# Patient Record
Sex: FEMALE | Race: BLACK OR AFRICAN AMERICAN | Employment: FULL TIME | URBAN - METROPOLITAN AREA
[De-identification: names, ages, dates, MRNs, and addresses within clinical notes are randomized per-mention and may not be internally consistent; named-entity substitution may affect disease eponyms.]

---

## 2018-03-01 ENCOUNTER — HOSPITAL ENCOUNTER (OUTPATIENT)
Dept: MAMMOGRAPHY | Age: 63
Discharge: HOME | End: 2018-03-01
Attending: INTERNAL MEDICINE | Admitting: INTERNAL MEDICINE

## 2018-03-01 DIAGNOSIS — Z12.31 VISIT FOR SCREENING MAMMOGRAM: ICD-10-CM

## 2018-12-04 ENCOUNTER — CLINICAL DOCUMENTATION (OUTPATIENT)
Dept: ONCOLOGY | Age: 63
End: 2018-12-04

## 2018-12-05 DIAGNOSIS — I10 ESSENTIAL HYPERTENSION: ICD-10-CM

## 2018-12-05 DIAGNOSIS — E11.9 DIABETES MELLITUS WITHOUT COMPLICATION (HCC): ICD-10-CM

## 2018-12-05 DIAGNOSIS — Z01.818 ENCOUNTER FOR PRE-TRANSPLANT EVALUATION FOR STEM CELL TRANSPLANT: ICD-10-CM

## 2018-12-05 DIAGNOSIS — E78.2 MIXED HYPERLIPIDEMIA: ICD-10-CM

## 2018-12-05 DIAGNOSIS — R06.02 SHORTNESS OF BREATH: ICD-10-CM

## 2018-12-05 DIAGNOSIS — C90.00 MULTIPLE MYELOMA, REMISSION STATUS UNSPECIFIED (HCC): Primary | ICD-10-CM

## 2018-12-24 ENCOUNTER — CLINICAL DOCUMENTATION (OUTPATIENT)
Dept: ONCOLOGY | Age: 63
End: 2018-12-24

## 2018-12-31 ENCOUNTER — HOSPITAL ENCOUNTER (OUTPATIENT)
Dept: NON INVASIVE DIAGNOSTICS | Age: 63
Discharge: HOME | End: 2018-12-31
Payer: COMMERCIAL

## 2018-12-31 ENCOUNTER — HOSPITAL ENCOUNTER (OUTPATIENT)
Dept: GENERAL RADIOLOGY | Age: 63
Discharge: HOME | End: 2018-12-31
Payer: COMMERCIAL

## 2018-12-31 ENCOUNTER — CLINICAL DOCUMENTATION (OUTPATIENT)
Dept: ONCOLOGY | Age: 63
End: 2018-12-31

## 2018-12-31 ENCOUNTER — HOSPITAL ENCOUNTER (OUTPATIENT)
Dept: ONCOLOGY | Age: 63
Setting detail: INFUSION SERIES
Discharge: HOME | End: 2018-12-31
Payer: COMMERCIAL

## 2018-12-31 ENCOUNTER — HOSPITAL ENCOUNTER (OUTPATIENT)
Dept: PULMONOLOGY | Age: 63
Discharge: HOME | End: 2018-12-31
Payer: COMMERCIAL

## 2018-12-31 VITALS
TEMPERATURE: 97.3 F | RESPIRATION RATE: 18 BRPM | WEIGHT: 178.35 LBS | HEART RATE: 75 BPM | SYSTOLIC BLOOD PRESSURE: 99 MMHG | DIASTOLIC BLOOD PRESSURE: 60 MMHG

## 2018-12-31 DIAGNOSIS — Z01.818 ENCOUNTER FOR PRE-TRANSPLANT EVALUATION FOR STEM CELL TRANSPLANT: ICD-10-CM

## 2018-12-31 DIAGNOSIS — E11.9 DIABETES MELLITUS WITHOUT COMPLICATION (HCC): ICD-10-CM

## 2018-12-31 DIAGNOSIS — R06.02 SHORTNESS OF BREATH: ICD-10-CM

## 2018-12-31 DIAGNOSIS — I10 ESSENTIAL HYPERTENSION: ICD-10-CM

## 2018-12-31 DIAGNOSIS — E78.2 MIXED HYPERLIPIDEMIA: ICD-10-CM

## 2018-12-31 DIAGNOSIS — C90.00 MULTIPLE MYELOMA, REMISSION STATUS UNSPECIFIED (HCC): ICD-10-CM

## 2018-12-31 LAB
A/G RATIO: 1.6 (ref 1.1–2.2)
ABO/RH: NORMAL
ALBUMIN SERPL-MCNC: 3.9 G/DL (ref 3.4–5)
ALP BLD-CCNC: 48 U/L (ref 40–129)
ALT SERPL-CCNC: 19 U/L (ref 10–40)
ANION GAP SERPL CALCULATED.3IONS-SCNC: 10 MMOL/L (ref 3–16)
ANTIBODY SCREEN: NORMAL
APTT: 30.4 SEC (ref 26–36)
AST SERPL-CCNC: 17 U/L (ref 15–37)
BASOPHILS ABSOLUTE: 0.1 K/UL (ref 0–0.2)
BASOPHILS RELATIVE PERCENT: 1 %
BILIRUB SERPL-MCNC: 0.3 MG/DL (ref 0–1)
BILIRUBIN DIRECT: <0.2 MG/DL (ref 0–0.3)
BILIRUBIN, INDIRECT: NORMAL MG/DL (ref 0–1)
BUN BLDV-MCNC: 13 MG/DL (ref 7–20)
CALCIUM SERPL-MCNC: 9.6 MG/DL (ref 8.3–10.6)
CHLORIDE BLD-SCNC: 102 MMOL/L (ref 99–110)
CO2: 29 MMOL/L (ref 21–32)
CREAT SERPL-MCNC: 0.9 MG/DL (ref 0.6–1.2)
EKG ATRIAL RATE: 60 BPM
EKG DIAGNOSIS: NORMAL
EKG P AXIS: 65 DEGREES
EKG P-R INTERVAL: 178 MS
EKG Q-T INTERVAL: 424 MS
EKG QRS DURATION: 88 MS
EKG QTC CALCULATION (BAZETT): 424 MS
EKG R AXIS: 5 DEGREES
EKG T AXIS: 41 DEGREES
EKG VENTRICULAR RATE: 60 BPM
EOSINOPHILS ABSOLUTE: 0.2 K/UL (ref 0–0.6)
EOSINOPHILS RELATIVE PERCENT: 3.6 %
GFR AFRICAN AMERICAN: >60
GFR NON-AFRICAN AMERICAN: >60
GLOBULIN: 2.5 G/DL
GLUCOSE BLD-MCNC: 121 MG/DL (ref 70–99)
HAV IGM SER IA-ACNC: NORMAL
HBV SURFACE AB TITR SER: <3.5 MIU/ML
HCT VFR BLD CALC: 39 % (ref 36–48)
HEMOGLOBIN: 12.3 G/DL (ref 12–16)
INR BLD: 1.04 (ref 0.86–1.14)
LACTATE DEHYDROGENASE: 199 U/L (ref 100–190)
LV EF: 63 %
LVEF MODALITY: NORMAL
LYMPHOCYTES ABSOLUTE: 1.6 K/UL (ref 1–5.1)
LYMPHOCYTES RELATIVE PERCENT: 26.7 %
MCH RBC QN AUTO: 28.3 PG (ref 26–34)
MCHC RBC AUTO-ENTMCNC: 31.6 G/DL (ref 31–36)
MCV RBC AUTO: 89.6 FL (ref 80–100)
MONOCYTES ABSOLUTE: 0.6 K/UL (ref 0–1.3)
MONOCYTES RELATIVE PERCENT: 10.8 %
NEUTROPHILS ABSOLUTE: 3.4 K/UL (ref 1.7–7.7)
NEUTROPHILS RELATIVE PERCENT: 57.9 %
PDW BLD-RTO: 16.3 % (ref 12.4–15.4)
PHOSPHORUS: 2.5 MG/DL (ref 2.5–4.9)
PLATELET # BLD: 250 K/UL (ref 135–450)
PMV BLD AUTO: 10.3 FL (ref 5–10.5)
POTASSIUM SERPL-SCNC: 3.2 MMOL/L (ref 3.5–5.1)
PROTHROMBIN TIME: 11.8 SEC (ref 9.8–13)
RBC # BLD: 4.36 M/UL (ref 4–5.2)
SICKLE CELL SCREEN: NEGATIVE
SODIUM BLD-SCNC: 141 MMOL/L (ref 136–145)
TOTAL PROTEIN: 6.4 G/DL (ref 6.4–8.2)
URIC ACID, SERUM: 2 MG/DL (ref 2.6–6)
WBC # BLD: 5.8 K/UL (ref 4–11)

## 2018-12-31 PROCEDURE — 85610 PROTHROMBIN TIME: CPT

## 2018-12-31 PROCEDURE — 82248 BILIRUBIN DIRECT: CPT

## 2018-12-31 PROCEDURE — 84100 ASSAY OF PHOSPHORUS: CPT

## 2018-12-31 PROCEDURE — 86706 HEP B SURFACE ANTIBODY: CPT

## 2018-12-31 PROCEDURE — 85660 RBC SICKLE CELL TEST: CPT

## 2018-12-31 PROCEDURE — 93306 TTE W/DOPPLER COMPLETE: CPT

## 2018-12-31 PROCEDURE — 83615 LACTATE (LD) (LDH) ENZYME: CPT

## 2018-12-31 PROCEDURE — 93010 ELECTROCARDIOGRAM REPORT: CPT | Performed by: INTERNAL MEDICINE

## 2018-12-31 PROCEDURE — 93005 ELECTROCARDIOGRAM TRACING: CPT

## 2018-12-31 PROCEDURE — 86850 RBC ANTIBODY SCREEN: CPT

## 2018-12-31 PROCEDURE — 86694 HERPES SIMPLEX NES ANTBDY: CPT

## 2018-12-31 PROCEDURE — 99201 HC NEW PT, OUTPT VISIT LEVEL 1: CPT | Performed by: NURSE PRACTITIONER

## 2018-12-31 PROCEDURE — 86901 BLOOD TYPING SEROLOGIC RH(D): CPT

## 2018-12-31 PROCEDURE — 80053 COMPREHEN METABOLIC PANEL: CPT

## 2018-12-31 PROCEDURE — 86709 HEPATITIS A IGM ANTIBODY: CPT

## 2018-12-31 PROCEDURE — 86644 CMV ANTIBODY: CPT

## 2018-12-31 PROCEDURE — 71046 X-RAY EXAM CHEST 2 VIEWS: CPT

## 2018-12-31 PROCEDURE — 86707 HEPATITIS BE ANTIBODY: CPT

## 2018-12-31 PROCEDURE — 85730 THROMBOPLASTIN TIME PARTIAL: CPT

## 2018-12-31 PROCEDURE — 85025 COMPLETE CBC W/AUTO DIFF WBC: CPT

## 2018-12-31 PROCEDURE — 86900 BLOOD TYPING SEROLOGIC ABO: CPT

## 2018-12-31 PROCEDURE — 82247 BILIRUBIN TOTAL: CPT

## 2018-12-31 PROCEDURE — 86645 CMV ANTIBODY IGM: CPT

## 2018-12-31 PROCEDURE — 84550 ASSAY OF BLOOD/URIC ACID: CPT

## 2019-01-01 LAB
CYTOMEGALOVIRUS IGG ANTIBODY: >10 U/ML
CYTOMEGALOVIRUS IGM ANTIBODY: <8 AU/ML
HEPATITIS BE ANTIBODY: NEGATIVE
HERPES TYPE 1/2 IGM COMBINED: 0.33 IV
HERPES TYPE I/II IGG COMBINED: >22.4 IV

## 2019-01-07 DIAGNOSIS — C90.01 MULTIPLE MYELOMA IN REMISSION (HCC): Primary | ICD-10-CM

## 2019-01-07 DIAGNOSIS — E78.2 MIXED HYPERLIPIDEMIA: ICD-10-CM

## 2019-01-07 DIAGNOSIS — R06.02 BREATH SHORTNESS: ICD-10-CM

## 2019-01-07 DIAGNOSIS — E11.9 DIABETES MELLITUS, STABLE (HCC): ICD-10-CM

## 2019-01-09 ENCOUNTER — CLINICAL DOCUMENTATION (OUTPATIENT)
Dept: ONCOLOGY | Age: 64
End: 2019-01-09

## 2019-01-09 ENCOUNTER — HOSPITAL ENCOUNTER (OUTPATIENT)
Dept: PULMONOLOGY | Age: 64
Discharge: HOME | End: 2019-01-09
Payer: COMMERCIAL

## 2019-01-09 PROCEDURE — 94640 AIRWAY INHALATION TREATMENT: CPT

## 2019-01-09 PROCEDURE — 94761 N-INVAS EAR/PLS OXIMETRY MLT: CPT

## 2019-01-09 PROCEDURE — 94060 EVALUATION OF WHEEZING: CPT

## 2019-01-09 PROCEDURE — 94726 PLETHYSMOGRAPHY LUNG VOLUMES: CPT

## 2019-01-09 PROCEDURE — 6360000002 HC RX W HCPCS: Performed by: INTERNAL MEDICINE

## 2019-01-09 PROCEDURE — 94729 DIFFUSING CAPACITY: CPT

## 2019-01-09 PROCEDURE — 94664 DEMO&/EVAL PT USE INHALER: CPT

## 2019-01-09 RX ORDER — ALBUTEROL SULFATE 2.5 MG/3ML
2.5 SOLUTION RESPIRATORY (INHALATION) ONCE
Status: COMPLETED | OUTPATIENT
Start: 2019-01-09 | End: 2019-01-09

## 2019-01-09 RX ADMIN — ALBUTEROL SULFATE 2.5 MG: 2.5 SOLUTION RESPIRATORY (INHALATION) at 13:35

## 2019-01-15 ENCOUNTER — HOSPITAL ENCOUNTER (OUTPATIENT)
Dept: ONCOLOGY | Age: 64
Setting detail: INFUSION SERIES
Discharge: HOME | End: 2019-01-15
Payer: COMMERCIAL

## 2019-01-15 NOTE — PROGRESS NOTES
FDA Donor labs drawn at [facility] and picked for transport to [facility].

## 2019-01-22 ENCOUNTER — TELEPHONE (OUTPATIENT)
Dept: SURGERY | Age: 64
End: 2019-01-22

## 2019-01-22 ENCOUNTER — CLINICAL DOCUMENTATION (OUTPATIENT)
Dept: ONCOLOGY | Age: 64
End: 2019-01-22

## 2019-01-22 RX ORDER — ACYCLOVIR 400 MG/1
1 TABLET ORAL 2 TIMES DAILY
Status: ON HOLD | COMMUNITY
Start: 2018-08-09 | End: 2019-02-18

## 2019-01-22 RX ORDER — AMLODIPINE BESYLATE 10 MG/1
TABLET ORAL
Status: ON HOLD | COMMUNITY
Start: 2018-03-13 | End: 2019-02-18 | Stop reason: HOSPADM

## 2019-01-22 RX ORDER — LISINOPRIL AND HYDROCHLOROTHIAZIDE 20; 12.5 MG/1; MG/1
2 TABLET ORAL
Status: ON HOLD | COMMUNITY
Start: 2018-11-26 | End: 2019-02-18 | Stop reason: HOSPADM

## 2019-01-22 RX ORDER — POTASSIUM CHLORIDE 750 MG/1
TABLET, FILM COATED, EXTENDED RELEASE ORAL
Refills: 3 | COMMUNITY
Start: 2019-01-08 | End: 2019-07-08 | Stop reason: SDUPTHER

## 2019-01-22 RX ORDER — PROCHLORPERAZINE MALEATE 10 MG
10 TABLET ORAL PRN
COMMUNITY
Start: 2018-08-29 | End: 2019-08-05 | Stop reason: ALTCHOICE

## 2019-01-22 RX ORDER — ROSUVASTATIN CALCIUM 20 MG/1
TABLET, COATED ORAL
Refills: 3 | COMMUNITY
Start: 2018-12-26 | End: 2019-06-11

## 2019-01-22 RX ORDER — LEVOFLOXACIN 500 MG/1
500 TABLET, FILM COATED ORAL PRN
Status: ON HOLD | COMMUNITY
Start: 2018-09-17 | End: 2019-02-05

## 2019-01-22 RX ORDER — LORATADINE 10 MG/1
10 TABLET ORAL PRN
COMMUNITY
End: 2019-06-11

## 2019-01-23 ENCOUNTER — ANESTHESIA EVENT (OUTPATIENT)
Dept: OPERATING ROOM | Age: 64
End: 2019-01-23
Payer: COMMERCIAL

## 2019-01-24 ENCOUNTER — APPOINTMENT (OUTPATIENT)
Dept: GENERAL RADIOLOGY | Age: 64
End: 2019-01-24
Attending: SURGERY
Payer: COMMERCIAL

## 2019-01-24 ENCOUNTER — ANESTHESIA (OUTPATIENT)
Dept: OPERATING ROOM | Age: 64
End: 2019-01-24
Payer: COMMERCIAL

## 2019-01-24 ENCOUNTER — HOSPITAL ENCOUNTER (OUTPATIENT)
Age: 64
Setting detail: OUTPATIENT SURGERY
Discharge: HOME | End: 2019-01-24
Attending: SURGERY | Admitting: SURGERY
Payer: COMMERCIAL

## 2019-01-24 VITALS — OXYGEN SATURATION: 94 % | DIASTOLIC BLOOD PRESSURE: 46 MMHG | SYSTOLIC BLOOD PRESSURE: 81 MMHG

## 2019-01-24 VITALS
SYSTOLIC BLOOD PRESSURE: 122 MMHG | RESPIRATION RATE: 18 BRPM | DIASTOLIC BLOOD PRESSURE: 72 MMHG | WEIGHT: 180 LBS | BODY MASS INDEX: 33.99 KG/M2 | OXYGEN SATURATION: 96 % | TEMPERATURE: 96.4 F | HEART RATE: 66 BPM | HEIGHT: 61 IN

## 2019-01-24 LAB
GLUCOSE BLD-MCNC: 100 MG/DL (ref 70–99)
GLUCOSE BLD-MCNC: 142 MG/DL (ref 70–99)
PERFORMED ON: ABNORMAL
PERFORMED ON: ABNORMAL
POTASSIUM SERPL-SCNC: 3.9 MMOL/L (ref 3.5–5.1)

## 2019-01-24 PROCEDURE — 3700000001 HC ADD 15 MINUTES (ANESTHESIA): Performed by: SURGERY

## 2019-01-24 PROCEDURE — 71045 X-RAY EXAM CHEST 1 VIEW: CPT

## 2019-01-24 PROCEDURE — 2709999900 HC NON-CHARGEABLE SUPPLY: Performed by: SURGERY

## 2019-01-24 PROCEDURE — 6360000002 HC RX W HCPCS: Performed by: SURGERY

## 2019-01-24 PROCEDURE — 7100000001 HC PACU RECOVERY - ADDTL 15 MIN: Performed by: SURGERY

## 2019-01-24 PROCEDURE — 2580000003 HC RX 258: Performed by: ANESTHESIOLOGY

## 2019-01-24 PROCEDURE — C1769 GUIDE WIRE: HCPCS | Performed by: SURGERY

## 2019-01-24 PROCEDURE — 3600000012 HC SURGERY LEVEL 2 ADDTL 15MIN: Performed by: SURGERY

## 2019-01-24 PROCEDURE — 2500000003 HC RX 250 WO HCPCS: Performed by: NURSE ANESTHETIST, CERTIFIED REGISTERED

## 2019-01-24 PROCEDURE — C1751 CATH, INF, PER/CENT/MIDLINE: HCPCS | Performed by: SURGERY

## 2019-01-24 PROCEDURE — 3600000002 HC SURGERY LEVEL 2 BASE: Performed by: SURGERY

## 2019-01-24 PROCEDURE — 77001 FLUOROGUIDE FOR VEIN DEVICE: CPT

## 2019-01-24 PROCEDURE — 84132 ASSAY OF SERUM POTASSIUM: CPT

## 2019-01-24 PROCEDURE — 7100000011 HC PHASE II RECOVERY - ADDTL 15 MIN: Performed by: SURGERY

## 2019-01-24 PROCEDURE — 36558 INSERT TUNNELED CV CATH: CPT | Performed by: SURGERY

## 2019-01-24 PROCEDURE — 2580000003 HC RX 258: Performed by: SURGERY

## 2019-01-24 PROCEDURE — 7100000000 HC PACU RECOVERY - FIRST 15 MIN: Performed by: SURGERY

## 2019-01-24 PROCEDURE — 6360000002 HC RX W HCPCS: Performed by: NURSE ANESTHETIST, CERTIFIED REGISTERED

## 2019-01-24 PROCEDURE — 7100000010 HC PHASE II RECOVERY - FIRST 15 MIN: Performed by: SURGERY

## 2019-01-24 PROCEDURE — 77001 FLUOROGUIDE FOR VEIN DEVICE: CPT | Performed by: SURGERY

## 2019-01-24 PROCEDURE — 3700000000 HC ANESTHESIA ATTENDED CARE: Performed by: SURGERY

## 2019-01-24 PROCEDURE — 2500000003 HC RX 250 WO HCPCS: Performed by: SURGERY

## 2019-01-24 RX ORDER — ONDANSETRON 2 MG/ML
4 INJECTION INTRAMUSCULAR; INTRAVENOUS
Status: DISCONTINUED | OUTPATIENT
Start: 2019-01-24 | End: 2019-01-24 | Stop reason: HOSPADM

## 2019-01-24 RX ORDER — PROPOFOL 10 MG/ML
INJECTION, EMULSION INTRAVENOUS CONTINUOUS PRN
Status: DISCONTINUED | OUTPATIENT
Start: 2019-01-24 | End: 2019-01-24 | Stop reason: SDUPTHER

## 2019-01-24 RX ORDER — SODIUM CHLORIDE, SODIUM LACTATE, POTASSIUM CHLORIDE, CALCIUM CHLORIDE 600; 310; 30; 20 MG/100ML; MG/100ML; MG/100ML; MG/100ML
INJECTION, SOLUTION INTRAVENOUS CONTINUOUS
Status: DISCONTINUED | OUTPATIENT
Start: 2019-01-24 | End: 2019-01-24 | Stop reason: HOSPADM

## 2019-01-24 RX ORDER — LIDOCAINE HYDROCHLORIDE 20 MG/ML
INJECTION, SOLUTION INFILTRATION; PERINEURAL PRN
Status: DISCONTINUED | OUTPATIENT
Start: 2019-01-24 | End: 2019-01-24 | Stop reason: SDUPTHER

## 2019-01-24 RX ORDER — MAGNESIUM HYDROXIDE 1200 MG/15ML
LIQUID ORAL CONTINUOUS PRN
Status: DISCONTINUED | OUTPATIENT
Start: 2019-01-24 | End: 2019-01-24 | Stop reason: HOSPADM

## 2019-01-24 RX ORDER — MEPERIDINE HYDROCHLORIDE 25 MG/ML
12.5 INJECTION INTRAMUSCULAR; INTRAVENOUS; SUBCUTANEOUS EVERY 5 MIN PRN
Status: DISCONTINUED | OUTPATIENT
Start: 2019-01-24 | End: 2019-01-24 | Stop reason: HOSPADM

## 2019-01-24 RX ORDER — FENTANYL CITRATE 50 UG/ML
50 INJECTION, SOLUTION INTRAMUSCULAR; INTRAVENOUS EVERY 5 MIN PRN
Status: DISCONTINUED | OUTPATIENT
Start: 2019-01-24 | End: 2019-01-24 | Stop reason: HOSPADM

## 2019-01-24 RX ORDER — SODIUM CHLORIDE 0.9 % (FLUSH) 0.9 %
10 SYRINGE (ML) INJECTION EVERY 12 HOURS SCHEDULED
Status: DISCONTINUED | OUTPATIENT
Start: 2019-01-24 | End: 2019-01-24 | Stop reason: HOSPADM

## 2019-01-24 RX ORDER — MORPHINE SULFATE 4 MG/ML
2 INJECTION, SOLUTION INTRAMUSCULAR; INTRAVENOUS EVERY 5 MIN PRN
Status: DISCONTINUED | OUTPATIENT
Start: 2019-01-24 | End: 2019-01-24 | Stop reason: HOSPADM

## 2019-01-24 RX ORDER — HEPARIN SODIUM (PORCINE) LOCK FLUSH IV SOLN 100 UNIT/ML 100 UNIT/ML
SOLUTION INTRAVENOUS PRN
Status: DISCONTINUED | OUTPATIENT
Start: 2019-01-24 | End: 2019-01-24 | Stop reason: HOSPADM

## 2019-01-24 RX ORDER — SODIUM CHLORIDE 0.9 % (FLUSH) 0.9 %
10 SYRINGE (ML) INJECTION PRN
Status: DISCONTINUED | OUTPATIENT
Start: 2019-01-24 | End: 2019-01-24 | Stop reason: HOSPADM

## 2019-01-24 RX ORDER — LABETALOL HYDROCHLORIDE 5 MG/ML
5 INJECTION, SOLUTION INTRAVENOUS EVERY 10 MIN PRN
Status: DISCONTINUED | OUTPATIENT
Start: 2019-01-24 | End: 2019-01-24 | Stop reason: HOSPADM

## 2019-01-24 RX ORDER — FENTANYL CITRATE 50 UG/ML
INJECTION, SOLUTION INTRAMUSCULAR; INTRAVENOUS PRN
Status: DISCONTINUED | OUTPATIENT
Start: 2019-01-24 | End: 2019-01-24 | Stop reason: SDUPTHER

## 2019-01-24 RX ADMIN — FENTANYL CITRATE 25 MCG: 50 INJECTION INTRAMUSCULAR; INTRAVENOUS at 07:37

## 2019-01-24 RX ADMIN — FENTANYL CITRATE 25 MCG: 50 INJECTION INTRAMUSCULAR; INTRAVENOUS at 07:45

## 2019-01-24 RX ADMIN — PROPOFOL 100 MCG/KG/MIN: 10 INJECTION, EMULSION INTRAVENOUS at 07:32

## 2019-01-24 RX ADMIN — SODIUM CHLORIDE, SODIUM LACTATE, POTASSIUM CHLORIDE, AND CALCIUM CHLORIDE: 600; 310; 30; 20 INJECTION, SOLUTION INTRAVENOUS at 07:18

## 2019-01-24 RX ADMIN — LIDOCAINE HYDROCHLORIDE 60 MG: 20 INJECTION, SOLUTION INFILTRATION; PERINEURAL at 07:32

## 2019-01-24 RX ADMIN — DEXTROSE MONOHYDRATE 1250 MG: 5 INJECTION, SOLUTION INTRAVENOUS at 06:57

## 2019-01-24 RX ADMIN — FENTANYL CITRATE 25 MCG: 50 INJECTION INTRAMUSCULAR; INTRAVENOUS at 07:41

## 2019-01-24 RX ADMIN — SODIUM CHLORIDE, SODIUM LACTATE, POTASSIUM CHLORIDE, AND CALCIUM CHLORIDE: 600; 310; 30; 20 INJECTION, SOLUTION INTRAVENOUS at 06:41

## 2019-01-24 ASSESSMENT — PULMONARY FUNCTION TESTS
PIF_VALUE: 1

## 2019-01-24 ASSESSMENT — PAIN SCALES - GENERAL
PAINLEVEL_OUTOF10: 0
PAINLEVEL_OUTOF10: 0

## 2019-01-24 ASSESSMENT — PAIN - FUNCTIONAL ASSESSMENT: PAIN_FUNCTIONAL_ASSESSMENT: 0-10

## 2019-01-24 ASSESSMENT — PAIN DESCRIPTION - ORIENTATION: ORIENTATION: LEFT

## 2019-01-24 ASSESSMENT — PAIN DESCRIPTION - LOCATION: LOCATION: CHEST

## 2019-01-24 ASSESSMENT — PAIN DESCRIPTION - PAIN TYPE: TYPE: SURGICAL PAIN

## 2019-01-29 ENCOUNTER — HOSPITAL ENCOUNTER (OUTPATIENT)
Dept: ONCOLOGY | Age: 64
Setting detail: INFUSION SERIES
Discharge: HOME | End: 2019-01-29
Payer: COMMERCIAL

## 2019-01-29 VITALS
HEART RATE: 86 BPM | SYSTOLIC BLOOD PRESSURE: 126 MMHG | TEMPERATURE: 98.5 F | RESPIRATION RATE: 18 BRPM | DIASTOLIC BLOOD PRESSURE: 77 MMHG

## 2019-01-29 DIAGNOSIS — C90.00 MULTIPLE MYELOMA NOT HAVING ACHIEVED REMISSION (HCC): ICD-10-CM

## 2019-01-29 LAB
A/G RATIO: 1.4 (ref 1.1–2.2)
ALBUMIN SERPL-MCNC: 4.2 G/DL (ref 3.4–5)
ALP BLD-CCNC: 270 U/L (ref 40–129)
ALT SERPL-CCNC: 15 U/L (ref 10–40)
ANION GAP SERPL CALCULATED.3IONS-SCNC: 13 MMOL/L (ref 3–16)
AST SERPL-CCNC: 27 U/L (ref 15–37)
ATYPICAL LYMPHOCYTE RELATIVE PERCENT: 7 % (ref 0–6)
BANDED NEUTROPHILS RELATIVE PERCENT: 18 % (ref 0–7)
BASOPHILS ABSOLUTE: 0 K/UL (ref 0–0.2)
BASOPHILS RELATIVE PERCENT: 0 %
BILIRUB SERPL-MCNC: 0.3 MG/DL (ref 0–1)
BUN BLDV-MCNC: 13 MG/DL (ref 7–20)
CALCIUM SERPL-MCNC: 10.1 MG/DL (ref 8.3–10.6)
CHLORIDE BLD-SCNC: 100 MMOL/L (ref 99–110)
CO2: 28 MMOL/L (ref 21–32)
CREAT SERPL-MCNC: 0.9 MG/DL (ref 0.6–1.2)
DOHLE BODIES: PRESENT
EOSINOPHILS ABSOLUTE: 0.5 K/UL (ref 0–0.6)
EOSINOPHILS RELATIVE PERCENT: 1 %
GFR AFRICAN AMERICAN: >60
GFR NON-AFRICAN AMERICAN: >60
GLOBULIN: 3 G/DL
GLUCOSE BLD-MCNC: 108 MG/DL (ref 70–99)
HCT VFR BLD CALC: 38.3 % (ref 36–48)
HCT VFR BLD CALC: 38.7 % (ref 36–48)
HEMATOLOGY PATH CONSULT: YES
HEMOGLOBIN: 12.2 G/DL (ref 12–16)
HEMOGLOBIN: 12.5 G/DL (ref 12–16)
LYMPHOCYTES ABSOLUTE: 9.2 K/UL (ref 1–5.1)
LYMPHOCYTES RELATIVE PERCENT: 13 %
MAGNESIUM: 2.2 MG/DL (ref 1.8–2.4)
MCH RBC QN AUTO: 28.2 PG (ref 26–34)
MCH RBC QN AUTO: 28.5 PG (ref 26–34)
MCHC RBC AUTO-ENTMCNC: 31.8 G/DL (ref 31–36)
MCHC RBC AUTO-ENTMCNC: 32.3 G/DL (ref 31–36)
MCV RBC AUTO: 88.4 FL (ref 80–100)
MCV RBC AUTO: 88.6 FL (ref 80–100)
METAMYELOCYTES RELATIVE PERCENT: 1 %
MONOCYTES ABSOLUTE: 2.7 K/UL (ref 0–1.3)
MONOCYTES RELATIVE PERCENT: 6 %
NEUTROPHILS ABSOLUTE: 33.4 K/UL (ref 1.7–7.7)
NEUTROPHILS RELATIVE PERCENT: 54 %
PDW BLD-RTO: 17.7 % (ref 12.4–15.4)
PDW BLD-RTO: 17.8 % (ref 12.4–15.4)
PLATELET # BLD: 106 K/UL (ref 135–450)
PLATELET # BLD: 205 K/UL (ref 135–450)
PLATELET SLIDE REVIEW: ADEQUATE
PMV BLD AUTO: 6.6 FL (ref 5–10.5)
PMV BLD AUTO: 7.7 FL (ref 5–10.5)
POTASSIUM SERPL-SCNC: 3.8 MMOL/L (ref 3.5–5.1)
RBC # BLD: 4.33 M/UL (ref 4–5.2)
RBC # BLD: 4.38 M/UL (ref 4–5.2)
SODIUM BLD-SCNC: 141 MMOL/L (ref 136–145)
TOTAL PROTEIN: 7.2 G/DL (ref 6.4–8.2)
WBC # BLD: 41.3 K/UL (ref 4–11)
WBC # BLD: 45.8 K/UL (ref 4–11)

## 2019-01-29 PROCEDURE — 83735 ASSAY OF MAGNESIUM: CPT

## 2019-01-29 PROCEDURE — 36592 COLLECT BLOOD FROM PICC: CPT

## 2019-01-29 PROCEDURE — 96372 THER/PROPH/DIAG INJ SC/IM: CPT

## 2019-01-29 PROCEDURE — 38206 HARVEST AUTO STEM CELLS: CPT

## 2019-01-29 PROCEDURE — 6360000002 HC RX W HCPCS: Performed by: INTERNAL MEDICINE

## 2019-01-29 PROCEDURE — 96365 THER/PROPH/DIAG IV INF INIT: CPT

## 2019-01-29 PROCEDURE — 85025 COMPLETE CBC W/AUTO DIFF WBC: CPT

## 2019-01-29 PROCEDURE — 80053 COMPREHEN METABOLIC PANEL: CPT

## 2019-01-29 PROCEDURE — 2580000003 HC RX 258: Performed by: INTERNAL MEDICINE

## 2019-01-29 PROCEDURE — 38207 CRYOPRESERVE STEM CELLS: CPT

## 2019-01-29 PROCEDURE — 85027 COMPLETE CBC AUTOMATED: CPT

## 2019-01-29 PROCEDURE — 96366 THER/PROPH/DIAG IV INF ADDON: CPT

## 2019-01-29 RX ORDER — PROCHLORPERAZINE MALEATE 10 MG
10 TABLET ORAL EVERY 6 HOURS PRN
Status: CANCELLED | OUTPATIENT
Start: 2019-01-29

## 2019-01-29 RX ORDER — LOPERAMIDE HYDROCHLORIDE 2 MG/1
2 CAPSULE ORAL PRN
Status: CANCELLED | OUTPATIENT
Start: 2019-01-29

## 2019-01-29 RX ORDER — WATER 1000 ML/1000ML
2.2 INJECTION, SOLUTION INTRAVENOUS PRN
Status: CANCELLED | OUTPATIENT
Start: 2019-01-29

## 2019-01-29 RX ADMIN — TBO-FILGRASTIM 900 MCG: 300 INJECTION, SOLUTION SUBCUTANEOUS at 06:55

## 2019-01-29 RX ADMIN — CALCIUM GLUCONATE 4 G: 98 INJECTION, SOLUTION INTRAVENOUS at 08:02

## 2019-01-29 NOTE — PROGRESS NOTES
PROCEDURE: Autologous stem cell collection in anticipation of autologous  stem cell transplant. Diagnosis:  Mult Myeloma    Date:  1/29/19    Subjective:  No complaints this morning except mild indigestion. Seen on apheresis and tolerating well. Laura Ardon REVIEW OF SYSTEMS:  The remainder of his 10-point is negative. PHYSICAL EXAMINATION:   GENERAL APPEARANCE: He is alert and oriented in no distress. VITAL SIGNS: Within normal limits. HEENT: Pupils are equal and reactive. RESPIRATORY: Clear to auscultation bilaterally. CARDIOVASCULAR: Regular rate and rhythm. No rubs or murmurs. ABDOMEN: Nontender. EXTREMITIES: No edema. SKIN: No rash or dermatitis. LABORATORIES: Reviewed. DESCRIPTION OF PROCEDURE: Patient underwent a 20 liter collection at a flow  rate of 100 mL per minute. The procedure was tolerated well. ASSESSMENT AND PLAN: Day #   of stem cell collection. Goal is 2.5-5.0. CD-34 cells/kg. Collection will be done today and possibly tomorrow dependoing on CD-34 count from today.    =====================  Bhavya Pastures.  Zaina Baer, [address]

## 2019-01-29 NOTE — PROGRESS NOTES
Pt arrived to [facility] today for scheduled stem cell  phoresis procedure. Granix was administered sq to abdomen, as noted in orders. After the hour wait period,   ECP, line care, education, & lab draws all completed by Rashmi RN, Malik Amaro. Review Rashmi documentation for details. Patient understands that she will receive a call this afternoon from [facility]- as to whether she has completed her stem cell collection. She will return tomorrow for either Day 2 collections, or line care and dressing change. Discharged ambulatory to home with daughter.

## 2019-01-30 ENCOUNTER — CLINICAL DOCUMENTATION (OUTPATIENT)
Dept: ONCOLOGY | Age: 64
End: 2019-01-30

## 2019-01-30 ENCOUNTER — HOSPITAL ENCOUNTER (OUTPATIENT)
Dept: ONCOLOGY | Age: 64
Setting detail: INFUSION SERIES
Discharge: HOME | End: 2019-01-30
Payer: COMMERCIAL

## 2019-01-30 VITALS
TEMPERATURE: 98.3 F | RESPIRATION RATE: 18 BRPM | DIASTOLIC BLOOD PRESSURE: 78 MMHG | SYSTOLIC BLOOD PRESSURE: 118 MMHG | HEART RATE: 83 BPM

## 2019-01-30 DIAGNOSIS — C90.00 MULTIPLE MYELOMA NOT HAVING ACHIEVED REMISSION (HCC): ICD-10-CM

## 2019-01-30 LAB
A/G RATIO: 1.6 (ref 1.1–2.2)
ALBUMIN SERPL-MCNC: 3.9 G/DL (ref 3.4–5)
ALP BLD-CCNC: 418 U/L (ref 40–129)
ALT SERPL-CCNC: 14 U/L (ref 10–40)
ANION GAP SERPL CALCULATED.3IONS-SCNC: 15 MMOL/L (ref 3–16)
ANISOCYTOSIS: ABNORMAL
AST SERPL-CCNC: 22 U/L (ref 15–37)
ATYPICAL LYMPHOCYTE RELATIVE PERCENT: 1 % (ref 0–6)
BANDED NEUTROPHILS RELATIVE PERCENT: 2 % (ref 0–7)
BASOPHILS ABSOLUTE: 0 K/UL (ref 0–0.2)
BASOPHILS RELATIVE PERCENT: 0 %
BILIRUB SERPL-MCNC: 0.3 MG/DL (ref 0–1)
BUN BLDV-MCNC: 12 MG/DL (ref 7–20)
CALCIUM SERPL-MCNC: 9.3 MG/DL (ref 8.3–10.6)
CHLORIDE BLD-SCNC: 104 MMOL/L (ref 99–110)
CO2: 27 MMOL/L (ref 21–32)
CREAT SERPL-MCNC: 0.9 MG/DL (ref 0.6–1.2)
EOSINOPHILS ABSOLUTE: 0 K/UL (ref 0–0.6)
EOSINOPHILS RELATIVE PERCENT: 0 %
GFR AFRICAN AMERICAN: >60
GFR NON-AFRICAN AMERICAN: >60
GLOBULIN: 2.4 G/DL
GLUCOSE BLD-MCNC: 149 MG/DL (ref 70–99)
HCT VFR BLD CALC: 35.3 % (ref 36–48)
HCT VFR BLD CALC: 36.9 % (ref 36–48)
HEMOGLOBIN: 11 G/DL (ref 12–16)
HEMOGLOBIN: 11.7 G/DL (ref 12–16)
LYMPHOCYTES ABSOLUTE: 3.2 K/UL (ref 1–5.1)
LYMPHOCYTES RELATIVE PERCENT: 7 %
MCH RBC QN AUTO: 28.1 PG (ref 26–34)
MCH RBC QN AUTO: 28.2 PG (ref 26–34)
MCHC RBC AUTO-ENTMCNC: 31.2 G/DL (ref 31–36)
MCHC RBC AUTO-ENTMCNC: 31.7 G/DL (ref 31–36)
MCV RBC AUTO: 88.8 FL (ref 80–100)
MCV RBC AUTO: 90 FL (ref 80–100)
MONOCYTES ABSOLUTE: 2 K/UL (ref 0–1.3)
MONOCYTES RELATIVE PERCENT: 5 %
NEUTROPHILS ABSOLUTE: 34.3 K/UL (ref 1.7–7.7)
NEUTROPHILS RELATIVE PERCENT: 85 %
PDW BLD-RTO: 17.4 % (ref 12.4–15.4)
PDW BLD-RTO: 17.5 % (ref 12.4–15.4)
PLATELET # BLD: 52 K/UL (ref 135–450)
PLATELET # BLD: 93 K/UL (ref 135–450)
PLATELET SLIDE REVIEW: ABNORMAL
PMV BLD AUTO: 6.1 FL (ref 5–10.5)
PMV BLD AUTO: 7.1 FL (ref 5–10.5)
POTASSIUM SERPL-SCNC: 4 MMOL/L (ref 3.5–5.1)
RBC # BLD: 3.92 M/UL (ref 4–5.2)
RBC # BLD: 4.15 M/UL (ref 4–5.2)
SODIUM BLD-SCNC: 146 MMOL/L (ref 136–145)
TOTAL PROTEIN: 6.3 G/DL (ref 6.4–8.2)
WBC # BLD: 36.6 K/UL (ref 4–11)
WBC # BLD: 39.4 K/UL (ref 4–11)

## 2019-01-30 PROCEDURE — 80053 COMPREHEN METABOLIC PANEL: CPT

## 2019-01-30 PROCEDURE — 96365 THER/PROPH/DIAG IV INF INIT: CPT

## 2019-01-30 PROCEDURE — 85027 COMPLETE CBC AUTOMATED: CPT

## 2019-01-30 PROCEDURE — 36592 COLLECT BLOOD FROM PICC: CPT

## 2019-01-30 PROCEDURE — 85025 COMPLETE CBC W/AUTO DIFF WBC: CPT

## 2019-01-30 PROCEDURE — 6360000002 HC RX W HCPCS: Performed by: INTERNAL MEDICINE

## 2019-01-30 PROCEDURE — 96372 THER/PROPH/DIAG INJ SC/IM: CPT

## 2019-01-30 PROCEDURE — 2580000003 HC RX 258: Performed by: INTERNAL MEDICINE

## 2019-01-30 PROCEDURE — 96366 THER/PROPH/DIAG IV INF ADDON: CPT

## 2019-01-30 PROCEDURE — 38207 CRYOPRESERVE STEM CELLS: CPT

## 2019-01-30 PROCEDURE — 38206 HARVEST AUTO STEM CELLS: CPT

## 2019-01-30 RX ORDER — PROCHLORPERAZINE MALEATE 10 MG
10 TABLET ORAL EVERY 6 HOURS PRN
Status: CANCELLED | OUTPATIENT
Start: 2019-01-30

## 2019-01-30 RX ORDER — LOPERAMIDE HYDROCHLORIDE 2 MG/1
2 CAPSULE ORAL PRN
Status: DISCONTINUED | OUTPATIENT
Start: 2019-01-30 | End: 2019-01-31 | Stop reason: HOSPADM

## 2019-01-30 RX ORDER — PROCHLORPERAZINE MALEATE 10 MG
10 TABLET ORAL EVERY 6 HOURS PRN
Status: DISCONTINUED | OUTPATIENT
Start: 2019-01-30 | End: 2019-01-31 | Stop reason: HOSPADM

## 2019-01-30 RX ORDER — LOPERAMIDE HYDROCHLORIDE 2 MG/1
2 CAPSULE ORAL PRN
Status: CANCELLED | OUTPATIENT
Start: 2019-01-30

## 2019-01-30 RX ADMIN — TBO-FILGRASTIM 900 MCG: 300 INJECTION, SOLUTION SUBCUTANEOUS at 07:08

## 2019-01-30 RX ADMIN — CALCIUM GLUCONATE 4 G: 98 INJECTION, SOLUTION INTRAVENOUS at 08:19

## 2019-01-30 NOTE — PROGRESS NOTES
PROCEDURE: Autologous stem cell collection in anticipation of autologous  stem cell transplant. Diagnosis:  Mult Myeloma    Date:  1/30/19    Subjective:  No complaints this morning except mild indigestion. Seen on apheresis and tolerating well. Yesterday collected 3.3 x 10^6 CD34+ cells/kg  . REVIEW OF SYSTEMS:  The remainder of her 10-point is negative. PHYSICAL EXAMINATION:   GENERAL APPEARANCE: He is alert and oriented in no distress. VITAL SIGNS: Within normal limits. HEENT: Pupils are equal and reactive. RESPIRATORY: Clear to auscultation bilaterally. CARDIOVASCULAR: Regular rate and rhythm. No rubs or murmurs. ABDOMEN: Nontender. EXTREMITIES: No edema. SKIN: No rash or dermatitis. LABORATORIES: Reviewed. DESCRIPTION OF PROCEDURE: Patient underwent a 20 liter collection at a flow  rate of 100 mL per minute. The procedure was tolerated well. ASSESSMENT AND PLAN: Day # 2  of stem cell collection. Goal is 5.0. CD-34 cells/kg. Collection will be done today and possibly tomorrow depending on CD-34 count from today.    =====================  Francisco Son.  Smith Edwards, [address] [place] [address]

## 2019-01-30 NOTE — PROGRESS NOTES
Patient arrived ambulatory to OP Infusion. Granix 900 mcg given at 0708 (see MAR) which she tolerated well. VSS. Denies c/o. Scheduled for apheresis at 0800. Will continue to monitor.

## 2019-01-30 NOTE — PLAN OF CARE
Problem: KNOWLEDGE DEFICIT  Goal: Patient/S.O. demonstrates understanding of disease process, treatment plan, medications, and discharge instructions. Outcome: Ongoing  Pt arrived to O[facility] today for scheduled stem cell pheresis procedure. Apheresis, line care, education, & lab draws all completed by Rashmi RN, Jaya Ch. Patient seen by Dr. Frank Hays. Review Rashmi documentation for details. Post cbc within normal range. Patient verbalized understanding that she will receive a phone call this evening from [facility] as to next appt. D/C'd ambulatory family.

## 2019-01-31 ENCOUNTER — HOSPITAL ENCOUNTER (OUTPATIENT)
Dept: ONCOLOGY | Age: 64
Setting detail: INFUSION SERIES
End: 2019-01-31
Payer: COMMERCIAL

## 2019-01-31 ENCOUNTER — HOSPITAL ENCOUNTER (OUTPATIENT)
Dept: ONCOLOGY | Age: 64
Setting detail: INFUSION SERIES
Discharge: HOME | End: 2019-01-31
Payer: COMMERCIAL

## 2019-01-31 VITALS
TEMPERATURE: 98 F | HEART RATE: 80 BPM | DIASTOLIC BLOOD PRESSURE: 68 MMHG | SYSTOLIC BLOOD PRESSURE: 117 MMHG | RESPIRATION RATE: 18 BRPM

## 2019-01-31 PROCEDURE — 6360000002 HC RX W HCPCS: Performed by: INTERNAL MEDICINE

## 2019-01-31 PROCEDURE — 99211 OFF/OP EST MAY X REQ PHY/QHP: CPT

## 2019-01-31 RX ORDER — HEPARIN SODIUM (PORCINE) LOCK FLUSH IV SOLN 100 UNIT/ML 100 UNIT/ML
300 SOLUTION INTRAVENOUS PRN
Status: DISCONTINUED | OUTPATIENT
Start: 2019-01-31 | End: 2019-02-01 | Stop reason: HOSPADM

## 2019-01-31 RX ADMIN — Medication 300 UNITS: at 13:26

## 2019-01-31 RX ADMIN — Medication 300 UNITS: at 13:24

## 2019-02-04 ENCOUNTER — CLINICAL DOCUMENTATION (OUTPATIENT)
Dept: ONCOLOGY | Age: 64
End: 2019-02-04

## 2019-02-04 NOTE — H&P
[facility] History and Physical        Attending Physician: Brian Villarreal MD    Primary Care: Kendal Flannery, APRN - CNP       Referring MD: Jorge Luis Varela MD  [address] [place] [address]    Name: Angela Baldwin :  1955  MRN:  [id number]    Admission:  19    Date:  19    Reason for Admission: High dose Melphalan (200 mg/m^2) preparative regimen followed by Autologous Stem Cell Transplant for IgG Lambda Multiple Myeloma     History of Present Illness: This is a 72-year-old female who was diagnosed w/ IgG Lambda Multiple Myeloma (R-ISS Stage I) in 2018. Her past medical history is also significant for HTN, HLD & Type 2 DM. Her baseline myeloma labs when diagnosed (18) showed M-spike 2.47, IgG 3560, Hgb 13.2, SCr 0.54 & Ca 9.2. Serum free light chains (18) showed kappa 1.46, lambda 13.7 w/ ratio 0.107. Her B2MG 2.6. PET scan (18) was negative, but BM bx/asp (18) showed 60% cellular marrow w/ 60% plasma cells. The cytogenetics were normal, but FISH was abnormal w/ hyperdiploidy 5, 9, 15 and del 13q, del 17, IgH heavy chain rearranged and dup 1q. She began treatment w/ RVd under the care of Dr. Jorge Luis Varela on 19. She has tolerated chemotherapy well except for conjunctivitis. She has completed 5 cycles of RVd (last cycle 18) and achieved a VGPR. She is now being admitted for high dose Melphalan and autologous stem cell rescue. She will receive 2.71 x10^6 [id number]/kg in 490 mL on 19. She feels well today and is without complaints. She denies headache, mouth pain, shortness of breath, chest pain, cough, nausea, vomiting, diarrhea, numbness, tingling, dizziness, fever, chills or sweats or pain.             Pre Transplant Workup:        Pre Transplant labs:     2018   Herpes Type 1/2 IgM Combined 0.33   Hep A IgM Non-reactive   Hep B E Ab Negative   Hep B S Ab <3.50      2018    Herpes Type I/II IgG Combined >22.40      12/31/2018 1/9/2019    CMV IgG >10.00    CMV IgM <8.0    VARICELLA ZOSTER AB IGM  0.00   VARICELLA ZOSTER AB IGG  1578       Past Surgical History:   Procedure Laterality Date    HYSTERECTOMY      partial for fibroids    INSERTION / REMOVAL / REPLACEMENT VENOUS ACCESS CATHETER N/A 1/24/2019    TRIFUSION CATHETER INSERTION LEFT SUBCLAVIAN performed by Matilda Oglesby MD at [address]         Past Medical History:   Diagnosis Date    Cancer [facility])     Multiple myeloma    Diabetes mellitus ([facility] [address]     Hyperlipidemia     Hypertension        Prior to Admission medications    Medication Sig Start Date End Date Taking?  Authorizing Provider   rosuvastatin (CRESTOR) 20 MG tablet Daily at PM 12/26/18  Yes Historical Provider, MD   potassium chloride (KLOR-CON) 10 MEQ extended release tablet TK 2 TS PO BID, 1/8/19  Yes Historical Provider, MD   metFORMIN (GLUCOPHAGE) 500 MG tablet Once daily with Breakfast 12/26/18  Yes Historical Provider, MD   loratadine (CLARITIN) 10 MG tablet Take 10 mg by mouth as needed    Yes Historical Provider, MD   lisinopril-hydrochlorothiazide (PRINZIDE;ZESTORETIC) 20-12.5 MG per tablet Take 2 tablets by mouth Takes 2 tabs in the morning 11/26/18  Yes Historical Provider, MD   Cholecalciferol (VITAMIN D3) 1000 units CAPS Take 2 capsules by mouth   Yes Historical Provider, MD   Calcium Carbonate-Vitamin D (CALCIUM-CARB 600 + D) 600-125 MG-UNIT TABS Take 1 tablet by mouth 2 times daily  8/9/18  Yes Historical Provider, MD   amLODIPine (NORVASC) 10 MG tablet Take by mouth 3/13/18  Yes Historical Provider, MD   acyclovir (ZOVIRAX) 400 MG tablet Take 1 tablet by mouth 2 times daily  8/9/18  Yes Historical Provider, MD   prochlorperazine (COMPAZINE) 10 MG tablet Take 10 mg by mouth 8/29/18   Historical Provider, MD       Allergies   Allergen Reactions    Pcn [Penicillins]     Lipitor [Atorvastatin] Other (See Comments)     Leg cramping       No family history on file.     Social History     Social History    Marital status:      Spouse name: N/A    Number of children: N/A    Years of education: N/A     Occupational History    Not on file. Social History Main Topics    Smoking status: Former Smoker     Packs/day: 0.50     Years: 20.00     Types: Cigarettes     Quit date: 11/22/2018    Smokeless tobacco: Never Used    Alcohol use Yes      Comment: rare    Drug use: No    Sexual activity: Not on file     Other Topics Concern    Not on file     Social History Narrative    No narrative on file        ROS:  As noted above, otherwise remainder of 10-point ROS negative      Physical Exam:     Vital Signs:  /66   Pulse 83   Temp 98.5 °F (36.9 °C) (Oral)   Resp 18   Ht 5' 1\" (1.549 m)   Wt 178 lb 6 oz (80.9 kg)   SpO2 94%   BMI 33.70 kg/m²     Weight:    Wt Readings from Last 3 Encounters:   02/05/19 178 lb 6 oz (80.9 kg)   01/24/19 180 lb (81.6 kg)   12/31/18 178 lb 5.6 oz (80.9 kg)       General: Awake, alert and oriented.   HEENT: normocephalic, alopecia, PERRL, no scleral erythema or icterus, Oral mucosa moist and intact, throat clear  NECK: supple without palpable adenopathy  BACK: Straight negative CVAT  SKIN: warm dry and intact without lesions rashes or masses  CHEST: CTA bilaterally without use of accessory muscles  CV: Normal S1 S2, RRR, no MRG  ABD: NT ND normoactive BS, no palpable masses or hepatosplenomegaly  EXTREMITIES: without edema, healing abrasion on left calf, denies calf tenderness  NEURO: CN II - XII grossly intact  CATHETER: Left SC Trifusion (1/24/19, Barrat) - CDI w/ open skin tears from bandage    Laboratory Data:  CBC:   Recent Labs      02/05/19   1013   WBC  3.9*   HGB  11.9*   HCT  36.7   MCV  87.4   PLT  97*     BMP/Mag:  Recent Labs      02/05/19   1013   NA  142   K  3.6   CL  103   CO2  29   PHOS  3.3   BUN  16   CREATININE  0.9   MG  2.20     LIVP:   Recent Labs      02/05/19   1013   AST  29   ALT  52*   BILIDIR <0. 2   BILITOT  <0.2   ALKPHOS  124     Coags:   Recent Labs      02/05/19   1013   PROTIME  11.3   INR  0.99   APTT  34.5     Uric Acid   Recent Labs      02/05/19   1013   LABURIC  4.2       PROBLEM LIST:            1. IgG Lambda Multiple Myeloma, R-ISS Stage I  2. HTN  3. HLD  4. Type 2 DM  5. GERD  6. Tobacco Use (quit 12/2018)      TREATMENT:            1.  RVd x 5 cycles  2. High dose Melpahlan (200 mg/m^2) and ASCT (2/7/19)     ASSESSMENT AND PLAN:            1. IgG Lambda Multiple Myeloma:  She is currently in VGPR  - Admit for high dose Melpahlan (200 mg/m^2) and ASCT  - She will receive 2.71x10^6 [id number]/kg on 2/7/19    Dr. Bharati Fischer has discussed the risks associated with transplant which include the risk of infection, bleeding and inability to obtain a long term remission. He understands these risks and is willing to proceed. The consent is signed and on the chart. Day - 2    2. ID:  Afebrile, no evidence of infection.    - Begin Valtrex, cont acyclovir at discharge for VZV positive titer.    - Start Diflucan prophylaxis on 2/7/19   - Start Levaquin when [address] < 1.5    3. Heme:  Blood counts stable  - Transfuse for Hgb < 7 and Platelets < 51U  - No transfusion today    4. Metabolic:  Electrolytes are WNL and renal fxn stable. - Cont KCl 20 meq bid  - Start IVF:  D5.45NS + KCl 10 meq @ 150 mL/hr   - Replace potassium and magnesium per policy. 5.  GI / Nutrition:  Appetite and oral intake is good. - Start low microbial diet   - Follow closely with dietary    6. Type 2 DM: She will likely develop hyperglycemia d/t steroids  - Hold oral diabetes medications  - Start Lispro SSI, medium regimen, AC&HS     7.  Cardiac:  Stable  HLD:  Stable  - Hold Crestor while inpatient, resume on d/c  HTN:  Stable  - Hold HCTZ while inpatient, will give intermittent Lasix if needed  - Cont Norvasc 10 mg daily and Lisinopril 40 mg daily    8.   Bone Health:  Stable  - Hold Calcium and Vit D supplements while inpatient, resume on d/c  - Resume bisphosphonate after discharged       - DVT Prophylaxis: Platelets >58,120 cells/dL, - daily lovenox prophylaxis ordered  Contraindications to pharmacologic prophylaxis: None  Contraindications to mechanical prophylaxis: None    - Disposition:  Once ANC > 1.0 and toxicities from transplant resolved    The patient was seen and examined by Dr. Hammad Leon. This admission history and physical has been discussed and agreed upon by Dr. Hammad Leon.     Margarita Koenig, APRN - CNP

## 2019-02-05 ENCOUNTER — HOSPITAL ENCOUNTER (INPATIENT)
Age: 64
LOS: 13 days | Discharge: HOME | DRG: 016 | End: 2019-02-18
Attending: INTERNAL MEDICINE | Admitting: INTERNAL MEDICINE
Payer: COMMERCIAL

## 2019-02-05 PROBLEM — C90.01 MULTIPLE MYELOMA IN REMISSION (HCC): Status: ACTIVE | Noted: 2019-02-05

## 2019-02-05 LAB
ALBUMIN SERPL-MCNC: 4.1 G/DL (ref 3.4–5)
ALP BLD-CCNC: 124 U/L (ref 40–129)
ALT SERPL-CCNC: 52 U/L (ref 10–40)
ANION GAP SERPL CALCULATED.3IONS-SCNC: 10 MMOL/L (ref 3–16)
APTT: 34.5 SEC (ref 26–36)
AST SERPL-CCNC: 29 U/L (ref 15–37)
BASOPHILS ABSOLUTE: 0 K/UL (ref 0–0.2)
BASOPHILS RELATIVE PERCENT: 0.6 %
BILIRUB SERPL-MCNC: <0.2 MG/DL (ref 0–1)
BILIRUBIN DIRECT: <0.2 MG/DL (ref 0–0.3)
BILIRUBIN URINE: NEGATIVE
BILIRUBIN, INDIRECT: ABNORMAL MG/DL (ref 0–1)
BLOOD, URINE: NEGATIVE
BUN BLDV-MCNC: 16 MG/DL (ref 7–20)
CALCIUM SERPL-MCNC: 9.6 MG/DL (ref 8.3–10.6)
CHLORIDE BLD-SCNC: 103 MMOL/L (ref 99–110)
CLARITY: CLEAR
CO2: 29 MMOL/L (ref 21–32)
COLOR: YELLOW
CREAT SERPL-MCNC: 0.9 MG/DL (ref 0.6–1.2)
EKG ATRIAL RATE: 76 BPM
EKG DIAGNOSIS: NORMAL
EKG P AXIS: 60 DEGREES
EKG P-R INTERVAL: 178 MS
EKG Q-T INTERVAL: 384 MS
EKG QRS DURATION: 88 MS
EKG QTC CALCULATION (BAZETT): 432 MS
EKG R AXIS: -41 DEGREES
EKG T AXIS: 41 DEGREES
EKG VENTRICULAR RATE: 76 BPM
EOSINOPHILS ABSOLUTE: 0.1 K/UL (ref 0–0.6)
EOSINOPHILS RELATIVE PERCENT: 1.9 %
GFR AFRICAN AMERICAN: >60
GFR NON-AFRICAN AMERICAN: >60
GLUCOSE BLD-MCNC: 113 MG/DL (ref 70–99)
GLUCOSE BLD-MCNC: 319 MG/DL (ref 70–99)
GLUCOSE URINE: NEGATIVE MG/DL
HCT VFR BLD CALC: 36.7 % (ref 36–48)
HEMOGLOBIN: 11.9 G/DL (ref 12–16)
INR BLD: 0.99 (ref 0.86–1.14)
KETONES, URINE: NEGATIVE MG/DL
LACTATE DEHYDROGENASE: 205 U/L (ref 100–190)
LEUKOCYTE ESTERASE, URINE: NEGATIVE
LYMPHOCYTES ABSOLUTE: 0.6 K/UL (ref 1–5.1)
LYMPHOCYTES RELATIVE PERCENT: 14.5 %
MAGNESIUM: 2.2 MG/DL (ref 1.8–2.4)
MCH RBC QN AUTO: 28.3 PG (ref 26–34)
MCHC RBC AUTO-ENTMCNC: 32.3 G/DL (ref 31–36)
MCV RBC AUTO: 87.4 FL (ref 80–100)
MICROSCOPIC EXAMINATION: NORMAL
MONOCYTES ABSOLUTE: 0.4 K/UL (ref 0–1.3)
MONOCYTES RELATIVE PERCENT: 11.5 %
NEUTROPHILS ABSOLUTE: 2.8 K/UL (ref 1.7–7.7)
NEUTROPHILS RELATIVE PERCENT: 71.5 %
NITRITE, URINE: NEGATIVE
PDW BLD-RTO: 17.1 % (ref 12.4–15.4)
PERFORMED ON: ABNORMAL
PH UA: 6.5
PHOSPHORUS: 3.3 MG/DL (ref 2.5–4.9)
PLATELET # BLD: 97 K/UL (ref 135–450)
PMV BLD AUTO: 8.6 FL (ref 5–10.5)
POTASSIUM SERPL-SCNC: 3.6 MMOL/L (ref 3.5–5.1)
PROTEIN UA: NEGATIVE MG/DL
PROTHROMBIN TIME: 11.3 SEC (ref 9.8–13)
RBC # BLD: 4.2 M/UL (ref 4–5.2)
SODIUM BLD-SCNC: 142 MMOL/L (ref 136–145)
SPECIFIC GRAVITY UA: 1.01
TOTAL PROTEIN: 6.5 G/DL (ref 6.4–8.2)
URIC ACID, SERUM: 4.2 MG/DL (ref 2.6–6)
URINE TYPE: NORMAL
UROBILINOGEN, URINE: 0.2 E.U./DL
WBC # BLD: 3.9 K/UL (ref 4–11)

## 2019-02-05 PROCEDURE — 85730 THROMBOPLASTIN TIME PARTIAL: CPT

## 2019-02-05 PROCEDURE — 94760 N-INVAS EAR/PLS OXIMETRY 1: CPT

## 2019-02-05 PROCEDURE — 2580000003 HC RX 258: Performed by: INTERNAL MEDICINE

## 2019-02-05 PROCEDURE — 94150 VITAL CAPACITY TEST: CPT

## 2019-02-05 PROCEDURE — 80076 HEPATIC FUNCTION PANEL: CPT

## 2019-02-05 PROCEDURE — 96413 CHEMO IV INFUSION 1 HR: CPT

## 2019-02-05 PROCEDURE — 2580000003 HC RX 258: Performed by: NURSE PRACTITIONER

## 2019-02-05 PROCEDURE — 85025 COMPLETE CBC W/AUTO DIFF WBC: CPT

## 2019-02-05 PROCEDURE — 6360000002 HC RX W HCPCS: Performed by: INTERNAL MEDICINE

## 2019-02-05 PROCEDURE — 94664 DEMO&/EVAL PT USE INHALER: CPT

## 2019-02-05 PROCEDURE — 87081 CULTURE SCREEN ONLY: CPT

## 2019-02-05 PROCEDURE — 83615 LACTATE (LD) (LDH) ENZYME: CPT

## 2019-02-05 PROCEDURE — 93010 ELECTROCARDIOGRAM REPORT: CPT | Performed by: INTERNAL MEDICINE

## 2019-02-05 PROCEDURE — 36592 COLLECT BLOOD FROM PICC: CPT

## 2019-02-05 PROCEDURE — 2500000003 HC RX 250 WO HCPCS: Performed by: NURSE PRACTITIONER

## 2019-02-05 PROCEDURE — 2060000000 HC ICU INTERMEDIATE R&B

## 2019-02-05 PROCEDURE — 80048 BASIC METABOLIC PNL TOTAL CA: CPT

## 2019-02-05 PROCEDURE — 93005 ELECTROCARDIOGRAM TRACING: CPT | Performed by: NURSE PRACTITIONER

## 2019-02-05 PROCEDURE — 6370000000 HC RX 637 (ALT 250 FOR IP): Performed by: INTERNAL MEDICINE

## 2019-02-05 PROCEDURE — 85610 PROTHROMBIN TIME: CPT

## 2019-02-05 PROCEDURE — 84550 ASSAY OF BLOOD/URIC ACID: CPT

## 2019-02-05 PROCEDURE — 84100 ASSAY OF PHOSPHORUS: CPT

## 2019-02-05 PROCEDURE — 81003 URINALYSIS AUTO W/O SCOPE: CPT

## 2019-02-05 PROCEDURE — 6370000000 HC RX 637 (ALT 250 FOR IP): Performed by: NURSE PRACTITIONER

## 2019-02-05 PROCEDURE — 83735 ASSAY OF MAGNESIUM: CPT

## 2019-02-05 RX ORDER — CETIRIZINE HYDROCHLORIDE 10 MG/1
10 TABLET ORAL DAILY
Status: DISCONTINUED | OUTPATIENT
Start: 2019-02-05 | End: 2019-02-05

## 2019-02-05 RX ORDER — DEXTROSE MONOHYDRATE 50 MG/ML
100 INJECTION, SOLUTION INTRAVENOUS PRN
Status: DISCONTINUED | OUTPATIENT
Start: 2019-02-05 | End: 2019-02-18 | Stop reason: HOSPADM

## 2019-02-05 RX ORDER — MAGNESIUM SULFATE IN WATER 40 MG/ML
4 INJECTION, SOLUTION INTRAVENOUS PRN
Status: DISCONTINUED | OUTPATIENT
Start: 2019-02-05 | End: 2019-02-18 | Stop reason: HOSPADM

## 2019-02-05 RX ORDER — ONDANSETRON HYDROCHLORIDE 8 MG/1
24 TABLET, FILM COATED ORAL ONCE
Status: COMPLETED | OUTPATIENT
Start: 2019-02-05 | End: 2019-02-05

## 2019-02-05 RX ORDER — PROCHLORPERAZINE MALEATE 10 MG
10 TABLET ORAL EVERY 4 HOURS PRN
Status: DISCONTINUED | OUTPATIENT
Start: 2019-02-05 | End: 2019-02-18 | Stop reason: HOSPADM

## 2019-02-05 RX ORDER — DEXAMETHASONE 4 MG/1
12 TABLET ORAL ONCE
Status: COMPLETED | OUTPATIENT
Start: 2019-02-06 | End: 2019-02-06

## 2019-02-05 RX ORDER — FLUCONAZOLE 200 MG/1
400 TABLET ORAL DAILY
Status: DISCONTINUED | OUTPATIENT
Start: 2019-02-07 | End: 2019-02-18

## 2019-02-05 RX ORDER — NICOTINE POLACRILEX 4 MG
15 LOZENGE BUCCAL PRN
Status: DISCONTINUED | OUTPATIENT
Start: 2019-02-05 | End: 2019-02-18 | Stop reason: HOSPADM

## 2019-02-05 RX ORDER — DEXTROSE MONOHYDRATE 25 G/50ML
12.5 INJECTION, SOLUTION INTRAVENOUS PRN
Status: DISCONTINUED | OUTPATIENT
Start: 2019-02-05 | End: 2019-02-18 | Stop reason: HOSPADM

## 2019-02-05 RX ORDER — POTASSIUM CHLORIDE 29.8 MG/ML
20 INJECTION INTRAVENOUS PRN
Status: DISCONTINUED | OUTPATIENT
Start: 2019-02-05 | End: 2019-02-18 | Stop reason: HOSPADM

## 2019-02-05 RX ORDER — SODIUM CHLORIDE 0.9 % (FLUSH) 0.9 %
10 SYRINGE (ML) INJECTION EVERY 12 HOURS SCHEDULED
Status: DISCONTINUED | OUTPATIENT
Start: 2019-02-05 | End: 2019-02-18 | Stop reason: HOSPADM

## 2019-02-05 RX ORDER — ONDANSETRON HYDROCHLORIDE 8 MG/1
24 TABLET, FILM COATED ORAL ONCE
Status: COMPLETED | OUTPATIENT
Start: 2019-02-06 | End: 2019-02-06

## 2019-02-05 RX ORDER — SODIUM CHLORIDE 9 MG/ML
INJECTION, SOLUTION INTRAVENOUS CONTINUOUS PRN
Status: DISCONTINUED | OUTPATIENT
Start: 2019-02-05 | End: 2019-02-07 | Stop reason: SDUPTHER

## 2019-02-05 RX ORDER — FUROSEMIDE 10 MG/ML
40 INJECTION INTRAMUSCULAR; INTRAVENOUS EVERY 12 HOURS
Status: DISCONTINUED | OUTPATIENT
Start: 2019-02-06 | End: 2019-02-11

## 2019-02-05 RX ORDER — LORAZEPAM 2 MG/ML
0.5 INJECTION INTRAMUSCULAR EVERY 4 HOURS PRN
Status: DISCONTINUED | OUTPATIENT
Start: 2019-02-05 | End: 2019-02-18 | Stop reason: HOSPADM

## 2019-02-05 RX ORDER — LORAZEPAM 0.5 MG/1
0.5 TABLET ORAL EVERY 4 HOURS PRN
Status: DISCONTINUED | OUTPATIENT
Start: 2019-02-05 | End: 2019-02-18 | Stop reason: HOSPADM

## 2019-02-05 RX ORDER — POTASSIUM CHLORIDE 600 MG/1
20 TABLET, FILM COATED, EXTENDED RELEASE ORAL 2 TIMES DAILY
Status: DISCONTINUED | OUTPATIENT
Start: 2019-02-05 | End: 2019-02-12

## 2019-02-05 RX ORDER — DEXTROSE AND SODIUM CHLORIDE 5; .45 G/100ML; G/100ML
1000 INJECTION, SOLUTION INTRAVENOUS CONTINUOUS
Status: DISPENSED | OUTPATIENT
Start: 2019-02-05 | End: 2019-02-05

## 2019-02-05 RX ORDER — DEXAMETHASONE 4 MG/1
12 TABLET ORAL ONCE
Status: COMPLETED | OUTPATIENT
Start: 2019-02-05 | End: 2019-02-05

## 2019-02-05 RX ORDER — AMLODIPINE BESYLATE 10 MG/1
10 TABLET ORAL DAILY
Status: DISCONTINUED | OUTPATIENT
Start: 2019-02-06 | End: 2019-02-12

## 2019-02-05 RX ORDER — DEXTROSE, SODIUM CHLORIDE, AND POTASSIUM CHLORIDE 5; .45; .075 G/100ML; G/100ML; G/100ML
INJECTION INTRAVENOUS CONTINUOUS
Status: DISCONTINUED | OUTPATIENT
Start: 2019-02-05 | End: 2019-02-14

## 2019-02-05 RX ORDER — VALACYCLOVIR HYDROCHLORIDE 500 MG/1
500 TABLET, FILM COATED ORAL 2 TIMES DAILY
Status: DISCONTINUED | OUTPATIENT
Start: 2019-02-05 | End: 2019-02-18 | Stop reason: HOSPADM

## 2019-02-05 RX ORDER — LISINOPRIL 40 MG/1
40 TABLET ORAL DAILY
Status: DISCONTINUED | OUTPATIENT
Start: 2019-02-05 | End: 2019-02-12

## 2019-02-05 RX ADMIN — POTASSIUM CHLORIDE 20 MEQ: 600 TABLET, FILM COATED, EXTENDED RELEASE ORAL at 20:49

## 2019-02-05 RX ADMIN — VALACYCLOVIR HYDROCHLORIDE 500 MG: 500 TABLET, FILM COATED ORAL at 20:49

## 2019-02-05 RX ADMIN — POTASSIUM CHLORIDE, DEXTROSE MONOHYDRATE AND SODIUM CHLORIDE: 75; 5; 450 INJECTION, SOLUTION INTRAVENOUS at 20:38

## 2019-02-05 RX ADMIN — DEXTROSE AND SODIUM CHLORIDE 1000 ML: 5; 450 INJECTION, SOLUTION INTRAVENOUS at 10:42

## 2019-02-05 RX ADMIN — Medication 10 ML: at 20:50

## 2019-02-05 RX ADMIN — SODIUM CHLORIDE 150 MG: 900 INJECTION, SOLUTION INTRAVENOUS at 14:26

## 2019-02-05 RX ADMIN — VALACYCLOVIR HYDROCHLORIDE 500 MG: 500 TABLET, FILM COATED ORAL at 10:41

## 2019-02-05 RX ADMIN — ONDANSETRON HYDROCHLORIDE 24 MG: 8 TABLET, FILM COATED ORAL at 14:26

## 2019-02-05 RX ADMIN — INSULIN LISPRO 4 UNITS: 100 INJECTION, SOLUTION INTRAVENOUS; SUBCUTANEOUS at 20:50

## 2019-02-05 RX ADMIN — SODIUM CHLORIDE 15 ML: 900 IRRIGANT IRRIGATION at 20:51

## 2019-02-05 RX ADMIN — POTASSIUM CHLORIDE, DEXTROSE MONOHYDRATE AND SODIUM CHLORIDE: 75; 5; 450 INJECTION, SOLUTION INTRAVENOUS at 13:46

## 2019-02-05 RX ADMIN — DEXAMETHASONE 12 MG: 4 TABLET ORAL at 14:26

## 2019-02-05 RX ADMIN — Medication 10 ML: at 09:54

## 2019-02-05 RX ADMIN — SODIUM CHLORIDE: 9 INJECTION, SOLUTION INTRAVENOUS at 14:26

## 2019-02-05 RX ADMIN — SODIUM CHLORIDE 15 ML: 900 IRRIGANT IRRIGATION at 17:47

## 2019-02-05 RX ADMIN — MELPHALAN HYDROCHLORIDE 200 MG: KIT at 15:01

## 2019-02-05 ASSESSMENT — PAIN SCALES - GENERAL
PAINLEVEL_OUTOF10: 0

## 2019-02-05 NOTE — PROGRESS NOTES
4 Eyes Admission Assessment     I agree as the admission nurse that 2 RN's have performed a thorough Head to Toe Skin Assessment on the patient. ALL assessment sites listed below have been assessed on admission. Areas assessed by both nurses: Ingrid Smith RN and Stephy Garces RN  [x]   Head, Face, and Ears   [x]   Shoulders, Back, and Chest  [x]   Arms, Elbows, and Hands   [x]   Coccyx, Sacrum, and Ischum  [x]   Legs, Feet, and Heels        Does the Patient have Skin Breakdown? Yes a wound was noted on the Admission Assessment and an LDA was Initiated documentation include the Geneva-wound, Wound Assessment, Measurements, Dressing Treatment, Drainage, and Color\",         Eddy Prevention initiated:  NA   Wound Care Orders initiated:  NA      WOC nurse consulted for Pressure Injury (Stage 3,4, Unstageable, DTI, NWPT, and Complex wounds):  NA      Nurse 1 eSignature: Electronically signed by Mer Nagel RN on 2/5/19 at 2:47 PM        Nurse 2 eSignature: Electronically signed by Manny Navarrete RN on 2/5/19 at 2:52 PM

## 2019-02-05 NOTE — PROGRESS NOTES
Original chemotherapy orders reviewed and acknowledged. Appropriateness of chemotherapy treatment regimen Melphelan for diagnosis of Multiple Myelomawas verified. Patient educated on chemotherapy regimen. Consent for chemotherapy obtained. Estimated body surface area is 1.87 meters squared as calculated from the following:    Height as of this encounter: 5' 1\" (1.549 m). Weight as of this encounter: 178 lb 6 oz (80.9 kg). verified. Appropriate dosing calculations of chemotherapy based on above height, weight, and BSA verified.       Jeana Nava  2:48 PM

## 2019-02-05 NOTE — BH NOTE
about 15 minutes from this facility. Her sister Monica Goodson will be the weekday caregiver, in charge of driving and daily needs, and her daughter Leila and her son will be moving in with her and take care of her needs in the evening and on weekends. 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 She gets 4 weeks PTO and no short-term disability or additional sick time. When she doesnt work, she is not paid. In addition, her insurance premium is $262 per month. I gave her links to [employer] financial assistance programs and encouraged her to enroll now while they are still open. Quality of Life Issues (Body Image, hair loss, sexual functioning):  Mrs. Maldonado complains of low energy and weakness but not much else. She indicated that she has sufficient support to meet her needs. History/Current or Projected Mental Health Issues/substance use:  Mrs. Sven Pena denies any history of depression or anxiety except during situations of loss or stress in the past.  She said dealing with her s death was difficult but she managed without intervention. She says she stopped drinking after her   but she did unfortunately  smoking cigarettes in her 44s. She says she quit a month ago and smoked about a half pack per day. Assessment and Plan:  Mrs. Gonzalez concerns about her financial solvency are the only psychosocial concerns about her candidacy. She will likely be able to get significant grants for her medical expenses and health insurance premiums through [employer] and we may be able to supply her with some additional funds to help her manage the gap between her PTO and her return to work. She has adequate caregiver support and both daughters and the sister who will be her  and daily caregiver, Monica Goodson, were present at her last appointment with Dr. Aye Coleman.   She will receive routine distress screening and intervention as is needed.     Deng Shankar.MARIZA, ABPP

## 2019-02-05 NOTE — PROGRESS NOTES
Administration: Chemotherapy drug Melphalan  independently verified with Geovanny Brewer RN prior to administration. Acknowledgement of informed consent for chemotherapy administration verified. Original order, appropriateness of regimen, drug supplied, height, weight, BSA, dose calculations, expiration dates/times, drug appearance, and two patient identifiers were verified by both RNs. Drug checked for vesicant/irritant status and for risk of hypersensitivity. Most recent laboratory values and allergies, were reviewed. Positive, brisk blood return via CVC was confirmed prior to administration. Chest x-ray for correct line placement reviewed. Sahil Martinez and Jacquelyn ULLOA verified correct rate of chemotherapy and maintenance IV fluids. Patient was educated on chemotherapy regimen prior to administration including indication for treatment related to disease & side effects of chemotherapy drug. Patient verbalizes understanding of all instructions. Completion of Chemotherapy: Monitoring during infusion done per policy, see Flowsheets. Blood return verified before, during, and after infusion per policy; no signs of extravasation. Pt tolerated chemotherapy well and without incident. Chemotherapy infusion end time on the [facility]. Will continue to monitor.

## 2019-02-05 NOTE — CARE COORDINATION
Type of Admission  Multiple Myeloma IgG  Melphalan Auto SCT ( T:0--->2/7/19)  Day-2        Central venous catheter  Left SC Tri Fusion ( 1/24/19, Dr. Bobo Friday)        Plan  Melphalan Auto SCT for treatment of Multiple Myeloma        Update  2/5/19: Planned admission for Melphalan Auto SCT. Multiple family members presents during admission process. Education  2/5/19:  Patient and caregiver have been through the educational process for autologus bone marrow transplantation including preadmission teaching and preadmission physician office visit with Princess Luis F ULLOA [facility] Coordinator     Patient and caregiver verbalize understanding of treatment regimen, possible adverse events, length of stay, and risk and benefits of transplantation and are agreeable to proceed. Patient and caregiver have been given an opportunity to ask, and to have their questions answered to their satisfaction. Documentation for above can be found in the Oncology Hematology [address] office chart. 2/5/19:  Reviewed infusion of stem cells, approximate length of time, pre-medication, telemetry during infusion. Discussed criteria for discharge & day+5 begin daily Granix until [address] recovers.         Discharge          Pending

## 2019-02-06 LAB
ALBUMIN SERPL-MCNC: 3.8 G/DL (ref 3.4–5)
ALP BLD-CCNC: 99 U/L (ref 40–129)
ALT SERPL-CCNC: 38 U/L (ref 10–40)
ANION GAP SERPL CALCULATED.3IONS-SCNC: 14 MMOL/L (ref 3–16)
ANISOCYTOSIS: ABNORMAL
AST SERPL-CCNC: 18 U/L (ref 15–37)
BANDED NEUTROPHILS RELATIVE PERCENT: 2 % (ref 0–7)
BASOPHILS ABSOLUTE: 0 K/UL (ref 0–0.2)
BASOPHILS RELATIVE PERCENT: 0 %
BILIRUB SERPL-MCNC: <0.2 MG/DL (ref 0–1)
BILIRUBIN DIRECT: <0.2 MG/DL (ref 0–0.3)
BILIRUBIN, INDIRECT: ABNORMAL MG/DL (ref 0–1)
BUN BLDV-MCNC: 16 MG/DL (ref 7–20)
CALCIUM SERPL-MCNC: 8.1 MG/DL (ref 8.3–10.6)
CHLORIDE BLD-SCNC: 105 MMOL/L (ref 99–110)
CO2: 26 MMOL/L (ref 21–32)
CREAT SERPL-MCNC: 0.8 MG/DL (ref 0.6–1.2)
EOSINOPHILS ABSOLUTE: 0 K/UL (ref 0–0.6)
EOSINOPHILS RELATIVE PERCENT: 0 %
GFR AFRICAN AMERICAN: >60
GFR NON-AFRICAN AMERICAN: >60
GLUCOSE BLD-MCNC: 129 MG/DL (ref 70–99)
GLUCOSE BLD-MCNC: 148 MG/DL (ref 70–99)
GLUCOSE BLD-MCNC: 176 MG/DL (ref 70–99)
GLUCOSE BLD-MCNC: 183 MG/DL (ref 70–99)
GLUCOSE BLD-MCNC: 248 MG/DL (ref 70–99)
HCT VFR BLD CALC: 33 % (ref 36–48)
HEMOGLOBIN: 10.9 G/DL (ref 12–16)
LACTATE DEHYDROGENASE: 182 U/L (ref 100–190)
LYMPHOCYTES ABSOLUTE: 0.2 K/UL (ref 1–5.1)
LYMPHOCYTES RELATIVE PERCENT: 6 %
MCH RBC QN AUTO: 28.8 PG (ref 26–34)
MCHC RBC AUTO-ENTMCNC: 32.9 G/DL (ref 31–36)
MCV RBC AUTO: 87.4 FL (ref 80–100)
MONOCYTES ABSOLUTE: 0 K/UL (ref 0–1.3)
MONOCYTES RELATIVE PERCENT: 1 %
NEUTROPHILS ABSOLUTE: 2.7 K/UL (ref 1.7–7.7)
NEUTROPHILS RELATIVE PERCENT: 91 %
PDW BLD-RTO: 17 % (ref 12.4–15.4)
PERFORMED ON: ABNORMAL
PHOSPHORUS: 2.6 MG/DL (ref 2.5–4.9)
PLATELET # BLD: 109 K/UL (ref 135–450)
PMV BLD AUTO: 8.2 FL (ref 5–10.5)
POLYCHROMASIA: ABNORMAL
POTASSIUM SERPL-SCNC: 3.8 MMOL/L (ref 3.5–5.1)
RBC # BLD: 3.77 M/UL (ref 4–5.2)
SODIUM BLD-SCNC: 145 MMOL/L (ref 136–145)
TOTAL PROTEIN: 5.9 G/DL (ref 6.4–8.2)
URIC ACID, SERUM: 3.5 MG/DL (ref 2.6–6)
WBC # BLD: 2.9 K/UL (ref 4–11)

## 2019-02-06 PROCEDURE — 36592 COLLECT BLOOD FROM PICC: CPT

## 2019-02-06 PROCEDURE — 2060000000 HC ICU INTERMEDIATE R&B

## 2019-02-06 PROCEDURE — 6360000002 HC RX W HCPCS: Performed by: INTERNAL MEDICINE

## 2019-02-06 PROCEDURE — 84550 ASSAY OF BLOOD/URIC ACID: CPT

## 2019-02-06 PROCEDURE — 84100 ASSAY OF PHOSPHORUS: CPT

## 2019-02-06 PROCEDURE — 2580000003 HC RX 258: Performed by: NURSE PRACTITIONER

## 2019-02-06 PROCEDURE — 6370000000 HC RX 637 (ALT 250 FOR IP): Performed by: NURSE PRACTITIONER

## 2019-02-06 PROCEDURE — 6370000000 HC RX 637 (ALT 250 FOR IP): Performed by: INTERNAL MEDICINE

## 2019-02-06 PROCEDURE — 83615 LACTATE (LD) (LDH) ENZYME: CPT

## 2019-02-06 PROCEDURE — 80076 HEPATIC FUNCTION PANEL: CPT

## 2019-02-06 PROCEDURE — 2500000003 HC RX 250 WO HCPCS: Performed by: INTERNAL MEDICINE

## 2019-02-06 PROCEDURE — 80048 BASIC METABOLIC PNL TOTAL CA: CPT

## 2019-02-06 PROCEDURE — 85025 COMPLETE CBC W/AUTO DIFF WBC: CPT

## 2019-02-06 PROCEDURE — 2500000003 HC RX 250 WO HCPCS: Performed by: NURSE PRACTITIONER

## 2019-02-06 RX ADMIN — POTASSIUM CHLORIDE, DEXTROSE MONOHYDRATE AND SODIUM CHLORIDE: 75; 5; 450 INJECTION, SOLUTION INTRAVENOUS at 21:12

## 2019-02-06 RX ADMIN — SODIUM CHLORIDE 15 ML: 900 IRRIGANT IRRIGATION at 16:54

## 2019-02-06 RX ADMIN — POTASSIUM CHLORIDE 20 MEQ: 600 TABLET, FILM COATED, EXTENDED RELEASE ORAL at 07:39

## 2019-02-06 RX ADMIN — MELPHALAN HYDROCHLORIDE 150 MG: KIT at 10:06

## 2019-02-06 RX ADMIN — DEXAMETHASONE 12 MG: 4 TABLET ORAL at 09:29

## 2019-02-06 RX ADMIN — VALACYCLOVIR HYDROCHLORIDE 500 MG: 500 TABLET, FILM COATED ORAL at 07:39

## 2019-02-06 RX ADMIN — SODIUM CHLORIDE 15 ML: 900 IRRIGANT IRRIGATION at 10:18

## 2019-02-06 RX ADMIN — ONDANSETRON HYDROCHLORIDE 24 MG: 8 TABLET, FILM COATED ORAL at 09:29

## 2019-02-06 RX ADMIN — POTASSIUM CHLORIDE, DEXTROSE MONOHYDRATE AND SODIUM CHLORIDE: 75; 5; 450 INJECTION, SOLUTION INTRAVENOUS at 10:10

## 2019-02-06 RX ADMIN — LISINOPRIL 40 MG: 40 TABLET ORAL at 07:48

## 2019-02-06 RX ADMIN — POTASSIUM CHLORIDE 20 MEQ: 600 TABLET, FILM COATED, EXTENDED RELEASE ORAL at 21:12

## 2019-02-06 RX ADMIN — Medication 10 ML: at 08:56

## 2019-02-06 RX ADMIN — POTASSIUM CHLORIDE, DEXTROSE MONOHYDRATE AND SODIUM CHLORIDE: 75; 5; 450 INJECTION, SOLUTION INTRAVENOUS at 03:12

## 2019-02-06 RX ADMIN — INSULIN LISPRO 4 UNITS: 100 INJECTION, SOLUTION INTRAVENOUS; SUBCUTANEOUS at 16:52

## 2019-02-06 RX ADMIN — SODIUM CHLORIDE 15 ML: 900 IRRIGANT IRRIGATION at 23:05

## 2019-02-06 RX ADMIN — AMLODIPINE BESYLATE 10 MG: 10 TABLET ORAL at 07:48

## 2019-02-06 RX ADMIN — ENOXAPARIN SODIUM 40 MG: 40 INJECTION SUBCUTANEOUS at 18:11

## 2019-02-06 RX ADMIN — INSULIN LISPRO 2 UNITS: 100 INJECTION, SOLUTION INTRAVENOUS; SUBCUTANEOUS at 07:21

## 2019-02-06 RX ADMIN — Medication 10 ML: at 21:13

## 2019-02-06 RX ADMIN — SODIUM CHLORIDE 15 ML: 900 IRRIGANT IRRIGATION at 07:49

## 2019-02-06 RX ADMIN — VALACYCLOVIR HYDROCHLORIDE 500 MG: 500 TABLET, FILM COATED ORAL at 21:12

## 2019-02-06 RX ADMIN — INSULIN LISPRO 1 UNITS: 100 INJECTION, SOLUTION INTRAVENOUS; SUBCUTANEOUS at 21:17

## 2019-02-06 ASSESSMENT — PAIN SCALES - GENERAL
PAINLEVEL_OUTOF10: 0

## 2019-02-06 NOTE — CARE COORDINATION
both and that it is asked that she complete a Durable Power of  for Jac Nieves prior to transplant. Transplant Recovery Plan:  Patients sister Haley Armendariz is disabled and she will be able to help out whenever patients daughters are not able to. Rosanne Rowe and Tiffani Tony are all able to drive. As stated above, patient told psychologist that Tiffani Tony and her son are moving in to assist.  Tiffani Tony will be working. Impression:  Patient has good support in her family, Jasper Abad is not concerned regarding care givers or transportation. Finances will likely get tight for patient during this time. SW will assist as able. Plan of Care  SW will monitor for discharge and psychosocial needs.     ANA Cote, [address] [address] [facility] [address]  [phone]

## 2019-02-06 NOTE — PLAN OF CARE
Problem: Falls - Risk of:  Goal: Will remain free from falls  Will remain free from falls   Outcome: Ongoing  Orthostatic vital signs obtained at start of shift - see flowsheet for details. Pt does not meet criteria for orthostasis. Pt is a Med fall risk. See Diana Merriman Fall Score and ABCDS Injury Risk assessments. - Screening for Orthostasis AND not a [place] Risk per AYERS/ABCDS: Pt bed is in low position, side rails up, call light and belongings are in reach. Fall risk light is on outside pts room. Pt encouraged to call for assistance as needed. Will continue with hourly rounds for PO intake, pain needs, toileting and repositioning as needed. Problem: Venous Thromboembolism:  Goal: Will show no signs or symptoms of venous thromboembolism  Will show no signs or symptoms of venous thromboembolism   Outcome: Ongoing  Refusing DVT Prevention: Pt is at risk for DVT d/t decreased mobility and cancer treatment. Pt educated on importance of activity. Pt has orders for SCDs while in bed, however pt currently refusing treatment. Reviewed risks of DVT & PE development while inpatient. Provider aware of patient's refusal and re-education of importance of prophylaxis. No new orders at this time. Will continue to re-instruct patient and intervene as appropriate. Problem: Bleeding:  Goal: Will show no signs and symptoms of excessive bleeding  Will show no signs and symptoms of excessive bleeding   Outcome: Ongoing  Patient's hemoglobin this AM:   Recent Labs      02/06/19   0315   HGB  10.9*     Patient's platelet count this AM:   Recent Labs      02/06/19   0315   PLT  109*    Thrombocytopenia Precautions in place. Patient showing no signs or symptoms of active bleeding. Transfusion not indicated at this time. Patient verbalizes understanding of all instructions. Will continue to assess and implement POC. Call light within reach and hourly rounding in place.      Problem: Infection - Central Venous Catheter-Associated Bloodstream Infection:  Goal: Will show no infection signs and symptoms  Will show no infection signs and symptoms   Outcome: Ongoing  CVC site remains free of signs/symptoms of infection. No drainage, edema, erythema, pain, or warmth noted at site. Dressing changes continue per protocol and on an as needed basis - see flowsheet. Problem: PROTECTIVE PRECAUTIONS  Goal: Patient will remain free of nosocomial Infections  Outcome: Ongoing  Pt, staff, and visitors adhering to handwashing guidelines. Pt showers daily with chlorhexidine and linens changed daily per protocol. Pt verbalizes understanding of low microbial diet. Will continue to monitor.

## 2019-02-06 NOTE — PLAN OF CARE
Problem: Falls - Risk of:  Goal: Will remain free from falls  Will remain free from falls   Outcome: Ongoing  Orthostatic vital signs obtained at start of shift - see flowsheet for details. Pt does not meet criteria for orthostasis. Pt is a Med fall risk. See Bunny Argue Fall Score and ABCDS Injury Risk assessments. Pt bed is in low position, side rails up, call light and belongings are in reach. Fall risk light is on outside pts room. Pt encouraged to call for assistance as needed. Will continue with hourly rounds for PO intake, pain needs, toileting and repositioning as needed.      Problem: Venous Thromboembolism:  Goal: Will show no signs or symptoms of venous thromboembolism  Will show no signs or symptoms of venous thromboembolism   Outcome: Ongoing  Pt is at risk for DVT d/t decreased mobility and cancer treatment. Pt educated on importance of activity. Pt has orders for SCDs while in bed. Pt verbalizes understanding of need for prophylaxis while inpatient. Problem: Bleeding:  Goal: Will show no signs and symptoms of excessive bleeding  Will show no signs and symptoms of excessive bleeding   Outcome: Ongoing  Patient's hemoglobin this AM:   Recent Labs      02/06/19   0315   HGB  10.9*     Patient's platelet count this AM:   Recent Labs      02/06/19   0315   PLT  109*    Thrombocytopenia Precautions in place. Patient showing no signs or symptoms of active bleeding. Transfusion not indicated at this time. Patient verbalizes understanding of all instructions. Will continue to assess and implement POC. Call light within reach and hourly rounding in place.      Problem: Infection - Central Venous Catheter-Associated Bloodstream Infection:  Goal: Will show no infection signs and symptoms  Will show no infection signs and symptoms   Outcome: Ongoing  CVC site remains free of signs/symptoms of infection. No drainage, edema, erythema, pain, or warmth noted at site.  Dressing changes continue per protocol and on an as needed basis - see flowsheet. Performed CHG bath today per [facility] protocol utilizing CHG solution in the shower. CVC site cleansed with CHG wipe over dressing, skin surrounding dressing, and first 6\" of IV tubing. Pt tolerated well. Continued to encourage daily CHG bathing per [facility] protocol. Problem: PROTECTIVE PRECAUTIONS  Goal: Patient will remain free of nosocomial Infections  Outcome: Ongoing  Pt remains in protective precautions per floor policy. Pt, visitors, and staff noted to be following precautions appropriately. Handwashing in place; pt wearing mask in hallway per protocol. Pt in private room. Low microbial diet in place. Will continue to monitor.

## 2019-02-06 NOTE — CARE COORDINATION
Type of Admission  Multiple Myeloma IgG  Melphalan Auto SCT (T:0--->2/7/19)  Day - 1    Central venous catheter  Left SC Tri Fusion (1/24/19, Dr. Edmund Alvarez)    Plan  Melphalan Auto SCT for treatment of Multiple Myeloma, and stay through recovery    Update  2/5/19: Planned admission for Melphalan Auto SCT. Multiple family members presents during admission process. 2/6: Tolerating chemo well. Education  2/5/19:  Patient and caregiver have been through the educational process for autologus bone marrow transplantation including preadmission teaching and preadmission physician office visit with Cody Hernandez RN [facility] Coordinator   Patient and caregiver verbalize understanding of treatment regimen, possible adverse events, length of stay, and risk and benefits of transplantation and are agreeable to proceed. Patient and caregiver have been given an opportunity to ask, and to have their questions answered to their satisfaction. Documentation for above can be found in the Oncology Hematology [address] office chart. 2/5/19:  Reviewed infusion of stem cells, approximate length of time, pre-medication, telemetry during infusion. Discussed criteria for discharge & day+5 begin daily Granix until [facility] recovers. 2/6: Asked questions about process on Day 0, so discussed that, as well as the importance of activity while here and discharge criteria. Discharge  Expect discharge to home in [place] [facility] with sister and daughter as caregivers.     Pending

## 2019-02-06 NOTE — PROGRESS NOTES
Administration: Chemotherapy drug Melphalan independently verified with Zander Pedro RN prior to administration. Acknowledgement of informed consent for chemotherapy administration verified. Original order, appropriateness of regimen, drug supplied, height, weight, BSA, dose calculations, expiration dates/times, drug appearance, and two patient identifiers were verified by both RNs. Drug checked for vesicant/irritant status and for risk of hypersensitivity. Most recent laboratory values and allergies, were reviewed. Positive, brisk blood return via CVC was confirmed prior to administration. Chest x-ray for correct line placement reviewed. Cindy Jacobsen and Teresa hernandez RN verified correct rate of chemotherapy and maintenance IV fluids. Patient was educated on chemotherapy regimen prior to administration including indication for treatment related to disease & side effects of chemotherapy drug. Patient verbalizes understanding of all instructions. Completion of Chemotherapy: Monitoring during infusion done per policy, see Flowsheets. Blood return verified before, during, and after infusion per policy; no signs of extravasation. Pt tolerated chemotherapy well and without incident. Chemotherapy infusion end time on the [facility] Will continue to monitor. Patient received melphalan today per chemotherapy preparative regimen as ordered. Prior to initiating melphalan infusion, patient was educated on the use of cryotherapy to prevent/decrease severe oral mucositis. Patient verbalized understanding of procedure and performed cryotherapy as instructed for 15 minutes prior to infusion, throughout duration of infusion, and for 2 hours post infusion using ice chips. Pt tolerated well with no significant side effects.

## 2019-02-06 NOTE — PROGRESS NOTES
i3 membrane progress note        Attending Physician: Deana Joyce MD      Primary Care: Aldo Lowry, APRN - CNP         Name: Roderick Hernández   :  1955    MRN:  [id number]      Date:  19      Reason for Admission: High dose Melphalan (200 mg/m^2) preparative regimen followed by Autologous Stem Cell Transplant for IgG Lambda Multiple Myeloma       Subjective:     No complaints, other than no BM yesterday         ROS:  As noted above, otherwise remainder of 10-point ROS negative      Physical Exam:     Vital Signs:  BP (!) 149/78   Pulse 94   Temp 98.2 °F (36.8 °C) (Oral)   Resp 16   Ht 5' 1\" (1.549 m)   Wt 183 lb 4.8 oz (83.1 kg)   SpO2 98%   BMI 34.63 kg/m²     Weight:    Wt Readings from Last 3 Encounters:   19 183 lb 4.8 oz (83.1 kg)   19 180 lb (81.6 kg)   18 178 lb 5.6 oz (80.9 kg)       General: Awake, alert and oriented.   HEENT: normocephalic, alopecia, PERRL, no scleral erythema or icterus, Oral mucosa moist and intact, throat clear  NECK: supple without palpable adenopathy  BACK: Straight negative CVAT  SKIN: warm dry and intact without lesions rashes or masses  CHEST: CTA bilaterally without use of accessory muscles  CV: Normal S1 S2, RRR, no MRG  ABD: NT ND normoactive BS, no palpable masses or hepatosplenomegaly  EXTREMITIES: without edema, healing abrasion on left calf, denies calf tenderness  NEURO: CN II - XII grossly intact  CATHETER: Left SC Trifusion (19, Barrat) - CDI w/ open skin tears from bandage    Laboratory Data:  CBC:   Recent Labs      19   1013  19   0315   WBC  3.9*  2.9*   HGB  11.9*  10.9*   HCT  36.7  33.0*   MCV  87.4  87.4   PLT  97*  109*     BMP/Mag:  Recent Labs      19   1013  19   0315   NA  142  145   K  3.6  3.8   CL  103  105   CO2  29  26   PHOS  3.3  2.6   BUN  16  16   CREATININE  0.9  0.8   MG  2.20   --      LIVP:   Recent Labs      19   1013  19   0315   AST  29  18   ALT  52*  38 BILIDIR  <0.2  <0.2   BILITOT  <0.2  <0.2   ALKPHOS  124  99     Coags:   Recent Labs      02/05/19   1013   PROTIME  11.3   INR  0.99   APTT  34.5     Uric Acid   Recent Labs      02/05/19   1013  02/06/19   0315   LABURIC  4.2  3.5       PROBLEM LIST:            1. IgG Lambda Multiple Myeloma, R-ISS Stage I  2. HTN  3. HLD  4. Type 2 DM  5. GERD  6. Tobacco Use (quit 12/2018)      TREATMENT:            1.  RVd x 5 cycles  2. High dose Melpahlan (200 mg/m^2) and ASCT (2/7/19)     ASSESSMENT AND PLAN:            1. IgG Lambda Multiple Myeloma:  She is currently in VGPR  - Admit for high dose Melpahlan (200 mg/m^2) and ASCT  - She will receive 2.71x10^6 ic18ppagx/kg on 2/7/19      Day -1    2. ID:  Afebrile, no evidence of infection.    - Begin Valtrex, cont acyclovir at discharge for VZV positive titer.    - Start Diflucan prophylaxis on 2/7/19   - Start Levaquin when [address] < 1.5    3. Heme:  Blood counts stable  - Transfuse for Hgb < 7 and Platelets < 03A  - No transfusion today    4. Metabolic:  Electrolytes are WNL and renal fxn stable. - Cont KCl 20 meq bid  - Start IVF:  D5.45NS + KCl 10 meq @ 75 mL/hr   - Replace potassium and magnesium per policy. 5.  GI / Nutrition:  Appetite and oral intake is good. - Start low microbial diet   - Follow closely with dietary    6. Type 2 DM: She will likely develop hyperglycemia d/t steroids  - Hold oral diabetes medications  - Start Lispro SSI, medium regimen, AC&HS     7.  Cardiac:  Stable  HLD:  Stable  - Hold Crestor while inpatient, resume on d/c  HTN:  Stable  - Hold HCTZ while inpatient, will give intermittent Lasix if needed  - Cont Norvasc 10 mg daily and Lisinopril 40 mg daily    8.   Bone Health:  Stable  - Hold Calcium and Vit D supplements while inpatient, resume on d/c  - Resume bisphosphonate after discharged       - DVT Prophylaxis: Platelets >53,600 cells/dL, - daily lovenox prophylaxis ordered  Contraindications to pharmacologic prophylaxis: None  Contraindications to mechanical prophylaxis: None    - Disposition:  Once ANC > 1.0 and toxicities from transplant resolved      Marilyn Chi DO, MS  Oncology/Hematology Care    Please contact via:  1. [employer]  2.   Cell Phone:  [phone]    2/6/2019   10:09 AM

## 2019-02-07 LAB
ANION GAP SERPL CALCULATED.3IONS-SCNC: 8 MMOL/L (ref 3–16)
ANISOCYTOSIS: ABNORMAL
APTT: 29.4 SEC (ref 26–36)
BANDED NEUTROPHILS RELATIVE PERCENT: 6 % (ref 0–7)
BASOPHILS ABSOLUTE: 0 K/UL (ref 0–0.2)
BASOPHILS RELATIVE PERCENT: 0 %
BUN BLDV-MCNC: 16 MG/DL (ref 7–20)
CALCIUM SERPL-MCNC: 7.8 MG/DL (ref 8.3–10.6)
CHLORIDE BLD-SCNC: 108 MMOL/L (ref 99–110)
CO2: 26 MMOL/L (ref 21–32)
CREAT SERPL-MCNC: 0.7 MG/DL (ref 0.6–1.2)
EOSINOPHILS ABSOLUTE: 0.1 K/UL (ref 0–0.6)
EOSINOPHILS RELATIVE PERCENT: 1 %
GFR AFRICAN AMERICAN: >60
GFR NON-AFRICAN AMERICAN: >60
GLUCOSE BLD-MCNC: 161 MG/DL (ref 70–99)
GLUCOSE BLD-MCNC: 174 MG/DL (ref 70–99)
GLUCOSE BLD-MCNC: 193 MG/DL (ref 70–99)
GLUCOSE BLD-MCNC: 257 MG/DL (ref 70–99)
GLUCOSE BLD-MCNC: 264 MG/DL (ref 70–99)
HCT VFR BLD CALC: 31.9 % (ref 36–48)
HEMOGLOBIN: 10.5 G/DL (ref 12–16)
INR BLD: 0.98 (ref 0.86–1.14)
LYMPHOCYTES ABSOLUTE: 0 K/UL (ref 1–5.1)
LYMPHOCYTES RELATIVE PERCENT: 0 %
MAGNESIUM: 2.4 MG/DL (ref 1.8–2.4)
MCH RBC QN AUTO: 28.8 PG (ref 26–34)
MCHC RBC AUTO-ENTMCNC: 32.9 G/DL (ref 31–36)
MCV RBC AUTO: 87.6 FL (ref 80–100)
METAMYELOCYTES RELATIVE PERCENT: 1 %
MONOCYTES ABSOLUTE: 0.3 K/UL (ref 0–1.3)
MONOCYTES RELATIVE PERCENT: 5 %
NEUTROPHILS ABSOLUTE: 5.4 K/UL (ref 1.7–7.7)
NEUTROPHILS RELATIVE PERCENT: 87 %
PDW BLD-RTO: 17.2 % (ref 12.4–15.4)
PERFORMED ON: ABNORMAL
PLATELET # BLD: 139 K/UL (ref 135–450)
PMV BLD AUTO: 8.4 FL (ref 5–10.5)
POTASSIUM SERPL-SCNC: 3.9 MMOL/L (ref 3.5–5.1)
PROTHROMBIN TIME: 11.2 SEC (ref 9.8–13)
RBC # BLD: 3.65 M/UL (ref 4–5.2)
SODIUM BLD-SCNC: 142 MMOL/L (ref 136–145)
URIC ACID, SERUM: 3.4 MG/DL (ref 2.6–6)
VRE CULTURE: NORMAL
WBC # BLD: 5.7 K/UL (ref 4–11)

## 2019-02-07 PROCEDURE — 85610 PROTHROMBIN TIME: CPT

## 2019-02-07 PROCEDURE — 38208 THAW PRESERVED STEM CELLS: CPT

## 2019-02-07 PROCEDURE — 85025 COMPLETE CBC W/AUTO DIFF WBC: CPT

## 2019-02-07 PROCEDURE — 38241 TRANSPLT AUTOL HCT/DONOR: CPT

## 2019-02-07 PROCEDURE — 6370000000 HC RX 637 (ALT 250 FOR IP): Performed by: NURSE PRACTITIONER

## 2019-02-07 PROCEDURE — 6360000002 HC RX W HCPCS: Performed by: INTERNAL MEDICINE

## 2019-02-07 PROCEDURE — 30243Y0 TRANSFUSION OF AUTOLOGOUS HEMATOPOIETIC STEM CELLS INTO CENTRAL VEIN, PERCUTANEOUS APPROACH: ICD-10-PCS | Performed by: INTERNAL MEDICINE

## 2019-02-07 PROCEDURE — 2580000003 HC RX 258: Performed by: NURSE PRACTITIONER

## 2019-02-07 PROCEDURE — 85730 THROMBOPLASTIN TIME PARTIAL: CPT

## 2019-02-07 PROCEDURE — 36592 COLLECT BLOOD FROM PICC: CPT

## 2019-02-07 PROCEDURE — 80048 BASIC METABOLIC PNL TOTAL CA: CPT

## 2019-02-07 PROCEDURE — 6360000002 HC RX W HCPCS: Performed by: NURSE PRACTITIONER

## 2019-02-07 PROCEDURE — 2060000000 HC ICU INTERMEDIATE R&B

## 2019-02-07 PROCEDURE — 83735 ASSAY OF MAGNESIUM: CPT

## 2019-02-07 PROCEDURE — 84550 ASSAY OF BLOOD/URIC ACID: CPT

## 2019-02-07 PROCEDURE — 2500000003 HC RX 250 WO HCPCS: Performed by: INTERNAL MEDICINE

## 2019-02-07 RX ORDER — EPINEPHRINE 1 MG/ML
0.3 INJECTION, SOLUTION, CONCENTRATE INTRAVENOUS
Status: DISPENSED | OUTPATIENT
Start: 2019-02-07 | End: 2019-02-07

## 2019-02-07 RX ORDER — MORPHINE SULFATE 2 MG/ML
2 INJECTION, SOLUTION INTRAMUSCULAR; INTRAVENOUS PRN
Status: DISCONTINUED | OUTPATIENT
Start: 2019-02-07 | End: 2019-02-11

## 2019-02-07 RX ORDER — DIPHENHYDRAMINE HCL 25 MG
25 TABLET ORAL ONCE
Status: COMPLETED | OUTPATIENT
Start: 2019-02-07 | End: 2019-02-07

## 2019-02-07 RX ORDER — SODIUM CHLORIDE 9 MG/ML
INJECTION, SOLUTION INTRAVENOUS CONTINUOUS PRN
Status: DISCONTINUED | OUTPATIENT
Start: 2019-02-07 | End: 2019-02-18 | Stop reason: HOSPADM

## 2019-02-07 RX ORDER — DIPHENHYDRAMINE HYDROCHLORIDE 50 MG/ML
25 INJECTION INTRAMUSCULAR; INTRAVENOUS PRN
Status: DISPENSED | OUTPATIENT
Start: 2019-02-07 | End: 2019-02-08

## 2019-02-07 RX ORDER — FUROSEMIDE 10 MG/ML
40 INJECTION INTRAMUSCULAR; INTRAVENOUS ONCE
Status: COMPLETED | OUTPATIENT
Start: 2019-02-07 | End: 2019-02-07

## 2019-02-07 RX ORDER — ACETAMINOPHEN 325 MG/1
650 TABLET ORAL ONCE
Status: COMPLETED | OUTPATIENT
Start: 2019-02-07 | End: 2019-02-07

## 2019-02-07 RX ADMIN — AMLODIPINE BESYLATE 10 MG: 10 TABLET ORAL at 09:47

## 2019-02-07 RX ADMIN — ACETAMINOPHEN 650 MG: 325 TABLET, FILM COATED ORAL at 10:33

## 2019-02-07 RX ADMIN — SODIUM CHLORIDE 15 ML: 900 IRRIGANT IRRIGATION at 16:02

## 2019-02-07 RX ADMIN — POTASSIUM CHLORIDE, DEXTROSE MONOHYDRATE AND SODIUM CHLORIDE: 75; 5; 450 INJECTION, SOLUTION INTRAVENOUS at 20:57

## 2019-02-07 RX ADMIN — INSULIN LISPRO 6 UNITS: 100 INJECTION, SOLUTION INTRAVENOUS; SUBCUTANEOUS at 16:51

## 2019-02-07 RX ADMIN — VALACYCLOVIR HYDROCHLORIDE 500 MG: 500 TABLET, FILM COATED ORAL at 20:45

## 2019-02-07 RX ADMIN — SODIUM CHLORIDE 15 ML: 900 IRRIGANT IRRIGATION at 09:15

## 2019-02-07 RX ADMIN — FUROSEMIDE 40 MG: 10 INJECTION, SOLUTION INTRAMUSCULAR; INTRAVENOUS at 09:53

## 2019-02-07 RX ADMIN — VALACYCLOVIR HYDROCHLORIDE 500 MG: 500 TABLET, FILM COATED ORAL at 09:13

## 2019-02-07 RX ADMIN — INSULIN LISPRO 2 UNITS: 100 INJECTION, SOLUTION INTRAVENOUS; SUBCUTANEOUS at 07:39

## 2019-02-07 RX ADMIN — Medication 10 ML: at 09:13

## 2019-02-07 RX ADMIN — INSULIN LISPRO 3 UNITS: 100 INJECTION, SOLUTION INTRAVENOUS; SUBCUTANEOUS at 20:51

## 2019-02-07 RX ADMIN — POTASSIUM CHLORIDE 20 MEQ: 600 TABLET, FILM COATED, EXTENDED RELEASE ORAL at 09:12

## 2019-02-07 RX ADMIN — Medication 10 ML: at 20:45

## 2019-02-07 RX ADMIN — FLUCONAZOLE 400 MG: 200 TABLET ORAL at 09:13

## 2019-02-07 RX ADMIN — POTASSIUM CHLORIDE, DEXTROSE MONOHYDRATE AND SODIUM CHLORIDE: 75; 5; 450 INJECTION, SOLUTION INTRAVENOUS at 03:43

## 2019-02-07 RX ADMIN — INSULIN LISPRO 2 UNITS: 100 INJECTION, SOLUTION INTRAVENOUS; SUBCUTANEOUS at 11:30

## 2019-02-07 RX ADMIN — ENOXAPARIN SODIUM 40 MG: 40 INJECTION SUBCUTANEOUS at 18:20

## 2019-02-07 RX ADMIN — HYDROCORTISONE SODIUM SUCCINATE 100 MG: 100 INJECTION, POWDER, FOR SOLUTION INTRAMUSCULAR; INTRAVENOUS at 10:34

## 2019-02-07 RX ADMIN — POTASSIUM CHLORIDE, DEXTROSE MONOHYDRATE AND SODIUM CHLORIDE: 75; 5; 450 INJECTION, SOLUTION INTRAVENOUS at 16:02

## 2019-02-07 RX ADMIN — POTASSIUM CHLORIDE 20 MEQ: 600 TABLET, FILM COATED, EXTENDED RELEASE ORAL at 20:45

## 2019-02-07 RX ADMIN — SODIUM CHLORIDE 15 ML: 900 IRRIGANT IRRIGATION at 11:52

## 2019-02-07 RX ADMIN — POTASSIUM CHLORIDE, DEXTROSE MONOHYDRATE AND SODIUM CHLORIDE: 75; 5; 450 INJECTION, SOLUTION INTRAVENOUS at 09:47

## 2019-02-07 RX ADMIN — DIPHENHYDRAMINE HCL 25 MG: 25 TABLET ORAL at 10:34

## 2019-02-07 RX ADMIN — LISINOPRIL 40 MG: 40 TABLET ORAL at 09:13

## 2019-02-07 ASSESSMENT — PAIN SCALES - GENERAL
PAINLEVEL_OUTOF10: 0

## 2019-02-07 NOTE — PLAN OF CARE
Problem: Falls - Risk of:  Goal: Will remain free from falls  Will remain free from falls   Outcome: Ongoing  Orthostatic vital signs obtained at start of shift - see flowsheet for details. Pt meets criteria for orthostasis. Pt is a Med fall risk. See Virginie João Fall Score and ABCDS Injury Risk assessments. - Screening for Orthostasis AND not a [place] Risk per AYERS/ABCDS: Pt bed is in low position, side rails up, call light and belongings are in reach. Fall risk light is on outside pts room. Pt encouraged to call for assistance as needed. Will continue with hourly rounds for PO intake, pain needs, toileting and repositioning as needed. Problem: Venous Thromboembolism:  Goal: Will show no signs or symptoms of venous thromboembolism  Will show no signs or symptoms of venous thromboembolism   Outcome: Ongoing  Adherent with DVT Prevention: Pt is at risk for DVT d/t decreased mobility and cancer treatment. Pt educated on importance of activity. Pt has orders for Subcut prophylactic lovenox. Pt verbalizes understanding of need for prophylaxis while inpatient. Problem: Bleeding:  Goal: Will show no signs and symptoms of excessive bleeding  Will show no signs and symptoms of excessive bleeding   Outcome: Ongoing  Patient's hemoglobin this AM:   Recent Labs      02/07/19   0345   HGB  10.5*     Patient's platelet count this AM:   Recent Labs      02/07/19   0345   PLT  139    Thrombocytopenia Precautions in place. Patient showing no signs or symptoms of active bleeding. Transfusion not indicated at this time. Patient verbalizes understanding of all instructions. Will continue to assess and implement POC. Call light within reach and hourly rounding in place. Problem: Infection - Central Venous Catheter-Associated Bloodstream Infection:  Goal: Will show no infection signs and symptoms  Will show no infection signs and symptoms   Outcome: Ongoing  CVC site remains free of signs/symptoms of infection. No drainage, edema, erythema, pain, or warmth noted at site. Dressing changes continue per protocol and on an as needed basis - see flowsheet. Compliant with BCC Bath Protocol:  Performed CHG bath today per [facility] protocol utilizing CHG solution in the shower. CVC site cleansed with CHG wipe over dressing, skin surrounding dressing, and first 6\" of IV tubing. Pt tolerated well. Continued to encourage daily CHG bathing per [facility] protocol. Problem: PROTECTIVE PRECAUTIONS  Goal: Patient will remain free of nosocomial Infections  Outcome: Ongoing  Pt remains in neutropenic precautions per floor policy. Pt, visitors, and staff noted to be following precautions appropriately. Handwashing in place; pt wearing mask in hallway per protocol. Pt in private room. Low microbial diet in place. Will continue to monitor.

## 2019-02-08 LAB
ALBUMIN SERPL-MCNC: 3.2 G/DL (ref 3.4–5)
ALP BLD-CCNC: 75 U/L (ref 40–129)
ALT SERPL-CCNC: 23 U/L (ref 10–40)
ANION GAP SERPL CALCULATED.3IONS-SCNC: 8 MMOL/L (ref 3–16)
AST SERPL-CCNC: 19 U/L (ref 15–37)
BASOPHILS ABSOLUTE: 0 K/UL (ref 0–0.2)
BASOPHILS RELATIVE PERCENT: 0.2 %
BILIRUB SERPL-MCNC: <0.2 MG/DL (ref 0–1)
BILIRUBIN DIRECT: <0.2 MG/DL (ref 0–0.3)
BILIRUBIN, INDIRECT: ABNORMAL MG/DL (ref 0–1)
BUN BLDV-MCNC: 11 MG/DL (ref 7–20)
CALCIUM SERPL-MCNC: 6.9 MG/DL (ref 8.3–10.6)
CHLORIDE BLD-SCNC: 111 MMOL/L (ref 99–110)
CO2: 27 MMOL/L (ref 21–32)
CREAT SERPL-MCNC: 0.6 MG/DL (ref 0.6–1.2)
EOSINOPHILS ABSOLUTE: 0 K/UL (ref 0–0.6)
EOSINOPHILS RELATIVE PERCENT: 0 %
GFR AFRICAN AMERICAN: >60
GFR NON-AFRICAN AMERICAN: >60
GLUCOSE BLD-MCNC: 108 MG/DL (ref 70–99)
GLUCOSE BLD-MCNC: 113 MG/DL (ref 70–99)
GLUCOSE BLD-MCNC: 113 MG/DL (ref 70–99)
GLUCOSE BLD-MCNC: 96 MG/DL (ref 70–99)
HCT VFR BLD CALC: 29.4 % (ref 36–48)
HEMOGLOBIN: 9.8 G/DL (ref 12–16)
LACTATE DEHYDROGENASE: 412 U/L (ref 100–190)
LYMPHOCYTES ABSOLUTE: 0.2 K/UL (ref 1–5.1)
LYMPHOCYTES RELATIVE PERCENT: 4.1 %
MCH RBC QN AUTO: 28.9 PG (ref 26–34)
MCHC RBC AUTO-ENTMCNC: 33.2 G/DL (ref 31–36)
MCV RBC AUTO: 87 FL (ref 80–100)
MONOCYTES ABSOLUTE: 0.1 K/UL (ref 0–1.3)
MONOCYTES RELATIVE PERCENT: 2.8 %
NEUTROPHILS ABSOLUTE: 4.8 K/UL (ref 1.7–7.7)
NEUTROPHILS RELATIVE PERCENT: 92.9 %
PDW BLD-RTO: 17.3 % (ref 12.4–15.4)
PERFORMED ON: ABNORMAL
PERFORMED ON: ABNORMAL
PERFORMED ON: NORMAL
PHOSPHORUS: 1.8 MG/DL (ref 2.5–4.9)
PLATELET # BLD: 152 K/UL (ref 135–450)
PMV BLD AUTO: 8.1 FL (ref 5–10.5)
POTASSIUM SERPL-SCNC: 3.4 MMOL/L (ref 3.5–5.1)
RBC # BLD: 3.38 M/UL (ref 4–5.2)
SODIUM BLD-SCNC: 146 MMOL/L (ref 136–145)
TOTAL PROTEIN: 4.9 G/DL (ref 6.4–8.2)
URIC ACID, SERUM: 3.5 MG/DL (ref 2.6–6)
WBC # BLD: 5.2 K/UL (ref 4–11)

## 2019-02-08 PROCEDURE — 84550 ASSAY OF BLOOD/URIC ACID: CPT

## 2019-02-08 PROCEDURE — 6360000002 HC RX W HCPCS: Performed by: INTERNAL MEDICINE

## 2019-02-08 PROCEDURE — 80048 BASIC METABOLIC PNL TOTAL CA: CPT

## 2019-02-08 PROCEDURE — 36592 COLLECT BLOOD FROM PICC: CPT

## 2019-02-08 PROCEDURE — 80076 HEPATIC FUNCTION PANEL: CPT

## 2019-02-08 PROCEDURE — 83615 LACTATE (LD) (LDH) ENZYME: CPT

## 2019-02-08 PROCEDURE — 2060000000 HC ICU INTERMEDIATE R&B

## 2019-02-08 PROCEDURE — 84100 ASSAY OF PHOSPHORUS: CPT

## 2019-02-08 PROCEDURE — 2500000003 HC RX 250 WO HCPCS: Performed by: INTERNAL MEDICINE

## 2019-02-08 PROCEDURE — 2580000003 HC RX 258: Performed by: NURSE PRACTITIONER

## 2019-02-08 PROCEDURE — 6370000000 HC RX 637 (ALT 250 FOR IP): Performed by: NURSE PRACTITIONER

## 2019-02-08 PROCEDURE — 6370000000 HC RX 637 (ALT 250 FOR IP): Performed by: INTERNAL MEDICINE

## 2019-02-08 PROCEDURE — 6360000002 HC RX W HCPCS: Performed by: NURSE PRACTITIONER

## 2019-02-08 PROCEDURE — 85025 COMPLETE CBC W/AUTO DIFF WBC: CPT

## 2019-02-08 RX ORDER — CALCIUM CARBONATE 200(500)MG
1000 TABLET,CHEWABLE ORAL 3 TIMES DAILY PRN
Status: DISCONTINUED | OUTPATIENT
Start: 2019-02-08 | End: 2019-02-18 | Stop reason: HOSPADM

## 2019-02-08 RX ORDER — POLYETHYLENE GLYCOL 3350 17 G/17G
17 POWDER, FOR SOLUTION ORAL DAILY PRN
Status: DISCONTINUED | OUTPATIENT
Start: 2019-02-08 | End: 2019-02-18 | Stop reason: HOSPADM

## 2019-02-08 RX ORDER — OYSTER SHELL CALCIUM WITH VITAMIN D 500; 200 MG/1; [IU]/1
1 TABLET, FILM COATED ORAL 2 TIMES DAILY
Status: DISCONTINUED | OUTPATIENT
Start: 2019-02-08 | End: 2019-02-11

## 2019-02-08 RX ADMIN — SODIUM CHLORIDE 15 ML: 900 IRRIGANT IRRIGATION at 18:41

## 2019-02-08 RX ADMIN — VALACYCLOVIR HYDROCHLORIDE 500 MG: 500 TABLET, FILM COATED ORAL at 21:03

## 2019-02-08 RX ADMIN — POTASSIUM CHLORIDE 20 MEQ: 29.8 INJECTION, SOLUTION INTRAVENOUS at 12:32

## 2019-02-08 RX ADMIN — CALCIUM CARBONATE-VITAMIN D TAB 500 MG-200 UNIT 1 TABLET: 500-200 TAB at 21:02

## 2019-02-08 RX ADMIN — AMLODIPINE BESYLATE 10 MG: 10 TABLET ORAL at 08:50

## 2019-02-08 RX ADMIN — Medication 10 ML: at 10:58

## 2019-02-08 RX ADMIN — ENOXAPARIN SODIUM 40 MG: 40 INJECTION SUBCUTANEOUS at 18:40

## 2019-02-08 RX ADMIN — DIBASIC SODIUM PHOSPHATE, MONOBASIC POTASSIUM PHOSPHATE AND MONOBASIC SODIUM PHOSPHATE 2 TABLET: 852; 155; 130 TABLET ORAL at 21:02

## 2019-02-08 RX ADMIN — CALCIUM CARBONATE-VITAMIN D TAB 500 MG-200 UNIT 1 TABLET: 500-200 TAB at 08:50

## 2019-02-08 RX ADMIN — FLUCONAZOLE 400 MG: 200 TABLET ORAL at 08:30

## 2019-02-08 RX ADMIN — POTASSIUM CHLORIDE 20 MEQ: 600 TABLET, FILM COATED, EXTENDED RELEASE ORAL at 21:02

## 2019-02-08 RX ADMIN — SODIUM CHLORIDE 15 ML: 900 IRRIGANT IRRIGATION at 06:29

## 2019-02-08 RX ADMIN — VALACYCLOVIR HYDROCHLORIDE 500 MG: 500 TABLET, FILM COATED ORAL at 08:50

## 2019-02-08 RX ADMIN — DIBASIC SODIUM PHOSPHATE, MONOBASIC POTASSIUM PHOSPHATE AND MONOBASIC SODIUM PHOSPHATE 2 TABLET: 852; 155; 130 TABLET ORAL at 10:57

## 2019-02-08 RX ADMIN — POTASSIUM CHLORIDE 20 MEQ: 29.8 INJECTION, SOLUTION INTRAVENOUS at 08:37

## 2019-02-08 RX ADMIN — Medication 10 ML: at 21:03

## 2019-02-08 RX ADMIN — POTASSIUM CHLORIDE 20 MEQ: 600 TABLET, FILM COATED, EXTENDED RELEASE ORAL at 10:56

## 2019-02-08 RX ADMIN — ANTACID TABLETS 1000 MG: 500 TABLET, CHEWABLE ORAL at 15:49

## 2019-02-08 RX ADMIN — PROCHLORPERAZINE MALEATE 10 MG: 10 TABLET ORAL at 15:51

## 2019-02-08 RX ADMIN — POTASSIUM CHLORIDE 20 MEQ: 29.8 INJECTION, SOLUTION INTRAVENOUS at 06:37

## 2019-02-08 RX ADMIN — POTASSIUM CHLORIDE, DEXTROSE MONOHYDRATE AND SODIUM CHLORIDE: 75; 5; 450 INJECTION, SOLUTION INTRAVENOUS at 04:13

## 2019-02-08 RX ADMIN — POTASSIUM CHLORIDE 20 MEQ: 29.8 INJECTION, SOLUTION INTRAVENOUS at 10:45

## 2019-02-08 RX ADMIN — SODIUM CHLORIDE 15 ML: 900 IRRIGANT IRRIGATION at 12:35

## 2019-02-08 RX ADMIN — LISINOPRIL 40 MG: 40 TABLET ORAL at 08:50

## 2019-02-08 RX ADMIN — POLYETHYLENE GLYCOL (3350) 17 G: 17 POWDER, FOR SOLUTION ORAL at 17:04

## 2019-02-08 ASSESSMENT — PAIN SCALES - GENERAL
PAINLEVEL_OUTOF10: 0

## 2019-02-08 NOTE — PROGRESS NOTES
[address]  Autologous Progress Note    2019    Sigrid Wolf    :  1955    MRN:  [id number]    Referring MD: Rissa Harris MD  [address] [facility]      Subjective:   Increased fatigue today and less of appetite      ECOG PS:  (1) Restricted in physically strenuous activity, ambulatory and able to do work of light nature    Isolation:  None     Medications    Scheduled Meds:   Calcium Carbonate-Vitamin D  1 tablet Oral BID    [START ON 2019] Tbo-Filgrastim  300 mcg Subcutaneous QPM    enoxaparin  40 mg Subcutaneous QPM    fluconazole  400 mg Oral Daily    Saline Mouthwash  15 mL Swish & Spit 4x Daily AC & HS    sodium chloride flush  10 mL Intravenous 2 times per day    valACYclovir  500 mg Oral BID    amLODIPine  10 mg Oral Daily    lisinopril  40 mg Oral Daily    potassium chloride  20 mEq Oral BID    insulin lispro  0-12 Units Subcutaneous TID WC    insulin lispro  0-6 Units Subcutaneous Nightly    furosemide  40 mg Intravenous Q12H     Continuous Infusions:   sodium chloride      dextrose 5% and 0.45% NaCl with KCl 10 mEq 50 mL/hr at 19 0413    dextrose       PRN Meds:.diphenhydrAMINE, morphine, sodium chloride, sodium chloride, alteplase, magnesium hydroxide, magnesium sulfate, potassium chloride, Saline Mouthwash, glucose, dextrose, glucagon (rDNA), dextrose, prochlorperazine **OR** prochlorperazine, LORazepam **OR** LORazepam    ROS:  As noted above, otherwise remainder of 10-point ROS negative    Physical Exam:     I&O:      Intake/Output Summary (Last 24 hours) at 19 0649  Last data filed at 19 9620   Gross per 24 hour   Intake             5671 ml   Output             5200 ml   Net              471 ml       Vital Signs:  /62   Pulse 76   Temp 98.3 °F (36.8 °C) (Oral)   Resp 14   Ht 5' 1\" (1.549 m)   Wt 185 lb 4.8 oz (84.1 kg)   SpO2 92%   BMI 35.01 kg/m²     Weight:    Wt Readings from Last 3 Encounters: 02/07/19 185 lb 4.8 oz (84.1 kg)   01/24/19 180 lb (81.6 kg)   12/31/18 178 lb 5.6 oz (80.9 kg)       General: Awake, alert and oriented. HEENT: normocephalic, alopecia, PERRL, no scleral erythema or icterus, Oral mucosa moist and intact, throat clear  NECK: supple without palpable adenopathy  BACK: Straight negative CVAT  SKIN: warm dry and intact without lesions rashes or masses  CHEST: CTA bilaterally without use of accessory muscles  CV: Normal S1 S2, RRR, no MRG  ABD: NT ND normoactive BS, no palpable masses or hepatosplenomegaly  EXTREMITIES: without edema, healing abrasion on left calf, denies calf tenderness  NEURO: CN II - XII grossly intact  CATHETER: Left SC Trifusion (1/24/19, Barrat) - CDI w/ open skin tears from bandage    Data:   CBC:   Recent Labs      02/06/19 0315 02/07/19 0345 02/08/19 0415   WBC  2.9*  5.7  5.2   HGB  10.9*  10.5*  9.8*   HCT  33.0*  31.9*  29.4*   MCV  87.4  87.6  87.0   PLT  109*  139  152     BMP/Mag:  Recent Labs      02/05/19 1013  02/06/19 0315 02/07/19 0345 02/08/19 0415   NA  142  145  142  146*   K  3.6  3.8  3.9  3.4*   CL  103  105  108  111*   CO2  29  26  26  27   PHOS  3.3  2.6   --   1.8*   BUN  16  16  16  11   CREATININE  0.9  0.8  0.7  0.6   MG  2.20   --   2.40   --      LIVP:   Recent Labs      02/05/19   1013  02/06/19 0315 02/08/19   0415   AST  29  18  19   ALT  52*  38  23   BILIDIR  <0.2  <0.2  <0.2   BILITOT  <0.2  <0.2  <0.2   ALKPHOS  124  99  75     Uric Acid:    Recent Labs      02/08/19 0415   LABURIC  3.5     Coags:   Recent Labs      02/05/19   1013  02/07/19 0345   PROTIME  11.3  11.2   INR  0.99  0.98   APTT  34.5  29.4       PROBLEM LIST:            1. IgG Lambda Multiple Myeloma, R-ISS Stage I  2. HTN  3. HLD  4. Type 2 DM  5. GERD  6. Tobacco Use (quit 12/2018)      TREATMENT:            1.  RVd x 5 cycles  2. High dose Melpahlan & ASCT (2/7/19) - 2.71x10^6 cx63ghbiz/kg     ASSESSMENT AND PLAN:            1. IgG Lambda Multiple Myeloma:  She is currently in VGPR  - S/p high dose Melpahlan (200 mg/m^2) and ASCT on 2/7/19      Day + 1     2. ID:  Afebrile, no evidence of infection.    - Cont Diflucan & Valtrex, cont acyclovir at discharge for VZV positive titer.   - Start Levaquin when [address] < 1.5     3. Heme:  Anemia from chemotherapy   - Transfuse for Hgb < 7 and Platelets < 54W  - No transfusion today  - Start Granix on 2/12/19     4. Metabolic:  Electrolytes are WNL and renal fxn stable except for hypreNa, hypoK and hypoCa    - HypoCa:  Resume Ca+D supplement  - Cont KCl 20 meq bid  - Cont IVF:  D5.45NS + KCl 10 meq @ 50 mL/hr   - Replace potassium and magnesium per policy.     5.  GI / Nutrition:  Appetite and oral intake is good. - Cont low microbial diet   - Follow closely with dietary     6. Type 2 DM: hyperglycemia d/t steroids is improving   - Hold oral diabetes medications  - Cont Lispro SSI, medium regimen, AC&HS      7.  Cardiac:  Stable  HLD:  Stable  - Hold Crestor while inpatient, resume on d/c  HTN:  Stable  - Hold HCTZ while inpatient, will give intermittent Lasix if needed  - Cont Norvasc 10 mg daily and Lisinopril 40 mg daily     8. Bone Health:  Stable  - Resume Calcium and Vit D supplements d/t low Ca  - Resume bisphosphonate after discharged        - DVT Prophylaxis: Platelets >54,110 cells/dL, - daily lovenox prophylaxis ordered  Contraindications to pharmacologic prophylaxis: None  Contraindications to mechanical prophylaxis: None    - Disposition:  Once ANC > 1.0 and toxicities from transplant resolved    Alexandr Quintanilla, APRN - JONATHAN Tejada. Jay Jay Kelly DO, MS  Oncology/Hematology Care    Please contact via:  1. [employer]  2.   Cell Phone:  [phone]    2/8/2019   6:49 AM

## 2019-02-08 NOTE — PLAN OF CARE
Problem: Falls - Risk of:  Goal: Will remain free from falls  Will remain free from falls   Outcome: Ongoing  Orthostatic vital signs obtained at start of shift - see flowsheet for details. Pt does not meet criteria for orthostasis. Pt is a Med fall risk. See Maddi Wilder Fall Score and ABCDS Injury Risk assessments. - Screening for Orthostasis AND not a [place] Risk per AYERS/ABCDS: Pt bed is in low position, side rails up, call light and belongings are in reach. Fall risk light is on outside pts room. Pt encouraged to call for assistance as needed. Will continue with hourly rounds for PO intake, pain needs, toileting and repositioning as needed. Problem: Venous Thromboembolism:  Goal: Will show no signs or symptoms of venous thromboembolism  Will show no signs or symptoms of venous thromboembolism   Outcome: Ongoing  Adherent with DVT Prevention: Pt is at risk for DVT d/t decreased mobility and cancer treatment. Pt educated on importance of activity. Pt has orders for Subcut prophylactic lovenox. Pt verbalizes understanding of need for prophylaxis while inpatient. Problem: Bleeding:  Goal: Will show no signs and symptoms of excessive bleeding  Will show no signs and symptoms of excessive bleeding   Outcome: Ongoing  Patient's hemoglobin this AM:   Recent Labs      02/08/19   0415   HGB  9.8*     Patient's platelet count this AM:   Recent Labs      02/08/19   0415   PLT  152    Thrombocytopenia not present at this time. Patient showing no signs or symptoms of active bleeding. Transfusion not indicated at this time. Patient verbalizes understanding of all instructions. Will continue to assess and implement POC. Call light within reach and hourly rounding in place.        Problem: Infection - Central Venous Catheter-Associated Bloodstream Infection:  Goal: Will show no infection signs and symptoms  Will show no infection signs and symptoms   Outcome: Ongoing  CVC site remains free of signs/symptoms of infection. No drainage, edema, erythema, pain, or warmth noted at site. Dressing changes continue per protocol and on an as needed basis - see flowsheet. Compliant with BCC Bath Protocol:  Performed CHG bath today per [facility] protocol utilizing CHG solution in the shower. CVC site cleansed with CHG wipe over dressing, skin surrounding dressing, and first 6\" of IV tubing. Pt tolerated well. Continued to encourage daily CHG bathing per [facility] protocol. Problem: PROTECTIVE PRECAUTIONS  Goal: Patient will remain free of nosocomial Infections  Outcome: Ongoing  Pt educated on wearing mask when in hallways. Pt, staff, and visitors adhering to handwashing guidelines. Pt showers daily with chlorhexidine and linens changed daily per protocol. Pt verbalizes understanding of low microbial diet. Will continue to monitor.

## 2019-02-08 NOTE — BH NOTE
Visited pt during stem cell infusion. She is feeling well and pleased to have the transplant and chemo over with. Daughters both working today and not able to visit. Will continue to visit with pt and provide emotional support.   Ana Naidu Psy.D., ABPP

## 2019-02-09 LAB
ANION GAP SERPL CALCULATED.3IONS-SCNC: 8 MMOL/L (ref 3–16)
BASOPHILS ABSOLUTE: 0 K/UL (ref 0–0.2)
BASOPHILS RELATIVE PERCENT: 0.1 %
BUN BLDV-MCNC: 10 MG/DL (ref 7–20)
CALCIUM SERPL-MCNC: 7.6 MG/DL (ref 8.3–10.6)
CHLORIDE BLD-SCNC: 111 MMOL/L (ref 99–110)
CO2: 26 MMOL/L (ref 21–32)
CREAT SERPL-MCNC: <0.5 MG/DL (ref 0.6–1.2)
EOSINOPHILS ABSOLUTE: 0 K/UL (ref 0–0.6)
EOSINOPHILS RELATIVE PERCENT: 0.4 %
GFR AFRICAN AMERICAN: >60
GFR NON-AFRICAN AMERICAN: >60
GLUCOSE BLD-MCNC: 111 MG/DL (ref 70–99)
GLUCOSE BLD-MCNC: 111 MG/DL (ref 70–99)
GLUCOSE BLD-MCNC: 121 MG/DL (ref 70–99)
GLUCOSE BLD-MCNC: 123 MG/DL (ref 70–99)
GLUCOSE BLD-MCNC: 139 MG/DL (ref 70–99)
HCT VFR BLD CALC: 31.3 % (ref 36–48)
HEMOGLOBIN: 10.2 G/DL (ref 12–16)
LYMPHOCYTES ABSOLUTE: 0.1 K/UL (ref 1–5.1)
LYMPHOCYTES RELATIVE PERCENT: 16.9 %
MCH RBC QN AUTO: 29.1 PG (ref 26–34)
MCHC RBC AUTO-ENTMCNC: 32.7 G/DL (ref 31–36)
MCV RBC AUTO: 89 FL (ref 80–100)
MONOCYTES ABSOLUTE: 0.1 K/UL (ref 0–1.3)
MONOCYTES RELATIVE PERCENT: 9 %
NEUTROPHILS ABSOLUTE: 0.6 K/UL (ref 1.7–7.7)
NEUTROPHILS RELATIVE PERCENT: 73.6 %
PDW BLD-RTO: 17.7 % (ref 12.4–15.4)
PERFORMED ON: ABNORMAL
PHOSPHORUS: 1.9 MG/DL (ref 2.5–4.9)
PLATELET # BLD: 157 K/UL (ref 135–450)
PMV BLD AUTO: 7.8 FL (ref 5–10.5)
POTASSIUM SERPL-SCNC: 4.7 MMOL/L (ref 3.5–5.1)
RBC # BLD: 3.51 M/UL (ref 4–5.2)
SODIUM BLD-SCNC: 145 MMOL/L (ref 136–145)
URIC ACID, SERUM: 2.9 MG/DL (ref 2.6–6)
WBC # BLD: 0.8 K/UL (ref 4–11)

## 2019-02-09 PROCEDURE — 6370000000 HC RX 637 (ALT 250 FOR IP): Performed by: NURSE PRACTITIONER

## 2019-02-09 PROCEDURE — 84100 ASSAY OF PHOSPHORUS: CPT

## 2019-02-09 PROCEDURE — 80048 BASIC METABOLIC PNL TOTAL CA: CPT

## 2019-02-09 PROCEDURE — 2580000003 HC RX 258: Performed by: NURSE PRACTITIONER

## 2019-02-09 PROCEDURE — 2060000000 HC ICU INTERMEDIATE R&B

## 2019-02-09 PROCEDURE — 6360000002 HC RX W HCPCS: Performed by: INTERNAL MEDICINE

## 2019-02-09 PROCEDURE — 85025 COMPLETE CBC W/AUTO DIFF WBC: CPT

## 2019-02-09 PROCEDURE — 84550 ASSAY OF BLOOD/URIC ACID: CPT

## 2019-02-09 PROCEDURE — 2500000003 HC RX 250 WO HCPCS: Performed by: NURSE PRACTITIONER

## 2019-02-09 PROCEDURE — 6370000000 HC RX 637 (ALT 250 FOR IP): Performed by: INTERNAL MEDICINE

## 2019-02-09 PROCEDURE — 2500000003 HC RX 250 WO HCPCS: Performed by: INTERNAL MEDICINE

## 2019-02-09 PROCEDURE — 36592 COLLECT BLOOD FROM PICC: CPT

## 2019-02-09 RX ORDER — LEVOFLOXACIN 500 MG/1
500 TABLET, FILM COATED ORAL DAILY
Status: DISCONTINUED | OUTPATIENT
Start: 2019-02-09 | End: 2019-02-18

## 2019-02-09 RX ADMIN — POTASSIUM CHLORIDE, DEXTROSE MONOHYDRATE AND SODIUM CHLORIDE: 75; 5; 450 INJECTION, SOLUTION INTRAVENOUS at 20:32

## 2019-02-09 RX ADMIN — ANTACID TABLETS 1000 MG: 500 TABLET, CHEWABLE ORAL at 16:12

## 2019-02-09 RX ADMIN — POTASSIUM CHLORIDE 20 MEQ: 600 TABLET, FILM COATED, EXTENDED RELEASE ORAL at 20:32

## 2019-02-09 RX ADMIN — AMLODIPINE BESYLATE 10 MG: 10 TABLET ORAL at 10:15

## 2019-02-09 RX ADMIN — POTASSIUM PHOSPHATE, MONOBASIC AND POTASSIUM PHOSPHATE, DIBASIC 20 MMOL: 224; 236 INJECTION, SOLUTION INTRAVENOUS at 05:07

## 2019-02-09 RX ADMIN — Medication 10 ML: at 20:32

## 2019-02-09 RX ADMIN — VALACYCLOVIR HYDROCHLORIDE 500 MG: 500 TABLET, FILM COATED ORAL at 10:14

## 2019-02-09 RX ADMIN — LISINOPRIL 40 MG: 40 TABLET ORAL at 10:14

## 2019-02-09 RX ADMIN — SODIUM CHLORIDE 15 ML: 900 IRRIGANT IRRIGATION at 17:00

## 2019-02-09 RX ADMIN — CALCIUM CARBONATE-VITAMIN D TAB 500 MG-200 UNIT 1 TABLET: 500-200 TAB at 20:32

## 2019-02-09 RX ADMIN — CALCIUM CARBONATE-VITAMIN D TAB 500 MG-200 UNIT 1 TABLET: 500-200 TAB at 10:15

## 2019-02-09 RX ADMIN — FLUCONAZOLE 400 MG: 200 TABLET ORAL at 10:13

## 2019-02-09 RX ADMIN — ENOXAPARIN SODIUM 40 MG: 40 INJECTION SUBCUTANEOUS at 18:17

## 2019-02-09 RX ADMIN — POTASSIUM CHLORIDE 20 MEQ: 600 TABLET, FILM COATED, EXTENDED RELEASE ORAL at 10:14

## 2019-02-09 RX ADMIN — ANTACID TABLETS 1000 MG: 500 TABLET, CHEWABLE ORAL at 11:38

## 2019-02-09 RX ADMIN — Medication 10 ML: at 10:15

## 2019-02-09 RX ADMIN — LEVOFLOXACIN 500 MG: 500 TABLET, FILM COATED ORAL at 10:50

## 2019-02-09 RX ADMIN — SODIUM CHLORIDE 15 ML: 900 IRRIGANT IRRIGATION at 10:50

## 2019-02-09 RX ADMIN — VALACYCLOVIR HYDROCHLORIDE 500 MG: 500 TABLET, FILM COATED ORAL at 20:32

## 2019-02-09 RX ADMIN — POTASSIUM CHLORIDE, DEXTROSE MONOHYDRATE AND SODIUM CHLORIDE: 75; 5; 450 INJECTION, SOLUTION INTRAVENOUS at 03:26

## 2019-02-09 RX ADMIN — PROCHLORPERAZINE MALEATE 10 MG: 10 TABLET ORAL at 16:11

## 2019-02-09 ASSESSMENT — PAIN SCALES - GENERAL
PAINLEVEL_OUTOF10: 0

## 2019-02-09 NOTE — PROGRESS NOTES
[facility]  Autologous Progress Note    2019    Brando Yan    :  1955    MRN:  [id number]    Referring MD: Ahsan Willard MD  [address] [facility]    Subjective: she refers some weakness and fatigue. Her po intake is fair.       ECOG PS:  (1) Restricted in physically strenuous activity, ambulatory and able to do work of light nature    Isolation:  None     Medications    Scheduled Meds:   calcium-vitamin D  1 tablet Oral BID    [START ON 2019] Tbo-Filgrastim  300 mcg Subcutaneous QPM    enoxaparin  40 mg Subcutaneous QPM    fluconazole  400 mg Oral Daily    Saline Mouthwash  15 mL Swish & Spit 4x Daily AC & HS    sodium chloride flush  10 mL Intravenous 2 times per day    valACYclovir  500 mg Oral BID    amLODIPine  10 mg Oral Daily    lisinopril  40 mg Oral Daily    potassium chloride  20 mEq Oral BID    insulin lispro  0-12 Units Subcutaneous TID WC    insulin lispro  0-6 Units Subcutaneous Nightly    furosemide  40 mg Intravenous Q12H     Continuous Infusions:   sodium chloride      dextrose 5% and 0.45% NaCl with KCl 10 mEq 50 mL/hr at 19 0326    dextrose       PRN Meds:.potassium phosphate IVPB, calcium carbonate, polyethylene glycol, morphine, sodium chloride, alteplase, magnesium hydroxide, magnesium sulfate, potassium chloride, Saline Mouthwash, glucose, dextrose, glucagon (rDNA), dextrose, prochlorperazine **OR** prochlorperazine, LORazepam **OR** LORazepam    ROS:  As noted above, otherwise remainder of 10-point ROS negative    Physical Exam:     I&O:      Intake/Output Summary (Last 24 hours) at 19 1010  Last data filed at 19 0547   Gross per 24 hour   Intake          2075.83 ml   Output             2750 ml   Net          -674.17 ml       Vital Signs:  /70   Pulse 82   Temp 98.6 °F (37 °C) (Oral)   Resp 16   Ht 5' 1\" (1.549 m)   Wt 183 lb 14.4 oz (83.4 kg)   SpO2 97%   BMI 34.75 kg/m²     Weight: Wt Readings from Last 3 Encounters:   02/09/19 183 lb 14.4 oz (83.4 kg)   01/24/19 180 lb (81.6 kg)   12/31/18 178 lb 5.6 oz (80.9 kg)       General: Awake, alert and oriented. HEENT: normocephalic, alopecia, PERRL, no scleral erythema or icterus, Oral mucosa moist and intact, throat clear  NECK: supple without palpable adenopathy  BACK: Straight negative CVAT  SKIN: warm dry and intact without lesions rashes or masses  CHEST: CTA bilaterally without use of accessory muscles  CV: Normal S1 S2, RRR, no MRG  ABD: NT ND normoactive BS, no palpable masses or hepatosplenomegaly  EXTREMITIES: without edema, healing abrasion on left calf, denies calf tenderness  NEURO: CN II - XII grossly intact  CATHETER: Left SC Trifusion (1/24/19, Barrat) - CDI w/ open skin tears from bandage    Data:   CBC:   Recent Labs      02/07/19 0345 02/08/19 0415 02/09/19 0358   WBC  5.7  5.2  0.8*   HGB  10.5*  9.8*  10.2*   HCT  31.9*  29.4*  31.3*   MCV  87.6  87.0  89.0   PLT  139  152  157     BMP/Mag:  Recent Labs      02/07/19 0345 02/08/19 0415 02/09/19 0358   NA  142  146*  145   K  3.9  3.4*  4.7   CL  108  111*  111*   CO2  26  27  26   PHOS   --   1.8*  1.9*   BUN  16  11  10   CREATININE  0.7  0.6  <0.5*   MG  2.40   --    --      LIVP:   Recent Labs      02/08/19 0415   AST  19   ALT  23   BILIDIR  <0.2   BILITOT  <0.2   ALKPHOS  75     Uric Acid:    Recent Labs      02/09/19 0358   LABURIC  2.9     Coags:   Recent Labs      02/07/19 0345   PROTIME  11.2   INR  0.98   APTT  29.4       PROBLEM LIST:            1. IgG Lambda Multiple Myeloma, R-ISS Stage I  2. HTN  3. HLD  4. Type 2 DM  5. GERD  6. Tobacco Use (quit 12/2018)      TREATMENT:            1.  RVd x 5 cycles  2. High dose Melpahlan & ASCT (2/7/19) - 2.71x10^6 [id number]/kg     ASSESSMENT AND PLAN:            1.   IgG Lambda Multiple Myeloma:  She is currently in [facility]GPR  - S/p high dose Melpahlan (200 mg/m^2) and ASCT on 2/7/19      Day +

## 2019-02-09 NOTE — PLAN OF CARE
Problem: Falls - Risk of:  Goal: Will remain free from falls  Will remain free from falls   Outcome: Ongoing  Orthostatic vital signs obtained at start of shift - see flowsheet for details. Pt does not meet criteria for orthostasis. Pt is a Med fall risk. See Jeanenne Pew Fall Score and ABCDS Injury Risk assessments. - Screening for Orthostasis AND not a [place] Risk per AYERS/ABCDS: Pt bed is in low position, side rails up, call light and belongings are in reach. Fall risk light is on outside pts room. Pt encouraged to call for assistance as needed. Will continue with hourly rounds for PO intake, pain needs, toileting and repositioning as needed. Problem: Venous Thromboembolism:  Goal: Will show no signs or symptoms of venous thromboembolism  Will show no signs or symptoms of venous thromboembolism   Outcome: Ongoing  Adherent with DVT Prevention: Pt is at risk for DVT d/t decreased mobility and cancer treatment. Pt educated on importance of activity. Pt has orders for Subcut prophylactic lovenox. Pt verbalizes understanding of need for prophylaxis while inpatient. Problem: Bleeding:  Goal: Will show no signs and symptoms of excessive bleeding  Will show no signs and symptoms of excessive bleeding   Outcome: Ongoing  Patient's hemoglobin this AM:   Recent Labs      02/09/19   0358   HGB  10.2*     Patient's platelet count this AM:   Recent Labs      02/09/19   0358   PLT  157    Thrombocytopenia not present at this time. Patient showing no signs or symptoms of active bleeding. Transfusion not indicated at this time. Patient verbalizes understanding of all instructions. Will continue to assess and implement POC. Call light within reach and hourly rounding in place.      Problem: Infection - Central Venous Catheter-Associated Bloodstream Infection:  Goal: Will show no infection signs and symptoms  Will show no infection signs and symptoms   Outcome: Ongoing  CVC site remains free of signs/symptoms of

## 2019-02-09 NOTE — PLAN OF CARE
Problem: Falls - Risk of:  Goal: Absence of physical injury  Absence of physical injury   Outcome: Ongoing  Orthostatic vital signs obtained at start of shift - see flowsheet for details. Pt does not meet criteria for orthostasis. Pt is a Med fall risk. See Kevan [place] Fall Score and ABCDS Injury Risk assessments. Pt bed is in low position, side rails up, call light and belongings are in reach. Fall risk light is on outside pts room. Pt encouraged to call for assistance as needed. Will continue with hourly rounds for PO intake, pain needs, toileting and repositioning as needed.      Problem: Venous Thromboembolism:  Goal: Will show no signs or symptoms of venous thromboembolism  Will show no signs or symptoms of venous thromboembolism   Outcome: Ongoing  Adherent with DVT Prevention: Pt is at risk for DVT d/t decreased mobility and cancer treatment. Pt educated on importance of activity. Pt has orders for Subcut prophylactic lovenox. Pt verbalizes understanding of need for prophylaxis while inpatient.      Problem: Bleeding:  Goal: Will show no signs and symptoms of excessive bleeding  Will show no signs and symptoms of excessive bleeding   Outcome: Ongoing     Patient's hemoglobin this AM:   Recent Labs      02/09/19   0358   HGB  10.2*     Patient's platelet count this AM:   Recent Labs      02/09/19   0358   PLT  157    Thrombocytopenia Precautions in place. Patient showing no signs or symptoms of active bleeding. Transfusion not indicated at this time. Patient verbalizes understanding of all instructions. Will continue to assess and implement POC. Call light within reach and hourly rounding in place.      Problem: Infection - Central Venous Catheter-Associated Bloodstream Infection:  Goal: Will show no infection signs and symptoms  Will show no infection signs and symptoms   Outcome: Ongoing  CVC site remains free of signs/symptoms of infection. No drainage, edema, erythema, pain, or warmth noted at site. Dressing changes continue per protocol and on an as needed basis - see flowsheet.         Problem: PROTECTIVE PRECAUTIONS  Goal: Patient will remain free of nosocomial Infections  Outcome: Ongoing  Pt remains in protective precautions. Pt educated on wearing mask when in hallways. Pt, staff, and visitors adhering to handwashing guidelines. Pt educated to shower or bathe daily with chlorhexidine and linens changed daily per protocol. Pt verbalizes understanding of low microbial diet. Will continue to monitor.

## 2019-02-09 NOTE — PLAN OF CARE
Problem: Falls - Risk of:  Goal: Will remain free from falls  Will remain free from falls   Outcome: Ongoing  Pt is a medium fall risk. See [facility] Fall Score. Pt bed is in low position, side rails up, call light and belongings are in reach. Pt up ad sharee, steady gait noted, bed alarm not in use. Pt encouraged to call for assistance, pt using call light appropriately. Will continue with hourly rounds for po intake, pain needs, toileting and repositioning as needed. Problem: Venous Thromboembolism:  Goal: Will show no signs or symptoms of venous thromboembolism  Will show no signs or symptoms of venous thromboembolism   Outcome: Ongoing  Adherent with DVT Prevention: Pt is at risk for DVT d/t decreased mobility and cancer treatment. Pt educated on importance of activity. Pt has orders for Subcut prophylactic lovenox. Pt verbalizes understanding of need for prophylaxis while inpatient. Problem: Bleeding:  Goal: Will show no signs and symptoms of excessive bleeding  Will show no signs and symptoms of excessive bleeding   Outcome: Ongoing  Patient's hemoglobin this AM:   Recent Labs      02/09/19   0358   HGB  10.2*     Patient's platelet count this AM:   Recent Labs      02/09/19   0358   PLT  157    Thrombocytopenia not present at this time. Patient showing no signs or symptoms of active bleeding. Transfusion not indicated at this time. Patient verbalizes understanding of all instructions. Will continue to assess and implement POC. Call light within reach and hourly rounding in place. Problem: Infection - Central Venous Catheter-Associated Bloodstream Infection:  Goal: Will show no infection signs and symptoms  Will show no infection signs and symptoms   Outcome: Ongoing  CVC site remains free of signs/symptoms of infection. No drainage, edema, erythema, pain, or warmth noted at site. Dressing changes continue per protocol and on an as needed basis - see flowsheet.      Compliant with [address] [facility]

## 2019-02-10 LAB
ANION GAP SERPL CALCULATED.3IONS-SCNC: 7 MMOL/L (ref 3–16)
BASOPHILS ABSOLUTE: 0 K/UL (ref 0–0.2)
BASOPHILS RELATIVE PERCENT: 0.4 %
BUN BLDV-MCNC: 9 MG/DL (ref 7–20)
CALCIUM SERPL-MCNC: 8.6 MG/DL (ref 8.3–10.6)
CHLORIDE BLD-SCNC: 105 MMOL/L (ref 99–110)
CO2: 26 MMOL/L (ref 21–32)
CREAT SERPL-MCNC: 0.6 MG/DL (ref 0.6–1.2)
EOSINOPHILS ABSOLUTE: 0 K/UL (ref 0–0.6)
EOSINOPHILS RELATIVE PERCENT: 0.5 %
GFR AFRICAN AMERICAN: >60
GFR NON-AFRICAN AMERICAN: >60
GLUCOSE BLD-MCNC: 106 MG/DL (ref 70–99)
GLUCOSE BLD-MCNC: 108 MG/DL (ref 70–99)
GLUCOSE BLD-MCNC: 116 MG/DL (ref 70–99)
GLUCOSE BLD-MCNC: 123 MG/DL (ref 70–99)
HCT VFR BLD CALC: 32 % (ref 36–48)
HEMOGLOBIN: 10.7 G/DL (ref 12–16)
LYMPHOCYTES ABSOLUTE: 0.1 K/UL (ref 1–5.1)
LYMPHOCYTES RELATIVE PERCENT: 10.7 %
MCH RBC QN AUTO: 29.2 PG (ref 26–34)
MCHC RBC AUTO-ENTMCNC: 33.6 G/DL (ref 31–36)
MCV RBC AUTO: 87.1 FL (ref 80–100)
MONOCYTES ABSOLUTE: 0 K/UL (ref 0–1.3)
MONOCYTES RELATIVE PERCENT: 7.4 %
NEUTROPHILS ABSOLUTE: 0.5 K/UL (ref 1.7–7.7)
NEUTROPHILS RELATIVE PERCENT: 81 %
PDW BLD-RTO: 17.8 % (ref 12.4–15.4)
PERFORMED ON: ABNORMAL
PHOSPHORUS: 3.4 MG/DL (ref 2.5–4.9)
PLATELET # BLD: 159 K/UL (ref 135–450)
PMV BLD AUTO: 7.6 FL (ref 5–10.5)
POTASSIUM SERPL-SCNC: 4.2 MMOL/L (ref 3.5–5.1)
RBC # BLD: 3.67 M/UL (ref 4–5.2)
SODIUM BLD-SCNC: 138 MMOL/L (ref 136–145)
URIC ACID, SERUM: 2.6 MG/DL (ref 2.6–6)
WBC # BLD: 0.6 K/UL (ref 4–11)

## 2019-02-10 PROCEDURE — 84550 ASSAY OF BLOOD/URIC ACID: CPT

## 2019-02-10 PROCEDURE — 2060000000 HC ICU INTERMEDIATE R&B

## 2019-02-10 PROCEDURE — 6370000000 HC RX 637 (ALT 250 FOR IP): Performed by: NURSE PRACTITIONER

## 2019-02-10 PROCEDURE — 2580000003 HC RX 258: Performed by: NURSE PRACTITIONER

## 2019-02-10 PROCEDURE — 36592 COLLECT BLOOD FROM PICC: CPT

## 2019-02-10 PROCEDURE — 84100 ASSAY OF PHOSPHORUS: CPT

## 2019-02-10 PROCEDURE — 2500000003 HC RX 250 WO HCPCS: Performed by: INTERNAL MEDICINE

## 2019-02-10 PROCEDURE — 85025 COMPLETE CBC W/AUTO DIFF WBC: CPT

## 2019-02-10 PROCEDURE — 6370000000 HC RX 637 (ALT 250 FOR IP): Performed by: INTERNAL MEDICINE

## 2019-02-10 PROCEDURE — 80048 BASIC METABOLIC PNL TOTAL CA: CPT

## 2019-02-10 PROCEDURE — 6360000002 HC RX W HCPCS: Performed by: INTERNAL MEDICINE

## 2019-02-10 RX ORDER — PANTOPRAZOLE SODIUM 40 MG/1
40 TABLET, DELAYED RELEASE ORAL
Status: DISCONTINUED | OUTPATIENT
Start: 2019-02-11 | End: 2019-02-12

## 2019-02-10 RX ADMIN — POTASSIUM CHLORIDE, DEXTROSE MONOHYDRATE AND SODIUM CHLORIDE: 75; 5; 450 INJECTION, SOLUTION INTRAVENOUS at 16:46

## 2019-02-10 RX ADMIN — ANTACID TABLETS 1000 MG: 500 TABLET, CHEWABLE ORAL at 16:45

## 2019-02-10 RX ADMIN — CALCIUM CARBONATE-VITAMIN D TAB 500 MG-200 UNIT 1 TABLET: 500-200 TAB at 21:23

## 2019-02-10 RX ADMIN — SODIUM CHLORIDE 15 ML: 900 IRRIGANT IRRIGATION at 21:23

## 2019-02-10 RX ADMIN — LEVOFLOXACIN 500 MG: 500 TABLET, FILM COATED ORAL at 08:29

## 2019-02-10 RX ADMIN — VALACYCLOVIR HYDROCHLORIDE 500 MG: 500 TABLET, FILM COATED ORAL at 08:26

## 2019-02-10 RX ADMIN — ANTACID TABLETS 1000 MG: 500 TABLET, CHEWABLE ORAL at 11:44

## 2019-02-10 RX ADMIN — FLUCONAZOLE 400 MG: 200 TABLET ORAL at 08:27

## 2019-02-10 RX ADMIN — SODIUM CHLORIDE 15 ML: 900 IRRIGANT IRRIGATION at 12:14

## 2019-02-10 RX ADMIN — LISINOPRIL 40 MG: 40 TABLET ORAL at 08:26

## 2019-02-10 RX ADMIN — CALCIUM CARBONATE-VITAMIN D TAB 500 MG-200 UNIT 1 TABLET: 500-200 TAB at 08:27

## 2019-02-10 RX ADMIN — ENOXAPARIN SODIUM 40 MG: 40 INJECTION SUBCUTANEOUS at 18:23

## 2019-02-10 RX ADMIN — Medication 10 ML: at 21:23

## 2019-02-10 RX ADMIN — SODIUM CHLORIDE 15 ML: 900 IRRIGANT IRRIGATION at 18:24

## 2019-02-10 RX ADMIN — Medication 10 ML: at 08:29

## 2019-02-10 RX ADMIN — POTASSIUM CHLORIDE 20 MEQ: 600 TABLET, FILM COATED, EXTENDED RELEASE ORAL at 08:26

## 2019-02-10 RX ADMIN — POTASSIUM CHLORIDE 20 MEQ: 600 TABLET, FILM COATED, EXTENDED RELEASE ORAL at 21:22

## 2019-02-10 RX ADMIN — VALACYCLOVIR HYDROCHLORIDE 500 MG: 500 TABLET, FILM COATED ORAL at 21:23

## 2019-02-10 RX ADMIN — AMLODIPINE BESYLATE 10 MG: 10 TABLET ORAL at 08:26

## 2019-02-10 ASSESSMENT — PAIN SCALES - GENERAL
PAINLEVEL_OUTOF10: 0

## 2019-02-10 NOTE — PLAN OF CARE
Problem: Falls - Risk of:  Goal: Will remain free from falls  Will remain free from falls   Outcome: Ongoing  Orthostatic vital signs obtained at start of shift - see flowsheet for details. Pt does not meet criteria for orthostasis. Pt is a Med fall risk. See Ellaree Barters Fall Score and ABCDS Injury Risk assessments. - Screening for Orthostasis AND not a [place] Risk per AYERS/ABCDS: Pt bed is in low position, side rails up, call light and belongings are in reach. Fall risk light is on outside pts room. Pt encouraged to call for assistance as needed. Will continue with hourly rounds for PO intake, pain needs, toileting and repositioning as needed. Problem: Venous Thromboembolism:  Goal: Will show no signs or symptoms of venous thromboembolism  Will show no signs or symptoms of venous thromboembolism   Outcome: Ongoing  Adherent with DVT Prevention: Pt is at risk for DVT d/t decreased mobility and cancer treatment. Pt educated on importance of activity. Pt has orders for Subcut prophylactic lovenox. Pt verbalizes understanding of need for prophylaxis while inpatient. Problem: Bleeding:  Goal: Will show no signs and symptoms of excessive bleeding  Will show no signs and symptoms of excessive bleeding   Outcome: Ongoing  Patient's hemoglobin this AM:   Recent Labs      02/10/19   0417   HGB  10.7*     Patient's platelet count this AM:   Recent Labs      02/10/19   0417   PLT  159    Thrombocytopenia Precautions in place. Patient showing no signs or symptoms of active bleeding. Transfusion not indicated at this time. Patient verbalizes understanding of all instructions. Will continue to assess and implement POC. Call light within reach and hourly rounding in place.        Problem: Infection - Central Venous Catheter-Associated Bloodstream Infection:  Goal: Will show no infection signs and symptoms  Will show no infection signs and symptoms   Outcome: Ongoing  CVC site remains free of signs/symptoms of infection. No drainage, edema, erythema, pain, or warmth noted at site. Dressing changes continue per protocol and on an as needed basis - see flowsheet. Compliant with [facility] Protocol:  Performed CHG bath today per [facility] protocol utilizing CHG solution in the shower. CVC site cleansed with CHG wipe over dressing, skin surrounding dressing, and first 6\" of IV tubing. Pt tolerated well. Continued to encourage daily CHG bathing per [facility] protocol. Problem: PROTECTIVE PRECAUTIONS  Goal: Patient will remain free of nosocomial Infections  Outcome: Ongoing  Pt educated on wearing mask when in hallways. Pt, staff, and visitors adhering to handwashing guidelines. Pt showers daily with chlorhexidine and linens changed daily per protocol. Pt verbalizes understanding of low microbial diet. Will continue to monitor. Problem: Serum Glucose Level - Abnormal:  Goal: Ability to maintain appropriate glucose levels has stabilized  Ability to maintain appropriate glucose levels has stabilized   Outcome: Ongoing  Pt did not require any insulin at bedtime.

## 2019-02-10 NOTE — PROGRESS NOTES
[facility]  Autologous Progress Note    2/10/2019    Justina Lucas    :  1955    MRN:  [id number]    Referring MD: Marcy Carl MD  [address], [address] [facility]    Subjective: she refers some weakness and fatigue. Her po intake is fair.       ECOG PS:  (1) Restricted in physically strenuous activity, ambulatory and able to do work of light nature    Isolation:  None     Medications    Scheduled Meds:   levofloxacin  500 mg Oral Daily    calcium-vitamin D  1 tablet Oral BID    [START ON 2019] Tbo-Filgrastim  300 mcg Subcutaneous QPM    enoxaparin  40 mg Subcutaneous QPM    fluconazole  400 mg Oral Daily    Saline Mouthwash  15 mL Swish & Spit 4x Daily AC & HS    sodium chloride flush  10 mL Intravenous 2 times per day    valACYclovir  500 mg Oral BID    amLODIPine  10 mg Oral Daily    lisinopril  40 mg Oral Daily    potassium chloride  20 mEq Oral BID    insulin lispro  0-12 Units Subcutaneous TID WC    insulin lispro  0-6 Units Subcutaneous Nightly    furosemide  40 mg Intravenous Q12H     Continuous Infusions:   sodium chloride      dextrose 5% and 0.45% NaCl with KCl 10 mEq 50 mL/hr at 19    dextrose       PRN Meds:.potassium phosphate IVPB, calcium carbonate, polyethylene glycol, morphine, sodium chloride, alteplase, magnesium hydroxide, magnesium sulfate, potassium chloride, Saline Mouthwash, glucose, dextrose, glucagon (rDNA), dextrose, prochlorperazine **OR** prochlorperazine, LORazepam **OR** LORazepam    ROS:  As noted above, otherwise remainder of 10-point ROS negative    Physical Exam:     I&O:      Intake/Output Summary (Last 24 hours) at 02/10/19 1137  Last data filed at 02/10/19 0742   Gross per 24 hour   Intake             1930 ml   Output             3125 ml   Net            -1195 ml       Vital Signs:  /65   Pulse 93   Temp 98.9 °F (37.2 °C) (Oral)   Resp 14   Ht 5' 1\" (1.549 m)   Wt 179 lb 3.2 oz (81.3 kg)   SpO2 97%   BMI 33.86 kg/m²     Weight:    Wt Readings from Last 3 Encounters:   02/10/19 179 lb 3.2 oz (81.3 kg)   01/24/19 180 lb (81.6 kg)   12/31/18 178 lb 5.6 oz (80.9 kg)       General: Awake, alert and oriented. HEENT: normocephalic, alopecia, PERRL, no scleral erythema or icterus, Oral mucosa moist and intact, throat clear  NECK: supple without palpable adenopathy  BACK: Straight negative CVAT  SKIN: warm dry and intact without lesions rashes or masses  CHEST: CTA bilaterally without use of accessory muscles  CV: Normal S1 S2, RRR, no MRG  ABD: NT ND normoactive BS, no palpable masses or hepatosplenomegaly  EXTREMITIES: without edema, healing abrasion on left calf, denies calf tenderness  NEURO: CN II - XII grossly intact  CATHETER: Left SC Trifusion (1/24/19, Barrat) - CDI w/ open skin tears from bandage    Data:   CBC:   Recent Labs      02/08/19 0415  02/09/19   0358  02/10/19   0417   WBC  5.2  0.8*  0.6*   HGB  9.8*  10.2*  10.7*   HCT  29.4*  31.3*  32.0*   MCV  87.0  89.0  87.1   PLT  152  157  159     BMP/Mag:  Recent Labs      02/08/19 0415  02/09/19   0358  02/10/19   0416   NA  146*  145  138   K  3.4*  4.7  4.2   CL  111*  111*  105   CO2  27  26  26   PHOS  1.8*  1.9*  3.4   BUN  11  10  9   CREATININE  0.6  <0.5*  0.6     LIVP:   Recent Labs      02/08/19 0415   AST  19   ALT  23   BILIDIR  <0.2   BILITOT  <0.2   ALKPHOS  75     Uric Acid:    Recent Labs      02/10/19   0416   LABURIC  2.6     Coags:   No results for input(s): PROTIME, INR, APTT in the last 72 hours. PROBLEM LIST:            1. IgG Lambda Multiple Myeloma, R-ISS Stage I  2. HTN  3. HLD  4. Type 2 DM  5. GERD  6. Tobacco Use (quit 12/2018)      TREATMENT:            1.  RVd x 5 cycles  2. High dose Melpahlan & ASCT (2/7/19) - 2.71x10^6 [id number]/kg     ASSESSMENT AND PLAN:            1.   IgG Lambda Multiple Myeloma:  She is currently in VGPR  - S/p high dose Melpahlan (200 mg/m^2) and ASCT on 2/7/19      Day + 3     2.  ID:  Afebrile, no evidence of infection.    - Cont Diflucan & Valtrex, cont acyclovir at discharge for VZV positive titer.   - Started Levaquin 2/9     3. Heme:  Anemia from chemotherapy   - Transfuse for Hgb < 7 and Platelets < 78J   - No transfusion today  - Start Granix on 2/12/19     4. Metabolic: good uop and stable wt   - HypoCa:  Resume Ca+D supplement  - Cont KCl 20 meq bid  - Cont IVF:  D5.45NS + KCl 10 meq @ 50 mL/hr   - Replace potassium and magnesium per policy.     5.  GI / Nutrition:  Appetite and oral intake is good. - Cont low microbial diet   - Follow closely with dietary     6. Type 2 DM: hyperglycemia d/t steroids is improving   - Hold oral diabetes medications  - Cont Lispro SSI, medium regimen, AC&HS      7.  Cardiac:  Stable  HLD:  Stable  - Hold Crestor while inpatient, resume on d/c  HTN:  Stable  - Hold HCTZ while inpatient, will give intermittent Lasix if needed  - Cont Norvasc 10 mg daily and Lisinopril 40 mg daily     8.   Bone Health:  Stable  - Resume Calcium and Vit D supplements d/t low Ca  - Resume bisphosphonate after discharged      - DVT Prophylaxis: Platelets >59,003 cells/dL, - daily lovenox prophylaxis ordered  Contraindications to pharmacologic prophylaxis: None  Contraindications to mechanical prophylaxis: None    - Disposition:  Once ANC > 1.0 and toxicities from transplant resolved    Ana Burgess MD

## 2019-02-11 ENCOUNTER — APPOINTMENT (OUTPATIENT)
Dept: GENERAL RADIOLOGY | Age: 64
DRG: 016 | End: 2019-02-11
Attending: INTERNAL MEDICINE
Payer: COMMERCIAL

## 2019-02-11 LAB
ALBUMIN SERPL-MCNC: 3.6 G/DL (ref 3.4–5)
ALP BLD-CCNC: 67 U/L (ref 40–129)
ALT SERPL-CCNC: 54 U/L (ref 10–40)
ANION GAP SERPL CALCULATED.3IONS-SCNC: 10 MMOL/L (ref 3–16)
APTT: 31 SEC (ref 26–36)
AST SERPL-CCNC: 25 U/L (ref 15–37)
BASOPHILS ABSOLUTE: 0 K/UL (ref 0–0.2)
BASOPHILS RELATIVE PERCENT: 0.6 %
BILIRUB SERPL-MCNC: 0.4 MG/DL (ref 0–1)
BILIRUBIN DIRECT: <0.2 MG/DL (ref 0–0.3)
BILIRUBIN URINE: NEGATIVE
BILIRUBIN, INDIRECT: ABNORMAL MG/DL (ref 0–1)
BLOOD, URINE: NEGATIVE
BUN BLDV-MCNC: 10 MG/DL (ref 7–20)
C DIFFICILE TOXIN, EIA: NORMAL
CALCIUM SERPL-MCNC: 9 MG/DL (ref 8.3–10.6)
CHLORIDE BLD-SCNC: 104 MMOL/L (ref 99–110)
CLARITY: CLEAR
CO2: 26 MMOL/L (ref 21–32)
COLOR: YELLOW
CREAT SERPL-MCNC: 0.7 MG/DL (ref 0.6–1.2)
EOSINOPHILS ABSOLUTE: 0 K/UL (ref 0–0.6)
EOSINOPHILS RELATIVE PERCENT: 0.5 %
GFR AFRICAN AMERICAN: >60
GFR NON-AFRICAN AMERICAN: >60
GLUCOSE BLD-MCNC: 107 MG/DL (ref 70–99)
GLUCOSE BLD-MCNC: 111 MG/DL (ref 70–99)
GLUCOSE BLD-MCNC: 113 MG/DL (ref 70–99)
GLUCOSE BLD-MCNC: 142 MG/DL (ref 70–99)
GLUCOSE URINE: NEGATIVE MG/DL
HCT VFR BLD CALC: 33.8 % (ref 36–48)
HEMOGLOBIN: 11.4 G/DL (ref 12–16)
INR BLD: 1.04 (ref 0.86–1.14)
KETONES, URINE: NEGATIVE MG/DL
LACTATE DEHYDROGENASE: 184 U/L (ref 100–190)
LEUKOCYTE ESTERASE, URINE: NEGATIVE
LYMPHOCYTES ABSOLUTE: 0.1 K/UL (ref 1–5.1)
LYMPHOCYTES RELATIVE PERCENT: 12.4 %
MAGNESIUM: 2 MG/DL (ref 1.8–2.4)
MCH RBC QN AUTO: 29.3 PG (ref 26–34)
MCHC RBC AUTO-ENTMCNC: 33.7 G/DL (ref 31–36)
MCV RBC AUTO: 86.8 FL (ref 80–100)
MICROSCOPIC EXAMINATION: NORMAL
MONOCYTES ABSOLUTE: 0.1 K/UL (ref 0–1.3)
MONOCYTES RELATIVE PERCENT: 12.8 %
NEUTROPHILS ABSOLUTE: 0.4 K/UL (ref 1.7–7.7)
NEUTROPHILS RELATIVE PERCENT: 73.7 %
NITRITE, URINE: NEGATIVE
PDW BLD-RTO: 17.4 % (ref 12.4–15.4)
PERFORMED ON: ABNORMAL
PH UA: 6
PHOSPHORUS: 4 MG/DL (ref 2.5–4.9)
PLATELET # BLD: 138 K/UL (ref 135–450)
PMV BLD AUTO: 7.3 FL (ref 5–10.5)
POTASSIUM SERPL-SCNC: 4 MMOL/L (ref 3.5–5.1)
PROTEIN UA: NEGATIVE MG/DL
PROTHROMBIN TIME: 11.8 SEC (ref 9.8–13)
RBC # BLD: 3.89 M/UL (ref 4–5.2)
SODIUM BLD-SCNC: 140 MMOL/L (ref 136–145)
SPECIFIC GRAVITY UA: 1.01
TOTAL PROTEIN: 5.6 G/DL (ref 6.4–8.2)
URIC ACID, SERUM: 2.6 MG/DL (ref 2.6–6)
URINE TYPE: NORMAL
UROBILINOGEN, URINE: 0.2 E.U./DL
WBC # BLD: 0.6 K/UL (ref 4–11)

## 2019-02-11 PROCEDURE — 2060000000 HC ICU INTERMEDIATE R&B

## 2019-02-11 PROCEDURE — 80048 BASIC METABOLIC PNL TOTAL CA: CPT

## 2019-02-11 PROCEDURE — 85730 THROMBOPLASTIN TIME PARTIAL: CPT

## 2019-02-11 PROCEDURE — 6370000000 HC RX 637 (ALT 250 FOR IP): Performed by: INTERNAL MEDICINE

## 2019-02-11 PROCEDURE — 36592 COLLECT BLOOD FROM PICC: CPT

## 2019-02-11 PROCEDURE — 87449 NOS EACH ORGANISM AG IA: CPT

## 2019-02-11 PROCEDURE — 84550 ASSAY OF BLOOD/URIC ACID: CPT

## 2019-02-11 PROCEDURE — 81003 URINALYSIS AUTO W/O SCOPE: CPT

## 2019-02-11 PROCEDURE — 71045 X-RAY EXAM CHEST 1 VIEW: CPT

## 2019-02-11 PROCEDURE — 83615 LACTATE (LD) (LDH) ENZYME: CPT

## 2019-02-11 PROCEDURE — 6370000000 HC RX 637 (ALT 250 FOR IP): Performed by: NURSE PRACTITIONER

## 2019-02-11 PROCEDURE — 85025 COMPLETE CBC W/AUTO DIFF WBC: CPT

## 2019-02-11 PROCEDURE — 85610 PROTHROMBIN TIME: CPT

## 2019-02-11 PROCEDURE — 80076 HEPATIC FUNCTION PANEL: CPT

## 2019-02-11 PROCEDURE — 2500000003 HC RX 250 WO HCPCS: Performed by: INTERNAL MEDICINE

## 2019-02-11 PROCEDURE — 87324 CLOSTRIDIUM AG IA: CPT

## 2019-02-11 PROCEDURE — 6360000002 HC RX W HCPCS: Performed by: INTERNAL MEDICINE

## 2019-02-11 PROCEDURE — 84100 ASSAY OF PHOSPHORUS: CPT

## 2019-02-11 PROCEDURE — 83735 ASSAY OF MAGNESIUM: CPT

## 2019-02-11 PROCEDURE — 2580000003 HC RX 258: Performed by: NURSE PRACTITIONER

## 2019-02-11 RX ORDER — LOPERAMIDE HYDROCHLORIDE 2 MG/1
4 CAPSULE ORAL ONCE
Status: DISCONTINUED | OUTPATIENT
Start: 2019-02-11 | End: 2019-02-15

## 2019-02-11 RX ORDER — ONDANSETRON HYDROCHLORIDE 8 MG/1
8 TABLET, FILM COATED ORAL EVERY 8 HOURS PRN
Status: DISCONTINUED | OUTPATIENT
Start: 2019-02-11 | End: 2019-02-18 | Stop reason: HOSPADM

## 2019-02-11 RX ORDER — FAMOTIDINE 20 MG/1
20 TABLET, FILM COATED ORAL 2 TIMES DAILY
Status: COMPLETED | OUTPATIENT
Start: 2019-02-11 | End: 2019-02-11

## 2019-02-11 RX ORDER — ONDANSETRON 2 MG/ML
8 INJECTION INTRAMUSCULAR; INTRAVENOUS EVERY 8 HOURS PRN
Status: DISCONTINUED | OUTPATIENT
Start: 2019-02-11 | End: 2019-02-18 | Stop reason: HOSPADM

## 2019-02-11 RX ORDER — LOPERAMIDE HYDROCHLORIDE 2 MG/1
2 CAPSULE ORAL PRN
Status: DISCONTINUED | OUTPATIENT
Start: 2019-02-11 | End: 2019-02-15

## 2019-02-11 RX ADMIN — FAMOTIDINE 20 MG: 20 TABLET, FILM COATED ORAL at 20:51

## 2019-02-11 RX ADMIN — CALCIUM CARBONATE-VITAMIN D TAB 500 MG-200 UNIT 1 TABLET: 500-200 TAB at 08:02

## 2019-02-11 RX ADMIN — FLUCONAZOLE 400 MG: 200 TABLET ORAL at 08:02

## 2019-02-11 RX ADMIN — SODIUM CHLORIDE 15 ML: 900 IRRIGANT IRRIGATION at 06:49

## 2019-02-11 RX ADMIN — POTASSIUM CHLORIDE 20 MEQ: 600 TABLET, FILM COATED, EXTENDED RELEASE ORAL at 08:02

## 2019-02-11 RX ADMIN — POTASSIUM CHLORIDE 20 MEQ: 600 TABLET, FILM COATED, EXTENDED RELEASE ORAL at 20:50

## 2019-02-11 RX ADMIN — Medication 10 ML: at 19:47

## 2019-02-11 RX ADMIN — FAMOTIDINE 20 MG: 20 TABLET, FILM COATED ORAL at 15:26

## 2019-02-11 RX ADMIN — INSULIN LISPRO 1 UNITS: 100 INJECTION, SOLUTION INTRAVENOUS; SUBCUTANEOUS at 20:51

## 2019-02-11 RX ADMIN — AMLODIPINE BESYLATE 10 MG: 10 TABLET ORAL at 08:03

## 2019-02-11 RX ADMIN — PROCHLORPERAZINE EDISYLATE 10 MG: 5 INJECTION INTRAMUSCULAR; INTRAVENOUS at 11:16

## 2019-02-11 RX ADMIN — POTASSIUM CHLORIDE, DEXTROSE MONOHYDRATE AND SODIUM CHLORIDE: 75; 5; 450 INJECTION, SOLUTION INTRAVENOUS at 23:12

## 2019-02-11 RX ADMIN — ENOXAPARIN SODIUM 40 MG: 40 INJECTION SUBCUTANEOUS at 19:02

## 2019-02-11 RX ADMIN — Medication 10 ML: at 08:03

## 2019-02-11 RX ADMIN — POTASSIUM CHLORIDE, DEXTROSE MONOHYDRATE AND SODIUM CHLORIDE: 75; 5; 450 INJECTION, SOLUTION INTRAVENOUS at 03:45

## 2019-02-11 RX ADMIN — LEVOFLOXACIN 500 MG: 500 TABLET, FILM COATED ORAL at 08:03

## 2019-02-11 RX ADMIN — SODIUM CHLORIDE 15 ML: 900 IRRIGANT IRRIGATION at 11:22

## 2019-02-11 RX ADMIN — LISINOPRIL 40 MG: 40 TABLET ORAL at 08:02

## 2019-02-11 RX ADMIN — PANTOPRAZOLE SODIUM 40 MG: 40 TABLET, DELAYED RELEASE ORAL at 06:48

## 2019-02-11 RX ADMIN — VALACYCLOVIR HYDROCHLORIDE 500 MG: 500 TABLET, FILM COATED ORAL at 08:02

## 2019-02-11 RX ADMIN — VALACYCLOVIR HYDROCHLORIDE 500 MG: 500 TABLET, FILM COATED ORAL at 20:50

## 2019-02-11 ASSESSMENT — PAIN SCALES - GENERAL
PAINLEVEL_OUTOF10: 0

## 2019-02-11 NOTE — PLAN OF CARE
protocol utilizing CHG solution in the shower. CVC site cleansed with CHG wipe over dressing, skin surrounding dressing, and first 6\" of IV tubing. Pt tolerated well. Continued to encourage daily CHG bathing per [facility] protocol    Problem: PROTECTIVE PRECAUTIONS  Goal: Patient will remain free of nosocomial Infections  Outcome: Ongoing  Pt remains in protective precautions. No living plants or fresh flowers in his/her room. Patient educated on wearing mask when in hallways. Patient, staff, and visitors adhering to handwashing guidelines. Patient cleansed with chlorhexidine wipes and linens changed daily per protocol. Pt verbalizes understanding of low microbial diet. Patient remains free of nosocomial infections. Problem: Nausea/Vomiting:  Goal: Absence of nausea/vomiting  Absence of nausea/vomiting   Outcome: Ongoing  Pt vomited once during shift. Medicated with compazine. Will continue to monitor. Problem: Bowel/Gastric:  Goal: Ability to achieve a regular elimination pattern will improve  Ability to achieve a regular elimination pattern will improve   Outcome: Ongoing  Pt Cdiff negative during shift.

## 2019-02-11 NOTE — PROGRESS NOTES
Resp 14   Ht 5' 1\" (1.549 m)   Wt 179 lb 3.2 oz (81.3 kg)   SpO2 92%   BMI 33.86 kg/m²     Weight:    Wt Readings from Last 3 Encounters:   02/10/19 179 lb 3.2 oz (81.3 kg)   01/24/19 180 lb (81.6 kg)   12/31/18 178 lb 5.6 oz (80.9 kg)       General: Awake, alert and oriented. HEENT: normocephalic, alopecia, PERRL, no scleral erythema or icterus, Oral mucosa moist and intact, throat clear  NECK: supple without palpable adenopathy  BACK: Straight negative CVAT  SKIN: warm dry and intact without lesions rashes or masses  CHEST: CTA bilaterally without use of accessory muscles  CV: Normal S1 S2, RRR, no MRG  ABD: NT ND normoactive BS, no palpable masses or hepatosplenomegaly  EXTREMITIES: without edema, healing abrasion on left calf, denies calf tenderness  NEURO: CN II - XII grossly intact  CATHETER: Left SC Trifusion (1/24/19, Barrat) - CDI w/ open skin tears from bandage    Data:   CBC:   Recent Labs      02/09/19   0358  02/10/19   0417  02/11/19   0350   WBC  0.8*  0.6*  0.6*   HGB  10.2*  10.7*  11.4*   HCT  31.3*  32.0*  33.8*   MCV  89.0  87.1  86.8   PLT  157  159  138     BMP/Mag:  Recent Labs      02/09/19   0358  02/10/19   0416  02/11/19   0350   NA  145  138  140   K  4.7  4.2  4.0   CL  111*  105  104   CO2  26  26  26   PHOS  1.9*  3.4  4.0   BUN  10  9  10   CREATININE  <0.5*  0.6  0.7   MG   --    --   2.00     LIVP:   Recent Labs      02/11/19   0350   AST  25   ALT  54*   BILIDIR  <0.2   BILITOT  0.4   ALKPHOS  67     Uric Acid:    Recent Labs      02/11/19 0350   LABURIC  2.6     Coags:   Recent Labs      02/11/19 0350   PROTIME  11.8   INR  1.04   APTT  31.0       PROBLEM LIST:            1. IgG Lambda Multiple Myeloma, R-ISS Stage I  2. HTN  3. HLD  4. Type 2 DM  5. GERD  6. Tobacco Use (quit 12/2018)      TREATMENT:            1.  RVd x 5 cycles  2. High dose Melpahlan & ASCT (2/7/19) - 2.71x10^6 [id number]/kg     ASSESSMENT AND PLAN:            1.   IgG Lambda Multiple Myeloma:

## 2019-02-11 NOTE — PLAN OF CARE
Problem: Falls - Risk of:  Goal: Will remain free from falls  Will remain free from falls   Outcome: Ongoing  Pt remains free of falls; Patient has very steady gait. She is up ad sharee. Bed in lowest position, wheels locked, side rails up 2/4. Possessions and call light within reach; pt uses call light appropriately. Will continue to monitor.     Stable/No Isolation Precautions:  Pt with activity orders for up ad sharee. Encouraged pt to be up OOB as much as possible throughout the day and for all meals. Encouraged frequent short naps as necessary to preserve energy but instructed that while awake, pt should be OOB. Encouraged pt to ambulate in halls. Pt seen up ad sharee in halls several times this shift. Pt is visualized to be OOB % of the time this shift. Will continue to encourage frequent activity.       Problem: Bleeding:  Goal: Will show no signs and symptoms of excessive bleeding  Will show no signs and symptoms of excessive bleeding   Outcome: Ongoing  Patient's hemoglobin this AM:   Recent Labs      02/11/19   0350   HGB  11.4*     Patient's platelet count this AM:   Recent Labs      02/11/19   0350   PLT  138    Thrombocytopenia Precautions in place. Patient showing no signs or symptoms of active bleeding. Transfusion not indicated at this time. Patient verbalizes understanding of all instructions. Will continue to assess and implement POC. Call light within reach and hourly rounding in place.      Problem: Infection - Central Venous Catheter-Associated Bloodstream Infection:  Goal: Will show no infection signs and symptoms  Will show no infection signs and symptoms   Outcome: Ongoing  Pt afebrile. PICC in place; site and dressing remain c/d/i. Lines flush well with good blood return; Tegaderm and Biopatch in place. Lines pinned per protocol. Will continue to monitor. CVC site remains free of signs/symptoms of infection. No drainage, edema, erythema, pain, or warmth noted at site.  Dressing changes continue per protocol and on an as needed basis - see flowsheet.

## 2019-02-12 LAB
ANION GAP SERPL CALCULATED.3IONS-SCNC: 9 MMOL/L (ref 3–16)
BUN BLDV-MCNC: 11 MG/DL (ref 7–20)
CALCIUM SERPL-MCNC: 8.4 MG/DL (ref 8.3–10.6)
CHLORIDE BLD-SCNC: 108 MMOL/L (ref 99–110)
CO2: 25 MMOL/L (ref 21–32)
CREAT SERPL-MCNC: 0.7 MG/DL (ref 0.6–1.2)
GFR AFRICAN AMERICAN: >60
GFR NON-AFRICAN AMERICAN: >60
GLUCOSE BLD-MCNC: 101 MG/DL (ref 70–99)
GLUCOSE BLD-MCNC: 110 MG/DL (ref 70–99)
HCT VFR BLD CALC: 30.8 % (ref 36–48)
HEMOGLOBIN: 10 G/DL (ref 12–16)
MCH RBC QN AUTO: 28.5 PG (ref 26–34)
MCHC RBC AUTO-ENTMCNC: 32.3 G/DL (ref 31–36)
MCV RBC AUTO: 88.2 FL (ref 80–100)
PDW BLD-RTO: 17.1 % (ref 12.4–15.4)
PERFORMED ON: ABNORMAL
PHOSPHORUS: 3.5 MG/DL (ref 2.5–4.9)
PLATELET # BLD: 92 K/UL (ref 135–450)
PMV BLD AUTO: 7 FL (ref 5–10.5)
POTASSIUM SERPL-SCNC: 4 MMOL/L (ref 3.5–5.1)
RBC # BLD: 3.5 M/UL (ref 4–5.2)
SODIUM BLD-SCNC: 142 MMOL/L (ref 136–145)
URIC ACID, SERUM: 2.4 MG/DL (ref 2.6–6)
WBC # BLD: 0.3 K/UL (ref 4–11)

## 2019-02-12 PROCEDURE — 6370000000 HC RX 637 (ALT 250 FOR IP): Performed by: NURSE PRACTITIONER

## 2019-02-12 PROCEDURE — 36592 COLLECT BLOOD FROM PICC: CPT

## 2019-02-12 PROCEDURE — 2500000003 HC RX 250 WO HCPCS: Performed by: NURSE PRACTITIONER

## 2019-02-12 PROCEDURE — 2060000000 HC ICU INTERMEDIATE R&B

## 2019-02-12 PROCEDURE — 2580000003 HC RX 258: Performed by: NURSE PRACTITIONER

## 2019-02-12 PROCEDURE — 80048 BASIC METABOLIC PNL TOTAL CA: CPT

## 2019-02-12 PROCEDURE — 84100 ASSAY OF PHOSPHORUS: CPT

## 2019-02-12 PROCEDURE — 6370000000 HC RX 637 (ALT 250 FOR IP): Performed by: INTERNAL MEDICINE

## 2019-02-12 PROCEDURE — 6360000002 HC RX W HCPCS: Performed by: INTERNAL MEDICINE

## 2019-02-12 PROCEDURE — 84550 ASSAY OF BLOOD/URIC ACID: CPT

## 2019-02-12 PROCEDURE — 87081 CULTURE SCREEN ONLY: CPT

## 2019-02-12 PROCEDURE — 85025 COMPLETE CBC W/AUTO DIFF WBC: CPT

## 2019-02-12 RX ORDER — FAMOTIDINE 20 MG/1
20 TABLET, FILM COATED ORAL 2 TIMES DAILY
Status: DISCONTINUED | OUTPATIENT
Start: 2019-02-12 | End: 2019-02-12

## 2019-02-12 RX ORDER — 0.9 % SODIUM CHLORIDE 0.9 %
500 INTRAVENOUS SOLUTION INTRAVENOUS ONCE
Status: COMPLETED | OUTPATIENT
Start: 2019-02-12 | End: 2019-02-12

## 2019-02-12 RX ORDER — AMLODIPINE BESYLATE 5 MG/1
5 TABLET ORAL DAILY
Status: DISCONTINUED | OUTPATIENT
Start: 2019-02-12 | End: 2019-02-12

## 2019-02-12 RX ORDER — FAMOTIDINE 20 MG/1
20 TABLET, FILM COATED ORAL 2 TIMES DAILY
Status: DISCONTINUED | OUTPATIENT
Start: 2019-02-12 | End: 2019-02-18 | Stop reason: HOSPADM

## 2019-02-12 RX ORDER — LISINOPRIL 20 MG/1
20 TABLET ORAL DAILY
Status: DISCONTINUED | OUTPATIENT
Start: 2019-02-12 | End: 2019-02-18 | Stop reason: HOSPADM

## 2019-02-12 RX ADMIN — VALACYCLOVIR HYDROCHLORIDE 500 MG: 500 TABLET, FILM COATED ORAL at 09:22

## 2019-02-12 RX ADMIN — FAMOTIDINE 20 MG: 20 TABLET, FILM COATED ORAL at 21:45

## 2019-02-12 RX ADMIN — SODIUM CHLORIDE 15 ML: 900 IRRIGANT IRRIGATION at 21:46

## 2019-02-12 RX ADMIN — LISINOPRIL 20 MG: 20 TABLET ORAL at 09:22

## 2019-02-12 RX ADMIN — Medication 10 ML: at 21:46

## 2019-02-12 RX ADMIN — FAMOTIDINE 20 MG: 20 TABLET, FILM COATED ORAL at 09:22

## 2019-02-12 RX ADMIN — SODIUM CHLORIDE 15 ML: 900 IRRIGANT IRRIGATION at 17:35

## 2019-02-12 RX ADMIN — PANTOPRAZOLE SODIUM 40 MG: 40 TABLET, DELAYED RELEASE ORAL at 06:24

## 2019-02-12 RX ADMIN — ENOXAPARIN SODIUM 40 MG: 40 INJECTION SUBCUTANEOUS at 17:28

## 2019-02-12 RX ADMIN — TBO-FILGRASTIM 300 MCG: 300 INJECTION, SOLUTION SUBCUTANEOUS at 17:28

## 2019-02-12 RX ADMIN — Medication 10 ML: at 09:23

## 2019-02-12 RX ADMIN — LOPERAMIDE HYDROCHLORIDE 2 MG: 2 CAPSULE ORAL at 17:31

## 2019-02-12 RX ADMIN — SODIUM CHLORIDE 15 ML: 900 IRRIGANT IRRIGATION at 11:59

## 2019-02-12 RX ADMIN — VALACYCLOVIR HYDROCHLORIDE 500 MG: 500 TABLET, FILM COATED ORAL at 21:45

## 2019-02-12 RX ADMIN — FLUCONAZOLE 400 MG: 200 TABLET ORAL at 09:22

## 2019-02-12 RX ADMIN — POTASSIUM CHLORIDE, DEXTROSE MONOHYDRATE AND SODIUM CHLORIDE: 75; 5; 450 INJECTION, SOLUTION INTRAVENOUS at 14:56

## 2019-02-12 RX ADMIN — LEVOFLOXACIN 500 MG: 500 TABLET, FILM COATED ORAL at 09:22

## 2019-02-12 RX ADMIN — SODIUM CHLORIDE 500 ML: 9 INJECTION, SOLUTION INTRAVENOUS at 07:37

## 2019-02-12 ASSESSMENT — PAIN SCALES - GENERAL
PAINLEVEL_OUTOF10: 0

## 2019-02-12 NOTE — PROGRESS NOTES
Problem: Falls - Risk of:  Goal: Will remain free from falls  Will remain free from falls   Outcome: Ongoing  Orthostatic vital signs obtained at start of shift - see flowsheet for details. Pt does not meet criteria for orthostasis. Pt is a Med fall risk. See [facility] Fall Score and ABCDS Injury Risk assessments. - Screening for Orthostasis AND not a [place] Risk per AYERS/ABCDS: Pt bed is in low position, side rails up, call light and belongings are in reach. Fall risk light is on outside pts room. Pt encouraged to call for assistance as needed. Will continue with hourly rounds for PO intake, pain needs, toileting and repositioning as needed.        Problem: Venous Thromboembolism:  Goal: Will show no signs or symptoms of venous thromboembolism  Will show no signs or symptoms of venous thromboembolism   Outcome: Ongoing  Adherent with DVT Prevention: Pt is at risk for DVT d/t decreased mobility and cancer treatment. Pt educated on importance of activity. Pt has orders for Subcut prophylactic lovenox. Pt verbalizes understanding of need for prophylaxis while inpatient.      Problem: Bleeding:  Goal: Will show no signs and symptoms of excessive bleeding  Will show no signs and symptoms of excessive bleeding   Outcome: Ongoing  Patient's hemoglobin this AM:   Recent Labs      02/12/19   0355   HGB  10.0*     Patient's platelet count this AM:   Recent Labs      02/12/19   0355   PLT  92*    Thrombocytopenia Precautions in place. Patient showing no signs or symptoms of active bleeding. Transfusion not indicated at this time. Patient verbalizes understanding of all instructions. Will continue to assess and implement POC.  Call light within reach and hourly rounding in place.      Problem: Infection - Central Venous Catheter-Associated Bloodstream Infection:  Goal: Will show no infection signs and symptoms  Will show no infection signs and symptoms   Outcome: Ongoing  CVC site remains free of signs/symptoms of infection. No drainage, edema, erythema, pain, or warmth noted at site. Dressing changes continue per protocol and on an as needed basis - see flowsheet. Dressing changed this shift    Compliant with [facility] Bath Protocol:  Performed CHG bath today per [facility] protocol utilizing CHG solution in the shower. CVC site cleansed with CHG wipe over dressing, skin surrounding dressing, and first 6\" of IV tubing. Pt tolerated well. Continued to encourage daily CHG bathing per [facility] protocol.       Problem: PROTECTIVE PRECAUTIONS  Goal: Patient will remain free of nosocomial Infections  Outcome: Ongoing  Protective precautions in place        Problem: Nausea/Vomiting:  Goal: Absence of nausea/vomiting  Absence of nausea/vomiting   Outcome: Ongoing  No complaints of nausea or vomiting     Problem: Discharge Planning:  Goal: Discharged to appropriate level of care  Discharged to appropriate level of care  Outcome: Ongoing  Patient verbalizes understanding of plan of care.  Will continue to reinforce

## 2019-02-12 NOTE — PROGRESS NOTES
[facility]  Autologous Progress Note    2019    Phyllis Badlwin    :  1955    MRN:  [id number]    Referring MD: Gus Monroe MD  [address], [address] [facility]    Subjective:  Feels better - dyspepsia is better     ECOG PS:  (1) Restricted in physically strenuous activity, ambulatory and able to do work of light nature    Isolation:  None     Medications    Scheduled Meds:   loperamide  4 mg Oral Once    pantoprazole  40 mg Oral QAM AC    levofloxacin  500 mg Oral Daily    Tbo-Filgrastim  300 mcg Subcutaneous QPM    enoxaparin  40 mg Subcutaneous QPM    fluconazole  400 mg Oral Daily    Saline Mouthwash  15 mL Swish & Spit 4x Daily AC & HS    sodium chloride flush  10 mL Intravenous 2 times per day    valACYclovir  500 mg Oral BID    amLODIPine  10 mg Oral Daily    lisinopril  40 mg Oral Daily    potassium chloride  20 mEq Oral BID    insulin lispro  0-12 Units Subcutaneous TID WC    insulin lispro  0-6 Units Subcutaneous Nightly     Continuous Infusions:   sodium chloride      dextrose 5% and 0.45% NaCl with KCl 10 mEq 50 mL/hr at 19 2312    dextrose       PRN Meds:. loperamide **FOLLOWED BY** loperamide, ondansetron, ondansetron, potassium phosphate IVPB, calcium carbonate, polyethylene glycol, sodium chloride, alteplase, magnesium hydroxide, magnesium sulfate, potassium chloride, Saline Mouthwash, glucose, dextrose, glucagon (rDNA), dextrose, prochlorperazine **OR** prochlorperazine, LORazepam **OR** LORazepam    ROS:  As noted above, otherwise remainder of 10-point ROS negative    Physical Exam:     I&O:      Intake/Output Summary (Last 24 hours) at 19 0637  Last data filed at 19 5286   Gross per 24 hour   Intake             1932 ml   Output             2300 ml   Net             -368 ml       Vital Signs:  BP (!) 94/58   Pulse 82   Temp 98.5 °F (36.9 °C) (Oral)   Resp 18   Ht 5' 1\" (1.549 m)   Wt 177 lb 3.2 oz (80.4 kg)   SpO2 95% is currently in VGPR  - S/p high dose Melpahlan (200 mg/m^2) and ASCT on 2/7/19      Day + 5      2. ID:  Afebrile, no evidence of infection.    - Levaquin, Diflucan & Valtrex, cont acyclovir at discharge for VZV positive titer.      3. Heme:  Anemia from chemotherapy   - Transfuse for Hgb < 7 and Platelets < 98E   - No transfusion today  - Start Granix on 2/12/19     4. Metabolic: good uop & wt down w/ stable e-lytes and renal fxn  - S/p KCl 20 meq bid (stopped 2/12/19) w/ nausea and indigestion  - Increase IVF:  D5.45NS + KCl 10 meq @ 75 mL/hr   - NS bolus 500 mL (2/12/19)  - Replace potassium and magnesium per policy.     5.  GI / Nutrition:  Appetite and oral intake has diminished d/t nausea, indigestion, diarrhea and lack of appetite  Nutrition:  - Cont low microbial diet   - Follow closely with dietary  - Encourage supplements and small meals  Diarrhea:  Chemo induced  C. Diff (2/11/19) - negative  - Cont Imodium as needed  Nausea /  Indigestion:  Ongoing, chemo induced  - Cont PPI and Pepcid bid, TUMS prn  - Cont Zofran, Compazine and Ativan prn      6. Type 2 DM: hyperglycemia d/t steroids has resolved  - Hold oral diabetes medications  - Stop Lispro SSI, cont to check glucose twice daily     7.  Cardiac: mild hypotension   HLD:  Stable  - Hold Crestor while inpatient, resume on d/c  HTN:  Stable  - Hold HCTZ while inpatient  - Hold Norvasc 10 mg daily (stopped 2/12/19) and decrease Lisinopril 20 mg daily (2/12/19) d/t mild hypotension. Will also give 500 mL bolus and increase IVF      8. Bone Health:  Stable  - Resume Calcium and Vit D on d/c  - Resume bisphosphonate after discharged        - DVT Prophylaxis: Platelets >83,311 cells/dL, - daily lovenox prophylaxis ordered  Contraindications to pharmacologic prophylaxis: None  Contraindications to mechanical prophylaxis: None    - Disposition:  Once ANC > 1.0 and toxicities from transplant resolved        HE Feliciano - CNP         Sherrine Riding. Macie Han DO, MS  Oncology/Hematology Care    Please contact via:  1. Perfect Serve  2.   Cell Phone:  [phone]    2/12/2019   10:56 AM

## 2019-02-12 NOTE — PLAN OF CARE
Problem: Nutrition  Goal: Optimal nutrition therapy  Outcome: Ongoing  Nutrition Problem: Increased nutrient needs  Intervention: Food and/or Nutrient Delivery: Continue current diet  Nutritional Goals: pt will tolerate and consume 50% or more of all meals offered to meet increased needs

## 2019-02-12 NOTE — PLAN OF CARE
Problem: Falls - Risk of:  Goal: Will remain free from falls  Will remain free from falls   Outcome: Ongoing    - Screening for Orthostasis AND not a [place] Risk per AYERS/ABCDS: Pt bed is in low position, side rails up, call light and belongings are in reach. Fall risk light is on outside pts room. Pt encouraged to call for assistance as needed. Will continue with hourly rounds for PO intake, pain needs, toileting and repositioning as needed. Pt calls out appropriately. Resting comfortably at this time. Will continue to monitor. Problem: Bleeding:  Goal: Will show no signs and symptoms of excessive bleeding  Will show no signs and symptoms of excessive bleeding   Outcome: Ongoing  Patient's hemoglobin this AM:   Recent Labs      02/12/19   0355   HGB  10.0*     Patient's platelet count this AM:   Recent Labs      02/12/19   0355   PLT  92*    Thrombocytopenia Precautions in place. Patient showing no signs or symptoms of active bleeding. Transfusion not indicated at this time. Patient verbalizes understanding of all instructions. Will continue to assess and implement POC. Call light within reach and hourly rounding in place. Problem: PROTECTIVE PRECAUTIONS  Goal: Patient will remain free of nosocomial Infections  Outcome: Ongoing  Pt remains in protective precautions. No living plants or fresh flowers in his/her room. Patient educated on wearing mask when in hallways. Patient, staff, and visitors adhering to handwashing guidelines. Patient cleansed with chlorhexidine wipes and linens changed daily per protocol. Pt verbalizes understanding of low microbial diet. Patient remains free of nosocomial infections. Problem: Serum Glucose Level - Abnormal:  Goal: Ability to maintain appropriate glucose levels has stabilized  Ability to maintain appropriate glucose levels has stabilized   Outcome: Ongoing  Covered with one unit during this shift. Pressure applied. Will continue to monitor.     Problem: Nausea/Vomiting:  Goal: Absence of nausea/vomiting  Absence of nausea/vomiting   Outcome: Met This Shift      Problem: Bowel/Gastric:  Goal: Ability to achieve a regular elimination pattern will improve  Ability to achieve a regular elimination pattern will improve   Outcome: Ongoing  Pt refuses imodium at this time. Stating that she does not want to become constipated. Educated pt. Will continue to monitor.

## 2019-02-12 NOTE — PROGRESS NOTES
None  · Anthropometric Measures:  · Ht: 5' 1\" (154.9 cm)   · Current Body Wt: 177 lb 11.1 oz (80.6 kg)  · Admission Body Wt: 178 lb 6 oz (80.9 kg)  · Usual Body Wt: 180 lb (81.6 kg)  · % Weight Change:    no significant weight lost - pt had previously been trying to lose wt d/t DM   · Ideal Body Wt: 105 lb (47.6 kg)   · BMI Classification: BMI 30.0 - 34.9 Obese Class I    Nutrition Interventions:   Continue current diet  Continued Inpatient Monitoring    Nutrition Evaluation:   · Evaluation: Goals set   · Goals: pt will tolerate and consume 50% or more of all meals offered to meet increased needs    · Monitoring: Meal Intake, Weight, Nausea or Vomiting, Diarrhea      Electronically signed by Nitin Rehman RD, PRASAD on 2/12/19 at 10:29 AM    BELÉN OLSEN, PRASAD  Pager:  [phone]  Office:  [phone]

## 2019-02-13 LAB
ALBUMIN SERPL-MCNC: 3.2 G/DL (ref 3.4–5)
ALP BLD-CCNC: 55 U/L (ref 40–129)
ALT SERPL-CCNC: 32 U/L (ref 10–40)
ANION GAP SERPL CALCULATED.3IONS-SCNC: 12 MMOL/L (ref 3–16)
AST SERPL-CCNC: 14 U/L (ref 15–37)
BILIRUB SERPL-MCNC: 0.3 MG/DL (ref 0–1)
BILIRUBIN DIRECT: <0.2 MG/DL (ref 0–0.3)
BILIRUBIN, INDIRECT: ABNORMAL MG/DL (ref 0–1)
BUN BLDV-MCNC: 8 MG/DL (ref 7–20)
CALCIUM SERPL-MCNC: 7.8 MG/DL (ref 8.3–10.6)
CHLORIDE BLD-SCNC: 110 MMOL/L (ref 99–110)
CO2: 25 MMOL/L (ref 21–32)
CREAT SERPL-MCNC: 0.7 MG/DL (ref 0.6–1.2)
GFR AFRICAN AMERICAN: >60
GFR NON-AFRICAN AMERICAN: >60
GLUCOSE BLD-MCNC: 107 MG/DL (ref 70–99)
HCT VFR BLD CALC: 27.7 % (ref 36–48)
HEMOGLOBIN: 9.3 G/DL (ref 12–16)
LACTATE DEHYDROGENASE: 141 U/L (ref 100–190)
MCH RBC QN AUTO: 29.1 PG (ref 26–34)
MCHC RBC AUTO-ENTMCNC: 33.4 G/DL (ref 31–36)
MCV RBC AUTO: 87.2 FL (ref 80–100)
PDW BLD-RTO: 17 % (ref 12.4–15.4)
PHOSPHORUS: 2.6 MG/DL (ref 2.5–4.9)
PLATELET # BLD: 63 K/UL (ref 135–450)
PMV BLD AUTO: 7.4 FL (ref 5–10.5)
POTASSIUM SERPL-SCNC: 3.7 MMOL/L (ref 3.5–5.1)
RBC # BLD: 3.17 M/UL (ref 4–5.2)
SODIUM BLD-SCNC: 147 MMOL/L (ref 136–145)
TOTAL PROTEIN: 5.1 G/DL (ref 6.4–8.2)
URIC ACID, SERUM: 1.9 MG/DL (ref 2.6–6)
WBC # BLD: 0.1 K/UL (ref 4–11)

## 2019-02-13 PROCEDURE — 85025 COMPLETE CBC W/AUTO DIFF WBC: CPT

## 2019-02-13 PROCEDURE — 84100 ASSAY OF PHOSPHORUS: CPT

## 2019-02-13 PROCEDURE — 36592 COLLECT BLOOD FROM PICC: CPT

## 2019-02-13 PROCEDURE — 80048 BASIC METABOLIC PNL TOTAL CA: CPT

## 2019-02-13 PROCEDURE — 6370000000 HC RX 637 (ALT 250 FOR IP): Performed by: INTERNAL MEDICINE

## 2019-02-13 PROCEDURE — 6370000000 HC RX 637 (ALT 250 FOR IP): Performed by: NURSE PRACTITIONER

## 2019-02-13 PROCEDURE — 83615 LACTATE (LD) (LDH) ENZYME: CPT

## 2019-02-13 PROCEDURE — 2500000003 HC RX 250 WO HCPCS: Performed by: NURSE PRACTITIONER

## 2019-02-13 PROCEDURE — 84550 ASSAY OF BLOOD/URIC ACID: CPT

## 2019-02-13 PROCEDURE — 2060000000 HC ICU INTERMEDIATE R&B

## 2019-02-13 PROCEDURE — 2580000003 HC RX 258: Performed by: NURSE PRACTITIONER

## 2019-02-13 PROCEDURE — 6360000002 HC RX W HCPCS: Performed by: INTERNAL MEDICINE

## 2019-02-13 PROCEDURE — 80076 HEPATIC FUNCTION PANEL: CPT

## 2019-02-13 RX ORDER — CETIRIZINE HYDROCHLORIDE 10 MG/1
10 TABLET ORAL DAILY
Status: DISCONTINUED | OUTPATIENT
Start: 2019-02-13 | End: 2019-02-18 | Stop reason: HOSPADM

## 2019-02-13 RX ADMIN — LISINOPRIL 20 MG: 20 TABLET ORAL at 09:30

## 2019-02-13 RX ADMIN — POTASSIUM CHLORIDE, DEXTROSE MONOHYDRATE AND SODIUM CHLORIDE: 75; 5; 450 INJECTION, SOLUTION INTRAVENOUS at 21:52

## 2019-02-13 RX ADMIN — POTASSIUM CHLORIDE, DEXTROSE MONOHYDRATE AND SODIUM CHLORIDE: 75; 5; 450 INJECTION, SOLUTION INTRAVENOUS at 17:24

## 2019-02-13 RX ADMIN — Medication 10 ML: at 20:46

## 2019-02-13 RX ADMIN — SODIUM CHLORIDE 15 ML: 900 IRRIGANT IRRIGATION at 11:42

## 2019-02-13 RX ADMIN — LOPERAMIDE HYDROCHLORIDE 2 MG: 2 CAPSULE ORAL at 21:53

## 2019-02-13 RX ADMIN — FAMOTIDINE 20 MG: 20 TABLET, FILM COATED ORAL at 20:44

## 2019-02-13 RX ADMIN — POTASSIUM CHLORIDE, DEXTROSE MONOHYDRATE AND SODIUM CHLORIDE: 75; 5; 450 INJECTION, SOLUTION INTRAVENOUS at 04:01

## 2019-02-13 RX ADMIN — CETIRIZINE HYDROCHLORIDE 10 MG: 10 TABLET, FILM COATED ORAL at 11:40

## 2019-02-13 RX ADMIN — TBO-FILGRASTIM 300 MCG: 300 INJECTION, SOLUTION SUBCUTANEOUS at 18:05

## 2019-02-13 RX ADMIN — FLUCONAZOLE 400 MG: 200 TABLET ORAL at 09:31

## 2019-02-13 RX ADMIN — VALACYCLOVIR HYDROCHLORIDE 500 MG: 500 TABLET, FILM COATED ORAL at 09:31

## 2019-02-13 RX ADMIN — Medication 10 ML: at 09:31

## 2019-02-13 RX ADMIN — LEVOFLOXACIN 500 MG: 500 TABLET, FILM COATED ORAL at 09:31

## 2019-02-13 RX ADMIN — SODIUM CHLORIDE 15 ML: 900 IRRIGANT IRRIGATION at 20:45

## 2019-02-13 RX ADMIN — VALACYCLOVIR HYDROCHLORIDE 500 MG: 500 TABLET, FILM COATED ORAL at 20:44

## 2019-02-13 RX ADMIN — SODIUM CHLORIDE 15 ML: 900 IRRIGANT IRRIGATION at 17:25

## 2019-02-13 RX ADMIN — FAMOTIDINE 20 MG: 20 TABLET, FILM COATED ORAL at 09:31

## 2019-02-13 ASSESSMENT — PAIN SCALES - GENERAL
PAINLEVEL_OUTOF10: 0

## 2019-02-13 NOTE — BH NOTE
Pt's family members attended [employer] support lunch. Pt doing well, mild nausea. Will follow.   Ana Naidu Psy.D., ABPP

## 2019-02-13 NOTE — PROGRESS NOTES
[address] [facility]  Autologous Progress Note    2019    Sigrid Wolf    :  1955    MRN:  [id number]    Referring MD: Rissa Harris MD  [address], [address] [facility]    Subjective:  Feels better - dyspepsia is better; up in chair again; ate breakfast well     ECOG PS:  (1) Restricted in physically strenuous activity, ambulatory and able to do work of light nature    Isolation:  None     Medications    Scheduled Meds:   lisinopril  20 mg Oral Daily    famotidine  20 mg Oral BID    loperamide  4 mg Oral Once    levofloxacin  500 mg Oral Daily    Tbo-Filgrastim  300 mcg Subcutaneous QPM    enoxaparin  40 mg Subcutaneous QPM    fluconazole  400 mg Oral Daily    Saline Mouthwash  15 mL Swish & Spit 4x Daily AC & HS    sodium chloride flush  10 mL Intravenous 2 times per day    valACYclovir  500 mg Oral BID     Continuous Infusions:   sodium chloride      dextrose 5% and 0.45% NaCl with KCl 10 mEq 75 mL/hr at 19 0401    dextrose       PRN Meds:. loperamide **FOLLOWED BY** loperamide, ondansetron, ondansetron, potassium phosphate IVPB, calcium carbonate, polyethylene glycol, sodium chloride, alteplase, magnesium hydroxide, magnesium sulfate, potassium chloride, Saline Mouthwash, glucose, dextrose, glucagon (rDNA), dextrose, prochlorperazine **OR** prochlorperazine, LORazepam **OR** LORazepam    ROS:  As noted above, otherwise remainder of 10-point ROS negative    Physical Exam:     I&O:      Intake/Output Summary (Last 24 hours) at 19 0722  Last data filed at 19 1114   Gross per 24 hour   Intake             2945 ml   Output             2500 ml   Net              445 ml       Vital Signs:  /66   Pulse 80   Temp 99.2 °F (37.3 °C) (Oral)   Resp 18   Ht 5' 1\" (1.549 m)   Wt 177 lb 9.6 oz (80.6 kg)   SpO2 95%   BMI 33.56 kg/m²     Weight:    Wt Readings from Last 3 Encounters:   19 177 lb 9.6 oz (80.6 kg)   19 180 lb (81.6 kg)   18 178 lb 5.6 oz (80.9 kg)       General: Awake, alert and oriented. HEENT: normocephalic, alopecia, PERRL, no scleral erythema or icterus, Oral mucosa moist and intact, throat clear  NECK: supple without palpable adenopathy  BACK: Straight negative CVAT  SKIN: warm dry and intact without lesions rashes or masses  CHEST: CTA bilaterally without use of accessory muscles  CV: Normal S1 S2, RRR, no MRG  ABD: NT ND normoactive BS, no palpable masses or hepatosplenomegaly  EXTREMITIES: without edema, healing abrasion on left calf, denies calf tenderness  NEURO: CN II - XII grossly intact  CATHETER: Left SC Trifusion (1/24/19, [place]) - CDI w/ open skin tears from bandage    Data:   CBC:   Recent Labs      02/11/19   0350 02/12/19   0355 02/13/19   0405   WBC  0.6*  0.3*  0.1*   HGB  11.4*  10.0*  9.3*   HCT  33.8*  30.8*  27.7*   MCV  86.8  88.2  87.2   PLT  138  92*  63*     BMP/Mag:  Recent Labs      02/11/19   0350 02/12/19   0355  02/13/19   0405   NA  140  142  147*   K  4.0  4.0  3.7   CL  104  108  110   CO2  26  25  25   PHOS  4.0  3.5  2.6   BUN  10  11  8   CREATININE  0.7  0.7  0.7   MG  2.00   --    --      LIVP:   Recent Labs      02/11/19 0350 02/13/19   0405   AST  25  14*   ALT  54*  32   BILIDIR  <0.2  <0.2   BILITOT  0.4  0.3   ALKPHOS  67  55     Uric Acid:    Recent Labs      02/13/19   0405   LABURIC  1.9*     Coags:   Recent Labs      02/11/19 0350   PROTIME  11.8   INR  1.04   APTT  31.0       PROBLEM LIST:            1. IgG Lambda Multiple Myeloma, R-ISS Stage I  2. HTN  3. HLD  4. Type 2 DM  5. GERD  6. Tobacco Use (quit 12/2018)    Post - Transplant Complications:  1. Nausea / Indigestion / Diarrhea      TREATMENT:            1.  RVd x 5 cycles  2. High dose Melpahlan & ASCT (2/7/19) - 2.71x10^6 [id number]/kg     ASSESSMENT AND PLAN:            1. IgG Lambda Multiple Myeloma:  She is currently in VGPR  - S/p high dose Melpahlan (200 mg/m^2) and ASCT on 2/7/19      Day + 6     2. ID:  Afebrile, no evidence of infection.    - Levaquin, Diflucan & Valtrex, cont acyclovir at discharge for VZV positive titer.      3. Heme:  Pancytopenia from chemotherapy   - Transfuse for Hgb < 7 and Platelets < 75W   - No transfusion today  - Continue Granix daily     4. Metabolic: good uop & wt down w/ stable e-lytes except for hyperNa and renal fxn  - S/p KCl 20 meq bid (stopped 2/12/19) w/ nausea and indigestion  - Continue IVF:  D5.45NS + KCl 10 meq @ 75 mL/hr   - s/p NS bolus 500 mL (2/12/19)  - Replace potassium and magnesium per policy.     5.  GI / Nutrition:  Appetite and oral intake has diminished d/t nausea, indigestion, diarrhea and lack of appetite  Nutrition:  - Cont low microbial diet   - Follow closely with dietary  - Encourage supplements and small meals  Diarrhea:  Chemo induced  C. Diff (2/11/19) - negative  - Cont Imodium as needed  Nausea /  Indigestion:  Ongoing, chemo induced  - Cont PPI and Pepcid bid, TUMS prn  - Cont Zofran, Compazine and Ativan prn      6. Type 2 DM: hyperglycemia d/t steroids has resolved  - Hold oral diabetes medications  - Stop Lispro SSI, cont to check glucose twice daily     7.  Cardiac: mild hypotension improved  HLD:  Stable  - Hold Crestor while inpatient, resume on d/c  HTN:  Stable  - Hold HCTZ while inpatient  - Hold Norvasc 10 mg daily (stopped 2/12/19) and decrease Lisinopril 20 mg daily (2/12/19) d/t mild hypotension.       8. Bone Health:  Stable  - Resume Calcium and Vit D on d/c  - Resume bisphosphonate after discharged        - DVT Prophylaxis: Platelets <49,120 cells/dL - prophylactic lovenox on hold and mechanical prophylaxis with bilateral SCDs while in bed in place.   Anticipate platelets <36,055 by evening of 2/13/19  Contraindications to pharmacologic prophylaxis: None  Contraindications to mechanical prophylaxis: None    - Disposition:  Once ANC > 1.0 and toxicities from transplant resolved        [place], APRN - CNP         Casey Swann Rosaura Morales DO, MS  Oncology/Hematology Care    Please contact via:  1. Perfect Serve  2.   Cell Phone:  [phone]    2/13/2019   7:22 AM

## 2019-02-13 NOTE — PROGRESS NOTES
Problem: Falls - Risk of:  Goal: Will remain free from falls  Will remain free from falls   Outcome: Ongoing  Orthostatic vital signs obtained at start of shift - see flowsheet for details. Pt does not meet criteria for orthostasis. Pt is a Med fall risk. See [place] Fall Score and ABCDS Injury Risk assessments. - Screening for Orthostasis AND not a [place] Risk per AYERS/ABCDS: Pt bed is in low position, side rails up, call light and belongings are in reach. Fall risk light is on outside pts room. Pt encouraged to call for assistance as needed. Will continue with hourly rounds for PO intake, pain needs, toileting and repositioning as needed.        Problem: Venous Thromboembolism:  Goal: Will show no signs or symptoms of venous thromboembolism  Will show no signs or symptoms of venous thromboembolism   Outcome: Ongoing    No complaints of calf pain. Up in chair most of shift and occasionally walks almeida     Problem: Bleeding:  Goal: Will show no signs and symptoms of excessive bleeding  Will show no signs and symptoms of excessive bleeding   Outcome: Ongoing  Patient's hemoglobin this AM:   Recent Labs      02/13/19   0405   HGB  9.3*     Patient's platelet count this AM:   Recent Labs      02/13/19   0405   PLT  63*    Thrombocytopenia Precautions in place. Patient showing no signs or symptoms of active bleeding. Transfusion not indicated at this time. Patient verbalizes understanding of all instructions. Will continue to assess and implement POC. Call light within reach and hourly rounding in place.      Problem: Infection - Central Venous Catheter-Associated Bloodstream Infection:  Goal: Will show no infection signs and symptoms  Will show no infection signs and symptoms   Outcome: Ongoing  CVC site remains free of signs/symptoms of infection. No drainage, edema, erythema, pain, or warmth noted at site. Dressing changes continue per protocol and on an as needed basis - see flowsheet.  Dressing changed this shift    Compliant with [facility] Bath Protocol:  Performed CHG bath today per [facility] protocol utilizing CHG solution in the shower. CVC site cleansed with CHG wipe over dressing, skin surrounding dressing, and first 6\" of IV tubing. Pt tolerated well. Continued to encourage daily CHG bathing per [facility] protocol. Problem: PROTECTIVE PRECAUTIONS  Goal: Patient will remain free of nosocomial Infections  Outcome: Ongoing  Protective precautions in place     Problem: Nausea/Vomiting:  Goal: Absence of nausea/vomiting  Absence of nausea/vomiting   Outcome: Ongoing  No complaints of nausea or vomiting     Problem:  Bowel/Gastric:  Goal: Ability to achieve a regular elimination pattern will improve  Ability to achieve a regular elimination pattern will improve   Outcome: Ongoing  Patient had couple soft stools this shift     Problem: Discharge Planning:  Goal: Discharged to appropriate level of care  Discharged to appropriate level of care   Outcome: Ongoing  Will continue to reinforce plan of care with patient     Problem: Nutrition  Goal: Optimal nutrition therapy  Outcome: Ongoing  Eating at meals and tolerating well.

## 2019-02-14 LAB
ANION GAP SERPL CALCULATED.3IONS-SCNC: 9 MMOL/L (ref 3–16)
APTT: 31.5 SEC (ref 26–36)
BUN BLDV-MCNC: 7 MG/DL (ref 7–20)
CALCIUM SERPL-MCNC: 7.4 MG/DL (ref 8.3–10.6)
CHLORIDE BLD-SCNC: 111 MMOL/L (ref 99–110)
CO2: 24 MMOL/L (ref 21–32)
CREAT SERPL-MCNC: 0.6 MG/DL (ref 0.6–1.2)
GFR AFRICAN AMERICAN: >60
GFR NON-AFRICAN AMERICAN: >60
GLUCOSE BLD-MCNC: 109 MG/DL (ref 70–99)
HCT VFR BLD CALC: 27.1 % (ref 36–48)
HEMOGLOBIN: 8.9 G/DL (ref 12–16)
INR BLD: 1.11 (ref 0.86–1.14)
MAGNESIUM: 2 MG/DL (ref 1.8–2.4)
MCH RBC QN AUTO: 28.8 PG (ref 26–34)
MCHC RBC AUTO-ENTMCNC: 32.9 G/DL (ref 31–36)
MCV RBC AUTO: 87.7 FL (ref 80–100)
PDW BLD-RTO: 17.1 % (ref 12.4–15.4)
PHOSPHORUS: 1.8 MG/DL (ref 2.5–4.9)
PLATELET # BLD: 36 K/UL (ref 135–450)
PMV BLD AUTO: 7.6 FL (ref 5–10.5)
POTASSIUM SERPL-SCNC: 3.6 MMOL/L (ref 3.5–5.1)
PROTHROMBIN TIME: 12.6 SEC (ref 9.8–13)
RBC # BLD: 3.09 M/UL (ref 4–5.2)
SODIUM BLD-SCNC: 144 MMOL/L (ref 136–145)
URIC ACID, SERUM: 1.6 MG/DL (ref 2.6–6)
VRE CULTURE: NORMAL
WBC # BLD: 0.1 K/UL (ref 4–11)

## 2019-02-14 PROCEDURE — 85610 PROTHROMBIN TIME: CPT

## 2019-02-14 PROCEDURE — 85025 COMPLETE CBC W/AUTO DIFF WBC: CPT

## 2019-02-14 PROCEDURE — 80048 BASIC METABOLIC PNL TOTAL CA: CPT

## 2019-02-14 PROCEDURE — 6360000002 HC RX W HCPCS: Performed by: INTERNAL MEDICINE

## 2019-02-14 PROCEDURE — 2500000003 HC RX 250 WO HCPCS: Performed by: NURSE PRACTITIONER

## 2019-02-14 PROCEDURE — 85730 THROMBOPLASTIN TIME PARTIAL: CPT

## 2019-02-14 PROCEDURE — 84100 ASSAY OF PHOSPHORUS: CPT

## 2019-02-14 PROCEDURE — 6370000000 HC RX 637 (ALT 250 FOR IP): Performed by: NURSE PRACTITIONER

## 2019-02-14 PROCEDURE — 2060000000 HC ICU INTERMEDIATE R&B

## 2019-02-14 PROCEDURE — 36592 COLLECT BLOOD FROM PICC: CPT

## 2019-02-14 PROCEDURE — 84550 ASSAY OF BLOOD/URIC ACID: CPT

## 2019-02-14 PROCEDURE — 2580000003 HC RX 258: Performed by: NURSE PRACTITIONER

## 2019-02-14 PROCEDURE — 83735 ASSAY OF MAGNESIUM: CPT

## 2019-02-14 PROCEDURE — 6370000000 HC RX 637 (ALT 250 FOR IP): Performed by: INTERNAL MEDICINE

## 2019-02-14 RX ORDER — DEXTROSE, SODIUM CHLORIDE, AND POTASSIUM CHLORIDE 5; .45; .22 G/100ML; G/100ML; G/100ML
INJECTION INTRAVENOUS CONTINUOUS
Status: DISCONTINUED | OUTPATIENT
Start: 2019-02-14 | End: 2019-02-17

## 2019-02-14 RX ADMIN — TBO-FILGRASTIM 300 MCG: 300 INJECTION, SOLUTION SUBCUTANEOUS at 17:48

## 2019-02-14 RX ADMIN — POTASSIUM CHLORIDE, DEXTROSE MONOHYDRATE AND SODIUM CHLORIDE: 224; 5; 450 INJECTION, SOLUTION INTRAVENOUS at 19:36

## 2019-02-14 RX ADMIN — CETIRIZINE HYDROCHLORIDE 10 MG: 10 TABLET, FILM COATED ORAL at 09:19

## 2019-02-14 RX ADMIN — LISINOPRIL 20 MG: 20 TABLET ORAL at 09:19

## 2019-02-14 RX ADMIN — FLUCONAZOLE 400 MG: 200 TABLET ORAL at 09:19

## 2019-02-14 RX ADMIN — Medication 10 ML: at 09:19

## 2019-02-14 RX ADMIN — Medication 10 ML: at 19:38

## 2019-02-14 RX ADMIN — SODIUM CHLORIDE 15 ML: 900 IRRIGANT IRRIGATION at 17:48

## 2019-02-14 RX ADMIN — FAMOTIDINE 20 MG: 20 TABLET, FILM COATED ORAL at 20:20

## 2019-02-14 RX ADMIN — VALACYCLOVIR HYDROCHLORIDE 500 MG: 500 TABLET, FILM COATED ORAL at 20:20

## 2019-02-14 RX ADMIN — POTASSIUM PHOSPHATE, MONOBASIC AND POTASSIUM PHOSPHATE, DIBASIC 20 MMOL: 224; 236 INJECTION, SOLUTION INTRAVENOUS at 07:39

## 2019-02-14 RX ADMIN — FAMOTIDINE 20 MG: 20 TABLET, FILM COATED ORAL at 09:19

## 2019-02-14 RX ADMIN — LIDOCAINE HYDROCHLORIDE 5 ML: 20 SOLUTION ORAL; TOPICAL at 16:14

## 2019-02-14 RX ADMIN — SODIUM CHLORIDE 15 ML: 900 IRRIGANT IRRIGATION at 20:21

## 2019-02-14 RX ADMIN — SODIUM CHLORIDE 15 ML: 900 IRRIGANT IRRIGATION at 12:01

## 2019-02-14 RX ADMIN — POTASSIUM CHLORIDE, DEXTROSE MONOHYDRATE AND SODIUM CHLORIDE: 224; 5; 450 INJECTION, SOLUTION INTRAVENOUS at 07:39

## 2019-02-14 RX ADMIN — VALACYCLOVIR HYDROCHLORIDE 500 MG: 500 TABLET, FILM COATED ORAL at 09:19

## 2019-02-14 RX ADMIN — LEVOFLOXACIN 500 MG: 500 TABLET, FILM COATED ORAL at 09:19

## 2019-02-14 ASSESSMENT — PAIN SCALES - GENERAL
PAINLEVEL_OUTOF10: 0

## 2019-02-14 NOTE — PLAN OF CARE
Problem: Falls - Risk of:  Goal: Will remain free from falls  Will remain free from falls   Outcome: Ongoing    Pt with activity orders for up ad sharee. Encouraged pt to be up OOB as much as possible throughout the day and for all meals. Encouraged frequent short naps as necessary to preserve energy but instructed that while awake, pt should be OOB. Pt up ad sharee. Pt up in room and hallway. Encouraged pt to ambulate in halls. Pt seen up ad sharee in halls several times this shift. Pt is visualized to be OOB % of the time this shift. Will continue to encourage frequent activity. Problem: Venous Thromboembolism:  Goal: Will show no signs or symptoms of venous thromboembolism  Will show no signs or symptoms of venous thromboembolism   Outcome: Ongoing  Adherent with DVT Prevention: Pt is at risk for DVT d/t decreased mobility and cancer treatment. Pt educated on importance of activity. Pt has orders for SCDs while in bed. Pt verbalizes understanding of need for prophylaxis while inpatient. Pt up ad sharee today in hallway and in chair. Problem: Bleeding:  Goal: Will show no signs and symptoms of excessive bleeding  Will show no signs and symptoms of excessive bleeding   Outcome: Ongoing  Patient's hemoglobin this AM:   Recent Labs      02/14/19   0324   HGB  8.9*     Patient's platelet count this AM:   Recent Labs      02/14/19   0324   PLT  36*    Thrombocytopenia Precautions in place. Patient showing no signs or symptoms of active bleeding. Transfusion not indicated at this time. Patient verbalizes understanding of all instructions. Will continue to assess and implement POC. Call light within reach and hourly rounding in place. Problem: Infection - Central Venous Catheter-Associated Bloodstream Infection:  Goal: Will show no infection signs and symptoms  Will show no infection signs and symptoms   Outcome: Ongoing  CVC site remains free of signs/symptoms of infection.  No drainage, edema, erythema, pain, or warmth noted at site. Dressing changes continue per protocol and on an as needed basis - see flowsheet. Performed CHG bath today per [facility] protocol utilizing CHG solution in the shower. CVC site cleansed with CHG wipe over dressing, skin surrounding dressing, and first 6\" of IV tubing. Pt tolerated well. Continued to encourage daily CHG bathing per [facility] protocol. Problem: PROTECTIVE PRECAUTIONS  Goal: Patient will remain free of nosocomial Infections  Outcome: Ongoing  Pt verbalize understanding of precautions. Will monitor. Problem: Nausea/Vomiting:  Goal: Absence of nausea/vomiting  Absence of nausea/vomiting   Outcome: Met This Shift  Pt denies nausea at this time. Encourage pt to call out with any needs or status changes. Problem: Bowel/Gastric:  Goal: Ability to achieve a regular elimination pattern will improve  Ability to achieve a regular elimination pattern will improve   Outcome: Met This Shift  Pt verbalize having formed bm. Will monitor. Problem: Discharge Planning:  Goal: Discharged to appropriate level of care  Discharged to appropriate level of care   Outcome: Ongoing  Pt verbalize understanding of treatment plan. Will monitor. Problem: Nutrition  Goal: Optimal nutrition therapy  Outcome: Ongoing  Encourage small frequent meals.

## 2019-02-14 NOTE — PROGRESS NOTES
[facility]  Autologous Progress Note    2019    Rylee Bearden    :  1955    MRN:  [id number]    Referring MD: Tamara Vera MD  [address], [address] [facility]    Subjective: throat is slightly sore, but still able to swallow. Otherwise she feels pretty well      ECOG PS:  (1) Restricted in physically strenuous activity, ambulatory and able to do work of light nature    Isolation:  None     Medications    Scheduled Meds:   cetirizine  10 mg Oral Daily    lisinopril  20 mg Oral Daily    famotidine  20 mg Oral BID    loperamide  4 mg Oral Once    levofloxacin  500 mg Oral Daily    Tbo-Filgrastim  300 mcg Subcutaneous QPM    fluconazole  400 mg Oral Daily    Saline Mouthwash  15 mL Swish & Spit 4x Daily AC & HS    sodium chloride flush  10 mL Intravenous 2 times per day    valACYclovir  500 mg Oral BID     Continuous Infusions:   sodium chloride      dextrose 5% and 0.45% NaCl with KCl 10 mEq 75 mL/hr at 19 2152    dextrose       PRN Meds:. loperamide **FOLLOWED BY** loperamide, ondansetron, ondansetron, potassium phosphate IVPB, calcium carbonate, polyethylene glycol, sodium chloride, alteplase, magnesium hydroxide, magnesium sulfate, potassium chloride, Saline Mouthwash, glucose, dextrose, glucagon (rDNA), dextrose, prochlorperazine **OR** prochlorperazine, LORazepam **OR** LORazepam    ROS:  As noted above, otherwise remainder of 10-point ROS negative    Physical Exam:     I&O:      Intake/Output Summary (Last 24 hours) at 19 0705  Last data filed at 19 0646   Gross per 24 hour   Intake             3110 ml   Output             3900 ml   Net             -790 ml       Vital Signs:  BP (!) 114/53   Pulse 82   Temp 99.2 °F (37.3 °C) (Oral)   Resp 18   Ht 5' 1\" (1.549 m)   Wt 179 lb 4.8 oz (81.3 kg)   SpO2 93%   BMI 33.88 kg/m²     Weight:    Wt Readings from Last 3 Encounters:   19 179 lb 4.8 oz (81.3 kg)   19 180 lb (81.6 kg) 12/31/18 178 lb 5.6 oz (80.9 kg)       General: Awake, alert and oriented. HEENT: normocephalic, alopecia, PERRL, no scleral erythema or icterus, Oral mucosa moist and intact, throat clear  NECK: supple without palpable adenopathy  BACK: Straight negative CVAT  SKIN: warm dry and intact without lesions rashes or masses  CHEST: CTA bilaterally without use of accessory muscles  CV: Normal S1 S2, RRR, no MRG  ABD: NT ND normoactive BS, no palpable masses or hepatosplenomegaly  EXTREMITIES: without edema, healing abrasion on left calf, denies calf tenderness  NEURO: CN II - XII grossly intact  CATHETER: Left SC Trifusion (1/24/19, Barrat) - CDI w/ open skin tears from bandage    Data:   CBC:   Recent Labs      02/12/19   0355  02/13/19   0405  02/14/19   0324   WBC  0.3*  0.1*  0.1*   HGB  10.0*  9.3*  8.9*   HCT  30.8*  27.7*  27.1*   MCV  88.2  87.2  87.7   PLT  92*  63*  36*     BMP/Mag:  Recent Labs      02/12/19   0355  02/13/19   0405  02/14/19   0324   NA  142  147*  144   K  4.0  3.7  3.6   CL  108  110  111*   CO2  25  25  24   PHOS  3.5  2.6  1.8*   BUN  11  8  7   CREATININE  0.7  0.7  0.6   MG   --    --   2.00     LIVP:   Recent Labs      02/13/19   0405   AST  14*   ALT  32   BILIDIR  <0.2   BILITOT  0.3   ALKPHOS  55     Uric Acid:    Recent Labs      02/14/19   0324   LABURIC  1.6*     Coags:   Recent Labs      02/14/19   0324   PROTIME  12.6   INR  1.11   APTT  31.5       PROBLEM LIST:            1. IgG Lambda Multiple Myeloma, R-ISS Stage I  2. HTN  3. HLD  4. Type 2 DM  5. GERD  6. Tobacco Use (quit 12/2018)    Post - Transplant Complications:  1. Nausea / Indigestion / Diarrhea  2. Hypophosphatemia & hypokalemia       TREATMENT:            1.  RVd x 5 cycles  2. High dose Melpahlan & ASCT (2/7/19) - 2.71x10^6 [id number]/kg     ASSESSMENT AND PLAN:            1.   IgG Lambda Multiple Myeloma:  She is currently in VGPR  - S/p high dose Melpahlan (200 mg/m^2) and ASCT on 2/7/19      Day + 7     2.  ID:  Afebrile, no evidence of infection.    - Levaquin, Diflucan & Valtrex, cont acyclovir at discharge for VZV positive titer.      3. Heme:  Pancytopenia from chemotherapy   - Transfuse for Hgb < 7 and Platelets < 46S   - No transfusion today  - Cont Granix daily     4. Metabolic: stable e-lytes except for and renal fxn except for hypoPhos  - S/p KCl 20 meq bid (stopped 2/12/19) w/ nausea and indigestion  - Change IVF:  D5.45NS + KCl 30 meq @ 75 mL/hr   - Replace Phos, potassium and magnesium per policy.     5.  GI / Nutrition:  Appetite and oral intake is good  Nutrition:  - Cont low microbial diet   - Follow closely with dietary  - Encourage supplements and small meals  Diarrhea:  Chemo induced and improved  - C. Diff (2/11/19) - Negative  - Cont Imodium as needed  Nausea /  Indigestion:  Ongoing, chemo induced  - Cont Pepcid bid, TUMS prn  - Cont Zofran, Compazine and Ativan prn      6. Type 2 DM: hyperglycemia d/t steroids has resolved  - Hold oral diabetes medications  - S/p Lispro SSI, cont to check glucose twice daily     7.  Cardiac: Stable  HLD:  Stable  - Hold Crestor while inpatient, resume on d/c  HTN:  Stable  - Hold HCTZ while inpatient  - S/p Norvasc 10 mg daily (stopped 2/12/19)   - Cont Lisinopril 20 mg daily (decreased 2/12/19) d/t mild hypotension.       8. Bone Health:  Stable  - Resume Calcium and Vit D on d/c  - Resume bisphosphonate after discharged     - DVT Prophylaxis: Platelets <77,397 cells/dL - prophylactic lovenox on hold and mechanical prophylaxis with bilateral SCDs while in bed in place. Contraindications to pharmacologic prophylaxis: Thrombocytopenia  Contraindications to mechanical prophylaxis: None      - Disposition:  Once ANC > 1.0 and toxicities from transplant resolved        HE Pearce - JONATHAN Reyna. Merritt Wood DO, MS  Oncology/Hematology Care    Please contact via:  1. Perfect Serve  2.   Cell Phone:  [phone]    2/14/2019   11:09 AM

## 2019-02-14 NOTE — PLAN OF CARE
Problem: Falls - Risk of:  Goal: Will remain free from falls  Will remain free from falls   Outcome: Ongoing  Orthostatic vital signs obtained at start of shift - see flowsheet for details. Pt does not meet criteria for orthostasis. Pt is a Med fall risk. See Maddi Wlider Fall Score and ABCDS Injury Risk assessments. .   - Screening for Orthostasis AND not a [place] Risk per AYERS/ABCDS: Pt bed is in low position, side rails up, call light and belongings are in reach. Fall risk light is on outside pts room. Pt encouraged to call for assistance as needed. Will continue with hourly rounds for PO intake, pain needs, toileting and repositioning as needed. Problem: Venous Thromboembolism:  Goal: Absence of signs or symptoms of impaired coagulation  Absence of signs or symptoms of impaired coagulation   Outcome: Ongoing  Adherent with DVT Prevention: Pt is at risk for DVT d/t decreased mobility and cancer treatment. Pt educated on importance of activity. Pt has orders for SCDs while in bed. Pt verbalizes understanding of need for prophylaxis while inpatient. Problem: Bleeding:  Goal: Will show no signs and symptoms of excessive bleeding  Will show no signs and symptoms of excessive bleeding   Outcome: Ongoing  Patient's hemoglobin this AM:   Recent Labs      02/14/19   0324   HGB  8.9*     Patient's platelet count this AM:   Recent Labs      02/14/19   0324   PLT  36*    Thrombocytopenia Precautions in place. Patient showing no signs or symptoms of active bleeding. Transfusion not indicated at this time. Patient verbalizes understanding of all instructions. Will continue to assess and implement POC. Call light within reach and hourly rounding in place. Problem: Infection - Central Venous Catheter-Associated Bloodstream Infection:  Goal: Will show no infection signs and symptoms  Will show no infection signs and symptoms   Outcome: Ongoing  CVC site remains free of signs/symptoms of infection.  No drainage, edema, erythema, pain, or warmth noted at site. Dressing changes continue per protocol and on an as needed basis - see flowsheet. Problem: PROTECTIVE PRECAUTIONS  Goal: Patient will remain free of nosocomial Infections  Outcome: Ongoing  Pt currently in a private, positive pressure room. Educated pt on wearing a mask when neutropenic and/or leaving the floor. No living plants or flowers allowed. Also reinforced importance of hand hygiene. Pt following a low microbial diet. Problem: Nausea/Vomiting:  Goal: Absence of nausea/vomiting  Absence of nausea/vomiting   Outcome: Ongoing  Pt has not complained of any nausea/vomiting this shift. Will continue to assess. Problem: Discharge Planning:  Goal: Discharged to appropriate level of care  Discharged to appropriate level of care   Outcome: Ongoing  Pt has been updated of POC with no questions at this time. Will continue to assess.

## 2019-02-14 NOTE — CARE COORDINATION
Type of Admission  Multiple Myeloma IgG  Melphalan Auto SCT (T:0--->2/7/19)  Day 7    Central venous catheter  Left SC Tri Fusion (1/24/19, Dr. Jackie Christensen)    Plan  Melphalan Auto SCT for treatment of Multiple Myeloma, and stay through recovery    Update  2/5/19: Planned admission for Melphalan Auto SCT. Multiple family members presents during admission process. 2/6: Tolerating chemo well. 2/14: Doing well, with only mild throat discomfort, which does not affect her diet very much, she states. Education  2/5/19:  Patient and caregiver have been through the educational process for autologus bone marrow transplantation including preadmission teaching and preadmission physician office visit with Fernanda Rodriguez RN [facility] Coordinator   Patient and caregiver verbalize understanding of treatment regimen, possible adverse events, length of stay, and risk and benefits of transplantation and are agreeable to proceed. Patient and caregiver have been given an opportunity to ask, and to have their questions answered to their satisfaction. Documentation for above can be found in the Oncology Hematology [address] office chart. 2/5/19:  Reviewed infusion of stem cells, approximate length of time, pre-medication, telemetry during infusion. Discussed criteria for discharge & day+5 begin daily Granix until [facility] recovers. 2/6: Asked questions about process on Day 0, so discussed that, as well as the importance of activity while here and discharge criteria. 2/14: Offered encouragement, and discussed usual time of wbc recovery, hopefully early next week. Answered questions about activities at home and assured her more information will follow for her and her family as she nears discharge. Discharge  Expect discharge to home in [facility] with sister and daughter as caregivers.     Pending

## 2019-02-14 NOTE — PROGRESS NOTES
Pt verbalize throat is sore. Pt denies need for pain medication at this time. Order for magic mouth rinse and swallow. Will monitor.

## 2019-02-15 LAB
ALBUMIN SERPL-MCNC: 3.2 G/DL (ref 3.4–5)
ALP BLD-CCNC: 49 U/L (ref 40–129)
ALT SERPL-CCNC: 21 U/L (ref 10–40)
ANION GAP SERPL CALCULATED.3IONS-SCNC: 9 MMOL/L (ref 3–16)
AST SERPL-CCNC: 10 U/L (ref 15–37)
BILIRUB SERPL-MCNC: 0.3 MG/DL (ref 0–1)
BILIRUBIN DIRECT: <0.2 MG/DL (ref 0–0.3)
BILIRUBIN, INDIRECT: ABNORMAL MG/DL (ref 0–1)
BUN BLDV-MCNC: 6 MG/DL (ref 7–20)
CALCIUM SERPL-MCNC: 7.2 MG/DL (ref 8.3–10.6)
CHLORIDE BLD-SCNC: 112 MMOL/L (ref 99–110)
CO2: 23 MMOL/L (ref 21–32)
CREAT SERPL-MCNC: 0.6 MG/DL (ref 0.6–1.2)
GFR AFRICAN AMERICAN: >60
GFR NON-AFRICAN AMERICAN: >60
GLUCOSE BLD-MCNC: 109 MG/DL (ref 70–99)
GLUCOSE BLD-MCNC: 96 MG/DL (ref 70–99)
HCT VFR BLD CALC: 26.5 % (ref 36–48)
HEMOGLOBIN: 8.8 G/DL (ref 12–16)
LACTATE DEHYDROGENASE: 121 U/L (ref 100–190)
MCH RBC QN AUTO: 28.7 PG (ref 26–34)
MCHC RBC AUTO-ENTMCNC: 33.1 G/DL (ref 31–36)
MCV RBC AUTO: 86.6 FL (ref 80–100)
PDW BLD-RTO: 16.8 % (ref 12.4–15.4)
PERFORMED ON: NORMAL
PHOSPHORUS: 1.7 MG/DL (ref 2.5–4.9)
PLATELET # BLD: 18 K/UL (ref 135–450)
PMV BLD AUTO: 7.8 FL (ref 5–10.5)
POTASSIUM SERPL-SCNC: 3.9 MMOL/L (ref 3.5–5.1)
RBC # BLD: 3.06 M/UL (ref 4–5.2)
SODIUM BLD-SCNC: 144 MMOL/L (ref 136–145)
TOTAL PROTEIN: 5 G/DL (ref 6.4–8.2)
URIC ACID, SERUM: 1.6 MG/DL (ref 2.6–6)
WBC # BLD: 0.1 K/UL (ref 4–11)

## 2019-02-15 PROCEDURE — 80048 BASIC METABOLIC PNL TOTAL CA: CPT

## 2019-02-15 PROCEDURE — 6360000002 HC RX W HCPCS: Performed by: INTERNAL MEDICINE

## 2019-02-15 PROCEDURE — 84550 ASSAY OF BLOOD/URIC ACID: CPT

## 2019-02-15 PROCEDURE — 2580000003 HC RX 258: Performed by: NURSE PRACTITIONER

## 2019-02-15 PROCEDURE — 36592 COLLECT BLOOD FROM PICC: CPT

## 2019-02-15 PROCEDURE — 6370000000 HC RX 637 (ALT 250 FOR IP): Performed by: NURSE PRACTITIONER

## 2019-02-15 PROCEDURE — 80076 HEPATIC FUNCTION PANEL: CPT

## 2019-02-15 PROCEDURE — 84100 ASSAY OF PHOSPHORUS: CPT

## 2019-02-15 PROCEDURE — 2500000003 HC RX 250 WO HCPCS: Performed by: INTERNAL MEDICINE

## 2019-02-15 PROCEDURE — 83615 LACTATE (LD) (LDH) ENZYME: CPT

## 2019-02-15 PROCEDURE — 2580000003 HC RX 258: Performed by: INTERNAL MEDICINE

## 2019-02-15 PROCEDURE — 2060000000 HC ICU INTERMEDIATE R&B

## 2019-02-15 PROCEDURE — 85025 COMPLETE CBC W/AUTO DIFF WBC: CPT

## 2019-02-15 PROCEDURE — 6370000000 HC RX 637 (ALT 250 FOR IP): Performed by: INTERNAL MEDICINE

## 2019-02-15 PROCEDURE — 2500000003 HC RX 250 WO HCPCS: Performed by: NURSE PRACTITIONER

## 2019-02-15 RX ORDER — LOPERAMIDE HYDROCHLORIDE 2 MG/1
2 CAPSULE ORAL PRN
Status: DISCONTINUED | OUTPATIENT
Start: 2019-02-15 | End: 2019-02-18 | Stop reason: HOSPADM

## 2019-02-15 RX ADMIN — POTASSIUM PHOSPHATE, MONOBASIC AND POTASSIUM PHOSPHATE, DIBASIC 15 MMOL: 224; 236 INJECTION, SOLUTION, CONCENTRATE INTRAVENOUS at 09:20

## 2019-02-15 RX ADMIN — CETIRIZINE HYDROCHLORIDE 10 MG: 10 TABLET, FILM COATED ORAL at 08:52

## 2019-02-15 RX ADMIN — FAMOTIDINE 20 MG: 20 TABLET, FILM COATED ORAL at 20:14

## 2019-02-15 RX ADMIN — LEVOFLOXACIN 500 MG: 500 TABLET, FILM COATED ORAL at 08:52

## 2019-02-15 RX ADMIN — POTASSIUM CHLORIDE, DEXTROSE MONOHYDRATE AND SODIUM CHLORIDE: 224; 5; 450 INJECTION, SOLUTION INTRAVENOUS at 18:54

## 2019-02-15 RX ADMIN — Medication 10 ML: at 08:54

## 2019-02-15 RX ADMIN — POTASSIUM PHOSPHATE, MONOBASIC AND POTASSIUM PHOSPHATE, DIBASIC 20 MMOL: 224; 236 INJECTION, SOLUTION INTRAVENOUS at 05:36

## 2019-02-15 RX ADMIN — TBO-FILGRASTIM 300 MCG: 300 INJECTION, SOLUTION SUBCUTANEOUS at 17:47

## 2019-02-15 RX ADMIN — VALACYCLOVIR HYDROCHLORIDE 500 MG: 500 TABLET, FILM COATED ORAL at 20:13

## 2019-02-15 RX ADMIN — FAMOTIDINE 20 MG: 20 TABLET, FILM COATED ORAL at 08:52

## 2019-02-15 RX ADMIN — SODIUM CHLORIDE 15 ML: 900 IRRIGANT IRRIGATION at 08:53

## 2019-02-15 RX ADMIN — SODIUM CHLORIDE 15 ML: 900 IRRIGANT IRRIGATION at 20:20

## 2019-02-15 RX ADMIN — VALACYCLOVIR HYDROCHLORIDE 500 MG: 500 TABLET, FILM COATED ORAL at 08:52

## 2019-02-15 RX ADMIN — FLUCONAZOLE 400 MG: 200 TABLET ORAL at 08:52

## 2019-02-15 RX ADMIN — Medication 10 ML: at 20:19

## 2019-02-15 RX ADMIN — LISINOPRIL 20 MG: 20 TABLET ORAL at 08:52

## 2019-02-15 RX ADMIN — SODIUM CHLORIDE 15 ML: 900 IRRIGANT IRRIGATION at 17:48

## 2019-02-15 RX ADMIN — POTASSIUM CHLORIDE, DEXTROSE MONOHYDRATE AND SODIUM CHLORIDE: 224; 5; 450 INJECTION, SOLUTION INTRAVENOUS at 05:36

## 2019-02-15 RX ADMIN — SODIUM CHLORIDE 15 ML: 900 IRRIGANT IRRIGATION at 12:53

## 2019-02-15 ASSESSMENT — PAIN SCALES - GENERAL
PAINLEVEL_OUTOF10: 0

## 2019-02-15 NOTE — PLAN OF CARE
Problem: Falls - Risk of:  Goal: Will remain free from falls  Will remain free from falls   Outcome: Ongoing  Orthostatic vital signs obtained at start of shift - see flowsheet for details. Pt meets criteria for orthostasis. Pt is a Med fall risk. See Waldemar Villar Fall Score and ABCDS Injury Risk assessments. - Screening for Orthostasis AND not a [place] Risk per AYERS/ABCDS: Pt bed is in low position, side rails up, call light and belongings are in reach. Fall risk light is on outside pts room. Pt encouraged to call for assistance as needed. Will continue with hourly rounds for PO intake, pain needs, toileting and repositioning as needed. Problem: Venous Thromboembolism:  Goal: Will show no signs or symptoms of venous thromboembolism  Will show no signs or symptoms of venous thromboembolism   Outcome: Ongoing  - DVT Prophylaxis: Platelets <35,168 cells/dL - prophylactic lovenox on hold and mechanical prophylaxis with bilateral SCDs while in bed in place. Contraindications to pharmacologic prophylaxis: Thrombocytopenia  Contraindications to mechanical prophylaxis: Pt assessed and low-risk for VTE    Problem: Bleeding:  Goal: Will show no signs and symptoms of excessive bleeding  Will show no signs and symptoms of excessive bleeding   Outcome: Ongoing  Patient's hemoglobin this AM:   Recent Labs      02/15/19   0400   HGB  8.8*     Patient's platelet count this AM:   Recent Labs      02/15/19   0400   PLT  18*    Thrombocytopenia Precautions in place. Patient showing no signs or symptoms of active bleeding. Transfusion not indicated at this time. Patient verbalizes understanding of all instructions. Will continue to assess and implement POC. Call light within reach and hourly rounding in place.      Problem: Infection - Central Venous Catheter-Associated Bloodstream Infection:  Goal: Will show no infection signs and symptoms  Will show no infection signs and symptoms   Outcome: Ongoing  CVC site remains free

## 2019-02-15 NOTE — PROGRESS NOTES
oz (80.9 kg)       General: Awake, alert and oriented. HEENT: normocephalic, alopecia, PERRL, no scleral erythema or icterus, Oral mucosa moist and intact, throat clear  NECK: supple without palpable adenopathy  BACK: Straight negative CVAT  SKIN: warm dry and intact without lesions rashes or masses  CHEST: CTA bilaterally without use of accessory muscles  CV: Normal S1 S2, RRR, no MRG  ABD: NT ND normoactive BS, no palpable masses or hepatosplenomegaly  EXTREMITIES: without edema, healing abrasion on left calf, denies calf tenderness  NEURO: CN II - XII grossly intact  CATHETER: Left SC Trifusion (1/24/19, Barrat) - CDI w/ open skin tears from bandage    Data:   CBC:   Recent Labs      02/13/19   0405  02/14/19   0324  02/15/19   0400   WBC  0.1*  0.1*  0.1*   HGB  9.3*  8.9*  8.8*   HCT  27.7*  27.1*  26.5*   MCV  87.2  87.7  86.6   PLT  63*  36*  18*     BMP/Mag:  Recent Labs      02/13/19 0405  02/14/19 0324  02/15/19   0400   NA  147*  144  144   K  3.7  3.6  3.9   CL  110  111*  112*   CO2  25  24  23   PHOS  2.6  1.8*  1.7*   BUN  8  7  6*   CREATININE  0.7  0.6  0.6   MG   --   2.00   --      LIVP:   Recent Labs      02/13/19   0405  02/15/19   0400   AST  14*  10*   ALT  32  21   BILIDIR  <0.2  <0.2   BILITOT  0.3  0.3   ALKPHOS  55  49     Uric Acid:    Recent Labs      02/15/19   0400   LABURIC  1.6*     Coags:   Recent Labs      02/14/19 0324   PROTIME  12.6   INR  1.11   APTT  31.5       PROBLEM LIST:            1. IgG Lambda Multiple Myeloma, R-ISS Stage I  2. HTN  3. HLD  4. Type 2 DM  5. GERD  6. Tobacco Use (quit 12/2018)    Post - Transplant Complications:  1. Nausea / Indigestion / Diarrhea  2. Hypophosphatemia & hypokalemia  3. Mucositis        TREATMENT:            1.  RVd x 5 cycles  2. High dose Melpahlan & ASCT (2/7/19) - 2.71x10^6 [id number]/kg     ASSESSMENT AND PLAN:            1.   IgG Lambda Multiple Myeloma:  She is currently in VGPR  - S/p high dose Melpahlan (200 mg/m^2) and ASCT on 2/7/19      Day + 8     2. ID:  Afebrile, no evidence of infection.    - Levaquin, Diflucan & Valtrex, cont acyclovir at discharge for VZV positive titer.      3. Heme:  Pancytopenia from chemotherapy   - Transfuse for Hgb < 7 and Platelets < 41H   - No transfusion today  - Cont Granix daily     4. Metabolic: stable e-lytes and renal fxn except for hypoPhos  - S/p KCl 20 meq bid (stopped 2/12/19) w/ nausea and indigestion  - Cont IVF:  D5.45NS + KCl 30 meq @ 75 mL/hr   - Replace Phos, potassium and magnesium per policy.     5.  GI / Nutrition:  Appetite and oral intake is good  Nutrition:  - Cont low microbial diet   - Follow closely with dietary  - Encourage supplements and small meals  Diarrhea:  Chemo induced and improved  - C. Diff (2/11/19) - Negative  - Cont Imodium as needed  Nausea /  Indigestion:  Improved  - Cont Pepcid bid, TUMS prn  - Cont Zofran, Compazine and Ativan prn   Mucositis:  D/t chemotherapy   - Cont MMW prn     6. Type 2 DM: hyperglycemia d/t steroids has resolved, blood sugars are well controlled   - Hold oral diabetes medications  - S/p Lispro SSI, cont to check glucose twice daily     7.  Cardiac: Stable  HLD:  Stable  - Hold Crestor while inpatient, resume on d/c  HTN:  Stable  - Hold HCTZ while inpatient  - S/p Norvasc 10 mg daily (stopped 2/12/19)   - Cont Lisinopril 20 mg daily (decreased 2/12/19) d/t mild hypotension.       8. Bone Health:  Stable  - Resume Calcium and Vit D on d/c  - Resume bisphosphonate after discharged     - DVT Prophylaxis: Platelets <89,596 cells/dL - prophylactic lovenox on hold and mechanical prophylaxis with bilateral SCDs while in bed in place. Contraindications to pharmacologic prophylaxis: Thrombocytopenia  Contraindications to mechanical prophylaxis: None      - Disposition:  Once ANC > 1.0 and toxicities from transplant resolved      HE Fish - CNP       Juaquin Krabbe.  Delmar Campos DO, MS  Oncology/Hematology Care    Please contact via:  1. Perfect Serve  2.   Cell Phone:  [phone]    2/15/2019   10:36 AM

## 2019-02-15 NOTE — PLAN OF CARE
Problem: Falls - Risk of:  Goal: Will remain free from falls  Will remain free from falls   Outcome: Ongoing  Orthostatic vital signs obtained at start of shift - see flowsheet for details. Pt does not meet criteria for orthostasis. Pt is a Med fall risk. See Konstantin Mighty Fall Score and ABCDS Injury Risk assessments. - Screening for Orthostasis AND not a [place] Risk per AYERS/ABCDS: Pt bed is in low position, side rails up, call light and belongings are in reach. Fall risk light is on outside pts room. Pt encouraged to call for assistance as needed. Will continue with hourly rounds for PO intake, pain needs, toileting and repositioning as needed. Problem: Venous Thromboembolism:  Goal: Will show no signs or symptoms of venous thromboembolism  Will show no signs or symptoms of venous thromboembolism   Outcome: Ongoing  Adherent with DVT Prevention: Pt is at risk for DVT d/t decreased mobility and cancer treatment. Pt educated on importance of activity. Pt has orders for SCDs while in bed. Pt verbalizes understanding of need for prophylaxis while inpatient.        Problem: Bleeding:  Goal: Will show no signs and symptoms of excessive bleeding  Will show no signs and symptoms of excessive bleeding   Outcome: Ongoing  Patient's hemoglobin this AM:   Recent Labs      02/15/19   0400   HGB  8.8*     Patient's platelet count this AM:   Recent Labs      02/15/19   0400   PLT  18*    Thrombocytopenia Precautions in place. Patient showing no signs or symptoms of active bleeding. Transfusion not indicated at this time. Patient verbalizes understanding of all instructions. Will continue to assess and implement POC. Call light within reach and hourly rounding in place. Problem: Infection - Central Venous Catheter-Associated Bloodstream Infection:  Goal: Will show no infection signs and symptoms  Will show no infection signs and symptoms   Outcome: Ongoing  CVC site remains free of signs/symptoms of infection. No drainage, edema, erythema, pain, or warmth noted at site. Dressing changes continue per protocol and on an as needed basis - see flowsheet. Problem: PROTECTIVE PRECAUTIONS  Goal: Patient will remain free of nosocomial Infections  Outcome: Ongoing  Pt currently in a private, positive pressure room. Educated pt on wearing a mask when neutropenic and/or leaving the floor. No living plants or flowers allowed. Also reinforced importance of hand hygiene. Pt following a low microbial diet. Problem: Nausea/Vomiting:  Goal: Absence of nausea/vomiting  Absence of nausea/vomiting   Outcome: Ongoing  Pt has not complained of any N/V during this shift. Will continue to monitor. Problem: Discharge Planning:  Goal: Discharged to appropriate level of care  Discharged to appropriate level of care   Outcome: Ongoing  Pt has been updated on POC and has no questions at this time. Will continue to assess.

## 2019-02-16 LAB
ANION GAP SERPL CALCULATED.3IONS-SCNC: 9 MMOL/L (ref 3–16)
BLOOD BANK DISPENSE STATUS: NORMAL
BLOOD BANK PRODUCT CODE: NORMAL
BPU ID: NORMAL
BUN BLDV-MCNC: 5 MG/DL (ref 7–20)
CALCIUM SERPL-MCNC: 7.5 MG/DL (ref 8.3–10.6)
CHLORIDE BLD-SCNC: 108 MMOL/L (ref 99–110)
CO2: 23 MMOL/L (ref 21–32)
CREAT SERPL-MCNC: <0.5 MG/DL (ref 0.6–1.2)
DESCRIPTION BLOOD BANK: NORMAL
GFR AFRICAN AMERICAN: >60
GFR NON-AFRICAN AMERICAN: >60
GLUCOSE BLD-MCNC: 107 MG/DL (ref 70–99)
HCT VFR BLD CALC: 25.6 % (ref 36–48)
HEMOGLOBIN: 8.7 G/DL (ref 12–16)
MCH RBC QN AUTO: 29.3 PG (ref 26–34)
MCHC RBC AUTO-ENTMCNC: 34 G/DL (ref 31–36)
MCV RBC AUTO: 86.1 FL (ref 80–100)
PDW BLD-RTO: 16.8 % (ref 12.4–15.4)
PHOSPHORUS: 1.7 MG/DL (ref 2.5–4.9)
PLATELET # BLD: 8 K/UL (ref 135–450)
PMV BLD AUTO: 7.9 FL (ref 5–10.5)
POTASSIUM SERPL-SCNC: 4.1 MMOL/L (ref 3.5–5.1)
RBC # BLD: 2.97 M/UL (ref 4–5.2)
SODIUM BLD-SCNC: 140 MMOL/L (ref 136–145)
URIC ACID, SERUM: 1.6 MG/DL (ref 2.6–6)
WBC # BLD: 0.2 K/UL (ref 4–11)

## 2019-02-16 PROCEDURE — 2500000003 HC RX 250 WO HCPCS: Performed by: INTERNAL MEDICINE

## 2019-02-16 PROCEDURE — 2580000003 HC RX 258: Performed by: INTERNAL MEDICINE

## 2019-02-16 PROCEDURE — 36430 TRANSFUSION BLD/BLD COMPNT: CPT

## 2019-02-16 PROCEDURE — 85025 COMPLETE CBC W/AUTO DIFF WBC: CPT

## 2019-02-16 PROCEDURE — 6370000000 HC RX 637 (ALT 250 FOR IP): Performed by: INTERNAL MEDICINE

## 2019-02-16 PROCEDURE — 6370000000 HC RX 637 (ALT 250 FOR IP): Performed by: NURSE PRACTITIONER

## 2019-02-16 PROCEDURE — 2500000003 HC RX 250 WO HCPCS: Performed by: NURSE PRACTITIONER

## 2019-02-16 PROCEDURE — 84550 ASSAY OF BLOOD/URIC ACID: CPT

## 2019-02-16 PROCEDURE — 6360000002 HC RX W HCPCS: Performed by: INTERNAL MEDICINE

## 2019-02-16 PROCEDURE — 84100 ASSAY OF PHOSPHORUS: CPT

## 2019-02-16 PROCEDURE — 2580000003 HC RX 258: Performed by: NURSE PRACTITIONER

## 2019-02-16 PROCEDURE — P9037 PLATE PHERES LEUKOREDU IRRAD: HCPCS

## 2019-02-16 PROCEDURE — 36592 COLLECT BLOOD FROM PICC: CPT

## 2019-02-16 PROCEDURE — 80048 BASIC METABOLIC PNL TOTAL CA: CPT

## 2019-02-16 PROCEDURE — 2060000000 HC ICU INTERMEDIATE R&B

## 2019-02-16 RX ORDER — 0.9 % SODIUM CHLORIDE 0.9 %
250 INTRAVENOUS SOLUTION INTRAVENOUS ONCE
Status: COMPLETED | OUTPATIENT
Start: 2019-02-16 | End: 2019-02-16

## 2019-02-16 RX ADMIN — SODIUM CHLORIDE 15 ML: 900 IRRIGANT IRRIGATION at 18:28

## 2019-02-16 RX ADMIN — VALACYCLOVIR HYDROCHLORIDE 500 MG: 500 TABLET, FILM COATED ORAL at 21:34

## 2019-02-16 RX ADMIN — BENZOCAINE AND MENTHOL 1 LOZENGE: 15; 3.6 LOZENGE ORAL at 08:56

## 2019-02-16 RX ADMIN — SODIUM CHLORIDE 15 ML: 900 IRRIGANT IRRIGATION at 11:29

## 2019-02-16 RX ADMIN — SODIUM CHLORIDE 15 ML: 900 IRRIGANT IRRIGATION at 21:38

## 2019-02-16 RX ADMIN — FAMOTIDINE 20 MG: 20 TABLET, FILM COATED ORAL at 08:51

## 2019-02-16 RX ADMIN — POTASSIUM CHLORIDE, DEXTROSE MONOHYDRATE AND SODIUM CHLORIDE: 224; 5; 450 INJECTION, SOLUTION INTRAVENOUS at 21:39

## 2019-02-16 RX ADMIN — LIDOCAINE HYDROCHLORIDE 5 ML: 20 SOLUTION ORAL; TOPICAL at 00:07

## 2019-02-16 RX ADMIN — Medication 10 ML: at 21:38

## 2019-02-16 RX ADMIN — POTASSIUM CHLORIDE, DEXTROSE MONOHYDRATE AND SODIUM CHLORIDE: 224; 5; 450 INJECTION, SOLUTION INTRAVENOUS at 08:49

## 2019-02-16 RX ADMIN — POTASSIUM PHOSPHATE, MONOBASIC AND POTASSIUM PHOSPHATE, DIBASIC 36 MMOL: 224; 236 INJECTION, SOLUTION, CONCENTRATE INTRAVENOUS at 05:10

## 2019-02-16 RX ADMIN — FLUCONAZOLE 400 MG: 200 TABLET ORAL at 08:51

## 2019-02-16 RX ADMIN — TBO-FILGRASTIM 300 MCG: 300 INJECTION, SOLUTION SUBCUTANEOUS at 18:28

## 2019-02-16 RX ADMIN — LEVOFLOXACIN 500 MG: 500 TABLET, FILM COATED ORAL at 08:51

## 2019-02-16 RX ADMIN — BENZOCAINE AND MENTHOL 1 LOZENGE: 15; 3.6 LOZENGE ORAL at 00:22

## 2019-02-16 RX ADMIN — LISINOPRIL 20 MG: 20 TABLET ORAL at 08:51

## 2019-02-16 RX ADMIN — FAMOTIDINE 20 MG: 20 TABLET, FILM COATED ORAL at 21:34

## 2019-02-16 RX ADMIN — LOPERAMIDE HYDROCHLORIDE 2 MG: 2 CAPSULE ORAL at 11:28

## 2019-02-16 RX ADMIN — CETIRIZINE HYDROCHLORIDE 10 MG: 10 TABLET, FILM COATED ORAL at 08:51

## 2019-02-16 RX ADMIN — SODIUM CHLORIDE 250 ML: 9 INJECTION, SOLUTION INTRAVENOUS at 05:09

## 2019-02-16 RX ADMIN — BENZOCAINE AND MENTHOL 1 LOZENGE: 15; 3.6 LOZENGE ORAL at 11:28

## 2019-02-16 RX ADMIN — VALACYCLOVIR HYDROCHLORIDE 500 MG: 500 TABLET, FILM COATED ORAL at 08:51

## 2019-02-16 RX ADMIN — Medication 10 ML: at 08:51

## 2019-02-16 ASSESSMENT — PAIN SCALES - GENERAL
PAINLEVEL_OUTOF10: 0
PAINLEVEL_OUTOF10: 5
PAINLEVEL_OUTOF10: 0
PAINLEVEL_OUTOF10: 4

## 2019-02-16 ASSESSMENT — PAIN DESCRIPTION - PAIN TYPE
TYPE: ACUTE PAIN
TYPE: ACUTE PAIN

## 2019-02-16 ASSESSMENT — PAIN DESCRIPTION - ORIENTATION
ORIENTATION: INNER
ORIENTATION: INNER

## 2019-02-16 ASSESSMENT — PAIN - FUNCTIONAL ASSESSMENT
PAIN_FUNCTIONAL_ASSESSMENT: ACTIVITIES ARE NOT PREVENTED
PAIN_FUNCTIONAL_ASSESSMENT: ACTIVITIES ARE NOT PREVENTED

## 2019-02-16 ASSESSMENT — PAIN DESCRIPTION - PROGRESSION
CLINICAL_PROGRESSION: NOT CHANGED
CLINICAL_PROGRESSION: GRADUALLY WORSENING
CLINICAL_PROGRESSION: GRADUALLY WORSENING

## 2019-02-16 ASSESSMENT — PAIN DESCRIPTION - FREQUENCY
FREQUENCY: INTERMITTENT
FREQUENCY: INTERMITTENT

## 2019-02-16 ASSESSMENT — PAIN DESCRIPTION - DESCRIPTORS
DESCRIPTORS: SORE
DESCRIPTORS: SORE

## 2019-02-16 ASSESSMENT — PAIN DESCRIPTION - LOCATION
LOCATION: THROAT
LOCATION: THROAT

## 2019-02-16 ASSESSMENT — PAIN DESCRIPTION - ONSET
ONSET: ON-GOING
ONSET: AWAKENED FROM SLEEP

## 2019-02-16 NOTE — PROGRESS NOTES
[address]  Autologous Progress Note    2019    Rylee Caul    :  1955    MRN:  [id number]    Referring MD: Tamara Vera MD  [address], [facility]    Subjective:  Mucositis issues better, no new issues; feeling better     ECOG PS:  (1) Restricted in physically strenuous activity, ambulatory and able to do work of light nature    Isolation:  None     Medications    Scheduled Meds:   sodium chloride  250 mL Intravenous Once    cetirizine  10 mg Oral Daily    lisinopril  20 mg Oral Daily    famotidine  20 mg Oral BID    levofloxacin  500 mg Oral Daily    Tbo-Filgrastim  300 mcg Subcutaneous QPM    fluconazole  400 mg Oral Daily    Saline Mouthwash  15 mL Swish & Spit 4x Daily AC & HS    sodium chloride flush  10 mL Intravenous 2 times per day    valACYclovir  500 mg Oral BID     Continuous Infusions:   dextrose 5% and 0.45% NaCl with KCl 30 mEq 75 mL/hr at 19 0849    sodium chloride      dextrose       PRN Meds:.benzocaine-menthol, potassium phosphate IVPB, loperamide, magic (miracle) mouthwash, ondansetron, ondansetron, calcium carbonate, polyethylene glycol, sodium chloride, alteplase, magnesium hydroxide, magnesium sulfate, potassium chloride, Saline Mouthwash, glucose, dextrose, glucagon (rDNA), dextrose, prochlorperazine **OR** prochlorperazine, LORazepam **OR** LORazepam    ROS:  As noted above, otherwise remainder of 10-point ROS negative    Physical Exam:     I&O:      Intake/Output Summary (Last 24 hours) at 19 1455  Last data filed at 19 1338   Gross per 24 hour   Intake             4619 ml   Output             3600 ml   Net             1019 ml       Vital Signs:  /83   Pulse 78   Temp 98.9 °F (37.2 °C) (Oral)   Resp 18   Ht 5' 1\" (1.549 m)   Wt 180 lb 8 oz (81.9 kg)   SpO2 94%   BMI 34.11 kg/m²     Weight:    Wt Readings from Last 3 Encounters:   19 180 lb 8 oz (81.9 kg)   19 180 lb (81.6 kg) 12/31/18 178 lb 5.6 oz (80.9 kg)       General: Awake, alert and oriented. HEENT: normocephalic, alopecia, PERRL, no scleral erythema or icterus, Oral mucosa moist and intact, throat clear  NECK: supple without palpable adenopathy  BACK: Straight negative CVAT  SKIN: warm dry and intact without lesions rashes or masses  CHEST: CTA bilaterally without use of accessory muscles  CV: Normal S1 S2, RRR, no MRG  ABD: NT ND normoactive BS, no palpable masses or hepatosplenomegaly  EXTREMITIES: without edema, healing abrasion on left calf, denies calf tenderness  NEURO: CN II - XII grossly intact  CATHETER: Left SC Trifusion (1/24/19, [place]) - CDI w/ open skin tears from bandage    Data:   CBC:   Recent Labs      02/14/19   0324  02/15/19   0400  02/16/19   0330   WBC  0.1*  0.1*  0.2*   HGB  8.9*  8.8*  8.7*   HCT  27.1*  26.5*  25.6*   MCV  87.7  86.6  86.1   PLT  36*  18*  8*     BMP/Mag:  Recent Labs      02/14/19   0324  02/15/19   0400  02/16/19   0330   NA  144  144  140   K  3.6  3.9  4.1   CL  111*  112*  108   CO2  24  23  23   PHOS  1.8*  1.7*  1.7*   BUN  7  6*  5*   CREATININE  0.6  0.6  <0.5*   MG  2.00   --    --      LIVP:   Recent Labs      02/15/19   0400   AST  10*   ALT  21   BILIDIR  <0.2   BILITOT  0.3   ALKPHOS  49     Uric Acid:    Recent Labs      02/16/19   0330   LABURIC  1.6*     Coags:   Recent Labs      02/14/19   0324   PROTIME  12.6   INR  1.11   APTT  31.5       PROBLEM LIST:            1. IgG Lambda Multiple Myeloma, R-ISS Stage I  2. HTN  3. HLD  4. Type 2 DM  5. GERD  6. Tobacco Use (quit 12/2018)    Post - Transplant Complications:  1. Nausea / Indigestion / Diarrhea  2. Hypophosphatemia & hypokalemia  3. Mucositis        TREATMENT:            1.  RVd x 5 cycles  2. High dose Melpahlan & ASCT (2/7/19) - 2.71x10^6 [id number]/kg     ASSESSMENT AND PLAN:            1.   IgG Lambda Multiple Myeloma:  She is currently in [facility]  - S/p high dose Melpahlan (200 mg/m^2) and ASCT on 2/7/19      Day +9     2. ID:  Afebrile, no evidence of infection.    - Levaquin, Diflucan & Valtrex, cont acyclovir at discharge for VZV positive titer.      3. Heme:  Pancytopenia from chemotherapy   - Transfuse for Hgb < 7 and Platelets < 21K   - No transfusion today  - Cont Granix daily     4. Metabolic: stable e-lytes and renal fxn except for hypoPhos  - S/p KCl 20 meq bid (stopped 2/12/19) w/ nausea and indigestion  - IVF:  D5.45NS + KCl 30 meq @ 75 mL/hr   - Replace Phos, potassium and magnesium per policy.     5.  GI / Nutrition:  Appetite and oral intake is good  Nutrition:  - Cont low microbial diet   - Follow closely with dietary  - Encourage supplements and small meals  Diarrhea:  Chemo induced and improved  - C. Diff (2/11/19) - Negative  - Cont Imodium as needed  Nausea /  Indigestion:  Improved  - Cont Pepcid bid, TUMS prn  - Cont Zofran, Compazine and Ativan prn   Mucositis:  D/t chemotherapy   - Cont MMW prn     6. Type 2 DM: hyperglycemia d/t steroids has resolved, blood sugars are well controlled   - Hold oral diabetes medications  - S/p Lispro SSI, cont to check glucose twice daily     7.  Cardiac: Stable  HLD:  Stable  - Hold Crestor while inpatient, resume on d/c  HTN:  Stable  - Hold HCTZ while inpatient  - S/p Norvasc 10 mg daily (stopped 2/12/19)   - Cont Lisinopril 20 mg daily (decreased 2/12/19) d/t mild hypotension.       8. Bone Health:  Stable  - Resume Calcium and Vit D on d/c  - Resume bisphosphonate after discharged     - DVT Prophylaxis: Platelets <08,927 cells/dL - prophylactic lovenox on hold and mechanical prophylaxis with bilateral SCDs while in bed in place. Contraindications to pharmacologic prophylaxis: Thrombocytopenia  Contraindications to mechanical prophylaxis: None      - Disposition:  Once ANC > 1.0 and toxicities from transplant resolved      Silviano Simmons DO, MS  [facility]    Please contact via:  1. [employer]  2.   Cell Phone:  [phone])

## 2019-02-16 NOTE — PLAN OF CARE
Problem: Falls - Risk of:  Goal: Will remain free from falls  Will remain free from falls   Outcome: Ongoing  Orthostatic vital signs obtained at start of shift - see flowsheet for details. Pt does not meet criteria for orthostasis. Pt is a Med fall risk. See [place] Fall Score and ABCDS Injury Risk assessments. - Screening for Orthostasis AND not a [place] Risk per AYERS/ABCDS: Pt bed is in low position, side rails up, call light and belongings are in reach. Fall risk light is on outside pts room. Pt encouraged to call for assistance as needed. Will continue with hourly rounds for PO intake, pain needs, toileting and repositioning as needed. Problem: Venous Thromboembolism:  Goal: Will show no signs or symptoms of venous thromboembolism  Will show no signs or symptoms of venous thromboembolism   Outcome: Ongoing  No s/s venous thromboembolism. Pt educated on s/s and instructed to notify RN immediately if s/s noted. Verbalized understanding. Will monitor. Problem: Bleeding:  Goal: Will show no signs and symptoms of excessive bleeding  Will show no signs and symptoms of excessive bleeding   Outcome: Ongoing  Patient's hemoglobin this AM:   Recent Labs      02/16/19   0330   HGB  8.7*     Patient's platelet count this AM:   Recent Labs      02/16/19   0330   PLT  8*    Thrombocytopenia Precautions in place. Patient showing no signs or symptoms of active bleeding. Patient received transfusions per orders prior to this shift. Patient verbalizes understanding of all instructions. Will continue to assess and implement POC. Call light within reach and hourly rounding in place. Problem: Infection - Central Venous Catheter-Associated Bloodstream Infection:  Goal: Will show no infection signs and symptoms  Will show no infection signs and symptoms   Outcome: Ongoing  Pt afebrile. Trifusion in place; site and dressing remain c/d/i. Lines flush well with good blood return; Tegaderm and Biopatch in place. Lines pinned per protocol. Will continue to monitor. CVC site remains free of signs/symptoms of infection. No drainage, edema, erythema, pain, or warmth noted at site. Dressing changes continue per protocol and on an as needed basis - see flowsheet. Compliant with [facility] Bath Protocol:  Pt plans to perform CHG bath today per [facility] protocol utilizing CHG solution in the shower. CVC site cleansed with CHG wipe over dressing, skin surrounding dressing, and first 6\" of IV tubing. Pt tolerated well. Continued to encourage daily CHG bathing per [facility] protocol. Problem: PROTECTIVE PRECAUTIONS  Goal: Patient will remain free of nosocomial Infections  Outcome: Ongoing  Pt compliant with protective precautions, remains in private room. Pt, staff and visitors adhere to handwashing protocol. Pt verbalizes understanding of low microbial diet. Pt instructed to wear mask while in halls. Problem: Nausea/Vomiting:  Goal: Absence of nausea/vomiting  Absence of nausea/vomiting   Outcome: Ongoing  No c/o nausea so far this shift. Educated on nausea control measures. Verbalized understanding. Problem: Discharge Planning:  Goal: Discharged to appropriate level of care  Discharged to appropriate level of care   Outcome: Ongoing  Pt updated on and agreeable to plan of care. Problem: Pain:  Goal: Pain level will decrease  Pain level will decrease   Outcome: Ongoing  Pt able to appropriately rate pain on scale of 0-10. C/o pain in throat, PRN cepacol given per [facility] with moderate relief per pt. Educated on pain control measures, verbalized understanding. Will monitor.       Electronically signed by Marcy Rae RN on 2/16/2019 at 3:48 PM

## 2019-02-16 NOTE — PLAN OF CARE
Problem: Falls - Risk of:  Goal: Will remain free from falls  Will remain free from falls   Outcome: Ongoing    - Screening for Orthostasis AND not a [place] Risk per AYERS/ABCDS: Pt bed is in low position, side rails up, call light and belongings are in reach. Fall risk light is on outside pts room. Pt encouraged to call for assistance as needed. Will continue with hourly rounds for PO intake, pain needs, toileting and repositioning as needed. Problem: Venous Thromboembolism:  Goal: Will show no signs or symptoms of venous thromboembolism  Will show no signs or symptoms of venous thromboembolism   Outcome: Ongoing    Refusing DVT Prevention: Pt is at risk for DVT d/t decreased mobility and cancer treatment. Pt educated on importance of activity. Pt has orders for SCDs while in bed, however pt currently refusing treatment. Reviewed risks of DVT & PE development while inpatient. MD aware of patient's refusal and re-education of importance of prophylaxis. No new orders at this time. Will continue to re-instruct patient and intervene as appropriate. Problem: Bleeding:  Goal: Will show no signs and symptoms of excessive bleeding  Will show no signs and symptoms of excessive bleeding   Outcome: Ongoing  Patient's hemoglobin this AM:   Recent Labs      02/16/19   0330   HGB  8.7*     Patient's platelet count this AM:   Recent Labs      02/16/19   0330   PLT  8*    Thrombocytopenia Precautions in place. Patient showing no signs or symptoms of active bleeding. Patient transfused blood products per orders - see flowsheet. Patient verbalizes understanding of all instructions. Will continue to assess and implement POC. Call light within reach and hourly rounding in place.  .     Problem: Infection - Central Venous Catheter-Associated Bloodstream Infection:  Goal: Will show no infection signs and symptoms  Will show no infection signs and symptoms   Outcome: Ongoing  CVC site remains free of signs/symptoms of infection. No drainage, edema, erythema, pain, or warmth noted at site. Dressing changes continue per protocol and on an as needed basis - see flowsheet. Compliant with [facility] Bath Protocol:  Performed CHG bath today per [facility] protocol utilizing CHG solution in the shower. CVC site cleansed with CHG wipe over dressing, skin surrounding dressing, and first 6\" of IV tubing. Pt tolerated well. Continued to encourage daily CHG bathing per [employer] protocol. .    Problem: PROTECTIVE PRECAUTIONS  Goal: Patient will remain free of nosocomial Infections  Outcome: Ongoing  Pt remains in protective precautions. Pt educated on wearing mask when in hallways. Pt, staff, and visitors adhering to handwashing guidelines. Pt educated to shower or bathe daily with chlorhexidine and linens changed daily per protocol. Pt verbalizes understanding of low microbial diet. Will continue to monitor. Problem: Pain:  Goal: Pain level will decrease  Pain level will decrease   Outcome: Ongoing  Pt c/o throst pain and magic mouthwash provided and effective for pain, Cepacol throat lozenges provided.

## 2019-02-17 LAB
ANION GAP SERPL CALCULATED.3IONS-SCNC: 11 MMOL/L (ref 3–16)
BASOPHILS ABSOLUTE: 0 K/UL (ref 0–0.2)
BASOPHILS RELATIVE PERCENT: 0.7 %
BUN BLDV-MCNC: 6 MG/DL (ref 7–20)
CALCIUM SERPL-MCNC: 7.9 MG/DL (ref 8.3–10.6)
CHLORIDE BLD-SCNC: 108 MMOL/L (ref 99–110)
CO2: 23 MMOL/L (ref 21–32)
CREAT SERPL-MCNC: 0.6 MG/DL (ref 0.6–1.2)
EOSINOPHILS ABSOLUTE: 0 K/UL (ref 0–0.6)
EOSINOPHILS RELATIVE PERCENT: 0.1 %
GFR AFRICAN AMERICAN: >60
GFR NON-AFRICAN AMERICAN: >60
GLUCOSE BLD-MCNC: 103 MG/DL (ref 70–99)
HCT VFR BLD CALC: 25.8 % (ref 36–48)
HEMOGLOBIN: 8.8 G/DL (ref 12–16)
LYMPHOCYTES ABSOLUTE: 0.3 K/UL (ref 1–5.1)
LYMPHOCYTES RELATIVE PERCENT: 48.2 %
MCH RBC QN AUTO: 29.4 PG (ref 26–34)
MCHC RBC AUTO-ENTMCNC: 34.2 G/DL (ref 31–36)
MCV RBC AUTO: 86.1 FL (ref 80–100)
MONOCYTES ABSOLUTE: 0.1 K/UL (ref 0–1.3)
MONOCYTES RELATIVE PERCENT: 23 %
NEUTROPHILS ABSOLUTE: 0.2 K/UL (ref 1.7–7.7)
NEUTROPHILS RELATIVE PERCENT: 28 %
PDW BLD-RTO: 16.5 % (ref 12.4–15.4)
PHOSPHORUS: 2.4 MG/DL (ref 2.5–4.9)
PLATELET # BLD: 33 K/UL (ref 135–450)
PMV BLD AUTO: 8.5 FL (ref 5–10.5)
POTASSIUM SERPL-SCNC: 4 MMOL/L (ref 3.5–5.1)
RBC # BLD: 3 M/UL (ref 4–5.2)
SODIUM BLD-SCNC: 142 MMOL/L (ref 136–145)
URIC ACID, SERUM: 1.7 MG/DL (ref 2.6–6)
WBC # BLD: 0.6 K/UL (ref 4–11)

## 2019-02-17 PROCEDURE — 85025 COMPLETE CBC W/AUTO DIFF WBC: CPT

## 2019-02-17 PROCEDURE — 6370000000 HC RX 637 (ALT 250 FOR IP): Performed by: INTERNAL MEDICINE

## 2019-02-17 PROCEDURE — 80048 BASIC METABOLIC PNL TOTAL CA: CPT

## 2019-02-17 PROCEDURE — 6370000000 HC RX 637 (ALT 250 FOR IP): Performed by: NURSE PRACTITIONER

## 2019-02-17 PROCEDURE — 2500000003 HC RX 250 WO HCPCS: Performed by: NURSE PRACTITIONER

## 2019-02-17 PROCEDURE — 36592 COLLECT BLOOD FROM PICC: CPT

## 2019-02-17 PROCEDURE — 2580000003 HC RX 258: Performed by: NURSE PRACTITIONER

## 2019-02-17 PROCEDURE — 84100 ASSAY OF PHOSPHORUS: CPT

## 2019-02-17 PROCEDURE — 6360000002 HC RX W HCPCS: Performed by: INTERNAL MEDICINE

## 2019-02-17 PROCEDURE — 84550 ASSAY OF BLOOD/URIC ACID: CPT

## 2019-02-17 PROCEDURE — 2060000000 HC ICU INTERMEDIATE R&B

## 2019-02-17 RX ADMIN — BENZOCAINE AND MENTHOL 1 LOZENGE: 15; 3.6 LOZENGE ORAL at 20:44

## 2019-02-17 RX ADMIN — BENZOCAINE AND MENTHOL 1 LOZENGE: 15; 3.6 LOZENGE ORAL at 03:43

## 2019-02-17 RX ADMIN — BENZOCAINE AND MENTHOL 1 LOZENGE: 15; 3.6 LOZENGE ORAL at 00:27

## 2019-02-17 RX ADMIN — FAMOTIDINE 20 MG: 20 TABLET, FILM COATED ORAL at 20:44

## 2019-02-17 RX ADMIN — TBO-FILGRASTIM 300 MCG: 300 INJECTION, SOLUTION SUBCUTANEOUS at 16:28

## 2019-02-17 RX ADMIN — BENZOCAINE AND MENTHOL 1 LOZENGE: 15; 3.6 LOZENGE ORAL at 08:01

## 2019-02-17 RX ADMIN — LEVOFLOXACIN 500 MG: 500 TABLET, FILM COATED ORAL at 08:02

## 2019-02-17 RX ADMIN — FAMOTIDINE 20 MG: 20 TABLET, FILM COATED ORAL at 08:02

## 2019-02-17 RX ADMIN — POTASSIUM CHLORIDE, DEXTROSE MONOHYDRATE AND SODIUM CHLORIDE: 224; 5; 450 INJECTION, SOLUTION INTRAVENOUS at 11:11

## 2019-02-17 RX ADMIN — VALACYCLOVIR HYDROCHLORIDE 500 MG: 500 TABLET, FILM COATED ORAL at 08:02

## 2019-02-17 RX ADMIN — FLUCONAZOLE 400 MG: 200 TABLET ORAL at 08:02

## 2019-02-17 RX ADMIN — Medication 10 ML: at 20:45

## 2019-02-17 RX ADMIN — LISINOPRIL 20 MG: 20 TABLET ORAL at 08:02

## 2019-02-17 RX ADMIN — VALACYCLOVIR HYDROCHLORIDE 500 MG: 500 TABLET, FILM COATED ORAL at 20:44

## 2019-02-17 RX ADMIN — SODIUM CHLORIDE 15 ML: 900 IRRIGANT IRRIGATION at 16:28

## 2019-02-17 RX ADMIN — SODIUM CHLORIDE 15 ML: 900 IRRIGANT IRRIGATION at 20:45

## 2019-02-17 RX ADMIN — Medication 10 ML: at 08:02

## 2019-02-17 RX ADMIN — SODIUM CHLORIDE 15 ML: 900 IRRIGANT IRRIGATION at 11:11

## 2019-02-17 RX ADMIN — CETIRIZINE HYDROCHLORIDE 10 MG: 10 TABLET, FILM COATED ORAL at 08:02

## 2019-02-17 ASSESSMENT — PAIN SCALES - GENERAL
PAINLEVEL_OUTOF10: 0

## 2019-02-17 NOTE — PLAN OF CARE
Problem: Falls - Risk of:  Goal: Will remain free from falls  Will remain free from falls   Outcome: Ongoing  Orthostatic vital signs obtained at start of shift - see flowsheet for details. Pt does not meet criteria for orthostasis. Pt is a Med fall risk. See Cleda Asper Fall Score and ABCDS Injury Risk assessments. - Screening for Orthostasis AND not a [place] Risk per AYERS/ABCDS: Pt bed is in low position, side rails up, call light and belongings are in reach. Fall risk light is on outside pts room. Pt encouraged to call for assistance as needed. Will continue with hourly rounds for PO intake, pain needs, toileting and repositioning as needed.      Problem: Venous Thromboembolism:  Goal: Will show no signs or symptoms of venous thromboembolism  Will show no signs or symptoms of venous thromboembolism   Outcome: Ongoing  No s/s venous thromboembolism. Pt educated on s/s and instructed to notify RN immediately if s/s noted. Verbalized understanding. Will monitor.        Problem: Bleeding:  Goal: Will show no signs and symptoms of excessive bleeding  Will show no signs and symptoms of excessive bleeding   Outcome: Ongoing  Patient's hemoglobin this AM:   Recent Labs      02/17/19   0337   HGB  8.8*     Patient's platelet count this AM:   Recent Labs      02/17/19   0337   PLT  33*    Thrombocytopenia Precautions in place. Patient showing no signs or symptoms of active bleeding. Transfusion not indicated at this time. Patient verbalizes understanding of all instructions. Will continue to assess and implement POC. Call light within reach and hourly rounding in place.        Problem: Infection - Central Venous Catheter-Associated Bloodstream Infection:  Goal: Will show no infection signs and symptoms  Will show no infection signs and symptoms   Outcome: Ongoing  Pt afebrile. Trifusion in place; site and dressing remain c/d/i. Lines flush well with good blood return; Tegaderm and Biopatch in place.  Lines pinned per protocol. Will continue to monitor. CVC site remains free of signs/symptoms of infection. No drainage, edema, erythema, pain, or warmth noted at site. Dressing changes continue per protocol and on an as needed basis - see flowsheet.      Compliant with [facility] Bath Protocol:  Performed CHG bath today per [employer] protocol utilizing CHG solution in the shower. CVC site cleansed with CHG wipe over dressing, skin surrounding dressing, and first 6\" of IV tubing. Pt tolerated well. Continued to encourage daily CHG bathing per [facility] protocol.      Problem: PROTECTIVE PRECAUTIONS  Goal: Patient will remain free of nosocomial Infections  Outcome: Ongoing  Pt compliant with protective precautions, remains in private room. Pt, staff and visitors adhere to handwashing protocol. Pt verbalizes understanding of low microbial diet. Pt instructed to wear mask while in halls.      Problem: Nausea/Vomiting:  Goal: Absence of nausea/vomiting  Absence of nausea/vomiting   Outcome: Ongoing  No c/o nausea so far this shift. Educated on nausea control measures. Verbalized understanding.      Problem: Discharge Planning:  Goal: Discharged to appropriate level of care  Discharged to appropriate level of care   Outcome: Ongoing  Pt updated on and agreeable to plan of care.      Problem: Pain:  Goal: Pain level will decrease  Pain level will decrease   Outcome: Ongoing  Pt able to appropriately rate pain on scale of 0-10. C/o pain in throat, PRN cepacol given per [facility] with moderate relief per pt. Educated on pain control measures, verbalized understanding.  Will monitor.     Electronically signed by Kim Gonzalez RN on 2/17/2019 at 5:20 PM

## 2019-02-17 NOTE — PLAN OF CARE
Problem: Falls - Risk of:  Goal: Will remain free from falls  Will remain free from falls   Outcome: Ongoing  Orthostatic vital signs obtained at start of shift - see flowsheet for details. Pt does not meet criteria for orthostasis. Pt is a Med fall risk. See Marlon Speed Fall Score and ABCDS Injury Risk assessments. - Screening for Orthostasis AND not a [place] Risk per AYERS/ABCDS: Pt bed is in low position, side rails up, call light and belongings are in reach. Fall risk light is on outside pts room. Pt encouraged to call for assistance as needed. Will continue with hourly rounds for PO intake, pain needs, toileting and repositioning as needed. Problem: Venous Thromboembolism:  Goal: Absence of signs or symptoms of impaired coagulation  Absence of signs or symptoms of impaired coagulation   Outcome: Ongoing  Adherent with DVT Prevention: Pt is at risk for DVT d/t decreased mobility and cancer treatment.  Pt educated on importance of activity.  Pt has orders for SCDs while in bed.  Pt verbalizes understanding of need for prophylaxis while inpatient.        Problem: Bleeding:  Goal: Will show no signs and symptoms of excessive bleeding  Will show no signs and symptoms of excessive bleeding   Outcome: Ongoing  Patient's hemoglobin this AM:   Recent Labs      02/17/19   0337   HGB  8.8*     Patient's platelet count this AM:   Recent Labs      02/17/19   0337   PLT  33*    Thrombocytopenia Precautions in place. Patient showing no signs or symptoms of active bleeding. Transfusion not indicated at this time. Patient verbalizes understanding of all instructions. Will continue to assess and implement POC. Call light within reach and hourly rounding in place. Problem: Infection - Central Venous Catheter-Associated Bloodstream Infection:  Goal: Will show no infection signs and symptoms  Will show no infection signs and symptoms   Outcome: Ongoing  Pt currently in a private, positive pressure room.   Educated pt on wearing a mask when neutropenic and/or leaving the floor. No living plants or flowers allowed. Also reinforced importance of hand hygiene. Pt following a low microbial diet. Problem: Nausea/Vomiting:  Goal: Absence of nausea/vomiting  Absence of nausea/vomiting   Outcome: Ongoing  Pt has not complained of any N/V during this shift. Will continue to monitor. Problem: Bowel/Gastric:  Goal: Ability to achieve a regular elimination pattern will improve  Ability to achieve a regular elimination pattern will improve   Outcome: Ongoing      Problem: Discharge Planning:  Goal: Discharged to appropriate level of care  Discharged to appropriate level of care   Outcome: Ongoing  Pt has been updated on POC and has no questions at this time. Will continue to assess. Problem: Pain:  Goal: Pain level will decrease  Pain level will decrease   Outcome: Ongoing  Pt has complained of pain upon swallowing. Medicated pt with cepacol throat lozenges. Will continue to assess.

## 2019-02-17 NOTE — PROGRESS NOTES
[facility]  Autologous Progress Note    2019    Giovanna Grubbs    :  1955    MRN:  [id number]    Referring MD: Amy Aleman MD  [address], [address] [facility]    Subjective:  Mucositis issues better, no new issues; feeling better day by day     ECOG PS:  (1) Restricted in physically strenuous activity, ambulatory and able to do work of light nature    Isolation:  None     Medications    Scheduled Meds:   cetirizine  10 mg Oral Daily    lisinopril  20 mg Oral Daily    famotidine  20 mg Oral BID    levofloxacin  500 mg Oral Daily    Tbo-Filgrastim  300 mcg Subcutaneous QPM    fluconazole  400 mg Oral Daily    Saline Mouthwash  15 mL Swish & Spit 4x Daily AC & HS    sodium chloride flush  10 mL Intravenous 2 times per day    valACYclovir  500 mg Oral BID     Continuous Infusions:   sodium chloride      dextrose       PRN Meds:.benzocaine-menthol, potassium phosphate IVPB, loperamide, magic (miracle) mouthwash, ondansetron, ondansetron, calcium carbonate, polyethylene glycol, sodium chloride, alteplase, magnesium hydroxide, magnesium sulfate, potassium chloride, Saline Mouthwash, glucose, dextrose, glucagon (rDNA), dextrose, prochlorperazine **OR** prochlorperazine, LORazepam **OR** LORazepam    ROS:  As noted above, otherwise remainder of 10-point ROS negative    Physical Exam:     I&O:      Intake/Output Summary (Last 24 hours) at 19 1152  Last data filed at 19 1115   Gross per 24 hour   Intake             2551 ml   Output             3800 ml   Net            -1249 ml       Vital Signs:  /61   Pulse 87   Temp 98.5 °F (36.9 °C) (Oral)   Resp 18   Ht 5' 1\" (1.549 m)   Wt 177 lb 9.6 oz (80.6 kg)   SpO2 94%   BMI 33.56 kg/m²     Weight:    Wt Readings from Last 3 Encounters:   19 177 lb 9.6 oz (80.6 kg)   19 180 lb (81.6 kg)   18 178 lb 5.6 oz (80.9 kg)       General: Awake, alert and oriented.   HEENT: normocephalic, alopecia, PERRL, no scleral erythema or icterus, Oral mucosa moist and intact, throat clear  NECK: supple without palpable adenopathy  BACK: Straight negative CVAT  SKIN: warm dry and intact without lesions rashes or masses  CHEST: CTA bilaterally without use of accessory muscles  CV: Normal S1 S2, RRR, no MRG  ABD: NT ND normoactive BS, no palpable masses or hepatosplenomegaly  EXTREMITIES: without edema, healing abrasion on left calf, denies calf tenderness  NEURO: CN II - XII grossly intact  CATHETER: Left SC Trifusion (1/24/19, Barrat) - CDI w/ open skin tears from bandage    Data:   CBC:   Recent Labs      02/15/19   0400  02/16/19   0330  02/17/19   0337   WBC  0.1*  0.2*  0.6*   HGB  8.8*  8.7*  8.8*   HCT  26.5*  25.6*  25.8*   MCV  86.6  86.1  86.1   PLT  18*  8*  33*     BMP/Mag:  Recent Labs      02/15/19   0400  02/16/19   0330  02/17/19   0337   NA  144  140  142   K  3.9  4.1  4.0   CL  112*  108  108   CO2  23  23  23   PHOS  1.7*  1.7*  2.4*   BUN  6*  5*  6*   CREATININE  0.6  <0.5*  0.6     LIVP:   Recent Labs      02/15/19   0400   AST  10*   ALT  21   BILIDIR  <0.2   BILITOT  0.3   ALKPHOS  49     Uric Acid:    Recent Labs      02/17/19   0337   LABURIC  1.7*     Coags:   No results for input(s): PROTIME, INR, APTT in the last 72 hours. PROBLEM LIST:            1. IgG Lambda Multiple Myeloma, R-ISS Stage I  2. HTN  3. HLD  4. Type 2 DM  5. GERD  6. Tobacco Use (quit 12/2018)    Post - Transplant Complications:  1. Nausea / Indigestion / Diarrhea  2. Hypophosphatemia & hypokalemia  3. Mucositis        TREATMENT:            1.  RVd x 5 cycles  2. High dose Melpahlan & ASCT (2/7/19) - 2.71x10^6 [id number]/kg     ASSESSMENT AND PLAN:            1. IgG Lambda Multiple Myeloma:  She is currently in VGPR  - S/p high dose Melpahlan (200 mg/m^2) and ASCT on 2/7/19      Day +10     2.   ID:  Afebrile, no evidence of infection.    - Levaquin, Diflucan & Valtrex, cont acyclovir at discharge for VZV positive titer.      3. Heme:  Pancytopenia from chemotherapy   - Transfuse for Hgb < 7 and Platelets < 95E   - No transfusion today  - Cont Granix daily     4. Metabolic: stable e-lytes and renal fxn except for hypoPhos  - S/p KCl 20 meq bid (stopped 2/12/19) w/ nausea and indigestion  - IVF:  D5.45NS + KCl 30 meq @ 75 mL/hr - stop today  - Replace Phos, potassium and magnesium per policy.     5.  GI / Nutrition:  Appetite and oral intake is good  Nutrition:  - Cont low microbial diet   - Follow closely with dietary  - Encourage supplements and small meals  Diarrhea:  Chemo induced and improved  - C. Diff (2/11/19) - Negative  - Cont Imodium as needed  Nausea /  Indigestion:  Improved  - Cont Pepcid bid, TUMS prn  - Cont Zofran, Compazine and Ativan prn   Mucositis:  D/t chemotherapy   - Cont MMW prn     6. Type 2 DM: hyperglycemia d/t steroids has resolved, blood sugars are well controlled   - Hold oral diabetes medications  - S/p Lispro SSI, cont to check glucose twice daily     7.  Cardiac: Stable  HLD:  Stable  - Hold Crestor while inpatient, resume on d/c  HTN:  Stable  - Hold HCTZ while inpatient  - S/p Norvasc 10 mg daily (stopped 2/12/19)   - Cont Lisinopril 20 mg daily (decreased 2/12/19) d/t mild hypotension.       8. Bone Health:  Stable  - Resume Calcium and Vit D on d/c  - Resume bisphosphonate after discharged     - DVT Prophylaxis: Platelets <64,776 cells/dL - prophylactic lovenox on hold and mechanical prophylaxis with bilateral SCDs while in bed in place. Contraindications to pharmacologic prophylaxis: Thrombocytopenia  Contraindications to mechanical prophylaxis: None      - Disposition:  Next 1-3 days      Iván Leonardo DO, MS  Oncology/Hematology Care    Please contact via:  1. [employer]  2.   Cell Phone:  [phone]    2/17/2019   11:52 AM

## 2019-02-18 ENCOUNTER — APPOINTMENT (OUTPATIENT)
Dept: GENERAL RADIOLOGY | Age: 64
DRG: 016 | End: 2019-02-18
Attending: INTERNAL MEDICINE
Payer: COMMERCIAL

## 2019-02-18 VITALS
HEART RATE: 99 BPM | WEIGHT: 176.4 LBS | SYSTOLIC BLOOD PRESSURE: 121 MMHG | DIASTOLIC BLOOD PRESSURE: 63 MMHG | BODY MASS INDEX: 33.3 KG/M2 | TEMPERATURE: 98.7 F | RESPIRATION RATE: 16 BRPM | HEIGHT: 61 IN | OXYGEN SATURATION: 95 %

## 2019-02-18 LAB
ALBUMIN SERPL-MCNC: 3.3 G/DL (ref 3.4–5)
ALP BLD-CCNC: 53 U/L (ref 40–129)
ALT SERPL-CCNC: 29 U/L (ref 10–40)
ANION GAP SERPL CALCULATED.3IONS-SCNC: 9 MMOL/L (ref 3–16)
APTT: 30.3 SEC (ref 26–36)
AST SERPL-CCNC: 15 U/L (ref 15–37)
BANDED NEUTROPHILS RELATIVE PERCENT: 19 % (ref 0–7)
BASOPHILS ABSOLUTE: 0 K/UL (ref 0–0.2)
BASOPHILS RELATIVE PERCENT: 0 %
BILIRUB SERPL-MCNC: 0.3 MG/DL (ref 0–1)
BILIRUBIN DIRECT: <0.2 MG/DL (ref 0–0.3)
BILIRUBIN URINE: NEGATIVE
BILIRUBIN, INDIRECT: ABNORMAL MG/DL (ref 0–1)
BLOOD, URINE: NEGATIVE
BUN BLDV-MCNC: 6 MG/DL (ref 7–20)
CALCIUM SERPL-MCNC: 8.4 MG/DL (ref 8.3–10.6)
CHLORIDE BLD-SCNC: 104 MMOL/L (ref 99–110)
CLARITY: CLEAR
CO2: 25 MMOL/L (ref 21–32)
COLOR: YELLOW
CREAT SERPL-MCNC: 0.7 MG/DL (ref 0.6–1.2)
DOHLE BODIES: PRESENT
EOSINOPHILS ABSOLUTE: 0 K/UL (ref 0–0.6)
EOSINOPHILS RELATIVE PERCENT: 0 %
GFR AFRICAN AMERICAN: >60
GFR NON-AFRICAN AMERICAN: >60
GLUCOSE BLD-MCNC: 88 MG/DL (ref 70–99)
GLUCOSE URINE: NEGATIVE MG/DL
HCT VFR BLD CALC: 27.1 % (ref 36–48)
HEMOGLOBIN: 9.4 G/DL (ref 12–16)
INR BLD: 1.18 (ref 0.86–1.14)
KETONES, URINE: NEGATIVE MG/DL
LACTATE DEHYDROGENASE: 143 U/L (ref 100–190)
LEUKOCYTE ESTERASE, URINE: NEGATIVE
LYMPHOCYTES ABSOLUTE: 0.7 K/UL (ref 1–5.1)
LYMPHOCYTES RELATIVE PERCENT: 30 %
MAGNESIUM: 2 MG/DL (ref 1.8–2.4)
MCH RBC QN AUTO: 29.8 PG (ref 26–34)
MCHC RBC AUTO-ENTMCNC: 34.6 G/DL (ref 31–36)
MCV RBC AUTO: 86 FL (ref 80–100)
MICROSCOPIC EXAMINATION: NORMAL
MONOCYTES ABSOLUTE: 0.2 K/UL (ref 0–1.3)
MONOCYTES RELATIVE PERCENT: 9 %
NEUTROPHILS ABSOLUTE: 1.5 K/UL (ref 1.7–7.7)
NEUTROPHILS RELATIVE PERCENT: 42 %
NITRITE, URINE: NEGATIVE
PDW BLD-RTO: 16.9 % (ref 12.4–15.4)
PH UA: 6.5
PHOSPHORUS: 2.8 MG/DL (ref 2.5–4.9)
PLATELET # BLD: 34 K/UL (ref 135–450)
PMV BLD AUTO: 8.7 FL (ref 5–10.5)
POTASSIUM SERPL-SCNC: 3.5 MMOL/L (ref 3.5–5.1)
PROTEIN UA: NEGATIVE MG/DL
PROTHROMBIN TIME: 13.5 SEC (ref 9.8–13)
RBC # BLD: 3.14 M/UL (ref 4–5.2)
SODIUM BLD-SCNC: 138 MMOL/L (ref 136–145)
SPECIFIC GRAVITY UA: <=1.005
TOTAL PROTEIN: 5.5 G/DL (ref 6.4–8.2)
TOXIC GRANULATION: PRESENT
URIC ACID, SERUM: 2.4 MG/DL (ref 2.6–6)
URINE TYPE: NORMAL
UROBILINOGEN, URINE: 0.2 E.U./DL
WBC # BLD: 2.4 K/UL (ref 4–11)

## 2019-02-18 PROCEDURE — 85025 COMPLETE CBC W/AUTO DIFF WBC: CPT

## 2019-02-18 PROCEDURE — 84100 ASSAY OF PHOSPHORUS: CPT

## 2019-02-18 PROCEDURE — 83735 ASSAY OF MAGNESIUM: CPT

## 2019-02-18 PROCEDURE — 81003 URINALYSIS AUTO W/O SCOPE: CPT

## 2019-02-18 PROCEDURE — 2580000003 HC RX 258: Performed by: NURSE PRACTITIONER

## 2019-02-18 PROCEDURE — 6370000000 HC RX 637 (ALT 250 FOR IP): Performed by: NURSE PRACTITIONER

## 2019-02-18 PROCEDURE — 80076 HEPATIC FUNCTION PANEL: CPT

## 2019-02-18 PROCEDURE — 83615 LACTATE (LD) (LDH) ENZYME: CPT

## 2019-02-18 PROCEDURE — 85610 PROTHROMBIN TIME: CPT

## 2019-02-18 PROCEDURE — 84550 ASSAY OF BLOOD/URIC ACID: CPT

## 2019-02-18 PROCEDURE — 36592 COLLECT BLOOD FROM PICC: CPT

## 2019-02-18 PROCEDURE — 6360000002 HC RX W HCPCS: Performed by: NURSE PRACTITIONER

## 2019-02-18 PROCEDURE — 85730 THROMBOPLASTIN TIME PARTIAL: CPT

## 2019-02-18 PROCEDURE — 80048 BASIC METABOLIC PNL TOTAL CA: CPT

## 2019-02-18 RX ORDER — ACYCLOVIR 800 MG/1
800 TABLET ORAL 2 TIMES DAILY
Qty: 60 TABLET | Refills: 11 | Status: SHIPPED | OUTPATIENT
Start: 2019-02-18 | End: 2022-09-18 | Stop reason: CLARIF

## 2019-02-18 RX ORDER — CALCIUM CARBONATE 200(500)MG
1000 TABLET,CHEWABLE ORAL 3 TIMES DAILY PRN
COMMUNITY
Start: 2019-02-18 | End: 2019-03-20

## 2019-02-18 RX ORDER — LISINOPRIL 20 MG/1
20 TABLET ORAL DAILY
Qty: 30 TABLET | Refills: 3 | Status: ON HOLD | OUTPATIENT
Start: 2019-02-18 | End: 2019-06-19 | Stop reason: HOSPADM

## 2019-02-18 RX ORDER — LOPERAMIDE HYDROCHLORIDE 2 MG/1
2 CAPSULE ORAL PRN
COMMUNITY
Start: 2019-02-18 | End: 2019-02-28

## 2019-02-18 RX ORDER — FAMOTIDINE 20 MG/1
20 TABLET, FILM COATED ORAL 2 TIMES DAILY
Qty: 60 TABLET | Refills: 3 | Status: SHIPPED | OUTPATIENT
Start: 2019-02-18 | End: 2020-01-18

## 2019-02-18 RX ORDER — POTASSIUM CHLORIDE 20 MEQ/1
40 TABLET, EXTENDED RELEASE ORAL ONCE
Status: COMPLETED | OUTPATIENT
Start: 2019-02-18 | End: 2019-02-18

## 2019-02-18 RX ORDER — HEPARIN SODIUM (PORCINE) LOCK FLUSH IV SOLN 100 UNIT/ML 100 UNIT/ML
1500 SOLUTION INTRAVENOUS ONCE
Status: COMPLETED | OUTPATIENT
Start: 2019-02-18 | End: 2019-02-18

## 2019-02-18 RX ADMIN — POTASSIUM CHLORIDE 40 MEQ: 20 TABLET, EXTENDED RELEASE ORAL at 07:30

## 2019-02-18 RX ADMIN — LISINOPRIL 20 MG: 20 TABLET ORAL at 08:14

## 2019-02-18 RX ADMIN — SODIUM CHLORIDE 15 ML: 900 IRRIGANT IRRIGATION at 07:31

## 2019-02-18 RX ADMIN — TBO-FILGRASTIM 300 MCG: 300 INJECTION, SOLUTION SUBCUTANEOUS at 10:41

## 2019-02-18 RX ADMIN — VALACYCLOVIR HYDROCHLORIDE 500 MG: 500 TABLET, FILM COATED ORAL at 08:14

## 2019-02-18 RX ADMIN — Medication 10 ML: at 08:14

## 2019-02-18 RX ADMIN — CETIRIZINE HYDROCHLORIDE 10 MG: 10 TABLET, FILM COATED ORAL at 08:14

## 2019-02-18 RX ADMIN — FAMOTIDINE 20 MG: 20 TABLET, FILM COATED ORAL at 08:14

## 2019-02-18 RX ADMIN — Medication 1500 UNITS: at 10:40

## 2019-02-18 ASSESSMENT — PAIN SCALES - GENERAL
PAINLEVEL_OUTOF10: 0

## 2019-02-18 NOTE — PLAN OF CARE
Problem: Falls - Risk of:  Goal: Will remain free from falls  Will remain free from falls   Outcome: Ongoing  Orthostatic vital signs obtained at start of shift - see flowsheet for details.  Pt does not meet criteria for orthostasis.  Pt is a Med fall risk. See [place] Fall Score and ABCDS Injury Risk assessments. - Screening for Orthostasis AND not a [place] Risk per AYERS/ABCDS: Pt bed is in low position, side rails up, call light and belongings are in reach.  Fall risk light is on outside pts room.  Pt encouraged to call for assistance as needed. Will continue with hourly rounds for PO intake, pain needs, toileting and repositioning as needed.      Problem: Venous Thromboembolism:  Goal: Will show no signs or symptoms of venous thromboembolism  Will show no signs or symptoms of venous thromboembolism   Outcome: Ongoing  No s/s venous thromboembolism. Pt educated on s/s and instructed to notify RN immediately if s/s noted. Verbalized understanding. Will monitor.        Problem: Bleeding:  Goal: Will show no signs and symptoms of excessive bleeding  Will show no signs and symptoms of excessive bleeding   Outcome: Ongoing  Patient's hemoglobin this AM:   Recent Labs      02/18/19   0340   HGB  9.4*     Patient's platelet count this AM:   Recent Labs      02/18/19   0340   PLT  34*    Thrombocytopenia Precautions in place. Patient showing no signs or symptoms of active bleeding. Transfusion not indicated at this time. Patient verbalizes understanding of all instructions. Will continue to assess and implement POC. Call light within reach and hourly rounding in place.      Problem: Infection - Central Venous Catheter-Associated Bloodstream Infection:  Goal: Will show no infection signs and symptoms  Will show no infection signs and symptoms   Outcome: Ongoing  Pt afebrile. Trifusion in place; site and dressing remain c/d/i. Lines flush well with good blood return; Tegaderm and Biopatch in place.  Lines pinned per protocol. Will continue to monitor. CVC site remains free of signs/symptoms of infection. No drainage, edema, erythema, pain, or warmth noted at site. Dressing changes continue per protocol and on an as needed basis - see flowsheet.      Compliant with [facility] Bath Protocol:  Performed CHG bath today per [facility] protocol utilizing CHG solution in the shower. [address] [facility] site cleansed with CHG wipe over dressing, skin surrounding dressing, and first 6\" of IV tubing.  Pt tolerated well.  Continued to encourage daily CHG bathing per [facility] protocol.      Problem: PROTECTIVE PRECAUTIONS  Goal: Patient will remain free of nosocomial Infections  Outcome: Ongoing  Pt compliant with protective precautions, remains in private room. Pt, staff and visitors adhere to handwashing protocol. Pt verbalizes understanding of low microbial diet. Pt instructed to wear mask while in halls.      Problem: Nausea/Vomiting:  Goal: Absence of nausea/vomiting  Absence of nausea/vomiting   Outcome: Ongoing  No c/o nausea so far this shift. Educated on nausea control measures. Verbalized understanding.      Problem: Discharge Planning:  Goal: Discharged to appropriate level of care  Discharged to appropriate level of care   Outcome: Ongoing  Pt updated on and agreeable to plan of care.      Problem: Pain:  Goal: Pain level will decrease  Pain level will decrease   Outcome: Ongoing  Pt able to appropriately rate pain on scale of 0-10. C/o pain in throat, PRN cepacol given per [facility] with moderate relief per pt. Educated on pain control measures, verbalized understanding. Will monitor.

## 2019-02-18 NOTE — FLOWSHEET NOTE
02/18/19 1146   Encounter Summary   Services provided to: Patient and family together   Referral/Consult From: Rounding   Continue Visiting (es 2/18)   Complexity of Encounter Moderate   Length of Encounter 15 minutes   Spiritual/Voodoo   Type Spiritual support   Assessment Approachable   Intervention Active listening;Prayer   Outcome Receptive;Engaged in conversation

## 2019-02-18 NOTE — DISCHARGE SUMMARY
History:     This is a 79-year-old female who was diagnosed w/ IgG Lambda Multiple Myeloma (R-ISS Stage I) in July 2018. Her past medical history is also significant for HTN, HLD & Type 2 DM. Her baseline myeloma labs when diagnosed (7/16/18) showed M-spike 2.47, IgG 3560, Hgb 13.2, SCr 0.54 & Ca 9.2. Serum free light chains (7/23/18) showed kappa 1.46, lambda 13.7 w/ ratio 0.107. Her B2MG 2.6. PET scan (8/1/18) was negative, but BM bx/asp (8/2/18) showed 60% cellular marrow w/ 60% plasma cells. The cytogenetics were normal, but FISH was abnormal w/ hyperdiploidy 5, 9, 15 and del 13q, del 17, IgH heavy chain rearranged and dup 1q. She began treatment w/ RVd under the care of Dr. Ahsan Willard on 8/26/19. She has tolerated chemotherapy well except for conjunctivitis. She has completed 5 cycles of RVd (last cycle 12/19/18) and achieved a VGPR. Hospital Course:     She was admitted (2/5/19) for high dose Melphalan and autologous stem cell rescue. She received 2.71 x10^6 fm47qsnhe/kg in 490 mL on 2/7/19. She did very well during her transplant hospitalization. She did develop nausea, indigestion and diarrhea, but she continued to eat and drink well. The nausea and indigestion was managed w/ prn antiemetics and Pepcid. She required a couple doses of Imodium to manage the diarrhea. She also developed mild mucositis that was managed w/ soft foods and magic mouthwash. She never developed neutropenic fever. Her WBC count recovered on Granix and she received her last dose prior to discharge. She will be discharged home today and return return to [facility] on 2/20/19 for labs (CBC w/ diff, CMP, Mag & Phos) and provider visit.        Physical Exam:     Vital Signs:  /65   Pulse 84   Temp 97.9 °F (36.6 °C) (Oral)   Resp 18   Ht 5' 1\" (1.549 m)   Wt 177 lb 9.6 oz (80.6 kg)   SpO2 90%   BMI 33.56 kg/m²     Weight:    Wt Readings from Last 3 Encounters:   02/17/19 177 lb 9.6 oz (80.6 kg)   01/24/19 180 lb (81.6 kg)   12/31/18 178 lb 5.6 oz (80.9 kg)       General: Awake, alert and oriented. HEENT: normocephalic, alopecia, PERRL, no scleral erythema or icterus, Oral mucosa moist and intact, throat clear  NECK: supple without palpable adenopathy  BACK: Straight negative CVAT  SKIN: warm dry and intact without lesions rashes or masses  CHEST: CTA bilaterally without use of accessory muscles  CV: Normal S1 S2, RRR, no MRG  ABD: NT ND normoactive BS, no palpable masses or hepatosplenomegaly  EXTREMITIES: without edema, denies calf tenderness  NEURO: CN II - XII grossly intact  CATHETER: Left SC Trifusion (1/24/19, Barrat) - CDI     Discharge Laboratory Data:  CBC:   Recent Labs      02/16/19 0330 02/17/19 0337 02/18/19 0340   WBC  0.2*  0.6*  2.4*   HGB  8.7*  8.8*  9.4*   HCT  25.6*  25.8*  27.1*   MCV  86.1  86.1  86.0   PLT  8*  33*  34*     BMP/Mag:  Recent Labs      02/16/19 0330 02/17/19 0337 02/18/19 0340   NA  140  142  138   K  4.1  4.0  3.5   CL  108  108  104   CO2  23  23  25   PHOS  1.7*  2.4*  2.8   BUN  5*  6*  6*   CREATININE  <0.5*  0.6  0.7   MG   --    --   2.00     LIVP:   Recent Labs      02/18/19 0340   AST  15   ALT  29   BILIDIR  <0.2   BILITOT  0.3   ALKPHOS  53     Coags:   Recent Labs      02/18/19 0340   PROTIME  13.5*   INR  1.18*   APTT  30.3     Uric Acid   Recent Labs      02/16/19 0330 02/17/19 0337 02/18/19 0340   LABURIC  1.6*  1.7*  2.4*       PROBLEM LIST:            1.  IgG Lambda Multiple Myeloma, R-ISS Stage I  2.  HTN  3.  HLD  4.  Type 2 DM  5.  GERD  6.  Tobacco Use (quit 12/2018)     Post - Transplant Complications:  1. Nausea / Indigestion / Diarrhea  2. Hypophosphatemia & hypokalemia  3.   Mucositis        TREATMENT:            1.  RVd x 5 cycles  2.  High dose Melpahlan & ASCT (2/7/19) - 2.71x10^6 [id number]/kg     ASSESSMENT AND PLAN:            1.  IgG Lambda Multiple Myeloma:  She is currently in VGPR  - S/p high dose Melpahlan (200 mg/m^2) and ASCT on 2/7/19      Day + 11     2.  ID: Dahlia Cheema, no evidence of infection.    - Cont acyclovir at discharge for VZV positive titer.      3.  Heme:  Pancytopenia from chemotherapy   - Transfuse for Hgb < 7 and Platelets < 50S   - No transfusion today  - Last Granix 1/74/71     4.  Metabolic: stable e-lytes and renal fxn except for hypoPhos  - Resume KCl 20 meq bid   - KCl 40 meq x 1 prior to d/c    5.  GI / Nutrition:  Appetite and oral intake is good  Nutrition:  - Cont low microbial diet   Diarrhea:  Chemo induced and improved  - C. Diff (2/11/19) - Negative  - Cont Imodium as needed  Nausea /  Indigestion:  Improved  - Cont Pepcid bid, TUMS prn  - Cont Compazine prn   Mucositis:  D/t chemotherapy, resolved     6.  Type 2 DM: blood sugars are well controlled   - Hold oral diabetes medications  - Keep log of glucose and restart oral meds when indicated by outpt team     7.  Cardiac: Stable  HLD:  Stable  - Resume Crestor   HTN:  Stable  - S/p HCTZ 12.5 mg daily (stopped on admit) - consider restarting once stable as outpatient  - S/p Norvasc 10 mg daily (stopped 2/12/19) - may need to resume as outpt  - Cont Lisinopril 20 mg daily      8.  Bone Health:  Stable  - Resume Calcium and Vit D   - Resume bisphosphonate after discharge     Condition on discharge:  Stable    Discharge Instructions:  Return to [facility] on Wednesday (2/20/19) for labs (CBC w/ diff, CMP, Mag & Phos) and provider visit. The patient was advised on activity and dietary restrictions. The patient was advised to follow up in the emergency department or contact the physician with any unresolved nausea/vomiting/diarrhea/pain or temperature greater than 100.5 F or any other unusual symptoms. This discharge summary and plan was discussed and agreed upon with Dr. Kaye Frey.     Gilbert Frank, CNP

## 2019-02-18 NOTE — CARE COORDINATION
Type of Admission  Multiple Myeloma IgG  Melphalan Auto SCT (T:0--->2/7/19)  Day 11    Central venous catheter  Left SC Tri Fusion (1/24/19, Dr. Ivana Fernandes)    Plan  Melphalan Auto SCT for treatment of Multiple Myeloma, and stay through recovery    Update  2/5/19: Planned admission for Melphalan Auto SCT. Multiple family members presents during admission process. 2/6: Tolerating chemo well. 2/14: Doing well, with only mild throat discomfort, which does not affect her diet very much, she states. 2/18: ANC 1.5, afebrile, eating well. Discharged. Education  2/5/19:  Patient and caregiver have been through the educational process for autologus bone marrow transplantation including preadmission teaching and preadmission physician office visit with Sophia Carney RN [facility] Coordinator   Patient and caregiver verbalize understanding of treatment regimen, possible adverse events, length of stay, and risk and benefits of transplantation and are agreeable to proceed. Patient and caregiver have been given an opportunity to ask, and to have their questions answered to their satisfaction. Documentation for above can be found in the Oncology Hematology [address] office chart. 2/5/19:  Reviewed infusion of stem cells, approximate length of time, pre-medication, telemetry during infusion. Discussed criteria for discharge & day+5 begin daily Granix until [address] recovers. 2/6: Asked questions about process on Day 0, so discussed that, as well as the importance of activity while here and discharge criteria. 2/14: Offered encouragement, and discussed usual time of wbc recovery, hopefully early next week. Answered questions about activities at home and assured her more information will follow for her and her family as she nears discharge. 2/18: Reviewed discharge instructions with patient and  daughter, Lesly Ramirez. Reviewed discharge medications including dosing, schedule, indication, and adverse reactions.   Reviewed which medications were already taken today and next dosage due for each medication. Reviewed signs and symptoms that prompt a call to the physician and appropriate phone numbers. [facility] card given to the patient with explanations of its use. Reviewed follow up appointments that have been made in [facility]. Low microbial diet, activity restrictions, and increased risk of infection were reviewed. Patient verbalized understanding of all instructions and questions were answered to their. satisfaction. Signed discharge instructions were given to the patient and a copy placed in the paper-lite chart. Patient discharged to home per self with caregiver. Discharge  Expect discharge to home in [facility] with sister and daughter as caregivers.

## 2019-02-18 NOTE — PROGRESS NOTES
Per William Agarwal RN, reviewed discharge instructions with patient and  family members. Reviewed discharge medications including dosing, schedule, indication, and adverse reactions. Reviewed which medications were already taken today and next dosage due for each medication. Reviewed signs and symptoms that prompt a call to the physician and appropriate phone numbers. [facility] card given to the patient with explanations of its use. Reviewed follow up appointments that have been made in [facility]. Low microbial diet, activity restrictions, and increased risk of infection were reviewed. Patient is being discharged with IV access d/t need for ongoing therapy:      Type:  L Trifusion                        Plan:continue   Next dressing change due on: 2/25/19  Cap changes due on: 2/25/19  CVC care and maintenance was reviewed with patient and family members. Pt verbalizes understanding of line care and maintenance. Patient verbalized understanding of all instructions and questions were answered to her. satisfaction. Signed discharge instructions were given to the patient and a copy placed in the paper-lite chart. Patient discharged to home per self with family members.       Pavan Mock

## 2019-03-06 ENCOUNTER — TELEPHONE (OUTPATIENT)
Dept: SURGERY | Age: 64
End: 2019-03-06

## 2019-03-13 ENCOUNTER — PROCEDURE VISIT (OUTPATIENT)
Dept: SURGERY | Age: 64
End: 2019-03-13
Payer: COMMERCIAL

## 2019-03-13 VITALS
HEIGHT: 61 IN | WEIGHT: 176 LBS | DIASTOLIC BLOOD PRESSURE: 78 MMHG | SYSTOLIC BLOOD PRESSURE: 124 MMHG | BODY MASS INDEX: 33.23 KG/M2 | RESPIRATION RATE: 16 BRPM

## 2019-03-13 DIAGNOSIS — C90.01 MULTIPLE MYELOMA IN REMISSION (HCC): Primary | ICD-10-CM

## 2019-03-13 PROCEDURE — 36589 REMOVAL TUNNELED CV CATH: CPT | Performed by: SURGERY

## 2019-06-11 ENCOUNTER — APPOINTMENT (OUTPATIENT)
Dept: GENERAL RADIOLOGY | Age: 64
DRG: 233 | End: 2019-06-11
Payer: COMMERCIAL

## 2019-06-11 ENCOUNTER — APPOINTMENT (OUTPATIENT)
Dept: CT IMAGING | Age: 64
DRG: 233 | End: 2019-06-11
Payer: COMMERCIAL

## 2019-06-11 ENCOUNTER — HOSPITAL ENCOUNTER (INPATIENT)
Age: 64
LOS: 8 days | Discharge: HOME HEALTH | DRG: 233 | End: 2019-06-19
Attending: EMERGENCY MEDICINE | Admitting: INTERNAL MEDICINE
Payer: COMMERCIAL

## 2019-06-11 DIAGNOSIS — I21.4 NSTEMI (NON-ST ELEVATED MYOCARDIAL INFARCTION) (HCC): Primary | ICD-10-CM

## 2019-06-11 DIAGNOSIS — G89.18 ACUTE POSTOPERATIVE PAIN: ICD-10-CM

## 2019-06-11 DIAGNOSIS — Z95.1 S/P CORONARY ARTERY BYPASS GRAFT X 4: ICD-10-CM

## 2019-06-11 PROBLEM — R07.9 CHEST PAIN: Status: ACTIVE | Noted: 2019-06-11

## 2019-06-11 LAB
A/G RATIO: 2.2 (ref 1.1–2.2)
ABO/RH: NORMAL
ALBUMIN SERPL-MCNC: 3.7 G/DL (ref 3.4–5)
ALP BLD-CCNC: 42 U/L (ref 40–129)
ALT SERPL-CCNC: 25 U/L (ref 10–40)
ANION GAP SERPL CALCULATED.3IONS-SCNC: 12 MMOL/L (ref 3–16)
ANION GAP SERPL CALCULATED.3IONS-SCNC: 13 MMOL/L (ref 3–16)
ANTI-XA UNFRAC HEPARIN: 1.41 IU/ML (ref 0.3–0.7)
ANTIBODY SCREEN: NORMAL
APTT: 32 SEC (ref 26–36)
APTT: 91.1 SEC (ref 26–36)
AST SERPL-CCNC: 72 U/L (ref 15–37)
BASOPHILS ABSOLUTE: 0.1 K/UL (ref 0–0.2)
BASOPHILS RELATIVE PERCENT: 1.5 %
BILIRUB SERPL-MCNC: <0.2 MG/DL (ref 0–1)
BILIRUBIN DIRECT: <0.2 MG/DL (ref 0–0.3)
BILIRUBIN, INDIRECT: NORMAL MG/DL (ref 0–1)
BUN BLDV-MCNC: 11 MG/DL (ref 7–20)
BUN BLDV-MCNC: 12 MG/DL (ref 7–20)
CALCIUM SERPL-MCNC: 10.1 MG/DL (ref 8.3–10.6)
CALCIUM SERPL-MCNC: 8.7 MG/DL (ref 8.3–10.6)
CHLORIDE BLD-SCNC: 105 MMOL/L (ref 99–110)
CHLORIDE BLD-SCNC: 105 MMOL/L (ref 99–110)
CO2: 22 MMOL/L (ref 21–32)
CO2: 25 MMOL/L (ref 21–32)
CREAT SERPL-MCNC: 0.7 MG/DL (ref 0.6–1.2)
CREAT SERPL-MCNC: 0.8 MG/DL (ref 0.6–1.2)
EKG ATRIAL RATE: 58 BPM
EKG ATRIAL RATE: 61 BPM
EKG DIAGNOSIS: NORMAL
EKG DIAGNOSIS: NORMAL
EKG P AXIS: 50 DEGREES
EKG P AXIS: 56 DEGREES
EKG P-R INTERVAL: 196 MS
EKG P-R INTERVAL: 208 MS
EKG Q-T INTERVAL: 424 MS
EKG Q-T INTERVAL: 426 MS
EKG QRS DURATION: 108 MS
EKG QRS DURATION: 110 MS
EKG QTC CALCULATION (BAZETT): 418 MS
EKG QTC CALCULATION (BAZETT): 426 MS
EKG R AXIS: -18 DEGREES
EKG R AXIS: -20 DEGREES
EKG T AXIS: 14 DEGREES
EKG T AXIS: 37 DEGREES
EKG VENTRICULAR RATE: 58 BPM
EKG VENTRICULAR RATE: 61 BPM
EOSINOPHILS ABSOLUTE: 0 K/UL (ref 0–0.6)
EOSINOPHILS RELATIVE PERCENT: 0.9 %
FIBRINOGEN: 240 MG/DL (ref 200–397)
GFR AFRICAN AMERICAN: >60
GFR AFRICAN AMERICAN: >60
GFR NON-AFRICAN AMERICAN: >60
GFR NON-AFRICAN AMERICAN: >60
GLOBULIN: 1.7 G/DL
GLUCOSE BLD-MCNC: 101 MG/DL (ref 70–99)
GLUCOSE BLD-MCNC: 91 MG/DL (ref 70–99)
GLUCOSE BLD-MCNC: 95 MG/DL (ref 70–99)
HCT VFR BLD CALC: 33.6 % (ref 36–48)
HCT VFR BLD CALC: 39.5 % (ref 36–48)
HEMOGLOBIN: 11.1 G/DL (ref 12–16)
HEMOGLOBIN: 12.9 G/DL (ref 12–16)
INR BLD: 1.01 (ref 0.86–1.14)
INR BLD: 1.16 (ref 0.86–1.14)
LACTIC ACID: 1 MMOL/L (ref 0.4–2)
LEFT VENTRICULAR EJECTION FRACTION MODE: NORMAL
LV EF: 60 %
LYMPHOCYTES ABSOLUTE: 0.6 K/UL (ref 1–5.1)
LYMPHOCYTES RELATIVE PERCENT: 12.2 %
MAGNESIUM: 1.7 MG/DL (ref 1.8–2.4)
MCH RBC QN AUTO: 28.4 PG (ref 26–34)
MCH RBC QN AUTO: 28.5 PG (ref 26–34)
MCHC RBC AUTO-ENTMCNC: 32.5 G/DL (ref 31–36)
MCHC RBC AUTO-ENTMCNC: 33.1 G/DL (ref 31–36)
MCV RBC AUTO: 86 FL (ref 80–100)
MCV RBC AUTO: 87.7 FL (ref 80–100)
MONOCYTES ABSOLUTE: 0.5 K/UL (ref 0–1.3)
MONOCYTES RELATIVE PERCENT: 9.9 %
NEUTROPHILS ABSOLUTE: 4 K/UL (ref 1.7–7.7)
NEUTROPHILS RELATIVE PERCENT: 75.5 %
PDW BLD-RTO: 14.3 % (ref 12.4–15.4)
PDW BLD-RTO: 14.5 % (ref 12.4–15.4)
PERFORMED ON: NORMAL
PLATELET # BLD: 242 K/UL (ref 135–450)
PLATELET # BLD: 262 K/UL (ref 135–450)
PMV BLD AUTO: 8 FL (ref 5–10.5)
PMV BLD AUTO: 8.2 FL (ref 5–10.5)
POC ACT LR: 231 SEC
POC ACT LR: 240 SEC
POTASSIUM REFLEX MAGNESIUM: 3.7 MMOL/L (ref 3.5–5.1)
POTASSIUM SERPL-SCNC: 3.4 MMOL/L (ref 3.5–5.1)
PRO-BNP: 70 PG/ML (ref 0–124)
PROTHROMBIN TIME: 11.5 SEC (ref 9.8–13)
PROTHROMBIN TIME: 13.2 SEC (ref 9.8–13)
RBC # BLD: 3.91 M/UL (ref 4–5.2)
RBC # BLD: 4.51 M/UL (ref 4–5.2)
SODIUM BLD-SCNC: 139 MMOL/L (ref 136–145)
SODIUM BLD-SCNC: 143 MMOL/L (ref 136–145)
TOTAL PROTEIN: 5.4 G/DL (ref 6.4–8.2)
TROPONIN: 0.25 NG/ML
TROPONIN: 0.38 NG/ML
WBC # BLD: 4.3 K/UL (ref 4–11)
WBC # BLD: 5.2 K/UL (ref 4–11)

## 2019-06-11 PROCEDURE — 71275 CT ANGIOGRAPHY CHEST: CPT

## 2019-06-11 PROCEDURE — 6360000004 HC RX CONTRAST MEDICATION: Performed by: EMERGENCY MEDICINE

## 2019-06-11 PROCEDURE — 6370000000 HC RX 637 (ALT 250 FOR IP)

## 2019-06-11 PROCEDURE — 6360000002 HC RX W HCPCS

## 2019-06-11 PROCEDURE — 85384 FIBRINOGEN ACTIVITY: CPT

## 2019-06-11 PROCEDURE — 83880 ASSAY OF NATRIURETIC PEPTIDE: CPT

## 2019-06-11 PROCEDURE — C1894 INTRO/SHEATH, NON-LASER: HCPCS

## 2019-06-11 PROCEDURE — C1769 GUIDE WIRE: HCPCS

## 2019-06-11 PROCEDURE — 2709999900 HC NON-CHARGEABLE SUPPLY

## 2019-06-11 PROCEDURE — 83036 HEMOGLOBIN GLYCOSYLATED A1C: CPT

## 2019-06-11 PROCEDURE — 6370000000 HC RX 637 (ALT 250 FOR IP): Performed by: STUDENT IN AN ORGANIZED HEALTH CARE EDUCATION/TRAINING PROGRAM

## 2019-06-11 PROCEDURE — 80053 COMPREHEN METABOLIC PANEL: CPT

## 2019-06-11 PROCEDURE — 99153 MOD SED SAME PHYS/QHP EA: CPT

## 2019-06-11 PROCEDURE — 36415 COLL VENOUS BLD VENIPUNCTURE: CPT

## 2019-06-11 PROCEDURE — 83605 ASSAY OF LACTIC ACID: CPT

## 2019-06-11 PROCEDURE — 6370000000 HC RX 637 (ALT 250 FOR IP): Performed by: INTERNAL MEDICINE

## 2019-06-11 PROCEDURE — 2580000003 HC RX 258: Performed by: INTERNAL MEDICINE

## 2019-06-11 PROCEDURE — 84484 ASSAY OF TROPONIN QUANT: CPT

## 2019-06-11 PROCEDURE — 85347 COAGULATION TIME ACTIVATED: CPT

## 2019-06-11 PROCEDURE — 6360000002 HC RX W HCPCS: Performed by: PHYSICIAN ASSISTANT

## 2019-06-11 PROCEDURE — 85027 COMPLETE CBC AUTOMATED: CPT

## 2019-06-11 PROCEDURE — 6360000002 HC RX W HCPCS: Performed by: STUDENT IN AN ORGANIZED HEALTH CARE EDUCATION/TRAINING PROGRAM

## 2019-06-11 PROCEDURE — 2500000003 HC RX 250 WO HCPCS

## 2019-06-11 PROCEDURE — 85730 THROMBOPLASTIN TIME PARTIAL: CPT

## 2019-06-11 PROCEDURE — 93458 L HRT ARTERY/VENTRICLE ANGIO: CPT

## 2019-06-11 PROCEDURE — 85520 HEPARIN ASSAY: CPT

## 2019-06-11 PROCEDURE — 99285 EMERGENCY DEPT VISIT HI MDM: CPT

## 2019-06-11 PROCEDURE — 92920 PRQ TRLUML C ANGIOP 1ART&/BR: CPT

## 2019-06-11 PROCEDURE — 92920 PRQ TRLUML C ANGIOP 1ART&/BR: CPT | Performed by: INTERNAL MEDICINE

## 2019-06-11 PROCEDURE — 2580000003 HC RX 258

## 2019-06-11 PROCEDURE — 82248 BILIRUBIN DIRECT: CPT

## 2019-06-11 PROCEDURE — 85025 COMPLETE CBC W/AUTO DIFF WBC: CPT

## 2019-06-11 PROCEDURE — C1760 CLOSURE DEV, VASC: HCPCS

## 2019-06-11 PROCEDURE — 86901 BLOOD TYPING SEROLOGIC RH(D): CPT

## 2019-06-11 PROCEDURE — 4A023N7 MEASUREMENT OF CARDIAC SAMPLING AND PRESSURE, LEFT HEART, PERCUTANEOUS APPROACH: ICD-10-PCS | Performed by: INTERNAL MEDICINE

## 2019-06-11 PROCEDURE — 83735 ASSAY OF MAGNESIUM: CPT

## 2019-06-11 PROCEDURE — 85610 PROTHROMBIN TIME: CPT

## 2019-06-11 PROCEDURE — 2000000000 HC ICU R&B

## 2019-06-11 PROCEDURE — 99152 MOD SED SAME PHYS/QHP 5/>YRS: CPT

## 2019-06-11 PROCEDURE — 6360000004 HC RX CONTRAST MEDICATION: Performed by: INTERNAL MEDICINE

## 2019-06-11 PROCEDURE — 71046 X-RAY EXAM CHEST 2 VIEWS: CPT

## 2019-06-11 PROCEDURE — 93458 L HRT ARTERY/VENTRICLE ANGIO: CPT | Performed by: INTERNAL MEDICINE

## 2019-06-11 PROCEDURE — 99223 1ST HOSP IP/OBS HIGH 75: CPT | Performed by: INTERNAL MEDICINE

## 2019-06-11 PROCEDURE — C1725 CATH, TRANSLUMIN NON-LASER: HCPCS

## 2019-06-11 PROCEDURE — B2111ZZ FLUOROSCOPY OF MULTIPLE CORONARY ARTERIES USING LOW OSMOLAR CONTRAST: ICD-10-PCS | Performed by: INTERNAL MEDICINE

## 2019-06-11 PROCEDURE — 86900 BLOOD TYPING SEROLOGIC ABO: CPT

## 2019-06-11 PROCEDURE — 93005 ELECTROCARDIOGRAM TRACING: CPT | Performed by: EMERGENCY MEDICINE

## 2019-06-11 PROCEDURE — C1887 CATHETER, GUIDING: HCPCS

## 2019-06-11 PROCEDURE — 80061 LIPID PANEL: CPT

## 2019-06-11 PROCEDURE — 6370000000 HC RX 637 (ALT 250 FOR IP): Performed by: PHYSICIAN ASSISTANT

## 2019-06-11 PROCEDURE — 86850 RBC ANTIBODY SCREEN: CPT

## 2019-06-11 PROCEDURE — 93005 ELECTROCARDIOGRAM TRACING: CPT | Performed by: PHYSICIAN ASSISTANT

## 2019-06-11 RX ORDER — MORPHINE SULFATE 2 MG/ML
1 INJECTION, SOLUTION INTRAMUSCULAR; INTRAVENOUS EVERY 4 HOURS PRN
Status: DISCONTINUED | OUTPATIENT
Start: 2019-06-11 | End: 2019-06-13

## 2019-06-11 RX ORDER — ATROPINE SULFATE 0.4 MG/ML
0.5 AMPUL (ML) INJECTION
Status: DISCONTINUED | OUTPATIENT
Start: 2019-06-11 | End: 2019-06-11

## 2019-06-11 RX ORDER — SODIUM CHLORIDE 9 MG/ML
INJECTION, SOLUTION INTRAVENOUS CONTINUOUS
Status: DISCONTINUED | OUTPATIENT
Start: 2019-06-11 | End: 2019-06-11

## 2019-06-11 RX ORDER — ROSUVASTATIN CALCIUM 20 MG/1
20 TABLET, COATED ORAL NIGHTLY
Status: DISCONTINUED | OUTPATIENT
Start: 2019-06-11 | End: 2019-06-13

## 2019-06-11 RX ORDER — ASPIRIN 81 MG/1
81 TABLET, CHEWABLE ORAL DAILY
Status: DISCONTINUED | OUTPATIENT
Start: 2019-06-12 | End: 2019-06-11

## 2019-06-11 RX ORDER — NITROGLYCERIN 0.4 MG/1
0.4 TABLET SUBLINGUAL EVERY 5 MIN PRN
Status: DISCONTINUED | OUTPATIENT
Start: 2019-06-11 | End: 2019-06-13

## 2019-06-11 RX ORDER — ONDANSETRON 2 MG/ML
4 INJECTION INTRAMUSCULAR; INTRAVENOUS EVERY 6 HOURS PRN
Status: DISCONTINUED | OUTPATIENT
Start: 2019-06-11 | End: 2019-06-13

## 2019-06-11 RX ORDER — ROSUVASTATIN CALCIUM 20 MG/1
20 TABLET, COATED ORAL EVERY EVENING
COMMUNITY
End: 2019-07-08 | Stop reason: SDUPTHER

## 2019-06-11 RX ORDER — SODIUM CHLORIDE 0.9 % (FLUSH) 0.9 %
10 SYRINGE (ML) INJECTION EVERY 12 HOURS SCHEDULED
Status: DISCONTINUED | OUTPATIENT
Start: 2019-06-11 | End: 2019-06-11

## 2019-06-11 RX ORDER — MORPHINE SULFATE 2 MG/ML
2 INJECTION, SOLUTION INTRAMUSCULAR; INTRAVENOUS ONCE
Status: DISCONTINUED | OUTPATIENT
Start: 2019-06-11 | End: 2019-06-13

## 2019-06-11 RX ORDER — NITROGLYCERIN 0.4 MG/1
0.4 TABLET SUBLINGUAL EVERY 5 MIN PRN
Status: DISCONTINUED | OUTPATIENT
Start: 2019-06-11 | End: 2019-06-11

## 2019-06-11 RX ORDER — SODIUM CHLORIDE 0.9 % (FLUSH) 0.9 %
10 SYRINGE (ML) INJECTION PRN
Status: DISCONTINUED | OUTPATIENT
Start: 2019-06-11 | End: 2019-06-13

## 2019-06-11 RX ORDER — ZOLEDRONIC ACID 4 MG/5ML
4 INJECTION INTRAVENOUS
COMMUNITY

## 2019-06-11 RX ORDER — ATROPINE SULFATE 1 MG/ML
0.5 INJECTION, SOLUTION INTRAMUSCULAR; INTRAVENOUS; SUBCUTANEOUS
Status: DISPENSED | OUTPATIENT
Start: 2019-06-11 | End: 2019-06-11

## 2019-06-11 RX ORDER — SODIUM CHLORIDE 0.9 % (FLUSH) 0.9 %
10 SYRINGE (ML) INJECTION PRN
Status: DISCONTINUED | OUTPATIENT
Start: 2019-06-11 | End: 2019-06-11

## 2019-06-11 RX ORDER — SODIUM CHLORIDE 0.9 % (FLUSH) 0.9 %
10 SYRINGE (ML) INJECTION EVERY 12 HOURS SCHEDULED
Status: DISCONTINUED | OUTPATIENT
Start: 2019-06-11 | End: 2019-06-13

## 2019-06-11 RX ORDER — MORPHINE SULFATE 2 MG/ML
2 INJECTION, SOLUTION INTRAMUSCULAR; INTRAVENOUS
Status: ACTIVE | OUTPATIENT
Start: 2019-06-11 | End: 2019-06-11

## 2019-06-11 RX ORDER — HEPARIN SODIUM 5000 [USP'U]/ML
2000 INJECTION, SOLUTION INTRAVENOUS; SUBCUTANEOUS PRN
Status: DISCONTINUED | OUTPATIENT
Start: 2019-06-11 | End: 2019-06-11

## 2019-06-11 RX ORDER — ONDANSETRON 2 MG/ML
4 INJECTION INTRAMUSCULAR; INTRAVENOUS EVERY 6 HOURS PRN
Status: DISCONTINUED | OUTPATIENT
Start: 2019-06-11 | End: 2019-06-11

## 2019-06-11 RX ORDER — SODIUM CHLORIDE 9 MG/ML
INJECTION, SOLUTION INTRAVENOUS CONTINUOUS
Status: DISCONTINUED | OUTPATIENT
Start: 2019-06-11 | End: 2019-06-13

## 2019-06-11 RX ORDER — FAMOTIDINE 20 MG/1
20 TABLET, FILM COATED ORAL 2 TIMES DAILY
Status: DISCONTINUED | OUTPATIENT
Start: 2019-06-11 | End: 2019-06-13

## 2019-06-11 RX ORDER — LISINOPRIL 20 MG/1
20 TABLET ORAL DAILY
Status: DISCONTINUED | OUTPATIENT
Start: 2019-06-12 | End: 2019-06-12

## 2019-06-11 RX ORDER — LENALIDOMIDE 25 MG/1
25 CAPSULE ORAL DAILY
Status: DISCONTINUED | OUTPATIENT
Start: 2019-06-12 | End: 2019-06-12

## 2019-06-11 RX ORDER — HEPARIN SODIUM 5000 [USP'U]/ML
4000 INJECTION, SOLUTION INTRAVENOUS; SUBCUTANEOUS PRN
Status: DISCONTINUED | OUTPATIENT
Start: 2019-06-11 | End: 2019-06-11

## 2019-06-11 RX ORDER — ASPIRIN 325 MG
325 TABLET ORAL DAILY
Status: DISCONTINUED | OUTPATIENT
Start: 2019-06-12 | End: 2019-06-13

## 2019-06-11 RX ORDER — ACETAMINOPHEN 325 MG/1
650 TABLET ORAL EVERY 4 HOURS PRN
Status: DISCONTINUED | OUTPATIENT
Start: 2019-06-11 | End: 2019-06-13

## 2019-06-11 RX ORDER — AMLODIPINE BESYLATE 10 MG/1
10 TABLET ORAL DAILY
Status: ON HOLD | COMMUNITY
Start: 2019-06-08 | End: 2019-06-19 | Stop reason: HOSPADM

## 2019-06-11 RX ORDER — SODIUM CHLORIDE 9 MG/ML
INJECTION, SOLUTION INTRAVENOUS CONTINUOUS
Status: ACTIVE | OUTPATIENT
Start: 2019-06-11 | End: 2019-06-12

## 2019-06-11 RX ORDER — LENALIDOMIDE 10 MG/1
10 CAPSULE ORAL EVERY EVENING
Status: ON HOLD | COMMUNITY
End: 2019-06-19 | Stop reason: HOSPADM

## 2019-06-11 RX ORDER — ATORVASTATIN CALCIUM 40 MG/1
40 TABLET, FILM COATED ORAL NIGHTLY
Status: DISCONTINUED | OUTPATIENT
Start: 2019-06-11 | End: 2019-06-11

## 2019-06-11 RX ORDER — NICOTINE POLACRILEX 4 MG
15 LOZENGE BUCCAL PRN
Status: DISCONTINUED | OUTPATIENT
Start: 2019-06-11 | End: 2019-06-13

## 2019-06-11 RX ORDER — ASPIRIN 81 MG/1
324 TABLET, CHEWABLE ORAL ONCE
Status: COMPLETED | OUTPATIENT
Start: 2019-06-11 | End: 2019-06-11

## 2019-06-11 RX ORDER — AMLODIPINE BESYLATE 10 MG/1
10 TABLET ORAL DAILY
Status: DISCONTINUED | OUTPATIENT
Start: 2019-06-12 | End: 2019-06-12

## 2019-06-11 RX ORDER — LENALIDOMIDE 25 MG/1
25 CAPSULE ORAL
COMMUNITY
Start: 2018-10-03 | End: 2019-06-11

## 2019-06-11 RX ORDER — DEXTROSE MONOHYDRATE 25 G/50ML
12.5 INJECTION, SOLUTION INTRAVENOUS PRN
Status: DISCONTINUED | OUTPATIENT
Start: 2019-06-11 | End: 2019-06-13

## 2019-06-11 RX ORDER — ASPIRIN 325 MG
1 TABLET ORAL EVERY EVENING
Status: ON HOLD | COMMUNITY
Start: 2018-08-09 | End: 2019-06-19 | Stop reason: HOSPADM

## 2019-06-11 RX ORDER — HEPARIN SODIUM 10000 [USP'U]/100ML
12 INJECTION, SOLUTION INTRAVENOUS CONTINUOUS
Status: DISCONTINUED | OUTPATIENT
Start: 2019-06-11 | End: 2019-06-11

## 2019-06-11 RX ORDER — HEPARIN SODIUM 5000 [USP'U]/ML
4000 INJECTION, SOLUTION INTRAVENOUS; SUBCUTANEOUS ONCE
Status: COMPLETED | OUTPATIENT
Start: 2019-06-11 | End: 2019-06-11

## 2019-06-11 RX ORDER — DEXTROSE MONOHYDRATE 50 MG/ML
100 INJECTION, SOLUTION INTRAVENOUS PRN
Status: DISCONTINUED | OUTPATIENT
Start: 2019-06-11 | End: 2019-06-13

## 2019-06-11 RX ADMIN — HEPARIN SODIUM 4000 UNITS: 5000 INJECTION, SOLUTION INTRAVENOUS; SUBCUTANEOUS at 16:27

## 2019-06-11 RX ADMIN — NITROGLYCERIN 0.4 MG: 0.4 TABLET, ORALLY DISINTEGRATING SUBLINGUAL at 14:06

## 2019-06-11 RX ADMIN — TIROFIBAN 0.15 MCG/KG/MIN: 5 INJECTION, SOLUTION INTRAVENOUS at 19:25

## 2019-06-11 RX ADMIN — ASPIRIN 81 MG 324 MG: 81 TABLET ORAL at 14:06

## 2019-06-11 RX ADMIN — IOHEXOL 300 ML: 350 INJECTION, SOLUTION INTRAVENOUS at 18:16

## 2019-06-11 RX ADMIN — HEPARIN SODIUM AND DEXTROSE 12 UNITS/KG/HR: 10000; 5 INJECTION INTRAVENOUS at 16:27

## 2019-06-11 RX ADMIN — FAMOTIDINE 20 MG: 20 TABLET, FILM COATED ORAL at 21:35

## 2019-06-11 RX ADMIN — METOPROLOL TARTRATE 25 MG: 25 TABLET ORAL at 22:00

## 2019-06-11 RX ADMIN — ROSUVASTATIN CALCIUM 20 MG: 20 TABLET, COATED ORAL at 22:17

## 2019-06-11 RX ADMIN — SODIUM CHLORIDE: 9 INJECTION, SOLUTION INTRAVENOUS at 19:19

## 2019-06-11 RX ADMIN — Medication 10 ML: at 21:35

## 2019-06-11 RX ADMIN — IOHEXOL 50 ML: 300 INJECTION, SOLUTION INTRAVENOUS at 17:43

## 2019-06-11 RX ADMIN — IOPAMIDOL 80 ML: 755 INJECTION, SOLUTION INTRAVENOUS at 15:09

## 2019-06-11 ASSESSMENT — PAIN DESCRIPTION - DIRECTION: RADIATING_TOWARDS: MID BACK

## 2019-06-11 ASSESSMENT — PAIN DESCRIPTION - ORIENTATION: ORIENTATION: LOWER

## 2019-06-11 ASSESSMENT — ENCOUNTER SYMPTOMS
BACK PAIN: 1
NAUSEA: 0
COUGH: 0
DIARRHEA: 0
SHORTNESS OF BREATH: 0
WHEEZING: 0
VOMITING: 0
ABDOMINAL PAIN: 0
NAUSEA: 1

## 2019-06-11 ASSESSMENT — PAIN SCALES - GENERAL
PAINLEVEL_OUTOF10: 5
PAINLEVEL_OUTOF10: 7
PAINLEVEL_OUTOF10: 5
PAINLEVEL_OUTOF10: 2

## 2019-06-11 ASSESSMENT — PAIN - FUNCTIONAL ASSESSMENT
PAIN_FUNCTIONAL_ASSESSMENT: ACTIVITIES ARE NOT PREVENTED
PAIN_FUNCTIONAL_ASSESSMENT: 0-10

## 2019-06-11 ASSESSMENT — PAIN DESCRIPTION - LOCATION: LOCATION: BACK

## 2019-06-11 ASSESSMENT — PAIN DESCRIPTION - ONSET: ONSET: ON-GOING

## 2019-06-11 ASSESSMENT — HEART SCORE: ECG: 0

## 2019-06-11 ASSESSMENT — PAIN DESCRIPTION - DESCRIPTORS: DESCRIPTORS: ACHING

## 2019-06-11 ASSESSMENT — PAIN DESCRIPTION - PAIN TYPE
TYPE: ACUTE PAIN
TYPE: ACUTE PAIN

## 2019-06-11 ASSESSMENT — PAIN DESCRIPTION - PROGRESSION: CLINICAL_PROGRESSION: GRADUALLY IMPROVING

## 2019-06-11 ASSESSMENT — PAIN DESCRIPTION - FREQUENCY: FREQUENCY: CONTINUOUS

## 2019-06-11 NOTE — ED NOTES
Pt resting in bed, 2/2 side rails up, fall precautions in place per [place] fall scale. Pt appears comfortable, no distress noted at this time. IV intact, no complications noted. Dressing clean, dry and intact. Denies any needs at this time. Call light in reach.        Tremaine Hannon RN  06/11/19 1100

## 2019-06-11 NOTE — H&P
Addendum to the Resident H& P .  Pt seen,examined and evaluated at bed side with the  resident. I have reviewed the current history, physical findings, labs and assessment and plan and agree with note as documented . Admitted for mid sternal cp rad to lower back, with elevated troponin , nsr on ekg, likley nstemi  On hep drip, asa, lipitor, mtpll, cardio called from ed, awaits response   MM on leflunamide,   Dm ty 2 on diet control, low sliding   Ct PA neg for PE, ? Infiltrate, check procal    Plan d/w pt/family and RN at bed side. Dispo: will admit , plan of care per H& P.    Giovanny Neal M.D

## 2019-06-11 NOTE — H&P
Hyperlipidemia     Hypertension      Past Surgical History:   Procedure Laterality Date    HYSTERECTOMY      partial for fibroids    INSERTION / REMOVAL / REPLACEMENT VENOUS ACCESS CATHETER N/A 2019    TRIFUSION CATHETER INSERTION LEFT SUBCLAVIAN performed by Vel Mahajan MD at [address] [place] [address] History     Substance and Sexual Activity   Alcohol Use Yes    Comment: rare     Social History     Substance and Sexual Activity   Drug Use No     Social History     Tobacco Use   Smoking Status Former Smoker    Packs/day: 0.50    Years: 20.00    Pack years: 10.00    Types: Cigarettes    Last attempt to quit: 2018    Years since quittin.5   Smokeless Tobacco Never Used     FMHx:  No family history on file. No hx of CAD, Cancer, DM  MEDICATIONS:     No current facility-administered medications on file prior to encounter.       Current Outpatient Medications on File Prior to Encounter   Medication Sig Dispense Refill    aspirin 325 MG tablet Take 1 tablet by mouth every evening       amLODIPine (NORVASC) 10 MG tablet Take 10 mg by mouth daily       rosuvastatin (CRESTOR) 20 MG tablet Take 20 mg by mouth every evening      lenalidomide (REVLIMID) 10 MG chemo capsule Take 10 mg by mouth every evening      zoledronic acid (ZOMETA) 4 MG/5ML injection Infuse 4 mg intravenously every 3 months      acyclovir (ZOVIRAX) 800 MG tablet Take 1 tablet by mouth 2 times daily 60 tablet 11    famotidine (PEPCID) 20 MG tablet Take 1 tablet by mouth 2 times daily 60 tablet 3    lisinopril (PRINIVIL;ZESTRIL) 20 MG tablet Take 1 tablet by mouth daily 30 tablet 3    potassium chloride (KLOR-CON) 10 MEQ extended release tablet TK 2 TS PO BID,  3    Cholecalciferol (VITAMIN D3) 1000 units CAPS Take 1 capsule by mouth 2 times daily       Calcium Carbonate-Vitamin D (CALCIUM-CARB 600 + D) 600-125 MG-UNIT TABS Take 1 tablet by mouth 2 times daily       prochlorperazine (COMPAZINE) 10 MG Gustavo Nella in the last 72 hours. Invalid input(s): Marimar Cardenas  Rad: CTPA negative for pulmonary embolism   EKG: No evidence of acute ischemia. ASSESSMENT AND PLAN:   Cathryn Solis is a 66 y.o. female w/ a PMH of Multiple Myeloma s/p stem cell transplant, HTN, HLD, Former Smoker who presents with chest pain. Patient was admitted for NSTEMI. NSTEMI- Patient p/w constant substernal chest pain with radiation to back. Troponin had upward trend (0.25 then 0.38 after 90 mins). ECG showed NSR without acute ST changes. CTPA negative for acute PE. HEART score of 3.  -Cardiac cath per Dr. Loya Course showing evidence of triple-vessel disease; needs CABG   -Cardiothoracic surgery consulted   -Diet NPO after midnight   -Tirofiban infusion   - mg PO daily  -Lisinopril 20mg PO daily  -Rosuvastatin 20mg PO daily  -amlodipine 10mg PO daily     H/o multiple myeloma s/p stem cell transplant- Patient was diagnosed last July. Had stem cell transplant in Feb. Currently on revlimid for maintenance.  -Hold revlimid 25mg per Dr. Edrick Canavan, patient's oncologist, because it's a procoagulant.     HTN  -lisinopril 20mg  -amlodipine 10mg PO daily     HLD  -rosuvastatin 20mg     Code Status: Full code   FEN: NPO after midnight   PPX: Tirofiban   DISPO: ICU for further management as above.    This patient has been staffed and discussed with Dr. Breanna Stout MD.   -----------------------------  Chepe Delong M.D.   PGY-1 Internal Medicine  [facility]  6/11/2019 6:30 PM

## 2019-06-11 NOTE — CONSULTS
coordination, mood, affect, memory, mentation, behavior. · Psychiatric: No anxiety,  · Endocrine: No malaise,  · Hematologic/Lymphatic: No abnormal bruising  · Allergic/Immunologic: No nasal congestion      Physical Examination:    Vitals:    06/11/19 1612   BP:    Pulse:    Resp:    Temp:    SpO2: 93%    Weight: 178 lb (80.7 kg)         General Appearance:  Alert, cooperative, no distress, appears stated age   Head:  Normocephalic, without obvious abnormality, atraumatic   Eyes:  PERRL   Nose: Nares normal,   Neck: Supple, JVP normal    Lungs:   Clear to auscultation bilaterally, respirations unlabored   Chest Wall:  No tenderness or deformity   Heart:  Regular rate and rhythm, normal S1, S2 normal, no murmur, ,  No rub. No S3 / S4 gallop   Abdomen:   Soft, non-tender, +bowel sounds   Extremities: no cyanosis, no clubbing , no LE edema   Pulses: Symmetric extremities   Skin:  no gross lesions or rashes   Pysch: Normal mood and affect   Neurologic: No gross deficits. CN II - XII grossly intact        Labs  CBC:   Lab Results   Component Value Date    WBC 4.3 06/11/2019    RBC 3.91 06/11/2019    HGB 11.1 06/11/2019    HCT 33.6 06/11/2019    MCV 86.0 06/11/2019    RDW 14.3 06/11/2019     06/11/2019     CMP:    Lab Results   Component Value Date     06/11/2019    K 3.7 06/11/2019     06/11/2019    CO2 25 06/11/2019    BUN 12 06/11/2019    CREATININE 0.8 06/11/2019    GFRAA >60 06/11/2019    AGRATIO 1.6 01/30/2019    LABGLOM >60 06/11/2019    GLUCOSE 101 06/11/2019    PROT 5.5 02/18/2019    CALCIUM 10.1 06/11/2019    BILITOT 0.3 02/18/2019    ALKPHOS 53 02/18/2019    AST 15 02/18/2019    ALT 29 02/18/2019     PT/INR:  No results found for: PTINR  Recent Labs     06/11/19  1345 06/11/19  1526   TROPONINI 0.25* 0.38*       EKG:  SR and poor R wave progression with NSST/T wave changes.     Assessment  Patient Active Problem List   Diagnosis    Multiple myeloma not having achieved remission ([facility] [address]   

## 2019-06-11 NOTE — PROGRESS NOTES
1845- admission     Patient admitted to room 4514, post cardiac cath , patient settled in room , handoff completed with cath lab nurse, pulses + in alessandra DP and PT, right groin soft no hematoma noted

## 2019-06-11 NOTE — ED PROVIDER NOTES
ED Attending Attestation Note     Date of evaluation: 6/11/2019    This patient was seen by the advance practice provider. I have seen and examined the patient, agree with the workup, evaluation, management and diagnosis. The care plan has been discussed. I have reviewed the ECG and concur with the PRECIOUS's interpretation. My assessment reveals a 65 y/o F who p/w chest pain    Location: midsternal  Onset: earlier today  Quality: \"dull\" (denied tightness, pressure, stabbing)  Radiation: none  Severity: 7/10 now   Exacerbating or relieving factors: patient has not tried anything for the pain   Associated factors: denied associated n/v/diaphoresis/SOA    Takes ASA 325mg daily, last took this last night     Heart Score Total: 3    6874 spoke to Dr. Kya Meneses (PROCOAGULANT)    Patient found to have elevated troponins. Serial EKGs did not show any acute ST-T wave changes, otherwise unchanged from her baseline. Spoke to cardiology who also discussed with hematology/oncology. Patient taken to Cath Lab for cardiac cath. Critical Care:  Due to the immediate potential for life-threatening deterioration due to NSTEMI, I spent 40 minutes providing critical care. This time excludes time spent performing procedures but includes time spent on direct patient care, history retrieval, review of the chart, and discussions with patient, family, and consultant(s).            Kimberly Theodore MD  06/18/19 4807
normal heart sounds. Pulmonary/Chest: Effort normal and breath sounds normal. No stridor. No respiratory distress. She has no wheezes. She has no rales. She exhibits no tenderness. Abdominal: Soft. Bowel sounds are normal. She exhibits no distension. There is no tenderness. There is no rebound and no guarding. Musculoskeletal: She exhibits no edema or tenderness (no calf tenderness). Neurological: She is alert and oriented to person, place, and time. Skin: Skin is warm and dry. She is not diaphoretic. Psychiatric: She has a normal mood and affect. Her behavior is normal.   Nursing note and vitals reviewed. Diagnostic Results     EKG   Interpreted in conjunction with emergencydepartment physician Ruben Cunningham MD  Rhythm: normal sinus   Rate: normal  Axis: normal  Ectopy: none  Conduction: normal  ST Segments: normal  T Darlen Primmer in  v1  Q Waves: none  Clinical Impression: NSR without acute ST changes  Comparison:  02/05/2019    RADIOLOGY:  CTA PULMONARY W CONTRAST   Final Result         1. No CT evidence of a filling defect to indicate acute pulmonary embolism. 2. Patchy bilateral ground glass opacities. These may be reactive airway disease. Infection is in the differential. Follow-up CT can be obtained to document resolution. 3. Stable expansile lucent lesion in the left T12 pedicle without FDG uptake. It is indeterminate. XR CHEST STANDARD (2 VW)   Final Result      No radiographic evidence of an acute pulmonary process.                 LABS:   Results for orders placed or performed during the hospital encounter of 06/11/19   CBC Auto Differential   Result Value Ref Range    WBC 5.2 4.0 - 11.0 K/uL    RBC 4.51 4.00 - 5.20 M/uL    Hemoglobin 12.9 12.0 - 16.0 g/dL    Hematocrit 39.5 36.0 - 48.0 %    MCV 87.7 80.0 - 100.0 fL    MCH 28.5 26.0 - 34.0 pg    MCHC 32.5 31.0 - 36.0 g/dL    RDW 14.5 12.4 - 15.4 %    Platelets 097 670 - 046 K/uL    MPV 8.0 5.0 - 10.5 fL    Neutrophils % 75.5

## 2019-06-11 NOTE — ED NOTES
Patient report given to Kylah Andrade at [id number]. Patient will be taken to cath lab per Dr. Kaylen Carrillo. Cath lab here to transfer her.        Gayle Farley RN  06/11/19 8052

## 2019-06-12 ENCOUNTER — APPOINTMENT (OUTPATIENT)
Dept: VASCULAR LAB | Age: 64
DRG: 233 | End: 2019-06-12
Payer: COMMERCIAL

## 2019-06-12 ENCOUNTER — ANESTHESIA EVENT (OUTPATIENT)
Dept: OPERATING ROOM | Age: 64
DRG: 233 | End: 2019-06-12
Payer: COMMERCIAL

## 2019-06-12 ENCOUNTER — APPOINTMENT (OUTPATIENT)
Dept: GENERAL RADIOLOGY | Age: 64
DRG: 233 | End: 2019-06-12
Payer: COMMERCIAL

## 2019-06-12 PROBLEM — E11.9 TYPE 2 DIABETES MELLITUS (HCC): Chronic | Status: ACTIVE | Noted: 2019-06-12

## 2019-06-12 PROBLEM — I10 HTN (HYPERTENSION): Chronic | Status: ACTIVE | Noted: 2019-06-12

## 2019-06-12 PROBLEM — E78.5 HYPERLIPIDEMIA: Chronic | Status: ACTIVE | Noted: 2019-06-12

## 2019-06-12 LAB
ANION GAP SERPL CALCULATED.3IONS-SCNC: 10 MMOL/L (ref 3–16)
BACTERIA: ABNORMAL /HPF
BILIRUBIN URINE: NEGATIVE
BLOOD, URINE: ABNORMAL
BUN BLDV-MCNC: 11 MG/DL (ref 7–20)
CALCIUM SERPL-MCNC: 8.8 MG/DL (ref 8.3–10.6)
CHLORIDE BLD-SCNC: 109 MMOL/L (ref 99–110)
CHOLESTEROL, TOTAL: 138 MG/DL (ref 0–199)
CHOLESTEROL, TOTAL: 150 MG/DL (ref 0–199)
CLARITY: CLEAR
CO2: 24 MMOL/L (ref 21–32)
COLOR: YELLOW
CREAT SERPL-MCNC: 0.7 MG/DL (ref 0.6–1.2)
EKG ATRIAL RATE: 56 BPM
EKG DIAGNOSIS: NORMAL
EKG P AXIS: 53 DEGREES
EKG P-R INTERVAL: 196 MS
EKG Q-T INTERVAL: 454 MS
EKG QRS DURATION: 96 MS
EKG QTC CALCULATION (BAZETT): 438 MS
EKG R AXIS: -23 DEGREES
EKG T AXIS: 29 DEGREES
EKG VENTRICULAR RATE: 56 BPM
EPITHELIAL CELLS, UA: ABNORMAL /HPF
ESTIMATED AVERAGE GLUCOSE: 119.8 MG/DL
ESTIMATED AVERAGE GLUCOSE: 119.8 MG/DL
GFR AFRICAN AMERICAN: >60
GFR NON-AFRICAN AMERICAN: >60
GLUCOSE BLD-MCNC: 105 MG/DL (ref 70–99)
GLUCOSE BLD-MCNC: 114 MG/DL (ref 70–99)
GLUCOSE BLD-MCNC: 116 MG/DL (ref 70–99)
GLUCOSE BLD-MCNC: 134 MG/DL (ref 70–99)
GLUCOSE BLD-MCNC: 92 MG/DL (ref 70–99)
GLUCOSE URINE: 100 MG/DL
HBA1C MFR BLD: 5.8 %
HBA1C MFR BLD: 5.8 %
HCT VFR BLD CALC: 34.9 % (ref 36–48)
HDLC SERPL-MCNC: 56 MG/DL (ref 40–60)
HDLC SERPL-MCNC: 57 MG/DL (ref 40–60)
HEMOGLOBIN: 11.5 G/DL (ref 12–16)
KETONES, URINE: NEGATIVE MG/DL
LDL CHOLESTEROL CALCULATED: 72 MG/DL
LDL CHOLESTEROL CALCULATED: 76 MG/DL
LEUKOCYTE ESTERASE, URINE: ABNORMAL
LV EF: 58 %
LVEF MODALITY: NORMAL
MAGNESIUM: 1.9 MG/DL (ref 1.8–2.4)
MCH RBC QN AUTO: 28.6 PG (ref 26–34)
MCHC RBC AUTO-ENTMCNC: 32.9 G/DL (ref 31–36)
MCV RBC AUTO: 87 FL (ref 80–100)
MICROSCOPIC EXAMINATION: YES
MRSA SCREEN RT-PCR: NORMAL
NITRITE, URINE: NEGATIVE
PDW BLD-RTO: 14.2 % (ref 12.4–15.4)
PERFORMED ON: ABNORMAL
PERFORMED ON: NORMAL
PH UA: 7 (ref 5–8)
PLATELET # BLD: 239 K/UL (ref 135–450)
PMV BLD AUTO: 8.1 FL (ref 5–10.5)
POTASSIUM REFLEX MAGNESIUM: 3.4 MMOL/L (ref 3.5–5.1)
PROTEIN UA: NEGATIVE MG/DL
RBC # BLD: 4.01 M/UL (ref 4–5.2)
RBC UA: ABNORMAL /HPF (ref 0–2)
SODIUM BLD-SCNC: 143 MMOL/L (ref 136–145)
SPECIFIC GRAVITY UA: <=1.005 (ref 1–1.03)
TRIGL SERPL-MCNC: 44 MG/DL (ref 0–150)
TRIGL SERPL-MCNC: 92 MG/DL (ref 0–150)
TROPONIN: 2.8 NG/ML
URINE TYPE: ABNORMAL
UROBILINOGEN, URINE: 0.2 E.U./DL
VLDLC SERPL CALC-MCNC: 18 MG/DL
VLDLC SERPL CALC-MCNC: 9 MG/DL
WBC # BLD: 4.4 K/UL (ref 4–11)
WBC UA: ABNORMAL /HPF (ref 0–5)

## 2019-06-12 PROCEDURE — 80048 BASIC METABOLIC PNL TOTAL CA: CPT

## 2019-06-12 PROCEDURE — 6370000000 HC RX 637 (ALT 250 FOR IP): Performed by: STUDENT IN AN ORGANIZED HEALTH CARE EDUCATION/TRAINING PROGRAM

## 2019-06-12 PROCEDURE — 93970 EXTREMITY STUDY: CPT

## 2019-06-12 PROCEDURE — 83735 ASSAY OF MAGNESIUM: CPT

## 2019-06-12 PROCEDURE — 93005 ELECTROCARDIOGRAM TRACING: CPT | Performed by: INTERNAL MEDICINE

## 2019-06-12 PROCEDURE — 87086 URINE CULTURE/COLONY COUNT: CPT

## 2019-06-12 PROCEDURE — 6370000000 HC RX 637 (ALT 250 FOR IP): Performed by: CLINICAL NURSE SPECIALIST

## 2019-06-12 PROCEDURE — 94150 VITAL CAPACITY TEST: CPT

## 2019-06-12 PROCEDURE — 6360000002 HC RX W HCPCS: Performed by: STUDENT IN AN ORGANIZED HEALTH CARE EDUCATION/TRAINING PROGRAM

## 2019-06-12 PROCEDURE — 6370000000 HC RX 637 (ALT 250 FOR IP): Performed by: INTERNAL MEDICINE

## 2019-06-12 PROCEDURE — 2580000003 HC RX 258: Performed by: INTERNAL MEDICINE

## 2019-06-12 PROCEDURE — 87641 MR-STAPH DNA AMP PROBE: CPT

## 2019-06-12 PROCEDURE — 94761 N-INVAS EAR/PLS OXIMETRY MLT: CPT

## 2019-06-12 PROCEDURE — 93880 EXTRACRANIAL BILAT STUDY: CPT

## 2019-06-12 PROCEDURE — 93010 ELECTROCARDIOGRAM REPORT: CPT | Performed by: INTERNAL MEDICINE

## 2019-06-12 PROCEDURE — 36415 COLL VENOUS BLD VENIPUNCTURE: CPT

## 2019-06-12 PROCEDURE — 80061 LIPID PANEL: CPT

## 2019-06-12 PROCEDURE — 85027 COMPLETE CBC AUTOMATED: CPT

## 2019-06-12 PROCEDURE — 93306 TTE W/DOPPLER COMPLETE: CPT

## 2019-06-12 PROCEDURE — 2000000000 HC ICU R&B

## 2019-06-12 PROCEDURE — 99232 SBSQ HOSP IP/OBS MODERATE 35: CPT | Performed by: INTERNAL MEDICINE

## 2019-06-12 PROCEDURE — 71046 X-RAY EXAM CHEST 2 VIEWS: CPT

## 2019-06-12 PROCEDURE — 81001 URINALYSIS AUTO W/SCOPE: CPT

## 2019-06-12 RX ORDER — HEPARIN SODIUM 5000 [USP'U]/ML
2000 INJECTION, SOLUTION INTRAVENOUS; SUBCUTANEOUS PRN
Status: DISCONTINUED | OUTPATIENT
Start: 2019-06-12 | End: 2019-06-12

## 2019-06-12 RX ORDER — CHLORHEXIDINE GLUCONATE 0.12 MG/ML
15 RINSE ORAL ONCE
Status: COMPLETED | OUTPATIENT
Start: 2019-06-13 | End: 2019-06-13

## 2019-06-12 RX ORDER — CHLORHEXIDINE GLUCONATE 4 G/100ML
SOLUTION TOPICAL SEE ADMIN INSTRUCTIONS
Status: DISCONTINUED | OUTPATIENT
Start: 2019-06-12 | End: 2019-06-13

## 2019-06-12 RX ORDER — SODIUM CHLORIDE, SODIUM LACTATE, POTASSIUM CHLORIDE, CALCIUM CHLORIDE 600; 310; 30; 20 MG/100ML; MG/100ML; MG/100ML; MG/100ML
INJECTION, SOLUTION INTRAVENOUS CONTINUOUS
Status: DISCONTINUED | OUTPATIENT
Start: 2019-06-13 | End: 2019-06-13

## 2019-06-12 RX ORDER — MAGNESIUM SULFATE IN WATER 40 MG/ML
2 INJECTION, SOLUTION INTRAVENOUS ONCE
Status: COMPLETED | OUTPATIENT
Start: 2019-06-12 | End: 2019-06-12

## 2019-06-12 RX ORDER — POTASSIUM CHLORIDE 20 MEQ/1
40 TABLET, EXTENDED RELEASE ORAL ONCE
Status: COMPLETED | OUTPATIENT
Start: 2019-06-12 | End: 2019-06-12

## 2019-06-12 RX ORDER — HEPARIN SODIUM 10000 [USP'U]/100ML
12 INJECTION, SOLUTION INTRAVENOUS CONTINUOUS
Status: DISCONTINUED | OUTPATIENT
Start: 2019-06-13 | End: 2019-06-12

## 2019-06-12 RX ORDER — HEPARIN SODIUM 5000 [USP'U]/ML
2000 INJECTION, SOLUTION INTRAVENOUS; SUBCUTANEOUS ONCE
Status: DISCONTINUED | OUTPATIENT
Start: 2019-06-13 | End: 2019-06-12

## 2019-06-12 RX ORDER — HEPARIN SODIUM 10000 [USP'U]/100ML
12 INJECTION, SOLUTION INTRAVENOUS CONTINUOUS
Status: DISCONTINUED | OUTPATIENT
Start: 2019-06-13 | End: 2019-06-13

## 2019-06-12 RX ORDER — MIDAZOLAM HYDROCHLORIDE 1 MG/ML
2 INJECTION INTRAMUSCULAR; INTRAVENOUS ONCE
Status: DISCONTINUED | OUTPATIENT
Start: 2019-06-13 | End: 2019-06-13

## 2019-06-12 RX ORDER — HEPARIN SODIUM 5000 [USP'U]/ML
4000 INJECTION, SOLUTION INTRAVENOUS; SUBCUTANEOUS PRN
Status: DISCONTINUED | OUTPATIENT
Start: 2019-06-12 | End: 2019-06-12

## 2019-06-12 RX ADMIN — Medication 10 ML: at 09:03

## 2019-06-12 RX ADMIN — MAGNESIUM SULFATE HEPTAHYDRATE 2 G: 40 INJECTION, SOLUTION INTRAVENOUS at 07:57

## 2019-06-12 RX ADMIN — ASPIRIN 325 MG ORAL TABLET 325 MG: 325 PILL ORAL at 09:03

## 2019-06-12 RX ADMIN — MUPIROCIN: 20 OINTMENT TOPICAL at 20:36

## 2019-06-12 RX ADMIN — ACETAMINOPHEN 650 MG: 325 TABLET ORAL at 04:03

## 2019-06-12 RX ADMIN — TIROFIBAN 0.15 MCG/KG/MIN: 5 INJECTION, SOLUTION INTRAVENOUS at 09:56

## 2019-06-12 RX ADMIN — CHLORHEXIDINE GLUCONATE: 213 SOLUTION TOPICAL at 20:38

## 2019-06-12 RX ADMIN — FAMOTIDINE 20 MG: 20 TABLET, FILM COATED ORAL at 09:03

## 2019-06-12 RX ADMIN — FAMOTIDINE 20 MG: 20 TABLET, FILM COATED ORAL at 20:35

## 2019-06-12 RX ADMIN — ROSUVASTATIN CALCIUM 20 MG: 20 TABLET, COATED ORAL at 20:35

## 2019-06-12 RX ADMIN — POTASSIUM CHLORIDE 40 MEQ: 20 TABLET, EXTENDED RELEASE ORAL at 09:06

## 2019-06-12 RX ADMIN — Medication 10 ML: at 20:37

## 2019-06-12 ASSESSMENT — ENCOUNTER SYMPTOMS
GASTROINTESTINAL NEGATIVE: 1
COUGH: 0
CHEST TIGHTNESS: 0
SHORTNESS OF BREATH: 0
CHOKING: 0

## 2019-06-12 ASSESSMENT — PAIN SCALES - GENERAL
PAINLEVEL_OUTOF10: 0
PAINLEVEL_OUTOF10: 0
PAINLEVEL_OUTOF10: 4
PAINLEVEL_OUTOF10: 0

## 2019-06-12 ASSESSMENT — PAIN DESCRIPTION - LOCATION: LOCATION: HEAD

## 2019-06-12 ASSESSMENT — PAIN DESCRIPTION - PAIN TYPE: TYPE: ACUTE PAIN

## 2019-06-12 ASSESSMENT — PAIN DESCRIPTION - PROGRESSION
CLINICAL_PROGRESSION: GRADUALLY IMPROVING
CLINICAL_PROGRESSION: GRADUALLY WORSENING
CLINICAL_PROGRESSION: GRADUALLY IMPROVING

## 2019-06-12 ASSESSMENT — PAIN - FUNCTIONAL ASSESSMENT: PAIN_FUNCTIONAL_ASSESSMENT: ACTIVITIES ARE NOT PREVENTED

## 2019-06-12 ASSESSMENT — PAIN DESCRIPTION - FREQUENCY: FREQUENCY: CONTINUOUS

## 2019-06-12 ASSESSMENT — PAIN DESCRIPTION - DESCRIPTORS: DESCRIPTORS: ACHING

## 2019-06-12 ASSESSMENT — PAIN DESCRIPTION - ORIENTATION: ORIENTATION: RIGHT;LEFT

## 2019-06-12 ASSESSMENT — PAIN DESCRIPTION - ONSET: ONSET: GRADUAL

## 2019-06-12 NOTE — H&P
Consultation H&P    Date of Admission:  6/11/2019  1:12 PM  Date of Consultation:  6/12/2019    PCP:  HE Freedman CNP      Chief Complaint: Chest pain with acute non-ST MI    History of Present Illness: We are asked to see this patient in consultation by Dr. estrada  Brianna Pantoja is a 66 y.o. female who presented to the emergency room severe acute chest pain progressive started in the middle of her chest radiated to her back no relieving factor associated with sweating patient was rushed to the Cath Lab with EKG changes found to have acute closure of distal circumflex balloon dilation was performed and flow was established     Past Medical History:  Past Medical History:   Diagnosis Date    Cancer ([facility] [address]     Multiple myeloma    Diabetes mellitus ([address] [address]     Hyperlipidemia     Hypertension        Past Surgical History:  Past Surgical History:   Procedure Laterality Date    HYSTERECTOMY      partial for fibroids    INSERTION / REMOVAL / REPLACEMENT VENOUS ACCESS CATHETER N/A 1/24/2019    TRIFUSION CATHETER INSERTION LEFT SUBCLAVIAN performed by Gertrude Webb MD at [address] [place] [address] Medications:   Prior to Admission medications    Medication Sig Start Date End Date Taking?  Authorizing Provider   aspirin 325 MG tablet Take 1 tablet by mouth every evening  8/9/18  Yes Historical Provider, MD   amLODIPine (NORVASC) 10 MG tablet Take 10 mg by mouth daily  6/8/19  Yes Historical Provider, MD   rosuvastatin (CRESTOR) 20 MG tablet Take 20 mg by mouth every evening   Yes Historical Provider, MD   lenalidomide (REVLIMID) 10 MG chemo capsule Take 10 mg by mouth every evening   Yes Historical Provider, MD   zoledronic acid (ZOMETA) 4 MG/5ML injection Infuse 4 mg intravenously every 3 months   Yes Historical Provider, MD   acyclovir (ZOVIRAX) 800 MG tablet Take 1 tablet by mouth 2 times daily 2/18/19  Yes Edmond Ndiaye.,    famotidine (PEPCID) 20 MG tablet Take 1 tablet Types: Cigarettes     Last attempt to quit: 2018     Years since quittin.5    Smokeless tobacco: Never Used   Substance and Sexual Activity    Alcohol use: Yes     Comment: rare    Drug use: No    Sexual activity: Not on file   Lifestyle    Physical activity:     Days per week: Not on file     Minutes per session: Not on file    Stress: Not on file   Relationships    Social connections:     Talks on phone: Not on file     Gets together: Not on file     Attends Orthodox service: Not on file     Active member of club or organization: Not on file     Attends meetings of clubs or organizations: Not on file     Relationship status: Not on file    Intimate partner violence:     Fear of current or ex partner: Not on file     Emotionally abused: Not on file     Physically abused: Not on file     Forced sexual activity: Not on file   Other Topics Concern    Not on file   Social History Narrative    Not on file       Family History:  Heart Disease:   Stroke:   Cancer:   Diabetes:   Hypertension:   Aneurysm/PVD:     No family history on file. Review of Systems:  Constitutional:  No night sweats, headaches, weight loss. Eyes:  No glaucoma, cataracts. ENMT:  No nosebleeds, deviated septum. Cardiac:  No arrhythmias, previous MI. Vascular:  No claudication, varicosities. GI:  No PUD, heartburn. :  No kidney stones, frequent UTIs  Musculoskeletal:  No arthritis, gout. Respiratory:  No SOB, emphysema, asthma. Integumentary:  No dermatitis, itching, rash. Neurological:  No stroke, TIAs, seizures. Psychiatric:  No depression, anxiety. Endocrine: No diabetes, thyroid issues. Hematologic:  No bleeding, easy bruising. Immunologic:  No known cancer, steroid therapies.       Physical Examination:    /60   Pulse 55   Temp 98.5 °F (36.9 °C) (Oral)   Resp 14   Ht 5' 1\" (1.549 m)   Wt 177 lb 14.6 oz (80.7 kg)   SpO2 96%   BMI 33.62 kg/m²      BP RUE:  BP LUE:   Admission Weight: 178 lb STS Cardiac Surgery Risk profile:  CABG     Mortality:   Morbidity and Mortality:    Long LOS:    Short LOS:    Permanent Stroke:    Prolonged Ventilation:    Deep Sternal Infection:    Renal Failure:    Reoperation:      Diagnosis: Complex coronary artery disease with non-ST MI    Plan: As discussed with the patient's we will plan for coronary artery bypass x3-4, will use LAD, reverse evidence vein for the rest of the graft  Carotid echo and vein mapping was reviewed      Typical periop/postop course reviewed including initial limitations on driving/heavy lifting. Risks, benefits and postoperative complications discussed including bleeding, infection, stroke, death, postop pulmonary and renal issues.     Francene Gilford, MD FACS

## 2019-06-12 NOTE — PROGRESS NOTES
without acute ST changes. CTPA negative for acute PE. HEART score of 3.  -Cardiac cath per Dr. Wu Skelton showing evidence of triple-vessel disease; needs CABG   -Cardiothoracic surgery consulted, plan for surgery on 6.13.19 at 7 am   -Diet NPO after midnight   -Tirofiban infusion to be held at 1 am, heparin gtt to be resumed following CABG   - mg PO daily  -Rosuvastatin 20mg PO daily  - Mg>2, K >4  - f/u carotid dopplers     H/o multiple myeloma s/p stem cell transplant- Patient was diagnosed last July. Had stem cell transplant in Feb. Currently on revlimid for maintenance.  -Gwendloyn Show 25mg per Dr. Wander Dash, patient's oncologist, because it's a procoagulant.  -stable subcarinal lymph nodes on CT chest    -Dr. Noreen Eduardo consulted     HTN  -hold home lisinopril 20mg  -hold home amlodipine 10mg PO daily     HLD  -rosuvastatin 20mg     Code Status: Full  FEN: Cardiac diet, NPO at midnight  PPX: ASA and aggrastat  DISPO: ICU, pending CTS on 9/96/5151    Konrad Armijo, PGY-1  57/93/05  9:52 AM    This patient has been staffed and discussed with Dr. Marty Stubbs MD.     Patient was seen, examined and discussed with Dr. Robert Orourke and the ICU team this morning. I agree with the interval history. My physical exam confirms the findings listed below  Chart was reviewed including labs, CT chest, EKG, echo, and medical records confirm the findings noted    NSTEMI, multivessel disease, scheduled for CABG  Bilateral ground glass on CT suggestive of dependent edema however echo show normal EF and normal diastolic function and BNP is not elevated. At this time she is on room air with pulse ox of 97%. Will monitor   Agree on the A/p noted above.

## 2019-06-12 NOTE — PROGRESS NOTES
pedal pulses +   And capillary refills time is  <  than 3 secs. Skin: Skin color, texture, turgor normal.  Neurologic:   Neurovascularly intact grossly . Labs:   Recent Labs     06/11/19  1345 06/11/19  1810 06/12/19  0421   WBC 5.2 4.3 4.4   HGB 12.9 11.1* 11.5*   HCT 39.5 33.6* 34.9*    242 239     Recent Labs     06/11/19  1345 06/11/19  1810 06/12/19  0421    139 143   K 3.7 3.4* 3.4*    105 109   CO2 25 22 24   BUN 12 11 11   CREATININE 0.8 0.7 0.7   CALCIUM 10.1 8.7 8.8     Recent Labs     06/11/19  1810   AST 72*   ALT 25   BILIDIR <0.2   BILITOT <0.2   ALKPHOS 42     Recent Labs     06/11/19  1345 06/11/19  1810   INR 1.01 1.16*     Recent Labs     06/11/19  1345 06/11/19  1526 06/11/19  2319   TROPONINI 0.25* 0.38* 2.80*       No flowsheet data found. Diagnostic Assesment and Plan :   Admitted 6.11.19  for mid sternal cp rad to lower back, with elevated troponin , nsr on ekg, likley nstemi, underwent cardiac catheter which showed circumflex occlusion, had stent placed, she also has multiple vessel disease she will be seen by CT surgery for possible bypass hopefully tomorrow morning. Continue Asa, lipitor, mtpll, tirofiban drip  MM, status post bone marrow transplant on leflunamide,   Dm ty 2 on diet control, low sliding   Ct PA neg for PE, ? Infiltrate, check procal  Obesity she needs to lose some weight. We will sign off as she is going for bypass surgery tomorrow    Body mass index is 33.62 kg/m². The following items were considered in medical decision making:  Independent review of images, Review / order clinical lab tests, Review / order radiology, tests Decision to obtain old records. DVT Prophylaxis: lvx   Diet: DIET CARDIAC; No Caffeine  Code Status: Full Code    PT/OT Eval Status: . At his/her baseline .     This chart was generated in part by using Dragon Dictation system and may contain errors related to that system including errors in grammar, punctuation, and spelling, as well as words and phrases that may be inappropriate. When dictating, effort is made to correct spelling/grammar errors. If there are any questions or concerns please feel free to contact the dictating provider for clarification. I have discussed the above stated plan with the patient  and they verbalized understanding and agreed with the plan. A total time of 15 min were exclusively devoted to discuss plan of care and advanced care planning during this encounter. RN notified about todays plan  bed side. Dispo: will monitor, Discharge in few  days to home . Needs to stay in hospital for poss cabg in am .   Giovanny Coronel MD  [phone]

## 2019-06-12 NOTE — PROGRESS NOTES
Heena Oh in to see pt. Plan of care discussed. Asked about beta blocker, Norvasc and lisinopril, was informed to hold these meds this am. Will continue to monitor.

## 2019-06-12 NOTE — ANESTHESIA PRE PROCEDURE
Department of Anesthesiology  Preprocedure Note       Name:  Manuela Parker   Age:  66 y.o.  :  1955                                          MRN:  [id number]         Date:  2019      Surgeon: Marleny Pearson):  Oliver Mckee MD    Procedure: CORONARY ARTERY BYPASS GRAFT X4, INTERNAL MAMMARY ARTERY AND SAPHENOUS VEIN GRAFT-TCP-BP (N/A )    Medications prior to admission:   Prior to Admission medications    Medication Sig Start Date End Date Taking?  Authorizing Provider   aspirin 325 MG tablet Take 1 tablet by mouth every evening  18  Yes Historical Provider, MD   amLODIPine (NORVASC) 10 MG tablet Take 10 mg by mouth daily  19  Yes Historical Provider, MD   rosuvastatin (CRESTOR) 20 MG tablet Take 20 mg by mouth every evening   Yes Historical Provider, MD   lenalidomide (REVLIMID) 10 MG chemo capsule Take 10 mg by mouth every evening   Yes Historical Provider, MD   zoledronic acid (ZOMETA) 4 MG/5ML injection Infuse 4 mg intravenously every 3 months   Yes Historical Provider, MD   acyclovir (ZOVIRAX) 800 MG tablet Take 1 tablet by mouth 2 times daily 19  Yes Wilian Candelarioing., DO   famotidine (PEPCID) 20 MG tablet Take 1 tablet by mouth 2 times daily 19  Yes Wilian Allan., DO   lisinopril (PRINIVIL;ZESTRIL) 20 MG tablet Take 1 tablet by mouth daily 19  Yes Wilian Candelarioing., DO   potassium chloride (KLOR-CON) 10 MEQ extended release tablet TK 2 TS PO BID, 19  Yes Historical Provider, MD   Cholecalciferol (VITAMIN D3) 1000 units CAPS Take 1 capsule by mouth 2 times daily    Yes Historical Provider, MD   Calcium Carbonate-Vitamin D (CALCIUM-CARB 600 + D) 600-125 MG-UNIT TABS Take 1 tablet by mouth 2 times daily  18  Yes Historical Provider, MD   prochlorperazine (COMPAZINE) 10 MG tablet Take 10 mg by mouth as needed  18   Historical Provider, MD       Current medications:    Current Facility-Administered Medications   Medication Dose Route Frequency Provider Last Rate Last Dose    [START ON 6/13/2019] lactated ringers infusion   Intravenous Continuous HE Farley CNP        [START ON 6/13/2019] ceFAZolin (ANCEF) 2 g in dextrose 5 % 50 mL IVPB  2 g Intravenous On Call to [address], APRN - CNP        [START ON 6/13/2019] vancomycin (VANCOCIN) 1,500 mg in dextrose 5 % 250 mL IVPB  1,500 mg Intravenous On Call to [address], APRN - CNP        [START ON 6/13/2019] chlorhexidine (PERIDEX) 0.12 % solution 15 mL  15 mL Mouth/Throat Once HE Farley CNP        chlorhexidine (HIBICLENS) 4 % liquid   Topical See Admin Instructions HE Farley CNP        mupirocin (BACTROBAN) 2 % ointment   Nasal Once HE Farley CNP        [START ON 6/13/2019] mupirocin (BACTROBAN) 2 % ointment   Nasal Once HE Farley CNP        [START ON 6/13/2019] heparin 25,000 units in dextrose 5% 250 mL infusion  12 Units/kg/hr Intravenous Continuous HE Farley CNP        morphine (PF) injection 2 mg  2 mg Intravenous Once Fernie Peralta MD   Stopped at 06/11/19 1525    aspirin tablet 325 mg  325 mg Oral Daily Giovanny Angela Solomon MD   325 mg at 06/12/19 0903    sodium chloride flush 0.9 % injection 10 mL  10 mL Intravenous 2 times per day Giovanny Solomon MD   10 mL at 06/12/19 0903    sodium chloride flush 0.9 % injection 10 mL  10 mL Intravenous PRN Giovanny Solomon MD        magnesium hydroxide (MILK OF MAGNESIA) 400 MG/5ML suspension 30 mL  30 mL Oral Daily PRN Giovanny Solomon MD        ondansetron (ZOFRAN) injection 4 mg  4 mg Intravenous Q6H PRN Giovanny Solomon MD        0.9 % sodium chloride infusion   Intravenous Continuous Giovanny Solomon MD 75 mL/hr at 06/11/19 1919      nitroGLYCERIN (NITROSTAT) SL tablet 0.4 mg  0.4 mg Sublingual Q5 Min PRN Giovanny Solomon MD        morphine (PF) injection 1 mg  1 mg Intravenous Q4H PRN Giovanny León MD        glucose (GLUTOSE) 40 % oral gel 15 g  15 g Oral PRN Antonieta Callahan MD        dextrose 50 % IV solution  12.5 g Intravenous PRN Antonieta Callahan MD        glucagon (rDNA) injection 1 mg  1 mg Intramuscular PRN Antonieta Callahan MD        dextrose 5 % solution  100 mL/hr Intravenous PRN Antonieta Callahan MD        insulin lispro (HUMALOG) injection pen 0-6 Units  0-6 Units Subcutaneous TID WC Antonieta Callahan MD        insulin lispro (HUMALOG) injection pen 0-3 Units  0-3 Units Subcutaneous Nightly Antonieta Callahan MD        acetaminophen (TYLENOL) tablet 650 mg  650 mg Oral Q4H PRN Tabatha Rahman MD   650 mg at 06/12/19 0403    famotidine (PEPCID) tablet 20 mg  20 mg Oral BID Tabatha Rahman MD   20 mg at 06/12/19 0903    tirofiban (AGGRASTAT) 50 mcg/mL infusion  0.15 mcg/kg/min Intravenous Continuous HE Marie - CNP 14.5 mL/hr at 06/12/19 0956 0.15 mcg/kg/min at 06/12/19 0956    rosuvastatin (CRESTOR) tablet 20 mg  20 mg Oral Nightly Rashel Dodd MD   20 mg at 06/11/19 2217       Allergies:     Allergies   Allergen Reactions    Pcn [Penicillins]     Lipitor [Atorvastatin] Other (See Comments)     Leg cramping       Problem List:    Patient Active Problem List   Diagnosis Code    Multiple myeloma not having achieved remission ([facility] [address] C90.00    Multiple myeloma in remission ([facility] [address] C90.01    NSTEMI (non-ST elevated myocardial infarction) ([address] I21.4    Chest pain R07.9    HTN (hypertension) I10    Hyperlipidemia E78.5    Type 2 diabetes mellitus ([address] E11.9       Past Medical History:        Diagnosis Date    Cancer ([address]     Multiple myeloma    Diabetes mellitus ([address]     Hyperlipidemia     Hypertension        Past Surgical History:        Procedure Laterality Date    HYSTERECTOMY      partial for fibroids    INSERTION / REMOVAL / REPLACEMENT VENOUS ACCESS CATHETER N/A 1/24/2019    TRIFUSION CATHETER INSERTION LEFT SUBCLAVIAN performed by Fanta Sotelo MD at [facility] History:    Social History     Tobacco Use    Smoking status: Former Smoker     Packs/day: 0.50     Years: 20.00     Pack years: 10.00     Types: Cigarettes     Last attempt to quit: 2018     Years since quittin.5    Smokeless tobacco: Never Used   Substance Use Topics    Alcohol use: Yes     Comment: rare                                Counseling given: Not Answered      Vital Signs (Current):   Vitals:    19 1500 19 1600 19 1700 19 1800   BP: 99/61 109/63 123/69 101/60   Pulse: 57 58 62 53   Resp:    Temp:  98.4 °F (36.9 °C)     TempSrc:  Oral     SpO2: 97% 94% 97% 97%   Weight:       Height:                                                  BP Readings from Last 3 Encounters:   19 101/60   19 124/78   19 121/63       NPO Status:                                                                                 BMI:   Wt Readings from Last 3 Encounters:   19 177 lb 14.6 oz (80.7 kg)   19 176 lb (79.8 kg)   19 176 lb 6.4 oz (80 kg)     Body mass index is 33.62 kg/m².     CBC:   Lab Results   Component Value Date    WBC 4.4 2019    RBC 4.01 2019    HGB 11.5 2019    HCT 34.9 2019    MCV 87.0 2019    RDW 14.2 2019     2019       CMP:   Lab Results   Component Value Date     2019    K 3.4 2019     2019    CO2 24 2019    BUN 11 2019    CREATININE 0.7 2019    GFRAA >60 2019    AGRATIO 2.2 2019    LABGLOM >60 2019    GLUCOSE 105 2019    PROT 5.4 2019    CALCIUM 8.8 2019    BILITOT <0.2 2019    ALKPHOS 42 2019    AST 72 2019    ALT 25 2019       POC Tests:   Recent Labs     19  1643   POCGLU 92       Coags:   Lab Results   Component Value Date    PROTIME 13.2 2019    INR 1.16 2019    APTT 91.1 2019       HCG (If Applicable): No results found for: PREGTESTUR, PREGSERUM, HCG, HCGQUANT     ABGs: No results found for:

## 2019-06-13 ENCOUNTER — ANESTHESIA (OUTPATIENT)
Dept: OPERATING ROOM | Age: 64
DRG: 233 | End: 2019-06-13
Payer: COMMERCIAL

## 2019-06-13 ENCOUNTER — APPOINTMENT (OUTPATIENT)
Dept: GENERAL RADIOLOGY | Age: 64
DRG: 233 | End: 2019-06-13
Payer: COMMERCIAL

## 2019-06-13 VITALS — SYSTOLIC BLOOD PRESSURE: 209 MMHG | TEMPERATURE: 98.6 F | OXYGEN SATURATION: 98 % | DIASTOLIC BLOOD PRESSURE: 118 MMHG

## 2019-06-13 LAB
ACTIVATED CLOTTING TIME: 110 SEC (ref 99–130)
ACTIVATED CLOTTING TIME: 488 SEC (ref 99–130)
ACTIVATED CLOTTING TIME: 576 SEC (ref 99–130)
ACTIVATED CLOTTING TIME: 609 SEC (ref 99–130)
ACTIVATED CLOTTING TIME: 668 SEC (ref 99–130)
ACTIVATED CLOTTING TIME: 723 SEC (ref 99–130)
ACTIVATED CLOTTING TIME: 80 SEC (ref 99–130)
ANION GAP SERPL CALCULATED.3IONS-SCNC: 10 MMOL/L (ref 3–16)
ANION GAP SERPL CALCULATED.3IONS-SCNC: 6 MMOL/L (ref 3–16)
ANION GAP SERPL CALCULATED.3IONS-SCNC: 7 MMOL/L (ref 3–16)
BASE EXCESS ARTERIAL: -1 (ref -3–3)
BASE EXCESS ARTERIAL: -2 (ref -3–3)
BASE EXCESS ARTERIAL: -2 (ref -3–3)
BASE EXCESS ARTERIAL: -3 (ref -3–3)
BASE EXCESS ARTERIAL: -4 (ref -3–3)
BASE EXCESS ARTERIAL: -5 (ref -3–3)
BASE EXCESS ARTERIAL: 0 (ref -3–3)
BASE EXCESS ARTERIAL: 1 (ref -3–3)
BASE EXCESS ARTERIAL: 1 (ref -3–3)
BASE EXCESS VENOUS: -4 (ref -3–3)
BASOPHILS ABSOLUTE: 0 K/UL (ref 0–0.2)
BASOPHILS ABSOLUTE: 0.1 K/UL (ref 0–0.2)
BASOPHILS RELATIVE PERCENT: 0.4 %
BASOPHILS RELATIVE PERCENT: 1.9 %
BUN BLDV-MCNC: 10 MG/DL (ref 7–20)
BUN BLDV-MCNC: 10 MG/DL (ref 7–20)
BUN BLDV-MCNC: 12 MG/DL (ref 7–20)
CALCIUM IONIZED: 1 MMOL/L (ref 1.12–1.32)
CALCIUM IONIZED: 1.03 MMOL/L (ref 1.12–1.32)
CALCIUM IONIZED: 1.04 MMOL/L (ref 1.12–1.32)
CALCIUM IONIZED: 1.04 MMOL/L (ref 1.12–1.32)
CALCIUM IONIZED: 1.06 MMOL/L (ref 1.12–1.32)
CALCIUM IONIZED: 1.13 MMOL/L (ref 1.12–1.32)
CALCIUM IONIZED: 1.16 MMOL/L (ref 1.12–1.32)
CALCIUM IONIZED: 1.18 MMOL/L (ref 1.12–1.32)
CALCIUM IONIZED: 1.32 MMOL/L (ref 1.12–1.32)
CALCIUM SERPL-MCNC: 7 MG/DL (ref 8.3–10.6)
CALCIUM SERPL-MCNC: 7.8 MG/DL (ref 8.3–10.6)
CALCIUM SERPL-MCNC: 8.4 MG/DL (ref 8.3–10.6)
CHLORIDE BLD-SCNC: 109 MMOL/L (ref 99–110)
CHLORIDE BLD-SCNC: 109 MMOL/L (ref 99–110)
CHLORIDE BLD-SCNC: 112 MMOL/L (ref 99–110)
CHOLESTEROL, TOTAL: 150 MG/DL (ref 0–199)
CO2: 21 MMOL/L (ref 21–32)
CO2: 22 MMOL/L (ref 21–32)
CO2: 25 MMOL/L (ref 21–32)
CREAT SERPL-MCNC: 0.6 MG/DL (ref 0.6–1.2)
CREAT SERPL-MCNC: 0.7 MG/DL (ref 0.6–1.2)
CREAT SERPL-MCNC: 0.8 MG/DL (ref 0.6–1.2)
EOSINOPHILS ABSOLUTE: 0 K/UL (ref 0–0.6)
EOSINOPHILS ABSOLUTE: 0.1 K/UL (ref 0–0.6)
EOSINOPHILS RELATIVE PERCENT: 0 %
EOSINOPHILS RELATIVE PERCENT: 4.4 %
GFR AFRICAN AMERICAN: >60
GFR NON-AFRICAN AMERICAN: >60
GLUCOSE BLD-MCNC: 102 MG/DL (ref 70–99)
GLUCOSE BLD-MCNC: 103 MG/DL (ref 70–99)
GLUCOSE BLD-MCNC: 105 MG/DL (ref 70–99)
GLUCOSE BLD-MCNC: 110 MG/DL (ref 70–99)
GLUCOSE BLD-MCNC: 111 MG/DL (ref 70–99)
GLUCOSE BLD-MCNC: 115 MG/DL (ref 70–99)
GLUCOSE BLD-MCNC: 123 MG/DL (ref 70–99)
GLUCOSE BLD-MCNC: 126 MG/DL (ref 70–99)
GLUCOSE BLD-MCNC: 127 MG/DL (ref 70–99)
GLUCOSE BLD-MCNC: 128 MG/DL (ref 70–99)
GLUCOSE BLD-MCNC: 131 MG/DL (ref 70–99)
GLUCOSE BLD-MCNC: 133 MG/DL (ref 70–99)
GLUCOSE BLD-MCNC: 140 MG/DL (ref 70–99)
GLUCOSE BLD-MCNC: 143 MG/DL (ref 70–99)
GLUCOSE BLD-MCNC: 144 MG/DL (ref 70–99)
GLUCOSE BLD-MCNC: 148 MG/DL (ref 70–99)
GLUCOSE BLD-MCNC: 153 MG/DL (ref 70–99)
GLUCOSE BLD-MCNC: 154 MG/DL (ref 70–99)
GLUCOSE BLD-MCNC: 156 MG/DL (ref 70–99)
GLUCOSE BLD-MCNC: 167 MG/DL (ref 70–99)
HCO3 ARTERIAL: 21.6 MMOL/L (ref 21–29)
HCO3 ARTERIAL: 22.6 MMOL/L (ref 21–29)
HCO3 ARTERIAL: 22.8 MMOL/L (ref 21–29)
HCO3 ARTERIAL: 23.2 MMOL/L (ref 21–29)
HCO3 ARTERIAL: 23.6 MMOL/L (ref 21–29)
HCO3 ARTERIAL: 24.1 MMOL/L (ref 21–29)
HCO3 ARTERIAL: 24.1 MMOL/L (ref 21–29)
HCO3 ARTERIAL: 24.2 MMOL/L (ref 21–29)
HCO3 ARTERIAL: 25.3 MMOL/L (ref 21–29)
HCO3 VENOUS: 22.6 MMOL/L (ref 23–29)
HCT VFR BLD CALC: 28.2 % (ref 36–48)
HCT VFR BLD CALC: 28.7 % (ref 36–48)
HCT VFR BLD CALC: 36.6 % (ref 36–48)
HDLC SERPL-MCNC: 55 MG/DL (ref 40–60)
HEMOGLOBIN: 10.1 G/DL (ref 12–16)
HEMOGLOBIN: 11.1 G/DL (ref 12–16)
HEMOGLOBIN: 12 G/DL (ref 12–16)
HEMOGLOBIN: 6.3 G/DL (ref 12–16)
HEMOGLOBIN: 6.5 G/DL (ref 12–16)
HEMOGLOBIN: 6.6 G/DL (ref 12–16)
HEMOGLOBIN: 6.8 G/DL (ref 12–16)
HEMOGLOBIN: 6.9 G/DL (ref 12–16)
HEMOGLOBIN: 9.1 G/DL (ref 12–16)
HEMOGLOBIN: 9.3 G/DL (ref 12–16)
INR BLD: 1.2 (ref 0.86–1.14)
LACTATE: 0.46 MMOL/L (ref 0.4–2)
LACTATE: 0.87 MMOL/L (ref 0.4–2)
LACTATE: 0.87 MMOL/L (ref 0.4–2)
LACTATE: 0.89 MMOL/L (ref 0.4–2)
LACTATE: 1.24 MMOL/L (ref 0.4–2)
LACTATE: 1.53 MMOL/L (ref 0.4–2)
LDL CHOLESTEROL CALCULATED: 80 MG/DL
LYMPHOCYTES ABSOLUTE: 0.5 K/UL (ref 1–5.1)
LYMPHOCYTES ABSOLUTE: 0.8 K/UL (ref 1–5.1)
LYMPHOCYTES RELATIVE PERCENT: 24.8 %
LYMPHOCYTES RELATIVE PERCENT: 4.6 %
MAGNESIUM: 2.8 MG/DL (ref 1.8–2.4)
MAGNESIUM: 3.3 MG/DL (ref 1.8–2.4)
MCH RBC QN AUTO: 28.3 PG (ref 26–34)
MCH RBC QN AUTO: 28.4 PG (ref 26–34)
MCH RBC QN AUTO: 28.6 PG (ref 26–34)
MCHC RBC AUTO-ENTMCNC: 32.4 G/DL (ref 31–36)
MCHC RBC AUTO-ENTMCNC: 32.5 G/DL (ref 31–36)
MCHC RBC AUTO-ENTMCNC: 32.7 G/DL (ref 31–36)
MCV RBC AUTO: 86.7 FL (ref 80–100)
MCV RBC AUTO: 87.7 FL (ref 80–100)
MCV RBC AUTO: 88 FL (ref 80–100)
MONOCYTES ABSOLUTE: 0.5 K/UL (ref 0–1.3)
MONOCYTES ABSOLUTE: 0.9 K/UL (ref 0–1.3)
MONOCYTES RELATIVE PERCENT: 15.8 %
MONOCYTES RELATIVE PERCENT: 8.9 %
NEUTROPHILS ABSOLUTE: 1.7 K/UL (ref 1.7–7.7)
NEUTROPHILS ABSOLUTE: 8.8 K/UL (ref 1.7–7.7)
NEUTROPHILS RELATIVE PERCENT: 53.1 %
NEUTROPHILS RELATIVE PERCENT: 86.1 %
O2 SAT, ARTERIAL: 100 % (ref 93–100)
O2 SAT, ARTERIAL: 90 % (ref 93–100)
O2 SAT, ARTERIAL: 98 % (ref 93–100)
O2 SAT, VEN: 70 %
PCO2 ARTERIAL: 31.9 MM HG (ref 35–45)
PCO2 ARTERIAL: 33.8 MM HG (ref 35–45)
PCO2 ARTERIAL: 37.1 MM HG (ref 35–45)
PCO2 ARTERIAL: 39 MM HG (ref 35–45)
PCO2 ARTERIAL: 39.5 MM HG (ref 35–45)
PCO2 ARTERIAL: 40.9 MM HG (ref 35–45)
PCO2 ARTERIAL: 43.5 MM HG (ref 35–45)
PCO2 ARTERIAL: 43.6 MM HG (ref 35–45)
PCO2 ARTERIAL: 43.8 MM HG (ref 35–45)
PCO2, VEN: 47.1 MM HG (ref 40–50)
PDW BLD-RTO: 14 % (ref 12.4–15.4)
PDW BLD-RTO: 14 % (ref 12.4–15.4)
PDW BLD-RTO: 14.3 % (ref 12.4–15.4)
PERFORMED ON: ABNORMAL
PH ARTERIAL: 7.3 (ref 7.35–7.45)
PH ARTERIAL: 7.32 (ref 7.35–7.45)
PH ARTERIAL: 7.35 (ref 7.35–7.45)
PH ARTERIAL: 7.36 (ref 7.35–7.45)
PH ARTERIAL: 7.37 (ref 7.35–7.45)
PH ARTERIAL: 7.41 (ref 7.35–7.45)
PH ARTERIAL: 7.42 (ref 7.35–7.45)
PH ARTERIAL: 7.46 (ref 7.35–7.45)
PH ARTERIAL: 7.49 (ref 7.35–7.45)
PH VENOUS: 7.29 (ref 7.35–7.45)
PLATELET # BLD: 125 K/UL (ref 135–450)
PLATELET # BLD: 153 K/UL (ref 135–450)
PLATELET # BLD: 232 K/UL (ref 135–450)
PMV BLD AUTO: 8 FL (ref 5–10.5)
PMV BLD AUTO: 8.2 FL (ref 5–10.5)
PMV BLD AUTO: 8.2 FL (ref 5–10.5)
PO2 ARTERIAL: 108.4 MM HG (ref 75–108)
PO2 ARTERIAL: 207 MM HG (ref 75–108)
PO2 ARTERIAL: 250.4 MM HG (ref 75–108)
PO2 ARTERIAL: 257.9 MM HG (ref 75–108)
PO2 ARTERIAL: 284.8 MM HG (ref 75–108)
PO2 ARTERIAL: 319.2 MM HG (ref 75–108)
PO2 ARTERIAL: 328.9 MM HG (ref 75–108)
PO2 ARTERIAL: 624.3 MM HG (ref 75–108)
PO2 ARTERIAL: 64.1 MM HG (ref 75–108)
PO2, VEN: 41 MM HG
POC POTASSIUM: 3.3 MMOL/L (ref 3.5–5.1)
POC POTASSIUM: 3.3 MMOL/L (ref 3.5–5.1)
POC POTASSIUM: 3.6 MMOL/L (ref 3.5–5.1)
POC POTASSIUM: 4 MMOL/L (ref 3.5–5.1)
POC POTASSIUM: 4.2 MMOL/L (ref 3.5–5.1)
POC POTASSIUM: 4.3 MMOL/L (ref 3.5–5.1)
POC POTASSIUM: 4.3 MMOL/L (ref 3.5–5.1)
POC POTASSIUM: 4.4 MMOL/L (ref 3.5–5.1)
POC POTASSIUM: 4.6 MMOL/L (ref 3.5–5.1)
POC POTASSIUM: 4.7 MMOL/L (ref 3.5–5.1)
POC SAMPLE TYPE: ABNORMAL
POC SODIUM: 142 MMOL/L (ref 136–145)
POC SODIUM: 143 MMOL/L (ref 136–145)
POC SODIUM: 144 MMOL/L (ref 136–145)
POC SODIUM: 144 MMOL/L (ref 136–145)
POC SODIUM: 145 MMOL/L (ref 136–145)
POC SODIUM: 145 MMOL/L (ref 136–145)
POTASSIUM REFLEX MAGNESIUM: 3.7 MMOL/L (ref 3.5–5.1)
POTASSIUM SERPL-SCNC: 4 MMOL/L (ref 3.5–5.1)
POTASSIUM SERPL-SCNC: 4.4 MMOL/L (ref 3.5–5.1)
PROTHROMBIN TIME: 13.7 SEC (ref 9.8–13)
RBC # BLD: 3.22 M/UL (ref 4–5.2)
RBC # BLD: 3.26 M/UL (ref 4–5.2)
RBC # BLD: 4.22 M/UL (ref 4–5.2)
SODIUM BLD-SCNC: 136 MMOL/L (ref 136–145)
SODIUM BLD-SCNC: 141 MMOL/L (ref 136–145)
SODIUM BLD-SCNC: 144 MMOL/L (ref 136–145)
TCO2 ARTERIAL: 23 MMOL/L
TCO2 ARTERIAL: 24 MMOL/L
TCO2 ARTERIAL: 25 MMOL/L
TCO2 ARTERIAL: 27 MMOL/L
TCO2 CALC VENOUS: 24 MMOL/L
TRIGL SERPL-MCNC: 73 MG/DL (ref 0–150)
URINE CULTURE, ROUTINE: NORMAL
VLDLC SERPL CALC-MCNC: 15 MG/DL
WBC # BLD: 10.3 K/UL (ref 4–11)
WBC # BLD: 3.2 K/UL (ref 4–11)
WBC # BLD: 9.5 K/UL (ref 4–11)

## 2019-06-13 PROCEDURE — 83605 ASSAY OF LACTIC ACID: CPT

## 2019-06-13 PROCEDURE — 2709999900 HC NON-CHARGEABLE SUPPLY: Performed by: THORACIC SURGERY (CARDIOTHORACIC VASCULAR SURGERY)

## 2019-06-13 PROCEDURE — 84295 ASSAY OF SERUM SODIUM: CPT

## 2019-06-13 PROCEDURE — 2500000003 HC RX 250 WO HCPCS: Performed by: ANESTHESIOLOGY

## 2019-06-13 PROCEDURE — 3700000000 HC ANESTHESIA ATTENDED CARE: Performed by: THORACIC SURGERY (CARDIOTHORACIC VASCULAR SURGERY)

## 2019-06-13 PROCEDURE — 2580000003 HC RX 258: Performed by: CLINICAL NURSE SPECIALIST

## 2019-06-13 PROCEDURE — 3700000001 HC ADD 15 MINUTES (ANESTHESIA): Performed by: THORACIC SURGERY (CARDIOTHORACIC VASCULAR SURGERY)

## 2019-06-13 PROCEDURE — C9290 INJ, BUPIVACAINE LIPOSOME: HCPCS | Performed by: THORACIC SURGERY (CARDIOTHORACIC VASCULAR SURGERY)

## 2019-06-13 PROCEDURE — 85025 COMPLETE CBC W/AUTO DIFF WBC: CPT

## 2019-06-13 PROCEDURE — 6370000000 HC RX 637 (ALT 250 FOR IP): Performed by: ANESTHESIOLOGY

## 2019-06-13 PROCEDURE — 2580000003 HC RX 258: Performed by: THORACIC SURGERY (CARDIOTHORACIC VASCULAR SURGERY)

## 2019-06-13 PROCEDURE — 2780000010 HC IMPLANT OTHER: Performed by: THORACIC SURGERY (CARDIOTHORACIC VASCULAR SURGERY)

## 2019-06-13 PROCEDURE — 84132 ASSAY OF SERUM POTASSIUM: CPT

## 2019-06-13 PROCEDURE — 33533 CABG ARTERIAL SINGLE: CPT | Performed by: PHYSICIAN ASSISTANT

## 2019-06-13 PROCEDURE — C1786 PMKR, SINGLE, RATE-RESP: HCPCS | Performed by: THORACIC SURGERY (CARDIOTHORACIC VASCULAR SURGERY)

## 2019-06-13 PROCEDURE — 6360000002 HC RX W HCPCS: Performed by: THORACIC SURGERY (CARDIOTHORACIC VASCULAR SURGERY)

## 2019-06-13 PROCEDURE — 94761 N-INVAS EAR/PLS OXIMETRY MLT: CPT

## 2019-06-13 PROCEDURE — 2580000003 HC RX 258: Performed by: ANESTHESIOLOGY

## 2019-06-13 PROCEDURE — 2500000003 HC RX 250 WO HCPCS: Performed by: THORACIC SURGERY (CARDIOTHORACIC VASCULAR SURGERY)

## 2019-06-13 PROCEDURE — 02100Z9 BYPASS CORONARY ARTERY, ONE ARTERY FROM LEFT INTERNAL MAMMARY, OPEN APPROACH: ICD-10-PCS | Performed by: THORACIC SURGERY (CARDIOTHORACIC VASCULAR SURGERY)

## 2019-06-13 PROCEDURE — 33519 CABG ARTERY-VEIN THREE: CPT | Performed by: PHYSICIAN ASSISTANT

## 2019-06-13 PROCEDURE — 33508 ENDOSCOPIC VEIN HARVEST: CPT | Performed by: THORACIC SURGERY (CARDIOTHORACIC VASCULAR SURGERY)

## 2019-06-13 PROCEDURE — 6370000000 HC RX 637 (ALT 250 FOR IP): Performed by: THORACIC SURGERY (CARDIOTHORACIC VASCULAR SURGERY)

## 2019-06-13 PROCEDURE — 021209W BYPASS CORONARY ARTERY, THREE ARTERIES FROM AORTA WITH AUTOLOGOUS VENOUS TISSUE, OPEN APPROACH: ICD-10-PCS | Performed by: THORACIC SURGERY (CARDIOTHORACIC VASCULAR SURGERY)

## 2019-06-13 PROCEDURE — 33508 ENDOSCOPIC VEIN HARVEST: CPT | Performed by: PHYSICIAN ASSISTANT

## 2019-06-13 PROCEDURE — 85018 HEMOGLOBIN: CPT

## 2019-06-13 PROCEDURE — 80048 BASIC METABOLIC PNL TOTAL CA: CPT

## 2019-06-13 PROCEDURE — C1729 CATH, DRAINAGE: HCPCS | Performed by: THORACIC SURGERY (CARDIOTHORACIC VASCULAR SURGERY)

## 2019-06-13 PROCEDURE — 82330 ASSAY OF CALCIUM: CPT

## 2019-06-13 PROCEDURE — 94150 VITAL CAPACITY TEST: CPT

## 2019-06-13 PROCEDURE — 33533 CABG ARTERIAL SINGLE: CPT | Performed by: THORACIC SURGERY (CARDIOTHORACIC VASCULAR SURGERY)

## 2019-06-13 PROCEDURE — 82803 BLOOD GASES ANY COMBINATION: CPT

## 2019-06-13 PROCEDURE — 2580000003 HC RX 258

## 2019-06-13 PROCEDURE — 3600000018 HC SURGERY OHS ADDTL 15MIN: Performed by: THORACIC SURGERY (CARDIOTHORACIC VASCULAR SURGERY)

## 2019-06-13 PROCEDURE — 36415 COLL VENOUS BLD VENIPUNCTURE: CPT

## 2019-06-13 PROCEDURE — P9045 ALBUMIN (HUMAN), 5%, 250 ML: HCPCS | Performed by: THORACIC SURGERY (CARDIOTHORACIC VASCULAR SURGERY)

## 2019-06-13 PROCEDURE — 3600000008 HC SURGERY OHS BASE: Performed by: THORACIC SURGERY (CARDIOTHORACIC VASCULAR SURGERY)

## 2019-06-13 PROCEDURE — 6360000002 HC RX W HCPCS: Performed by: ANESTHESIOLOGY

## 2019-06-13 PROCEDURE — 83735 ASSAY OF MAGNESIUM: CPT

## 2019-06-13 PROCEDURE — 94669 MECHANICAL CHEST WALL OSCILL: CPT

## 2019-06-13 PROCEDURE — 94640 AIRWAY INHALATION TREATMENT: CPT

## 2019-06-13 PROCEDURE — 33519 CABG ARTERY-VEIN THREE: CPT | Performed by: THORACIC SURGERY (CARDIOTHORACIC VASCULAR SURGERY)

## 2019-06-13 PROCEDURE — 5A1221Z PERFORMANCE OF CARDIAC OUTPUT, CONTINUOUS: ICD-10-PCS | Performed by: THORACIC SURGERY (CARDIOTHORACIC VASCULAR SURGERY)

## 2019-06-13 PROCEDURE — 94799 UNLISTED PULMONARY SVC/PX: CPT

## 2019-06-13 PROCEDURE — 2000000000 HC ICU R&B

## 2019-06-13 PROCEDURE — 2720000010 HC SURG SUPPLY STERILE: Performed by: THORACIC SURGERY (CARDIOTHORACIC VASCULAR SURGERY)

## 2019-06-13 PROCEDURE — 6370000000 HC RX 637 (ALT 250 FOR IP): Performed by: CLINICAL NURSE SPECIALIST

## 2019-06-13 PROCEDURE — 6360000002 HC RX W HCPCS: Performed by: CLINICAL NURSE SPECIALIST

## 2019-06-13 PROCEDURE — 85610 PROTHROMBIN TIME: CPT

## 2019-06-13 PROCEDURE — 71045 X-RAY EXAM CHEST 1 VIEW: CPT

## 2019-06-13 PROCEDURE — 2700000000 HC OXYGEN THERAPY PER DAY

## 2019-06-13 PROCEDURE — 85027 COMPLETE CBC AUTOMATED: CPT

## 2019-06-13 PROCEDURE — 82947 ASSAY GLUCOSE BLOOD QUANT: CPT

## 2019-06-13 PROCEDURE — 85347 COAGULATION TIME ACTIVATED: CPT

## 2019-06-13 PROCEDURE — 06BP4ZZ EXCISION OF RIGHT SAPHENOUS VEIN, PERCUTANEOUS ENDOSCOPIC APPROACH: ICD-10-PCS | Performed by: THORACIC SURGERY (CARDIOTHORACIC VASCULAR SURGERY)

## 2019-06-13 RX ORDER — MEPERIDINE HYDROCHLORIDE 25 MG/ML
25 INJECTION INTRAMUSCULAR; INTRAVENOUS; SUBCUTANEOUS
Status: ACTIVE | OUTPATIENT
Start: 2019-06-13 | End: 2019-06-13

## 2019-06-13 RX ORDER — ALBUMIN, HUMAN INJ 5% 5 %
25 SOLUTION INTRAVENOUS PRN
Status: DISCONTINUED | OUTPATIENT
Start: 2019-06-13 | End: 2019-06-14

## 2019-06-13 RX ORDER — OXYCODONE HYDROCHLORIDE AND ACETAMINOPHEN 5; 325 MG/1; MG/1
2 TABLET ORAL EVERY 4 HOURS PRN
Status: DISCONTINUED | OUTPATIENT
Start: 2019-06-13 | End: 2019-06-14

## 2019-06-13 RX ORDER — CLOPIDOGREL BISULFATE 75 MG/1
75 TABLET ORAL DAILY
Status: DISCONTINUED | OUTPATIENT
Start: 2019-06-14 | End: 2019-06-19 | Stop reason: HOSPADM

## 2019-06-13 RX ORDER — KETOROLAC TROMETHAMINE 30 MG/ML
INJECTION, SOLUTION INTRAMUSCULAR; INTRAVENOUS PRN
Status: DISCONTINUED | OUTPATIENT
Start: 2019-06-13 | End: 2019-06-13 | Stop reason: SDUPTHER

## 2019-06-13 RX ORDER — ZOLEDRONIC ACID 4 MG/5ML
4 INJECTION INTRAVENOUS
Status: DISCONTINUED | OUTPATIENT
Start: 2019-06-13 | End: 2019-06-13 | Stop reason: ALTCHOICE

## 2019-06-13 RX ORDER — GLYCOPYRROLATE 0.2 MG/ML
INJECTION INTRAMUSCULAR; INTRAVENOUS PRN
Status: DISCONTINUED | OUTPATIENT
Start: 2019-06-13 | End: 2019-06-13 | Stop reason: SDUPTHER

## 2019-06-13 RX ORDER — NEOSTIGMINE METHYLSULFATE 1 MG/ML
INJECTION, SOLUTION INTRAVENOUS PRN
Status: DISCONTINUED | OUTPATIENT
Start: 2019-06-13 | End: 2019-06-13 | Stop reason: SDUPTHER

## 2019-06-13 RX ORDER — MAGNESIUM SULFATE IN WATER 40 MG/ML
2 INJECTION, SOLUTION INTRAVENOUS PRN
Status: DISCONTINUED | OUTPATIENT
Start: 2019-06-13 | End: 2019-06-19

## 2019-06-13 RX ORDER — DEXAMETHASONE SODIUM PHOSPHATE 4 MG/ML
INJECTION, SOLUTION INTRA-ARTICULAR; INTRALESIONAL; INTRAMUSCULAR; INTRAVENOUS; SOFT TISSUE PRN
Status: DISCONTINUED | OUTPATIENT
Start: 2019-06-13 | End: 2019-06-13 | Stop reason: SDUPTHER

## 2019-06-13 RX ORDER — CHLORHEXIDINE GLUCONATE 0.12 MG/ML
15 RINSE ORAL 2 TIMES DAILY
Status: DISCONTINUED | OUTPATIENT
Start: 2019-06-13 | End: 2019-06-19 | Stop reason: HOSPADM

## 2019-06-13 RX ORDER — NICOTINE POLACRILEX 4 MG
15 LOZENGE BUCCAL PRN
Status: DISCONTINUED | OUTPATIENT
Start: 2019-06-13 | End: 2019-06-19 | Stop reason: HOSPADM

## 2019-06-13 RX ORDER — CISATRACURIUM BESYLATE 2 MG/ML
INJECTION, SOLUTION INTRAVENOUS PRN
Status: DISCONTINUED | OUTPATIENT
Start: 2019-06-13 | End: 2019-06-13 | Stop reason: SDUPTHER

## 2019-06-13 RX ORDER — ACETAMINOPHEN 650 MG/1
650 SUPPOSITORY RECTAL EVERY 4 HOURS PRN
Status: DISCONTINUED | OUTPATIENT
Start: 2019-06-13 | End: 2019-06-14

## 2019-06-13 RX ORDER — DEXTROSE MONOHYDRATE 25 G/50ML
12.5 INJECTION, SOLUTION INTRAVENOUS PRN
Status: DISCONTINUED | OUTPATIENT
Start: 2019-06-13 | End: 2019-06-19 | Stop reason: HOSPADM

## 2019-06-13 RX ORDER — DEXTROSE MONOHYDRATE 50 MG/ML
100 INJECTION, SOLUTION INTRAVENOUS PRN
Status: DISCONTINUED | OUTPATIENT
Start: 2019-06-13 | End: 2019-06-19 | Stop reason: HOSPADM

## 2019-06-13 RX ORDER — ROSUVASTATIN CALCIUM 20 MG/1
20 TABLET, COATED ORAL EVERY EVENING
Status: DISCONTINUED | OUTPATIENT
Start: 2019-06-13 | End: 2019-06-19 | Stop reason: HOSPADM

## 2019-06-13 RX ORDER — ALBUTEROL SULFATE 2.5 MG/3ML
2.5 SOLUTION RESPIRATORY (INHALATION) EVERY 4 HOURS PRN
Status: DISCONTINUED | OUTPATIENT
Start: 2019-06-13 | End: 2019-06-19 | Stop reason: HOSPADM

## 2019-06-13 RX ORDER — POTASSIUM CHLORIDE 29.8 MG/ML
20 INJECTION INTRAVENOUS PRN
Status: DISCONTINUED | OUTPATIENT
Start: 2019-06-13 | End: 2019-06-19

## 2019-06-13 RX ORDER — ONDANSETRON 2 MG/ML
4 INJECTION INTRAMUSCULAR; INTRAVENOUS EVERY 8 HOURS PRN
Status: DISCONTINUED | OUTPATIENT
Start: 2019-06-13 | End: 2019-06-19 | Stop reason: HOSPADM

## 2019-06-13 RX ORDER — ASPIRIN 81 MG/1
81 TABLET ORAL DAILY
Status: DISCONTINUED | OUTPATIENT
Start: 2019-06-14 | End: 2019-06-19 | Stop reason: HOSPADM

## 2019-06-13 RX ORDER — PROTAMINE SULFATE 10 MG/ML
INJECTION, SOLUTION INTRAVENOUS PRN
Status: DISCONTINUED | OUTPATIENT
Start: 2019-06-13 | End: 2019-06-13 | Stop reason: SDUPTHER

## 2019-06-13 RX ORDER — POTASSIUM CHLORIDE 750 MG/1
10 TABLET, EXTENDED RELEASE ORAL
Status: DISCONTINUED | OUTPATIENT
Start: 2019-06-14 | End: 2019-06-19

## 2019-06-13 RX ORDER — MIDAZOLAM HYDROCHLORIDE 1 MG/ML
INJECTION INTRAMUSCULAR; INTRAVENOUS PRN
Status: DISCONTINUED | OUTPATIENT
Start: 2019-06-13 | End: 2019-06-13 | Stop reason: SDUPTHER

## 2019-06-13 RX ORDER — POLYETHYLENE GLYCOL 3350 17 G/17G
17 POWDER, FOR SOLUTION ORAL DAILY
Status: DISCONTINUED | OUTPATIENT
Start: 2019-06-14 | End: 2019-06-19 | Stop reason: HOSPADM

## 2019-06-13 RX ORDER — FAMOTIDINE 20 MG/1
20 TABLET, FILM COATED ORAL 2 TIMES DAILY
Status: DISCONTINUED | OUTPATIENT
Start: 2019-06-15 | End: 2019-06-19 | Stop reason: HOSPADM

## 2019-06-13 RX ORDER — DEXTROSE AND SODIUM CHLORIDE 5; .45 G/100ML; G/100ML
INJECTION, SOLUTION INTRAVENOUS CONTINUOUS
Status: DISCONTINUED | OUTPATIENT
Start: 2019-06-13 | End: 2019-06-14

## 2019-06-13 RX ORDER — ETOMIDATE 2 MG/ML
INJECTION INTRAVENOUS PRN
Status: DISCONTINUED | OUTPATIENT
Start: 2019-06-13 | End: 2019-06-13 | Stop reason: SDUPTHER

## 2019-06-13 RX ORDER — LENALIDOMIDE 10 MG/1
10 CAPSULE ORAL EVERY EVENING
Status: DISCONTINUED | OUTPATIENT
Start: 2019-06-13 | End: 2019-06-14

## 2019-06-13 RX ORDER — SODIUM CHLORIDE 9 MG/ML
INJECTION, SOLUTION INTRAVENOUS CONTINUOUS PRN
Status: DISCONTINUED | OUTPATIENT
Start: 2019-06-13 | End: 2019-06-13 | Stop reason: SDUPTHER

## 2019-06-13 RX ORDER — MIDAZOLAM HYDROCHLORIDE 1 MG/ML
1 INJECTION INTRAMUSCULAR; INTRAVENOUS
Status: DISCONTINUED | OUTPATIENT
Start: 2019-06-13 | End: 2019-06-14

## 2019-06-13 RX ORDER — ACETAMINOPHEN 325 MG/1
650 TABLET ORAL EVERY 4 HOURS PRN
Status: DISCONTINUED | OUTPATIENT
Start: 2019-06-13 | End: 2019-06-14

## 2019-06-13 RX ORDER — FENTANYL CITRATE 50 UG/ML
INJECTION, SOLUTION INTRAMUSCULAR; INTRAVENOUS PRN
Status: DISCONTINUED | OUTPATIENT
Start: 2019-06-13 | End: 2019-06-13 | Stop reason: SDUPTHER

## 2019-06-13 RX ORDER — OXYCODONE HYDROCHLORIDE AND ACETAMINOPHEN 5; 325 MG/1; MG/1
1 TABLET ORAL EVERY 4 HOURS PRN
Status: DISCONTINUED | OUTPATIENT
Start: 2019-06-13 | End: 2019-06-14

## 2019-06-13 RX ORDER — KETAMINE HYDROCHLORIDE 50 MG/ML
INJECTION, SOLUTION, CONCENTRATE INTRAMUSCULAR; INTRAVENOUS PRN
Status: DISCONTINUED | OUTPATIENT
Start: 2019-06-13 | End: 2019-06-13 | Stop reason: SDUPTHER

## 2019-06-13 RX ORDER — ONDANSETRON 2 MG/ML
INJECTION INTRAMUSCULAR; INTRAVENOUS PRN
Status: DISCONTINUED | OUTPATIENT
Start: 2019-06-13 | End: 2019-06-13 | Stop reason: SDUPTHER

## 2019-06-13 RX ORDER — METOPROLOL TARTRATE 5 MG/5ML
2.5 INJECTION INTRAVENOUS EVERY 10 MIN PRN
Status: DISCONTINUED | OUTPATIENT
Start: 2019-06-13 | End: 2019-06-17

## 2019-06-13 RX ORDER — DOCUSATE SODIUM 100 MG/1
100 CAPSULE, LIQUID FILLED ORAL 2 TIMES DAILY
Status: DISCONTINUED | OUTPATIENT
Start: 2019-06-13 | End: 2019-06-19 | Stop reason: HOSPADM

## 2019-06-13 RX ORDER — CLINDAMYCIN PHOSPHATE 900 MG/50ML
900 INJECTION INTRAVENOUS EVERY 6 HOURS
Status: COMPLETED | OUTPATIENT
Start: 2019-06-13 | End: 2019-06-14

## 2019-06-13 RX ORDER — ALBUTEROL SULFATE 2.5 MG/3ML
2.5 SOLUTION RESPIRATORY (INHALATION)
Status: DISCONTINUED | OUTPATIENT
Start: 2019-06-13 | End: 2019-06-13

## 2019-06-13 RX ORDER — DOBUTAMINE 12.5 MG/ML
INJECTION, SOLUTION INTRAVENOUS CONTINUOUS PRN
Status: DISCONTINUED | OUTPATIENT
Start: 2019-06-13 | End: 2019-06-13 | Stop reason: SDUPTHER

## 2019-06-13 RX ORDER — HYDRALAZINE HYDROCHLORIDE 20 MG/ML
5 INJECTION INTRAMUSCULAR; INTRAVENOUS EVERY 5 MIN PRN
Status: DISCONTINUED | OUTPATIENT
Start: 2019-06-13 | End: 2019-06-19

## 2019-06-13 RX ORDER — ALBUTEROL SULFATE 2.5 MG/3ML
2.5 SOLUTION RESPIRATORY (INHALATION) 3 TIMES DAILY
Status: DISCONTINUED | OUTPATIENT
Start: 2019-06-13 | End: 2019-06-15

## 2019-06-13 RX ORDER — SODIUM CHLORIDE 0.9 % (FLUSH) 0.9 %
10 SYRINGE (ML) INJECTION EVERY 12 HOURS SCHEDULED
Status: DISCONTINUED | OUTPATIENT
Start: 2019-06-13 | End: 2019-06-19 | Stop reason: HOSPADM

## 2019-06-13 RX ORDER — DEXTROSE AND SODIUM CHLORIDE 5; .45 G/100ML; G/100ML
INJECTION, SOLUTION INTRAVENOUS
Status: COMPLETED
Start: 2019-06-13 | End: 2019-06-13

## 2019-06-13 RX ORDER — PROTAMINE SULFATE 10 MG/ML
50 INJECTION, SOLUTION INTRAVENOUS
Status: DISPENSED | OUTPATIENT
Start: 2019-06-13 | End: 2019-06-13

## 2019-06-13 RX ORDER — HEPARIN SODIUM 1000 [USP'U]/ML
INJECTION, SOLUTION INTRAVENOUS; SUBCUTANEOUS PRN
Status: DISCONTINUED | OUTPATIENT
Start: 2019-06-13 | End: 2019-06-13 | Stop reason: SDUPTHER

## 2019-06-13 RX ORDER — SODIUM CHLORIDE, SODIUM LACTATE, POTASSIUM CHLORIDE, CALCIUM CHLORIDE 600; 310; 30; 20 MG/100ML; MG/100ML; MG/100ML; MG/100ML
INJECTION, SOLUTION INTRAVENOUS CONTINUOUS PRN
Status: DISCONTINUED | OUTPATIENT
Start: 2019-06-13 | End: 2019-06-13 | Stop reason: SDUPTHER

## 2019-06-13 RX ORDER — FUROSEMIDE 10 MG/ML
40 INJECTION INTRAMUSCULAR; INTRAVENOUS 2 TIMES DAILY
Status: DISCONTINUED | OUTPATIENT
Start: 2019-06-14 | End: 2019-06-14

## 2019-06-13 RX ORDER — SODIUM CHLORIDE 0.9 % (FLUSH) 0.9 %
10 SYRINGE (ML) INJECTION PRN
Status: DISCONTINUED | OUTPATIENT
Start: 2019-06-13 | End: 2019-06-19 | Stop reason: HOSPADM

## 2019-06-13 RX ORDER — MAGNESIUM HYDROXIDE 1200 MG/15ML
LIQUID ORAL CONTINUOUS PRN
Status: COMPLETED | OUTPATIENT
Start: 2019-06-13 | End: 2019-06-13

## 2019-06-13 RX ORDER — NITROGLYCERIN 20 MG/100ML
INJECTION INTRAVENOUS CONTINUOUS PRN
Status: DISCONTINUED | OUTPATIENT
Start: 2019-06-13 | End: 2019-06-13 | Stop reason: SDUPTHER

## 2019-06-13 RX ADMIN — KETAMINE HYDROCHLORIDE 25 MG: 50 INJECTION, SOLUTION INTRAMUSCULAR; INTRAVENOUS at 07:04

## 2019-06-13 RX ADMIN — PROTAMINE SULFATE 400 MG: 10 INJECTION, SOLUTION INTRAVENOUS at 11:05

## 2019-06-13 RX ADMIN — MUPIROCIN: 20 OINTMENT TOPICAL at 21:39

## 2019-06-13 RX ADMIN — POTASSIUM CHLORIDE 20 MEQ: 29.8 INJECTION, SOLUTION INTRAVENOUS at 15:05

## 2019-06-13 RX ADMIN — ALBUMIN (HUMAN) 25 G: 12.5 INJECTION, SOLUTION INTRAVENOUS at 14:02

## 2019-06-13 RX ADMIN — FENTANYL CITRATE 500 MCG: 50 INJECTION, SOLUTION INTRAMUSCULAR; INTRAVENOUS at 07:15

## 2019-06-13 RX ADMIN — SODIUM CHLORIDE, SODIUM LACTATE, POTASSIUM CHLORIDE, AND CALCIUM CHLORIDE: 600; 310; 30; 20 INJECTION, SOLUTION INTRAVENOUS at 11:43

## 2019-06-13 RX ADMIN — DEXAMETHASONE SODIUM PHOSPHATE 4 MG: 4 INJECTION, SOLUTION INTRAMUSCULAR; INTRAVENOUS at 11:30

## 2019-06-13 RX ADMIN — CISATRACURIUM BESYLATE 10 MG: 2 INJECTION INTRAVENOUS at 09:00

## 2019-06-13 RX ADMIN — CLINDAMYCIN PHOSPHATE 900 MG: 900 INJECTION, SOLUTION INTRAVENOUS at 14:13

## 2019-06-13 RX ADMIN — ROSUVASTATIN CALCIUM 20 MG: 20 TABLET, COATED ORAL at 18:43

## 2019-06-13 RX ADMIN — HEPARIN SODIUM AND DEXTROSE 12 UNITS/KG/HR: 10000; 5 INJECTION INTRAVENOUS at 00:06

## 2019-06-13 RX ADMIN — ALBUMIN (HUMAN) 25 G: 12.5 INJECTION, SOLUTION INTRAVENOUS at 21:39

## 2019-06-13 RX ADMIN — POTASSIUM CHLORIDE 20 MEQ: 29.8 INJECTION, SOLUTION INTRAVENOUS at 13:37

## 2019-06-13 RX ADMIN — SODIUM CHLORIDE 3 UNITS/HR: 9 INJECTION, SOLUTION INTRAVENOUS at 09:00

## 2019-06-13 RX ADMIN — ETOMIDATE 10 MG: 2 INJECTION, SOLUTION INTRAVENOUS at 07:15

## 2019-06-13 RX ADMIN — FENTANYL CITRATE 250 MCG: 50 INJECTION, SOLUTION INTRAMUSCULAR; INTRAVENOUS at 08:27

## 2019-06-13 RX ADMIN — PROTAMINE SULFATE 50 MG: 10 INJECTION, SOLUTION INTRAVENOUS at 11:13

## 2019-06-13 RX ADMIN — Medication 10 ML: at 21:39

## 2019-06-13 RX ADMIN — Medication 15 ML: at 21:39

## 2019-06-13 RX ADMIN — CISATRACURIUM BESYLATE 10 MG: 2 INJECTION INTRAVENOUS at 11:00

## 2019-06-13 RX ADMIN — Medication 15 ML: at 05:06

## 2019-06-13 RX ADMIN — SODIUM CHLORIDE, POTASSIUM CHLORIDE, SODIUM LACTATE AND CALCIUM CHLORIDE: 600; 310; 30; 20 INJECTION, SOLUTION INTRAVENOUS at 05:06

## 2019-06-13 RX ADMIN — MIDAZOLAM HYDROCHLORIDE 2 MG: 1 INJECTION, SOLUTION INTRAMUSCULAR; INTRAVENOUS at 06:00

## 2019-06-13 RX ADMIN — VANCOMYCIN HYDROCHLORIDE 1500 MG: 10 INJECTION, POWDER, LYOPHILIZED, FOR SOLUTION INTRAVENOUS at 19:56

## 2019-06-13 RX ADMIN — HEPARIN SODIUM 37000 UNITS: 1000 INJECTION, SOLUTION INTRAVENOUS; SUBCUTANEOUS at 09:00

## 2019-06-13 RX ADMIN — NITROGLYCERIN 25 MCG/MIN: 200 INJECTION, SOLUTION INTRAVENOUS at 08:09

## 2019-06-13 RX ADMIN — HYDROMORPHONE HYDROCHLORIDE 0.25 MG: 1 INJECTION, SOLUTION INTRAMUSCULAR; INTRAVENOUS; SUBCUTANEOUS at 20:06

## 2019-06-13 RX ADMIN — POTASSIUM CHLORIDE 20 MEQ: 29.8 INJECTION, SOLUTION INTRAVENOUS at 20:32

## 2019-06-13 RX ADMIN — MIDAZOLAM HYDROCHLORIDE 2 MG: 2 INJECTION, SOLUTION INTRAMUSCULAR; INTRAVENOUS at 07:04

## 2019-06-13 RX ADMIN — FAMOTIDINE 20 MG: 10 INJECTION, SOLUTION INTRAVENOUS at 14:50

## 2019-06-13 RX ADMIN — FENTANYL CITRATE 250 MCG: 50 INJECTION, SOLUTION INTRAMUSCULAR; INTRAVENOUS at 08:00

## 2019-06-13 RX ADMIN — KETOROLAC TROMETHAMINE 30 MG: 30 INJECTION, SOLUTION INTRAMUSCULAR; INTRAVENOUS at 11:30

## 2019-06-13 RX ADMIN — DEXTROSE AND SODIUM CHLORIDE: 5; .45 INJECTION, SOLUTION INTRAVENOUS at 12:36

## 2019-06-13 RX ADMIN — SODIUM CHLORIDE: 900 INJECTION, SOLUTION INTRAVENOUS at 05:17

## 2019-06-13 RX ADMIN — CISATRACURIUM BESYLATE 10 MG: 2 INJECTION INTRAVENOUS at 08:00

## 2019-06-13 RX ADMIN — DOBUTAMINE 2 MCG/KG/MIN: 12.5 INJECTION, SOLUTION, CONCENTRATE INTRAVENOUS at 07:30

## 2019-06-13 RX ADMIN — MUPIROCIN: 20 OINTMENT TOPICAL at 14:51

## 2019-06-13 RX ADMIN — HYDROMORPHONE HYDROCHLORIDE 0.5 MG: 1 INJECTION, SOLUTION INTRAMUSCULAR; INTRAVENOUS; SUBCUTANEOUS at 12:49

## 2019-06-13 RX ADMIN — SODIUM CHLORIDE 1.29 UNITS/HR: 900 INJECTION, SOLUTION INTRAVENOUS at 12:45

## 2019-06-13 RX ADMIN — NEOSTIGMINE METHYLSULFATE 4 MG: 1 INJECTION INTRAVENOUS at 11:51

## 2019-06-13 RX ADMIN — AMINOCAPROIC ACID 1 G/HR: 250 INJECTION, SOLUTION INTRAVENOUS at 07:30

## 2019-06-13 RX ADMIN — HYDROMORPHONE HYDROCHLORIDE 0.25 MG: 1 INJECTION, SOLUTION INTRAMUSCULAR; INTRAVENOUS; SUBCUTANEOUS at 22:35

## 2019-06-13 RX ADMIN — SODIUM CHLORIDE, SODIUM LACTATE, POTASSIUM CHLORIDE, AND CALCIUM CHLORIDE: 600; 310; 30; 20 INJECTION, SOLUTION INTRAVENOUS at 06:55

## 2019-06-13 RX ADMIN — HYDROMORPHONE HYDROCHLORIDE 0.5 MG: 1 INJECTION, SOLUTION INTRAMUSCULAR; INTRAVENOUS; SUBCUTANEOUS at 15:39

## 2019-06-13 RX ADMIN — GLYCOPYRROLATE 0.4 MG: 0.2 INJECTION, SOLUTION INTRAMUSCULAR; INTRAVENOUS at 11:51

## 2019-06-13 RX ADMIN — ALBUTEROL SULFATE 2.5 MG: 2.5 SOLUTION RESPIRATORY (INHALATION) at 19:30

## 2019-06-13 RX ADMIN — DEXTROSE AND SODIUM CHLORIDE: 5; 450 INJECTION, SOLUTION INTRAVENOUS at 12:36

## 2019-06-13 RX ADMIN — VANCOMYCIN HYDROCHLORIDE 1.5 G: 10 INJECTION, POWDER, LYOPHILIZED, FOR SOLUTION INTRAVENOUS at 07:30

## 2019-06-13 RX ADMIN — DEXTROSE MONOHYDRATE 5 MCG/MIN: 5 INJECTION, SOLUTION INTRAVENOUS at 13:00

## 2019-06-13 RX ADMIN — ONDANSETRON 4 MG: 2 INJECTION INTRAMUSCULAR; INTRAVENOUS at 11:30

## 2019-06-13 RX ADMIN — ALBUTEROL SULFATE 2.5 MG: 2.5 SOLUTION RESPIRATORY (INHALATION) at 16:51

## 2019-06-13 RX ADMIN — MUPIROCIN: 20 OINTMENT TOPICAL at 05:06

## 2019-06-13 RX ADMIN — CISATRACURIUM BESYLATE 20 MG: 2 INJECTION INTRAVENOUS at 07:15

## 2019-06-13 RX ADMIN — CLINDAMYCIN PHOSPHATE 900 MG: 900 INJECTION, SOLUTION INTRAVENOUS at 18:42

## 2019-06-13 ASSESSMENT — PULMONARY FUNCTION TESTS
PIF_VALUE: 21
PIF_VALUE: 2
PIF_VALUE: 20
PIF_VALUE: 25
PIF_VALUE: 8
PIF_VALUE: 2
PIF_VALUE: 2
PIF_VALUE: 24
PIF_VALUE: 21
PIF_VALUE: 22
PIF_VALUE: 23
PIF_VALUE: 2
PIF_VALUE: 4
PIF_VALUE: 1
PIF_VALUE: 1
PIF_VALUE: 21
PIF_VALUE: 25
PIF_VALUE: 22
PIF_VALUE: 1
PIF_VALUE: 23
PIF_VALUE: 20
PIF_VALUE: 1
PIF_VALUE: 23
PIF_VALUE: 22
PIF_VALUE: 1
PIF_VALUE: 22
PIF_VALUE: 25
PIF_VALUE: 2
PIF_VALUE: 1
PIF_VALUE: 31
PIF_VALUE: 1
PIF_VALUE: 1
PIF_VALUE: 24
PIF_VALUE: 1
PIF_VALUE: 25
PIF_VALUE: 1
PIF_VALUE: 1
PIF_VALUE: 15
PIF_VALUE: 24
PIF_VALUE: 13
PIF_VALUE: 1
PIF_VALUE: 23
PIF_VALUE: 22
PIF_VALUE: 1
PIF_VALUE: 23
PIF_VALUE: 22
PIF_VALUE: 26
PIF_VALUE: 20
PIF_VALUE: 1
PIF_VALUE: 1
PIF_VALUE: 22
PIF_VALUE: 22
PIF_VALUE: 23
PIF_VALUE: 22
PIF_VALUE: 22
PIF_VALUE: 4
PIF_VALUE: 1
PIF_VALUE: 1
PIF_VALUE: 20
PIF_VALUE: 1
PIF_VALUE: 21
PIF_VALUE: 23
PIF_VALUE: 21
PIF_VALUE: 1
PIF_VALUE: 27
PIF_VALUE: 22
PIF_VALUE: 25
PIF_VALUE: 1
PIF_VALUE: 1
PIF_VALUE: 23
PIF_VALUE: 1
PIF_VALUE: 24
PIF_VALUE: 1
PIF_VALUE: 23
PIF_VALUE: 1
PIF_VALUE: 25
PIF_VALUE: 15
PIF_VALUE: 1
PIF_VALUE: 1
PIF_VALUE: 35
PIF_VALUE: 1
PIF_VALUE: 22
PIF_VALUE: 22
PIF_VALUE: 23
PIF_VALUE: 22
PIF_VALUE: 25
PIF_VALUE: 18
PIF_VALUE: 23
PIF_VALUE: 21
PIF_VALUE: 23
PIF_VALUE: 23
PIF_VALUE: 24
PIF_VALUE: 21
PIF_VALUE: 24
PIF_VALUE: 23
PIF_VALUE: 24
PIF_VALUE: 21
PIF_VALUE: 23
PIF_VALUE: 1
PIF_VALUE: 1
PIF_VALUE: 22
PIF_VALUE: 1
PIF_VALUE: 2
PIF_VALUE: 22
PIF_VALUE: 22
PIF_VALUE: 23
PIF_VALUE: 1
PIF_VALUE: 2
PIF_VALUE: 1
PIF_VALUE: 23
PIF_VALUE: 2
PIF_VALUE: 21
PIF_VALUE: 1
PIF_VALUE: 27
PIF_VALUE: 1
PIF_VALUE: 0
PIF_VALUE: 1
PIF_VALUE: 15
PIF_VALUE: 23
PIF_VALUE: 1
PIF_VALUE: 24
PIF_VALUE: 1
PIF_VALUE: 26
PIF_VALUE: 1
PIF_VALUE: 1
PIF_VALUE: 25
PIF_VALUE: 1
PIF_VALUE: 22
PIF_VALUE: 9
PIF_VALUE: 23
PIF_VALUE: 23
PIF_VALUE: 22
PIF_VALUE: 1
PIF_VALUE: 2
PIF_VALUE: 24
PIF_VALUE: 21
PIF_VALUE: 23
PIF_VALUE: 13
PIF_VALUE: 1
PIF_VALUE: 21
PIF_VALUE: 20
PIF_VALUE: 1
PIF_VALUE: 25
PIF_VALUE: 1
PIF_VALUE: 21
PIF_VALUE: 1
PIF_VALUE: 27
PIF_VALUE: 34
PIF_VALUE: 25
PIF_VALUE: 24
PIF_VALUE: 23
PIF_VALUE: 1
PIF_VALUE: 21
PIF_VALUE: 26
PIF_VALUE: 23
PIF_VALUE: 1
PIF_VALUE: 21
PIF_VALUE: 20
PIF_VALUE: 1
PIF_VALUE: 24
PIF_VALUE: 23
PIF_VALUE: 20
PIF_VALUE: 2
PIF_VALUE: 23
PIF_VALUE: 26
PIF_VALUE: 1
PIF_VALUE: 23
PIF_VALUE: 23
PIF_VALUE: 1
PIF_VALUE: 24
PIF_VALUE: 1
PIF_VALUE: 29
PIF_VALUE: 23
PIF_VALUE: 23
PIF_VALUE: 1
PIF_VALUE: 24
PIF_VALUE: 21
PIF_VALUE: 24
PIF_VALUE: 1
PIF_VALUE: 14
PIF_VALUE: 1
PIF_VALUE: 1
PIF_VALUE: 24
PIF_VALUE: 1
PIF_VALUE: 23
PIF_VALUE: 24
PIF_VALUE: 1
PIF_VALUE: 21
PIF_VALUE: 1
PIF_VALUE: 1
PIF_VALUE: 21
PIF_VALUE: 1
PIF_VALUE: 27
PIF_VALUE: 23
PIF_VALUE: 23
PIF_VALUE: 2
PIF_VALUE: 23
PIF_VALUE: 24
PIF_VALUE: 1
PIF_VALUE: 21
PIF_VALUE: 3
PIF_VALUE: 1
PIF_VALUE: 1
PIF_VALUE: 23
PIF_VALUE: 1
PIF_VALUE: 22
PIF_VALUE: 1
PIF_VALUE: 1
PIF_VALUE: 23
PIF_VALUE: 1
PIF_VALUE: 1
PIF_VALUE: 23
PIF_VALUE: 23
PIF_VALUE: 20
PIF_VALUE: 31
PIF_VALUE: 1
PIF_VALUE: 21
PIF_VALUE: 22
PIF_VALUE: 1
PIF_VALUE: 25
PIF_VALUE: 3
PIF_VALUE: 23
PIF_VALUE: 22
PIF_VALUE: 21
PIF_VALUE: 23
PIF_VALUE: 2
PIF_VALUE: 1
PIF_VALUE: 20
PIF_VALUE: 4
PIF_VALUE: 1
PIF_VALUE: 21
PIF_VALUE: 24
PIF_VALUE: 1
PIF_VALUE: 15
PIF_VALUE: 24
PIF_VALUE: 1
PIF_VALUE: 4
PIF_VALUE: 13
PIF_VALUE: 1
PIF_VALUE: 1
PIF_VALUE: 4
PIF_VALUE: 24
PIF_VALUE: 4
PIF_VALUE: 1
PIF_VALUE: 2
PIF_VALUE: 1
PIF_VALUE: 21
PIF_VALUE: 1
PIF_VALUE: 23
PIF_VALUE: 19
PIF_VALUE: 1
PIF_VALUE: 21
PIF_VALUE: 1
PIF_VALUE: 22
PIF_VALUE: 24
PIF_VALUE: 1
PIF_VALUE: 24
PIF_VALUE: 3
PIF_VALUE: 25
PIF_VALUE: 25
PIF_VALUE: 24
PIF_VALUE: 1
PIF_VALUE: 22
PIF_VALUE: 21
PIF_VALUE: 1
PIF_VALUE: 23
PIF_VALUE: 2
PIF_VALUE: 1
PIF_VALUE: 23
PIF_VALUE: 1
PIF_VALUE: 24
PIF_VALUE: 1
PIF_VALUE: 23
PIF_VALUE: 32
PIF_VALUE: 21
PIF_VALUE: 25
PIF_VALUE: 22
PIF_VALUE: 21
PIF_VALUE: 23
PIF_VALUE: 23
PIF_VALUE: 1
PIF_VALUE: 33
PIF_VALUE: 23
PIF_VALUE: 1
PIF_VALUE: 22
PIF_VALUE: 25
PIF_VALUE: 22
PIF_VALUE: 21

## 2019-06-13 ASSESSMENT — PAIN SCALES - GENERAL
PAINLEVEL_OUTOF10: 7
PAINLEVEL_OUTOF10: 0
PAINLEVEL_OUTOF10: 0
PAINLEVEL_OUTOF10: 7
PAINLEVEL_OUTOF10: 6
PAINLEVEL_OUTOF10: 0
PAINLEVEL_OUTOF10: 0
PAINLEVEL_OUTOF10: 5

## 2019-06-13 ASSESSMENT — PAIN DESCRIPTION - PAIN TYPE
TYPE: SURGICAL PAIN

## 2019-06-13 ASSESSMENT — PAIN DESCRIPTION - LOCATION
LOCATION: CHEST;INCISION
LOCATION: CHEST
LOCATION: CHEST;INCISION

## 2019-06-13 ASSESSMENT — PAIN DESCRIPTION - FREQUENCY
FREQUENCY: CONTINUOUS
FREQUENCY: CONTINUOUS

## 2019-06-13 ASSESSMENT — PAIN DESCRIPTION - PROGRESSION
CLINICAL_PROGRESSION: GRADUALLY WORSENING
CLINICAL_PROGRESSION: GRADUALLY WORSENING

## 2019-06-13 ASSESSMENT — PAIN DESCRIPTION - ORIENTATION: ORIENTATION: MID;LEFT

## 2019-06-13 ASSESSMENT — PAIN DESCRIPTION - DESCRIPTORS
DESCRIPTORS: ACHING;DULL
DESCRIPTORS: ACHING;PRESSURE
DESCRIPTORS: ACHING;DULL

## 2019-06-13 NOTE — ANESTHESIA POSTPROCEDURE EVALUATION
Department of Anesthesiology  Postprocedure Note    Patient: Shelly Phelps  MRN: [id number]  YOB: 1955  Date of evaluation: 6/13/2019  Time:  12:24 PM     Procedure Summary     Date:  06/13/19 Room / Location:  [facility] OR [address] [place] [address] OR    Anesthesia Start:  4095 Anesthesia Stop:  9048    Procedure:  CORONARY ARTERY BYPASS GRAFT X4, INTERNAL MAMMARY ARTERY AND SAPHENOUS VEIN GRAFT-TCP-BP (N/A Chest) Diagnosis:  (CORONARY ARTERY DISEASE)    Surgeon:  Mayi Oglesby MD Responsible Provider:  Juanjo Quintanilla MD    Anesthesia Type:  general ASA Status:  4          Anesthesia Type: general    Dolores Phase I:      Dolores Phase II:      Last vitals: Reviewed and per EMR flowsheets. Anesthesia Post Evaluation    Patient location during evaluation: ICU  Patient participation: complete - patient participated  Level of consciousness: awake  Pain scale: N/A.   Airway patency: patent  Nausea & Vomiting: no vomiting and no nausea  Complications: no  Cardiovascular status: hemodynamically stable  Respiratory status: acceptable and face mask  Hydration status: stable  Comments: [place]: ef~ 50-55% ; no swma ; MR unchanged ; probe out intact

## 2019-06-13 NOTE — DISCHARGE INSTR - COC
E78.5    Type 2 diabetes mellitus (HCC) E11.9       Isolation/Infection:   Isolation          No Isolation            Nurse Assessment:  Last Vital Signs: BP (!) 84/55   Pulse 79   Temp 99.1 °F (37.3 °C) (Core)   Resp 17   Ht 5' 1\" (1.549 m)   Wt 177 lb 11.1 oz (80.6 kg)   SpO2 95%   BMI 33.57 kg/m²     Last documented pain score (0-10 scale): Pain Level: 7  Last Weight:   Wt Readings from Last 1 Encounters:   06/13/19 177 lb 11.1 oz (80.6 kg)     Mental Status:  oriented, alert, coherent, logical, thought processes intact and able to concentrate and follow conversation    IV Access:  - None    Nursing Mobility/ADLs:  Walking   Independent  Transfer  Independent  Bathing  Assisted  Dressing  Independent  Toileting  Independent  Feeding  Independent  Med [address] [place]  Med Delivery   whole    Wound Care Documentation and Therapy:        Elimination:  Continence:   · Bowel: yes  · Bladder: yes  Urinary Catheter: None   Colostomy/Ileostomy/Ileal Conduit: no  Date of Last BM:6/19/19     Intake/Output Summary (Last 24 hours) at 6/13/2019 1514  Last data filed at 6/13/2019 1500  Gross per 24 hour   Intake 4746 ml   Output 2825 ml   Net 1921 ml     I/O last 3 completed shifts: In: 5309 [P.O.:120; I.V.:3276; Blood:750; IV Piggyback:600]  Out: 5492 [Urine:2775;  Chest Tube:50]    Safety Concerns:     None    Impairments/Disabilities:      None    Nutrition Therapy:  Current Nutrition Therapy:   - Oral Diet:  General    Routes of Feeding: Oral  Liquids: {Slp liquid thickness:73668}  Daily Fluid Restriction: yes - amount 1500  Last Modified Barium Swallow with Video (Video Swallowing Test): not done    Treatments at the Time of Hospital Discharge:   Respiratory Treatments:   Oxygen Therapy:02 2 liters nasal cannula   Ventilator:    - No ventilator support    Rehab Therapies: Skilled Nurse  Weight Bearing Status/Restrictions: sternal wound precautions  Other Medical Equipment (for information only, NOT a DME order):02 2 tanke with 02 2 liters nc   Other Treatments:     Patient's personal belongings (please select all that are sent with patient):  Cell phone    RN SIGNATURE:  Jeremy Hoffmann RN     CASE MANAGEMENT/SOCIAL WORK SECTION    Inpatient Status Date: ***    Readmission Risk Assessment Score:  Readmission Risk              Risk of Unplanned Readmission:        30           Discharging to Facility/ Agency   Name:  [facility] care    Address: [address] [place] [address] [address]  Phone: [phone]  Fax: [phone]      / signature: {Esignature:[id number]}    PHYSICIAN SECTION    Prognosis: Good    Condition at Discharge: Stable    Rehab Potential (if transferring to Rehab): Good    Recommended Labs or Other Treatments After Discharge:     MUST  [address] [facility]    Pre-op weight:   177 lbs    Incision/Wound Care:    Midsternal      Chest Tube Site     Legs         Clean with antibacterial soap and water daily. Monitor for signs of infection    Upper Extremity Restrictions (sternal precautions):  No lifting, pushing, pulling over 5 pounds for 8 weeks. Remove chest tubes sutures 10 days post-op, after checking with surgeon's office. Please remove IJ suture if it is still in.   1.  Wash EACH incision daily with separate wash cloth with antibacterial soap and water; pat dry. Do NOT apply anything to incisions - NO LOTIONS, HYDROGEN PEROXIDE, POWDER. 2.  If discharged with dressing on wound, remove dressing and wash daily with antibacterial soap and water. Leave open to air unless draining. 3. MANNY hose on during the day and off at night. Keep legs elevated while sitting, especially if edematous. 4.  Incentive spiropmeter X 10, cough and deep breath every hour while awake. 5.  Pulse Ox checks with each visit for home care and with vital signs for ECF. CALL if pulse ox less than 90%.   6.  Keep activity

## 2019-06-13 NOTE — PROGRESS NOTES
[address] [facility] Daily Progress Note      Admit Date:  6/11/2019    CC:  Chest pain on admission  NSTEMI    Subjective:  Ms. Ghazala Hummel feels fine on 6/12/19 am.    Objective:   BP (!) 111/58   Pulse 59   Temp 98 °F (36.7 °C) (Oral)   Resp 17   Ht 5' 1\" (1.549 m)   Wt 177 lb 11.1 oz (80.6 kg)   SpO2 96%   BMI 33.57 kg/m²       Intake/Output Summary (Last 24 hours) at 6/13/2019 0854  Last data filed at 6/13/2019 0615  Gross per 24 hour   Intake 703 ml   Output 1925 ml   Net -1222 ml       TELEMETRY: Sinus     Physical Exam:  General:  Awake, alert, NAD  Eyes:  EOMI PERRL anicteric  Skin:  Warm and dry. Neck:  JVP normal  Chest:  Diminshed. No Rales. Cardiovascular:  RRR, Normal S1S2. No Murmur. No Rub. No Gallops. Abdomen:  Soft NT  + bs  Extremities:  No edema  Groin stable. Neuro:  CN II-XII intact. No focal deficits. No weakness. No lateralizing findings. Psych:  Normal thought and affect. MSK:  No Cyanosis nor Clubbing.   Symmetrical strength upper and lower extremities    Medications:    ceFAZolin (ANCEF) IVPB  2 g Intravenous On Call to OR    chlorhexidine   Topical See Admin Instructions    midazolam  2 mg Intravenous Once    morphine  2 mg Intravenous Once    aspirin  325 mg Oral Daily    sodium chloride flush  10 mL Intravenous 2 times per day    insulin lispro  0-6 Units Subcutaneous TID WC    insulin lispro  0-3 Units Subcutaneous Nightly    famotidine  20 mg Oral BID    rosuvastatin  20 mg Oral Nightly      sodium chloride      lactated ringers 50 mL/hr at 06/13/19 0506    insulin (HUMAN R) non-weight based infusion      norepinephrine      sodium chloride 75 mL/hr at 06/11/19 1919    dextrose       gentamicin irrigation, heparin and plasmalyte, heparin and plasmalyte, sodium chloride, Local Anesthetic Custom Mixture, bupivacaine liposome, sodium chloride flush, magnesium hydroxide, ondansetron, nitroGLYCERIN, morphine, glucose, dextrose, glucagon (rDNA),

## 2019-06-13 NOTE — PROGRESS NOTES
Patient bathed with hibiclens solution. Plan of care discussed and patient agreeable. No further questions expressed about surgey in AM. VSS. Will continue to monitor closely.

## 2019-06-13 NOTE — PROGRESS NOTES
06/13/19 0100 106/62 -- -- 56 12 -- -- --   06/13/19 0000 (!) 106/56 97.8 °F (36.6 °C) Oral 67 13 92 % -- --   06/12/19 2300 (!) 87/47 -- -- 66 23 (!) 89 % -- --   06/12/19 2200 (!) 86/51 -- -- 56 19 90 % -- --   06/12/19 2100 (!) 98/46 -- -- 59 15 93 % -- --   06/12/19 2000 100/84 97.8 °F (36.6 °C) Oral 67 15 96 % -- --   06/12/19 1900 128/60 -- -- 69 18 -- -- --   06/12/19 1800 101/60 -- -- 53 20 97 % -- --   06/12/19 1700 123/69 -- -- 62 20 97 % -- --   06/12/19 1600 109/63 98.4 °F (36.9 °C) Oral 58 18 94 % -- --   06/12/19 1500 99/61 -- -- 57 13 97 % -- --   06/12/19 1410 -- -- -- -- -- 98 % -- --   06/12/19 1400 89/66 -- -- 63 16 99 % -- --   06/12/19 1300 115/63 -- -- 66 17 98 % -- --   06/12/19 1219 (!) 114/59 98.4 °F (36.9 °C) Oral 66 16 97 % -- --   06/12/19 1200 -- -- -- 74 18 97 % -- --   06/12/19 1100 109/63 -- -- 63 17 95 % -- --   06/12/19 1000 114/63 -- -- 67 21 91 % -- --   06/12/19 0900 114/60 -- -- 64 19 (!) 89 % -- --   06/12/19 0800 109/61 98.4 °F (36.9 °C) Oral 67 18 94 % -- --   06/12/19 0700 110/60 -- -- 55 14 96 % -- --         Intake/Output Summary (Last 24 hours) at 6/13/2019 0653  Last data filed at 6/13/2019 0615  Gross per 24 hour   Intake 703 ml   Output 1925 ml   Net -1222 ml       Well developed, well nourished in no acute distress    Skin: Warm, dry no active rash    HEENT:  PERRL, Conjunctiva without erythema or icterus, oral mucosa moist and intact. Neck:  Supple without adenopathy    Catheter:  See Above    Cor:  RRR S1,S2, OK. No murmur rub or gallop. Lungs:  Unlabored breathing. Clear to A & P    Abdomen:  Soft, nontender, nondistended, normoactive BS, no hepatosplenomegaly or palpable masses. No rebound or guarding. Pelvic/:  No nodes, rojas, flexiseal not present    Extremities:  No edema, cyanosis or clubbing    Neuro:  Awake, alert, well oriented. CN II-XII intact, no gross motor or sensory deficits.     Data    CBC:   Recent Labs     06/11/19  1810 06/12/19  0421 06/13/19  0413   WBC 4.3 4.4 3.2*   HGB 11.1* 11.5* 12.0   HCT 33.6* 34.9* 36.6   MCV 86.0 87.0 86.7    239 232     BMP/Mag:  Recent Labs     06/11/19  1810 06/12/19  0421 06/13/19  0413    143 144   K 3.4* 3.4* 3.7    109 109   CO2 22 24 25   BUN 11 11 12   CREATININE 0.7 0.7 0.7   MG 1.70* 1.90  --      LIVP:   Recent Labs     06/11/19 1810   AST 72*   ALT 25   BILIDIR <0.2   BILITOT <0.2   ALKPHOS 42     Coags:   Recent Labs     06/11/19  1345 06/11/19  1810 06/11/19 2007   PROTIME 11.5 13.2*  --    INR 1.01 1.16*  --    APTT 32.0  --  91.1*                ASSESSMENT AND PLAN     Patient Active Problem List   Diagnosis    Multiple myeloma not having achieved remission ([address]    Multiple myeloma in remission ([address]    NSTEMI (non-ST elevated myocardial infarction) ([address]    Chest pain    HTN (hypertension)    Hyperlipidemia    Type 2 diabetes mellitus ([address]       1. CAD - scheduled for CABG today    2. Myeloma - she is S/P RVD X 5 followed by an autologous BMT with HD Melphalan 2/7/2019. She is currently in remission and receiving maintenance Revlimid. This can be pro-thrombotic and will be stopped. After she recovers from CABG will consider Velcade maintenance.       Amy Enrique MD  [facility]  Please contact me through [address]

## 2019-06-13 NOTE — PROGRESS NOTES
direction from physician. 3. Bronchodilators will be administered via Metered Dose Inhaler (MDI) with spacer when the following criteria are met:  a. Alert and cooperative     b. HR < 140 bpm  c. RR < 30 bpm                d. Can demonstrate a 2-3 second inspiratory hold  4. Bronchodilators will be administered via Hand Held Nebulizer TANYA [facility]) to patients when ANY of the following criteria are met  a. Incognizant or uncooperative          b. Patients treated with HHN at Home        c. Unable to demonstrate proper use of MDI with spacer     d. RR > 30 bpm   5. Bronchodilators will be delivered via Metered Dose Inhaler (MDI), HHN, Aerogen to intubated patients on mechanical ventilation. 6. Inhalation medication orders will be delivered and/or substituted as outlined below. Aerosolized Medications Ordering and Administration Guidelines:    1. All Medications will be ordered by a physician, and their frequency and/or modality will be adjusted as defined by the patients Respiratory Severity Index (RSI) score. 2. If the patient does not have documented COPD, consider discontinuing anticholinergics when RSI is less than 9.  3. If the bronchospasm worsens (increased RSI), then the bronchodilator frequency can be increased to a maximum of every 4 hours. If greater than every 4 hours is required, the physician will be contacted. 4. If the bronchospasm improves, the frequency of the bronchodilator can be decreased, based on the patient's RSI, but not less than home treatment regimen frequency. 5. Bronchodilator(s) will be discontinued if patient has a RSI less than 9 and has received no scheduled or as needed treatment for 72  Hrs. Patients Ordered on a Mucolytic Agent:    1. Must always be administered with a bronchodilator. 2. Discontinue if patient experiences worsened bronchospasm, or secretions have lessened to the point that the patient is able to clear them with a cough.     Anti-inflammatory and Combination

## 2019-06-14 ENCOUNTER — APPOINTMENT (OUTPATIENT)
Dept: GENERAL RADIOLOGY | Age: 64
DRG: 233 | End: 2019-06-14
Payer: COMMERCIAL

## 2019-06-14 LAB
ANION GAP SERPL CALCULATED.3IONS-SCNC: 12 MMOL/L (ref 3–16)
ANION GAP SERPL CALCULATED.3IONS-SCNC: 13 MMOL/L (ref 3–16)
BASE EXCESS ARTERIAL: -4 (ref -3–3)
BASE EXCESS ARTERIAL: -7 (ref -3–3)
BASE EXCESS VENOUS: -6 (ref -3–3)
BLOOD BANK DISPENSE STATUS: NORMAL
BLOOD BANK PRODUCT CODE: NORMAL
BPU ID: NORMAL
BUN BLDV-MCNC: 13 MG/DL (ref 7–20)
BUN BLDV-MCNC: 13 MG/DL (ref 7–20)
CALCIUM IONIZED: 1.05 MMOL/L (ref 1.12–1.32)
CALCIUM IONIZED: 1.08 MMOL/L (ref 1.12–1.32)
CALCIUM SERPL-MCNC: 6.9 MG/DL (ref 8.3–10.6)
CALCIUM SERPL-MCNC: 7.2 MG/DL (ref 8.3–10.6)
CHLORIDE BLD-SCNC: 104 MMOL/L (ref 99–110)
CHLORIDE BLD-SCNC: 108 MMOL/L (ref 99–110)
CO2: 19 MMOL/L (ref 21–32)
CO2: 21 MMOL/L (ref 21–32)
CREAT SERPL-MCNC: 0.8 MG/DL (ref 0.6–1.2)
CREAT SERPL-MCNC: 0.9 MG/DL (ref 0.6–1.2)
DESCRIPTION BLOOD BANK: NORMAL
GFR AFRICAN AMERICAN: >60
GFR AFRICAN AMERICAN: >60
GFR NON-AFRICAN AMERICAN: >60
GFR NON-AFRICAN AMERICAN: >60
GLUCOSE BLD-MCNC: 101 MG/DL (ref 70–99)
GLUCOSE BLD-MCNC: 102 MG/DL (ref 70–99)
GLUCOSE BLD-MCNC: 104 MG/DL (ref 70–99)
GLUCOSE BLD-MCNC: 104 MG/DL (ref 70–99)
GLUCOSE BLD-MCNC: 113 MG/DL (ref 70–99)
GLUCOSE BLD-MCNC: 114 MG/DL (ref 70–99)
GLUCOSE BLD-MCNC: 125 MG/DL (ref 70–99)
GLUCOSE BLD-MCNC: 138 MG/DL (ref 70–99)
GLUCOSE BLD-MCNC: 139 MG/DL (ref 70–99)
GLUCOSE BLD-MCNC: 143 MG/DL (ref 70–99)
GLUCOSE BLD-MCNC: 164 MG/DL (ref 70–99)
GLUCOSE BLD-MCNC: 76 MG/DL (ref 70–99)
GLUCOSE BLD-MCNC: 86 MG/DL (ref 70–99)
GLUCOSE BLD-MCNC: 92 MG/DL (ref 70–99)
GLUCOSE BLD-MCNC: 95 MG/DL (ref 70–99)
GLUCOSE BLD-MCNC: 95 MG/DL (ref 70–99)
GLUCOSE BLD-MCNC: 96 MG/DL (ref 70–99)
GLUCOSE BLD-MCNC: 98 MG/DL (ref 70–99)
HCO3 ARTERIAL: 18.8 MMOL/L (ref 21–29)
HCO3 ARTERIAL: 21.9 MMOL/L (ref 21–29)
HCO3 VENOUS: 20.5 MMOL/L (ref 23–29)
HCT VFR BLD CALC: 24.6 % (ref 36–48)
HEMOGLOBIN: 8 G/DL (ref 12–16)
MAGNESIUM: 2.5 MG/DL (ref 1.8–2.4)
MCH RBC QN AUTO: 28.9 PG (ref 26–34)
MCHC RBC AUTO-ENTMCNC: 32.7 G/DL (ref 31–36)
MCV RBC AUTO: 88.3 FL (ref 80–100)
O2 SAT, ARTERIAL: 87 % (ref 93–100)
O2 SAT, ARTERIAL: 93 % (ref 93–100)
O2 SAT, VEN: 58 %
PCO2 ARTERIAL: 34.9 MM HG (ref 35–45)
PCO2 ARTERIAL: 38.6 MM HG (ref 35–45)
PCO2, VEN: 40.5 MM HG (ref 40–50)
PDW BLD-RTO: 13.9 % (ref 12.4–15.4)
PERFORMED ON: ABNORMAL
PERFORMED ON: NORMAL
PH ARTERIAL: 7.34 (ref 7.35–7.45)
PH ARTERIAL: 7.36 (ref 7.35–7.45)
PH VENOUS: 7.31 (ref 7.35–7.45)
PLATELET # BLD: 129 K/UL (ref 135–450)
PMV BLD AUTO: 8.3 FL (ref 5–10.5)
PO2 ARTERIAL: 54.5 MM HG (ref 75–108)
PO2 ARTERIAL: 71.4 MM HG (ref 75–108)
PO2, VEN: 33 MM HG
POC POTASSIUM: 5.1 MMOL/L (ref 3.5–5.1)
POC SAMPLE TYPE: ABNORMAL
POC SAMPLE TYPE: NORMAL
POTASSIUM SERPL-SCNC: 3.8 MMOL/L (ref 3.5–5.1)
POTASSIUM SERPL-SCNC: 4.6 MMOL/L (ref 3.5–5.1)
RBC # BLD: 2.78 M/UL (ref 4–5.2)
SODIUM BLD-SCNC: 137 MMOL/L (ref 136–145)
SODIUM BLD-SCNC: 140 MMOL/L (ref 136–145)
TCO2 ARTERIAL: 20 MMOL/L
TCO2 ARTERIAL: 23 MMOL/L
TCO2 CALC VENOUS: 22 MMOL/L
WBC # BLD: 8.9 K/UL (ref 4–11)

## 2019-06-14 PROCEDURE — P9040 RBC LEUKOREDUCED IRRADIATED: HCPCS

## 2019-06-14 PROCEDURE — 6370000000 HC RX 637 (ALT 250 FOR IP): Performed by: CLINICAL NURSE SPECIALIST

## 2019-06-14 PROCEDURE — P9045 ALBUMIN (HUMAN), 5%, 250 ML: HCPCS | Performed by: THORACIC SURGERY (CARDIOTHORACIC VASCULAR SURGERY)

## 2019-06-14 PROCEDURE — 2580000003 HC RX 258: Performed by: THORACIC SURGERY (CARDIOTHORACIC VASCULAR SURGERY)

## 2019-06-14 PROCEDURE — 2580000003 HC RX 258: Performed by: CLINICAL NURSE SPECIALIST

## 2019-06-14 PROCEDURE — 86923 COMPATIBILITY TEST ELECTRIC: CPT

## 2019-06-14 PROCEDURE — 2500000003 HC RX 250 WO HCPCS: Performed by: THORACIC SURGERY (CARDIOTHORACIC VASCULAR SURGERY)

## 2019-06-14 PROCEDURE — 71045 X-RAY EXAM CHEST 1 VIEW: CPT

## 2019-06-14 PROCEDURE — 94669 MECHANICAL CHEST WALL OSCILL: CPT

## 2019-06-14 PROCEDURE — 94150 VITAL CAPACITY TEST: CPT

## 2019-06-14 PROCEDURE — 82330 ASSAY OF CALCIUM: CPT

## 2019-06-14 PROCEDURE — 99233 SBSQ HOSP IP/OBS HIGH 50: CPT | Performed by: INTERNAL MEDICINE

## 2019-06-14 PROCEDURE — 99232 SBSQ HOSP IP/OBS MODERATE 35: CPT | Performed by: INTERNAL MEDICINE

## 2019-06-14 PROCEDURE — 2700000000 HC OXYGEN THERAPY PER DAY

## 2019-06-14 PROCEDURE — 82803 BLOOD GASES ANY COMBINATION: CPT

## 2019-06-14 PROCEDURE — 94761 N-INVAS EAR/PLS OXIMETRY MLT: CPT

## 2019-06-14 PROCEDURE — 6370000000 HC RX 637 (ALT 250 FOR IP): Performed by: THORACIC SURGERY (CARDIOTHORACIC VASCULAR SURGERY)

## 2019-06-14 PROCEDURE — 80048 BASIC METABOLIC PNL TOTAL CA: CPT

## 2019-06-14 PROCEDURE — 85027 COMPLETE CBC AUTOMATED: CPT

## 2019-06-14 PROCEDURE — 94640 AIRWAY INHALATION TREATMENT: CPT

## 2019-06-14 PROCEDURE — 36430 TRANSFUSION BLD/BLD COMPNT: CPT

## 2019-06-14 PROCEDURE — 2000000000 HC ICU R&B

## 2019-06-14 PROCEDURE — 37799 UNLISTED PX VASCULAR SURGERY: CPT

## 2019-06-14 PROCEDURE — 82947 ASSAY GLUCOSE BLOOD QUANT: CPT

## 2019-06-14 PROCEDURE — 83735 ASSAY OF MAGNESIUM: CPT

## 2019-06-14 PROCEDURE — 6360000002 HC RX W HCPCS: Performed by: CLINICAL NURSE SPECIALIST

## 2019-06-14 PROCEDURE — 36415 COLL VENOUS BLD VENIPUNCTURE: CPT

## 2019-06-14 PROCEDURE — 6360000002 HC RX W HCPCS: Performed by: THORACIC SURGERY (CARDIOTHORACIC VASCULAR SURGERY)

## 2019-06-14 PROCEDURE — 84132 ASSAY OF SERUM POTASSIUM: CPT

## 2019-06-14 RX ORDER — OXYCODONE HYDROCHLORIDE 5 MG/1
5 TABLET ORAL EVERY 4 HOURS PRN
Status: DISCONTINUED | OUTPATIENT
Start: 2019-06-14 | End: 2019-06-19 | Stop reason: HOSPADM

## 2019-06-14 RX ORDER — 0.9 % SODIUM CHLORIDE 0.9 %
250 INTRAVENOUS SOLUTION INTRAVENOUS ONCE
Status: DISCONTINUED | OUTPATIENT
Start: 2019-06-14 | End: 2019-06-14

## 2019-06-14 RX ORDER — ACYCLOVIR 800 MG/1
800 TABLET ORAL 2 TIMES DAILY
Status: DISCONTINUED | OUTPATIENT
Start: 2019-06-14 | End: 2019-06-19 | Stop reason: HOSPADM

## 2019-06-14 RX ORDER — OYSTER SHELL CALCIUM WITH VITAMIN D 500; 200 MG/1; [IU]/1
1 TABLET, FILM COATED ORAL 2 TIMES DAILY
Status: DISCONTINUED | OUTPATIENT
Start: 2019-06-14 | End: 2019-06-19 | Stop reason: HOSPADM

## 2019-06-14 RX ORDER — ACETAMINOPHEN 500 MG
1000 TABLET ORAL EVERY 6 HOURS
Status: DISCONTINUED | OUTPATIENT
Start: 2019-06-14 | End: 2019-06-19 | Stop reason: HOSPADM

## 2019-06-14 RX ORDER — IBUPROFEN 400 MG/1
600 TABLET ORAL EVERY 6 HOURS PRN
Status: DISCONTINUED | OUTPATIENT
Start: 2019-06-14 | End: 2019-06-19 | Stop reason: HOSPADM

## 2019-06-14 RX ORDER — FUROSEMIDE 10 MG/ML
20 INJECTION INTRAMUSCULAR; INTRAVENOUS EVERY 6 HOURS
Status: DISCONTINUED | OUTPATIENT
Start: 2019-06-14 | End: 2019-06-14

## 2019-06-14 RX ORDER — SODIUM CHLORIDE 9 MG/ML
INJECTION, SOLUTION INTRAVENOUS
Status: DISPENSED
Start: 2019-06-14 | End: 2019-06-15

## 2019-06-14 RX ORDER — OXYCODONE HYDROCHLORIDE 5 MG/1
10 TABLET ORAL EVERY 4 HOURS PRN
Status: DISCONTINUED | OUTPATIENT
Start: 2019-06-14 | End: 2019-06-19 | Stop reason: HOSPADM

## 2019-06-14 RX ORDER — OXYCODONE HYDROCHLORIDE 5 MG/1
10 TABLET ORAL EVERY 4 HOURS PRN
Status: DISCONTINUED | OUTPATIENT
Start: 2019-06-14 | End: 2019-06-14 | Stop reason: SDUPTHER

## 2019-06-14 RX ORDER — FUROSEMIDE 10 MG/ML
20 INJECTION INTRAMUSCULAR; INTRAVENOUS ONCE
Status: COMPLETED | OUTPATIENT
Start: 2019-06-14 | End: 2019-06-14

## 2019-06-14 RX ORDER — OXYCODONE HYDROCHLORIDE 5 MG/1
5 TABLET ORAL EVERY 4 HOURS PRN
Status: DISCONTINUED | OUTPATIENT
Start: 2019-06-14 | End: 2019-06-14 | Stop reason: SDUPTHER

## 2019-06-14 RX ORDER — SODIUM CHLORIDE, SODIUM LACTATE, POTASSIUM CHLORIDE, CALCIUM CHLORIDE 600; 310; 30; 20 MG/100ML; MG/100ML; MG/100ML; MG/100ML
INJECTION, SOLUTION INTRAVENOUS
Status: DISPENSED
Start: 2019-06-14 | End: 2019-06-14

## 2019-06-14 RX ADMIN — IBUPROFEN 600 MG: 400 TABLET, FILM COATED ORAL at 16:45

## 2019-06-14 RX ADMIN — ALBUTEROL SULFATE 2.5 MG: 2.5 SOLUTION RESPIRATORY (INHALATION) at 19:49

## 2019-06-14 RX ADMIN — POTASSIUM CHLORIDE 10 MEQ: 10 TABLET, EXTENDED RELEASE ORAL at 17:56

## 2019-06-14 RX ADMIN — INSULIN GLARGINE 10 UNITS: 100 INJECTION, SOLUTION SUBCUTANEOUS at 22:02

## 2019-06-14 RX ADMIN — FUROSEMIDE 20 MG: 10 INJECTION, SOLUTION INTRAMUSCULAR; INTRAVENOUS at 14:56

## 2019-06-14 RX ADMIN — CLINDAMYCIN PHOSPHATE 900 MG: 900 INJECTION, SOLUTION INTRAVENOUS at 12:49

## 2019-06-14 RX ADMIN — OXYCODONE HYDROCHLORIDE AND ACETAMINOPHEN 2 TABLET: 5; 325 TABLET ORAL at 10:33

## 2019-06-14 RX ADMIN — POTASSIUM CHLORIDE 10 MEQ: 10 TABLET, EXTENDED RELEASE ORAL at 12:50

## 2019-06-14 RX ADMIN — FUROSEMIDE 40 MG: 10 INJECTION, SOLUTION INTRAMUSCULAR; INTRAVENOUS at 07:42

## 2019-06-14 RX ADMIN — ALBUTEROL SULFATE 2.5 MG: 2.5 SOLUTION RESPIRATORY (INHALATION) at 02:35

## 2019-06-14 RX ADMIN — ASPIRIN 81 MG: 81 TABLET, COATED ORAL at 08:52

## 2019-06-14 RX ADMIN — ACETAMINOPHEN 1000 MG: 500 TABLET ORAL at 13:16

## 2019-06-14 RX ADMIN — MUPIROCIN: 20 OINTMENT TOPICAL at 08:53

## 2019-06-14 RX ADMIN — IBUPROFEN 600 MG: 400 TABLET, FILM COATED ORAL at 22:43

## 2019-06-14 RX ADMIN — CALCIUM CARBONATE-VITAMIN D TAB 500 MG-200 UNIT 1 TABLET: 500-200 TAB at 22:09

## 2019-06-14 RX ADMIN — ACETAMINOPHEN 1000 MG: 500 TABLET ORAL at 17:55

## 2019-06-14 RX ADMIN — POTASSIUM CHLORIDE 20 MEQ: 29.8 INJECTION, SOLUTION INTRAVENOUS at 17:56

## 2019-06-14 RX ADMIN — POLYETHYLENE GLYCOL (3350) 17 G: 17 POWDER, FOR SOLUTION ORAL at 08:52

## 2019-06-14 RX ADMIN — HYDROMORPHONE HYDROCHLORIDE 0.5 MG: 1 INJECTION, SOLUTION INTRAMUSCULAR; INTRAVENOUS; SUBCUTANEOUS at 02:21

## 2019-06-14 RX ADMIN — FUROSEMIDE 20 MG: 10 INJECTION, SOLUTION INTRAMUSCULAR; INTRAVENOUS at 11:47

## 2019-06-14 RX ADMIN — MAGNESIUM HYDROXIDE 30 ML: 400 SUSPENSION ORAL at 08:53

## 2019-06-14 RX ADMIN — CLINDAMYCIN PHOSPHATE 900 MG: 900 INJECTION, SOLUTION INTRAVENOUS at 00:22

## 2019-06-14 RX ADMIN — SODIUM CHLORIDE 250 ML: 9 INJECTION, SOLUTION INTRAVENOUS at 09:45

## 2019-06-14 RX ADMIN — CALCIUM CHLORIDE 1 G: 100 INJECTION, SOLUTION INTRAVENOUS at 05:58

## 2019-06-14 RX ADMIN — MUPIROCIN: 20 OINTMENT TOPICAL at 21:58

## 2019-06-14 RX ADMIN — CALCIUM CARBONATE-VITAMIN D TAB 500 MG-200 UNIT 1 TABLET: 500-200 TAB at 11:47

## 2019-06-14 RX ADMIN — ALBUTEROL SULFATE 2.5 MG: 2.5 SOLUTION RESPIRATORY (INHALATION) at 15:01

## 2019-06-14 RX ADMIN — HYDROMORPHONE HYDROCHLORIDE 0.5 MG: 1 INJECTION, SOLUTION INTRAMUSCULAR; INTRAVENOUS; SUBCUTANEOUS at 07:38

## 2019-06-14 RX ADMIN — CLOPIDOGREL 75 MG: 75 TABLET, FILM COATED ORAL at 08:52

## 2019-06-14 RX ADMIN — ACYCLOVIR 800 MG: 800 TABLET ORAL at 21:58

## 2019-06-14 RX ADMIN — OXYCODONE HYDROCHLORIDE 10 MG: 5 TABLET ORAL at 17:56

## 2019-06-14 RX ADMIN — VANCOMYCIN HYDROCHLORIDE 1500 MG: 10 INJECTION, POWDER, LYOPHILIZED, FOR SOLUTION INTRAVENOUS at 08:51

## 2019-06-14 RX ADMIN — Medication 10 ML: at 21:58

## 2019-06-14 RX ADMIN — ALBUTEROL SULFATE 2.5 MG: 2.5 SOLUTION RESPIRATORY (INHALATION) at 08:19

## 2019-06-14 RX ADMIN — HYDROMORPHONE HYDROCHLORIDE 0.5 MG: 1 INJECTION, SOLUTION INTRAMUSCULAR; INTRAVENOUS; SUBCUTANEOUS at 20:09

## 2019-06-14 RX ADMIN — Medication 10 ML: at 07:30

## 2019-06-14 RX ADMIN — CLINDAMYCIN PHOSPHATE 900 MG: 900 INJECTION, SOLUTION INTRAVENOUS at 07:15

## 2019-06-14 RX ADMIN — VANCOMYCIN HYDROCHLORIDE 1500 MG: 10 INJECTION, POWDER, LYOPHILIZED, FOR SOLUTION INTRAVENOUS at 19:03

## 2019-06-14 RX ADMIN — DOCUSATE SODIUM 100 MG: 100 CAPSULE, LIQUID FILLED ORAL at 22:09

## 2019-06-14 RX ADMIN — HYDROMORPHONE HYDROCHLORIDE 0.25 MG: 1 INJECTION, SOLUTION INTRAMUSCULAR; INTRAVENOUS; SUBCUTANEOUS at 12:06

## 2019-06-14 RX ADMIN — FUROSEMIDE 10 MG/HR: 10 INJECTION, SOLUTION INTRAMUSCULAR; INTRAVENOUS at 17:42

## 2019-06-14 RX ADMIN — ROSUVASTATIN CALCIUM 20 MG: 20 TABLET, COATED ORAL at 17:56

## 2019-06-14 RX ADMIN — Medication 15 ML: at 08:53

## 2019-06-14 RX ADMIN — DOCUSATE SODIUM 100 MG: 100 CAPSULE, LIQUID FILLED ORAL at 08:52

## 2019-06-14 RX ADMIN — Medication 15 ML: at 21:57

## 2019-06-14 RX ADMIN — POTASSIUM CHLORIDE 10 MEQ: 10 TABLET, EXTENDED RELEASE ORAL at 08:52

## 2019-06-14 RX ADMIN — INSULIN LISPRO 2 UNITS: 100 INJECTION, SOLUTION INTRAVENOUS; SUBCUTANEOUS at 22:00

## 2019-06-14 RX ADMIN — ACYCLOVIR 800 MG: 800 TABLET ORAL at 11:47

## 2019-06-14 ASSESSMENT — PAIN - FUNCTIONAL ASSESSMENT
PAIN_FUNCTIONAL_ASSESSMENT: PREVENTS OR INTERFERES WITH ALL ACTIVE AND SOME PASSIVE ACTIVITIES
PAIN_FUNCTIONAL_ASSESSMENT: PREVENTS OR INTERFERES WITH MANY ACTIVE NOT PASSIVE ACTIVITIES

## 2019-06-14 ASSESSMENT — PAIN DESCRIPTION - FREQUENCY
FREQUENCY: CONTINUOUS
FREQUENCY: CONTINUOUS
FREQUENCY: INTERMITTENT
FREQUENCY: CONTINUOUS
FREQUENCY: INTERMITTENT
FREQUENCY: CONTINUOUS

## 2019-06-14 ASSESSMENT — PAIN SCALES - GENERAL
PAINLEVEL_OUTOF10: 0
PAINLEVEL_OUTOF10: 4
PAINLEVEL_OUTOF10: 0
PAINLEVEL_OUTOF10: 1
PAINLEVEL_OUTOF10: 7
PAINLEVEL_OUTOF10: 6
PAINLEVEL_OUTOF10: 8
PAINLEVEL_OUTOF10: 4
PAINLEVEL_OUTOF10: 1
PAINLEVEL_OUTOF10: 3
PAINLEVEL_OUTOF10: 7
PAINLEVEL_OUTOF10: 1
PAINLEVEL_OUTOF10: 1
PAINLEVEL_OUTOF10: 2

## 2019-06-14 ASSESSMENT — PAIN DESCRIPTION - PAIN TYPE
TYPE: SURGICAL PAIN

## 2019-06-14 ASSESSMENT — PAIN DESCRIPTION - ONSET
ONSET: ON-GOING

## 2019-06-14 ASSESSMENT — PAIN DESCRIPTION - DESCRIPTORS
DESCRIPTORS: SORE

## 2019-06-14 ASSESSMENT — PAIN DESCRIPTION - ORIENTATION
ORIENTATION: MID

## 2019-06-14 ASSESSMENT — PAIN DESCRIPTION - LOCATION
LOCATION: STERNUM
LOCATION: BACK
LOCATION: STERNUM

## 2019-06-14 ASSESSMENT — PAIN DESCRIPTION - PROGRESSION
CLINICAL_PROGRESSION: NOT CHANGED
CLINICAL_PROGRESSION: GRADUALLY IMPROVING
CLINICAL_PROGRESSION: NOT CHANGED
CLINICAL_PROGRESSION: NOT CHANGED
CLINICAL_PROGRESSION: GRADUALLY IMPROVING
CLINICAL_PROGRESSION: NOT CHANGED

## 2019-06-14 ASSESSMENT — PAIN DESCRIPTION - DIRECTION
RADIATING_TOWARDS: BACK

## 2019-06-14 NOTE — PROGRESS NOTES
[address] [place] [address] Daily Progress Note      Admit Date:  6/11/2019    CC:  Follow up CAD s/p CABG x 4   Subjective:  Ms. Dianne Solorio has mild incisional discomfort post cath    Objective:   BP (!) 87/49   Pulse 81   Temp 98.1 °F (36.7 °C) (Oral)   Resp 18   Ht 5' 1\" (1.549 m)   Wt 195 lb 12.3 oz (88.8 kg)   SpO2 (!) 88%   BMI 36.99 kg/m²       Intake/Output Summary (Last 24 hours) at 6/14/2019 1814  Last data filed at 6/14/2019 1800  Gross per 24 hour   Intake 4386.93 ml   Output 2386 ml   Net 2000.93 ml       TELEMETRY: Sinus    Physical Exam:  General:  Awake, alert, incisional chest pain  Eyes:  EOMI PERRL anicteric  Skin:  Warm and dry. Healing sternal wounds and leg wounds  Neck:  JVP normal  Chest:  Diminshed. No Rales. Cardiovascular:  RRR, Normal S1S2. No Murmur. No Rub. No Gallops. Abdomen:  Soft NT  + bs  Extremities:  ++ edema  Neuro:  CN II-XII intact. No focal deficits. Psych:  Normal thought and affect. MSK:  No Cyanosis nor Clubbing.   Symmetrical strength    Medications:    calcium-vitamin D  1 tablet Oral BID    acyclovir  800 mg Oral BID    [START ON 6/15/2019] magnesium oxide  400 mg Oral BID    acetaminophen  1,000 mg Oral Q6H    insulin glargine  0.125 Units/kg Subcutaneous Nightly    rosuvastatin  20 mg Oral QPM    sodium chloride flush  10 mL Intravenous 2 times per day    vancomycin (VANCOCIN) IV  1,500 mg Intravenous Q12H    docusate sodium  100 mg Oral BID    [START ON 6/15/2019] famotidine  20 mg Oral BID    [Held by provider] metoprolol tartrate  12.5 mg Oral BID    chlorhexidine  15 mL Mouth/Throat BID    mupirocin   Nasal BID    potassium chloride  10 mEq Oral TID WC    aspirin  81 mg Oral Daily    clopidogrel  75 mg Oral Daily    insulin lispro  0-12 Units Subcutaneous Q4H    magnesium hydroxide  30 mL Oral Daily    polyethylene glycol  17 g Oral Daily    albuterol  2.5 mg Nebulization TID      lactated ringers      sodium chloride     

## 2019-06-14 NOTE — PROGRESS NOTES
to monitor, transfuse 1 unite PRBC                  -  Acute postoperative thrombocytopenia - continue to monitor        · FEN - cardiac diet with 1500 ml fluid restriction     - dc insulin infusion later today, was diet controlled preop     - resume preop calcium carbonate  · Remove chest tubes when criteria met  · DC Revlimid per oncology - prothrombic  · Restart preop acyclovir  · VTE prophylaxis - SCD and MANNY hose  · Disposition - Transition to progressive care when norepinephrine weaned off. PT/OT  Discussed patient with Dr Christy Calderon who is agreeable to assessment and plan.         Bruce Rachel, [address] [facility] pager  6/14/2019  7:45 AM     Transfuse 1 unit of blood  Transition  Keep art line  Wean Levophed as tolerated  Lasix 20 every 6 hours  DC wire  DC mediastinal tube

## 2019-06-14 NOTE — PROGRESS NOTES
HYDROmorphone, ondansetron, hydrALAZINE, metoprolol, albumin human, norepinephrine, glucose, dextrose, glucagon (rDNA), dextrose, albuterol      ROS:  negative ten point review of systems.     Physical    Patient Vitals for the past 24 hrs:   BP Temp Temp src Pulse Resp SpO2 Weight   06/14/19 0944 (!) 114/58 98.9 °F (37.2 °C) Oral -- -- -- --   06/14/19 0917 (!) 105/58 98.7 °F (37.1 °C) Oral 81 19 (!) 87 % --   06/14/19 0900 (!) 101/56 -- -- 80 28 (!) 89 % --   06/14/19 0845 -- -- -- 83 (!) 32 (!) 86 % --   06/14/19 0833 -- -- -- -- -- 90 % --   06/14/19 0830 -- -- -- 81 16 (!) 85 % --   06/14/19 0819 -- -- -- -- -- (!) 89 % --   06/14/19 0815 -- -- -- 73 20 91 % --   06/14/19 0800 (!) 101/49 -- -- 76 17 (!) 86 % --   06/14/19 0715 -- 100.6 °F (38.1 °C) CORE 73 21 90 % --   06/14/19 0700 113/60 100.8 °F (38.2 °C) CORE 72 23 90 % 195 lb 12.3 oz (88.8 kg)   06/14/19 0645 -- -- -- 71 23 (!) 88 % --   06/14/19 0630 -- -- -- 71 17 90 % --   06/14/19 0615 -- -- -- 71 16 90 % --   06/14/19 0600 (!) 87/49 100.4 °F (38 °C) CORE 72 23 (!) 89 % --   06/14/19 0545 -- -- -- 71 22 90 % --   06/14/19 0530 -- -- -- 71 17 90 % --   06/14/19 0515 -- -- -- 71 16 90 % --   06/14/19 0500 (!) 90/56 100.3 °F (37.9 °C) CORE 69 16 91 % --   06/14/19 0415 -- -- -- 70 18 90 % --   06/14/19 0400 (!) 96/49 100.1 °F (37.8 °C) CORE 73 17 90 % --   06/14/19 0345 -- -- -- 74 12 (!) 89 % --   06/14/19 0330 -- -- -- 72 16 (!) 89 % --   06/14/19 0315 -- -- -- 71 19 (!) 89 % --   06/14/19 0300 (!) 96/48 100.1 °F (37.8 °C) CORE 70 18 (!) 88 % --   06/14/19 0245 -- -- -- 70 21 90 % --   06/14/19 0235 -- -- -- -- 18 90 % --   06/14/19 0230 -- -- -- 70 16 (!) 85 % --   06/14/19 0200 -- 100 °F (37.8 °C) CORE 67 12 91 % --   06/14/19 0145 -- -- -- 68 17 91 % --   06/14/19 0130 -- -- -- 68 14 91 % --   06/14/19 0115 (!) 96/53 -- -- 70 23 (!) 89 % --   06/14/19 0100 -- 100 °F (37.8 °C) CORE 70 15 92 % --   06/14/19 0045 -- -- -- 71 14 92 % --   06/14/19 0030 -- (Last 24 hours) at 6/14/2019 1029  Last data filed at 6/14/2019 0957  Gross per 24 hour   Intake 7846.5 ml   Output 3066 ml   Net 4780.5 ml       Well developed, well nourished in no acute distress    Skin: Warm, dry no active rash    HEENT:  PERRL, Conjunctiva without erythema or icterus, oral mucosa moist and intact. Neck:  Supple without adenopathy    Catheter:  R IJ , left hand PIV, right art line    Cor:  RRR S1,S2, OK. No murmur rub or gallop. Lungs:  Unlabored breathing. Clear to A & P. Mediastinal tubes in place    Abdomen:  Soft, nontender, nondistended, normoactive BS, no hepatosplenomegaly or palpable masses. No rebound or guarding. Pelvic/:  No nodes, rojas present    Extremities:  No edema, cyanosis or clubbing    Neuro:  Awake, alert, well oriented. CN II-XII intact, no gross motor or sensory deficits. Data    CBC:   Recent Labs     06/13/19  1225 06/1955 06/14/19 0418   WBC 9.5 10.3 8.9   HGB 9.3* 9.1* 8.0*   HCT 28.7* 28.2* 24.6*   MCV 88.0 87.7 88.3   * 153 129*     BMP/Mag:  Recent Labs     06/13/19  1225 06/1955 06/14/19 0418    136 140   K 4.0 4.4 4.6   * 109 108   CO2 22 21 19*   BUN 10 10 13   CREATININE 0.6 0.8 0.8   MG 3.30* 2.80* 2.50*     LIVP:   Recent Labs     06/11/19 1810   AST 72*   ALT 25   BILIDIR <0.2   BILITOT <0.2   ALKPHOS 42     Coags:   Recent Labs     06/11/19  1345 06/11/19 1810 06/11/19 2007 06/1955   PROTIME 11.5 13.2*  --  13.7*   INR 1.01 1.16*  --  1.20*   APTT 32.0  --  91.1*  --                 ASSESSMENT AND PLAN     Patient Active Problem List   Diagnosis    Multiple myeloma not having achieved remission ([facility]    Multiple myeloma in remission ([address]    [facility]non-ST [facility]) [facility]    Chest pain    HTN (hypertension)    Hyperlipidemia    Type 2 diabetes mellitus ([facility]       1. CAD - POD 1 CABG     2.  Myeloma - she is S/P RVD X 5 followed by an autologous BMT with HD Melphalan

## 2019-06-14 NOTE — PROGRESS NOTES
place.  There is no pneumothorax. No signs or symptoms of infection. Unlikely PE that early in the course. No fever or leukocytosis. Discussed with CT surgery. Lasix drip started. We also started her on vapotherm.     Will closely monitor     Luis Stewart MD

## 2019-06-15 ENCOUNTER — APPOINTMENT (OUTPATIENT)
Dept: GENERAL RADIOLOGY | Age: 64
DRG: 233 | End: 2019-06-15
Payer: COMMERCIAL

## 2019-06-15 LAB
ANION GAP SERPL CALCULATED.3IONS-SCNC: 9 MMOL/L (ref 3–16)
BASE EXCESS ARTERIAL: -0.1 MMOL/L (ref -3–3)
BUN BLDV-MCNC: 14 MG/DL (ref 7–20)
CALCIUM SERPL-MCNC: 7.3 MG/DL (ref 8.3–10.6)
CARBOXYHEMOGLOBIN ARTERIAL: 1.5 % (ref 0–1.5)
CHLORIDE BLD-SCNC: 105 MMOL/L (ref 99–110)
CO2: 23 MMOL/L (ref 21–32)
CREAT SERPL-MCNC: 1 MG/DL (ref 0.6–1.2)
GFR AFRICAN AMERICAN: >60
GFR NON-AFRICAN AMERICAN: 56
GLUCOSE BLD-MCNC: 117 MG/DL (ref 70–99)
GLUCOSE BLD-MCNC: 139 MG/DL (ref 70–99)
GLUCOSE BLD-MCNC: 141 MG/DL (ref 70–99)
GLUCOSE BLD-MCNC: 158 MG/DL (ref 70–99)
GLUCOSE BLD-MCNC: 196 MG/DL (ref 70–99)
HCO3 ARTERIAL: 24 MMOL/L (ref 21–29)
HCT VFR BLD CALC: 25.8 % (ref 36–48)
HEMOGLOBIN, ART, EXTENDED: 8.9 G/DL
HEMOGLOBIN: 8.7 G/DL (ref 12–16)
MAGNESIUM: 2.2 MG/DL (ref 1.8–2.4)
MCH RBC QN AUTO: 29.1 PG (ref 26–34)
MCHC RBC AUTO-ENTMCNC: 33.7 G/DL (ref 31–36)
MCV RBC AUTO: 86.2 FL (ref 80–100)
METHEMOGLOBIN ARTERIAL: 1 % (ref 0–1.4)
O2 SAT, ARTERIAL: 94 % (ref 93–100)
PCO2 ARTERIAL: 36.4 MMHG (ref 35–45)
PDW BLD-RTO: 14.8 % (ref 12.4–15.4)
PERFORMED ON: ABNORMAL
PH ARTERIAL: 7.43 (ref 7.35–7.45)
PLATELET # BLD: 90 K/UL (ref 135–450)
PLATELET SLIDE REVIEW: ABNORMAL
PMV BLD AUTO: 8.1 FL (ref 5–10.5)
PO2 ARTERIAL: 64.2 MMHG (ref 75–108)
POTASSIUM SERPL-SCNC: 3.9 MMOL/L (ref 3.5–5.1)
RBC # BLD: 3 M/UL (ref 4–5.2)
SODIUM BLD-SCNC: 137 MMOL/L (ref 136–145)
TCO2 ARTERIAL: 25 MMOL/L
WBC # BLD: 8.4 K/UL (ref 4–11)

## 2019-06-15 PROCEDURE — 82803 BLOOD GASES ANY COMBINATION: CPT

## 2019-06-15 PROCEDURE — 6360000002 HC RX W HCPCS: Performed by: CLINICAL NURSE SPECIALIST

## 2019-06-15 PROCEDURE — 6360000002 HC RX W HCPCS: Performed by: INTERNAL MEDICINE

## 2019-06-15 PROCEDURE — 2580000003 HC RX 258: Performed by: THORACIC SURGERY (CARDIOTHORACIC VASCULAR SURGERY)

## 2019-06-15 PROCEDURE — 99233 SBSQ HOSP IP/OBS HIGH 50: CPT | Performed by: INTERNAL MEDICINE

## 2019-06-15 PROCEDURE — 99024 POSTOP FOLLOW-UP VISIT: CPT | Performed by: THORACIC SURGERY (CARDIOTHORACIC VASCULAR SURGERY)

## 2019-06-15 PROCEDURE — 94669 MECHANICAL CHEST WALL OSCILL: CPT

## 2019-06-15 PROCEDURE — 6370000000 HC RX 637 (ALT 250 FOR IP): Performed by: CLINICAL NURSE SPECIALIST

## 2019-06-15 PROCEDURE — 99232 SBSQ HOSP IP/OBS MODERATE 35: CPT | Performed by: INTERNAL MEDICINE

## 2019-06-15 PROCEDURE — 80048 BASIC METABOLIC PNL TOTAL CA: CPT

## 2019-06-15 PROCEDURE — 83735 ASSAY OF MAGNESIUM: CPT

## 2019-06-15 PROCEDURE — 94150 VITAL CAPACITY TEST: CPT

## 2019-06-15 PROCEDURE — 71045 X-RAY EXAM CHEST 1 VIEW: CPT

## 2019-06-15 PROCEDURE — 2580000003 HC RX 258: Performed by: CLINICAL NURSE SPECIALIST

## 2019-06-15 PROCEDURE — 85027 COMPLETE CBC AUTOMATED: CPT

## 2019-06-15 PROCEDURE — 37799 UNLISTED PX VASCULAR SURGERY: CPT

## 2019-06-15 PROCEDURE — 6370000000 HC RX 637 (ALT 250 FOR IP): Performed by: THORACIC SURGERY (CARDIOTHORACIC VASCULAR SURGERY)

## 2019-06-15 PROCEDURE — 94660 CPAP INITIATION&MGMT: CPT

## 2019-06-15 PROCEDURE — 6360000002 HC RX W HCPCS: Performed by: THORACIC SURGERY (CARDIOTHORACIC VASCULAR SURGERY)

## 2019-06-15 PROCEDURE — 94761 N-INVAS EAR/PLS OXIMETRY MLT: CPT

## 2019-06-15 PROCEDURE — 2700000000 HC OXYGEN THERAPY PER DAY

## 2019-06-15 PROCEDURE — 94640 AIRWAY INHALATION TREATMENT: CPT

## 2019-06-15 PROCEDURE — 2000000000 HC ICU R&B

## 2019-06-15 RX ORDER — LEVOFLOXACIN 5 MG/ML
750 INJECTION, SOLUTION INTRAVENOUS EVERY 24 HOURS
Status: DISCONTINUED | OUTPATIENT
Start: 2019-06-15 | End: 2019-06-19 | Stop reason: HOSPADM

## 2019-06-15 RX ORDER — ALBUTEROL SULFATE 2.5 MG/3ML
2.5 SOLUTION RESPIRATORY (INHALATION) EVERY 4 HOURS
Status: DISCONTINUED | OUTPATIENT
Start: 2019-06-15 | End: 2019-06-18

## 2019-06-15 RX ADMIN — ROSUVASTATIN CALCIUM 20 MG: 20 TABLET, COATED ORAL at 21:20

## 2019-06-15 RX ADMIN — FUROSEMIDE 10 MG/HR: 10 INJECTION, SOLUTION INTRAMUSCULAR; INTRAVENOUS at 00:57

## 2019-06-15 RX ADMIN — MAGNESIUM HYDROXIDE 30 ML: 400 SUSPENSION ORAL at 09:29

## 2019-06-15 RX ADMIN — OXYCODONE HYDROCHLORIDE 5 MG: 5 TABLET ORAL at 10:41

## 2019-06-15 RX ADMIN — INSULIN LISPRO 2 UNITS: 100 INJECTION, SOLUTION INTRAVENOUS; SUBCUTANEOUS at 21:21

## 2019-06-15 RX ADMIN — FAMOTIDINE 20 MG: 20 TABLET, FILM COATED ORAL at 09:29

## 2019-06-15 RX ADMIN — POLYETHYLENE GLYCOL (3350) 17 G: 17 POWDER, FOR SOLUTION ORAL at 09:30

## 2019-06-15 RX ADMIN — CLOPIDOGREL 75 MG: 75 TABLET, FILM COATED ORAL at 09:29

## 2019-06-15 RX ADMIN — CALCIUM CARBONATE-VITAMIN D TAB 500 MG-200 UNIT 1 TABLET: 500-200 TAB at 21:21

## 2019-06-15 RX ADMIN — ACETAMINOPHEN 1000 MG: 500 TABLET ORAL at 17:34

## 2019-06-15 RX ADMIN — LEVOFLOXACIN 750 MG: 5 INJECTION, SOLUTION INTRAVENOUS at 11:27

## 2019-06-15 RX ADMIN — FAMOTIDINE 20 MG: 20 TABLET, FILM COATED ORAL at 21:20

## 2019-06-15 RX ADMIN — MUPIROCIN: 20 OINTMENT TOPICAL at 11:28

## 2019-06-15 RX ADMIN — POTASSIUM CHLORIDE 10 MEQ: 10 TABLET, EXTENDED RELEASE ORAL at 17:34

## 2019-06-15 RX ADMIN — CALCIUM CARBONATE-VITAMIN D TAB 500 MG-200 UNIT 1 TABLET: 500-200 TAB at 09:29

## 2019-06-15 RX ADMIN — POTASSIUM CHLORIDE 10 MEQ: 10 TABLET, EXTENDED RELEASE ORAL at 13:13

## 2019-06-15 RX ADMIN — ACETAMINOPHEN 1000 MG: 500 TABLET ORAL at 13:12

## 2019-06-15 RX ADMIN — FUROSEMIDE 10 MG/HR: 10 INJECTION, SOLUTION INTRAMUSCULAR; INTRAVENOUS at 11:43

## 2019-06-15 RX ADMIN — MAGNESIUM OXIDE TAB 400 MG (241.3 MG ELEMENTAL MG) 400 MG: 400 (241.3 MG) TAB at 21:21

## 2019-06-15 RX ADMIN — MAGNESIUM OXIDE TAB 400 MG (241.3 MG ELEMENTAL MG) 400 MG: 400 (241.3 MG) TAB at 09:29

## 2019-06-15 RX ADMIN — POTASSIUM CHLORIDE 10 MEQ: 10 TABLET, EXTENDED RELEASE ORAL at 09:29

## 2019-06-15 RX ADMIN — ACYCLOVIR 800 MG: 800 TABLET ORAL at 09:30

## 2019-06-15 RX ADMIN — ALBUTEROL SULFATE 2.5 MG: 2.5 SOLUTION RESPIRATORY (INHALATION) at 19:58

## 2019-06-15 RX ADMIN — INSULIN LISPRO 2 UNITS: 100 INJECTION, SOLUTION INTRAVENOUS; SUBCUTANEOUS at 18:58

## 2019-06-15 RX ADMIN — Medication 15 ML: at 21:20

## 2019-06-15 RX ADMIN — OXYCODONE HYDROCHLORIDE 5 MG: 5 TABLET ORAL at 14:20

## 2019-06-15 RX ADMIN — ALBUTEROL SULFATE 2.5 MG: 2.5 SOLUTION RESPIRATORY (INHALATION) at 16:08

## 2019-06-15 RX ADMIN — DOCUSATE SODIUM 100 MG: 100 CAPSULE, LIQUID FILLED ORAL at 21:21

## 2019-06-15 RX ADMIN — ALBUTEROL SULFATE 2.5 MG: 2.5 SOLUTION RESPIRATORY (INHALATION) at 00:32

## 2019-06-15 RX ADMIN — DOCUSATE SODIUM 100 MG: 100 CAPSULE, LIQUID FILLED ORAL at 09:29

## 2019-06-15 RX ADMIN — ACETAMINOPHEN 1000 MG: 500 TABLET ORAL at 00:54

## 2019-06-15 RX ADMIN — ALBUTEROL SULFATE 2.5 MG: 2.5 SOLUTION RESPIRATORY (INHALATION) at 08:01

## 2019-06-15 RX ADMIN — Medication 15 ML: at 08:30

## 2019-06-15 RX ADMIN — MUPIROCIN: 20 OINTMENT TOPICAL at 20:20

## 2019-06-15 RX ADMIN — INSULIN LISPRO 2 UNITS: 100 INJECTION, SOLUTION INTRAVENOUS; SUBCUTANEOUS at 11:24

## 2019-06-15 RX ADMIN — ALBUTEROL SULFATE 2.5 MG: 2.5 SOLUTION RESPIRATORY (INHALATION) at 04:16

## 2019-06-15 RX ADMIN — ALBUTEROL SULFATE 2.5 MG: 2.5 SOLUTION RESPIRATORY (INHALATION) at 11:52

## 2019-06-15 RX ADMIN — Medication 10 ML: at 09:00

## 2019-06-15 RX ADMIN — ASPIRIN 81 MG: 81 TABLET, COATED ORAL at 09:29

## 2019-06-15 ASSESSMENT — PAIN - FUNCTIONAL ASSESSMENT
PAIN_FUNCTIONAL_ASSESSMENT: PREVENTS OR INTERFERES SOME ACTIVE ACTIVITIES AND ADLS
PAIN_FUNCTIONAL_ASSESSMENT: ACTIVITIES ARE NOT PREVENTED
PAIN_FUNCTIONAL_ASSESSMENT: ACTIVITIES ARE NOT PREVENTED

## 2019-06-15 ASSESSMENT — PAIN SCALES - GENERAL
PAINLEVEL_OUTOF10: 4
PAINLEVEL_OUTOF10: 1
PAINLEVEL_OUTOF10: 0
PAINLEVEL_OUTOF10: 3
PAINLEVEL_OUTOF10: 4
PAINLEVEL_OUTOF10: 4
PAINLEVEL_OUTOF10: 1
PAINLEVEL_OUTOF10: 2
PAINLEVEL_OUTOF10: 4
PAINLEVEL_OUTOF10: 4
PAINLEVEL_OUTOF10: 2
PAINLEVEL_OUTOF10: 0

## 2019-06-15 ASSESSMENT — PAIN DESCRIPTION - DESCRIPTORS
DESCRIPTORS: ACHING;SORE
DESCRIPTORS: DULL;SORE
DESCRIPTORS: DULL;SORE

## 2019-06-15 ASSESSMENT — PAIN DESCRIPTION - ONSET
ONSET: GRADUAL
ONSET: ON-GOING
ONSET: PROGRESSIVE
ONSET: ON-GOING

## 2019-06-15 ASSESSMENT — PAIN DESCRIPTION - LOCATION
LOCATION: SHOULDER
LOCATION: SHOULDER;STERNUM
LOCATION: SHOULDER
LOCATION: STERNUM

## 2019-06-15 ASSESSMENT — PAIN DESCRIPTION - ORIENTATION
ORIENTATION: RIGHT
ORIENTATION: MID
ORIENTATION: RIGHT

## 2019-06-15 ASSESSMENT — PAIN DESCRIPTION - PAIN TYPE
TYPE: ACUTE PAIN

## 2019-06-15 ASSESSMENT — PAIN DESCRIPTION - PROGRESSION
CLINICAL_PROGRESSION: GRADUALLY IMPROVING
CLINICAL_PROGRESSION: NOT CHANGED
CLINICAL_PROGRESSION: NOT CHANGED
CLINICAL_PROGRESSION: GRADUALLY IMPROVING

## 2019-06-15 ASSESSMENT — PAIN DESCRIPTION - FREQUENCY
FREQUENCY: CONTINUOUS
FREQUENCY: CONTINUOUS
FREQUENCY: INTERMITTENT
FREQUENCY: CONTINUOUS
FREQUENCY: CONTINUOUS
FREQUENCY: INTERMITTENT

## 2019-06-15 NOTE — PROGRESS NOTES
[facility] Daily Progress Note      Admit Date:  6/11/2019    CC:  Follow up CAD s/p CABG x 4   Subjective:  Ms. Pauline Thomson has mild incisional discomfort post cath. Up in chair this am.    Objective:   BP (!) 101/52   Pulse 88   Temp 98.1 °F (36.7 °C) (Oral)   Resp 20   Ht 5' 1\" (1.549 m)   Wt 193 lb 2 oz (87.6 kg)   SpO2 96%   BMI 36.49 kg/m²       Intake/Output Summary (Last 24 hours) at 6/15/2019 1246  Last data filed at 6/15/2019 1151  Gross per 24 hour   Intake 1879.98 ml   Output 4095 ml   Net -2215.02 ml       TELEMETRY: Sinus    Physical Exam:  General:  Awake, alert, incisional chest pain  Eyes:  EOMI PERRL anicteric  Skin:  Warm and dry. Healing sternal wounds and leg wounds  Neck:  JVP normal  Chest:  Diminshed. No Rales. Cardiovascular:  RRR, Normal S1S2. No Murmur. No Rub. No Gallops. Abdomen:  Soft NT  + bs  Extremities:  ++ edema  Neuro:  CN II-XII intact. No focal deficits. Psych:  Normal thought and affect. MSK:  No Cyanosis nor Clubbing.   Symmetrical strength    Medications:    albuterol  2.5 mg Nebulization Q4H    levofloxacin  750 mg Intravenous Q24H    calcium-vitamin D  1 tablet Oral BID    acyclovir  800 mg Oral BID    magnesium oxide  400 mg Oral BID    acetaminophen  1,000 mg Oral Q6H    rosuvastatin  20 mg Oral QPM    sodium chloride flush  10 mL Intravenous 2 times per day    docusate sodium  100 mg Oral BID    famotidine  20 mg Oral BID    [Held by provider] metoprolol tartrate  12.5 mg Oral BID    chlorhexidine  15 mL Mouth/Throat BID    mupirocin   Nasal BID    potassium chloride  10 mEq Oral TID WC    aspirin  81 mg Oral Daily    clopidogrel  75 mg Oral Daily    insulin lispro  0-12 Units Subcutaneous Q4H    magnesium hydroxide  30 mL Oral Daily    polyethylene glycol  17 g Oral Daily      furosemide (LASIX) 1mg/ml infusion 10 mg/hr (06/15/19 1143)    norepinephrine Stopped (06/14/19 1900)    dextrose       oxyCODONE **OR** oxyCODONE, ibuprofen, sodium chloride flush, potassium chloride, magnesium sulfate, calcium chloride IVPB, calcium chloride IVPB, HYDROmorphone **OR** HYDROmorphone, ondansetron, hydrALAZINE, metoprolol, norepinephrine, glucose, dextrose, glucagon (rDNA), dextrose, albuterol    Lab Data:  CBC:   Recent Labs     06/13/19  1957/19  0418 06/15/19  0621   WBC 10.3 8.9 8.4   HGB 9.1* 8.0* 8.7*   HCT 28.2* 24.6* 25.8*   MCV 87.7 88.3 86.2    129* 90*     BMP:   Recent Labs     06/14/19 0418 06/14/19  1627 06/15/19  0621    137 137   K 4.6 3.8 3.9    104 105   CO2 19* 21 23   BUN 13 13 14   CREATININE 0.8 0.9 1.0     LIVER PROFILE: No results for input(s): AST, ALT, LIPASE, BILIDIR, BILITOT, ALKPHOS in the last 72 hours. Invalid input(s): AMYLASE,  ALB  PT/INR:   Recent Labs     06/1957   PROTIME 13.7*   INR 1.20*     APTT:   No results for input(s): APTT in the last 72 hours. Assessment:  Patient Active Problem List    Diagnosis Date Noted    HTN (hypertension) 06/12/2019    Hyperlipidemia 06/12/2019    Type 2 diabetes mellitus ([facility] 06/12/2019    NSTEMI (non-ST elevated myocardial infarction) ([facility] 06/11/2019    Chest pain 06/11/2019    Multiple myeloma in remission ([facility] 02/05/2019    Multiple myeloma not having achieved remission ([facility]        Plan:  1. CAD:  S/p CABG day 1.    2. S/p CABG:  Continue supportive care. Continue diuresis with furosemide gtt. .  IS.  CT. Up in chair today. SR monitor.     Core Measures:  · Discharge instructions:   · LVEF documented:   · ACEI for LV dysfunction:   · Smoking Cessation:    Vi Summers MD 6/15/2019 12:46 PM

## 2019-06-15 NOTE — PROGRESS NOTES
magnesium oxide  400 mg Oral BID    acetaminophen  1,000 mg Oral Q6H    rosuvastatin  20 mg Oral QPM    sodium chloride flush  10 mL Intravenous 2 times per day    docusate sodium  100 mg Oral BID    famotidine  20 mg Oral BID    [Held by provider] metoprolol tartrate  12.5 mg Oral BID    chlorhexidine  15 mL Mouth/Throat BID    mupirocin   Nasal BID    potassium chloride  10 mEq Oral TID WC    aspirin  81 mg Oral Daily    clopidogrel  75 mg Oral Daily    insulin lispro  0-12 Units Subcutaneous Q4H    magnesium hydroxide  30 mL Oral Daily    polyethylene glycol  17 g Oral Daily       Infusions:   furosemide (LASIX) 1mg/ml infusion 10 mg/hr (06/15/19 0057)    norepinephrine Stopped (06/14/19 1900)    dextrose       DATA REVIEW:   CBC:   Recent Labs     06/13/19  0413  06/13/19  1110 06/13/19  1225 06/1955 06/14/19  0418 06/15/19  0621   WBC 3.2*  --   --  9.5 10.3 8.9 8.4   HGB 12.0   < > 6.5* 9.3* 9.1* 8.0* 8.7*   HCT 36.6  --   --  28.7* 28.2* 24.6* 25.8*   MCV 86.7  --   --  88.0 87.7 88.3 86.2     --   --  125* 153 129* 90*    < > = values in this interval not displayed. BMP:   Recent Labs     06/13/19  1225 06/13/19  1955/19  0418 06/14/19  1627 06/15/19  0621    136 140 137 137   K 4.0 4.4 4.6 3.8 3.9   * 109 108 104 105   CO2 22 21 19* 21 23   GLUCOSE 102* 128* 98 139* 117*   BUN 10 10 13 13 14   CREATININE 0.6 0.8 0.8 0.9 1.0   CALCIUM 7.8* 7.0* 6.9* 7.2* 7.3*   MG 3.30* 2.80* 2.50*  --  2.20     Accucheck Glucoses:   Recent Labs     06/14/19  1454 06/14/19  1617 06/14/19  1812 06/14/19  2155 06/15/19  0728   POCGLU 113* 143* 138* 164* 158*     PT/INR:   Recent Labs     06/1955   PROTIME 13.7*   INR 1.20*     APTT:   No results for input(s): APTT in the last 72 hours. LFT:   No results for input(s): AST, ALT, ALB, BILIDIR, BILITOT, ALKPHOS in the last 72 hours.   Troponin:   Lab Results   Component Value Date    TROPONINI 2.80 ([facility]) 06/11/2019 -  Acute postoperative thrombocytopenia - continue to monitor        · FEN - cardiac diet with 1500 ml fluid restriction     - dc insulin infusion later today, was diet controlled preop     - resume preop calcium carbonat  · Remove mediastinal tube, leave left pleural for now. · DC Revlimid per oncology - prothrombic  · VTE prophylaxis - SCD and MANNY hose  · Disposition - Maintain ICU status due to tenuous respiratory status and high oxygen requirements.  If her Randy drop further, she will need to go on BIPAP

## 2019-06-15 NOTE — PROGRESS NOTES
Chest Tube Removal Documentation    ___Y__ Order is written to remove chest tube  ___y__ Criteria is met per Clinical Pathway to remove    No air leak is noted and crepitus is absent. Instructed patient on the procedure. Premedicated patient with   Oxycodone 5mg PO  Wound is clean, dry and intact. Cleansed with chloroprep. Chest tube:   Mediastinal x1 Pleural x  Removed without difficulty and vaseline gauze applied. Purse string sutures secured and dry sterile dressing applied. Bilateral breath sounds are audible. SpO2 is 93 %  Patient tolerated procedure well.       Herber Burch RN  6/15/2019  11:41 AM

## 2019-06-15 NOTE — PROGRESS NOTES
RESPIRATORY THERAPY ASSESSMENT    Name:  Marnie Krishnamurthy  Medical Record Number:  [id number]  Age: 66 y.o. Gender: female  : 1955  Today's Date:  6/15/2019  Room:  [facility]/1091-20    Assessment     Is the patient being admitted for a COPD or Asthma exacerbation? No   (If yes the patient will be seen every 4 hours for the first 24 hours and then reassessed)    Patient Admission Diagnosis      Allergies  Allergies   Allergen Reactions    Pcn [Penicillins]     Lipitor [Atorvastatin] Other (See Comments)     Leg cramping       Minimum Predicted Vital Capacity:     748          Actual Vital Capacity:      250              Pulmonary History:No history  Home Oxygen Therapy:  room air  Home Respiratory Therapy:None   Current Respiratory Therapy:  Albuterol hhn tid and prn  Treatment Type: HHN, IS  Medications: Albuterol    Respiratory Severity Index(RSI)   Patients with orders for inhalation medications, oxygen, or any therapeutic treatment modality will be placed on Respiratory Protocol. They will be assessed with the first treatment and at least every 72 hours thereafter. The following severity scale will be used to determine frequency of treatment intervention.     Smoking History: Smoking History Less than 1ppd or less than 15 pack year = 1    Social History  Social History     Tobacco Use    Smoking status: Former Smoker     Packs/day: 0.50     Years: 20.00     Pack years: 10.00     Types: Cigarettes     Last attempt to quit: 2018     Years since quittin.5    Smokeless tobacco: Never Used   Substance Use Topics    Alcohol use: Yes     Comment: rare    Drug use: No       Recent Surgical History: Thoracic or Upper Airway = 3  Past Surgical History  Past Surgical History:   Procedure Laterality Date    CORONARY ARTERY BYPASS GRAFT N/A 2019    CORONARY ARTERY BYPASS GRAFT X4, INTERNAL MAMMARY ARTERY AND SAPHENOUS VEIN GRAFT-TCP-BP performed by Raimundo Santos MD at [place] [address] partial for fibroids    INSERTION / REMOVAL / REPLACEMENT VENOUS ACCESS CATHETER N/A 1/24/2019    TRIFUSION CATHETER INSERTION LEFT SUBCLAVIAN performed by Clarice Fairchild MD at [address] [facility]         Level of Consciousness: Alert, Oriented, and Cooperative = 0    Level of Activity: Non weight bearing- transfers bed to chair only = 3    Respiratory Pattern: Dyspnea with exertion;Irregular pattern;or RR less than 6 = 2    Breath Sounds: Diminshed bilaterally and/or crackles = 2    Sputum  Sputum Color: None, Tenacity: None, Sputum How Obtained: Cough on request  Cough: Strong, spontaneous, non-productive = 0    Vital Signs   BP (!) 82/50   Pulse 65   Temp 97.5 °F (36.4 °C) (Oral)   Resp 19   Ht 5' 1\" (1.549 m)   Wt 195 lb 12.3 oz (88.8 kg)   SpO2 96%   BMI 36.99 kg/m²   SPO2 (COPD values may differ): Less than 86% on room air or greater than 92% on FiO2 greater than 50% = 4    Peak Flow (asthma only): not applicable = 0    RSI: 69-39 = Q4WA (every four hours while awake) and Q4hrs PRN        Plan       Goals: medication delivery, mobilize retained secretions, volume expansion and improve oxygenation    Patient/caregiver was educated on the proper method of use for Respiratory Care Devices:  Yes      Level of patient/caregiver understanding able to:   ? Verbalize understanding   ? Demonstrate understanding       ? Teach back        ? Needs reinforcement       ? No available caregiver               ? Other:     Response to education:  Good     Is patient being placed on Home Treatment Regimen? No     Does the patient have everything they need prior to discharge? NA     Comments: pt requesting night time txs. Pts o2 requirement 100%, 40 lpm vapotherm    Plan of Care: continue hhn with albuterol q4hrs and prn. Continue Tatianna Mcclendon and iS    Electronically signed by Kath Ashraf RCP on 6/15/2019 at 12:53 AM    Respiratory Protocol Guidelines     1.  Assessment and treatment by Respiratory Therapy will be initiated for medication and therapeutic interventions upon initiation of aerosolized medication. 2. Physician will be contacted for respiratory rate (RR) greater than 35 breaths per minute. Therapy will be held for heart rate (HR) greater than 140 beats per minute, pending direction from physician. 3. Bronchodilators will be administered via Metered Dose Inhaler (MDI) with spacer when the following criteria are met:  a. Alert and cooperative     b. HR < 140 bpm  c. RR < 30 bpm                d. Can demonstrate a 2-3 second inspiratory hold  4. Bronchodilators will be administered via Hand Held Nebulizer [facility]) to patients when ANY of the following criteria are met  a. Incognizant or uncooperative          b. Patients treated with HHN at Home        c. Unable to demonstrate proper use of MDI with spacer     d. RR > 30 bpm   5. Bronchodilators will be delivered via Metered Dose Inhaler (MDI), HHN, Aerogen to intubated patients on mechanical ventilation. 6. Inhalation medication orders will be delivered and/or substituted as outlined below. Aerosolized Medications Ordering and Administration Guidelines:    1. All Medications will be ordered by a physician, and their frequency and/or modality will be adjusted as defined by the patients Respiratory Severity Index (RSI) score. 2. If the patient does not have documented COPD, consider discontinuing anticholinergics when RSI is less than 9.  3. If the bronchospasm worsens (increased RSI), then the bronchodilator frequency can be increased to a maximum of every 4 hours. If greater than every 4 hours is required, the physician will be contacted. 4. If the bronchospasm improves, the frequency of the bronchodilator can be decreased, based on the patient's RSI, but not less than home treatment regimen frequency.   5. Bronchodilator(s) will be discontinued if patient has a RSI less than 9 and has received no scheduled or as needed treatment for 72 Hrs.    Patients Ordered on a Mucolytic Agent:    1. Must always be administered with a bronchodilator. 2. Discontinue if patient experiences worsened bronchospasm, or secretions have lessened to the point that the patient is able to clear them with a cough. Anti-inflammatory and Combination Medications:    1. If the patient lacks prior history of lung disease, is not using inhaled anti-inflammatory medication at home, and lacks wheezing by examination or by history for at least 24 hours, contact physician for possible discontinuation.

## 2019-06-16 ENCOUNTER — APPOINTMENT (OUTPATIENT)
Dept: GENERAL RADIOLOGY | Age: 64
DRG: 233 | End: 2019-06-16
Payer: COMMERCIAL

## 2019-06-16 LAB
ANION GAP SERPL CALCULATED.3IONS-SCNC: 10 MMOL/L (ref 3–16)
BUN BLDV-MCNC: 13 MG/DL (ref 7–20)
CALCIUM SERPL-MCNC: 8 MG/DL (ref 8.3–10.6)
CHLORIDE BLD-SCNC: 103 MMOL/L (ref 99–110)
CO2: 27 MMOL/L (ref 21–32)
CREAT SERPL-MCNC: 0.8 MG/DL (ref 0.6–1.2)
GFR AFRICAN AMERICAN: >60
GFR NON-AFRICAN AMERICAN: >60
GLUCOSE BLD-MCNC: 116 MG/DL (ref 70–99)
GLUCOSE BLD-MCNC: 125 MG/DL (ref 70–99)
GLUCOSE BLD-MCNC: 128 MG/DL (ref 70–99)
GLUCOSE BLD-MCNC: 129 MG/DL (ref 70–99)
GLUCOSE BLD-MCNC: 136 MG/DL (ref 70–99)
GLUCOSE BLD-MCNC: 153 MG/DL (ref 70–99)
HCT VFR BLD CALC: 27.8 % (ref 36–48)
HEMOGLOBIN: 9.3 G/DL (ref 12–16)
MAGNESIUM: 2.4 MG/DL (ref 1.8–2.4)
MCH RBC QN AUTO: 28.7 PG (ref 26–34)
MCHC RBC AUTO-ENTMCNC: 33.3 G/DL (ref 31–36)
MCV RBC AUTO: 86.2 FL (ref 80–100)
PDW BLD-RTO: 14.3 % (ref 12.4–15.4)
PERFORMED ON: ABNORMAL
PLATELET # BLD: 116 K/UL (ref 135–450)
PMV BLD AUTO: 8.4 FL (ref 5–10.5)
POTASSIUM SERPL-SCNC: 3.2 MMOL/L (ref 3.5–5.1)
RBC # BLD: 3.23 M/UL (ref 4–5.2)
SODIUM BLD-SCNC: 140 MMOL/L (ref 136–145)
WBC # BLD: 7.4 K/UL (ref 4–11)

## 2019-06-16 PROCEDURE — 71045 X-RAY EXAM CHEST 1 VIEW: CPT

## 2019-06-16 PROCEDURE — 6360000002 HC RX W HCPCS: Performed by: INTERNAL MEDICINE

## 2019-06-16 PROCEDURE — 80048 BASIC METABOLIC PNL TOTAL CA: CPT

## 2019-06-16 PROCEDURE — 2700000000 HC OXYGEN THERAPY PER DAY

## 2019-06-16 PROCEDURE — 94640 AIRWAY INHALATION TREATMENT: CPT

## 2019-06-16 PROCEDURE — 6360000002 HC RX W HCPCS: Performed by: THORACIC SURGERY (CARDIOTHORACIC VASCULAR SURGERY)

## 2019-06-16 PROCEDURE — 2580000003 HC RX 258: Performed by: THORACIC SURGERY (CARDIOTHORACIC VASCULAR SURGERY)

## 2019-06-16 PROCEDURE — 2000000000 HC ICU R&B

## 2019-06-16 PROCEDURE — 6370000000 HC RX 637 (ALT 250 FOR IP): Performed by: THORACIC SURGERY (CARDIOTHORACIC VASCULAR SURGERY)

## 2019-06-16 PROCEDURE — 99233 SBSQ HOSP IP/OBS HIGH 50: CPT | Performed by: INTERNAL MEDICINE

## 2019-06-16 PROCEDURE — 6370000000 HC RX 637 (ALT 250 FOR IP): Performed by: CLINICAL NURSE SPECIALIST

## 2019-06-16 PROCEDURE — 99024 POSTOP FOLLOW-UP VISIT: CPT | Performed by: THORACIC SURGERY (CARDIOTHORACIC VASCULAR SURGERY)

## 2019-06-16 PROCEDURE — 94669 MECHANICAL CHEST WALL OSCILL: CPT

## 2019-06-16 PROCEDURE — 94150 VITAL CAPACITY TEST: CPT

## 2019-06-16 PROCEDURE — 94799 UNLISTED PULMONARY SVC/PX: CPT

## 2019-06-16 PROCEDURE — 85027 COMPLETE CBC AUTOMATED: CPT

## 2019-06-16 PROCEDURE — 99233 SBSQ HOSP IP/OBS HIGH 50: CPT | Performed by: NURSE PRACTITIONER

## 2019-06-16 PROCEDURE — 36415 COLL VENOUS BLD VENIPUNCTURE: CPT

## 2019-06-16 PROCEDURE — 36592 COLLECT BLOOD FROM PICC: CPT

## 2019-06-16 PROCEDURE — 94660 CPAP INITIATION&MGMT: CPT

## 2019-06-16 PROCEDURE — 83735 ASSAY OF MAGNESIUM: CPT

## 2019-06-16 RX ORDER — FUROSEMIDE 10 MG/ML
20 INJECTION INTRAMUSCULAR; INTRAVENOUS 2 TIMES DAILY
Status: DISCONTINUED | OUTPATIENT
Start: 2019-06-16 | End: 2019-06-19

## 2019-06-16 RX ADMIN — DOCUSATE SODIUM 100 MG: 100 CAPSULE, LIQUID FILLED ORAL at 21:03

## 2019-06-16 RX ADMIN — OXYCODONE HYDROCHLORIDE 10 MG: 5 TABLET ORAL at 11:55

## 2019-06-16 RX ADMIN — ACETAMINOPHEN 1000 MG: 500 TABLET ORAL at 06:54

## 2019-06-16 RX ADMIN — POTASSIUM CHLORIDE 10 MEQ: 10 TABLET, EXTENDED RELEASE ORAL at 18:19

## 2019-06-16 RX ADMIN — CALCIUM CARBONATE-VITAMIN D TAB 500 MG-200 UNIT 1 TABLET: 500-200 TAB at 09:14

## 2019-06-16 RX ADMIN — INSULIN LISPRO 2 UNITS: 100 INJECTION, SOLUTION INTRAVENOUS; SUBCUTANEOUS at 21:10

## 2019-06-16 RX ADMIN — MAGNESIUM OXIDE TAB 400 MG (241.3 MG ELEMENTAL MG) 400 MG: 400 (241.3 MG) TAB at 09:14

## 2019-06-16 RX ADMIN — ALBUTEROL SULFATE 2.5 MG: 2.5 SOLUTION RESPIRATORY (INHALATION) at 11:37

## 2019-06-16 RX ADMIN — ACYCLOVIR 800 MG: 800 TABLET ORAL at 09:37

## 2019-06-16 RX ADMIN — ROSUVASTATIN CALCIUM 20 MG: 20 TABLET, COATED ORAL at 18:19

## 2019-06-16 RX ADMIN — ALBUTEROL SULFATE 2.5 MG: 2.5 SOLUTION RESPIRATORY (INHALATION) at 00:56

## 2019-06-16 RX ADMIN — ALBUTEROL SULFATE 2.5 MG: 2.5 SOLUTION RESPIRATORY (INHALATION) at 15:30

## 2019-06-16 RX ADMIN — MUPIROCIN: 20 OINTMENT TOPICAL at 20:22

## 2019-06-16 RX ADMIN — Medication 15 ML: at 08:05

## 2019-06-16 RX ADMIN — MAGNESIUM HYDROXIDE 30 ML: 400 SUSPENSION ORAL at 09:14

## 2019-06-16 RX ADMIN — ACYCLOVIR 800 MG: 800 TABLET ORAL at 21:03

## 2019-06-16 RX ADMIN — LEVOFLOXACIN 750 MG: 5 INJECTION, SOLUTION INTRAVENOUS at 11:55

## 2019-06-16 RX ADMIN — ALBUTEROL SULFATE 2.5 MG: 2.5 SOLUTION RESPIRATORY (INHALATION) at 20:35

## 2019-06-16 RX ADMIN — ALBUTEROL SULFATE 2.5 MG: 2.5 SOLUTION RESPIRATORY (INHALATION) at 08:11

## 2019-06-16 RX ADMIN — POLYETHYLENE GLYCOL (3350) 17 G: 17 POWDER, FOR SOLUTION ORAL at 09:14

## 2019-06-16 RX ADMIN — POTASSIUM CHLORIDE 20 MEQ: 29.8 INJECTION, SOLUTION INTRAVENOUS at 09:40

## 2019-06-16 RX ADMIN — MAGNESIUM OXIDE TAB 400 MG (241.3 MG ELEMENTAL MG) 400 MG: 400 (241.3 MG) TAB at 21:03

## 2019-06-16 RX ADMIN — ACYCLOVIR 800 MG: 800 TABLET ORAL at 00:10

## 2019-06-16 RX ADMIN — POTASSIUM CHLORIDE 20 MEQ: 29.8 INJECTION, SOLUTION INTRAVENOUS at 06:54

## 2019-06-16 RX ADMIN — ALBUTEROL SULFATE 2.5 MG: 2.5 SOLUTION RESPIRATORY (INHALATION) at 04:56

## 2019-06-16 RX ADMIN — POTASSIUM CHLORIDE 10 MEQ: 10 TABLET, EXTENDED RELEASE ORAL at 09:14

## 2019-06-16 RX ADMIN — CLOPIDOGREL 75 MG: 75 TABLET, FILM COATED ORAL at 09:14

## 2019-06-16 RX ADMIN — CALCIUM CARBONATE-VITAMIN D TAB 500 MG-200 UNIT 1 TABLET: 500-200 TAB at 21:03

## 2019-06-16 RX ADMIN — Medication 10 ML: at 10:22

## 2019-06-16 RX ADMIN — Medication 15 ML: at 21:03

## 2019-06-16 RX ADMIN — POTASSIUM CHLORIDE 10 MEQ: 10 TABLET, EXTENDED RELEASE ORAL at 11:56

## 2019-06-16 RX ADMIN — ACETAMINOPHEN 1000 MG: 500 TABLET ORAL at 00:13

## 2019-06-16 RX ADMIN — ACETAMINOPHEN 1000 MG: 500 TABLET ORAL at 18:19

## 2019-06-16 RX ADMIN — METOPROLOL TARTRATE 12.5 MG: 25 TABLET ORAL at 13:10

## 2019-06-16 RX ADMIN — POTASSIUM CHLORIDE 20 MEQ: 29.8 INJECTION, SOLUTION INTRAVENOUS at 05:47

## 2019-06-16 RX ADMIN — ASPIRIN 81 MG: 81 TABLET, COATED ORAL at 09:14

## 2019-06-16 RX ADMIN — FUROSEMIDE 20 MG: 10 INJECTION, SOLUTION INTRAMUSCULAR; INTRAVENOUS at 18:30

## 2019-06-16 RX ADMIN — DOCUSATE SODIUM 100 MG: 100 CAPSULE, LIQUID FILLED ORAL at 09:14

## 2019-06-16 RX ADMIN — FAMOTIDINE 20 MG: 20 TABLET, FILM COATED ORAL at 09:15

## 2019-06-16 RX ADMIN — FAMOTIDINE 20 MG: 20 TABLET, FILM COATED ORAL at 21:03

## 2019-06-16 RX ADMIN — MUPIROCIN: 20 OINTMENT TOPICAL at 11:13

## 2019-06-16 ASSESSMENT — PAIN SCALES - GENERAL
PAINLEVEL_OUTOF10: 0
PAINLEVEL_OUTOF10: 7
PAINLEVEL_OUTOF10: 3
PAINLEVEL_OUTOF10: 3
PAINLEVEL_OUTOF10: 0
PAINLEVEL_OUTOF10: 2
PAINLEVEL_OUTOF10: 3
PAINLEVEL_OUTOF10: 0

## 2019-06-16 ASSESSMENT — PAIN DESCRIPTION - PAIN TYPE
TYPE: ACUTE PAIN

## 2019-06-16 ASSESSMENT — PAIN DESCRIPTION - FREQUENCY
FREQUENCY: CONTINUOUS
FREQUENCY: CONTINUOUS

## 2019-06-16 ASSESSMENT — PAIN DESCRIPTION - PROGRESSION
CLINICAL_PROGRESSION: NOT CHANGED
CLINICAL_PROGRESSION: NOT CHANGED
CLINICAL_PROGRESSION: GRADUALLY IMPROVING
CLINICAL_PROGRESSION: GRADUALLY IMPROVING
CLINICAL_PROGRESSION: NOT CHANGED
CLINICAL_PROGRESSION: GRADUALLY IMPROVING
CLINICAL_PROGRESSION: GRADUALLY IMPROVING
CLINICAL_PROGRESSION: NOT CHANGED
CLINICAL_PROGRESSION: NOT CHANGED

## 2019-06-16 ASSESSMENT — PAIN DESCRIPTION - LOCATION
LOCATION: SHOULDER

## 2019-06-16 ASSESSMENT — PAIN DESCRIPTION - DESCRIPTORS
DESCRIPTORS: ACHING;DULL
DESCRIPTORS: ACHING;DULL;SORE
DESCRIPTORS: ACHING;SORE;DULL

## 2019-06-16 ASSESSMENT — PAIN - FUNCTIONAL ASSESSMENT: PAIN_FUNCTIONAL_ASSESSMENT: ACTIVITIES ARE NOT PREVENTED

## 2019-06-16 ASSESSMENT — PAIN DESCRIPTION - ONSET
ONSET: ON-GOING
ONSET: ON-GOING

## 2019-06-16 ASSESSMENT — PAIN DESCRIPTION - ORIENTATION
ORIENTATION: RIGHT

## 2019-06-16 NOTE — PROGRESS NOTES
acyclovir  800 mg Oral BID    magnesium oxide  400 mg Oral BID    acetaminophen  1,000 mg Oral Q6H    rosuvastatin  20 mg Oral QPM    sodium chloride flush  10 mL Intravenous 2 times per day    docusate sodium  100 mg Oral BID    famotidine  20 mg Oral BID    metoprolol tartrate  12.5 mg Oral BID    chlorhexidine  15 mL Mouth/Throat BID    mupirocin   Nasal BID    potassium chloride  10 mEq Oral TID WC    aspirin  81 mg Oral Daily    clopidogrel  75 mg Oral Daily    insulin lispro  0-12 Units Subcutaneous Q4H    magnesium hydroxide  30 mL Oral Daily    polyethylene glycol  17 g Oral Daily       Infusions:   furosemide (LASIX) 1mg/ml infusion 5 mg/hr (06/16/19 0025)    norepinephrine Stopped (06/14/19 1900)    dextrose       DATA REVIEW:   CBC:   Recent Labs     06/13/19  1225 06/1955 06/14/19  0418 06/15/19  0621 06/16/19  0403   WBC 9.5 10.3 8.9 8.4 7.4   HGB 9.3* 9.1* 8.0* 8.7* 9.3*   HCT 28.7* 28.2* 24.6* 25.8* 27.8*   MCV 88.0 87.7 88.3 86.2 86.2   * 153 129* 90* 116*     BMP:   Recent Labs     06/13/19  1225 06/1955 06/14/19 0418 06/14/19  1627 06/15/19  0621 06/16/19  0403    136 140 137 137 140   K 4.0 4.4 4.6 3.8 3.9 3.2*   * 109 108 104 105 103   CO2 22 21 19* 21 23 27   GLUCOSE 102* 128* 98 139* 117* 125*   BUN 10 10 13 13 14 13   CREATININE 0.6 0.8 0.8 0.9 1.0 0.8   CALCIUM 7.8* 7.0* 6.9* 7.2* 7.3* 8.0*   MG 3.30* 2.80* 2.50*  --  2.20 2.40     Accucheck Glucoses:   Recent Labs     06/15/19  0728 06/15/19  1258 06/15/19  1629 06/15/19  2119 06/16/19  0731   POCGLU 158* 139* 141* 196* 129*     PT/INR:   Recent Labs     06/1955   PROTIME 13.7*   INR 1.20*     APTT:   No results for input(s): APTT in the last 72 hours. LFT:   No results for input(s): AST, ALT, ALB, BILIDIR, BILITOT, ALKPHOS in the last 72 hours.   Troponin:   Lab Results   Component Value Date    TROPONINI 2.80 ([facility]) 06/11/2019     ABG:    Lab Results   Component Value Date    PHART

## 2019-06-16 NOTE — PROGRESS NOTES
Patient up to chair. Patient tolerated activity well. 6 L NC on patient at this time. Chair alarm on. Patient eating meal tray. Call light with in reach. No acute distress noted.

## 2019-06-16 NOTE — PROGRESS NOTES
hours) at 6/16/2019 1151  Last data filed at 6/16/2019 1058  Gross per 24 hour   Intake 1785 ml   Output 5410 ml   Net -3625 ml     Wt Readings from Last 3 Encounters:   06/16/19 184 lb 4.9 oz (83.6 kg)   03/13/19 176 lb (79.8 kg)   02/18/19 176 lb 6.4 oz (80 kg)       Physical Exam:  BP (!) 99/49   Pulse 98   Temp 98.9 °F (37.2 °C) (Oral)   Resp 14   Ht 5' 1\" (1.549 m)   Wt 184 lb 4.9 oz (83.6 kg)   SpO2 92%   BMI 34.82 kg/m²   BP Readings from Last 3 Encounters:   06/16/19 (!) 99/49   06/13/19 (!) 209/118   03/13/19 124/78     Pulse Readings from Last 3 Encounters:   06/16/19 98   02/18/19 99   01/31/19 80       Intake/Output Summary (Last 24 hours) at 6/16/2019 1151  Last data filed at 6/16/2019 1058  Gross per 24 hour   Intake 1785 ml   Output 5410 ml   Net -3625 ml     Wt Readings from Last 2 Encounters:   06/16/19 184 lb 4.9 oz (83.6 kg)   03/13/19 176 lb (79.8 kg)     Constitutional: She is oriented to person, place, and time. She appears well-developed and well-nourished. In no acute distress. Head: Normocephalic and atraumatic. Eyes: PEERL   Neck: Neck supple. No JVD present. Carotid bruit is not present. No mass and no thyromegaly present. No lymphadenopathy present. Cardiovascular: Normal rate, regular rhythm, normal heart sounds and intact distal pulses. Exam reveals no gallop and no friction rub. No murmur heard. Pulmonary/Chest: Effort normal and breath sounds normal. No respiratory distress. She has no wheezes, rhonchi or rales. Abdominal: Soft, non-tender. Bowel sounds and aorta are normal. She exhibits no organomegaly, mass or bruit. Extremities: No edema, cyanosis, or clubbing. Pulses are 2+ radial/carotid/dorsalis pedis and posterior tibial bilaterally. Neurological: She is alert and oriented to person, place, and time. She has normal reflexes. No cranial nerve deficit. Coordination normal.   Skin: Skin is warm and dry. There is no rash or diaphoresis.    Psychiatric: She has a

## 2019-06-17 LAB
ANION GAP SERPL CALCULATED.3IONS-SCNC: 12 MMOL/L (ref 3–16)
BUN BLDV-MCNC: 13 MG/DL (ref 7–20)
CALCIUM SERPL-MCNC: 8.9 MG/DL (ref 8.3–10.6)
CHLORIDE BLD-SCNC: 105 MMOL/L (ref 99–110)
CO2: 24 MMOL/L (ref 21–32)
CREAT SERPL-MCNC: 0.8 MG/DL (ref 0.6–1.2)
GFR AFRICAN AMERICAN: >60
GFR NON-AFRICAN AMERICAN: >60
GLUCOSE BLD-MCNC: 117 MG/DL (ref 70–99)
GLUCOSE BLD-MCNC: 124 MG/DL (ref 70–99)
GLUCOSE BLD-MCNC: 130 MG/DL (ref 70–99)
GLUCOSE BLD-MCNC: 131 MG/DL (ref 70–99)
GLUCOSE BLD-MCNC: 144 MG/DL (ref 70–99)
HCT VFR BLD CALC: 30.5 % (ref 36–48)
HEMOGLOBIN: 10 G/DL (ref 12–16)
MAGNESIUM: 2.6 MG/DL (ref 1.8–2.4)
MCH RBC QN AUTO: 28.5 PG (ref 26–34)
MCHC RBC AUTO-ENTMCNC: 32.7 G/DL (ref 31–36)
MCV RBC AUTO: 87.2 FL (ref 80–100)
PDW BLD-RTO: 14.5 % (ref 12.4–15.4)
PERFORMED ON: ABNORMAL
PLATELET # BLD: 153 K/UL (ref 135–450)
PMV BLD AUTO: 8.2 FL (ref 5–10.5)
POTASSIUM SERPL-SCNC: 3.9 MMOL/L (ref 3.5–5.1)
RBC # BLD: 3.5 M/UL (ref 4–5.2)
SODIUM BLD-SCNC: 141 MMOL/L (ref 136–145)
WBC # BLD: 5.9 K/UL (ref 4–11)

## 2019-06-17 PROCEDURE — 36592 COLLECT BLOOD FROM PICC: CPT

## 2019-06-17 PROCEDURE — 80048 BASIC METABOLIC PNL TOTAL CA: CPT

## 2019-06-17 PROCEDURE — 2060000000 HC ICU INTERMEDIATE R&B

## 2019-06-17 PROCEDURE — 2580000003 HC RX 258: Performed by: THORACIC SURGERY (CARDIOTHORACIC VASCULAR SURGERY)

## 2019-06-17 PROCEDURE — 94150 VITAL CAPACITY TEST: CPT

## 2019-06-17 PROCEDURE — 97161 PT EVAL LOW COMPLEX 20 MIN: CPT

## 2019-06-17 PROCEDURE — 97116 GAIT TRAINING THERAPY: CPT

## 2019-06-17 PROCEDURE — 99232 SBSQ HOSP IP/OBS MODERATE 35: CPT | Performed by: INTERNAL MEDICINE

## 2019-06-17 PROCEDURE — 6370000000 HC RX 637 (ALT 250 FOR IP): Performed by: CLINICAL NURSE SPECIALIST

## 2019-06-17 PROCEDURE — 97530 THERAPEUTIC ACTIVITIES: CPT

## 2019-06-17 PROCEDURE — 85027 COMPLETE CBC AUTOMATED: CPT

## 2019-06-17 PROCEDURE — 6370000000 HC RX 637 (ALT 250 FOR IP): Performed by: THORACIC SURGERY (CARDIOTHORACIC VASCULAR SURGERY)

## 2019-06-17 PROCEDURE — 83735 ASSAY OF MAGNESIUM: CPT

## 2019-06-17 PROCEDURE — 94761 N-INVAS EAR/PLS OXIMETRY MLT: CPT

## 2019-06-17 PROCEDURE — 6360000002 HC RX W HCPCS: Performed by: INTERNAL MEDICINE

## 2019-06-17 PROCEDURE — 2700000000 HC OXYGEN THERAPY PER DAY

## 2019-06-17 PROCEDURE — 94640 AIRWAY INHALATION TREATMENT: CPT

## 2019-06-17 PROCEDURE — 36415 COLL VENOUS BLD VENIPUNCTURE: CPT

## 2019-06-17 PROCEDURE — 94669 MECHANICAL CHEST WALL OSCILL: CPT

## 2019-06-17 PROCEDURE — 97166 OT EVAL MOD COMPLEX 45 MIN: CPT

## 2019-06-17 RX ADMIN — MAGNESIUM HYDROXIDE 30 ML: 400 SUSPENSION ORAL at 09:08

## 2019-06-17 RX ADMIN — Medication 15 ML: at 20:25

## 2019-06-17 RX ADMIN — Medication 15 ML: at 09:08

## 2019-06-17 RX ADMIN — POTASSIUM CHLORIDE 10 MEQ: 10 TABLET, EXTENDED RELEASE ORAL at 09:08

## 2019-06-17 RX ADMIN — ACYCLOVIR 800 MG: 800 TABLET ORAL at 20:25

## 2019-06-17 RX ADMIN — METOPROLOL TARTRATE 12.5 MG: 25 TABLET ORAL at 20:26

## 2019-06-17 RX ADMIN — CALCIUM CARBONATE-VITAMIN D TAB 500 MG-200 UNIT 1 TABLET: 500-200 TAB at 20:26

## 2019-06-17 RX ADMIN — FAMOTIDINE 20 MG: 20 TABLET, FILM COATED ORAL at 20:26

## 2019-06-17 RX ADMIN — POTASSIUM CHLORIDE 10 MEQ: 10 TABLET, EXTENDED RELEASE ORAL at 18:38

## 2019-06-17 RX ADMIN — DOCUSATE SODIUM 100 MG: 100 CAPSULE, LIQUID FILLED ORAL at 09:09

## 2019-06-17 RX ADMIN — Medication 10 ML: at 09:09

## 2019-06-17 RX ADMIN — ALBUTEROL SULFATE 2.5 MG: 2.5 SOLUTION RESPIRATORY (INHALATION) at 08:30

## 2019-06-17 RX ADMIN — ACETAMINOPHEN 1000 MG: 500 TABLET ORAL at 04:58

## 2019-06-17 RX ADMIN — DOCUSATE SODIUM 100 MG: 100 CAPSULE, LIQUID FILLED ORAL at 20:26

## 2019-06-17 RX ADMIN — FAMOTIDINE 20 MG: 20 TABLET, FILM COATED ORAL at 09:08

## 2019-06-17 RX ADMIN — MUPIROCIN: 20 OINTMENT TOPICAL at 20:25

## 2019-06-17 RX ADMIN — POLYETHYLENE GLYCOL (3350) 17 G: 17 POWDER, FOR SOLUTION ORAL at 09:08

## 2019-06-17 RX ADMIN — ACETAMINOPHEN 1000 MG: 500 TABLET ORAL at 11:53

## 2019-06-17 RX ADMIN — MUPIROCIN: 20 OINTMENT TOPICAL at 09:09

## 2019-06-17 RX ADMIN — Medication 10 ML: at 20:25

## 2019-06-17 RX ADMIN — CLOPIDOGREL 75 MG: 75 TABLET, FILM COATED ORAL at 09:09

## 2019-06-17 RX ADMIN — ASPIRIN 81 MG: 81 TABLET, COATED ORAL at 09:08

## 2019-06-17 RX ADMIN — ACETAMINOPHEN 1000 MG: 500 TABLET ORAL at 18:38

## 2019-06-17 RX ADMIN — ALBUTEROL SULFATE 2.5 MG: 2.5 SOLUTION RESPIRATORY (INHALATION) at 19:55

## 2019-06-17 RX ADMIN — ALBUTEROL SULFATE 2.5 MG: 2.5 SOLUTION RESPIRATORY (INHALATION) at 01:10

## 2019-06-17 RX ADMIN — ALBUTEROL SULFATE 2.5 MG: 2.5 SOLUTION RESPIRATORY (INHALATION) at 17:14

## 2019-06-17 RX ADMIN — ACYCLOVIR 800 MG: 800 TABLET ORAL at 09:08

## 2019-06-17 RX ADMIN — ROSUVASTATIN CALCIUM 20 MG: 20 TABLET, COATED ORAL at 18:38

## 2019-06-17 RX ADMIN — METOPROLOL TARTRATE 12.5 MG: 25 TABLET ORAL at 09:08

## 2019-06-17 RX ADMIN — CALCIUM CARBONATE-VITAMIN D TAB 500 MG-200 UNIT 1 TABLET: 500-200 TAB at 09:09

## 2019-06-17 RX ADMIN — FUROSEMIDE 20 MG: 10 INJECTION, SOLUTION INTRAMUSCULAR; INTRAVENOUS at 18:39

## 2019-06-17 RX ADMIN — LEVOFLOXACIN 750 MG: 5 INJECTION, SOLUTION INTRAVENOUS at 13:44

## 2019-06-17 RX ADMIN — POTASSIUM CHLORIDE 10 MEQ: 10 TABLET, EXTENDED RELEASE ORAL at 11:53

## 2019-06-17 RX ADMIN — FUROSEMIDE 20 MG: 10 INJECTION, SOLUTION INTRAMUSCULAR; INTRAVENOUS at 09:08

## 2019-06-17 RX ADMIN — ALBUTEROL SULFATE 2.5 MG: 2.5 SOLUTION RESPIRATORY (INHALATION) at 04:55

## 2019-06-17 ASSESSMENT — PAIN SCALES - GENERAL
PAINLEVEL_OUTOF10: 0
PAINLEVEL_OUTOF10: 3
PAINLEVEL_OUTOF10: 0
PAINLEVEL_OUTOF10: 2

## 2019-06-17 ASSESSMENT — PAIN DESCRIPTION - PROGRESSION
CLINICAL_PROGRESSION: GRADUALLY WORSENING
CLINICAL_PROGRESSION: NOT CHANGED

## 2019-06-17 ASSESSMENT — PAIN DESCRIPTION - LOCATION: LOCATION: CHEST;INCISION

## 2019-06-17 ASSESSMENT — PAIN DESCRIPTION - FREQUENCY: FREQUENCY: INTERMITTENT

## 2019-06-17 ASSESSMENT — PAIN - FUNCTIONAL ASSESSMENT: PAIN_FUNCTIONAL_ASSESSMENT: ACTIVITIES ARE NOT PREVENTED

## 2019-06-17 ASSESSMENT — PAIN DESCRIPTION - PAIN TYPE: TYPE: SURGICAL PAIN

## 2019-06-17 ASSESSMENT — PAIN DESCRIPTION - DESCRIPTORS: DESCRIPTORS: ACHING;DULL

## 2019-06-17 NOTE — PROGRESS NOTES
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 Reports sister, daughter and grandson can assist at d/c        Objective   Vision: Impaired  Vision Exceptions: Wears glasses for reading  Hearing: Within functional limits    Orientation  Overall Orientation Status: Within Functional Limits     Balance  Sitting Balance: Stand by assistance  Standing Balance: Stand by assistance  Standing Balance  Time: ~8 min  Activity: functional mobility   Functional Mobility  Functional - Mobility Device: (No device in room/bathroom; RW in almeida)  Activity: To/from bathroom; Other(~220 ft in almeida)  Assist Level: Contact guard assistance  Functional Mobility Comments: Slow and effortful. SOB at end of walk.    Toilet Transfers  Toilet - Technique: Ambulating(without device)  Equipment Used: Standard toilet(sternal pillow)  Toilet Transfer: Contact guard assistance     Tone RUE  RUE Tone: Normotonic  Tone LUE  LUE Tone: Normotonic  Coordination  Movements Are Fluid And Coordinated: Yes        Transfers  Sit to stand: Contact guard assistance(from chair, from toilet with sternal pillow for sternal precautions)  Stand to sit: Contact guard assistance(to toilet, to chair with sternal pillow)     Cognition  Overall Cognitive Status: WFL        Sensation  Overall Sensation Status: WFL        LUE AROM (degrees)  LUE General AROM: not formally tested but appeared [facility] during functional activities  Left Hand AROM (degrees)  Left Hand AROM: WFL  Left Hand General AROM: not formally tested but appeared WFL during functional activities  RUE AROM (degrees)  RUE AROM : WFL  RUE General AROM: not formally tested but appeared [facility] during functional activities  Right Hand AROM (degrees)  Right Hand AROM: WFL  Right Hand General AROM: not formally tested but appeared WFL during functional activities        Hand Dominance  Hand Dominance: Right             Plan   Plan  Times per week: 2-5x  Times per day: Daily  Current Treatment Recommendations: Strengthening, Safety Education & Training, Self-Care / ADL, Patient/Caregiver Education & Training, Functional Mobility Training, Endurance Training      AM-PAC Score        AM-PAC Inpatient Daily Activity Raw Score: 18 (06/17/19 1509)  AM-PAC Inpatient ADL T-Scale Score : 38.66 (06/17/19 1509)  ADL Inpatient CMS 0-100% Score: 46.65 (06/17/19 1509)  ADL Inpatient CMS G-Code Modifier : CK (06/17/19 1509)    Goals  Short term goals  Time Frame for Short term goals: by d/c  Short term goal 1: toilet transfer with SBA, sternal precautions  Short term goal 2: assess LB dressing   Short term goal 3: increase standing tolerance to 10 min to complete functional activity  Patient Goals   Patient goals : to return home       Therapy Time   Individual Concurrent Group Co-treatment   Time In 1356         Time Out 1440         Minutes 44           Time coded treatment minutes: 29    Total Treatment time: [address] [facility] [place], OTS    If pt is discharged prior to next treatment, this note to serve as discharge summary. Kary Ruiz OTR/L #[id number]  Therapist was present, directed the patient's care, made skilled judgement, and was responsible for assessment and treatment of the patient.

## 2019-06-17 NOTE — PROGRESS NOTES
0800 (!) 116/53 98.2 °F (36.8 °C) Oral 79 25 97 % --   06/17/19 0700 121/64 -- -- 75 26 94 % --   06/17/19 0646 -- -- -- -- -- -- 182 lb 1.6 oz (82.6 kg)   06/17/19 0600 (!) 104/56 -- -- 74 20 95 % --   06/17/19 0500 120/65 -- -- 86 28 98 % --   06/17/19 0400 110/68 98.5 °F (36.9 °C) Oral 74 22 92 % --   06/17/19 0300 123/66 -- -- 87 18 92 % --   06/17/19 0200 106/65 -- -- 74 20 93 % --   06/17/19 0110 -- -- -- -- 20 93 % --   06/17/19 0100 114/60 -- -- 72 18 94 % --   06/17/19 0000 (!) 98/58 98.4 °F (36.9 °C) Oral 71 21 94 % --   06/16/19 2300 109/64 -- -- 86 25 92 % --   06/16/19 2200 (!) 104/58 -- -- 70 21 95 % --   06/16/19 2100 (!) 114/54 -- -- 83 25 94 % --   06/16/19 2040 -- -- -- -- 21 94 % --   06/16/19 2035 -- -- -- -- 26 94 % --   06/16/19 2000 (!) 98/57 98.2 °F (36.8 °C) Oral 92 25 95 % --   06/16/19 1900 (!) 107/48 -- -- 90 24 96 % --   06/16/19 1800 103/74 -- -- 90 17 -- --   06/16/19 1700 110/63 -- -- 88 21 94 % --   06/16/19 1618 125/67 -- -- 99 20 -- --   06/16/19 1600 -- -- -- 98 28 92 % --   06/16/19 1530 -- -- -- -- -- 94 % --   06/16/19 1500 112/63 -- -- 89 23 -- --   06/16/19 1400 105/66 98 °F (36.7 °C) Oral 89 17 93 % --   06/16/19 1310 116/60 -- -- 97 -- -- --   06/16/19 1300 116/60 -- -- 95 20 94 % --         Intake/Output Summary (Last 24 hours) at 6/17/2019 1247  Last data filed at 6/17/2019 1100  Gross per 24 hour   Intake 390 ml   Output 2960 ml   Net -2570 ml       Well developed, well nourished in no acute distress    Skin: Warm, dry no active rash    HEENT:  PERRL, Conjunctiva without erythema or icterus, oral mucosa moist and intact. Neck:  Supple without adenopathy    Catheter:  R IJ , left hand PIV, right art line    Cor:  RRR S1,S2, OK. No murmur rub or gallop. Lungs:  Unlabored breathing. Clear to A & P. Incision without sign of infection    Abdomen:  Soft, nontender, nondistended, normoactive BS, no hepatosplenomegaly or palpable masses.   No rebound or

## 2019-06-18 LAB
ANION GAP SERPL CALCULATED.3IONS-SCNC: 10 MMOL/L (ref 3–16)
BUN BLDV-MCNC: 17 MG/DL (ref 7–20)
CALCIUM SERPL-MCNC: 9.3 MG/DL (ref 8.3–10.6)
CHLORIDE BLD-SCNC: 109 MMOL/L (ref 99–110)
CO2: 24 MMOL/L (ref 21–32)
CREAT SERPL-MCNC: 0.8 MG/DL (ref 0.6–1.2)
GFR AFRICAN AMERICAN: >60
GFR NON-AFRICAN AMERICAN: >60
GLUCOSE BLD-MCNC: 107 MG/DL (ref 70–99)
GLUCOSE BLD-MCNC: 117 MG/DL (ref 70–99)
GLUCOSE BLD-MCNC: 152 MG/DL (ref 70–99)
HCT VFR BLD CALC: 29.9 % (ref 36–48)
HEMOGLOBIN: 9.8 G/DL (ref 12–16)
MAGNESIUM: 2.2 MG/DL (ref 1.8–2.4)
MCH RBC QN AUTO: 28.5 PG (ref 26–34)
MCHC RBC AUTO-ENTMCNC: 32.7 G/DL (ref 31–36)
MCV RBC AUTO: 87.2 FL (ref 80–100)
PDW BLD-RTO: 14.6 % (ref 12.4–15.4)
PERFORMED ON: ABNORMAL
PERFORMED ON: ABNORMAL
PLATELET # BLD: 188 K/UL (ref 135–450)
PMV BLD AUTO: 7.6 FL (ref 5–10.5)
POTASSIUM SERPL-SCNC: 3.7 MMOL/L (ref 3.5–5.1)
RBC # BLD: 3.43 M/UL (ref 4–5.2)
SODIUM BLD-SCNC: 143 MMOL/L (ref 136–145)
WBC # BLD: 4.5 K/UL (ref 4–11)

## 2019-06-18 PROCEDURE — 2060000000 HC ICU INTERMEDIATE R&B

## 2019-06-18 PROCEDURE — 6370000000 HC RX 637 (ALT 250 FOR IP): Performed by: THORACIC SURGERY (CARDIOTHORACIC VASCULAR SURGERY)

## 2019-06-18 PROCEDURE — 2580000003 HC RX 258: Performed by: THORACIC SURGERY (CARDIOTHORACIC VASCULAR SURGERY)

## 2019-06-18 PROCEDURE — 94640 AIRWAY INHALATION TREATMENT: CPT

## 2019-06-18 PROCEDURE — 94669 MECHANICAL CHEST WALL OSCILL: CPT

## 2019-06-18 PROCEDURE — 6370000000 HC RX 637 (ALT 250 FOR IP): Performed by: CLINICAL NURSE SPECIALIST

## 2019-06-18 PROCEDURE — 6360000002 HC RX W HCPCS: Performed by: INTERNAL MEDICINE

## 2019-06-18 PROCEDURE — 2700000000 HC OXYGEN THERAPY PER DAY

## 2019-06-18 PROCEDURE — 6360000002 HC RX W HCPCS: Performed by: THORACIC SURGERY (CARDIOTHORACIC VASCULAR SURGERY)

## 2019-06-18 PROCEDURE — 36592 COLLECT BLOOD FROM PICC: CPT

## 2019-06-18 PROCEDURE — 85027 COMPLETE CBC AUTOMATED: CPT

## 2019-06-18 PROCEDURE — 83735 ASSAY OF MAGNESIUM: CPT

## 2019-06-18 PROCEDURE — 80048 BASIC METABOLIC PNL TOTAL CA: CPT

## 2019-06-18 PROCEDURE — 94761 N-INVAS EAR/PLS OXIMETRY MLT: CPT

## 2019-06-18 RX ORDER — ALBUTEROL SULFATE 2.5 MG/3ML
2.5 SOLUTION RESPIRATORY (INHALATION) 4 TIMES DAILY
Status: DISCONTINUED | OUTPATIENT
Start: 2019-06-18 | End: 2019-06-19 | Stop reason: HOSPADM

## 2019-06-18 RX ADMIN — FAMOTIDINE 20 MG: 20 TABLET, FILM COATED ORAL at 08:50

## 2019-06-18 RX ADMIN — METOPROLOL TARTRATE 25 MG: 25 TABLET ORAL at 20:53

## 2019-06-18 RX ADMIN — DOCUSATE SODIUM 100 MG: 100 CAPSULE, LIQUID FILLED ORAL at 08:48

## 2019-06-18 RX ADMIN — Medication 10 ML: at 08:49

## 2019-06-18 RX ADMIN — FUROSEMIDE 20 MG: 10 INJECTION, SOLUTION INTRAMUSCULAR; INTRAVENOUS at 08:50

## 2019-06-18 RX ADMIN — ALBUTEROL SULFATE 2.5 MG: 2.5 SOLUTION RESPIRATORY (INHALATION) at 08:49

## 2019-06-18 RX ADMIN — ASPIRIN 81 MG: 81 TABLET, COATED ORAL at 08:48

## 2019-06-18 RX ADMIN — FUROSEMIDE 20 MG: 10 INJECTION, SOLUTION INTRAMUSCULAR; INTRAVENOUS at 18:01

## 2019-06-18 RX ADMIN — CALCIUM CARBONATE-VITAMIN D TAB 500 MG-200 UNIT 1 TABLET: 500-200 TAB at 08:48

## 2019-06-18 RX ADMIN — ACETAMINOPHEN 1000 MG: 500 TABLET ORAL at 06:33

## 2019-06-18 RX ADMIN — LEVOFLOXACIN 750 MG: 5 INJECTION, SOLUTION INTRAVENOUS at 12:30

## 2019-06-18 RX ADMIN — Medication 15 ML: at 20:54

## 2019-06-18 RX ADMIN — Medication 10 ML: at 20:54

## 2019-06-18 RX ADMIN — ACETAMINOPHEN 1000 MG: 500 TABLET ORAL at 00:33

## 2019-06-18 RX ADMIN — OXYCODONE HYDROCHLORIDE 5 MG: 5 TABLET ORAL at 21:04

## 2019-06-18 RX ADMIN — ACYCLOVIR 800 MG: 800 TABLET ORAL at 21:03

## 2019-06-18 RX ADMIN — ACETAMINOPHEN 1000 MG: 500 TABLET ORAL at 12:29

## 2019-06-18 RX ADMIN — CALCIUM CARBONATE-VITAMIN D TAB 500 MG-200 UNIT 1 TABLET: 500-200 TAB at 20:53

## 2019-06-18 RX ADMIN — ACYCLOVIR 800 MG: 800 TABLET ORAL at 08:55

## 2019-06-18 RX ADMIN — Medication 15 ML: at 08:48

## 2019-06-18 RX ADMIN — ROSUVASTATIN CALCIUM 20 MG: 20 TABLET, COATED ORAL at 18:06

## 2019-06-18 RX ADMIN — ACETAMINOPHEN 1000 MG: 500 TABLET ORAL at 19:09

## 2019-06-18 RX ADMIN — ALBUTEROL SULFATE 2.5 MG: 2.5 SOLUTION RESPIRATORY (INHALATION) at 00:05

## 2019-06-18 RX ADMIN — POTASSIUM CHLORIDE 10 MEQ: 10 TABLET, EXTENDED RELEASE ORAL at 08:49

## 2019-06-18 RX ADMIN — CLOPIDOGREL 75 MG: 75 TABLET, FILM COATED ORAL at 08:49

## 2019-06-18 RX ADMIN — POTASSIUM CHLORIDE 10 MEQ: 10 TABLET, EXTENDED RELEASE ORAL at 12:29

## 2019-06-18 RX ADMIN — ALBUTEROL SULFATE 2.5 MG: 2.5 SOLUTION RESPIRATORY (INHALATION) at 21:25

## 2019-06-18 RX ADMIN — POTASSIUM CHLORIDE 10 MEQ: 10 TABLET, EXTENDED RELEASE ORAL at 18:02

## 2019-06-18 RX ADMIN — METOPROLOL TARTRATE 12.5 MG: 25 TABLET ORAL at 08:49

## 2019-06-18 RX ADMIN — FAMOTIDINE 20 MG: 20 TABLET, FILM COATED ORAL at 20:53

## 2019-06-18 ASSESSMENT — PAIN DESCRIPTION - LOCATION
LOCATION: CHEST
LOCATION: CHEST;SHOULDER
LOCATION: SHOULDER

## 2019-06-18 ASSESSMENT — PAIN SCALES - GENERAL
PAINLEVEL_OUTOF10: 3
PAINLEVEL_OUTOF10: 2
PAINLEVEL_OUTOF10: 4
PAINLEVEL_OUTOF10: 4
PAINLEVEL_OUTOF10: 5
PAINLEVEL_OUTOF10: 4
PAINLEVEL_OUTOF10: 4

## 2019-06-18 ASSESSMENT — PAIN DESCRIPTION - ONSET
ONSET: ON-GOING
ONSET: ON-GOING

## 2019-06-18 ASSESSMENT — PAIN DESCRIPTION - PROGRESSION
CLINICAL_PROGRESSION: NOT CHANGED
CLINICAL_PROGRESSION: GRADUALLY WORSENING

## 2019-06-18 ASSESSMENT — PAIN DESCRIPTION - DESCRIPTORS
DESCRIPTORS: DISCOMFORT;ACHING
DESCRIPTORS: DISCOMFORT;ACHING

## 2019-06-18 ASSESSMENT — PAIN DESCRIPTION - ORIENTATION
ORIENTATION: MID
ORIENTATION: RIGHT
ORIENTATION: RIGHT;ANTERIOR;MID

## 2019-06-18 ASSESSMENT — PAIN DESCRIPTION - PAIN TYPE
TYPE: ACUTE PAIN;SURGICAL PAIN
TYPE: ACUTE PAIN;SURGICAL PAIN

## 2019-06-18 ASSESSMENT — PAIN DESCRIPTION - FREQUENCY
FREQUENCY: CONTINUOUS
FREQUENCY: INTERMITTENT

## 2019-06-18 NOTE — PROGRESS NOTES
· Oral Diet intake: 51-75%, 26-50%  · Oral Nutrition Supplement (ONS) Orders: Diabetic Oral Supplement  · ONS intake: 26-50%, 51-75%  · Anthropometric Measures:  · Ht: 5' 1\" (154.9 cm)   · Current Body Wt: 179 lb 10.8 oz (81.5 kg)  · Admission Body Wt: 177 lb 14.6 oz (80.7 kg)  · Usual Body Wt: 178 lb (80.7 kg)  · Ideal Body Wt: 105 lb (47.6 kg)   · BMI Classification: BMI 30.0 - 34.9 Obese Class I    Nutrition Interventions:   Continue current diet, Continue current ONS  Education Completed    Nutrition Evaluation:   · Evaluation: Goals set   · Goals: pt will consume >75% of meals and ONS offered    · Monitoring: Meal Intake, Supplement Intake, Weight, Patient/Family Education      Electronically signed by Jessi Delaney RD, PRASAD on 6/18/19 at 9:47 AM    Contact Number:   Pager: [phone]  Office: [phone]

## 2019-06-18 NOTE — PROGRESS NOTES
RESPIRATORY THERAPY ASSESSMENT    Name:  Leana White  Medical Record Number:  [id number]  Age: 66 y.o. Gender: female  : 1955  Today's Date:  2019  Room:  2919/5352-92    Assessment     Is the patient being admitted for a COPD or Asthma exacerbation? No   (If yes the patient will be seen every 4 hours for the first 24 hours and then reassessed)    Patient Admission Diagnosis      Allergies  Allergies   Allergen Reactions    Pcn [Penicillins]     Lipitor [Atorvastatin] Other (See Comments)     Leg cramping       Minimum Predicted Vital Capacity:     714          Actual Vital Capacity:     550               Pulmonary History:No history  Home Oxygen Therapy:  room air  Home Respiratory Therapy:None   Current Respiratory Therapy: Albuterol HHN Q4H, Ezpap, flutter valve, IS, O2  Treatment Type: HHN  Medications: Albuterol    Respiratory Severity Index(RSI)   Patients with orders for inhalation medications, oxygen, or any therapeutic treatment modality will be placed on Respiratory Protocol. They will be assessed with the first treatment and at least every 72 hours thereafter. The following severity scale will be used to determine frequency of treatment intervention.     Smoking History: Smoking History Less than 1ppd or less than 15 pack year = 1    Social History  Social History     Tobacco Use    Smoking status: Former Smoker     Packs/day: 0.50     Years: 20.00     Pack years: 10.00     Types: Cigarettes     Last attempt to quit: 2018     Years since quittin.5    Smokeless tobacco: Never Used   Substance Use Topics    Alcohol use: Yes     Comment: rare    Drug use: No       Recent Surgical History: Thoracic or Upper Airway = 3  Past Surgical History  Past Surgical History:   Procedure Laterality Date    CORONARY ARTERY BYPASS GRAFT N/A 2019    CORONARY ARTERY BYPASS GRAFT X4, INTERNAL MAMMARY ARTERY AND SAPHENOUS VEIN GRAFT-TCP-BP performed by Nick Goodman MD at [address] [place] [address] Mucolytic Agent:    1. Must always be administered with a bronchodilator. 2. Discontinue if patient experiences worsened bronchospasm, or secretions have lessened to the point that the patient is able to clear them with a cough. Anti-inflammatory and Combination Medications:    1. If the patient lacks prior history of lung disease, is not using inhaled anti-inflammatory medication at home, and lacks wheezing by examination or by history for at least 24 hours, contact physician for possible discontinuation.

## 2019-06-19 VITALS
HEIGHT: 61 IN | BODY MASS INDEX: 33.55 KG/M2 | HEART RATE: 98 BPM | RESPIRATION RATE: 31 BRPM | TEMPERATURE: 98.5 F | DIASTOLIC BLOOD PRESSURE: 62 MMHG | OXYGEN SATURATION: 91 % | SYSTOLIC BLOOD PRESSURE: 103 MMHG | WEIGHT: 177.69 LBS

## 2019-06-19 LAB
ANION GAP SERPL CALCULATED.3IONS-SCNC: 13 MMOL/L (ref 3–16)
BUN BLDV-MCNC: 20 MG/DL (ref 7–20)
CALCIUM SERPL-MCNC: 8.7 MG/DL (ref 8.3–10.6)
CHLORIDE BLD-SCNC: 108 MMOL/L (ref 99–110)
CO2: 21 MMOL/L (ref 21–32)
CREAT SERPL-MCNC: 0.8 MG/DL (ref 0.6–1.2)
GFR AFRICAN AMERICAN: >60
GFR NON-AFRICAN AMERICAN: >60
GLUCOSE BLD-MCNC: 103 MG/DL (ref 70–99)
GLUCOSE BLD-MCNC: 116 MG/DL (ref 70–99)
GLUCOSE BLD-MCNC: 119 MG/DL (ref 70–99)
GLUCOSE BLD-MCNC: 128 MG/DL (ref 70–99)
HCT VFR BLD CALC: 29.8 % (ref 36–48)
HEMOGLOBIN: 9.7 G/DL (ref 12–16)
MAGNESIUM: 2 MG/DL (ref 1.8–2.4)
MCH RBC QN AUTO: 28.8 PG (ref 26–34)
MCHC RBC AUTO-ENTMCNC: 32.7 G/DL (ref 31–36)
MCV RBC AUTO: 88.1 FL (ref 80–100)
PDW BLD-RTO: 14.8 % (ref 12.4–15.4)
PERFORMED ON: ABNORMAL
PLATELET # BLD: 220 K/UL (ref 135–450)
PMV BLD AUTO: 7.3 FL (ref 5–10.5)
POTASSIUM SERPL-SCNC: 3.9 MMOL/L (ref 3.5–5.1)
RBC # BLD: 3.38 M/UL (ref 4–5.2)
SODIUM BLD-SCNC: 142 MMOL/L (ref 136–145)
WBC # BLD: 4.9 K/UL (ref 4–11)

## 2019-06-19 PROCEDURE — 85027 COMPLETE CBC AUTOMATED: CPT

## 2019-06-19 PROCEDURE — 97116 GAIT TRAINING THERAPY: CPT

## 2019-06-19 PROCEDURE — 94640 AIRWAY INHALATION TREATMENT: CPT

## 2019-06-19 PROCEDURE — 6360000002 HC RX W HCPCS: Performed by: INTERNAL MEDICINE

## 2019-06-19 PROCEDURE — 6370000000 HC RX 637 (ALT 250 FOR IP): Performed by: THORACIC SURGERY (CARDIOTHORACIC VASCULAR SURGERY)

## 2019-06-19 PROCEDURE — 97535 SELF CARE MNGMENT TRAINING: CPT

## 2019-06-19 PROCEDURE — 6360000002 HC RX W HCPCS: Performed by: THORACIC SURGERY (CARDIOTHORACIC VASCULAR SURGERY)

## 2019-06-19 PROCEDURE — 80048 BASIC METABOLIC PNL TOTAL CA: CPT

## 2019-06-19 PROCEDURE — 94669 MECHANICAL CHEST WALL OSCILL: CPT

## 2019-06-19 PROCEDURE — 83735 ASSAY OF MAGNESIUM: CPT

## 2019-06-19 PROCEDURE — 6370000000 HC RX 637 (ALT 250 FOR IP): Performed by: CLINICAL NURSE SPECIALIST

## 2019-06-19 PROCEDURE — 94761 N-INVAS EAR/PLS OXIMETRY MLT: CPT

## 2019-06-19 PROCEDURE — 2580000003 HC RX 258: Performed by: THORACIC SURGERY (CARDIOTHORACIC VASCULAR SURGERY)

## 2019-06-19 PROCEDURE — 94680 O2 UPTK RST&XERS DIR SIMPLE: CPT

## 2019-06-19 PROCEDURE — 94150 VITAL CAPACITY TEST: CPT

## 2019-06-19 PROCEDURE — 97530 THERAPEUTIC ACTIVITIES: CPT

## 2019-06-19 RX ORDER — FUROSEMIDE 40 MG/1
40 TABLET ORAL DAILY
Qty: 10 TABLET | Refills: 0 | Status: CANCELLED | OUTPATIENT
Start: 2019-06-19 | End: 2019-06-29

## 2019-06-19 RX ORDER — OXYCODONE HYDROCHLORIDE 5 MG/1
5 TABLET ORAL EVERY 6 HOURS PRN
Qty: 28 TABLET | Refills: 0 | Status: SHIPPED | OUTPATIENT
Start: 2019-06-19 | End: 2019-06-26

## 2019-06-19 RX ORDER — FUROSEMIDE 40 MG/1
40 TABLET ORAL DAILY
Status: DISCONTINUED | OUTPATIENT
Start: 2019-06-19 | End: 2019-06-19 | Stop reason: HOSPADM

## 2019-06-19 RX ORDER — ASPIRIN 81 MG/1
81 TABLET ORAL DAILY
Qty: 30 TABLET | Refills: 3 | Status: SHIPPED | OUTPATIENT
Start: 2019-06-19

## 2019-06-19 RX ORDER — POTASSIUM CHLORIDE 20 MEQ/1
20 TABLET, EXTENDED RELEASE ORAL DAILY
Status: DISCONTINUED | OUTPATIENT
Start: 2019-06-19 | End: 2019-06-19 | Stop reason: HOSPADM

## 2019-06-19 RX ORDER — CLOPIDOGREL BISULFATE 75 MG/1
75 TABLET ORAL DAILY
Qty: 30 TABLET | Refills: 3 | Status: SHIPPED | OUTPATIENT
Start: 2019-06-19 | End: 2019-07-08 | Stop reason: SDUPTHER

## 2019-06-19 RX ADMIN — FAMOTIDINE 20 MG: 20 TABLET, FILM COATED ORAL at 08:42

## 2019-06-19 RX ADMIN — ACETAMINOPHEN 1000 MG: 500 TABLET ORAL at 05:57

## 2019-06-19 RX ADMIN — METOPROLOL TARTRATE 25 MG: 25 TABLET ORAL at 08:42

## 2019-06-19 RX ADMIN — ALBUTEROL SULFATE 2.5 MG: 2.5 SOLUTION RESPIRATORY (INHALATION) at 12:23

## 2019-06-19 RX ADMIN — Medication 10 ML: at 15:08

## 2019-06-19 RX ADMIN — MAGNESIUM HYDROXIDE 30 ML: 400 SUSPENSION ORAL at 08:41

## 2019-06-19 RX ADMIN — ACETAMINOPHEN 1000 MG: 500 TABLET ORAL at 12:33

## 2019-06-19 RX ADMIN — CLOPIDOGREL 75 MG: 75 TABLET, FILM COATED ORAL at 08:42

## 2019-06-19 RX ADMIN — Medication 15 ML: at 08:41

## 2019-06-19 RX ADMIN — ALBUTEROL SULFATE 2.5 MG: 2.5 SOLUTION RESPIRATORY (INHALATION) at 08:55

## 2019-06-19 RX ADMIN — CALCIUM CARBONATE-VITAMIN D TAB 500 MG-200 UNIT 1 TABLET: 500-200 TAB at 08:42

## 2019-06-19 RX ADMIN — ASPIRIN 81 MG: 81 TABLET, COATED ORAL at 08:42

## 2019-06-19 RX ADMIN — ACYCLOVIR 800 MG: 800 TABLET ORAL at 08:44

## 2019-06-19 RX ADMIN — POTASSIUM CHLORIDE 20 MEQ: 20 TABLET, EXTENDED RELEASE ORAL at 08:41

## 2019-06-19 RX ADMIN — ACETAMINOPHEN 1000 MG: 500 TABLET ORAL at 00:12

## 2019-06-19 RX ADMIN — ALBUTEROL SULFATE 2.5 MG: 2.5 SOLUTION RESPIRATORY (INHALATION) at 16:15

## 2019-06-19 RX ADMIN — DOCUSATE SODIUM 100 MG: 100 CAPSULE, LIQUID FILLED ORAL at 08:42

## 2019-06-19 RX ADMIN — POLYETHYLENE GLYCOL (3350) 17 G: 17 POWDER, FOR SOLUTION ORAL at 08:41

## 2019-06-19 RX ADMIN — FUROSEMIDE 40 MG: 40 TABLET ORAL at 08:42

## 2019-06-19 RX ADMIN — LEVOFLOXACIN 750 MG: 5 INJECTION, SOLUTION INTRAVENOUS at 15:08

## 2019-06-19 ASSESSMENT — PAIN DESCRIPTION - ORIENTATION
ORIENTATION: MID
ORIENTATION: MID

## 2019-06-19 ASSESSMENT — PAIN SCALES - GENERAL
PAINLEVEL_OUTOF10: 3
PAINLEVEL_OUTOF10: 2
PAINLEVEL_OUTOF10: 0
PAINLEVEL_OUTOF10: 2
PAINLEVEL_OUTOF10: 0
PAINLEVEL_OUTOF10: 0

## 2019-06-19 ASSESSMENT — PAIN DESCRIPTION - ONSET
ONSET: ON-GOING
ONSET: ON-GOING

## 2019-06-19 ASSESSMENT — PAIN DESCRIPTION - FREQUENCY
FREQUENCY: INTERMITTENT
FREQUENCY: INTERMITTENT

## 2019-06-19 ASSESSMENT — PAIN DESCRIPTION - LOCATION
LOCATION: CHEST
LOCATION: CHEST

## 2019-06-19 ASSESSMENT — PAIN DESCRIPTION - PAIN TYPE
TYPE: ACUTE PAIN;SURGICAL PAIN
TYPE: ACUTE PAIN;SURGICAL PAIN

## 2019-06-19 ASSESSMENT — PAIN DESCRIPTION - PROGRESSION
CLINICAL_PROGRESSION: GRADUALLY IMPROVING
CLINICAL_PROGRESSION: GRADUALLY IMPROVING

## 2019-06-19 ASSESSMENT — PAIN DESCRIPTION - DESCRIPTORS
DESCRIPTORS: DISCOMFORT;ACHING
DESCRIPTORS: DISCOMFORT;ACHING

## 2019-06-19 NOTE — PROGRESS NOTES
Occupational Therapy  Facility/Department: [facility] ICU  Daily Treatment Note  NAME: Pacheco Sims  : 1955  MRN: [id number]    Date of Service: 2019    Discharge Recommendations:  Pacheco Sims scored a 19/24 on the AM-PAC ADL Inpatient form. Current research shows that an AM-PAC score of 18 or greater is typically associated with a discharge to the patient's home setting. OT Equipment Recommendations  Equipment Needed: No    Assessment   Performance deficits / Impairments: Decreased functional mobility ; Decreased ADL status; Decreased endurance;Decreased balance;Decreased strength  Assessment: Pt currently functioning below baseline with sternal precautions and SBA for functional mobility and transfers. Pt ambulating without AD this date. Pt on 2 L O2- not on O2 at baseline. Pt plans to d/c home with 24 hr A from family with home O2. Pt denies concerns for d/c home and states she has enough support and equipment. Educated on energy conservation, sternal precuations, safe transfers, and use of gait belt. Pt and daughter verbalized understanding. Pt with no further skilled needs at this time. D/c acute OT. Patient Education: role of OT, importance of activity, sternal precautions, safe tub transfer, d/c planning, use of gait belt, energy conservation- pt and dtr verbalized understanding  REQUIRES OT FOLLOW UP: No  Activity Tolerance  Activity Tolerance: Patient Tolerated treatment well  Safety Devices  Type of devices: Call light within reach; Left in chair;Chair alarm in place;Nurse notified(daughter in room)         Patient Diagnosis(es): The primary encounter diagnosis was NSTEMI (non-ST elevated myocardial infarction) ([facility] Diagnoses of S/P coronary artery bypass graft x 4 and Acute postoperative pain were also pertinent to this visit. has a past medical history of Cancer ([address] Diabetes mellitus ([address] Hyperlipidemia, and Hypertension.    has a past surgical history that includes Tubal ligation; Hysterectomy; INSERTION / REMOVAL / REPLACEMENT VENOUS ACCESS CATHETER (N/A, 1/24/2019); and Coronary artery bypass graft (N/A, 6/13/2019). Restrictions  Position Activity Restriction  Other position/activity restrictions: Sternal precautions; Ambulate  Subjective   General  Chart Reviewed: Yes  Additional Pertinent Hx: 66 y.o. F to ED 6/11 w/ c/o Chest Pain (NSTEMI). Hospital Course:  CTA Pulm: neg PE; Carotid Dopplers: <50% stenosis B ICA; Cardiac cath per Dr. Cayla Dejesus showing evidence of triple-vessel disease; needs CABG; Cardiothoracic Surgery Consult; OR 6/14 for CABG procedure. PMH:  DM, Multiple Myeloma, HTN, Hyperlipidemia. Family / Caregiver Present: Yes(daughter)  Referring Practitioner: Krista ANDERSON  Diagnosis: NSTEMI  Subjective  Subjective: Pt sitting up and agreeable. \"Other than this oxygen I have no concerns. \" \"I have enough support at home. \"  Pain Assessment  Pain Assessment: (Denies)  Vital Signs  Patient Currently in Pain: Denies   Orientation     Objective    ADL  LE Dressing: Stand by assistance(seated to don underwear and shorts; SBA in stance to pull up)        Balance  Sitting Balance: Stand by assistance  Standing Balance: Stand by assistance  Functional Mobility  Functional - Mobility Device: No device  Activity: Other(in almeida )  Assist Level: Stand by assistance  Functional Mobility Comments: Rest break at end of almeida before stairs. Pt did not appear SOB but stated she would like a break so she doesn't get too tired. Tub Transfers  Tub Transfers Comments: Pt has tub transfer bench. Discussed safe transfers with supervision and A prn for first few showers.  Pt verbalized understanding     Transfers  Sit to stand: Stand by assistance(from recliner, from chair; sternal pillow )  Stand to sit: Stand by assistance(to chair, to recliner; sternal pillow)                       Cognition  Overall Cognitive Status: [facility]                                         Plan

## 2019-06-19 NOTE — PROGRESS NOTES
Progress Note    2019     Cash Jones    MRN: [id number]    : 1955      Patient Active Problem List   Diagnosis    Multiple myeloma not having achieved remission ([facility]    Multiple myeloma in remission ([place] [address]    NSTEMI (non-ST elevated myocardial infarction) ([place] [address]    Chest pain    HTN (hypertension)    Hyperlipidemia    Type 2 diabetes mellitus ([place] [address]    S/P coronary artery bypass graft x 4       SUBJECTIVE:  Feeling better, walked laps in the halls yesterday. Now on 1 liter O2 with sats at 93%    ECOG PS:(3)    Intravenous Access: RIJ CVC    Isolation: N/A    Medications    Scheduled Meds:   albuterol  2.5 mg Nebulization 4x daily    metoprolol tartrate  25 mg Oral BID    insulin lispro  0-14 Units Subcutaneous 4x Daily AC & HS    furosemide  20 mg Intravenous BID    levofloxacin  750 mg Intravenous Q24H    calcium-vitamin D  1 tablet Oral BID    acyclovir  800 mg Oral BID    acetaminophen  1,000 mg Oral Q6H    rosuvastatin  20 mg Oral QPM    sodium chloride flush  10 mL Intravenous 2 times per day    docusate sodium  100 mg Oral BID    famotidine  20 mg Oral BID    chlorhexidine  15 mL Mouth/Throat BID    potassium chloride  10 mEq Oral TID WC    aspirin  81 mg Oral Daily    clopidogrel  75 mg Oral Daily    magnesium hydroxide  30 mL Oral Daily    polyethylene glycol  17 g Oral Daily     Continuous Infusions:   dextrose       PRN Meds:.oxyCODONE **OR** oxyCODONE, ibuprofen, sodium chloride flush, potassium chloride, magnesium sulfate, HYDROmorphone **OR** HYDROmorphone, ondansetron, hydrALAZINE, glucose, dextrose, glucagon (rDNA), dextrose, albuterol      ROS:  negative ten point review of systems.     Physical    Patient Vitals for the past 24 hrs:   BP Temp Temp src Pulse Resp SpO2 Weight   19 0700 115/63 -- -- 93 20 -- --   19 0600 121/67 -- -- 92 24 -- 177 lb 11.1 oz (80.6 kg)   19 0500 111/60 -- -- 78 (!) 7 -- --   19 0400 114/63 98.2 °F BMP/Mag:  Recent Labs     06/17/19  0431 06/18/19  0557 06/19/19  0538    143 142   K 3.9 3.7 3.9    109 108   CO2 24 24 21   BUN 13 17 20   CREATININE 0.8 0.8 0.8   MG 2.60* 2.20 2.00     LIVP:   No results for input(s): AST, ALT, LIPASE, BILIDIR, BILITOT, ALKPHOS in the last 72 hours. Invalid input(s): AMYLASE,  ALB  Coags:   No results for input(s): PROTIME, INR, APTT in the last 72 hours. ASSESSMENT AND PLAN     Patient Active Problem List   Diagnosis    Multiple myeloma not having achieved remission ([facility]    Multiple myeloma in remission ([facility]    NSTEMI (non-ST elevated myocardial infarction) ([facility]    Chest pain    HTN (hypertension)    Hyperlipidemia    Type 2 diabetes mellitus ([facility]    S/P coronary artery bypass graft x 4       1. CAD - POD 1 CABG     2. Myeloma - she is S/P RVD X 5 followed by an autologous BMT with HD Melphalan 2/7/2019. She is currently in remission and receiving maintenance Revlimid. This can be pro-thrombotic and will be stopped. After she recovers from CABG will consider Velcade maintenance. She will call and make an appointment to see me in approximately 2 weeks. 3. Anemia - one unit irradiated PRBC 6/14. Hgb recovering 9.7    4. Pulm - baseline PFT's 1/7/2019 -->  SPIROMETRY:  FEV1 to FVC ratio is 81%. Prebronchodilator FEV1 is 1.5,  which is 66% of predicted. Postbronchodilator FEV1 is 1.84, which is  82% of predicted for a 22% increase. Prebronchodilator FVC is 1.84,  which is 63% of predicted. Postbronchodilator FVC is 2.14, which is 73%  of predicted for a 16% increase.     Lung volumes show total lung capacity of 3.75, which is 81% of  predicted. Residual volume is 1.63, which is 87% of predicted.     Diffusion capacity is 17.25, which is 76% of predicted.     IMPRESSION:  1. No obstructive defect. 2.  There is a significant response to bronchodilators in small and  large airways. 3.  Normal lung volumes.   4.  Mild decrease

## 2019-06-19 NOTE — PROGRESS NOTES
725am report received bedside from JULIA, patient remains up in the chair, alert oriented x 4. Denies any c/o pain at this time. 02 1 liter nc sats 92% new order for home 02 evaluation noted. Right IJ cordis noted capped. Knee hi teds on, NSR on monitor bp 115/63 resp 23/min. Diet order has been taken.      Isiah Harper RN

## 2019-06-19 NOTE — PROGRESS NOTES
medications  · benzocaine-menthol 15-3.6 MG lozenge           ASSESSMENT/PLAN:  Patient participating in Meds to Bed program.  Pharmacy asked to discuss new medications. 1)  Patient Education:  · Counseled patient on new medications:  Oxycodone, aspirin, clopidogrel, and metoprolol. Discussed name, strength, frequency, purpose, duration of therapy, major side effects, and what to do if a severe reaction occurs. Education confirmed via teach-back. · Discussed which home medications to continue and which to stop taking  · Patient education medication sheet(s) provided   · Meds to Bed Program explained.     2)  Disposition  · Home    Compa Taylor, PharmD, [address] [facility]  Phone: [phone]  6/19/2019 2:30 PM

## 2019-06-19 NOTE — CARE COORDINATION
Case Management Assessment           Daily Note                 Date/ Time of Note: 6/18/2019 3:19 PM         Note completed by: Minus Ulm    Patient Name: Omari Emery  YOB: 1955    Diagnosis:NSTEMI (non-ST elevated myocardial infarction) [facility]) [I21.4]  NSTEMI (non-ST elevated myocardial infarction) [facility]) [I21.4]  Chest pain [R07.9]  Patient Admission Status: Inpatient    Date of Admission:6/11/2019  1:12 PM Length of Stay: 7 GLOS:        Current Plan of Care: IV Lasix, IV Levaquin  ________________________________________________________________________________________  PT AM-PAC: 18 / 24 per last evaluation on: 6/17/19    OT AM-PAC: 18 / 24 per last evaluation on:  6/17/19    DME Needs for discharge: None    ________________________________________________________________________________________  Discharge Plan: Home with [address]: [facility]     Tentative discharge date: 6/19/19    Current barriers to discharge: continue pulmonary toilet and wean 02    Referrals completed: [address]: [facility]    Resources/ information provided:  N/A  ________________________________________________________________________________________  Case Management Notes:    Planned discharge home tomorrow. Kali Enrique and her family were provided with choice of provider; she and her family are in agreement with the discharge plan.     Care Transition Patient: Marissa Simon, RN, BSN  [facility]  Case Management Department  Ph: [phone]
Case Management Daily Note:    Current Plan of Care: IV Lasix, IV Levaquin, 4L HF O2      PT/OT evals pending    Discharge Plan:  Home with hhc    Tentative Discharge Date:  TBD    Current Barriers to Discharge:  IV meds    Resources/Information given:  N/A      Case Management Notes:     POD #4. Anticipate home with hhc. Large family support.       Ceicly Nava, RN, BSN  [facility]  Case Management Department  Ph: [phone]
Spoke with [address] [place] [address] again 15  Minutes ago and they are still working on the home 02 order. Need to identify a qualifying diagnosis.   Message sent to surgery team.
Yes    ADLS:  Current PT AM-PAC Score: 18 /24  Current OT AM-PAC Score: 18 /24      DISCHARGE Disposition: Home with Home Health Care: [facility] Phone: [phone]     LOC at discharge: Not Applicable  DALE Completed: Yes    Notification completed in HENS/PAS?:  Not Applicable    IMM Completed:   Not Indicated    Transportation:  Transportation PLAN for discharge: family   Mode of Transport: Not Applicable  Reason for medical transport: Not Applicable  Name of Transport Company: Not Applicable  Time of Transport:     Transport form completed: Not Indicated    Home Care:  [address] ordered at discharge: Yes  [address]: [facility]  Phone: [phone]  Fax: [phone]  Orders faxed: Yes    Durable Medical Equipment:  DME Provider:  N/A  Equipment obtained during hospitalization: None    Home Oxygen and Respiratory Equipment:  Oxygen needed at discharge?: Yes  [address]: [place]   Phone: [phone]    Portable tank available for discharge?:  Yes      Dialysis:  Dialysis patient: No    Dialysis Center:  Not Applicable    Hospice Services:  Location: Not Applicable  Agency: Not Applicable    Consents signed: Not Indicated    Referrals made at [facility] for outpatient continued care:  Not Applicable    Leonel Sommer and her family were provided with choice of provider; she and her family are in agreement with the discharge plan.     Care Transitions patient: No    Stephen Palencia RN, BSN  [facility]  Case Management Department  Ph: [phone]

## 2019-06-19 NOTE — PROGRESS NOTES
[facility]    Respiratory Therapy     Home Oxygen Evaluation        Name: Zach Moreno  Medical Record Number: [id number]  Age: 66 y.o. Gender:  female   : 1955  Today's date: 2019  Room: [facility]8007-24      Assessment        /69   Pulse 94   Temp 98.7 °F (37.1 °C) (Oral)   Resp 27   Ht 5' 1\" (1.549 m)   Wt 177 lb 11.1 oz (80.6 kg)   SpO2 92%   BMI 33.57 kg/m²     Patient Active Problem List   Diagnosis    Multiple myeloma not having achieved remission ([facility]    Multiple myeloma in remission ([facility]    NSTEMI (non-ST elevated myocardial infarction) ([facility]    Chest pain    HTN (hypertension)    Hyperlipidemia    Type 2 diabetes mellitus ([facility]    S/P coronary artery bypass graft x 4       Social History:  Social History     Tobacco Use    Smoking status: Former Smoker     Packs/day: 0.50     Years: 20.00     Pack years: 10.00     Types: Cigarettes     Last attempt to quit: 2018     Years since quittin.5    Smokeless tobacco: Never Used   Substance Use Topics    Alcohol use: Yes     Comment: rare    Drug use: No       Patient Room Air saturation at rest 91  %  Patient Room Air saturation upon ambulation 84 %    Oxygen saturations of 88% or less on RA qualifies patient for Home Oxygen    Patient resting on 0  lmp  with an oxygen saturation of  90 %     Patient ambulated on 0 lpm with an oxygen saturation of 84%    Patient ambulated on 1 lpm with an oxygen saturation of 85%    Patient ambulated on 2 lpm with an oxygen saturation of 86%     Patient ambulated on 3lpm with an oxygen saturation of 87%    Patient ambulated on 4 lpm with an oxygen saturation of 89%    Qualifying patient for home oxygen with ambulation and continuous flow  @ 4 lpm.      In your clinical assessment does the Patient Require Portable Oxygen Tanks?     Yes               Patient/caregiver was educated on Home Oxygen process:  Yes      Level of patient/caregiver understanding able to:   [x] Verbalize understanding   [] Demonstrate understanding       [] Teach back        [] Needs reinforcement        []  No available caregiver               []  Other:     Response to education:  Excellent     Time Spent with Home O2 Set Up:  10  minutes     [facility], MARY on 6/19/2019 at 9:54 AM                 .

## 2019-06-19 NOTE — PROGRESS NOTES
Progress Note  Hospital Day: 9  POD#  6  S/P Coronary artery bypass x 4 (2019)    Chief Complaint: Postop follow up    Ms. Maldonado     24 hour Interval History:    - increased BB   - dc rojas, wean O2   - Start BB, vapotherm dc and placed on high flow oxygen. Lasix infusion dc and switched to intermittent dosing. Pleural tube dc  6/15 - Mediastinal tubed dc. Started on Levoquin. Decrease lasix infusion 5 mg/hr   - pacing wires dc. Transfused 1 PRBC hgb 8.0 and marginal BP. Remained on norepi. Lasix initially changed to 20 mg q6h. OOB. Increasing oxygen requirements. Started on vapotherm and lasix infusion 10 mg/hr. Pulm consulted    - uncomplicated operation. Extubated in OR. Transferred to ICU on norepi, insulin. On high flow oxygen, resp tx    VITAL SIGNS   Temperature:  Current - Temp: 98.2 °F (36.8 °C);  Max - Temp  Av.3 °F (36.8 °C)  Min: 98.2 °F (36.8 °C)  Max: 98.4 °F (36.9 °C)    Respiratory Rate : Resp  Av.1  Min: 7  Max: 26    Pulse Range: Pulse  Av.6  Min: 77  Max: 93    Blood Pressure Range:  Systolic (96SZW), ZOB:408 , Min:101 , IJM:862   ; Diastolic (83UFB), NVI:25, Min:55, Max:73    Pulse ox Range: SpO2  Av.9 %  Min: 92 %  Max: 97 %  O2 Flow Rate (L/min): 1 L/min    Admission Weight: Weight: 178 lb (80.7 kg)    Current Weight: up 5 lbs from preop  Patient Vitals for the past 96 hrs (Last 3 readings):   Weight   19 0600 177 lb 11.1 oz (80.6 kg)   19 0655 179 lb 10.8 oz (81.5 kg)   19 0646 182 lb 1.6 oz (82.6 kg)     I/O:     Intake/Output Summary (Last 24 hours) at 2019 0811  Last data filed at 2019 0600  Gross per 24 hour   Intake 490 ml   Output 2225 ml   Net -1735 ml     Urine: 9942-203-167 ml/shift    LINES/ACCESS:   Central Access: [facility] Day #:6                            Diet: DIET GENERAL; Daily Fluid Restriction: 1500 ml; No Caffeine  Dietary Nutrition Supplements: Diabetic Oral Supplement    MEDICATIONS:     Tonny Mano Patient Active Problem List:     Multiple myeloma not having achieved remission ([facility]     Multiple myeloma in remission ([facility]     NSTEMI (non-ST elevated myocardial infarction) ([facility]     Chest pain     HTN (hypertension)     Hyperlipidemia     Type 2 diabetes mellitus ([facility]      Neuro: awake, alert and oriented x 3  CV:   Sinus,  Pulm:  Diminished  Abd:  soft, non-tender; bowel sounds normal, last BM 6/17  Wounds: clean, dry and intact  Ext: warm    PLAN:   · CV - continue BB, statin, ASA/Plavix   - hold ace inhibitor for marginal BP and EF 60%  · Pulm - wean O2 as tolerated, continue incentive spirometry, Cardiac Rehab, acapella, EZpap  · Renal - creatinine stable - continue diuresis   -  remove urinary catheter  · Heme - stable  · FEN - general diet with 1500 ml fluid restriction     - continue preop calcium carbonate, acyclovir  · ID - D#5 Levoquin for worsening RLL airspace disease with sputum production  · DC Revlimid per oncology - prothrombic  · VTE prophylaxis - SCD and MANNY hose  · Disposition - check home O2 evaluation for desaturation, dc home with home health      Ursula Bergeron, [address] [facility] pager  6/19/2019  8:15 AM     Home oxygen evaluation 6/19/2019:    Patient Room Air saturation at rest 91  %  Patient Room Air saturation upon ambulation 84 %     Oxygen saturations of 88% or less on RA qualifies patient for Home Oxygen     Patient resting on 0  lmp  with an oxygen saturation of  90 %      Patient ambulated on 0 lpm with an oxygen saturation of 84%     Patient ambulated on 1 lpm with an oxygen saturation of 85%     Patient ambulated on 2 lpm with an oxygen saturation of 86%      Patient ambulated on 3lpm with an oxygen saturation of 87%     Patient ambulated on 4 lpm with an oxygen saturation of 89%     Qualifying patient for home oxygen with ambulation and continuous flow  @ 4 lpm.      Will send her home on continuous home oxygen via nasal cannula.      Ursula Bergeron [address] [facility] pager  6/19/2019  10:41 AM

## 2019-06-24 ENCOUNTER — OFFICE VISIT (OUTPATIENT)
Dept: CARDIOTHORACIC SURGERY | Age: 64
End: 2019-06-24

## 2019-06-24 VITALS
HEART RATE: 80 BPM | BODY MASS INDEX: 33.79 KG/M2 | WEIGHT: 179 LBS | OXYGEN SATURATION: 96 % | HEIGHT: 61 IN | DIASTOLIC BLOOD PRESSURE: 78 MMHG | SYSTOLIC BLOOD PRESSURE: 118 MMHG | TEMPERATURE: 97.8 F

## 2019-06-24 DIAGNOSIS — Z95.1 S/P CORONARY ARTERY BYPASS GRAFT X 4: Primary | ICD-10-CM

## 2019-06-24 DIAGNOSIS — I21.4 NSTEMI (NON-ST ELEVATED MYOCARDIAL INFARCTION) (HCC): ICD-10-CM

## 2019-06-24 PROCEDURE — 99024 POSTOP FOLLOW-UP VISIT: CPT | Performed by: THORACIC SURGERY (CARDIOTHORACIC VASCULAR SURGERY)

## 2019-06-24 RX ORDER — ACETAMINOPHEN 500 MG
500 TABLET ORAL EVERY 6 HOURS PRN
COMMUNITY
End: 2022-09-18 | Stop reason: CLARIF

## 2019-07-08 ENCOUNTER — OFFICE VISIT (OUTPATIENT)
Dept: CARDIOLOGY CLINIC | Age: 64
End: 2019-07-08
Payer: COMMERCIAL

## 2019-07-08 ENCOUNTER — OFFICE VISIT (OUTPATIENT)
Dept: CARDIOTHORACIC SURGERY | Age: 64
End: 2019-07-08

## 2019-07-08 VITALS
WEIGHT: 177.4 LBS | HEART RATE: 67 BPM | SYSTOLIC BLOOD PRESSURE: 120 MMHG | DIASTOLIC BLOOD PRESSURE: 72 MMHG | BODY MASS INDEX: 33.52 KG/M2

## 2019-07-08 VITALS
HEIGHT: 61 IN | BODY MASS INDEX: 33.42 KG/M2 | WEIGHT: 177 LBS | TEMPERATURE: 97.9 F | DIASTOLIC BLOOD PRESSURE: 78 MMHG | OXYGEN SATURATION: 97 % | HEART RATE: 65 BPM | SYSTOLIC BLOOD PRESSURE: 130 MMHG

## 2019-07-08 DIAGNOSIS — E78.2 MIXED HYPERLIPIDEMIA: ICD-10-CM

## 2019-07-08 DIAGNOSIS — I10 ESSENTIAL HYPERTENSION: ICD-10-CM

## 2019-07-08 DIAGNOSIS — Z95.1 S/P CORONARY ARTERY BYPASS GRAFT X 4: ICD-10-CM

## 2019-07-08 DIAGNOSIS — I21.4 NSTEMI (NON-ST ELEVATED MYOCARDIAL INFARCTION) (HCC): Primary | ICD-10-CM

## 2019-07-08 PROCEDURE — 99214 OFFICE O/P EST MOD 30 MIN: CPT | Performed by: INTERNAL MEDICINE

## 2019-07-08 PROCEDURE — 99024 POSTOP FOLLOW-UP VISIT: CPT | Performed by: THORACIC SURGERY (CARDIOTHORACIC VASCULAR SURGERY)

## 2019-07-08 PROCEDURE — 93000 ELECTROCARDIOGRAM COMPLETE: CPT | Performed by: INTERNAL MEDICINE

## 2019-07-08 RX ORDER — POTASSIUM CHLORIDE 750 MG/1
10 TABLET, FILM COATED, EXTENDED RELEASE ORAL 2 TIMES DAILY
Qty: 60 TABLET | Refills: 3 | Status: SHIPPED | OUTPATIENT
Start: 2019-07-08 | End: 2019-10-25 | Stop reason: SDUPTHER

## 2019-07-08 RX ORDER — CLOPIDOGREL BISULFATE 75 MG/1
75 TABLET ORAL DAILY
Qty: 90 TABLET | Refills: 3 | Status: SHIPPED | OUTPATIENT
Start: 2019-07-08 | End: 2020-06-17

## 2019-07-08 RX ORDER — ROSUVASTATIN CALCIUM 20 MG/1
20 TABLET, COATED ORAL EVERY EVENING
Qty: 90 TABLET | Refills: 3 | Status: SHIPPED | OUTPATIENT
Start: 2019-07-08 | End: 2020-12-17 | Stop reason: SDUPTHER

## 2019-07-22 ENCOUNTER — TELEPHONE (OUTPATIENT)
Dept: CARDIOLOGY CLINIC | Age: 64
End: 2019-07-22

## 2019-07-22 NOTE — TELEPHONE ENCOUNTER
Called and spoke with Dr. Marvel Parsons. She stated that the pt B/P has been elevated 624F- 813V systolic, and she started the pt on Lisinopril 10 mg daily. Dr. Marvel Parsons would like to verify with VINNIE if this is ok. She stated that they will continue to monitoring her b/p. Informed her that VINNIE is out this week and if he feels this is an issue we will notify her and the pt. She verbalized understanding.

## 2019-08-05 ENCOUNTER — HOSPITAL ENCOUNTER (EMERGENCY)
Age: 64
Discharge: HOME | End: 2019-08-05
Attending: EMERGENCY MEDICINE
Payer: COMMERCIAL

## 2019-08-05 VITALS
HEIGHT: 61 IN | BODY MASS INDEX: 33.23 KG/M2 | OXYGEN SATURATION: 96 % | RESPIRATION RATE: 16 BRPM | SYSTOLIC BLOOD PRESSURE: 142 MMHG | WEIGHT: 176 LBS | HEART RATE: 72 BPM | TEMPERATURE: 98.2 F | DIASTOLIC BLOOD PRESSURE: 89 MMHG

## 2019-08-05 DIAGNOSIS — I10 HYPERTENSION, UNSPECIFIED TYPE: Primary | ICD-10-CM

## 2019-08-05 LAB
ANION GAP SERPL CALCULATED.3IONS-SCNC: 13 MMOL/L (ref 3–16)
BASOPHILS ABSOLUTE: 0 K/UL (ref 0–0.2)
BASOPHILS RELATIVE PERCENT: 0.9 %
BUN BLDV-MCNC: 18 MG/DL (ref 7–20)
CALCIUM SERPL-MCNC: 9 MG/DL (ref 8.3–10.6)
CHLORIDE BLD-SCNC: 106 MMOL/L (ref 99–110)
CO2: 20 MMOL/L (ref 21–32)
CREAT SERPL-MCNC: 0.9 MG/DL (ref 0.6–1.2)
EKG ATRIAL RATE: 74 BPM
EKG DIAGNOSIS: NORMAL
EKG P AXIS: 57 DEGREES
EKG P-R INTERVAL: 186 MS
EKG Q-T INTERVAL: 438 MS
EKG QRS DURATION: 110 MS
EKG QTC CALCULATION (BAZETT): 486 MS
EKG R AXIS: -52 DEGREES
EKG T AXIS: 56 DEGREES
EKG VENTRICULAR RATE: 74 BPM
EOSINOPHILS ABSOLUTE: 0.2 K/UL (ref 0–0.6)
EOSINOPHILS RELATIVE PERCENT: 5.8 %
GFR AFRICAN AMERICAN: >60
GFR NON-AFRICAN AMERICAN: >60
GLUCOSE BLD-MCNC: 116 MG/DL (ref 70–99)
HCT VFR BLD CALC: 37.6 % (ref 36–48)
HEMOGLOBIN: 12.1 G/DL (ref 12–16)
LYMPHOCYTES ABSOLUTE: 0.9 K/UL (ref 1–5.1)
LYMPHOCYTES RELATIVE PERCENT: 20.8 %
MCH RBC QN AUTO: 27.7 PG (ref 26–34)
MCHC RBC AUTO-ENTMCNC: 32.3 G/DL (ref 31–36)
MCV RBC AUTO: 85.9 FL (ref 80–100)
MONOCYTES ABSOLUTE: 0.5 K/UL (ref 0–1.3)
MONOCYTES RELATIVE PERCENT: 12.4 %
NEUTROPHILS ABSOLUTE: 2.6 K/UL (ref 1.7–7.7)
NEUTROPHILS RELATIVE PERCENT: 60.1 %
PDW BLD-RTO: 16 % (ref 12.4–15.4)
PLATELET # BLD: 214 K/UL (ref 135–450)
PMV BLD AUTO: 8.2 FL (ref 5–10.5)
POTASSIUM SERPL-SCNC: 3.7 MMOL/L (ref 3.5–5.1)
POTASSIUM SERPL-SCNC: 5.6 MMOL/L (ref 3.5–5.1)
RBC # BLD: 4.37 M/UL (ref 4–5.2)
SODIUM BLD-SCNC: 139 MMOL/L (ref 136–145)
TROPONIN: <0.01 NG/ML
WBC # BLD: 4.3 K/UL (ref 4–11)

## 2019-08-05 PROCEDURE — 93005 ELECTROCARDIOGRAM TRACING: CPT | Performed by: EMERGENCY MEDICINE

## 2019-08-05 PROCEDURE — 84484 ASSAY OF TROPONIN QUANT: CPT

## 2019-08-05 PROCEDURE — 85025 COMPLETE CBC W/AUTO DIFF WBC: CPT

## 2019-08-05 PROCEDURE — 6370000000 HC RX 637 (ALT 250 FOR IP): Performed by: EMERGENCY MEDICINE

## 2019-08-05 PROCEDURE — 84132 ASSAY OF SERUM POTASSIUM: CPT

## 2019-08-05 PROCEDURE — 80048 BASIC METABOLIC PNL TOTAL CA: CPT

## 2019-08-05 PROCEDURE — 99283 EMERGENCY DEPT VISIT LOW MDM: CPT

## 2019-08-05 RX ORDER — BORTEZOMIB 3.5 MG/1
1.3 INJECTION, POWDER, LYOPHILIZED, FOR SOLUTION INTRAVENOUS; SUBCUTANEOUS
COMMUNITY
End: 2022-09-18 | Stop reason: CLARIF

## 2019-08-05 RX ORDER — LISINOPRIL 40 MG/1
40 TABLET ORAL DAILY
COMMUNITY
End: 2021-12-06 | Stop reason: SDUPTHER

## 2019-08-05 RX ORDER — LISINOPRIL 20 MG/1
20 TABLET ORAL ONCE
Status: COMPLETED | OUTPATIENT
Start: 2019-08-05 | End: 2019-08-05

## 2019-08-05 RX ADMIN — LISINOPRIL 20 MG: 20 TABLET ORAL at 19:33

## 2019-08-09 ENCOUNTER — HOSPITAL ENCOUNTER (OUTPATIENT)
Dept: CARDIAC REHAB | Age: 64
Setting detail: THERAPIES SERIES
Discharge: HOME | End: 2019-08-09
Payer: COMMERCIAL

## 2019-08-09 PROCEDURE — 93798 PHYS/QHP OP CAR RHAB W/ECG: CPT

## 2019-08-12 ENCOUNTER — HOSPITAL ENCOUNTER (OUTPATIENT)
Dept: CARDIAC REHAB | Age: 64
Setting detail: THERAPIES SERIES
Discharge: HOME | End: 2019-08-12
Payer: COMMERCIAL

## 2019-08-12 ENCOUNTER — TELEPHONE (OUTPATIENT)
Dept: CARDIOLOGY CLINIC | Age: 64
End: 2019-08-12

## 2019-08-12 PROCEDURE — 93798 PHYS/QHP OP CAR RHAB W/ECG: CPT

## 2019-08-14 ENCOUNTER — HOSPITAL ENCOUNTER (OUTPATIENT)
Dept: CARDIAC REHAB | Age: 64
Setting detail: THERAPIES SERIES
Discharge: HOME | End: 2019-08-14
Payer: COMMERCIAL

## 2019-08-14 PROCEDURE — 93798 PHYS/QHP OP CAR RHAB W/ECG: CPT

## 2019-08-16 ENCOUNTER — HOSPITAL ENCOUNTER (OUTPATIENT)
Dept: CARDIAC REHAB | Age: 64
Setting detail: THERAPIES SERIES
Discharge: HOME | End: 2019-08-16
Payer: COMMERCIAL

## 2019-08-16 PROCEDURE — 93798 PHYS/QHP OP CAR RHAB W/ECG: CPT

## 2019-08-23 ENCOUNTER — HOSPITAL ENCOUNTER (OUTPATIENT)
Dept: CARDIAC REHAB | Age: 64
Setting detail: THERAPIES SERIES
Discharge: HOME | End: 2019-08-23
Payer: COMMERCIAL

## 2019-08-23 PROCEDURE — 93798 PHYS/QHP OP CAR RHAB W/ECG: CPT

## 2019-09-18 ENCOUNTER — OFFICE VISIT (OUTPATIENT)
Dept: CARDIOLOGY CLINIC | Age: 64
End: 2019-09-18
Payer: COMMERCIAL

## 2019-09-18 VITALS
HEART RATE: 59 BPM | SYSTOLIC BLOOD PRESSURE: 126 MMHG | WEIGHT: 172 LBS | DIASTOLIC BLOOD PRESSURE: 78 MMHG | BODY MASS INDEX: 32.5 KG/M2

## 2019-09-18 DIAGNOSIS — C90.01 MULTIPLE MYELOMA IN REMISSION (HCC): ICD-10-CM

## 2019-09-18 DIAGNOSIS — E78.2 MIXED HYPERLIPIDEMIA: ICD-10-CM

## 2019-09-18 DIAGNOSIS — I21.4 NSTEMI (NON-ST ELEVATED MYOCARDIAL INFARCTION) (HCC): ICD-10-CM

## 2019-09-18 DIAGNOSIS — I10 ESSENTIAL HYPERTENSION: ICD-10-CM

## 2019-09-18 DIAGNOSIS — Z95.1 S/P CORONARY ARTERY BYPASS GRAFT X 4: Primary | ICD-10-CM

## 2019-09-18 PROCEDURE — 99213 OFFICE O/P EST LOW 20 MIN: CPT | Performed by: INTERNAL MEDICINE

## 2019-09-18 RX ORDER — AMLODIPINE BESYLATE 5 MG/1
10 TABLET ORAL DAILY
COMMUNITY
End: 2022-09-18 | Stop reason: CLARIF

## 2019-10-02 ENCOUNTER — APPOINTMENT (OUTPATIENT)
Dept: CARDIAC REHAB | Age: 64
End: 2019-10-02
Payer: COMMERCIAL

## 2019-10-04 ENCOUNTER — APPOINTMENT (OUTPATIENT)
Dept: CARDIAC REHAB | Age: 64
End: 2019-10-04
Payer: COMMERCIAL

## 2019-10-07 ENCOUNTER — APPOINTMENT (OUTPATIENT)
Dept: CARDIAC REHAB | Age: 64
End: 2019-10-07
Payer: COMMERCIAL

## 2019-10-09 ENCOUNTER — APPOINTMENT (OUTPATIENT)
Dept: CARDIAC REHAB | Age: 64
End: 2019-10-09
Payer: COMMERCIAL

## 2019-10-11 ENCOUNTER — APPOINTMENT (OUTPATIENT)
Dept: CARDIAC REHAB | Age: 64
End: 2019-10-11
Payer: COMMERCIAL

## 2019-10-14 ENCOUNTER — HOSPITAL ENCOUNTER (OUTPATIENT)
Dept: CARDIAC REHAB | Age: 64
Setting detail: THERAPIES SERIES
Discharge: HOME | End: 2019-10-14
Payer: COMMERCIAL

## 2019-10-16 ENCOUNTER — HOSPITAL ENCOUNTER (OUTPATIENT)
Dept: CARDIAC REHAB | Age: 64
Setting detail: THERAPIES SERIES
Discharge: HOME | End: 2019-10-16
Payer: COMMERCIAL

## 2019-10-16 PROCEDURE — 93798 PHYS/QHP OP CAR RHAB W/ECG: CPT

## 2019-10-18 ENCOUNTER — HOSPITAL ENCOUNTER (OUTPATIENT)
Dept: CARDIAC REHAB | Age: 64
Setting detail: THERAPIES SERIES
Discharge: HOME | End: 2019-10-18
Payer: COMMERCIAL

## 2019-10-18 PROCEDURE — 93798 PHYS/QHP OP CAR RHAB W/ECG: CPT

## 2019-10-21 ENCOUNTER — APPOINTMENT (OUTPATIENT)
Dept: CARDIAC REHAB | Age: 64
End: 2019-10-21
Payer: COMMERCIAL

## 2019-10-23 ENCOUNTER — TELEPHONE (OUTPATIENT)
Dept: CARDIOLOGY CLINIC | Age: 64
End: 2019-10-23

## 2019-10-23 ENCOUNTER — HOSPITAL ENCOUNTER (OUTPATIENT)
Dept: CARDIAC REHAB | Age: 64
Setting detail: THERAPIES SERIES
Discharge: HOME | End: 2019-10-23
Payer: COMMERCIAL

## 2019-10-23 PROCEDURE — 93798 PHYS/QHP OP CAR RHAB W/ECG: CPT

## 2019-10-25 ENCOUNTER — HOSPITAL ENCOUNTER (OUTPATIENT)
Dept: CARDIAC REHAB | Age: 64
Setting detail: THERAPIES SERIES
Discharge: HOME | End: 2019-10-25
Payer: COMMERCIAL

## 2019-10-25 PROCEDURE — 93798 PHYS/QHP OP CAR RHAB W/ECG: CPT

## 2019-10-25 RX ORDER — POTASSIUM CHLORIDE 750 MG/1
TABLET, FILM COATED, EXTENDED RELEASE ORAL
Qty: 180 TABLET | Refills: 3 | Status: SHIPPED | OUTPATIENT
Start: 2019-10-25 | End: 2020-07-20

## 2019-10-28 ENCOUNTER — HOSPITAL ENCOUNTER (OUTPATIENT)
Dept: CARDIAC REHAB | Age: 64
Setting detail: THERAPIES SERIES
Discharge: HOME | End: 2019-10-28
Payer: COMMERCIAL

## 2019-10-28 PROCEDURE — 93798 PHYS/QHP OP CAR RHAB W/ECG: CPT

## 2019-10-30 ENCOUNTER — APPOINTMENT (OUTPATIENT)
Dept: CARDIAC REHAB | Age: 64
End: 2019-10-30
Payer: COMMERCIAL

## 2019-11-01 ENCOUNTER — HOSPITAL ENCOUNTER (OUTPATIENT)
Dept: CARDIAC REHAB | Age: 64
Setting detail: THERAPIES SERIES
Discharge: HOME | End: 2019-11-01
Payer: COMMERCIAL

## 2019-11-01 PROCEDURE — 93798 PHYS/QHP OP CAR RHAB W/ECG: CPT

## 2019-11-04 ENCOUNTER — HOSPITAL ENCOUNTER (OUTPATIENT)
Dept: CARDIAC REHAB | Age: 64
Setting detail: THERAPIES SERIES
Discharge: HOME | End: 2019-11-04
Payer: COMMERCIAL

## 2019-11-04 PROCEDURE — 93798 PHYS/QHP OP CAR RHAB W/ECG: CPT

## 2019-11-06 ENCOUNTER — HOSPITAL ENCOUNTER (OUTPATIENT)
Dept: CARDIAC REHAB | Age: 64
Setting detail: THERAPIES SERIES
Discharge: HOME | End: 2019-11-06
Payer: COMMERCIAL

## 2019-11-06 PROCEDURE — 93798 PHYS/QHP OP CAR RHAB W/ECG: CPT

## 2019-11-08 ENCOUNTER — HOSPITAL ENCOUNTER (OUTPATIENT)
Dept: CARDIAC REHAB | Age: 64
Setting detail: THERAPIES SERIES
Discharge: HOME | End: 2019-11-08
Payer: COMMERCIAL

## 2019-11-08 PROCEDURE — 93798 PHYS/QHP OP CAR RHAB W/ECG: CPT

## 2019-11-11 ENCOUNTER — HOSPITAL ENCOUNTER (OUTPATIENT)
Dept: CARDIAC REHAB | Age: 64
Setting detail: THERAPIES SERIES
Discharge: HOME | End: 2019-11-11
Payer: COMMERCIAL

## 2019-11-11 PROCEDURE — 93798 PHYS/QHP OP CAR RHAB W/ECG: CPT

## 2019-11-13 ENCOUNTER — HOSPITAL ENCOUNTER (OUTPATIENT)
Dept: CARDIAC REHAB | Age: 64
Setting detail: THERAPIES SERIES
Discharge: HOME | End: 2019-11-13
Payer: COMMERCIAL

## 2019-11-13 PROCEDURE — 93798 PHYS/QHP OP CAR RHAB W/ECG: CPT

## 2019-11-15 ENCOUNTER — APPOINTMENT (OUTPATIENT)
Dept: CARDIAC REHAB | Age: 64
End: 2019-11-15
Payer: COMMERCIAL

## 2019-11-22 ENCOUNTER — HOSPITAL ENCOUNTER (OUTPATIENT)
Dept: CARDIAC REHAB | Age: 64
Setting detail: THERAPIES SERIES
Discharge: HOME | End: 2019-11-22
Payer: COMMERCIAL

## 2019-11-22 PROCEDURE — 93798 PHYS/QHP OP CAR RHAB W/ECG: CPT

## 2019-12-01 ENCOUNTER — HOSPITAL ENCOUNTER (EMERGENCY)
Age: 64
Discharge: HOME | End: 2019-12-01
Attending: EMERGENCY MEDICINE
Payer: COMMERCIAL

## 2019-12-01 ENCOUNTER — APPOINTMENT (OUTPATIENT)
Dept: GENERAL RADIOLOGY | Age: 64
End: 2019-12-01
Payer: COMMERCIAL

## 2019-12-01 VITALS
HEART RATE: 60 BPM | TEMPERATURE: 98.3 F | OXYGEN SATURATION: 96 % | RESPIRATION RATE: 16 BRPM | SYSTOLIC BLOOD PRESSURE: 134 MMHG | WEIGHT: 179 LBS | DIASTOLIC BLOOD PRESSURE: 83 MMHG | BODY MASS INDEX: 33.79 KG/M2 | HEIGHT: 61 IN

## 2019-12-01 DIAGNOSIS — K21.9 GASTROESOPHAGEAL REFLUX DISEASE, ESOPHAGITIS PRESENCE NOT SPECIFIED: Primary | ICD-10-CM

## 2019-12-01 LAB
ANION GAP SERPL CALCULATED.3IONS-SCNC: 11 MMOL/L (ref 3–16)
BASOPHILS ABSOLUTE: 0 K/UL (ref 0–0.2)
BASOPHILS RELATIVE PERCENT: 1.2 %
BUN BLDV-MCNC: 15 MG/DL (ref 7–20)
CALCIUM SERPL-MCNC: 9.8 MG/DL (ref 8.3–10.6)
CHLORIDE BLD-SCNC: 105 MMOL/L (ref 99–110)
CO2: 24 MMOL/L (ref 21–32)
CREAT SERPL-MCNC: 0.7 MG/DL (ref 0.6–1.2)
D DIMER: <200 NG/ML DDU (ref 0–229)
EOSINOPHILS ABSOLUTE: 0.2 K/UL (ref 0–0.6)
EOSINOPHILS RELATIVE PERCENT: 6.9 %
GFR AFRICAN AMERICAN: >60
GFR NON-AFRICAN AMERICAN: >60
GLUCOSE BLD-MCNC: 89 MG/DL (ref 70–99)
HCT VFR BLD CALC: 41.3 % (ref 36–48)
HEMOGLOBIN: 13.3 G/DL (ref 12–16)
LYMPHOCYTES ABSOLUTE: 1.1 K/UL (ref 1–5.1)
LYMPHOCYTES RELATIVE PERCENT: 33.7 %
MCH RBC QN AUTO: 28.9 PG (ref 26–34)
MCHC RBC AUTO-ENTMCNC: 32.3 G/DL (ref 31–36)
MCV RBC AUTO: 89.4 FL (ref 80–100)
MONOCYTES ABSOLUTE: 0.5 K/UL (ref 0–1.3)
MONOCYTES RELATIVE PERCENT: 16.4 %
NEUTROPHILS ABSOLUTE: 1.3 K/UL (ref 1.7–7.7)
NEUTROPHILS RELATIVE PERCENT: 41.8 %
PDW BLD-RTO: 15.3 % (ref 12.4–15.4)
PLATELET # BLD: 243 K/UL (ref 135–450)
PMV BLD AUTO: 7.5 FL (ref 5–10.5)
POTASSIUM REFLEX MAGNESIUM: 3.7 MMOL/L (ref 3.5–5.1)
RBC # BLD: 4.62 M/UL (ref 4–5.2)
SODIUM BLD-SCNC: 140 MMOL/L (ref 136–145)
TROPONIN: <0.01 NG/ML
TROPONIN: <0.01 NG/ML
WBC # BLD: 3.2 K/UL (ref 4–11)

## 2019-12-01 PROCEDURE — 93005 ELECTROCARDIOGRAM TRACING: CPT | Performed by: EMERGENCY MEDICINE

## 2019-12-01 PROCEDURE — 71046 X-RAY EXAM CHEST 2 VIEWS: CPT

## 2019-12-01 PROCEDURE — 6370000000 HC RX 637 (ALT 250 FOR IP): Performed by: EMERGENCY MEDICINE

## 2019-12-01 PROCEDURE — 36415 COLL VENOUS BLD VENIPUNCTURE: CPT

## 2019-12-01 PROCEDURE — 84484 ASSAY OF TROPONIN QUANT: CPT

## 2019-12-01 PROCEDURE — 85379 FIBRIN DEGRADATION QUANT: CPT

## 2019-12-01 PROCEDURE — 80048 BASIC METABOLIC PNL TOTAL CA: CPT

## 2019-12-01 PROCEDURE — 99285 EMERGENCY DEPT VISIT HI MDM: CPT

## 2019-12-01 PROCEDURE — 85025 COMPLETE CBC W/AUTO DIFF WBC: CPT

## 2019-12-01 RX ADMIN — LIDOCAINE HYDROCHLORIDE: 20 SOLUTION ORAL; TOPICAL at 20:40

## 2019-12-01 ASSESSMENT — PAIN DESCRIPTION - ORIENTATION: ORIENTATION: MID

## 2019-12-01 ASSESSMENT — PAIN DESCRIPTION - PROGRESSION: CLINICAL_PROGRESSION: GRADUALLY WORSENING

## 2019-12-01 ASSESSMENT — PAIN - FUNCTIONAL ASSESSMENT: PAIN_FUNCTIONAL_ASSESSMENT: ACTIVITIES ARE NOT PREVENTED

## 2019-12-01 ASSESSMENT — PAIN DESCRIPTION - FREQUENCY: FREQUENCY: CONTINUOUS

## 2019-12-01 ASSESSMENT — PAIN DESCRIPTION - DESCRIPTORS: DESCRIPTORS: DISCOMFORT;PRESSURE

## 2019-12-01 ASSESSMENT — PAIN DESCRIPTION - PAIN TYPE: TYPE: ACUTE PAIN

## 2019-12-01 ASSESSMENT — PAIN DESCRIPTION - ONSET: ONSET: GRADUAL

## 2019-12-01 ASSESSMENT — PAIN DESCRIPTION - LOCATION: LOCATION: BACK

## 2019-12-01 ASSESSMENT — PAIN SCALES - GENERAL: PAINLEVEL_OUTOF10: 5

## 2019-12-02 ENCOUNTER — HOSPITAL ENCOUNTER (OUTPATIENT)
Dept: CARDIAC REHAB | Age: 64
Setting detail: THERAPIES SERIES
Discharge: HOME | End: 2019-12-02
Payer: COMMERCIAL

## 2019-12-02 ENCOUNTER — TELEPHONE (OUTPATIENT)
Dept: CARDIOLOGY CLINIC | Age: 64
End: 2019-12-02

## 2019-12-02 LAB
EKG ATRIAL RATE: 52 BPM
EKG DIAGNOSIS: NORMAL
EKG P AXIS: 56 DEGREES
EKG P-R INTERVAL: 192 MS
EKG Q-T INTERVAL: 444 MS
EKG QRS DURATION: 108 MS
EKG QTC CALCULATION (BAZETT): 412 MS
EKG R AXIS: -41 DEGREES
EKG T AXIS: 42 DEGREES
EKG VENTRICULAR RATE: 52 BPM

## 2019-12-02 PROCEDURE — 93798 PHYS/QHP OP CAR RHAB W/ECG: CPT

## 2019-12-04 ENCOUNTER — HOSPITAL ENCOUNTER (OUTPATIENT)
Dept: CARDIAC REHAB | Age: 64
Setting detail: THERAPIES SERIES
Discharge: HOME | End: 2019-12-04
Payer: COMMERCIAL

## 2019-12-04 PROCEDURE — 93798 PHYS/QHP OP CAR RHAB W/ECG: CPT

## 2019-12-06 ENCOUNTER — APPOINTMENT (OUTPATIENT)
Dept: CARDIAC REHAB | Age: 64
End: 2019-12-06
Payer: COMMERCIAL

## 2019-12-09 ENCOUNTER — HOSPITAL ENCOUNTER (OUTPATIENT)
Dept: CARDIAC REHAB | Age: 64
Setting detail: THERAPIES SERIES
Discharge: HOME | End: 2019-12-09
Payer: COMMERCIAL

## 2019-12-09 PROCEDURE — 93798 PHYS/QHP OP CAR RHAB W/ECG: CPT

## 2019-12-11 ENCOUNTER — APPOINTMENT (OUTPATIENT)
Dept: CARDIAC REHAB | Age: 64
End: 2019-12-11
Payer: COMMERCIAL

## 2019-12-13 ENCOUNTER — APPOINTMENT (OUTPATIENT)
Dept: CARDIAC REHAB | Age: 64
End: 2019-12-13
Payer: COMMERCIAL

## 2019-12-27 ENCOUNTER — APPOINTMENT (OUTPATIENT)
Dept: CARDIAC REHAB | Age: 64
End: 2019-12-27
Payer: COMMERCIAL

## 2019-12-30 ENCOUNTER — APPOINTMENT (OUTPATIENT)
Dept: CARDIAC REHAB | Age: 64
End: 2019-12-30
Payer: COMMERCIAL

## 2020-01-03 ENCOUNTER — OFFICE VISIT (OUTPATIENT)
Dept: CARDIOLOGY CLINIC | Age: 65
End: 2020-01-03
Payer: COMMERCIAL

## 2020-01-03 VITALS
DIASTOLIC BLOOD PRESSURE: 80 MMHG | BODY MASS INDEX: 33.97 KG/M2 | SYSTOLIC BLOOD PRESSURE: 130 MMHG | HEART RATE: 70 BPM | WEIGHT: 179.8 LBS

## 2020-01-03 PROCEDURE — 99213 OFFICE O/P EST LOW 20 MIN: CPT | Performed by: INTERNAL MEDICINE

## 2020-01-03 NOTE — PROGRESS NOTES
Subjective:      Patient ID: Claudia Stephens is a 66 y.o. female    S/P CABG x 4. HLP.  HTN.      HPI:  Claudia Stephens is a 66 y.o. patient who presented to the hospital with complaints of chest pain and back pain. She had a CTPA that was negative for PE and troponin was climbing from 0.25 to 0.38. Pt is s/p bmtx for multiple myeloma in  and counts are normal now. Follow up CAD s/p CABG x 4 on 19. Doing very well. Becoming more active. No chest pain. Allergies   Allergen Reactions    Pcn [Penicillins]     Lipitor [Atorvastatin] Other (See Comments)     Leg cramping       Social History     Socioeconomic History    Marital status:       Spouse name: Not on file    Number of children: Not on file    Years of education: Not on file    Highest education level: Not on file   Occupational History    Not on file   Social Needs    Financial resource strain: Not on file    Food insecurity:     Worry: Not on file     Inability: Not on file    Transportation needs:     Medical: Not on file     Non-medical: Not on file   Tobacco Use    Smoking status: Former Smoker     Packs/day: 0.50     Years: 20.00     Pack years: 10.00     Types: Cigarettes     Last attempt to quit: 2018     Years since quittin.6    Smokeless tobacco: Never Used   Substance and Sexual Activity    Alcohol use: Yes     Comment: rare    Drug use: No    Sexual activity: Not on file   Lifestyle    Physical activity:     Days per week: Not on file     Minutes per session: Not on file    Stress: Not on file   Relationships    Social connections:     Talks on phone: Not on file     Gets together: Not on file     Attends [employer] service: Not on file     Active member of club or organization: Not on file     Attends meetings of clubs or organizations: Not on file     Relationship status: Not on file    Intimate partner violence:     Fear of current or ex partner: Not on file     Emotionally abused: Not on file every 3 months      acyclovir (ZOVIRAX) 800 MG tablet Take 1 tablet by mouth 2 times daily 60 tablet 11    famotidine (PEPCID) 20 MG tablet Take 1 tablet by mouth 2 times daily 60 tablet 3    prochlorperazine (COMPAZINE) 10 MG tablet Take 10 mg by mouth as needed       potassium chloride (KLOR-CON) 10 MEQ extended release tablet TK 2 TS PO BID,  3    Cholecalciferol (VITAMIN D3) 1000 units CAPS Take 1 capsule by mouth 2 times daily       Calcium Carbonate-Vitamin D (CALCIUM-CARB 600 + D) 600-125 MG-UNIT TABS Take 1 tablet by mouth 2 times daily        No current facility-administered medications for this visit. 19 ECHO  Summary   Left ventricular cavity size is normal. There is mild concentric left   ventricular hypertrophy. Overall left ventricular systolic function appears   normal with an ejection fraction of 55-60%. No regional wall motion   abnormalities are noted. Normal diastolic function. Trivial tricuspid   regurgitation. Estimated pulmonary artery systolic pressure is at 31 mmHg   assuming a right atrial pressure of 3 mmHg. PATIENT NAME: Gladis Lantigua                       :        1955  MED REC NO:   [id number]                          ROOM:       [facility]  ACCOUNT NO:   [de-identified]                           ADMIT DATE: 2019  PROVIDER:     Anson Cardenas MD     DATE OF PROCEDURE:  2019     PREOPERATIVE DIAGNOSES:  1. Hypertension. 2.  Hyperlipidemia. 3.  Type 2 diabetes. 4.  Multiple myeloma. 5.  Non-ST MI.  6.  Complex coronary artery disease. 7.  Obese, BMI of 33.     POSTOPERATIVE DIAGNOSES:  1. Hypertension. 2.  Hyperlipidemia. 3.  Type 2 diabetes. 4.  Multiple myeloma. 5.  Non-ST MI.  6.  Complex coronary artery disease. 7.  Obese, BMI of 33.     OPERATION PERFORMED:  1. Coronary artery bypass x4, LIMA to LAD, reverse saphenous vein to  OM1, reverse saphenous vein to OM2, and reverse saphenous vein to PDA.   2.  Endovein harvest.  3.  Intercostal nerve block  4. Transesophageal echo. 5.  Doppler assessment of graft. Assessment:      Diagnosis Orders   1. Essential hypertension     2. S/P coronary artery bypass graft x 4     3. NSTEMI (non-ST elevated myocardial infarction) ([address]     4. Mixed hyperlipidemia            Plan:      NSTEMI:  Stable after CABG. Continue plavix. JEAN GARZON II.VIERTEL to LAD. SVG to OM1. SVG to OM2 and SVG to RCA. CAD:  Doing well after CABG. Start cardiac rehab. Patient looks much more comfortable. HLP:  Crestor and controlled  HTN:  Controlled with medication--metoprolol.

## 2020-01-18 ENCOUNTER — HOSPITAL ENCOUNTER (EMERGENCY)
Age: 65
Discharge: HOME | End: 2020-01-18
Attending: EMERGENCY MEDICINE
Payer: COMMERCIAL

## 2020-01-18 ENCOUNTER — APPOINTMENT (OUTPATIENT)
Dept: GENERAL RADIOLOGY | Age: 65
End: 2020-01-18
Payer: COMMERCIAL

## 2020-01-18 VITALS
SYSTOLIC BLOOD PRESSURE: 145 MMHG | RESPIRATION RATE: 20 BRPM | TEMPERATURE: 98.1 F | HEART RATE: 59 BPM | WEIGHT: 180 LBS | DIASTOLIC BLOOD PRESSURE: 86 MMHG | HEIGHT: 61 IN | BODY MASS INDEX: 33.99 KG/M2 | OXYGEN SATURATION: 94 %

## 2020-01-18 LAB
A/G RATIO: 1.4 (ref 1.1–2.2)
ALBUMIN SERPL-MCNC: 4.4 G/DL (ref 3.4–5)
ALP BLD-CCNC: 50 U/L (ref 40–129)
ALT SERPL-CCNC: 21 U/L (ref 10–40)
ANION GAP SERPL CALCULATED.3IONS-SCNC: 13 MMOL/L (ref 3–16)
AST SERPL-CCNC: 27 U/L (ref 15–37)
BASE EXCESS VENOUS: 3.6 MMOL/L (ref -2–3)
BASOPHILS ABSOLUTE: 0 K/UL (ref 0–0.2)
BASOPHILS RELATIVE PERCENT: 1.3 %
BILIRUB SERPL-MCNC: 0.4 MG/DL (ref 0–1)
BUN BLDV-MCNC: 11 MG/DL (ref 7–20)
CALCIUM SERPL-MCNC: 9.8 MG/DL (ref 8.3–10.6)
CARBOXYHEMOGLOBIN: 1.8 % (ref 0–1.5)
CHLORIDE BLD-SCNC: 100 MMOL/L (ref 99–110)
CO2: 26 MMOL/L (ref 21–32)
CREAT SERPL-MCNC: 0.8 MG/DL (ref 0.6–1.2)
EOSINOPHILS ABSOLUTE: 0.2 K/UL (ref 0–0.6)
EOSINOPHILS RELATIVE PERCENT: 5.3 %
GFR AFRICAN AMERICAN: >60
GFR NON-AFRICAN AMERICAN: >60
GLOBULIN: 3.1 G/DL
GLUCOSE BLD-MCNC: 88 MG/DL (ref 70–99)
HCO3 VENOUS: 28.5 MMOL/L (ref 24–28)
HCT VFR BLD CALC: 42.5 % (ref 36–48)
HEMOGLOBIN, VEN, REDUCED: 22.9 %
HEMOGLOBIN: 13.7 G/DL (ref 12–16)
LACTIC ACID: 1.3 MMOL/L (ref 0.4–2)
LIPASE: 30 U/L (ref 13–60)
LYMPHOCYTES ABSOLUTE: 1.6 K/UL (ref 1–5.1)
LYMPHOCYTES RELATIVE PERCENT: 48.1 %
MCH RBC QN AUTO: 28.8 PG (ref 26–34)
MCHC RBC AUTO-ENTMCNC: 32.2 G/DL (ref 31–36)
MCV RBC AUTO: 89.5 FL (ref 80–100)
METHEMOGLOBIN VENOUS: 0.4 % (ref 0–1.5)
MONOCYTES ABSOLUTE: 0.5 K/UL (ref 0–1.3)
MONOCYTES RELATIVE PERCENT: 13.8 %
NEUTROPHILS ABSOLUTE: 1.1 K/UL (ref 1.7–7.7)
NEUTROPHILS RELATIVE PERCENT: 31.5 %
O2 SAT, VEN: 77 %
PCO2, VEN: 46.4 MMHG (ref 41–51)
PDW BLD-RTO: 14.8 % (ref 12.4–15.4)
PH VENOUS: 7.41 (ref 7.35–7.45)
PLATELET # BLD: 211 K/UL (ref 135–450)
PMV BLD AUTO: 8 FL (ref 5–10.5)
PO2, VEN: 43.7 MMHG (ref 25–40)
POTASSIUM REFLEX MAGNESIUM: 3.7 MMOL/L (ref 3.5–5.1)
PRO-BNP: 127 PG/ML (ref 0–124)
RBC # BLD: 4.74 M/UL (ref 4–5.2)
SODIUM BLD-SCNC: 139 MMOL/L (ref 136–145)
TCO2 CALC VENOUS: 30 MMOL/L
TOTAL PROTEIN: 7.5 G/DL (ref 6.4–8.2)
TROPONIN: <0.01 NG/ML
TROPONIN: <0.01 NG/ML
WBC # BLD: 3.4 K/UL (ref 4–11)

## 2020-01-18 PROCEDURE — 85025 COMPLETE CBC W/AUTO DIFF WBC: CPT

## 2020-01-18 PROCEDURE — 6360000002 HC RX W HCPCS: Performed by: STUDENT IN AN ORGANIZED HEALTH CARE EDUCATION/TRAINING PROGRAM

## 2020-01-18 PROCEDURE — 80053 COMPREHEN METABOLIC PANEL: CPT

## 2020-01-18 PROCEDURE — C9113 INJ PANTOPRAZOLE SODIUM, VIA: HCPCS | Performed by: STUDENT IN AN ORGANIZED HEALTH CARE EDUCATION/TRAINING PROGRAM

## 2020-01-18 PROCEDURE — 83605 ASSAY OF LACTIC ACID: CPT

## 2020-01-18 PROCEDURE — 99284 EMERGENCY DEPT VISIT MOD MDM: CPT

## 2020-01-18 PROCEDURE — 84484 ASSAY OF TROPONIN QUANT: CPT

## 2020-01-18 PROCEDURE — 93005 ELECTROCARDIOGRAM TRACING: CPT | Performed by: STUDENT IN AN ORGANIZED HEALTH CARE EDUCATION/TRAINING PROGRAM

## 2020-01-18 PROCEDURE — 6370000000 HC RX 637 (ALT 250 FOR IP): Performed by: STUDENT IN AN ORGANIZED HEALTH CARE EDUCATION/TRAINING PROGRAM

## 2020-01-18 PROCEDURE — 83690 ASSAY OF LIPASE: CPT

## 2020-01-18 PROCEDURE — 71046 X-RAY EXAM CHEST 2 VIEWS: CPT

## 2020-01-18 PROCEDURE — 83880 ASSAY OF NATRIURETIC PEPTIDE: CPT

## 2020-01-18 PROCEDURE — 96374 THER/PROPH/DIAG INJ IV PUSH: CPT

## 2020-01-18 PROCEDURE — 82803 BLOOD GASES ANY COMBINATION: CPT

## 2020-01-18 RX ORDER — PANTOPRAZOLE SODIUM 20 MG/1
20 TABLET, DELAYED RELEASE ORAL
Qty: 30 TABLET | Refills: 0 | Status: SHIPPED | OUTPATIENT
Start: 2020-01-18 | End: 2022-09-18 | Stop reason: CLARIF

## 2020-01-18 RX ORDER — PANTOPRAZOLE SODIUM 40 MG/10ML
40 INJECTION, POWDER, LYOPHILIZED, FOR SOLUTION INTRAVENOUS ONCE
Status: COMPLETED | OUTPATIENT
Start: 2020-01-18 | End: 2020-01-18

## 2020-01-18 RX ADMIN — METOPROLOL TARTRATE 25 MG: 25 TABLET ORAL at 18:07

## 2020-01-18 RX ADMIN — PANTOPRAZOLE SODIUM 40 MG: 40 INJECTION, POWDER, FOR SOLUTION INTRAVENOUS at 18:35

## 2020-01-18 ASSESSMENT — ENCOUNTER SYMPTOMS
VOMITING: 0
SHORTNESS OF BREATH: 0
NAUSEA: 0
ABDOMINAL PAIN: 0
CHEST TIGHTNESS: 0
SORE THROAT: 0

## 2020-01-18 ASSESSMENT — HEART SCORE: ECG: 0

## 2020-01-18 NOTE — ED PROVIDER NOTES
pain.   Neurological: Negative for dizziness, syncope, weakness and light-headedness. Hematological: Negative for adenopathy. Psychiatric/Behavioral: Negative for confusion. Past Medical, Surgical, Family, and Social History     She has a past medical history of Cancer ([facility] Diabetes mellitus ([facility] [address] Hyperlipidemia, Hypertension, and MI (myocardial infarction) ([facility] [address] She has a past surgical history that includes Tubal ligation; Hysterectomy; INSERTION / REMOVAL / REPLACEMENT VENOUS ACCESS CATHETER (N/A, 1/24/2019); and Coronary artery bypass graft (N/A, 6/13/2019). Her family history is not on file. She reports that she quit smoking about 13 months ago. Her smoking use included cigarettes. She has a 10.00 pack-year smoking history. She has never used smokeless tobacco. She reports current alcohol use. She reports that she does not use drugs.     Medications     Previous Medications    ACETAMINOPHEN (TYLENOL) 500 MG TABLET    Take 500 mg by mouth every 6 hours as needed for Pain    ACYCLOVIR (ZOVIRAX) 800 MG TABLET    Take 1 tablet by mouth 2 times daily    AMLODIPINE (NORVASC) 5 MG TABLET    Take 5 mg by mouth daily    ASPIRIN 81 MG EC TABLET    Take 1 tablet by mouth daily    BORTEZOMIB (VELCADE) 3.5 MG CHEMO INNJECTION    Infuse 1.3 mg/m2 intravenously every 14 days    CALCIUM CARBONATE-VITAMIN D (CALCIUM-CARB 600 + D) 600-125 MG-UNIT TABS    Take 1 tablet by mouth 2 times daily     CHOLECALCIFEROL (VITAMIN D3) 1000 UNITS CAPS    Take 1 capsule by mouth 2 times daily     CLOPIDOGREL (PLAVIX) 75 MG TABLET    Take 1 tablet by mouth daily    LISINOPRIL (PRINIVIL;ZESTRIL) 40 MG TABLET    Take 40 mg by mouth daily    METOPROLOL TARTRATE (LOPRESSOR) 25 MG TABLET    Take 1 tablet by mouth 2 times daily    POTASSIUM CHLORIDE (KLOR-CON) 10 MEQ EXTENDED RELEASE TABLET    TAKE 1 TABLET BY MOUTH TWICE DAILY    ROSUVASTATIN (CRESTOR) 20 MG TABLET    Take 1 tablet by mouth every evening    ZOLEDRONIC ACID (ZOMETA) 4 51.0 mmHg    pO2, Keegan 43.7 (H) 25.0 - 40.0 mmHg    HCO3, Venous 28.5 (H) 24.0 - 28.0 mmol/L    Base Excess, Keegan 3.6 (H) -2.0 - 3.0 mmol/L    O2 Sat, Keegan 77 Not established %    Carboxyhemoglobin 1.8 (H) 0.0 - 1.5 %    MetHgb, Keegan 0.4 0.0 - 1.5 %    TC02 (Calc), Keegan 30 mmol/L    Hemoglobin, Keegan, Reduced 22.90 %   Lactic acid, plasma   Result Value Ref Range    Lactic Acid 1.3 0.4 - 2.0 mmol/L   Troponin   Result Value Ref Range    Troponin <0.01 <0.01 ng/mL         RECENT VITALS:  BP: 138/83, Temp: 98.1 °F (36.7 °C), Pulse: 58,Resp: 14, SpO2: 93 %     Procedures         ED Course     Nursing Notes, Past Medical Hx, Past Surgical Hx, Social Hx, Allergies, and Family Hx were reviewed. The patient was given the followingmedications:  Orders Placed This Encounter   Medications    metoprolol tartrate (LOPRESSOR) tablet 25 mg    pantoprazole (PROTONIX) injection 40 mg    pantoprazole (PROTONIX) 20 MG tablet     Sig: Take 1 tablet by mouth every morning (before breakfast)     Dispense:  30 tablet     Refill:  0       CONSULTS:  None    MEDICAL DECISION MAKING / ASSESSMENT / Jarad Blaise is a 60 y.o. female with a history of multiple myeloma status post bone marrow transplant, prior CAD with stents and CABG x4 on June 13, 2019 who is presenting with chest pain that has been ongoing for the past 3 days now. Previous records reveal the patient does have a significant past cardiac disease. Upon arrival her EKG was pertinent for some new T wave inversion in leads V2 compared to her previous EKG in December 2019 but otherwise her right bundle branch block was still noted. The patient's blood work here revealed  abnormal CBC, renal panel, LFTs, lipase. Her VBG was normal.  The patient had a normal lactate. Her proBNP was slightly elevated from 70 back in June to 127 today. But the patient did not have any obvious findings of fluid overload. Her heart score here today is 3.   The patient was given a dose of Protonix here to see about improve her symptoms as well as her home blood pressure doses her blood pressure was elevated and she takes 25 mg of metoprolol at night. This brought her blood pressure down to 146/81. A second troponin which came back negative. The patient felt significantly better after the Protonix and as the Nexium has not been helping her she will be given a prescription for Protonix. She was encouraged to follow-up with her family doctor and cardiologist.    This patient was also evaluated by the attending physician. All care plans werediscussed and agreed upon. Clinical Impression     1. Atypical chest pain    2. Essential hypertension        Disposition     PATIENT REFERRED TO:  Justine Samayoa MD   [address]  [phone]    Schedule an appointment as soon as possible for a visit in 3 days      Cardiologist  Please call your cardiologist as well.   Call   for follow up      DISCHARGE MEDICATIONS:  New Prescriptions    PANTOPRAZOLE (PROTONIX) 20 MG TABLET    Take 1 tablet by mouth every morning (before breakfast)       DISPOSITION     Discharge home     Moe Kim MD  Resident  01/18/20 0569

## 2020-01-19 LAB
EKG ATRIAL RATE: 62 BPM
EKG DIAGNOSIS: NORMAL
EKG P AXIS: 58 DEGREES
EKG P-R INTERVAL: 186 MS
EKG Q-T INTERVAL: 460 MS
EKG QRS DURATION: 114 MS
EKG QTC CALCULATION (BAZETT): 466 MS
EKG R AXIS: -43 DEGREES
EKG T AXIS: 52 DEGREES
EKG VENTRICULAR RATE: 62 BPM

## 2020-03-10 ENCOUNTER — TELEPHONE (OUTPATIENT)
Dept: CARDIOLOGY CLINIC | Age: 65
End: 2020-03-10

## 2020-03-11 NOTE — TELEPHONE ENCOUNTER
Spoke with pt regarding Protonix. Pt is going to finish her months supply then stopped taking it.    She was encouraged to follow up with GI if her symptoms return

## 2020-05-04 ENCOUNTER — OFFICE VISIT (OUTPATIENT)
Dept: PRIMARY CARE CLINIC | Age: 65
End: 2020-05-04

## 2020-05-04 PROCEDURE — 99999 PR OFFICE/OUTPT VISIT,PROCEDURE ONLY: CPT | Performed by: NURSE PRACTITIONER

## 2020-05-06 ENCOUNTER — HOSPITAL ENCOUNTER (OUTPATIENT)
Dept: INTERVENTIONAL RADIOLOGY/VASCULAR | Age: 65
Discharge: HOME | End: 2020-05-06
Payer: COMMERCIAL

## 2020-05-06 VITALS — HEIGHT: 61 IN | BODY MASS INDEX: 34.93 KG/M2 | TEMPERATURE: 98.5 F | WEIGHT: 185 LBS

## 2020-05-06 LAB
HCT VFR BLD CALC: 41.2 % (ref 36–48)
HEMOGLOBIN: 13.7 G/DL (ref 12–16)
INR BLD: 0.94 (ref 0.86–1.14)
MCH RBC QN AUTO: 29.4 PG (ref 26–34)
MCHC RBC AUTO-ENTMCNC: 33.2 G/DL (ref 31–36)
MCV RBC AUTO: 88.7 FL (ref 80–100)
PDW BLD-RTO: 15.1 % (ref 12.4–15.4)
PLATELET # BLD: 185 K/UL (ref 135–450)
PMV BLD AUTO: 8 FL (ref 5–10.5)
PROTHROMBIN TIME: 10.9 SEC (ref 10–13.2)
RBC # BLD: 4.65 M/UL (ref 4–5.2)
SARS-COV-2: NOT DETECTED
SOURCE: NORMAL
WBC # BLD: 8.3 K/UL (ref 4–11)

## 2020-05-06 PROCEDURE — 85610 PROTHROMBIN TIME: CPT

## 2020-05-06 PROCEDURE — 85027 COMPLETE CBC AUTOMATED: CPT

## 2020-05-06 RX ORDER — MONTELUKAST SODIUM 10 MG/1
10 TABLET ORAL NIGHTLY
COMMUNITY
End: 2022-09-18 | Stop reason: CLARIF

## 2020-05-06 RX ORDER — CIPROFLOXACIN 250 MG/1
250 TABLET, FILM COATED ORAL 2 TIMES DAILY
COMMUNITY
End: 2022-07-14

## 2020-05-07 ENCOUNTER — HOSPITAL ENCOUNTER (OUTPATIENT)
Dept: INTERVENTIONAL RADIOLOGY/VASCULAR | Age: 65
Discharge: HOME | End: 2020-05-07
Attending: INTERNAL MEDICINE | Admitting: INTERNAL MEDICINE
Payer: COMMERCIAL

## 2020-05-07 VITALS — WEIGHT: 187 LBS | HEIGHT: 61 IN | TEMPERATURE: 98.5 F | BODY MASS INDEX: 35.3 KG/M2

## 2020-05-07 PROCEDURE — C1788 PORT, INDWELLING, IMP: HCPCS

## 2020-05-07 PROCEDURE — 77001 FLUOROGUIDE FOR VEIN DEVICE: CPT

## 2020-05-07 PROCEDURE — C1894 INTRO/SHEATH, NON-LASER: HCPCS

## 2020-05-07 PROCEDURE — C1887 CATHETER, GUIDING: HCPCS

## 2020-05-07 PROCEDURE — 99152 MOD SED SAME PHYS/QHP 5/>YRS: CPT

## 2020-05-07 PROCEDURE — 76937 US GUIDE VASCULAR ACCESS: CPT

## 2020-05-07 PROCEDURE — 99153 MOD SED SAME PHYS/QHP EA: CPT

## 2020-05-07 PROCEDURE — 2500000003 HC RX 250 WO HCPCS

## 2020-05-07 PROCEDURE — C1769 GUIDE WIRE: HCPCS

## 2020-05-07 PROCEDURE — 2709999900 HC NON-CHARGEABLE SUPPLY

## 2020-05-07 PROCEDURE — 36561 INSERT TUNNELED CV CATH: CPT

## 2020-05-07 PROCEDURE — 6360000002 HC RX W HCPCS

## 2020-06-17 RX ORDER — CLOPIDOGREL BISULFATE 75 MG/1
75 TABLET ORAL DAILY
Qty: 90 TABLET | Refills: 3 | Status: SHIPPED | OUTPATIENT
Start: 2020-06-17 | End: 2021-06-22

## 2020-06-29 ENCOUNTER — TELEPHONE (OUTPATIENT)
Dept: CARDIOLOGY CLINIC | Age: 65
End: 2020-06-29

## 2020-06-29 NOTE — TELEPHONE ENCOUNTER
Pt stated she is now having cancer treatments once a week. Pt states every week after treatment her HR goes up for 1 day. Pt would like to address this matter with someone at [phone].  Thanks

## 2020-06-30 NOTE — TELEPHONE ENCOUNTER
Spoke with pt and her HR goes up to 120's the day after chemo for short periods of time. Pt states these are not bothersome and she was advised to call back if symptoms worsen. Made pt aware that I will let VINNIE know.

## 2020-07-07 ENCOUNTER — TELEPHONE (OUTPATIENT)
Dept: CARDIOLOGY CLINIC | Age: 65
End: 2020-07-07

## 2020-07-07 NOTE — TELEPHONE ENCOUNTER
Patient having top left breast numbness and pinching under her breast and pain in  mddle of her back. And her blood pressure is up a little 171/97 now 150/95.   Is there something she can do to relieve her symtoms Please call patient to advise #[phone]

## 2020-07-09 NOTE — TELEPHONE ENCOUNTER
Pt states the pain is now gone and her last blood pressure was 148/78.   Instructed pt to continue to monitor her blood pressure runs consistently high I will discuss options with pt

## 2020-07-20 RX ORDER — POTASSIUM CHLORIDE 750 MG/1
TABLET, FILM COATED, EXTENDED RELEASE ORAL
Qty: 180 TABLET | Refills: 3 | Status: SHIPPED | OUTPATIENT
Start: 2020-07-20 | End: 2021-08-13

## 2020-07-20 NOTE — TELEPHONE ENCOUNTER
Requested Prescriptions     Pending Prescriptions Disp Refills    potassium chloride (KLOR-CON) 10 MEQ extended release tablet [Pharmacy Med Name: POTASSIUM CL 10MEQ ER TABLETS] 180 tablet 3     Sig: TAKE 1 TABLET BY MOUTH TWICE DAILY                  Last Office Visit: 1/3/2020     Next Office Visit: 9/4/2020

## 2020-09-04 ENCOUNTER — OFFICE VISIT (OUTPATIENT)
Dept: CARDIOLOGY CLINIC | Age: 65
End: 2020-09-04
Payer: COMMERCIAL

## 2020-09-04 VITALS
TEMPERATURE: 98.2 F | HEART RATE: 72 BPM | BODY MASS INDEX: 34.92 KG/M2 | DIASTOLIC BLOOD PRESSURE: 80 MMHG | WEIGHT: 184.8 LBS | SYSTOLIC BLOOD PRESSURE: 146 MMHG

## 2020-09-04 PROCEDURE — 99214 OFFICE O/P EST MOD 30 MIN: CPT | Performed by: INTERNAL MEDICINE

## 2020-09-04 NOTE — PROGRESS NOTES
Subjective:      Patient ID: Chava Robbins is a 66 y.o. female    S/P CABG x 4. HLP.  HTN.      HPI:  Chava Robbins is a 66 y.o. patient who presented to the hospital with complaints of chest pain and back pain. She had a CTPA that was negative for PE and troponin was climbing from 0.25 to 0.38. Pt is s/p bmtx for multiple myeloma in  and counts are normal now. Follow up CAD s/p CABG x 4 on 19. Doing very well. Becoming more active. No chest pain. Activity level is improving. Allergies   Allergen Reactions    Pcn [Penicillins]     Lipitor [Atorvastatin] Other (See Comments)     Leg cramping       Social History     Socioeconomic History    Marital status:       Spouse name: Not on file    Number of children: Not on file    Years of education: Not on file    Highest education level: Not on file   Occupational History    Not on file   Social Needs    Financial resource strain: Not on file    Food insecurity:     Worry: Not on file     Inability: Not on file    Transportation needs:     Medical: Not on file     Non-medical: Not on file   Tobacco Use    Smoking status: Former Smoker     Packs/day: 0.50     Years: 20.00     Pack years: 10.00     Types: Cigarettes     Last attempt to quit: 2018     Years since quittin.6    Smokeless tobacco: Never Used   Substance and Sexual Activity    Alcohol use: Yes     Comment: rare    Drug use: No    Sexual activity: Not on file   Lifestyle    Physical activity:     Days per week: Not on file     Minutes per session: Not on file    Stress: Not on file   Relationships    Social connections:     Talks on phone: Not on file     Gets together: Not on file     Attends Mos[employer] service: Not on file     Active member of club or organization: Not on file     Attends meetings of clubs or organizations: Not on file     Relationship status: Not on file    Intimate partner violence:     Fear of current or ex partner: Not on file Emotionally abused: Not on file     Physically abused: Not on file     Forced sexual activity: Not on file   Other Topics Concern    Not on file   Social History Narrative    Not on file       No family history on file. has a past medical history of Cancer ([facility] [address] Diabetes mellitus ([facility] [address] Hyperlipidemia, and Hypertension. Vitals:    09/04/20 1507   BP: (!) 146/80   Pulse:    Temp:      Vitals:    09/04/20 1507   BP: (!) 146/80   Pulse:    Temp:      HR 72      Physical Exam   Constitutional: She is oriented to person, place, and time. She appears well-developed and well-nourished. HENT:   Head: Normocephalic and atraumatic. Eyes: Pupils are equal, round, and reactive to light. EOM are normal.   Neck: Normal range of motion. Neck supple. Cardiovascular: Normal rate and regular rhythm. Exam reveals no friction rub. No murmur heard. Pulmonary/Chest: Effort normal and breath sounds normal. No respiratory distress. She has no wheezes. She has no rales. Abdominal: Soft. Bowel sounds are normal.   Musculoskeletal: Normal range of motion. General: No deformity or edema. Neurological: She is alert and oriented to person, place, and time. No cranial nerve deficit. Coordination normal.   Skin: Skin is warm and dry. Psychiatric: She has a normal mood and affect.  Her behavior is normal.         Current Outpatient Medications   Medication Sig Dispense Refill    acetaminophen (TYLENOL) 500 MG tablet Take 500 mg by mouth every 6 hours as needed for Pain      aspirin 81 MG EC tablet Take 1 tablet by mouth daily 30 tablet 3    metoprolol tartrate (LOPRESSOR) 25 MG tablet Take 1 tablet by mouth 2 times daily 60 tablet 3    clopidogrel (PLAVIX) 75 MG tablet Take 1 tablet by mouth daily 30 tablet 3    benzocaine-menthol (CEPACOL SORE THROAT) 15-3.6 MG lozenge Take 1 lozenge by mouth every 2 hours as needed for Sore Throat 168 lozenge 0    rosuvastatin (CRESTOR) 20 MG tablet Take 20 mg by mouth every evening      zoledronic acid (ZOMETA) 4 MG/5ML injection Infuse 4 mg intravenously every 3 months      acyclovir (ZOVIRAX) 800 MG tablet Take 1 tablet by mouth 2 times daily 60 tablet 11    famotidine (PEPCID) 20 MG tablet Take 1 tablet by mouth 2 times daily 60 tablet 3    prochlorperazine (COMPAZINE) 10 MG tablet Take 10 mg by mouth as needed       potassium chloride (KLOR-CON) 10 MEQ extended release tablet TK 2 TS PO BID,  3    Cholecalciferol (VITAMIN D3) 1000 units CAPS Take 1 capsule by mouth 2 times daily       Calcium Carbonate-Vitamin D (CALCIUM-CARB 600 + D) 600-125 MG-UNIT TABS Take 1 tablet by mouth 2 times daily        No current facility-administered medications for this visit. 19 ECHO  Summary   Left ventricular cavity size is normal. There is mild concentric left   ventricular hypertrophy. Overall left ventricular systolic function appears   normal with an ejection fraction of 55-60%. No regional wall motion   abnormalities are noted. Normal diastolic function. Trivial tricuspid   regurgitation. Estimated pulmonary artery systolic pressure is at 31 mmHg   assuming a right atrial pressure of 3 mmHg. PATIENT NAME: Chandana Gutierrez                       :        1955  MED REC NO:   [id number]                          ROOM:       [facility]  ACCOUNT NO:   [de-identified]                           ADMIT DATE: 2019  PROVIDER:     Ganga Henderson MD     DATE OF PROCEDURE:  2019     PREOPERATIVE DIAGNOSES:  1. Hypertension. 2.  Hyperlipidemia. 3.  Type 2 diabetes. 4.  Multiple myeloma. 5.  Non-ST MI.  6.  Complex coronary artery disease. 7.  Obese, BMI of 33.     POSTOPERATIVE DIAGNOSES:  1. Hypertension. 2.  Hyperlipidemia. 3.  Type 2 diabetes. 4.  Multiple myeloma. 5.  Non-ST MI.  6.  Complex coronary artery disease. 7.  Obese, BMI of 33.     OPERATION PERFORMED:  1.   Coronary artery bypass x4, LIMA to LAD, reverse saphenous vein to  OM1, reverse saphenous vein to OM2, and reverse saphenous vein to PDA. 2.  Endovein harvest.  3.  Intercostal nerve block  4. Transesophageal echo. 5.  Doppler assessment of graft. Assessment:          Plan:      NSTEMI:  Stable after CABG. Continue plavix. JEAN GARZON II.VIERTEL to LAD. SVG to OM1. SVG to OM2 and SVG to RCA. CAD:  Doing well after CABG. Start cardiac rehab. Patient looks much more comfortable. HLP:  Crestor and controlled  HTN:  Controlled with medication--metoprolol.

## 2020-09-21 NOTE — PLAN OF CARE
Problem: Pain:  Goal: Pain level will decrease  Description  Pain level will decrease  Outcome: Ongoing  Note:   Reports intermittent surgical pain, states satisfactory relief with dilaudid. Problem: Pain:  Goal: Pain level will decrease  Description  Pain level will decrease  Outcome: Ongoing  Note:   Reports intermittent surgical pain, states satisfactory relief with dilaudid. Problem: Falls - Risk of:  Goal: Will remain free from falls  Description  Will remain free from falls  Outcome: Ongoing  Note:   Fall risk protocol in place: Bed alarm on, fall risk bracelet applied, yellow indicator in hallway on, non-skid footwear at bedside. Patient/family educated on fall risk protocol, instructed to call for assistance when needed. Problem: Skin Integrity:  Goal: Absence of new skin breakdown  Description  Absence of new skin breakdown  Outcome: Ongoing  Note:   Patient unable to turn self adequately in bed, so pillow supports in use with repositioning every two hours. Sacral Heart Mepilex in place to protect, skin intact underneath.
Pre-Sedation:    Pre-Sedation Documentation and Exam:  I have personally completed a history, physical exam & review of systems for this patient (see notes). Prior History of Anesthesia Complications:   none    Modified Mallampati:  II (soft palate, uvula, fauces visible)    ASA Classification:  Class 2 - A normal healthy patient with mild systemic disease and Class 2 -- A normal healthy patient with mild systemic disease    Dolores Scale: Activity:  2 - Able to move 4 extremities voluntarily on command  Respiration:  2 - Able to breathe deeply and cough freely  Circulation:  1 - BP+/- 20-50mmHg of normal  Consciousness:  2 - Fully awake  Oxygen Saturation (color):  2 - Able to maintain oxygen saturation >92% on room air    Sedation/Anesthesia Plan:  Guard the patient's safety and welfare. Minimize physical discomfort and pain. Minimize negative psychological responses to treatment by providing sedation and analgesia and maximize the potential amnesia. Patient to meet pre-procedure discharge plan.     Medication Planned:  midazolam intravenously, fentanyl intravenously    Patient is an appropriate candidate for plan of sedation: yes      Electronically signed by Cintia Pierre MD on 6/11/2019 at 7:14 PM
Hemostasis: Drysol

## 2020-09-29 ENCOUNTER — HOSPITAL ENCOUNTER (OUTPATIENT)
Dept: NON INVASIVE DIAGNOSTICS | Age: 65
Discharge: HOME | End: 2020-09-29
Payer: COMMERCIAL

## 2020-09-29 DIAGNOSIS — E78.5 DYSLIPIDEMIA: ICD-10-CM

## 2020-09-29 DIAGNOSIS — I10 ESSENTIAL HYPERTENSION: ICD-10-CM

## 2020-09-29 DIAGNOSIS — I25.10 CORONARY ARTERY DISEASE INVOLVING NATIVE CORONARY ARTERY OF NATIVE HEART WITHOUT ANGINA PECTORIS: ICD-10-CM

## 2020-09-29 LAB
A/G RATIO: 2.9 (ref 1.1–2.2)
ALBUMIN SERPL-MCNC: 4.4 G/DL (ref 3.4–5)
ALP BLD-CCNC: 49 U/L (ref 40–129)
ALT SERPL-CCNC: 16 U/L (ref 10–40)
ANION GAP SERPL CALCULATED.3IONS-SCNC: 14 MMOL/L (ref 3–16)
AST SERPL-CCNC: 17 U/L (ref 15–37)
BASOPHILS ABSOLUTE: 0 K/UL (ref 0–0.2)
BASOPHILS RELATIVE PERCENT: 0.9 %
BILIRUB SERPL-MCNC: 0.4 MG/DL (ref 0–1)
BUN BLDV-MCNC: 16 MG/DL (ref 7–20)
CALCIUM SERPL-MCNC: 9.6 MG/DL (ref 8.3–10.6)
CHLORIDE BLD-SCNC: 106 MMOL/L (ref 99–110)
CHOLESTEROL, TOTAL: 184 MG/DL (ref 0–199)
CO2: 22 MMOL/L (ref 21–32)
CREAT SERPL-MCNC: 0.9 MG/DL (ref 0.6–1.2)
EOSINOPHILS ABSOLUTE: 0.1 K/UL (ref 0–0.6)
EOSINOPHILS RELATIVE PERCENT: 2.7 %
GFR AFRICAN AMERICAN: >60
GFR NON-AFRICAN AMERICAN: >60
GLOBULIN: 1.5 G/DL
GLUCOSE BLD-MCNC: 100 MG/DL (ref 70–99)
HCT VFR BLD CALC: 41.2 % (ref 36–48)
HDLC SERPL-MCNC: 74 MG/DL (ref 40–60)
HEMOGLOBIN: 13.7 G/DL (ref 12–16)
LDL CHOLESTEROL CALCULATED: 95 MG/DL
LV EF: 55 %
LVEF MODALITY: NORMAL
LYMPHOCYTES ABSOLUTE: 0.9 K/UL (ref 1–5.1)
LYMPHOCYTES RELATIVE PERCENT: 31.1 %
MCH RBC QN AUTO: 32.3 PG (ref 26–34)
MCHC RBC AUTO-ENTMCNC: 33.2 G/DL (ref 31–36)
MCV RBC AUTO: 97.2 FL (ref 80–100)
MONOCYTES ABSOLUTE: 0.3 K/UL (ref 0–1.3)
MONOCYTES RELATIVE PERCENT: 10 %
NEUTROPHILS ABSOLUTE: 1.6 K/UL (ref 1.7–7.7)
NEUTROPHILS RELATIVE PERCENT: 55.3 %
PDW BLD-RTO: 12.5 % (ref 12.4–15.4)
PLATELET # BLD: 178 K/UL (ref 135–450)
PMV BLD AUTO: 8.7 FL (ref 5–10.5)
POTASSIUM SERPL-SCNC: 3.8 MMOL/L (ref 3.5–5.1)
RBC # BLD: 4.24 M/UL (ref 4–5.2)
SODIUM BLD-SCNC: 142 MMOL/L (ref 136–145)
TOTAL PROTEIN: 5.9 G/DL (ref 6.4–8.2)
TRIGL SERPL-MCNC: 73 MG/DL (ref 0–150)
VLDLC SERPL CALC-MCNC: 15 MG/DL
WBC # BLD: 2.8 K/UL (ref 4–11)

## 2020-09-29 PROCEDURE — 93350 STRESS TTE ONLY: CPT

## 2020-09-29 PROCEDURE — 93017 CV STRESS TEST TRACING ONLY: CPT

## 2020-10-16 ENCOUNTER — HOSPITAL ENCOUNTER (OUTPATIENT)
Dept: MAMMOGRAPHY | Age: 65
Discharge: HOME | End: 2020-10-21
Payer: COMMERCIAL

## 2020-10-16 PROCEDURE — 77067 SCR MAMMO BI INCL CAD: CPT

## 2020-11-26 ENCOUNTER — NURSE TRIAGE (OUTPATIENT)
Dept: OTHER | Facility: CLINIC | Age: 65
End: 2020-11-26

## 2020-11-26 NOTE — TELEPHONE ENCOUNTER
Reason for Disposition   Systolic BP  >= 541 OR Diastolic >= 279    Answer Assessment - Initial Assessment Questions  1. BLOOD PRESSURE: \"What is the blood pressure? \" \"Did you take at least two measurements 5 minutes apart? \"      On the left arm 174/95, on the right arm 170/93 taken within the last 10 minutes    2. ONSET: \"When did you take your blood pressure? \"     Minutes ago    3. HOW: \"How did you obtain the blood pressure? \" (e.g., visiting nurse, automatic home BP monitor)        Automatic home BP monitor    4. HISTORY: \"Do you have a history of high blood pressure? \"      Yes and does take medication    5. MEDICATIONS: Gregsherwin Garrick you taking any medications for blood pressure? \" \"Have you missed any doses recently? \"       Does take Lisinopril and Norvasc daily and has not missed any doses. 6. OTHER SYMPTOMS: \"Do you have any symptoms? \" (e.g., headache, chest pain, blurred vision, difficulty breathing, weakness)     Very low grade headache  Denies chest pain, blurred vision, difficulty breathing, weakness    7. PREGNANCY: \"Is there any chance you are pregnant? \" \"When was your last menstrual period? \"      n/a    Protocols used: HIGH BLOOD PRESSURE-ADULT-AH    Protocol recommendation shared to be seen within the next 3 days. Care advice shared.

## 2020-11-27 ENCOUNTER — NURSE TRIAGE (OUTPATIENT)
Dept: OTHER | Facility: CLINIC | Age: 65
End: 2020-11-27

## 2020-11-28 NOTE — TELEPHONE ENCOUNTER
Blood pressure has been trending in the 160 - 170 range for a few days, attempted to contact PCP today however clinic was closed and will remain closed for the weekend. Caller reports headache/head pressure however also reports this is always present and cannot tell if it is changed at this time, rates it currently as between 0/10 and 1/10 on pain scale. Denies weakness, numbness, chest pain, or any other symptoms. BP was 172/86 at 10:55, 192/105 at beginning of triage call and was 182/100 5 mins later. No cardiac or neurologic symptoms. Dispo: PCP within 24hrs, advised may seek care at [facility] tomorrow due to clinic closed if bp stays above 180. Otherwise may seek care at PCP on Monday for possible adjustment to medications. In the mean time attempt relaxation techniques and attempt to reduce stress. Call back for weakness or numbness, difficulty walking, talking or severe headache occurs, chest pain or difficulty breathing, worsening of symptoms or other concerns. Reason for Disposition   Systolic BP  >= 502 OR Diastolic >= 187    Answer Assessment - Initial Assessment Questions  1. BLOOD PRESSURE: \"What is the blood pressure? \" \"Did you take at least two measurements 5 minutes apart?\"      172/86 15 mins ago now 192/105    2. ONSET: \"When did you take your blood pressure? \"      Intermittent issue for the last few days    3. HOW: \"How did you obtain the blood pressure? \" (e.g., visiting nurse, automatic home BP monitor)      Automatic home BP monitor    4. HISTORY: \"Do you have a history of high blood pressure? \"      Yes    5. MEDICATIONS: Kamron Costa you taking any medications for blood pressure? \" \"Have you missed any doses recently? \"      Takes once a day in morning    6. OTHER SYMPTOMS: \"Do you have any symptoms? \" (e.g., headache, chest pain, blurred vision, difficulty breathing, weakness)      Slight headache, head pressure, denies blurred vision, difficulty breathing,  Weakness    7.  PREGNANCY: \"Is there any

## 2020-12-17 ENCOUNTER — OFFICE VISIT (OUTPATIENT)
Dept: CARDIOLOGY CLINIC | Age: 65
End: 2020-12-17
Payer: COMMERCIAL

## 2020-12-17 VITALS
WEIGHT: 185 LBS | DIASTOLIC BLOOD PRESSURE: 80 MMHG | BODY MASS INDEX: 34.96 KG/M2 | SYSTOLIC BLOOD PRESSURE: 130 MMHG | TEMPERATURE: 97.5 F | HEART RATE: 68 BPM

## 2020-12-17 PROCEDURE — 99214 OFFICE O/P EST MOD 30 MIN: CPT | Performed by: INTERNAL MEDICINE

## 2020-12-17 RX ORDER — DEXAMETHASONE 4 MG/1
TABLET ORAL
COMMUNITY
Start: 2020-07-31 | End: 2022-09-18 | Stop reason: CLARIF

## 2020-12-17 RX ORDER — ROSUVASTATIN CALCIUM 40 MG/1
40 TABLET, COATED ORAL EVERY EVENING
Qty: 90 TABLET | Refills: 3 | Status: SHIPPED | OUTPATIENT
Start: 2020-12-17 | End: 2022-01-17

## 2020-12-17 RX ORDER — FAMOTIDINE 20 MG/1
TABLET, FILM COATED ORAL
COMMUNITY
Start: 2020-09-28 | End: 2022-09-18 | Stop reason: CLARIF

## 2020-12-17 RX ORDER — DARATUMUMAB 100 MG/5ML
1350 INJECTION, SOLUTION, CONCENTRATE INTRAVENOUS
COMMUNITY
Start: 2020-11-04 | End: 2022-09-18 | Stop reason: CLARIF

## 2020-12-17 NOTE — PROGRESS NOTES
Subjective:      Patient ID: Rose Lopez is a 66 y.o. female    S/P CABG x 4. HLP.  HTN.      HPI:  Rose Lopez is a 66 y.o. patient who presented to the hospital with complaints of chest pain and back pain. She had a CTPA that was negative for PE and troponin was climbing from 0.25 to 0.38. Pt is s/p bmtx for multiple myeloma in  and counts are normal now. Follow up CAD s/p CABG x 4 on 19. Doing very well. Becoming more active. No chest pain. Smoked 1/2ppd for years and quit 2 yrs ago. No chest pains no orthopnea. Allergies   Allergen Reactions    Pcn [Penicillins]     Lipitor [Atorvastatin] Other (See Comments)     Leg cramping       Social History     Socioeconomic History    Marital status:       Spouse name: Not on file    Number of children: Not on file    Years of education: Not on file    Highest education level: Not on file   Occupational History    Not on file   Social Needs    Financial resource strain: Not on file    Food insecurity:     Worry: Not on file     Inability: Not on file    Transportation needs:     Medical: Not on file     Non-medical: Not on file   Tobacco Use    Smoking status: Former Smoker     Packs/day: 0.50     Years: 20.00     Pack years: 10.00     Types: Cigarettes     Last attempt to quit: 2018     Years since quittin.6    Smokeless tobacco: Never Used   Substance and Sexual Activity    Alcohol use: Yes     Comment: rare    Drug use: No    Sexual activity: Not on file   Lifestyle    Physical activity:     Days per week: Not on file     Minutes per session: Not on file    Stress: Not on file   Relationships    Social connections:     Talks on phone: Not on file     Gets together: Not on file     Attends Church service: Not on file     Active member of club or organization: Not on file     Attends meetings of clubs or organizations: Not on file     Relationship status: Not on file    Intimate partner violence:     Fear of current or ex partner: Not on file     Emotionally abused: Not on file     Physically abused: Not on file     Forced sexual activity: Not on file   Other Topics Concern    Not on file   Social History Narrative    Not on file       No family history on file. has a past medical history of Cancer ([facility] [address] Diabetes mellitus ([facility] [address] Hyperlipidemia, and Hypertension. Vitals:    12/17/20 1452   BP: 130/80   Pulse: 68   Temp: 97.5 °F (36.4 °C)       Same exam as 9/4//20  Objective:   Physical Exam:    Physical Exam   Constitutional: She is oriented to person, place, and time. She appears well-developed and well-nourished. HENT:   Head: Normocephalic and atraumatic. Eyes: Pupils are equal, round, and reactive to light. EOM are normal.   Neck: Normal range of motion. Neck supple. Cardiovascular: Normal rate and regular rhythm. Exam reveals no friction rub. No murmur heard. Pulmonary/Chest: Effort normal and breath sounds normal. No respiratory distress. She has no wheezes. She has no rales. Abdominal: Soft. Bowel sounds are normal.   Musculoskeletal: Normal range of motion. General: No deformity or edema. Neurological: She is alert and oriented to person, place, and time. No cranial nerve deficit. Coordination normal.   Skin: Skin is warm and dry. Psychiatric: She has a normal mood and affect.  Her behavior is normal.         Current Outpatient Medications   Medication Sig Dispense Refill    acetaminophen (TYLENOL) 500 MG tablet Take 500 mg by mouth every 6 hours as needed for Pain      aspirin 81 MG EC tablet Take 1 tablet by mouth daily 30 tablet 3    metoprolol tartrate (LOPRESSOR) 25 MG tablet Take 1 tablet by mouth 2 times daily 60 tablet 3    clopidogrel (PLAVIX) 75 MG tablet Take 1 tablet by mouth daily 30 tablet 3    benzocaine-menthol (CEPACOL SORE THROAT) 15-3.6 MG lozenge Take 1 lozenge by mouth every 2 hours as needed for Sore Throat 168 lozenge 0    rosuvastatin (CRESTOR) 20 MG tablet Take 20 mg by mouth every evening      zoledronic acid (ZOMETA) 4 MG/5ML injection Infuse 4 mg intravenously every 3 months      acyclovir (ZOVIRAX) 800 MG tablet Take 1 tablet by mouth 2 times daily 60 tablet 11    famotidine (PEPCID) 20 MG tablet Take 1 tablet by mouth 2 times daily 60 tablet 3    prochlorperazine (COMPAZINE) 10 MG tablet Take 10 mg by mouth as needed       potassium chloride (KLOR-CON) 10 MEQ extended release tablet TK 2 TS PO BID,  3    Cholecalciferol (VITAMIN D3) 1000 units CAPS Take 1 capsule by mouth 2 times daily       Calcium Carbonate-Vitamin D (CALCIUM-CARB 600 + D) 600-125 MG-UNIT TABS Take 1 tablet by mouth 2 times daily        No current facility-administered medications for this visit. 19 ECHO  Summary   Left ventricular cavity size is normal. There is mild concentric left   ventricular hypertrophy. Overall left ventricular systolic function appears   normal with an ejection fraction of 55-60%. No regional wall motion   abnormalities are noted. Normal diastolic function. Trivial tricuspid   regurgitation. Estimated pulmonary artery systolic pressure is at 31 mmHg   assuming a right atrial pressure of 3 mmHg. PATIENT NAME: Sheila Amaro                       :        1955  MED REC NO:   [id number]                          ROOM:       8202  ACCOUNT NO:   [de-identified]                           ADMIT DATE: 2019  PROVIDER:     Kandace Alcala MD     DATE OF PROCEDURE:  2019     PREOPERATIVE DIAGNOSES:  1. Hypertension. 2.  Hyperlipidemia. 3.  Type 2 diabetes. 4.  Multiple myeloma. 5.  Non-ST MI.  6.  Complex coronary artery disease. 7.  Obese, BMI of 33.     POSTOPERATIVE DIAGNOSES:  1. Hypertension. 2.  Hyperlipidemia. 3.  Type 2 diabetes. 4.  Multiple myeloma. 5.  Non-ST MI.  6.  Complex coronary artery disease. 7.  Obese, BMI of 33.     OPERATION PERFORMED:  1.   Coronary artery bypass x4, LIMA to LAD, reverse saphenous vein to  OM1, reverse saphenous vein to OM2, and reverse saphenous vein to PDA. 2.  Endovein harvest.  3.  Intercostal nerve block  4. Transesophageal echo. 5.  Doppler assessment of graft. Assessment:      Diagnosis Orders   1. S/P coronary artery bypass graft x 4     2. Essential hypertension     3. Mixed hyperlipidemia            Plan:      NSTEMI:  Stable after CABG. Continue plavix. [address] to LAD. SVG to OM1. SVG to OM2 and SVG to RCA. CAD:  Doing well after CABG. Start cardiac rehab. Patient looks much more comfortable. HLP:  Crestor and controlled  HTN:  Controlled with medication--metoprolol.

## 2020-12-26 ENCOUNTER — NURSE TRIAGE (OUTPATIENT)
Dept: OTHER | Facility: CLINIC | Age: 65
End: 2020-12-26

## 2020-12-26 ENCOUNTER — APPOINTMENT (OUTPATIENT)
Dept: GENERAL RADIOLOGY | Age: 65
End: 2020-12-26
Payer: COMMERCIAL

## 2020-12-26 ENCOUNTER — HOSPITAL ENCOUNTER (EMERGENCY)
Age: 65
Discharge: HOME | End: 2020-12-26
Attending: EMERGENCY MEDICINE
Payer: COMMERCIAL

## 2020-12-26 VITALS
DIASTOLIC BLOOD PRESSURE: 81 MMHG | BODY MASS INDEX: 34.8 KG/M2 | TEMPERATURE: 98.7 F | HEART RATE: 59 BPM | OXYGEN SATURATION: 95 % | RESPIRATION RATE: 14 BRPM | SYSTOLIC BLOOD PRESSURE: 143 MMHG | WEIGHT: 184.2 LBS

## 2020-12-26 LAB
A/G RATIO: 2.3 (ref 1.1–2.2)
ALBUMIN SERPL-MCNC: 4.5 G/DL (ref 3.4–5)
ALP BLD-CCNC: 42 U/L (ref 40–129)
ALT SERPL-CCNC: 19 U/L (ref 10–40)
ANION GAP SERPL CALCULATED.3IONS-SCNC: 9 MMOL/L (ref 3–16)
AST SERPL-CCNC: 20 U/L (ref 15–37)
BASOPHILS ABSOLUTE: 0 K/UL (ref 0–0.2)
BASOPHILS RELATIVE PERCENT: 0.7 %
BILIRUB SERPL-MCNC: 0.3 MG/DL (ref 0–1)
BILIRUBIN URINE: NEGATIVE
BLOOD, URINE: NEGATIVE
BUN BLDV-MCNC: 13 MG/DL (ref 7–20)
CALCIUM SERPL-MCNC: 9.5 MG/DL (ref 8.3–10.6)
CHLORIDE BLD-SCNC: 100 MMOL/L (ref 99–110)
CLARITY: CLEAR
CO2: 26 MMOL/L (ref 21–32)
COLOR: YELLOW
CREAT SERPL-MCNC: 0.8 MG/DL (ref 0.6–1.2)
D DIMER: <200 NG/ML DDU (ref 0–229)
EOSINOPHILS ABSOLUTE: 0.1 K/UL (ref 0–0.6)
EOSINOPHILS RELATIVE PERCENT: 2.6 %
GFR AFRICAN AMERICAN: >60
GFR NON-AFRICAN AMERICAN: >60
GLOBULIN: 2 G/DL
GLUCOSE BLD-MCNC: 116 MG/DL (ref 70–99)
GLUCOSE URINE: NEGATIVE MG/DL
HCT VFR BLD CALC: 44 % (ref 36–48)
HEMOGLOBIN: 14.3 G/DL (ref 12–16)
KETONES, URINE: NEGATIVE MG/DL
LEUKOCYTE ESTERASE, URINE: NEGATIVE
LIPASE: 19 U/L (ref 13–60)
LYMPHOCYTES ABSOLUTE: 1.5 K/UL (ref 1–5.1)
LYMPHOCYTES RELATIVE PERCENT: 48 %
MCH RBC QN AUTO: 29.9 PG (ref 26–34)
MCHC RBC AUTO-ENTMCNC: 32.6 G/DL (ref 31–36)
MCV RBC AUTO: 91.6 FL (ref 80–100)
MICROSCOPIC EXAMINATION: NORMAL
MONOCYTES ABSOLUTE: 0.5 K/UL (ref 0–1.3)
MONOCYTES RELATIVE PERCENT: 15.7 %
NEUTROPHILS ABSOLUTE: 1 K/UL (ref 1.7–7.7)
NEUTROPHILS RELATIVE PERCENT: 33 %
NITRITE, URINE: NEGATIVE
PDW BLD-RTO: 12.6 % (ref 12.4–15.4)
PH UA: 7 (ref 5–8)
PLATELET # BLD: 202 K/UL (ref 135–450)
PLATELET SLIDE REVIEW: ADEQUATE
PMV BLD AUTO: 8.8 FL (ref 5–10.5)
POTASSIUM REFLEX MAGNESIUM: 3.6 MMOL/L (ref 3.5–5.1)
PROTEIN UA: NEGATIVE MG/DL
RBC # BLD: 4.8 M/UL (ref 4–5.2)
RBC # BLD: NORMAL 10*6/UL
SODIUM BLD-SCNC: 135 MMOL/L (ref 136–145)
SPECIFIC GRAVITY UA: 1.01 (ref 1–1.03)
TOTAL PROTEIN: 6.5 G/DL (ref 6.4–8.2)
TROPONIN: <0.01 NG/ML
URINE REFLEX TO CULTURE: NORMAL
URINE TYPE: NORMAL
UROBILINOGEN, URINE: 0.2 E.U./DL
WBC # BLD: 3.1 K/UL (ref 4–11)

## 2020-12-26 PROCEDURE — 80053 COMPREHEN METABOLIC PANEL: CPT

## 2020-12-26 PROCEDURE — 93005 ELECTROCARDIOGRAM TRACING: CPT | Performed by: EMERGENCY MEDICINE

## 2020-12-26 PROCEDURE — 81003 URINALYSIS AUTO W/O SCOPE: CPT

## 2020-12-26 PROCEDURE — 84484 ASSAY OF TROPONIN QUANT: CPT

## 2020-12-26 PROCEDURE — 85379 FIBRIN DEGRADATION QUANT: CPT

## 2020-12-26 PROCEDURE — 83690 ASSAY OF LIPASE: CPT

## 2020-12-26 PROCEDURE — 85025 COMPLETE CBC W/AUTO DIFF WBC: CPT

## 2020-12-26 PROCEDURE — 99285 EMERGENCY DEPT VISIT HI MDM: CPT

## 2020-12-26 PROCEDURE — 71046 X-RAY EXAM CHEST 2 VIEWS: CPT

## 2020-12-26 PROCEDURE — 6370000000 HC RX 637 (ALT 250 FOR IP): Performed by: EMERGENCY MEDICINE

## 2020-12-26 RX ORDER — SUCRALFATE 1 G/1
1 TABLET ORAL 4 TIMES DAILY
Qty: 120 TABLET | Refills: 3 | Status: SHIPPED | OUTPATIENT
Start: 2020-12-26 | End: 2022-09-18 | Stop reason: CLARIF

## 2020-12-26 RX ORDER — MAGNESIUM HYDROXIDE/ALUMINUM HYDROXICE/SIMETHICONE 120; 1200; 1200 MG/30ML; MG/30ML; MG/30ML
30 SUSPENSION ORAL ONCE
Status: COMPLETED | OUTPATIENT
Start: 2020-12-26 | End: 2020-12-26

## 2020-12-26 RX ADMIN — ALUMINUM HYDROXIDE, MAGNESIUM HYDROXIDE, AND SIMETHICONE 30 ML: 200; 200; 20 SUSPENSION ORAL at 20:59

## 2020-12-26 ASSESSMENT — PAIN DESCRIPTION - ORIENTATION: ORIENTATION: MID;LOWER

## 2020-12-26 ASSESSMENT — PAIN DESCRIPTION - FREQUENCY: FREQUENCY: INTERMITTENT

## 2020-12-26 ASSESSMENT — PAIN SCALES - GENERAL
PAINLEVEL_OUTOF10: 0
PAINLEVEL_OUTOF10: 2

## 2020-12-26 ASSESSMENT — PAIN DESCRIPTION - LOCATION: LOCATION: CHEST

## 2020-12-27 LAB
EKG ATRIAL RATE: 62 BPM
EKG DIAGNOSIS: NORMAL
EKG P AXIS: 53 DEGREES
EKG P-R INTERVAL: 174 MS
EKG Q-T INTERVAL: 452 MS
EKG QRS DURATION: 112 MS
EKG QTC CALCULATION (BAZETT): 458 MS
EKG R AXIS: -35 DEGREES
EKG T AXIS: 39 DEGREES
EKG VENTRICULAR RATE: 62 BPM

## 2020-12-27 ASSESSMENT — ENCOUNTER SYMPTOMS
TROUBLE SWALLOWING: 0
NAUSEA: 0
VOMITING: 0
PHOTOPHOBIA: 0
ABDOMINAL PAIN: 0
CHEST TIGHTNESS: 0
SHORTNESS OF BREATH: 0
WHEEZING: 0
COLOR CHANGE: 0
COUGH: 0

## 2020-12-27 NOTE — ED PROVIDER NOTES
[address] [facility]  EMERGENCY DEPARTMENTENCOUNTER      Pt Name: Argentina Kong  MRN: [id number]  [place] 1955  Date ofevaluation: 12/26/2020  Provider: Pollo Acevedo MD    CHIEF COMPLAINT       Chief Complaint   Patient presents with    Chest Pain         HISTORY OF PRESENT ILLNESS   (Location/Symptom, Timing/Onset,Context/Setting, Quality, Duration, Modifying Factors, Severity)  Note limiting factors. Argentina Kong is a 63 y.o. female  who  has a past medical history of Cancer ([facility] [address] Diabetes mellitus ([facility] [address] Hyperlipidemia, Hypertension, and MI (myocardial infarction) ([facility] [address] who presents to the emergency department for evaluation of chest discomfort. Patient reports a 3-day history of substernal chest discomfort. She states it has been persistent and was a gradual onset. She denies associated shortness of breath paresthesias weakness diaphoresis nausea or vomiting. She states it does feel similar to indigestion she has had in the past.  She also reports that she has had previous heart attacks and was concerned. She called nursing hotline was told to present to the ED for evaluation. Patient does take medications for GERD but states that she has not been adherent to her diet to prevent her symptoms. She denies remitting or exacerbating factors. Denies pain or swelling to the lower extremities. Patient does have a history of multiple myeloma but is currently in remission. She is on antiplatelet medications but not on anticoagulation. She was seen earlier in the month by her cardiologist.  She does have a previous history of CABG x4. HPI    NursingNotes were reviewed. REVIEW OF SYSTEMS    (2-9 systems for level 4, 10 or more for level 5)     Review of Systems   Constitutional: Negative for activity change, diaphoresis, fatigue and fever. HENT: Negative for congestion, mouth sores and trouble swallowing. Eyes: Negative for photophobia and visual disturbance.    Respiratory: R-3Normal      potassium chloride (KLOR-CON) 10 MEQ extended release tablet TAKE 1 TABLET BY MOUTH TWICE DAILY, Disp-180 tablet,R-3Normal      metoprolol tartrate (LOPRESSOR) 25 MG tablet TAKE 1 TABLET BY MOUTH TWICE DAILY, Disp-180 tablet,R-3Normal      clopidogrel (PLAVIX) 75 MG tablet TAKE 1 TABLET BY MOUTH DAILY, Disp-90 tablet,R-3Normal      ciprofloxacin (CIPRO) 250 MG tablet Take 250 mg by mouth 2 times dailyHistorical Med      montelukast (SINGULAIR) 10 MG tablet Take 10 mg by mouth nightlyHistorical Med      ALBUTEROL IN Inhale into the lungsHistorical Med      pantoprazole (PROTONIX) 20 MG tablet Take 1 tablet by mouth every morning (before breakfast), Disp-30 tablet, R-0Print      amLODIPine (NORVASC) 5 MG tablet Take 10 mg by mouth daily Historical Med      lisinopril (PRINIVIL;ZESTRIL) 40 MG tablet Take 40 mg by mouth dailyHistorical Med      bortezomib (VELCADE) 3.5 MG chemo innjection Infuse 1.3 mg/m2 intravenously every 14 daysHistorical Med      acetaminophen (TYLENOL) 500 MG tablet Take 500 mg by mouth every 6 hours as needed for PainHistorical Med      aspirin 81 MG EC tablet Take 1 tablet by mouth daily, Disp-30 tablet, R-3Print      zoledronic acid (ZOMETA) 4 MG/5ML injection Infuse 4 mg intravenously every 3 monthsHistorical Med      acyclovir (ZOVIRAX) 800 MG tablet Take 1 tablet by mouth 2 times daily, Disp-60 tablet, R-11Normal      Cholecalciferol (VITAMIN D3) 1000 units CAPS Take 1 capsule by mouth 2 times daily Historical Med      Calcium Carbonate-Vitamin D (CALCIUM-CARB 600 + D) 600-125 MG-UNIT TABS Take 1 tablet by mouth 2 times daily Historical Med                  Pcn [penicillins] and Lipitor [atorvastatin]    FAMILY HISTORY     History reviewed. No pertinent family history. SOCIAL HISTORY       Social History     Socioeconomic History    Marital status:       Spouse name: None    Number of children: None    Years of education: None    Highest education level: None Occupational History    None   Social Needs    Financial resource strain: None    Food insecurity     Worry: None     Inability: None    Transportation needs     Medical: None     Non-medical: None   Tobacco Use    Smoking status: Former Smoker     Packs/day: 0.50     Years: 20.00     Pack years: 10.00     Types: Cigarettes     Quit date: 2018     Years since quittin.0    Smokeless tobacco: Never Used   Substance and Sexual Activity    Alcohol use: Yes     Comment: rare    Drug use: No    Sexual activity: None   Lifestyle    Physical activity     Days per week: None     Minutes per session: None    Stress: None   Relationships    Social connections     Talks on phone: None     Gets together: None     Attends Zoroastrian service: None     Active member of club or organization: None     Attends meetings of clubs or organizations: None     Relationship status: None    Intimate partner violence     Fear of current or ex partner: None     Emotionally abused: None     Physically abused: None     Forced sexual activity: None   Other Topics Concern    None   Social History Narrative    None       SCREENINGS    [place] Coma Scale  Eye Opening: Spontaneous  Best Verbal Response: Oriented  Best Motor Response: Obeys commands  [place] Coma Scale Score: 15        PHYSICAL EXAM    (up to 7 for level 4, 8 or more for level 5)     ED Triage Vitals [20]   BP Temp Temp Source Pulse Resp SpO2 Height Weight   (!) 169/79 98.7 °F (37.1 °C) Oral 63 16 98 % -- 184 lb 3.2 oz (83.6 kg)       Physical Exam  Vitals signs and nursing note reviewed. Constitutional:       Appearance: She is well-developed. HENT:      Head: Normocephalic and atraumatic. Mouth/Throat:      Mouth: Mucous membranes are moist.   Eyes:      Extraocular Movements: Extraocular movements intact. Conjunctiva/sclera: Conjunctivae normal.      Pupils: Pupils are equal, round, and reactive to light.    Neck: Musculoskeletal: Normal range of motion. Trachea: No tracheal deviation. Cardiovascular:      Rate and Rhythm: Normal rate and regular rhythm. Pulses: Normal pulses. Heart sounds: Normal heart sounds. Pulmonary:      Effort: Pulmonary effort is normal. No respiratory distress. Breath sounds: Normal breath sounds. No wheezing. Abdominal:      General: There is no distension. Palpations: Abdomen is soft. Tenderness: There is no abdominal tenderness. There is no right CVA tenderness or left CVA tenderness. Musculoskeletal: Normal range of motion. Skin:     General: Skin is warm and dry. Capillary Refill: Capillary refill takes less than 2 seconds. Neurological:      General: No focal deficit present. Mental Status: She is alert and oriented to person, place, and time. RESULTS     EKG: All EKG's are interpreted by the Emergency Department Physician who either signs or Co-signsthis chart in the absence of a cardiologist.    Patient's EKG shows a sinus rhythm with a ventricular to 62 bpm.  Patient's CO interval is prolonged and QTc interval is within normal limits. Patient is left axis deviation. There are no significant ST elevations or depressions EKG is nondiagnostic for ACS compared EKG from 1/18/2020 did not appreciate significant changes. RADIOLOGY:   Non-plain filmimages such as CT, Ultrasound and MRI are read by the radiologist. Plain radiographic images are visualized and preliminarily interpreted by the emergency physician with the below findings:      Interpretation per the Radiologist below, if available at the time ofthis note:    XR CHEST (2 VW)   Final Result      No acute cardiopulmonary findings.                ED BEDSIDE ULTRASOUND:   Performed by ED Physician - none    LABS:  Labs Reviewed   CBC WITH AUTO DIFFERENTIAL - Abnormal; Notable for the following components:       Result Value    WBC 3.1 (*)     Neutrophils Absolute 1.0 (*) All other components within normal limits    Narrative:     Performed at:  [facility]  [address]   Phone [phone]   COMPREHENSIVE METABOLIC PANEL W/ REFLEX TO MG FOR LOW K - Abnormal; Notable for the following components:    Sodium 135 (*)     Glucose 116 (*)     Albumin/Globulin Ratio 2.3 (*)     All other components within normal limits    Narrative:     Performed at:  [facility]  [place],  [place]   Phone [phone]   LIPASE    Narrative:     Performed at:  [facility]  [facility],  [place] [facility]   Phone [phone]   TROPONIN    Narrative:     Performed at:  [facility]  [place],  [place]   Phone [phone]   D-DIMER, QUANTITATIVE    Narrative:     Performed at:  [facility]  [address]   Phone [phone]   URINE RT REFLEX TO CULTURE    Narrative:     Performed at:  [facility]  [address]   Phone [phone]       All other labs were within normal range or not returned as of this dictation. EMERGENCY DEPARTMENT COURSE and DIFFERENTIAL DIAGNOSIS/MDM:   Vitals:    Vitals:    12/26/20 2000 12/26/20 2100 12/26/20 2130   BP: (!) 169/79 (!) 143/81    Pulse: 63 60 59   Resp: 16 11 14   Temp: 98.7 °F (37.1 °C)     TempSrc: Oral     SpO2: 98% 100% 95%   Weight: 184 lb 3.2 oz (83.6 kg)         Patient was given thefollowing medications:  Medications   aluminum & magnesium hydroxide-simethicone (MAALOX) 116-072-02 MG/5ML suspension 30 mL (30 mLs Oral Given 12/26/20 2059)       ED COURSE & MEDICAL DECISION MAKING    Pertinent Labs & Imaging studies reviewed.  (See chart for details)   -  Patient seen and evaluated in the emergency department. -  Triage and nursing notes reviewed and incorporated. -  Old chart records reviewed and incorporated. -  Differential diagnosis includes: Differential Diagnosis: Acute Coronary Syndrome, Congestive Heart Failure, Thoracic Dissection, Pericarditis, Pericardial Effusion, Pulmonary Embolism, Pneumonia, Pneumothorax, Ischemic Bowel, Bowel Obstruction, PUD, GERD, Acute Cholecystitis, Pancreatitis, Hepatitis, Colitis, other    -  Work-up included:  See above  -  ED treatment included: See above  -  Results discussed with patient. Patient presents the ED for evaluation of chest discomfort. States it is similar indigestion she is had in the past and does report eating onions which have exacerbated the symptoms in the past.  Labs show no emergent laboratory normalities. D-dimer is within normal limits. Troponin is within normal limits. Patient reevaluated, and states that her symptoms have resolved after receiving antacid. imaging studies show no acute cardiopulmonary disease. Patient symptoms have been ongoing for the past 3 days and have been persistent I would suspect it would be EKG changes or change in troponin if it was of a cardiac etiology. As the patient symptoms are improving with antacid treatments believe she is safe for discharge home. Patient feels improved on reevaluation. Symptomatic treatment with expectant management discussed with the patient and the amenable to treatment plan and outpatient follow-up. Strict return precautions were discussed with the patient. They demonstrated understanding of when to return to the emergency department for new or worsening symptoms. .  The patient is agreeable with plan of care and disposition. REASSESSMENT          CRITICAL CARE TIME   Total Critical Care time was 10 minutes, excluding separatelyreportable procedures.   There was a high probability ofclinically significant/life threatening deterioration in the patient's condition which required my urgent intervention. CONSULTS:  None    PROCEDURES:  Unless otherwise noted below, none     Procedures    FINAL IMPRESSION      1.  Chest pain, unspecified type          DISPOSITION/PLAN   DISPOSITION Decision To Discharge 12/26/2020 09:22:16 PM      PATIENT REFERREDTO:  Floresita Burch MD   [address] [place] [id number]  [phone]    Schedule an appointment as soon as possible for a visit   As needed      DISCHARGEMEDICATIONS:  Discharge Medication List as of 12/26/2020  9:24 PM      START taking these medications    Details   sucralfate (CARAFATE) 1 GM tablet Take 1 tablet by mouth 4 times daily, Disp-120 tablet, R-3Print                (Please note that portions of this note were completed with a voice recognition program.  Efforts were made to edit the dictations but occasionally words are mis-transcribed.)    Naif Gutierrez MD (electronically signed)  Attending Emergency Physician          Naif Gutierrez MD  12/27/20 [id number]

## 2020-12-27 NOTE — TELEPHONE ENCOUNTER
Reason for Disposition   [1] Chest pain lasts > 5 minutes AND [2] occurred in past 3 days (72 hours)    Answer Assessment - Initial Assessment Questions  1. LOCATION: \"Where does it hurt? \"    Under breasts in the center    2. RADIATION: \"Does the pain go anywhere else? \" (e.g., into neck, jaw, arms, back)     To back    3. ONSET: \"When did the chest pain begin? \" (Minutes, hours or days)     3 days ago    4. PATTERN \"Does the pain come and go, or has it been constant since it started? \"  \"Does it get worse with exertion? \"    comes and goes    5. DURATION: \"How long does it last\" (e.g., seconds, minutes, hours)    Last 30-60 mins    6. SEVERITY: \"How bad is the pain? \"  (e.g., Scale 1-10; mild, moderate, or severe)     - MILD (1-3): doesn't interfere with normal activities      - MODERATE (4-7): interferes with normal activities or awakens from sleep     - SEVERE (8-10): excruciating pain, unable to do any normal activities     7/10    7. CARDIAC RISK FACTORS: \"Do you have any history of heart problems or risk factors for heart disease? \" (e.g., angina, prior heart attack; diabetes, high blood pressure, high cholesterol, smoker, or strong family history of heart disease)    Previous heart attack 6/2019, quadruple bypass     8. PULMONARY RISK FACTORS: \"Do you have any history of lung disease? \"  (e.g., blood clots in lung, asthma, emphysema, birth control pills)   no    9. CAUSE: \"What do you think is causing the chest pain? \"      Unsure    10. OTHER SYMPTOMS: \"Do you have any other symptoms? \" (e.g., dizziness, nausea, vomiting, sweating, fever, difficulty breathing, cough)     Nothing    11. PREGNANCY: \"Is there any chance you are pregnant? \" \"When was your last menstrual period? \"      no    Protocols used: CHEST PAIN-ADULT-    Caller provided care advice and instructed to call back with worsening symptoms.

## 2020-12-28 ENCOUNTER — TELEPHONE (OUTPATIENT)
Dept: CARDIOLOGY CLINIC | Age: 65
End: 2020-12-28

## 2020-12-28 NOTE — TELEPHONE ENCOUNTER
Pt called regarding medication sucralfate (CARAFATE) 1 GM tablet that was giving to her at urgent care Saturday 12-26-20. Pt would like to speak to someone to address this medication before taking medication. Pt can be reached at [phone].  Thanks

## 2021-06-03 ENCOUNTER — OFFICE VISIT (OUTPATIENT)
Dept: CARDIOLOGY CLINIC | Age: 66
End: 2021-06-03
Payer: COMMERCIAL

## 2021-06-03 VITALS
BODY MASS INDEX: 35.52 KG/M2 | WEIGHT: 188 LBS | SYSTOLIC BLOOD PRESSURE: 136 MMHG | DIASTOLIC BLOOD PRESSURE: 84 MMHG | HEART RATE: 70 BPM

## 2021-06-03 DIAGNOSIS — E78.5 HYPERLIPIDEMIA, UNSPECIFIED HYPERLIPIDEMIA TYPE: ICD-10-CM

## 2021-06-03 DIAGNOSIS — I10 HTN (HYPERTENSION), BENIGN: ICD-10-CM

## 2021-06-03 DIAGNOSIS — I25.10 CORONARY ARTERY DISEASE INVOLVING NATIVE CORONARY ARTERY OF NATIVE HEART WITHOUT ANGINA PECTORIS: Primary | ICD-10-CM

## 2021-06-03 PROCEDURE — 99214 OFFICE O/P EST MOD 30 MIN: CPT | Performed by: INTERNAL MEDICINE

## 2021-06-03 NOTE — PROGRESS NOTES
Subjective:      Patient ID: Myesha Lagos is a 66 y.o. female    S/P CABG x 4. HLP.  HTN.      HPI:  Myesha Lagos is a 66 y.o. patient who presented to the hospital with complaints of chest pain and back pain. She had a CTPA that was negative for PE and troponin was climbing from 0.25 to 0.38. Pt is s/p bmtx for multiple myeloma in  and counts are normal now. Follow up CAD s/p CABG x 4 on 19. Doing very well. Becoming more active. No chest pain. Smoked 1/2ppd for years and quit 2 yrs ago. No chest pains no orthopnea. Allergies   Allergen Reactions    Pcn [Penicillins]     Lipitor [Atorvastatin] Other (See Comments)     Leg cramping       Social History     Socioeconomic History    Marital status:       Spouse name: Not on file    Number of children: Not on file    Years of education: Not on file    Highest education level: Not on file   Occupational History    Not on file   Social Needs    Financial resource strain: Not on file    Food insecurity:     Worry: Not on file     Inability: Not on file    Transportation needs:     Medical: Not on file     Non-medical: Not on file   Tobacco Use    Smoking status: Former Smoker     Packs/day: 0.50     Years: 20.00     Pack years: 10.00     Types: Cigarettes     Last attempt to quit: 2018     Years since quittin.6    Smokeless tobacco: Never Used   Substance and Sexual Activity    Alcohol use: Yes     Comment: rare    Drug use: No    Sexual activity: Not on file   Lifestyle    Physical activity:     Days per week: Not on file     Minutes per session: Not on file    Stress: Not on file   Relationships    Social connections:     Talks on phone: Not on file     Gets together: Not on file     Attends Yazidism service: Not on file     Active member of club or organization: Not on file     Attends meetings of clubs or organizations: Not on file     Relationship status: Not on file    Intimate partner violence:     Fear of current or ex partner: Not on file     Emotionally abused: Not on file     Physically abused: Not on file     Forced sexual activity: Not on file   Other Topics Concern    Not on file   Social History Narrative    Not on file       No family history on file. has a past medical history of Cancer ([facility] [address] Diabetes mellitus ([facility] [address] Hyperlipidemia, and Hypertension. Vitals:    06/03/21 1524   BP: 136/84   Pulse: 70       Same exam as 12/14/20  Objective:   Physical Exam:    Physical Exam  Constitutional:       Appearance: She is well-developed. HENT:      Head: Normocephalic and atraumatic. Eyes:      Pupils: Pupils are equal, round, and reactive to light. Cardiovascular:      Rate and Rhythm: Normal rate and regular rhythm. Heart sounds: No murmur heard. No friction rub. Pulmonary:      Effort: Pulmonary effort is normal. No respiratory distress. Breath sounds: Normal breath sounds. No wheezing or rales. Abdominal:      General: Bowel sounds are normal.      Palpations: Abdomen is soft. Musculoskeletal:         General: No deformity. Normal range of motion. Cervical back: Normal range of motion and neck supple. Skin:     General: Skin is warm and dry. Neurological:      Mental Status: She is alert and oriented to person, place, and time. Cranial Nerves: No cranial nerve deficit.       Coordination: Coordination normal.   Psychiatric:         Behavior: Behavior normal.           Current Outpatient Medications   Medication Sig Dispense Refill    acetaminophen (TYLENOL) 500 MG tablet Take 500 mg by mouth every 6 hours as needed for Pain      aspirin 81 MG EC tablet Take 1 tablet by mouth daily 30 tablet 3    metoprolol tartrate (LOPRESSOR) 25 MG tablet Take 1 tablet by mouth 2 times daily 60 tablet 3    clopidogrel (PLAVIX) 75 MG tablet Take 1 tablet by mouth daily 30 tablet 3    benzocaine-menthol (CEPACOL SORE THROAT) 15-3.6 MG lozenge Take 1 lozenge by mouth every 2 hours as needed for Sore Throat 168 lozenge 0    rosuvastatin (CRESTOR) 20 MG tablet Take 20 mg by mouth every evening      zoledronic acid (ZOMETA) 4 MG/5ML injection Infuse 4 mg intravenously every 3 months      acyclovir (ZOVIRAX) 800 MG tablet Take 1 tablet by mouth 2 times daily 60 tablet 11    famotidine (PEPCID) 20 MG tablet Take 1 tablet by mouth 2 times daily 60 tablet 3    prochlorperazine (COMPAZINE) 10 MG tablet Take 10 mg by mouth as needed       potassium chloride (KLOR-CON) 10 MEQ extended release tablet TK 2 TS PO BID,  3    Cholecalciferol (VITAMIN D3) 1000 units CAPS Take 1 capsule by mouth 2 times daily       Calcium Carbonate-Vitamin D (CALCIUM-CARB 600 + D) 600-125 MG-UNIT TABS Take 1 tablet by mouth 2 times daily        No current facility-administered medications for this visit. 19 ECHO  Summary   Left ventricular cavity size is normal. There is mild concentric left   ventricular hypertrophy. Overall left ventricular systolic function appears   normal with an ejection fraction of 55-60%. No regional wall motion   abnormalities are noted. Normal diastolic function. Trivial tricuspid   regurgitation. Estimated pulmonary artery systolic pressure is at 31 mmHg   assuming a right atrial pressure of 3 mmHg. PATIENT NAME: Mel Corrales                       :        1955  MED REC NO:   [id number]                          ROOM:       9434  ACCOUNT NO:   [de-identified]                           ADMIT DATE: 2019  PROVIDER:     Dante Cuevas MD     DATE OF PROCEDURE:  2019     PREOPERATIVE DIAGNOSES:  1. Hypertension. 2.  Hyperlipidemia. 3.  Type 2 diabetes. 4.  Multiple myeloma. 5.  Non-ST MI.  6.  Complex coronary artery disease. 7.  Obese, BMI of 33.     POSTOPERATIVE DIAGNOSES:  1. Hypertension. 2.  Hyperlipidemia. 3.  Type 2 diabetes. 4.  Multiple myeloma. 5.  Non-ST MI.  6.  Complex coronary artery disease.   7.  Obese, BMI of 33.     OPERATION PERFORMED:  1. Coronary artery bypass x4, LIMA to LAD, reverse saphenous vein to  OM1, reverse saphenous vein to OM2, and reverse saphenous vein to PDA. 2.  Endovein harvest.  3.  Intercostal nerve block  4. Transesophageal echo. 5.  Doppler assessment of graft. Assessment:      Diagnosis Orders   1. Coronary artery disease involving native coronary artery of native heart without angina pectoris     2. HTN (hypertension), benign     3. Hyperlipidemia, unspecified hyperlipidemia type            Plan:      NSTEMI:  Stable after CABG. Continue plavix. [address] to LAD. SVG to OM1. SVG to OM2 and SVG to RCA. CAD:  Doing well after CABG. Start cardiac rehab. Patient looks much more comfortable. HLP:  Crestor and controlled  HTN:  Controlled with medication--metoprolol.

## 2021-06-22 RX ORDER — CLOPIDOGREL BISULFATE 75 MG/1
75 TABLET ORAL DAILY
Qty: 90 TABLET | Refills: 3 | Status: SHIPPED | OUTPATIENT
Start: 2021-06-22 | End: 2022-07-19

## 2021-06-22 NOTE — TELEPHONE ENCOUNTER
Requested Prescriptions     Pending Prescriptions Disp Refills    metoprolol tartrate (LOPRESSOR) 25 MG tablet [Pharmacy Med Name: METOPROLOL TARTRATE 25MG TABLETS] 180 tablet 3     Sig: TAKE 1 TABLET BY MOUTH TWICE DAILY    clopidogrel (PLAVIX) 75 MG tablet [Pharmacy Med Name: CLOPIDOGREL 75MG TABLETS] 90 tablet 3     Sig: TAKE 1 TABLET BY MOUTH DAILY          Number: 90 day    Refills: 3    Last Office Visit: 6/3/2021     Next Office Visit: 12/3/2021

## 2021-08-13 RX ORDER — POTASSIUM CHLORIDE 750 MG/1
TABLET, FILM COATED, EXTENDED RELEASE ORAL
Qty: 180 TABLET | Refills: 3 | Status: SHIPPED | OUTPATIENT
Start: 2021-08-13

## 2021-08-13 NOTE — TELEPHONE ENCOUNTER
Requested Prescriptions     Pending Prescriptions Disp Refills    potassium chloride (KLOR-CON) 10 MEQ extended release tablet [Pharmacy Med Name: POTASSIUM CL 10MEQ ER TABLETS] 180 tablet 3     Sig: TAKE 1 TABLET BY MOUTH TWICE DAILY                Last Office Visit: 6/3/2021     Next Office Visit: 12/3/2021

## 2021-10-04 ENCOUNTER — TELEPHONE (OUTPATIENT)
Dept: CARDIOLOGY CLINIC | Age: 66
End: 2021-10-04

## 2021-10-04 NOTE — TELEPHONE ENCOUNTER
Spoke with pt and she states she is having no other symptoms. Instructed pt to call office if pulse rate is elevated while resting.  Pt voiced an understanding

## 2021-10-04 NOTE — TELEPHONE ENCOUNTER
Harjinder just got over the flu and wants to make sure her heart rate is ok. She was walking a a normal pace and her heart rate went up to 111. While we are the phone the heart rate went down to 69.   Please call patient and advise [phone]

## 2021-12-06 ENCOUNTER — OFFICE VISIT (OUTPATIENT)
Dept: CARDIOLOGY CLINIC | Age: 66
End: 2021-12-06
Payer: COMMERCIAL

## 2021-12-06 VITALS
WEIGHT: 187 LBS | DIASTOLIC BLOOD PRESSURE: 84 MMHG | SYSTOLIC BLOOD PRESSURE: 130 MMHG | HEIGHT: 61 IN | OXYGEN SATURATION: 95 % | HEART RATE: 84 BPM | BODY MASS INDEX: 35.3 KG/M2

## 2021-12-06 DIAGNOSIS — E78.5 HYPERLIPIDEMIA, UNSPECIFIED HYPERLIPIDEMIA TYPE: ICD-10-CM

## 2021-12-06 DIAGNOSIS — Z95.1 S/P CORONARY ARTERY BYPASS GRAFT X 4: Primary | ICD-10-CM

## 2021-12-06 DIAGNOSIS — I10 HTN (HYPERTENSION), BENIGN: ICD-10-CM

## 2021-12-06 PROCEDURE — 99214 OFFICE O/P EST MOD 30 MIN: CPT | Performed by: INTERNAL MEDICINE

## 2021-12-06 PROCEDURE — 93000 ELECTROCARDIOGRAM COMPLETE: CPT | Performed by: INTERNAL MEDICINE

## 2021-12-06 RX ORDER — LISINOPRIL 40 MG/1
40 TABLET ORAL DAILY
Qty: 90 TABLET | Refills: 3 | Status: SHIPPED | OUTPATIENT
Start: 2021-12-06

## 2021-12-06 NOTE — PROGRESS NOTES
Subjective:      Patient ID: Eddy Lozoya is a 66 y.o. female    S/P CABG x 4. HLP.  HTN.      HPI:  Eddy Lozoya is a 66 y.o. patient who presented to the hospital with complaints of chest pain and back pain. She had a CTPA that was negative for PE and troponin was climbing from 0.25 to 0.38. Pt is s/p bmtx for multiple myeloma in  and counts are normal now. Follow up CAD s/p CABG x 4 on 19. Doing very well. Becoming more active. No chest pain. Smoked 1/2ppd for years and quit 2 yrs ago. No chest pains no orthopnea. Allergies   Allergen Reactions    Pcn [Penicillins]     Lipitor [Atorvastatin] Other (See Comments)     Leg cramping       Social History     Socioeconomic History    Marital status:       Spouse name: Not on file    Number of children: Not on file    Years of education: Not on file    Highest education level: Not on file   Occupational History    Not on file   Social Needs    Financial resource strain: Not on file    Food insecurity:     Worry: Not on file     Inability: Not on file    Transportation needs:     Medical: Not on file     Non-medical: Not on file   Tobacco Use    Smoking status: Former Smoker     Packs/day: 0.50     Years: 20.00     Pack years: 10.00     Types: Cigarettes     Last attempt to quit: 2018     Years since quittin.6    Smokeless tobacco: Never Used   Substance and Sexual Activity    Alcohol use: Yes     Comment: rare    Drug use: No    Sexual activity: Not on file   Lifestyle    Physical activity:     Days per week: Not on file     Minutes per session: Not on file    Stress: Not on file   Relationships    Social connections:     Talks on phone: Not on file     Gets together: Not on file     Attends Holiness service: Not on file     Active member of club or organization: Not on file     Attends meetings of clubs or organizations: Not on file     Relationship status: Not on file    Intimate partner violence:     Fear of current or ex partner: Not on file     Emotionally abused: Not on file     Physically abused: Not on file     Forced sexual activity: Not on file   Other Topics Concern    Not on file   Social History Narrative    Not on file       No family history on file. has a past medical history of Cancer ([facility] [address] Diabetes mellitus ([facility] [address] Hyperlipidemia, and Hypertension. Vitals:    12/06/21 1559   BP: 130/84   Pulse: 84   SpO2: 95%       Same exam as 12/14/20  Objective:   Physical Exam:    Physical Exam  Constitutional:       Appearance: She is well-developed. HENT:      Head: Normocephalic and atraumatic. Eyes:      Pupils: Pupils are equal, round, and reactive to light. Cardiovascular:      Rate and Rhythm: Normal rate and regular rhythm. Heart sounds: No murmur heard. No friction rub. Pulmonary:      Effort: Pulmonary effort is normal. No respiratory distress. Breath sounds: Normal breath sounds. No wheezing or rales. Abdominal:      General: Bowel sounds are normal.      Palpations: Abdomen is soft. Musculoskeletal:         General: No deformity. Normal range of motion. Cervical back: Normal range of motion and neck supple. Skin:     General: Skin is warm and dry. Neurological:      Mental Status: She is alert and oriented to person, place, and time. Cranial Nerves: No cranial nerve deficit.       Coordination: Coordination normal.   Psychiatric:         Behavior: Behavior normal.           Current Outpatient Medications   Medication Sig Dispense Refill    acetaminophen (TYLENOL) 500 MG tablet Take 500 mg by mouth every 6 hours as needed for Pain      aspirin 81 MG EC tablet Take 1 tablet by mouth daily 30 tablet 3    metoprolol tartrate (LOPRESSOR) 25 MG tablet Take 1 tablet by mouth 2 times daily 60 tablet 3    clopidogrel (PLAVIX) 75 MG tablet Take 1 tablet by mouth daily 30 tablet 3    benzocaine-menthol (CEPACOL SORE THROAT) 15-3.6 MG lozenge Take 1 lozenge by mouth every 2 hours as needed for Sore Throat 168 lozenge 0    rosuvastatin (CRESTOR) 20 MG tablet Take 20 mg by mouth every evening      zoledronic acid (ZOMETA) 4 MG/5ML injection Infuse 4 mg intravenously every 3 months      acyclovir (ZOVIRAX) 800 MG tablet Take 1 tablet by mouth 2 times daily 60 tablet 11    famotidine (PEPCID) 20 MG tablet Take 1 tablet by mouth 2 times daily 60 tablet 3    prochlorperazine (COMPAZINE) 10 MG tablet Take 10 mg by mouth as needed       potassium chloride (KLOR-CON) 10 MEQ extended release tablet TK 2 TS PO BID,  3    Cholecalciferol (VITAMIN D3) 1000 units CAPS Take 1 capsule by mouth 2 times daily       Calcium Carbonate-Vitamin D (CALCIUM-CARB 600 + D) 600-125 MG-UNIT TABS Take 1 tablet by mouth 2 times daily        No current facility-administered medications for this visit. 19 ECHO  Summary   Left ventricular cavity size is normal. There is mild concentric left   ventricular hypertrophy. Overall left ventricular systolic function appears   normal with an ejection fraction of 55-60%. No regional wall motion   abnormalities are noted. Normal diastolic function. Trivial tricuspid   regurgitation. Estimated pulmonary artery systolic pressure is at 31 mmHg   assuming a right atrial pressure of 3 mmHg. PATIENT NAME: Gaby Kim                       :        1955  MED REC NO:   [id number]                          ROOM:       2796  ACCOUNT NO:   [de-identified]                           ADMIT DATE: 2019  PROVIDER:     Jeff Dillard MD     DATE OF PROCEDURE:  2019     PREOPERATIVE DIAGNOSES:  1. Hypertension. 2.  Hyperlipidemia. 3.  Type 2 diabetes. 4.  Multiple myeloma. 5.  Non-ST MI.  6.  Complex coronary artery disease. 7.  Obese, BMI of 33.     POSTOPERATIVE DIAGNOSES:  1. Hypertension. 2.  Hyperlipidemia. 3.  Type 2 diabetes. 4.  Multiple myeloma. 5.  Non-ST MI.  6.  Complex coronary artery disease.   7.  Obese, BMI of 33.     OPERATION PERFORMED:  1. Coronary artery bypass x4, LIMA to LAD, reverse saphenous vein to  OM1, reverse saphenous vein to OM2, and reverse saphenous vein to PDA. 2.  Endovein harvest.  3.  Intercostal nerve block  4. Transesophageal echo. 5.  Doppler assessment of graft. Assessment:      Diagnosis Orders   1. S/P coronary artery bypass graft x 4  EKG 12 lead    Comprehensive Metabolic Panel    CBC WITH AUTO DIFFERENTIAL    LIPID PANEL   2. HTN (hypertension), benign  Comprehensive Metabolic Panel    CBC WITH AUTO DIFFERENTIAL    LIPID PANEL   3. Hyperlipidemia, unspecified hyperlipidemia type  Comprehensive Metabolic Panel    CBC WITH AUTO DIFFERENTIAL    LIPID PANEL          Plan:      NSTEMI:  Stable after CABG. Continue plavix. [address] to LAD. SVG to OM1. SVG to OM2 and SVG to RCA.  ecg today unchanged from 2020. CAD:  Doing well after CABG. Patient has no chest pain but has acid reflux and MM. Treatment gives her reflux. HLP:  Crestor and controlled  Check labs and lipids  HTN:  Controlled with medication--metoprolol.

## 2021-12-14 DIAGNOSIS — Z95.1 S/P CORONARY ARTERY BYPASS GRAFT X 4: ICD-10-CM

## 2021-12-14 DIAGNOSIS — E78.5 HYPERLIPIDEMIA, UNSPECIFIED HYPERLIPIDEMIA TYPE: ICD-10-CM

## 2021-12-14 DIAGNOSIS — I10 HTN (HYPERTENSION), BENIGN: ICD-10-CM

## 2021-12-14 LAB
A/G RATIO: 2.1 (ref 1.1–2.2)
ALBUMIN SERPL-MCNC: 4.6 G/DL (ref 3.4–5)
ALP BLD-CCNC: 55 U/L (ref 40–129)
ALT SERPL-CCNC: 24 U/L (ref 10–40)
ANION GAP SERPL CALCULATED.3IONS-SCNC: 12 MMOL/L (ref 3–16)
AST SERPL-CCNC: 28 U/L (ref 15–37)
BASOPHILS ABSOLUTE: 0 K/UL (ref 0–0.2)
BASOPHILS RELATIVE PERCENT: 1.1 %
BILIRUB SERPL-MCNC: 0.3 MG/DL (ref 0–1)
BUN BLDV-MCNC: 14 MG/DL (ref 7–20)
CALCIUM SERPL-MCNC: 10 MG/DL (ref 8.3–10.6)
CHLORIDE BLD-SCNC: 106 MMOL/L (ref 99–110)
CHOLESTEROL, TOTAL: 155 MG/DL (ref 0–199)
CO2: 28 MMOL/L (ref 21–32)
CREAT SERPL-MCNC: 0.9 MG/DL (ref 0.6–1.2)
EOSINOPHILS ABSOLUTE: 0.1 K/UL (ref 0–0.6)
EOSINOPHILS RELATIVE PERCENT: 2.3 %
GFR AFRICAN AMERICAN: >60
GFR NON-AFRICAN AMERICAN: >60
GLUCOSE BLD-MCNC: 110 MG/DL (ref 70–99)
HCT VFR BLD CALC: 42.9 % (ref 36–48)
HDLC SERPL-MCNC: 61 MG/DL (ref 40–60)
HEMOGLOBIN: 13.5 G/DL (ref 12–16)
LDL CHOLESTEROL CALCULATED: 82 MG/DL
LYMPHOCYTES ABSOLUTE: 1.4 K/UL (ref 1–5.1)
LYMPHOCYTES RELATIVE PERCENT: 41.4 %
MCH RBC QN AUTO: 28.3 PG (ref 26–34)
MCHC RBC AUTO-ENTMCNC: 31.5 G/DL (ref 31–36)
MCV RBC AUTO: 89.9 FL (ref 80–100)
MONOCYTES ABSOLUTE: 0.4 K/UL (ref 0–1.3)
MONOCYTES RELATIVE PERCENT: 11.3 %
NEUTROPHILS ABSOLUTE: 1.5 K/UL (ref 1.7–7.7)
NEUTROPHILS RELATIVE PERCENT: 43.9 %
PDW BLD-RTO: 14.8 % (ref 12.4–15.4)
PLATELET # BLD: 177 K/UL (ref 135–450)
PMV BLD AUTO: 8.9 FL (ref 5–10.5)
POTASSIUM SERPL-SCNC: 4.3 MMOL/L (ref 3.5–5.1)
RBC # BLD: 4.77 M/UL (ref 4–5.2)
SODIUM BLD-SCNC: 146 MMOL/L (ref 136–145)
TOTAL PROTEIN: 6.8 G/DL (ref 6.4–8.2)
TRIGL SERPL-MCNC: 58 MG/DL (ref 0–150)
VLDLC SERPL CALC-MCNC: 12 MG/DL
WBC # BLD: 3.4 K/UL (ref 4–11)

## 2022-01-17 RX ORDER — ROSUVASTATIN CALCIUM 40 MG/1
40 TABLET, COATED ORAL EVERY EVENING
Qty: 90 TABLET | Refills: 3 | Status: SHIPPED | OUTPATIENT
Start: 2022-01-17 | End: 2022-06-20 | Stop reason: SDUPTHER

## 2022-01-17 NOTE — TELEPHONE ENCOUNTER
Requested Prescriptions     Pending Prescriptions Disp Refills    rosuvastatin (CRESTOR) 40 MG tablet [Pharmacy Med Name: ROSUVASTATIN 40MG TABLETS] 90 tablet 3     Sig: TAKE 1 TABLET BY MOUTH EVERY EVENING          Number: 90    Refills: 3    Last Office Visit: 12/6/2021     Next Office Visit: 6/20/22

## 2022-02-01 ENCOUNTER — HOSPITAL ENCOUNTER (OUTPATIENT)
Dept: ONCOLOGY | Age: 67
Setting detail: INFUSION SERIES
Discharge: HOME | End: 2022-02-01
Payer: COMMERCIAL

## 2022-02-01 VITALS
RESPIRATION RATE: 14 BRPM | SYSTOLIC BLOOD PRESSURE: 135 MMHG | OXYGEN SATURATION: 97 % | HEART RATE: 67 BPM | TEMPERATURE: 98.3 F | DIASTOLIC BLOOD PRESSURE: 84 MMHG

## 2022-02-01 DIAGNOSIS — C90.01 MULTIPLE MYELOMA IN REMISSION (HCC): Primary | ICD-10-CM

## 2022-02-01 PROCEDURE — 2500000003 HC RX 250 WO HCPCS: Performed by: INTERNAL MEDICINE

## 2022-02-01 PROCEDURE — M0220 HC TIXAGEV AND CILGAV INJ: HCPCS

## 2022-02-01 RX ORDER — DIPHENHYDRAMINE HYDROCHLORIDE 50 MG/ML
50 INJECTION INTRAMUSCULAR; INTRAVENOUS
Status: ACTIVE | OUTPATIENT
Start: 2022-02-01 | End: 2022-02-01

## 2022-02-01 RX ORDER — SODIUM CHLORIDE 9 MG/ML
INJECTION, SOLUTION INTRAVENOUS CONTINUOUS
Status: DISCONTINUED | OUTPATIENT
Start: 2022-02-01 | End: 2022-02-02 | Stop reason: HOSPADM

## 2022-02-01 RX ORDER — ACETAMINOPHEN 325 MG/1
650 TABLET ORAL
Status: ACTIVE | OUTPATIENT
Start: 2022-02-01 | End: 2022-02-01

## 2022-02-01 RX ORDER — SODIUM CHLORIDE 9 MG/ML
INJECTION, SOLUTION INTRAVENOUS CONTINUOUS
Status: CANCELLED | OUTPATIENT
Start: 2022-02-01

## 2022-02-01 RX ORDER — ONDANSETRON 2 MG/ML
8 INJECTION INTRAMUSCULAR; INTRAVENOUS
Status: CANCELLED | OUTPATIENT
Start: 2022-02-01

## 2022-02-01 RX ORDER — ONDANSETRON 2 MG/ML
8 INJECTION INTRAMUSCULAR; INTRAVENOUS
Status: ACTIVE | OUTPATIENT
Start: 2022-02-01 | End: 2022-02-01

## 2022-02-01 RX ORDER — DIPHENHYDRAMINE HYDROCHLORIDE 50 MG/ML
50 INJECTION INTRAMUSCULAR; INTRAVENOUS
Status: CANCELLED | OUTPATIENT
Start: 2022-02-01

## 2022-02-01 RX ORDER — ACETAMINOPHEN 325 MG/1
650 TABLET ORAL
Status: CANCELLED | OUTPATIENT
Start: 2022-02-01

## 2022-02-01 RX ADMIN — Medication 150 MG: at 10:48

## 2022-02-01 NOTE — PROGRESS NOTES
Pt seen and assessed [facility] today for Evusheld injection per orders from Dr. Sarita Du . Pt tolerated infusion well and without incident. Pt verbalizes understanding of discharge instructions. Discharged ambulatory to home with self.

## 2022-02-15 ENCOUNTER — HOSPITAL ENCOUNTER (OUTPATIENT)
Dept: MAMMOGRAPHY | Age: 67
Discharge: HOME | End: 2022-02-20
Payer: COMMERCIAL

## 2022-02-15 VITALS — BODY MASS INDEX: 34.74 KG/M2 | WEIGHT: 184 LBS | HEIGHT: 61 IN

## 2022-02-15 DIAGNOSIS — Z12.31 VISIT FOR SCREENING MAMMOGRAM: ICD-10-CM

## 2022-02-15 PROCEDURE — 77063 BREAST TOMOSYNTHESIS BI: CPT

## 2022-03-15 RX ORDER — SODIUM CHLORIDE 9 MG/ML
INJECTION, SOLUTION INTRAVENOUS CONTINUOUS
OUTPATIENT
Start: 2022-03-15

## 2022-03-15 RX ORDER — ONDANSETRON 2 MG/ML
8 INJECTION INTRAMUSCULAR; INTRAVENOUS
OUTPATIENT
Start: 2022-03-15

## 2022-03-15 RX ORDER — ACETAMINOPHEN 325 MG/1
650 TABLET ORAL
OUTPATIENT
Start: 2022-03-15

## 2022-03-15 RX ORDER — DIPHENHYDRAMINE HYDROCHLORIDE 50 MG/ML
50 INJECTION INTRAMUSCULAR; INTRAVENOUS
OUTPATIENT
Start: 2022-03-15

## 2022-03-16 ENCOUNTER — HOSPITAL ENCOUNTER (OUTPATIENT)
Dept: ONCOLOGY | Age: 67
Setting detail: INFUSION SERIES
Discharge: HOME | End: 2022-03-16
Payer: COMMERCIAL

## 2022-03-16 VITALS
DIASTOLIC BLOOD PRESSURE: 73 MMHG | SYSTOLIC BLOOD PRESSURE: 150 MMHG | TEMPERATURE: 97.8 F | RESPIRATION RATE: 18 BRPM | OXYGEN SATURATION: 97 % | HEART RATE: 70 BPM

## 2022-03-16 DIAGNOSIS — C90.00 MULTIPLE MYELOMA NOT HAVING ACHIEVED REMISSION (HCC): Primary | ICD-10-CM

## 2022-03-16 PROCEDURE — M0220 HC TIXAGEV AND CILGAV INJ: HCPCS

## 2022-03-16 PROCEDURE — 6360000002 HC RX W HCPCS: Performed by: INTERNAL MEDICINE

## 2022-03-16 RX ORDER — DIPHENHYDRAMINE HYDROCHLORIDE 50 MG/ML
50 INJECTION INTRAMUSCULAR; INTRAVENOUS
OUTPATIENT
Start: 2022-03-16

## 2022-03-16 RX ORDER — ACETAMINOPHEN 325 MG/1
650 TABLET ORAL
OUTPATIENT
Start: 2022-03-16

## 2022-03-16 RX ORDER — ONDANSETRON 2 MG/ML
8 INJECTION INTRAMUSCULAR; INTRAVENOUS
OUTPATIENT
Start: 2022-03-16

## 2022-03-16 RX ORDER — SODIUM CHLORIDE 9 MG/ML
INJECTION, SOLUTION INTRAVENOUS CONTINUOUS
OUTPATIENT
Start: 2022-03-16

## 2022-03-16 RX ADMIN — Medication 150 MG: at 09:34

## 2022-03-16 NOTE — PROGRESS NOTES
Pt seen and assessed [facility] today for Evusheld injection per orders from Dr. Shyam Baker . Pt tolerated infusion well and without incident. Pt verbalizes understanding of discharge instructions. Discharged ambulatory to home with self.     Electronically signed by Ju Fontenot RN on 3/16/2022 at 4:16 PM

## 2022-04-03 ENCOUNTER — HOSPITAL ENCOUNTER (EMERGENCY)
Age: 67
Discharge: HOME | End: 2022-04-03
Attending: EMERGENCY MEDICINE
Payer: COMMERCIAL

## 2022-04-03 VITALS
BODY MASS INDEX: 35.19 KG/M2 | RESPIRATION RATE: 16 BRPM | WEIGHT: 186.4 LBS | HEIGHT: 61 IN | OXYGEN SATURATION: 95 % | HEART RATE: 89 BPM | TEMPERATURE: 98.6 F | DIASTOLIC BLOOD PRESSURE: 75 MMHG | SYSTOLIC BLOOD PRESSURE: 157 MMHG

## 2022-04-03 DIAGNOSIS — J06.9 VIRAL URI WITH COUGH: Primary | ICD-10-CM

## 2022-04-03 DIAGNOSIS — Z20.822 ENCOUNTER FOR LABORATORY TESTING FOR COVID-19 VIRUS: ICD-10-CM

## 2022-04-03 LAB
RAPID INFLUENZA  B AGN: NEGATIVE
RAPID INFLUENZA A AGN: NEGATIVE

## 2022-04-03 PROCEDURE — 99284 EMERGENCY DEPT VISIT MOD MDM: CPT

## 2022-04-03 PROCEDURE — U0005 INFEC AGEN DETEC AMPLI PROBE: HCPCS

## 2022-04-03 PROCEDURE — U0003 INFECTIOUS AGENT DETECTION BY NUCLEIC ACID (DNA OR RNA); SEVERE ACUTE RESPIRATORY SYNDROME CORONAVIRUS 2 (SARS-COV-2) (CORONAVIRUS DISEASE [COVID-19]), AMPLIFIED PROBE TECHNIQUE, MAKING USE OF HIGH THROUGHPUT TECHNOLOGIES AS DESCRIBED BY CMS-2020-01-R: HCPCS

## 2022-04-03 PROCEDURE — 87804 INFLUENZA ASSAY W/OPTIC: CPT

## 2022-04-03 NOTE — ED PROVIDER NOTES
TRIAGE CHIEF COMPLAINT:   Chief Complaint   Patient presents with    Cough         HPI: Leonides Edwards is a 61 y.o. female who presents to the Emergency Department with complaint of nonproductive cough that started 2 days ago. Her cough seems better today. She has been taking Tessalon and another over-the-counter cough medication. Denies chest pain or shortness of breath. She thinks she had a low-grade fever yesterday. Denies chest pain or shortness of breath. No abdominal pain, vomiting or diarrhea. She has been vaccinated for COVID with 2 shots. She did get a flu vaccination in the fall. Patient has a history of coronary disease status post CABG and a history of a blood clot in her heart. She is on Eliquis. Her primary care doctor told her to come to urgent care to get Covid and flu testing. REVIEW OF SYSTEMS:  6 systems reviewed. Pertinent positives per HPI. Otherwise noted to be negative. Nursing notes reviewed and agree with above. Past medical/surgical history reviewed.     MEDICATIONS   Patient's Medications   New Prescriptions    No medications on file   Previous Medications    ACETAMINOPHEN (TYLENOL) 500 MG TABLET    Take 500 mg by mouth every 6 hours as needed for Pain    ACYCLOVIR (ZOVIRAX) 800 MG TABLET    Take 1 tablet by mouth 2 times daily    ALBUTEROL IN    Inhale into the lungs    AMLODIPINE (NORVASC) 5 MG TABLET    Take 10 mg by mouth daily     ASPIRIN 81 MG EC TABLET    Take 1 tablet by mouth daily    BORTEZOMIB (VELCADE) 3.5 MG CHEMO INNJECTION    Infuse 1.3 mg/m2 intravenously every 14 days    CALCIUM CARBONATE-VITAMIN D (CALCIUM-CARB 600 + D) 600-125 MG-UNIT TABS    Take 1 tablet by mouth 2 times daily     CHOLECALCIFEROL (VITAMIN D3) 1000 UNITS CAPS    Take 1 capsule by mouth 2 times daily     CIPROFLOXACIN (CIPRO) 250 MG TABLET    Take 250 mg by mouth 2 times daily    CLOPIDOGREL (PLAVIX) 75 MG TABLET    TAKE 1 TABLET BY MOUTH DAILY    DARATUMUMAB (DARZALEX) 400 MG/20ML SOLN CHEMO  INJECTION    Infuse 1,350 mg intravenously    DEXAMETHASONE (DECADRON) 4 MG TABLET    07/31/2020 dexamethasone 4 MG Oral Tablet      07/31/2020  Active    FAMOTIDINE (PEPCID) 20 MG TABLET    TK 1 T PO BID    LISINOPRIL (PRINIVIL;ZESTRIL) 40 MG TABLET    Take 1 tablet by mouth daily    METOPROLOL TARTRATE (LOPRESSOR) 25 MG TABLET    TAKE 1 TABLET BY MOUTH TWICE DAILY    MONTELUKAST (SINGULAIR) 10 MG TABLET    Take 10 mg by mouth nightly    PANTOPRAZOLE (PROTONIX) 20 MG TABLET    Take 1 tablet by mouth every morning (before breakfast)    POTASSIUM CHLORIDE (KLOR-CON) 10 MEQ EXTENDED RELEASE TABLET    TAKE 1 TABLET BY MOUTH TWICE DAILY    ROSUVASTATIN (CRESTOR) 40 MG TABLET    TAKE 1 TABLET BY MOUTH EVERY EVENING    SUCRALFATE (CARAFATE) 1 GM TABLET    Take 1 tablet by mouth 4 times daily    ZOLEDRONIC ACID (ZOMETA) 4 MG/5ML INJECTION    Infuse 4 mg intravenously every 3 months   Modified Medications    No medications on file   Discontinued Medications    No medications on file         ALLERGIES   Allergies   Allergen Reactions    Pcn [Penicillins]     Lipitor [Atorvastatin] Other (See Comments)     Leg cramping         BP (!) 157/75   Pulse 89   Temp 98.6 °F (37 °C) (Oral)   Resp 16   Ht 5' 1\" (1.549 m)   Wt 186 lb 6.4 oz (84.6 kg)   SpO2 95%   BMI 35.22 kg/m²   General:  No acute distress. Non toxic appearance  Head:   Normocephalic and atraumatic  Eyes:   Conjunctiva clear, ELI, EOM's intact. Sclera anicteric. ENT:   Mucous membranes moist.  TMs are normal.  Nose and oropharynx are clear. Neck:   Supple. No adenopathy. Lungs/Chest:  No respiratory distress. Lungs are clear bilaterally. CVS:   Regular rate and rhythm  Abdomen:  Nontender  Extremities:  Full range of motion  Skin:   No rashes or lesions to exposed skin  Back:   Nontender  Neuro:  Alert and OX3. Speech clear and appropriate. No upper/lower extremity weakness. Normal sensation in all extremities.   No facial asymmetry or weakness. Gait normal.  Psych:   Affect normal. Mood normal        RADIOLOGY:      LAB      ED COURSE / MDM:  24-year-old female with 2-day history of cough which is improving today. She had possible low-grade fever yesterday. She was sent here by her PCP for Covid and flu testing. She has been vaccinated for both COVID-19 and influenza. Lung sounds are clear. She is afebrile. No respiratory distress. Rapid flu swab was negative. COVID-19 testing was sent and results are pending. I recommend she continue with her current cough medication. Advised isolation from other people until negative COVID-19 test results are available. Recommended follow-up with her PCP as needed. I discussed with Prieto Simpson the results of the evaluation in the Emergency Department, diagnosis, care, prognosis and the importance of follow-up. The patient is stable for discharge. The patient and/or family are in agreement with the plan and all questions have been answered. Specific discharge instructions were explained, including reasons to return to the emergency department.       (Please note that portions of this note may have been completed with a voice recognition program.  Efforts were made to edit the dictation but occasionally words are mis-transcribed)        FINAL IMPRESSION:  1 --viral URI with cough  2 --encounter for laboratory testing for COVID-19 virus                Adele Ignacio MD  04/03/22 [id number]

## 2022-04-03 NOTE — ED NOTES
Patient to ed with complaints of a cough which started 2 days ago patient reports cough is improving she reports he md is requesting a covid and flu test.     Alana Kayser, RN  04/03/22 5920

## 2022-04-03 NOTE — ED NOTES
Patient given discharge instructions verbal and written, patient verbalized understanding. Alert/oriented X4, Clear speech.   Patient exhibits no distress, ambulates with steady gait per self leaving unit, no further request.     Marguerite Cockayne, RN  04/03/22 1695

## 2022-04-04 LAB — SARS-COV-2: NOT DETECTED

## 2022-04-08 ENCOUNTER — TELEPHONE (OUTPATIENT)
Dept: CARDIOLOGY CLINIC | Age: 67
End: 2022-04-08

## 2022-04-08 NOTE — TELEPHONE ENCOUNTER
Patients oncologist put her on Eliquis and has been taking eliquis 4 weeks now. Patient needs know if she should continue taking plavix and aspirin . Please call pt at [phone]  Patent has been taking Eliquis, Aspirin and Plavix for  4 weeks.

## 2022-06-16 NOTE — TELEPHONE ENCOUNTER
Requested Prescriptions     Pending Prescriptions Disp Refills    metoprolol tartrate (LOPRESSOR) 25 MG tablet [Pharmacy Med Name: METOPROLOL TARTRATE 25MG TABLETS] 180 tablet 3     Sig: TAKE 1 TABLET BY MOUTH TWICE DAILY                  Last Office Visit: 12/6/2021     Next Office Visit: 6/20/2022

## 2022-06-20 ENCOUNTER — OFFICE VISIT (OUTPATIENT)
Dept: CARDIOLOGY CLINIC | Age: 67
End: 2022-06-20
Payer: COMMERCIAL

## 2022-06-20 VITALS
SYSTOLIC BLOOD PRESSURE: 126 MMHG | BODY MASS INDEX: 36.28 KG/M2 | WEIGHT: 192 LBS | DIASTOLIC BLOOD PRESSURE: 80 MMHG | HEART RATE: 60 BPM

## 2022-06-20 DIAGNOSIS — I25.10 CORONARY ARTERY DISEASE INVOLVING NATIVE CORONARY ARTERY OF NATIVE HEART WITHOUT ANGINA PECTORIS: Primary | ICD-10-CM

## 2022-06-20 DIAGNOSIS — E78.5 HYPERLIPIDEMIA, UNSPECIFIED HYPERLIPIDEMIA TYPE: ICD-10-CM

## 2022-06-20 DIAGNOSIS — I10 HTN (HYPERTENSION), BENIGN: ICD-10-CM

## 2022-06-20 PROCEDURE — 99214 OFFICE O/P EST MOD 30 MIN: CPT | Performed by: INTERNAL MEDICINE

## 2022-06-20 RX ORDER — ROSUVASTATIN CALCIUM 40 MG/1
40 TABLET, COATED ORAL EVERY EVENING
Qty: 90 TABLET | Refills: 3 | Status: SHIPPED | OUTPATIENT
Start: 2022-06-20

## 2022-06-20 NOTE — PROGRESS NOTES
Subjective:      Patient ID: Zoila Arevalo is a 66 y.o. female    S/P CABG x 4. HLP.  HTN.      HPI:  Zoila Arevalo is a 66 y.o. patient who presented to the hospital with complaints of chest pain and back pain. She had a CTPA that was negative for PE. Follow up CAD s/p CABG x 4 on 19. No chest pain. Smoked 1/2ppd for years and quit 2 yrs ago. No chest pains no orthopnea. has relapsed with multiple myeloma after bm txp in 2019      Allergies   Allergen Reactions    Pcn [Penicillins]     Lipitor [Atorvastatin] Other (See Comments)     Leg cramping       Social History     Socioeconomic History    Marital status:       Spouse name: Not on file    Number of children: Not on file    Years of education: Not on file    Highest education level: Not on file   Occupational History    Not on file   Social Needs    Financial resource strain: Not on file    Food insecurity:     Worry: Not on file     Inability: Not on file    Transportation needs:     Medical: Not on file     Non-medical: Not on file   Tobacco Use    Smoking status: Former Smoker     Packs/day: 0.50     Years: 20.00     Pack years: 10.00     Types: Cigarettes     Last attempt to quit: 2018     Years since quittin.6    Smokeless tobacco: Never Used   Substance and Sexual Activity    Alcohol use: Yes     Comment: rare    Drug use: No    Sexual activity: Not on file   Lifestyle    Physical activity:     Days per week: Not on file     Minutes per session: Not on file    Stress: Not on file   Relationships    Social connections:     Talks on phone: Not on file     Gets together: Not on file     Attends Scientologist service: Not on file     Active member of club or organization: Not on file     Attends meetings of clubs or organizations: Not on file     Relationship status: Not on file    Intimate partner violence:     Fear of current or ex partner: Not on file     Emotionally abused: Not on file     Physically abused: Not on file     Forced sexual activity: Not on file   Other Topics Concern    Not on file   Social History Narrative    Not on file       No family history on file. has a past medical history of Cancer ([facility] [address] Diabetes mellitus ([facility] [address] Hyperlipidemia, and Hypertension. Vitals:    06/20/22 1344   BP: 126/80   Pulse: 60       Same exam as 12/14/21  Objective:   Physical Exam:    Physical Exam  Constitutional:       Appearance: She is well-developed. HENT:      Head: Normocephalic and atraumatic. Eyes:      Pupils: Pupils are equal, round, and reactive to light. Cardiovascular:      Rate and Rhythm: Normal rate and regular rhythm. Heart sounds: No murmur heard. No friction rub. Pulmonary:      Effort: Pulmonary effort is normal. No respiratory distress. Breath sounds: Normal breath sounds. No wheezing or rales. Abdominal:      General: Bowel sounds are normal.      Palpations: Abdomen is soft. Musculoskeletal:         General: No deformity. Normal range of motion. Cervical back: Normal range of motion and neck supple. Skin:     General: Skin is warm and dry. Neurological:      Mental Status: She is alert and oriented to person, place, and time. Cranial Nerves: No cranial nerve deficit.       Coordination: Coordination normal.   Psychiatric:         Behavior: Behavior normal.           Current Outpatient Medications   Medication Sig Dispense Refill    acetaminophen (TYLENOL) 500 MG tablet Take 500 mg by mouth every 6 hours as needed for Pain      aspirin 81 MG EC tablet Take 1 tablet by mouth daily 30 tablet 3    metoprolol tartrate (LOPRESSOR) 25 MG tablet Take 1 tablet by mouth 2 times daily 60 tablet 3    clopidogrel (PLAVIX) 75 MG tablet Take 1 tablet by mouth daily 30 tablet 3    benzocaine-menthol (CEPACOL SORE THROAT) 15-3.6 MG lozenge Take 1 lozenge by mouth every 2 hours as needed for Sore Throat 168 lozenge 0    rosuvastatin (CRESTOR) 20 MG tablet Take 20 mg by mouth every evening      zoledronic acid (ZOMETA) 4 MG/5ML injection Infuse 4 mg intravenously every 3 months      acyclovir (ZOVIRAX) 800 MG tablet Take 1 tablet by mouth 2 times daily 60 tablet 11    famotidine (PEPCID) 20 MG tablet Take 1 tablet by mouth 2 times daily 60 tablet 3    prochlorperazine (COMPAZINE) 10 MG tablet Take 10 mg by mouth as needed       potassium chloride (KLOR-CON) 10 MEQ extended release tablet TK 2 TS PO BID,  3    Cholecalciferol (VITAMIN D3) 1000 units CAPS Take 1 capsule by mouth 2 times daily       Calcium Carbonate-Vitamin D (CALCIUM-CARB 600 + D) 600-125 MG-UNIT TABS Take 1 tablet by mouth 2 times daily        No current facility-administered medications for this visit. 19 ECHO  Summary   Left ventricular cavity size is normal. There is mild concentric left   ventricular hypertrophy. Overall left ventricular systolic function appears   normal with an ejection fraction of 55-60%. No regional wall motion   abnormalities are noted. Normal diastolic function. Trivial tricuspid   regurgitation. Estimated pulmonary artery systolic pressure is at 31 mmHg   assuming a right atrial pressure of 3 mmHg. PATIENT NAME: Belle Ramirez                       :        1955  MED REC NO:   [id number]                          ROOM:       [facility]  ACCOUNT NO:   [de-identified]                           ADMIT DATE: 2019  PROVIDER:     Marcos Hernandez MD     DATE OF PROCEDURE:  2019     PREOPERATIVE DIAGNOSES:  1. Hypertension. 2.  Hyperlipidemia. 3.  Type 2 diabetes. 4.  Multiple myeloma. 5.  Non-ST MI.  6.  Complex coronary artery disease. 7.  Obese, BMI of 33.     POSTOPERATIVE DIAGNOSES:  1. Hypertension. 2.  Hyperlipidemia. 3.  Type 2 diabetes. 4.  Multiple myeloma. 5.  Non-ST MI.  6.  Complex coronary artery disease. 7.  Obese, BMI of 33.     OPERATION PERFORMED:  1.   Coronary artery bypass x4, LIMA to LAD, reverse saphenous vein to  OM1, reverse saphenous vein to OM2, and reverse saphenous vein to PDA. 2.  Endovein harvest.  3.  Intercostal nerve block  4. Transesophageal echo. 5.  Doppler assessment of graft. Assessment:      Diagnosis Orders   1. Coronary artery disease involving native coronary artery of native heart without angina pectoris     2. HTN (hypertension), benign     3. Hyperlipidemia, unspecified hyperlipidemia type            Plan:      NSTEMI:  Stable after CABG. Continue plavix. JEAN GARZON II.VIERTEL to LAD. SVG to OM1. SVG to OM2 and SVG to RCA. No chest pain  CAD:  Doing well after CABG. Patient has no chest pain no acid reflux. Takes asa 81 daily only no plavix. HLP:  Crestor and controlled    HTN:  Controlled with medication--metoprolol.

## 2022-07-14 ENCOUNTER — APPOINTMENT (OUTPATIENT)
Dept: CT IMAGING | Age: 67
End: 2022-07-14
Payer: MEDICARE

## 2022-07-14 ENCOUNTER — HOSPITAL ENCOUNTER (EMERGENCY)
Age: 67
Discharge: HOME | End: 2022-07-14
Attending: EMERGENCY MEDICINE
Payer: MEDICARE

## 2022-07-14 VITALS
TEMPERATURE: 97.8 F | RESPIRATION RATE: 16 BRPM | SYSTOLIC BLOOD PRESSURE: 111 MMHG | HEIGHT: 61 IN | OXYGEN SATURATION: 99 % | BODY MASS INDEX: 34.19 KG/M2 | DIASTOLIC BLOOD PRESSURE: 57 MMHG | HEART RATE: 56 BPM | WEIGHT: 181.1 LBS

## 2022-07-14 DIAGNOSIS — K57.32 DIVERTICULITIS OF COLON: Primary | ICD-10-CM

## 2022-07-14 LAB
A/G RATIO: 2 (ref 1.1–2.2)
ALBUMIN SERPL-MCNC: 3.9 G/DL (ref 3.4–5)
ALP BLD-CCNC: 54 U/L (ref 40–129)
ALT SERPL-CCNC: 11 U/L (ref 10–40)
AMORPHOUS: ABNORMAL /HPF
ANION GAP SERPL CALCULATED.3IONS-SCNC: 13 MMOL/L (ref 3–16)
AST SERPL-CCNC: 11 U/L (ref 15–37)
BACTERIA: ABNORMAL /HPF
BASOPHILS ABSOLUTE: 0.2 K/UL (ref 0–0.2)
BASOPHILS RELATIVE PERCENT: 3.2 %
BILIRUB SERPL-MCNC: 0.7 MG/DL (ref 0–1)
BILIRUBIN URINE: ABNORMAL
BLOOD, URINE: NEGATIVE
BUN BLDV-MCNC: 16 MG/DL (ref 7–20)
CALCIUM SERPL-MCNC: 8.7 MG/DL (ref 8.3–10.6)
CELLULAR CASTS: ABNORMAL /LPF
CHLORIDE BLD-SCNC: 108 MMOL/L (ref 99–110)
CLARITY: ABNORMAL
CO2: 19 MMOL/L (ref 21–32)
COLOR: ABNORMAL
CREAT SERPL-MCNC: 1 MG/DL (ref 0.6–1.2)
EOSINOPHILS ABSOLUTE: 0.2 K/UL (ref 0–0.6)
EOSINOPHILS RELATIVE PERCENT: 3.8 %
EPITHELIAL CELLS, UA: ABNORMAL /HPF (ref 0–5)
GFR AFRICAN AMERICAN: >60
GFR NON-AFRICAN AMERICAN: 56
GLUCOSE BLD-MCNC: 105 MG/DL (ref 70–99)
GLUCOSE URINE: NEGATIVE MG/DL
HCT VFR BLD CALC: 40.4 % (ref 36–48)
HEMOGLOBIN: 13.4 G/DL (ref 12–16)
HYALINE CASTS: ABNORMAL /LPF (ref 0–2)
KETONES, URINE: ABNORMAL MG/DL
LEUKOCYTE ESTERASE, URINE: NEGATIVE
LIPASE: 9 U/L (ref 13–60)
LYMPHOCYTES ABSOLUTE: 0.6 K/UL (ref 1–5.1)
LYMPHOCYTES RELATIVE PERCENT: 12 %
MAGNESIUM: 2.1 MG/DL (ref 1.8–2.4)
MCH RBC QN AUTO: 28.6 PG (ref 26–34)
MCHC RBC AUTO-ENTMCNC: 33.2 G/DL (ref 31–36)
MCV RBC AUTO: 86.4 FL (ref 80–100)
MICROSCOPIC EXAMINATION: YES
MONOCYTES ABSOLUTE: 0.8 K/UL (ref 0–1.3)
MONOCYTES RELATIVE PERCENT: 15.7 %
MUCUS: ABNORMAL /LPF
NEUTROPHILS ABSOLUTE: 3.5 K/UL (ref 1.7–7.7)
NEUTROPHILS RELATIVE PERCENT: 65.3 %
NITRITE, URINE: NEGATIVE
PDW BLD-RTO: 16.4 % (ref 12.4–15.4)
PH UA: 6 (ref 5–8)
PLATELET # BLD: 229 K/UL (ref 135–450)
PMV BLD AUTO: 8.9 FL (ref 5–10.5)
POTASSIUM REFLEX MAGNESIUM: 3.3 MMOL/L (ref 3.5–5.1)
PROTEIN UA: 100 MG/DL
RBC # BLD: 4.68 M/UL (ref 4–5.2)
RBC UA: ABNORMAL /HPF (ref 0–4)
RENAL EPITHELIAL, UA: ABNORMAL /HPF (ref 0–1)
SODIUM BLD-SCNC: 140 MMOL/L (ref 136–145)
SPECIFIC GRAVITY UA: >=1.03 (ref 1–1.03)
TOTAL PROTEIN: 5.9 G/DL (ref 6.4–8.2)
URINE TYPE: ABNORMAL
UROBILINOGEN, URINE: 0.2 E.U./DL
WBC # BLD: 5.4 K/UL (ref 4–11)
WBC UA: ABNORMAL /HPF (ref 0–5)

## 2022-07-14 PROCEDURE — 83690 ASSAY OF LIPASE: CPT

## 2022-07-14 PROCEDURE — 99285 EMERGENCY DEPT VISIT HI MDM: CPT

## 2022-07-14 PROCEDURE — 74177 CT ABD & PELVIS W/CONTRAST: CPT

## 2022-07-14 PROCEDURE — 85025 COMPLETE CBC W/AUTO DIFF WBC: CPT

## 2022-07-14 PROCEDURE — 6370000000 HC RX 637 (ALT 250 FOR IP): Performed by: EMERGENCY MEDICINE

## 2022-07-14 PROCEDURE — 81001 URINALYSIS AUTO W/SCOPE: CPT

## 2022-07-14 PROCEDURE — 6360000004 HC RX CONTRAST MEDICATION: Performed by: EMERGENCY MEDICINE

## 2022-07-14 PROCEDURE — 83735 ASSAY OF MAGNESIUM: CPT

## 2022-07-14 PROCEDURE — 87086 URINE CULTURE/COLONY COUNT: CPT

## 2022-07-14 PROCEDURE — 80053 COMPREHEN METABOLIC PANEL: CPT

## 2022-07-14 PROCEDURE — 2580000003 HC RX 258: Performed by: EMERGENCY MEDICINE

## 2022-07-14 RX ORDER — METRONIDAZOLE 500 MG/1
500 TABLET ORAL 2 TIMES DAILY
Qty: 20 TABLET | Refills: 0 | Status: SHIPPED | OUTPATIENT
Start: 2022-07-14 | End: 2022-07-24

## 2022-07-14 RX ORDER — ONDANSETRON 4 MG/1
4 TABLET, ORALLY DISINTEGRATING ORAL ONCE
Status: COMPLETED | OUTPATIENT
Start: 2022-07-14 | End: 2022-07-14

## 2022-07-14 RX ORDER — CIPROFLOXACIN 500 MG/1
500 TABLET, FILM COATED ORAL 2 TIMES DAILY
Qty: 20 TABLET | Refills: 0 | Status: SHIPPED | OUTPATIENT
Start: 2022-07-14 | End: 2022-07-24

## 2022-07-14 RX ORDER — DICYCLOMINE HYDROCHLORIDE 10 MG/1
10 CAPSULE ORAL
Status: DISCONTINUED | OUTPATIENT
Start: 2022-07-14 | End: 2022-07-14 | Stop reason: HOSPADM

## 2022-07-14 RX ORDER — SODIUM CHLORIDE, SODIUM LACTATE, POTASSIUM CHLORIDE, AND CALCIUM CHLORIDE .6; .31; .03; .02 G/100ML; G/100ML; G/100ML; G/100ML
1000 INJECTION, SOLUTION INTRAVENOUS ONCE
Status: COMPLETED | OUTPATIENT
Start: 2022-07-14 | End: 2022-07-14

## 2022-07-14 RX ADMIN — ONDANSETRON 4 MG: 4 TABLET, ORALLY DISINTEGRATING ORAL at 09:57

## 2022-07-14 RX ADMIN — DICYCLOMINE HYDROCHLORIDE 10 MG: 10 CAPSULE ORAL at 09:57

## 2022-07-14 RX ADMIN — IOPAMIDOL 80 ML: 755 INJECTION, SOLUTION INTRAVENOUS at 10:38

## 2022-07-14 RX ADMIN — SODIUM CHLORIDE, POTASSIUM CHLORIDE, SODIUM LACTATE AND CALCIUM CHLORIDE 1000 ML: 600; 310; 30; 20 INJECTION, SOLUTION INTRAVENOUS at 09:58

## 2022-07-14 ASSESSMENT — PAIN - FUNCTIONAL ASSESSMENT
PAIN_FUNCTIONAL_ASSESSMENT: NONE - DENIES PAIN
PAIN_FUNCTIONAL_ASSESSMENT: NONE - DENIES PAIN

## 2022-07-14 ASSESSMENT — PAIN SCALES - GENERAL: PAINLEVEL_OUTOF10: 0

## 2022-07-14 NOTE — ED PROVIDER NOTES
[address]        Pt Name: Charity Murray  MRN: [id number]  [place] 1955  Date of evaluation: 7/14/2022  Provider: Tiffani Coelho MD  PCP: No primary care provider on file. This patient was seen and evaluated by the attending physician Tiffani Coelho MD.      [address] [place] [address]       Chief Complaint   Patient presents with    Diarrhea     pt states last chemo was 7/1 and has been having diarrhea since sunday night and made oncologist aware and pt denies fevers       HISTORY OF PRESENT ILLNESS   (Location/Symptom, Timing/Onset, Context/Setting, Quality, Duration, Modifying Factors, Severity)  Note limiting factors. Charity Murray is a 61 y.o. female with a history of multiple myeloma on monthly monoclonal infusions of Darzalex and as well as Velcade here today with a chief complaint of a few days of diarrhea crampy abdominal pain and feeling dehydrated. No fevers chills sweats no headaches chest pains dyspnea. Nursing Notes were all reviewed and agreed with or any disagreements were addressed  in the HPI. REVIEW OF SYSTEMS    (2-9 systems for level 4, 10 or more for level 5)     Review of Systems    Positives and Pertinent negatives as per HPI. Except as noted abovein the ROS, all other systems were reviewed and negative.        PAST MEDICAL HISTORY     Past Medical History:   Diagnosis Date    Cancer [facility])     Multiple myeloma    Diabetes mellitus ([facility] [address]     Hyperlipidemia     Hypertension     MI (myocardial infarction) ([facility] [address]          SURGICAL HISTORY     Past Surgical History:   Procedure Laterality Date    CORONARY ARTERY BYPASS GRAFT N/A 6/13/2019    CORONARY ARTERY BYPASS GRAFT X4, INTERNAL MAMMARY ARTERY AND SAPHENOUS VEIN GRAFT-TCP-BP performed by Antonieta Quintana MD at /[facility] (CERVIX STATUS UNKNOWN)      partial for fibroids    INSERTION / REMOVAL / REPLACEMENT VENOUS ACCESS CATHETER N/A 1/24/2019 TRIFUSION CATHETER INSERTION LEFT SUBCLAVIAN performed by Alice Eldridge MD at [facility]       Discharge Medication List as of 7/14/2022 11:21 AM      CONTINUE these medications which have NOT CHANGED    Details   pomalidomide (POMALYST) 4 MG chemo capsule Take 4 mg by mouthHistorical Med      metoprolol tartrate (LOPRESSOR) 25 MG tablet TAKE 1 TABLET BY MOUTH TWICE DAILY, Disp-180 tablet, R-3Normal      rosuvastatin (CRESTOR) 40 MG tablet Take 1 tablet by mouth every evening, Disp-90 tablet, R-3Normal      lisinopril (PRINIVIL;ZESTRIL) 40 MG tablet Take 1 tablet by mouth daily, Disp-90 tablet, R-3Normal      potassium chloride (KLOR-CON) 10 MEQ extended release tablet TAKE 1 TABLET BY MOUTH TWICE DAILY, Disp-180 tablet, R-3Normal      clopidogrel (PLAVIX) 75 MG tablet TAKE 1 TABLET BY MOUTH DAILY, Disp-90 tablet, R-3Normal      sucralfate (CARAFATE) 1 GM tablet Take 1 tablet by mouth 4 times daily, Disp-120 tablet, R-3Print      daratumumab (DARZALEX) 400 MG/20ML SOLN chemo  injection Infuse 1,350 mg intravenously Historical Med      dexamethasone (DECADRON) 4 MG tablet 07/31/2020 dexamethasone 4 MG Oral Tablet      07/31/2020  ActiveHistorical Med      famotidine (PEPCID) 20 MG tablet TK 1 T PO BIDHistorical Med      montelukast (SINGULAIR) 10 MG tablet Take 10 mg by mouth nightlyHistorical Med      ALBUTEROL IN Inhale into the lungsHistorical Med      pantoprazole (PROTONIX) 20 MG tablet Take 1 tablet by mouth every morning (before breakfast), Disp-30 tablet, R-0Print      amLODIPine (NORVASC) 5 MG tablet Take 10 mg by mouth daily Historical Med      bortezomib (VELCADE) 3.5 MG chemo innjection Infuse 1.3 mg/m2 intravenously every 14 daysHistorical Med      acetaminophen (TYLENOL) 500 MG tablet Take 500 mg by mouth every 6 hours as needed for PainHistorical Med      aspirin 81 MG EC tablet Take 1 tablet by mouth daily, Disp-30 tablet, R-3Print      zoledronic acid (ZOMETA) 4 MG/5ML injection Infuse 4 mg intravenously every 3 months Historical Med      acyclovir (ZOVIRAX) 800 MG tablet Take 1 tablet by mouth 2 times daily, Disp-60 tablet, R-11Normal      Cholecalciferol (VITAMIN D3) 1000 units CAPS Take 1 capsule by mouth 2 times daily Historical Med      Calcium Carbonate-Vitamin D (CALCIUM-CARB 600 + D) 600-125 MG-UNIT TABS Take 1 tablet by mouth 2 times daily Historical Med               ALLERGIES     Pcn [penicillins] and Lipitor [atorvastatin]    FAMILYHISTORY       Family History   Problem Relation Age of Onset    Breast Cancer Neg Hx     Ovarian Cancer Neg Hx           SOCIAL HISTORY       Social History     Socioeconomic History    Marital status:      Spouse name: None    Number of children: None    Years of education: None    Highest education level: None   Occupational History    None   Tobacco Use    Smoking status: Former Smoker     Packs/day: 0.50     Years: 20.00     Pack years: 10.00     Types: Cigarettes     Quit date: 11/22/2018     Years since quitting: 3.6    Smokeless tobacco: Never Used   Vaping Use    Vaping Use: Former   Substance and Sexual Activity    Alcohol use: Yes     Comment: rare    Drug use: No    Sexual activity: None   Other Topics Concern    None   Social History Narrative    None     Social Determinants of Health     Financial Resource Strain:     Difficulty of Paying Living Expenses: Not on file   Food Insecurity:     Worried About Running Out of Food in the Last Year: Not on file    Yolanda of Food in the Last Year: Not on file   Transportation Needs:     Lack of Transportation (Medical): Not on file    Lack of Transportation (Non-Medical):  Not on file   Physical Activity:     Days of Exercise per Week: Not on file    Minutes of Exercise per Session: Not on file   Stress:     Feeling of Stress : Not on file   Social Connections:     Frequency of Communication with Friends and Family: Not on file    Frequency of Social Gatherings with Friends and Family: Not on file    Attends Faith Services: Not on file    Active Member of Clubs or Organizations: Not on file    Attends Club or Organization Meetings: Not on file    Marital Status: Not on file   Intimate Partner Violence:     Fear of Current or Ex-Partner: Not on file    Emotionally Abused: Not on file    Physically Abused: Not on file    Sexually Abused: Not on file   Housing Stability:     Unable to Pay for Housing in the Last Year: Not on file    Number of [place] in the Last Year: Not on file    Unstable Housing in the Last Year: Not on file       SCREENINGS    Iván Coma Scale  Eye Opening: Spontaneous  Best Verbal Response: Oriented  Best Motor Response: Obeys commands  Iván Coma Scale Score: 15        PHYSICAL EXAM    (up to 7 for level 4, 8 or more for level 5)     ED Triage Vitals   BP Temp Temp src Pulse Resp SpO2 Height Weight   -- -- -- -- -- -- -- --       Physical Exam     General Appearance:  Alert, cooperative, no distress, appears stated age. Head:  Normocephalic, without obvious abnormality, atraumatic. Eyes:  conjunctiva/corneas clear, EOM's intact. Sclera anicteric. ENT: Mucous membranes moist.   Neck: Supple, symmetrical, trachea midline, no adenopathy. No jugular venous distention. Lungs:   No Respiratory Distress. Chest Wall:  Atrauamtic   Heart:  RRR   Abdomen:   Soft, NT, ND; benign   Extremities:  Full range of motion. Pulses: Symmetric x4   Skin:  No rashes or lesions to exposed skin. Neurologic: Alert and oriented X 3. Motor grossly normal.  Speech clear.           DIAGNOSTIC RESULTS   LABS:    Labs Reviewed   CBC WITH AUTO DIFFERENTIAL - Abnormal; Notable for the following components:       Result Value    RDW 16.4 (*)     Lymphocytes Absolute 0.6 (*)     All other components within normal limits   COMPREHENSIVE METABOLIC PANEL W/ REFLEX TO MG FOR LOW K - Abnormal; Notable for the following components:    Potassium reflex Magnesium 3.3 (*)     CO2 19 (*)     Glucose 105 (*)     GFR Non- 56 (*)     Total Protein 5.9 (*)     AST 11 (*)     All other components within normal limits   LIPASE - Abnormal; Notable for the following components:    Lipase 9.0 (*)     All other components within normal limits   URINALYSIS - Abnormal; Notable for the following components:    Color, UA DARK YELLOW (*)     Clarity, UA SL CLOUDY (*)     Bilirubin Urine SMALL (*)     Ketones, Urine TRACE (*)     Protein,  (*)     All other components within normal limits   MICROSCOPIC URINALYSIS - Abnormal; Notable for the following components:    Hyaline Casts, UA 3-5 (*)     Cellular Cast, UA 1-3 WBC (*)     Mucus, UA 1+ (*)     WBC, UA 6-9 (*)     Bacteria, UA 2+ (*)     All other components within normal limits   CULTURE, URINE   MAGNESIUM       All other labs were within normal range or not returned as of this dictation. EKG: All EKG's are interpreted by the Emergency Department Physician in the absence of a cardiologist.  Please see their note for interpretation of EKG. RADIOLOGY:   Non-plain film images such as CT, Ultrasound and MRI are read by the radiologist. Plain radiographic images are visualized andpreliminarily interpreted by the  ED Provider with the below findings:        Interpretation perthe Radiologist below, if available at the time of this note:    CT ABDOMEN PELVIS W IV CONTRAST Additional Contrast? None   Final Result   1. Mild fat stranding adjacent to several diverticula in the left lower quadrant concerning for mild uncomplicated diverticulitis. 2.  Minimally dilated loops of small bowel without a focal transition point which may be seen in ileus. 3.  Coronary artery calcifications. No results found.         PROCEDURES   Unless otherwise noted below, none     Procedures    CRITICAL CARE TIME   N/A    CONSULTS:  None      EMERGENCY DEPARTMENT COURSE and DIFFERENTIAL DIAGNOSIS/MDM:   Vitals:    Vitals:    07/14/22 0940 07/14/22 1128   BP: (!) 120/54 (!) 111/57   Pulse: 68 56   Resp: 14 16   Temp: 97.8 °F (36.6 °C)    TempSrc: Oral    SpO2: 99% 99%   Weight: 181 lb 1.6 oz (82.1 kg)    Height: 5' 1\" (1.549 m)        Patient was given thefollowing medications:  Medications   lactated ringers bolus (0 mLs IntraVENous Stopped 7/14/22 1128)   ondansetron (ZOFRAN-ODT) disintegrating tablet 4 mg (4 mg Oral Given 7/14/22 0957)   iopamidol (ISOVUE-370) 76 % injection 80 mL (80 mLs IntraVENous Given 7/14/22 1038)       68-year-old female with diarrhea and some crampy abdominal pain with clinically benign examination and history of multiple myeloma on monthly antibodies infusions. Checking labs and CT imaging to rule out colitis neutropenic colitis infection colitis diverticulitis or surgical pathology. CBC benign  UA contaminant; send CX. Chemistry benign  Lipase WNL  CT Ileus vs. Diverticulitis; clinically seems to be diverticulitis. Doubt ileus as is tolerating PO and voiding. Will start ABX for diverticulitis (immunocompromised), f/u PCP, return for worsening. Is this patient to be included in the SEP-1 Core Measure? No   Exclusion criteria - the patient is NOT to be included for SEP-1 Core Measure due to: Infection is not suspected     FINAL IMPRESSION      1.  Diverticulitis of colon          DISPOSITION/PLAN   DISPOSITION        PATIENT REFERREDTO:  Your PCP            DISCHARGE MEDICATIONS:  Discharge Medication List as of 7/14/2022 11:21 AM      START taking these medications    Details   metroNIDAZOLE (FLAGYL) 500 MG tablet Take 1 tablet by mouth 2 times daily for 10 days, Disp-20 tablet, R-0Normal             DISCONTINUED MEDICATIONS:  Discharge Medication List as of 7/14/2022 11:21 AM                 (Please note that portions ofthis note were completed with a voice recognition program.  Efforts were made to edit the dictations but occasionally words are

## 2022-07-14 NOTE — ED NOTES
Pt states that she is on chemo and her last treatment was 7/1 and on Sunday evening she started with diarrhea that has continued and pt made oncologist aware and they informed her to call back if she developed fevers or if it did not resolve and pt denies any fevers or vomiting at home and states that she does feel fatigued and drained. Pt states that she does have intermittent periods of abdominal cramping and states that she has not been eating much although has been drinking water and gatorade.       Darcie Ugarte RN  07/14/22 LAILA Kunz  07/14/22 [id number]

## 2022-07-14 NOTE — ED NOTES
Dr Brooks Avers at bedside to talk with pt about results and plan of care.       Rocky Saunders RN  07/14/22 2952

## 2022-07-15 LAB — URINE CULTURE, ROUTINE: NORMAL

## 2022-07-18 NOTE — TELEPHONE ENCOUNTER
Requested Prescriptions     Pending Prescriptions Disp Refills    clopidogrel (PLAVIX) 75 MG tablet [Pharmacy Med Name: CLOPIDOGREL 75MG TABLETS] 90 tablet 3     Sig: TAKE 1 TABLET BY MOUTH DAILY          Number: 90    Refills: 3    Last Office Visit: 12/6/2021     Next Office Visit: 12/9/22

## 2022-07-19 RX ORDER — CLOPIDOGREL BISULFATE 75 MG/1
75 TABLET ORAL DAILY
Qty: 90 TABLET | Refills: 3 | Status: SHIPPED | OUTPATIENT
Start: 2022-07-19 | End: 2022-09-18 | Stop reason: CLARIF

## 2022-09-18 ENCOUNTER — HOSPITAL ENCOUNTER (INPATIENT)
Age: 67
LOS: 2 days | Discharge: HOME | DRG: 392 | End: 2022-09-20
Attending: EMERGENCY MEDICINE | Admitting: INTERNAL MEDICINE
Payer: MEDICARE

## 2022-09-18 ENCOUNTER — APPOINTMENT (OUTPATIENT)
Dept: CT IMAGING | Age: 67
DRG: 392 | End: 2022-09-18
Payer: MEDICARE

## 2022-09-18 ENCOUNTER — APPOINTMENT (OUTPATIENT)
Dept: GENERAL RADIOLOGY | Age: 67
DRG: 392 | End: 2022-09-18
Payer: MEDICARE

## 2022-09-18 DIAGNOSIS — R07.9 CHEST PAIN, UNSPECIFIED TYPE: Primary | ICD-10-CM

## 2022-09-18 DIAGNOSIS — E87.5 HYPERKALEMIA: ICD-10-CM

## 2022-09-18 LAB
A/G RATIO: 1.4 (ref 1.1–2.2)
ALBUMIN SERPL-MCNC: 3.6 G/DL (ref 3.4–5)
ALP BLD-CCNC: 50 U/L (ref 40–129)
ALT SERPL-CCNC: 11 U/L (ref 10–40)
ANION GAP SERPL CALCULATED.3IONS-SCNC: 11 MMOL/L (ref 3–16)
AST SERPL-CCNC: 36 U/L (ref 15–37)
BASOPHILS ABSOLUTE: 0.1 K/UL (ref 0–0.2)
BASOPHILS RELATIVE PERCENT: 1.6 %
BILIRUB SERPL-MCNC: 0.3 MG/DL (ref 0–1)
BILIRUBIN URINE: NEGATIVE
BLOOD, URINE: NEGATIVE
BUN BLDV-MCNC: 13 MG/DL (ref 7–20)
CALCIUM SERPL-MCNC: 8.7 MG/DL (ref 8.3–10.6)
CHLORIDE BLD-SCNC: 107 MMOL/L (ref 99–110)
CLARITY: CLEAR
CO2: 20 MMOL/L (ref 21–32)
COLOR: YELLOW
CREAT SERPL-MCNC: 0.9 MG/DL (ref 0.6–1.2)
EOSINOPHILS ABSOLUTE: 0.7 K/UL (ref 0–0.6)
EOSINOPHILS RELATIVE PERCENT: 11.7 %
GFR AFRICAN AMERICAN: >60
GFR NON-AFRICAN AMERICAN: >60
GLUCOSE BLD-MCNC: 124 MG/DL (ref 70–99)
GLUCOSE BLD-MCNC: 99 MG/DL (ref 70–99)
GLUCOSE URINE: NEGATIVE MG/DL
HCT VFR BLD CALC: 35.3 % (ref 36–48)
HEMOGLOBIN: 11.7 G/DL (ref 12–16)
KETONES, URINE: NEGATIVE MG/DL
LEUKOCYTE ESTERASE, URINE: NEGATIVE
LIPASE: 22 U/L (ref 13–60)
LYMPHOCYTES ABSOLUTE: 1.2 K/UL (ref 1–5.1)
LYMPHOCYTES RELATIVE PERCENT: 20.5 %
MCH RBC QN AUTO: 28.4 PG (ref 26–34)
MCHC RBC AUTO-ENTMCNC: 33.2 G/DL (ref 31–36)
MCV RBC AUTO: 85.7 FL (ref 80–100)
MICROSCOPIC EXAMINATION: NORMAL
MONOCYTES ABSOLUTE: 1.5 K/UL (ref 0–1.3)
MONOCYTES RELATIVE PERCENT: 24.5 %
NEUTROPHILS ABSOLUTE: 2.5 K/UL (ref 1.7–7.7)
NEUTROPHILS RELATIVE PERCENT: 41.7 %
NITRITE, URINE: NEGATIVE
PDW BLD-RTO: 16.8 % (ref 12.4–15.4)
PERFORMED ON: NORMAL
PH UA: 6.5 (ref 5–8)
PLATELET # BLD: 173 K/UL (ref 135–450)
PMV BLD AUTO: 8.8 FL (ref 5–10.5)
POTASSIUM REFLEX MAGNESIUM: 5.4 MMOL/L (ref 3.5–5.1)
PROTEIN UA: NEGATIVE MG/DL
RBC # BLD: 4.12 M/UL (ref 4–5.2)
SODIUM BLD-SCNC: 138 MMOL/L (ref 136–145)
SPECIFIC GRAVITY UA: <=1.005 (ref 1–1.03)
TOTAL PROTEIN: 6.1 G/DL (ref 6.4–8.2)
TROPONIN: <0.01 NG/ML
URINE REFLEX TO CULTURE: NORMAL
URINE TYPE: NORMAL
UROBILINOGEN, URINE: 0.2 E.U./DL
WBC # BLD: 5.9 K/UL (ref 4–11)

## 2022-09-18 PROCEDURE — 93005 ELECTROCARDIOGRAM TRACING: CPT | Performed by: EMERGENCY MEDICINE

## 2022-09-18 PROCEDURE — 80053 COMPREHEN METABOLIC PANEL: CPT

## 2022-09-18 PROCEDURE — G0378 HOSPITAL OBSERVATION PER HR: HCPCS

## 2022-09-18 PROCEDURE — 6360000004 HC RX CONTRAST MEDICATION: Performed by: EMERGENCY MEDICINE

## 2022-09-18 PROCEDURE — 99285 EMERGENCY DEPT VISIT HI MDM: CPT

## 2022-09-18 PROCEDURE — 2060000000 HC ICU INTERMEDIATE R&B

## 2022-09-18 PROCEDURE — 84484 ASSAY OF TROPONIN QUANT: CPT

## 2022-09-18 PROCEDURE — 85025 COMPLETE CBC W/AUTO DIFF WBC: CPT

## 2022-09-18 PROCEDURE — 81003 URINALYSIS AUTO W/O SCOPE: CPT

## 2022-09-18 PROCEDURE — 71045 X-RAY EXAM CHEST 1 VIEW: CPT

## 2022-09-18 PROCEDURE — 83690 ASSAY OF LIPASE: CPT

## 2022-09-18 PROCEDURE — 71260 CT THORAX DX C+: CPT | Performed by: EMERGENCY MEDICINE

## 2022-09-18 RX ORDER — LISINOPRIL 40 MG/1
40 TABLET ORAL EVERY EVENING
Status: DISCONTINUED | OUTPATIENT
Start: 2022-09-19 | End: 2022-09-18

## 2022-09-18 RX ORDER — SODIUM CHLORIDE 0.9 % (FLUSH) 0.9 %
5-40 SYRINGE (ML) INJECTION EVERY 12 HOURS SCHEDULED
Status: DISCONTINUED | OUTPATIENT
Start: 2022-09-18 | End: 2022-09-20 | Stop reason: HOSPADM

## 2022-09-18 RX ORDER — ASPIRIN 81 MG/1
81 TABLET ORAL DAILY
Status: DISCONTINUED | OUTPATIENT
Start: 2022-09-19 | End: 2022-09-20 | Stop reason: HOSPADM

## 2022-09-18 RX ORDER — AMLODIPINE BESYLATE 10 MG/1
10 TABLET ORAL NIGHTLY
Status: ON HOLD | COMMUNITY
End: 2022-09-20 | Stop reason: HOSPADM

## 2022-09-18 RX ORDER — INSULIN LISPRO 100 [IU]/ML
0-4 INJECTION, SOLUTION INTRAVENOUS; SUBCUTANEOUS
Status: DISCONTINUED | OUTPATIENT
Start: 2022-09-19 | End: 2022-09-20 | Stop reason: HOSPADM

## 2022-09-18 RX ORDER — ROSUVASTATIN CALCIUM 20 MG/1
40 TABLET, COATED ORAL EVERY EVENING
Status: DISCONTINUED | OUTPATIENT
Start: 2022-09-18 | End: 2022-09-20 | Stop reason: HOSPADM

## 2022-09-18 RX ORDER — ACETAMINOPHEN 325 MG/1
650 TABLET ORAL EVERY 6 HOURS PRN
Status: DISCONTINUED | OUTPATIENT
Start: 2022-09-18 | End: 2022-09-20 | Stop reason: HOSPADM

## 2022-09-18 RX ORDER — AMLODIPINE BESYLATE 5 MG/1
10 TABLET ORAL ONCE
Status: DISCONTINUED | OUTPATIENT
Start: 2022-09-18 | End: 2022-09-18

## 2022-09-18 RX ORDER — ONDANSETRON 4 MG/1
4 TABLET, ORALLY DISINTEGRATING ORAL EVERY 8 HOURS PRN
Status: DISCONTINUED | OUTPATIENT
Start: 2022-09-18 | End: 2022-09-20 | Stop reason: HOSPADM

## 2022-09-18 RX ORDER — LISINOPRIL 20 MG/1
40 TABLET ORAL ONCE
Status: DISCONTINUED | OUTPATIENT
Start: 2022-09-18 | End: 2022-09-18

## 2022-09-18 RX ORDER — DEXAMETHASONE 4 MG/1
40 TABLET ORAL SEE ADMIN INSTRUCTIONS
COMMUNITY

## 2022-09-18 RX ORDER — POLYETHYLENE GLYCOL 3350 17 G/17G
17 POWDER, FOR SOLUTION ORAL DAILY PRN
Status: DISCONTINUED | OUTPATIENT
Start: 2022-09-18 | End: 2022-09-20 | Stop reason: HOSPADM

## 2022-09-18 RX ORDER — DEXTROSE MONOHYDRATE 100 MG/ML
INJECTION, SOLUTION INTRAVENOUS CONTINUOUS PRN
Status: DISCONTINUED | OUTPATIENT
Start: 2022-09-18 | End: 2022-09-20 | Stop reason: HOSPADM

## 2022-09-18 RX ORDER — AMLODIPINE BESYLATE 10 MG/1
10 TABLET ORAL NIGHTLY
Status: DISCONTINUED | OUTPATIENT
Start: 2022-09-18 | End: 2022-09-19

## 2022-09-18 RX ORDER — INSULIN LISPRO 100 [IU]/ML
0-4 INJECTION, SOLUTION INTRAVENOUS; SUBCUTANEOUS NIGHTLY
Status: DISCONTINUED | OUTPATIENT
Start: 2022-09-18 | End: 2022-09-20 | Stop reason: HOSPADM

## 2022-09-18 RX ORDER — SODIUM CHLORIDE 9 MG/ML
INJECTION, SOLUTION INTRAVENOUS PRN
Status: DISCONTINUED | OUTPATIENT
Start: 2022-09-18 | End: 2022-09-20 | Stop reason: HOSPADM

## 2022-09-18 RX ORDER — LISINOPRIL 40 MG/1
40 TABLET ORAL EVERY EVENING
Status: DISCONTINUED | OUTPATIENT
Start: 2022-09-18 | End: 2022-09-20 | Stop reason: HOSPADM

## 2022-09-18 RX ORDER — ONDANSETRON 2 MG/ML
4 INJECTION INTRAMUSCULAR; INTRAVENOUS EVERY 6 HOURS PRN
Status: DISCONTINUED | OUTPATIENT
Start: 2022-09-18 | End: 2022-09-20 | Stop reason: HOSPADM

## 2022-09-18 RX ORDER — ACETAMINOPHEN 650 MG/1
650 SUPPOSITORY RECTAL EVERY 6 HOURS PRN
Status: DISCONTINUED | OUTPATIENT
Start: 2022-09-18 | End: 2022-09-20 | Stop reason: HOSPADM

## 2022-09-18 RX ORDER — VITAMIN B COMPLEX
1000 TABLET ORAL DAILY
Status: DISCONTINUED | OUTPATIENT
Start: 2022-09-19 | End: 2022-09-20 | Stop reason: HOSPADM

## 2022-09-18 RX ORDER — SODIUM CHLORIDE 0.9 % (FLUSH) 0.9 %
5-40 SYRINGE (ML) INJECTION PRN
Status: DISCONTINUED | OUTPATIENT
Start: 2022-09-18 | End: 2022-09-20 | Stop reason: HOSPADM

## 2022-09-18 RX ORDER — AMLODIPINE BESYLATE 10 MG/1
10 TABLET ORAL NIGHTLY
Status: DISCONTINUED | OUTPATIENT
Start: 2022-09-19 | End: 2022-09-18

## 2022-09-18 RX ADMIN — IOPAMIDOL 75 ML: 755 INJECTION, SOLUTION INTRAVENOUS at 17:24

## 2022-09-18 ASSESSMENT — PAIN DESCRIPTION - FREQUENCY: FREQUENCY: CONTINUOUS

## 2022-09-18 ASSESSMENT — PAIN - FUNCTIONAL ASSESSMENT
PAIN_FUNCTIONAL_ASSESSMENT: 0-10
PAIN_FUNCTIONAL_ASSESSMENT: ACTIVITIES ARE NOT PREVENTED

## 2022-09-18 ASSESSMENT — PAIN DESCRIPTION - ONSET: ONSET: ON-GOING

## 2022-09-18 ASSESSMENT — PAIN SCALES - GENERAL
PAINLEVEL_OUTOF10: 7
PAINLEVEL_OUTOF10: 3

## 2022-09-18 ASSESSMENT — PAIN DESCRIPTION - LOCATION: LOCATION: CHEST

## 2022-09-18 ASSESSMENT — PAIN DESCRIPTION - DESCRIPTORS: DESCRIPTORS: DISCOMFORT

## 2022-09-18 ASSESSMENT — PAIN DESCRIPTION - DIRECTION: RADIATING_TOWARDS: INTO BACK

## 2022-09-18 ASSESSMENT — LIFESTYLE VARIABLES: HOW OFTEN DO YOU HAVE A DRINK CONTAINING ALCOHOL: NEVER

## 2022-09-18 ASSESSMENT — PAIN DESCRIPTION - PAIN TYPE: TYPE: ACUTE PAIN

## 2022-09-18 ASSESSMENT — PAIN DESCRIPTION - ORIENTATION: ORIENTATION: MID

## 2022-09-18 NOTE — ED PROVIDER NOTES
CHIEF COMPLAINT  Chest Pain      HISTORY OF PRESENT ILLNESS  Emely Casey is a 74 y.o. female with a history of diabetes, hyperlipidemia, CAD status post CABG 2019 presenting for evaluation of chest pain. Patient states that she has had some pain in the left substernal region of her chest, upper abdomen for the past several hours. No specific shortness of breath. No new leg swelling. She does report that she has been compliant with anticoagulation. She has not had any cardiac issues ever since the bypass surgery. She does report history of multiple myeloma on chemotherapy. No other complaints, modifying factors or associated symptoms. I have reviewed the following from the nursing documentation. Past Medical History:   Diagnosis Date    Cancer ([facility]     Multiple myeloma    Diabetes mellitus ([facility]     Hyperlipidemia     Hypertension     MI (myocardial infarction) ([facility]      Past Surgical History:   Procedure Laterality Date    CORONARY ARTERY BYPASS GRAFT N/A 6/13/2019    CORONARY ARTERY BYPASS GRAFT X4, INTERNAL MAMMARY ARTERY AND SAPHENOUS VEIN GRAFT-TCP-BP performed by Urbano Adhikari MD at [address] [facility] ([facility])      partial for fibroids    INSERTION / REMOVAL / REPLACEMENT VENOUS ACCESS CATHETER N/A 1/24/2019    TRIFUSION CATHETER INSERTION LEFT SUBCLAVIAN performed by Claudeen Schlichter, MD at [address]       Family History   Problem Relation Age of Onset    Breast Cancer Neg Hx     Ovarian Cancer Neg Hx      Social History     Socioeconomic History    Marital status:       Spouse name: Not on file    Number of children: Not on file    Years of education: Not on file    Highest education level: Not on file   Occupational History    Not on file   Tobacco Use    Smoking status: Former     Packs/day: 0.50     Years: 20.00     Pack years: 10.00     Types: Cigarettes     Quit date: 11/22/2018     Years since quitting: 3.8    Smokeless tobacco: Never   Vaping Use Vaping Use: Former   Substance and Sexual Activity    Alcohol use: Yes     Comment: rare    Drug use: No    Sexual activity: Not on file   Other Topics Concern    Not on file   Social History Narrative    Not on file     Social Determinants of Health     Financial Resource Strain: Not on file   Food Insecurity: Not on file   Transportation Needs: Not on file   Physical Activity: Not on file   Stress: Not on file   Social Connections: Not on file   Intimate Partner Violence: Not on file   Housing Stability: Not on file     No current facility-administered medications for this encounter.      Current Outpatient Medications   Medication Sig Dispense Refill    clopidogrel (PLAVIX) 75 MG tablet TAKE 1 TABLET BY MOUTH DAILY 90 tablet 3    pomalidomide (POMALYST) 4 MG chemo capsule Take 4 mg by mouth      metoprolol tartrate (LOPRESSOR) 25 MG tablet TAKE 1 TABLET BY MOUTH TWICE DAILY 180 tablet 3    rosuvastatin (CRESTOR) 40 MG tablet Take 1 tablet by mouth every evening 90 tablet 3    lisinopril (PRINIVIL;ZESTRIL) 40 MG tablet Take 1 tablet by mouth daily 90 tablet 3    potassium chloride (KLOR-CON) 10 MEQ extended release tablet TAKE 1 TABLET BY MOUTH TWICE DAILY 180 tablet 3    sucralfate (CARAFATE) 1 GM tablet Take 1 tablet by mouth 4 times daily 120 tablet 3    dexamethasone (DECADRON) 4 MG tablet 07/31/2020 dexamethasone 4 MG Oral Tablet      07/31/2020  Active (Patient not taking: Reported on 4/3/2022)      famotidine (PEPCID) 20 MG tablet TK 1 T PO BID (Patient not taking: Reported on 12/6/2021)      montelukast (SINGULAIR) 10 MG tablet Take 10 mg by mouth nightly (Patient not taking: Reported on 12/6/2021)      ALBUTEROL IN Inhale into the lungs      pantoprazole (PROTONIX) 20 MG tablet Take 1 tablet by mouth every morning (before breakfast) (Patient not taking: Reported on 12/6/2021) 30 tablet 0    amLODIPine (NORVASC) 5 MG tablet Take 10 mg by mouth daily       acetaminophen (TYLENOL) 500 MG tablet Take 500 mg by mouth every 6 hours as needed for Pain      aspirin 81 MG EC tablet Take 1 tablet by mouth daily 30 tablet 3    zoledronic acid (ZOMETA) 4 MG/5ML injection Infuse 4 mg intravenously every 3 months       acyclovir (ZOVIRAX) 800 MG tablet Take 1 tablet by mouth 2 times daily 60 tablet 11    Cholecalciferol (VITAMIN D3) 1000 units CAPS Take 1 capsule by mouth 2 times daily       Calcium Carbonate-Vitamin D (CALCIUM-CARB 600 + D) 600-125 MG-UNIT TABS Take 1 tablet by mouth 2 times daily        Allergies   Allergen Reactions    Pcn [Penicillins]     Lipitor [Atorvastatin] Other (See Comments)     Leg cramping       REVIEW OF SYSTEMS  Positive and pertinent negatives as per HPI. All other systems were reviewed and are negative. PHYSICAL EXAM  BP (!) 123/58   Pulse (!) 45   Temp 97.9 °F (36.6 °C) (Oral)   Resp 12   Ht 5' 1\" (1.549 m)   Wt 189 lb 11.2 oz (86 kg)   SpO2 95%   BMI 35.84 kg/m²   GENERAL APPEARANCE: Awake and alert. Cooperative. HEAD: Normocephalic. Atraumatic. HEART: RRR. No harsh murmurs. Intact radial pulses 2+ bilaterally. Right chest wall port. Chest pain is not reproducible with palpation  LUNGS: Respirations unlabored without accessory muscle use. Speaking comfortably in full sentences. ABDOMEN: Soft. Non-distended. Non-tender. No guarding or rebound. EXTREMITIES: No peripheral edema. No acute deformities. SKIN: Warm and dry. No acute rashes. NEUROLOGICAL: Alert and oriented X 3. No focal deficits    LABS  I have reviewed all labs for this visit.    Results for orders placed or performed during the hospital encounter of 09/18/22   CBC with Diff   Result Value Ref Range    WBC 5.9 4.0 - 11.0 K/uL    RBC 4.12 4.00 - 5.20 M/uL    Hemoglobin 11.7 (L) 12.0 - 16.0 g/dL    Hematocrit 35.3 (L) 36.0 - 48.0 %    MCV 85.7 80.0 - 100.0 fL    MCH 28.4 26.0 - 34.0 pg    MCHC 33.2 31.0 - 36.0 g/dL    RDW 16.8 (H) 12.4 - 15.4 %    Platelets 646 016 - 898 K/uL    MPV 8.8 5.0 - 10.5 fL Neutrophils % 41.7 %    Lymphocytes % 20.5 %    Monocytes % 24.5 %    Eosinophils % 11.7 %    Basophils % 1.6 %    Neutrophils Absolute 2.5 1.7 - 7.7 K/uL    Lymphocytes Absolute 1.2 1.0 - 5.1 K/uL    Monocytes Absolute 1.5 (H) 0.0 - 1.3 K/uL    Eosinophils Absolute 0.7 (H) 0.0 - 0.6 K/uL    Basophils Absolute 0.1 0.0 - 0.2 K/uL   CMP w/ Reflex to MG   Result Value Ref Range    Sodium 138 136 - 145 mmol/L    Potassium reflex Magnesium 5.4 (H) 3.5 - 5.1 mmol/L    Chloride 107 99 - 110 mmol/L    CO2 20 (L) 21 - 32 mmol/L    Anion Gap 11 3 - 16    Glucose 124 (H) 70 - 99 mg/dL    BUN 13 7 - 20 mg/dL    Creatinine 0.9 0.6 - 1.2 mg/dL    GFR Non-African American >60 >60    GFR African American >60 >60    Calcium 8.7 8.3 - 10.6 mg/dL    Total Protein 6.1 (L) 6.4 - 8.2 g/dL    Albumin 3.6 3.4 - 5.0 g/dL    Albumin/Globulin Ratio 1.4 1.1 - 2.2    Total Bilirubin 0.3 0.0 - 1.0 mg/dL    Alkaline Phosphatase 50 40 - 129 U/L    ALT 11 10 - 40 U/L    AST 36 15 - 37 U/L   Lipase   Result Value Ref Range    Lipase 22.0 13.0 - 60.0 U/L   Urinalysis with Reflex to Culture    Specimen: Urine   Result Value Ref Range    Color, UA Yellow Straw/Yellow    Clarity, UA Clear Clear    Glucose, Ur Negative Negative mg/dL    Bilirubin Urine Negative Negative    Ketones, Urine Negative Negative mg/dL    Specific Gravity, UA <=1.005 1.005 - 1.030    Blood, Urine Negative Negative    pH, UA 6.5 5.0 - 8.0    Protein, UA Negative Negative mg/dL    Urobilinogen, Urine 0.2 <2.0 E.U./dL    Nitrite, Urine Negative Negative    Leukocyte Esterase, Urine Negative Negative    Microscopic Examination Not Indicated     Urine Type NotGiven     Urine Reflex to Culture Not Indicated    Troponin   Result Value Ref Range    Troponin <0.01 <0.01 ng/mL       EKG  The Ekg interpreted by myself in the emergency department in the absence of a cardiologist.  sinus bradycardia, rate=48  with a rate of 48  Axis is   Normal  QTc is  within an acceptable range  Intervals and Durations are unremarkable. No specific ST-T wave changes appreciated. No evidence of acute ischemia. No significant change from prior EKG dated 12/6/21      RADIOLOGY  X-RAYS:  I have reviewed radiologic plain film image(s). ALL OTHER NON-PLAIN FILM IMAGES SUCH AS CT, ULTRASOUND AND MRI HAVE BEEN READ BY THE RADIOLOGIST. CT CHEST PULMONARY EMBOLISM W CONTRAST   Final Result   Impression:       No pulmonary embolism or other acute cardiopulmonary abnormality. XR CHEST PORTABLE   Final Result   Impression:       Mild prominence of interstitial pulmonary markings without Kerley B lines. This may represent very mild interstitial edema. Questionable increased size of the cardiomediastinal silhouette. This is a completely nonspecific finding but is compatible with cardiomegaly. No results found. During the patient's ED course, the patient was given:  Medications   iopamidol (ISOVUE-370) 76 % injection 75 mL (75 mLs IntraVENous Given 9/18/22 1724)        ED COURSE/MDM  Patient seen and evaluated. Old records reviewed. Labs and imaging reviewed and results discussed with patient. 31-year-old female presenting for evaluation of chest pain. Patient arrives with mild hypertension. She is afebrile. No acute distress no respiratory distress. No significant lower extremity swelling. ED evaluation clued basic labs, cardiac panel, CT chest.  Because of her risk factors, chest pain that is not reproducible, she will likely require admission for further cardiac work-up. Initial troponin is negative. Mild hyperkalemia. Lipase is normal.  CT chest without acute cardiopulmonary abnormality. Because of patient's continued chest pain, cardiac history, is recommended for her to be admitted for further cardiac evaluation. Patient is agreeable with this plan. I spoke with Dr. Candice Quiroz who has accepted the patient for admission. CLINICAL IMPRESSION  1.  Chest pain, unspecified type    2. Hyperkalemia        Blood pressure (!) 123/58, pulse (!) 45, temperature 97.9 °F (36.6 °C), temperature source Oral, resp. rate 12, height 5' 1\" (1.549 m), weight 189 lb 11.2 oz (86 kg), SpO2 95 %. DISPOSITION  Cheko Araujo was admitted in stable condition. This chart was generated in part by using Dragon Dictation system and may contain errors related to that system including errors in grammar, punctuation, and spelling, as well as words and phrases that may be inappropriate. If there are any questions or concerns please feel free to contact the dictating provider for clarification.        Leverne Homans, MD  09/18/22 1946

## 2022-09-18 NOTE — PROGRESS NOTES
Pharmacy  Note  - Admission Medication History    List of aodnm-mc-rktcrqbag medications is complete. I reviewed Rx fill history via \"Complete Dispense Report\" in Epic, and spoke to patient on phone. Please call with any questions:    Mara Reyes  Pharm. D. BCPS  [phone] (main pharmacy)      Current Outpatient Medications   Medication Instructions    amLODIPine (NORVASC) 10 mg, Oral, Nightly    apixaban (ELIQUIS) 5 mg, Oral, 2 TIMES DAILY    aspirin 81 mg, Oral, DAILY    Calcium Carbonate-Vitamin D 600-125 MG-UNIT TABS 1 tablet, Oral, 2 TIMES DAILY    Cholecalciferol (VITAMIN D3) 1000 units CAPS 1 capsule, Oral, DAILY    dexamethasone (DECADRON) 40 mg, Oral, SEE ADMIN INSTRUCTIONS, Per current chemo regimen- taking 40 mg days 8, 15, and 22 of each 28-day chemo cycle. On 9/30/22 is to take 21 mg prior to chemo treatment (Elotuzumab)    lisinopril (PRINIVIL;ZESTRIL) 40 mg, Oral, DAILY    metoprolol tartrate (LOPRESSOR) 25 MG tablet TAKE 1 TABLET BY MOUTH TWICE DAILY    pomalidomide (POMALYST) 4 mg, Oral, EVERY EVENING, Daily on days 1-21 of 28-day cycle. Takes in evening.    9/18/22- per cycle, will take daily through 9/22/22, then 7 days off.    potassium chloride (KLOR-CON) 10 MEQ extended release tablet TAKE 1 TABLET BY MOUTH TWICE DAILY    rosuvastatin (CRESTOR) 40 mg, Oral, EVERY EVENING    sodium chloride 0.9 % SOLN 250 mL with elotuzumab 300 MG SOLR 1,725 mg 1,725 mg, IntraVENous, EVERY 28 DAYS    sodium chloride 0.9 % SOLN 250 mL with elotuzumab 300 MG SOLR 10 mg/kg 10 mg/kg, IntraVENous, ONCE, Chemo - every 28 days    zoledronic acid (ZOMETA) 4 mg, IntraVENous, EVERY 3 MONTHS

## 2022-09-19 LAB
ANION GAP SERPL CALCULATED.3IONS-SCNC: 9 MMOL/L (ref 3–16)
BUN BLDV-MCNC: 10 MG/DL (ref 7–20)
CALCIUM SERPL-MCNC: 8.3 MG/DL (ref 8.3–10.6)
CHLORIDE BLD-SCNC: 107 MMOL/L (ref 99–110)
CO2: 24 MMOL/L (ref 21–32)
CREAT SERPL-MCNC: 0.8 MG/DL (ref 0.6–1.2)
EKG ATRIAL RATE: 44 BPM
EKG ATRIAL RATE: 48 BPM
EKG DIAGNOSIS: NORMAL
EKG DIAGNOSIS: NORMAL
EKG P AXIS: 55 DEGREES
EKG P AXIS: 58 DEGREES
EKG P-R INTERVAL: 162 MS
EKG P-R INTERVAL: 172 MS
EKG Q-T INTERVAL: 464 MS
EKG Q-T INTERVAL: 478 MS
EKG QRS DURATION: 114 MS
EKG QRS DURATION: 116 MS
EKG QTC CALCULATION (BAZETT): 408 MS
EKG QTC CALCULATION (BAZETT): 414 MS
EKG R AXIS: -18 DEGREES
EKG R AXIS: -24 DEGREES
EKG T AXIS: 28 DEGREES
EKG T AXIS: 45 DEGREES
EKG VENTRICULAR RATE: 44 BPM
EKG VENTRICULAR RATE: 48 BPM
GFR AFRICAN AMERICAN: >60
GFR NON-AFRICAN AMERICAN: >60
GLUCOSE BLD-MCNC: 128 MG/DL (ref 70–99)
GLUCOSE BLD-MCNC: 134 MG/DL (ref 70–99)
GLUCOSE BLD-MCNC: 91 MG/DL (ref 70–99)
GLUCOSE BLD-MCNC: 92 MG/DL (ref 70–99)
GLUCOSE BLD-MCNC: 93 MG/DL (ref 70–99)
HCT VFR BLD CALC: 36.5 % (ref 36–48)
HEMOGLOBIN: 11.8 G/DL (ref 12–16)
MAGNESIUM: 2.4 MG/DL (ref 1.8–2.4)
MCH RBC QN AUTO: 28.5 PG (ref 26–34)
MCHC RBC AUTO-ENTMCNC: 32.4 G/DL (ref 31–36)
MCV RBC AUTO: 88.1 FL (ref 80–100)
PDW BLD-RTO: 16.3 % (ref 12.4–15.4)
PERFORMED ON: ABNORMAL
PERFORMED ON: NORMAL
PLATELET # BLD: 130 K/UL (ref 135–450)
PMV BLD AUTO: 8.9 FL (ref 5–10.5)
POTASSIUM REFLEX MAGNESIUM: 3.2 MMOL/L (ref 3.5–5.1)
RBC # BLD: 4.15 M/UL (ref 4–5.2)
SODIUM BLD-SCNC: 140 MMOL/L (ref 136–145)
TROPONIN: <0.01 NG/ML
WBC # BLD: 4.2 K/UL (ref 4–11)

## 2022-09-19 PROCEDURE — 93005 ELECTROCARDIOGRAM TRACING: CPT | Performed by: INTERNAL MEDICINE

## 2022-09-19 PROCEDURE — 2580000003 HC RX 258: Performed by: INTERNAL MEDICINE

## 2022-09-19 PROCEDURE — 84484 ASSAY OF TROPONIN QUANT: CPT

## 2022-09-19 PROCEDURE — 6370000000 HC RX 637 (ALT 250 FOR IP): Performed by: INTERNAL MEDICINE

## 2022-09-19 PROCEDURE — G0378 HOSPITAL OBSERVATION PER HR: HCPCS

## 2022-09-19 PROCEDURE — 83735 ASSAY OF MAGNESIUM: CPT

## 2022-09-19 PROCEDURE — 93010 ELECTROCARDIOGRAM REPORT: CPT | Performed by: INTERNAL MEDICINE

## 2022-09-19 PROCEDURE — 85027 COMPLETE CBC AUTOMATED: CPT

## 2022-09-19 PROCEDURE — 83036 HEMOGLOBIN GLYCOSYLATED A1C: CPT

## 2022-09-19 PROCEDURE — 2060000000 HC ICU INTERMEDIATE R&B

## 2022-09-19 PROCEDURE — 80048 BASIC METABOLIC PNL TOTAL CA: CPT

## 2022-09-19 RX ORDER — POTASSIUM CHLORIDE 20 MEQ/1
40 TABLET, EXTENDED RELEASE ORAL 2 TIMES DAILY WITH MEALS
Status: COMPLETED | OUTPATIENT
Start: 2022-09-19 | End: 2022-09-20

## 2022-09-19 RX ORDER — PANTOPRAZOLE SODIUM 40 MG/1
40 TABLET, DELAYED RELEASE ORAL
Status: DISCONTINUED | OUTPATIENT
Start: 2022-09-19 | End: 2022-09-20 | Stop reason: HOSPADM

## 2022-09-19 RX ADMIN — POTASSIUM CHLORIDE 40 MEQ: 1500 TABLET, EXTENDED RELEASE ORAL at 17:24

## 2022-09-19 RX ADMIN — LISINOPRIL 40 MG: 40 TABLET ORAL at 17:24

## 2022-09-19 RX ADMIN — SODIUM CHLORIDE, PRESERVATIVE FREE 10 ML: 5 INJECTION INTRAVENOUS at 00:05

## 2022-09-19 RX ADMIN — ROSUVASTATIN CALCIUM 40 MG: 20 TABLET, FILM COATED ORAL at 17:24

## 2022-09-19 RX ADMIN — APIXABAN 5 MG: 5 TABLET, FILM COATED ORAL at 20:42

## 2022-09-19 RX ADMIN — SODIUM CHLORIDE, PRESERVATIVE FREE 10 ML: 5 INJECTION INTRAVENOUS at 20:42

## 2022-09-19 RX ADMIN — SODIUM CHLORIDE, PRESERVATIVE FREE 10 ML: 5 INJECTION INTRAVENOUS at 09:55

## 2022-09-19 RX ADMIN — ROSUVASTATIN CALCIUM 40 MG: 20 TABLET, FILM COATED ORAL at 00:05

## 2022-09-19 RX ADMIN — Medication 1000 UNITS: at 09:55

## 2022-09-19 RX ADMIN — ASPIRIN 81 MG: 81 TABLET, COATED ORAL at 09:55

## 2022-09-19 ASSESSMENT — PAIN DESCRIPTION - ONSET: ONSET: ON-GOING

## 2022-09-19 ASSESSMENT — PAIN DESCRIPTION - DESCRIPTORS: DESCRIPTORS: ACHING

## 2022-09-19 ASSESSMENT — PAIN DESCRIPTION - ORIENTATION: ORIENTATION: RIGHT

## 2022-09-19 ASSESSMENT — PAIN DESCRIPTION - FREQUENCY: FREQUENCY: INTERMITTENT

## 2022-09-19 ASSESSMENT — PAIN SCALES - GENERAL
PAINLEVEL_OUTOF10: 0
PAINLEVEL_OUTOF10: 2

## 2022-09-19 ASSESSMENT — PAIN DESCRIPTION - PAIN TYPE: TYPE: ACUTE PAIN

## 2022-09-19 ASSESSMENT — PAIN DESCRIPTION - LOCATION: LOCATION: HEAD

## 2022-09-19 ASSESSMENT — PAIN - FUNCTIONAL ASSESSMENT: PAIN_FUNCTIONAL_ASSESSMENT: ACTIVITIES ARE NOT PREVENTED

## 2022-09-19 NOTE — PROGRESS NOTES
HOSPITAL MEDICINE  - PROGRESS NOTE    Admit Date: 9/18/2022         Interval History:    74 y.o. female ,hx of NSTEMI and CABG with quadruple bypass in 2019,Multiple myeloma on chemo who presented from [place] ED  with  chest pain. -left sided and substernal chest pain /upper abdominal pain for several hours prior to presenting to ED at [place]. No associated nausea, vomiting, shortness of breath, diaphoresis, palpitations. Denies any radiation of the pain. No identifiable triggering or alleviating factors. Currently patient is chest pain-free,similar to when she had her heart attack in 2019 and also has some indigestion          Subjective: No further chest pain. -feels her pain was sec to GI issues    Objective: afebrile    Diet: Diet NPO       Data:   Scheduled Meds: Reviewed  Continuous Infusions:   dextrose      sodium chloride         Intake/Output Summary (Last 24 hours) at 9/19/2022 1408  Last data filed at 9/19/2022 0942  Gross per 24 hour   Intake 240 ml   Output 600 ml   Net -360 ml     CBC:   Recent Labs     09/19/22  0345   WBC 4.2   HGB 11.8*   *     BMP:  Recent Labs     09/19/22  0345      K 3.2*      CO2 24   BUN 10   CREATININE 0.8   GLUCOSE 134*     ABGs:   Lab Results   Component Value Date/Time    PHART 7.427 06/15/2019 06:21 AM    PO2ART 64.2 06/15/2019 06:21 AM    FOR7UCY 36.4 06/15/2019 06:21 AM          Objective:   Vitals: BP (!) 147/67   Pulse (!) 47   Temp 98.2 °F (36.8 °C) (Oral)   Resp 24   Ht 5' 1\" (1.549 m)   Wt 186 lb 3.2 oz (84.5 kg)   SpO2 97%   BMI 35.18 kg/m²   General appearance: alert, appears stated age and cooperative  Skin: Skin color, texture, turgor normal.   HEENT: Head: Normocephalic, no lesions, without obvious abnormality.   Neck: no adenopathy, no carotid bruit, no JVD, supple, symmetrical, trachea midline, and thyroid not enlarged, symmetric, no tenderness/mass/nodules  Lungs: clear to auscultation bilaterally  Heart: regular rate and rhythm, S1, S2 normal, no murmur, click, rub or gallop  Abdomen: soft, non-tender; bowel sounds normal; no masses,  no organomegaly  Extremities: extremities normal, atraumatic, no cyanosis or edema    Neurologic: Mental status: Alert, oriented, thought content appropriate      Assessment & Plan:        #Chest pain, cardiac enzymes x 2-normal,   -EKG with sinus bradycardia (patient is on beta-blocker twice a day) with no ischemic changes  -ct DOAC,statin  Stress test in am    Sinus bradycardia  Hold metoprolol and amlodipine for now  TSH in am    GERD hx   Restart PPI      #CAD, status post quadruple bypass in 2019  -ct cardiac meds    #DM-2, does not take any medications. - Diet controlled   A1c and lipid panel  Monitor with am labs    #Essential hypertension  Hold metoprolol and amlodipine given bradycardia  Ct lisinopril    #Hx of VTE  -on Chronic anticoagulation   -resume Eliquis for prior DVT/PE    #Multiple myeloma, currently on chemotherapy  Ok to ct oral chemo inhouse-  on Pomalyst as prescribed for 21 days. #Obesity class II with BMI of 36  Dietary and lifestyle modification    #Hypokalemia-replace              DVT Prophylaxis: On Eliquis which is held until seen by cardiology  Diet: ADULT DIET; Regular; 3 carb choices (45 gm/meal); Low Fat/Low Chol/High Fiber/2 gm Na;  No Caffeine  Diet NPO  Code Status: Full Code    Disposition:stress test in am  Was not on schedule for today apparently    Justina Barrera MD

## 2022-09-19 NOTE — H&P
Hospital Medicine History & Physical      PCP: Jeromy Reeder MD    Date of Admission: 9/18/2022    Date of Service: Pt seen/examined on 9/18/2022 and Admitted to Inpatient with expected LOS greater than two midnights due to medical therapy. Chief Complaint: Chest pain      History Of Present Illness:    74 y.o. female who presents from [address] [place] [address] ED for further evaluation of chest pain. Patient has been experiencing left sided and substernal chest pain as well as upper abdominal pain for several hours prior to presenting to ED at [facility]. No associated nausea, vomiting, shortness of breath, diaphoresis, palpitations. Denies any radiation of the pain. No identifiable triggering or alleviating factors. Currently patient is chest pain-free. Patient states this is somewhat similar to when she had her heart attack in 2019 and also has some indigestion which resembles the pain  Has history of NSTEMI and CABG with quadruple bypass in 2019. Currently undergoing chemotherapy for multiple myeloma. Supposed to take her chemotherapy medications until next Thursday.     Last use of Eliquis for DVT/PE treatment 7:30 AM on 9/18/2022    Past Medical History:          Diagnosis Date    Cancer ([address]     Multiple myeloma    Diabetes mellitus ([facility]     Hyperlipidemia     Hypertension     MI (myocardial infarction) ([facility]        Past Surgical History:          Procedure Laterality Date    CORONARY ARTERY BYPASS GRAFT N/A 6/13/2019    CORONARY ARTERY BYPASS GRAFT X4, INTERNAL MAMMARY ARTERY AND SAPHENOUS VEIN GRAFT-TCP-BP performed by Endy Jean Baptiste MD at [facility] ([address] [place] [address])      partial for fibroids    INSERTION / REMOVAL / REPLACEMENT VENOUS ACCESS CATHETER N/A 1/24/2019    TRIFUSION CATHETER INSERTION LEFT SUBCLAVIAN performed by Robert Washington MD at [address] [place] [address]         Medications Prior to Admission:      Prior to Admission medications    Medication Sig Start Date End Date Taking? Authorizing Provider   amLODIPine (NORVASC) 10 MG tablet Take 10 mg by mouth at bedtime   Yes Historical Provider, MD   dexamethasone (DECADRON) 4 MG tablet Take 40 mg by mouth See Admin Instructions Per current chemo regimen- taking 40 mg days 8, 15, and 22 of each 28-day chemo cycle. On 9/30/22 is to take 21 mg prior to chemo treatment (Elotuzumab)   Yes Historical Provider, MD   sodium chloride 0.9 % SOLN 250 mL with elotuzumab 300 MG SOLR 10 mg/kg Infuse 10 mg/kg intravenously once Chemo - every 28 days   Yes Historical Provider, MD   sodium chloride 0.9 % SOLN 250 mL with elotuzumab 300 MG SOLR 1,725 mg Infuse 1,725 mg intravenously every 28 days   Yes Historical Provider, MD   apixaban (ELIQUIS) 5 MG TABS tablet Take 5 mg by mouth 2 times daily   Yes Historical Provider, MD   pomalidomide (POMALYST) 4 MG chemo capsule Take 4 mg by mouth every evening Daily on days 1-21 of 28-day cycle. Takes in evening.    9/18/22- per cycle, will take daily through 9/22/22, then 7 days off. 3/4/22   Historical Provider, MD   metoprolol tartrate (LOPRESSOR) 25 MG tablet TAKE 1 TABLET BY MOUTH TWICE DAILY 6/20/22   Willian Vargas MD   rosuvastatin (CRESTOR) 40 MG tablet Take 1 tablet by mouth every evening 6/20/22   Willian Vargas MD   lisinopril (PRINIVIL;ZESTRIL) 40 MG tablet Take 1 tablet by mouth daily  Patient taking differently: Take 40 mg by mouth every evening 12/6/21   Willian Vargas MD   potassium chloride (KLOR-CON) 10 MEQ extended release tablet TAKE 1 TABLET BY MOUTH TWICE DAILY 8/13/21   HE Andre - CNP   aspirin 81 MG EC tablet Take 1 tablet by mouth daily 6/19/19   HE Acosta CNP   zoledronic acid (ZOMETA) 4 MG/5ML injection Infuse 4 mg intravenously every 3 months     Historical Provider, MD   Cholecalciferol (VITAMIN D3) 1000 units CAPS Take 1 capsule by mouth daily    Historical Provider, MD   Calcium Carbonate-Vitamin D 600-125 MG-UNIT TABS Take 1 tablet by mouth 2 times daily  8/9/18   Historical Provider, MD       Allergies:  Pcn [penicillins] and Lipitor [atorvastatin]    Social History:      The patient currently lives at home    TOBACCO:   reports that she quit smoking about 3 years ago. Her smoking use included cigarettes. She has a 10.00 pack-year smoking history. She has never used smokeless tobacco.  ETOH:   reports current alcohol use. E-cigarette/Vaping       Questions Responses    E-cigarette/Vaping Use Former User    Start Date     Passive Exposure     Quit Date     Counseling Given     Comments               Family History:    Reviewed and negative in regards to presenting illness/complaint. Problem Relation Age of Onset    Breast Cancer Neg Hx     Ovarian Cancer Neg Hx        REVIEW OF SYSTEMS COMPLETED:   Pertinent positives as noted in the HPI. All other systems reviewed and negative. PHYSICAL EXAM PERFORMED:    BP (!) 140/56   Pulse (!) 45   Temp 97.9 °F (36.6 °C) (Oral)   Resp 12   Ht 5' 1\" (1.549 m)   Wt 189 lb 11.2 oz (86 kg)   SpO2 95%   BMI 35.84 kg/m²     General appearance:  No apparent distress, appears stated age and cooperative. Obese  HEENT:  Normal cephalic, atraumatic without obvious deformity. Pupils equal, round, and reactive to light. Extra ocular muscles intact. Conjunctivae/corneas clear. Neck: Supple, with full range of motion. No jugular venous distention. Trachea midline. Respiratory:  Normal respiratory effort. Clear to auscultation, bilaterally without Rales/Wheezes/Rhonchi. Cardiovascular:  Regular rate and rhythm with normal S1/S2 without murmurs, rubs or gallops. Has support in right upper chest  Abdomen: Soft, non-tender, non-distended with normal bowel sounds. Musculoskeletal:  No clubbing, cyanosis or edema bilaterally. Full range of motion without deformity. Skin: Skin color, texture, turgor normal.  No rashes or lesions. Neurologic:  Neurovascularly intact without any focal sensory/motor deficits.  Cranial nerves: II-XII intact, grossly non-focal.  Psychiatric:  Alert and oriented, thought content appropriate, normal insight  Capillary Refill: Brisk,3 seconds, normal  Peripheral Pulses: +2 palpable, equal bilaterally       Labs:     Recent Labs     09/18/22  1550   WBC 5.9   HGB 11.7*   HCT 35.3*        Recent Labs     09/18/22  1550      K 5.4*      CO2 20*   BUN 13   CREATININE 0.9   CALCIUM 8.7     Recent Labs     09/18/22  1550   AST 36   ALT 11   BILITOT 0.3   ALKPHOS 50     No results for input(s): INR in the last 72 hours. Recent Labs     09/18/22  1550   TROPONINI <0.01       Urinalysis:      Lab Results   Component Value Date/Time    NITRU Negative 09/18/2022 03:50 PM    WBCUA 6-9 07/14/2022 09:53 AM    BACTERIA 2+ 07/14/2022 09:53 AM    RBCUA 3-4 07/14/2022 09:53 AM    BLOODU Negative 09/18/2022 03:50 PM    SPECGRAV <=1.005 09/18/2022 03:50 PM    GLUCOSEU Negative 09/18/2022 03:50 PM       Radiology:     CXR: I have reviewed the CXR with the following interpretation: As below  EKG:  I have reviewed the EKG with the following interpretation: Sinus bradycardia, VR = 48, normal intervals, no acute ST-T changes    CT CHEST PULMONARY EMBOLISM W CONTRAST   Final Result   Impression:       No pulmonary embolism or other acute cardiopulmonary abnormality. XR CHEST PORTABLE   Final Result   Impression:       Mild prominence of interstitial pulmonary markings without Kerley B lines. This may represent very mild interstitial edema. Questionable increased size of the cardiomediastinal silhouette. This is a completely nonspecific finding but is compatible with cardiomegaly.           Consults:    IP CONSULT TO CARDIOLOGY    ASSESSMENT:    Active Hospital Problems    Diagnosis Date Noted    Chest pain [R07.9] 06/11/2019   #Chest pain, cardiac enzymes x1-normal, EKG with sinus bradycardia (patient is on beta-blocker twice a day) with no ischemic changes  #Known CAD, status post quadruple bypass in 2019  #DM-2, does not take any medications. 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  -Hold beta-blocker given sinus bradycardia in the range of low 40s  -Pain relief as ordered  -Continue on aspirin and statins. Beta-blocker held  -Cardiology consult, further work-up per cardiology recommendations  -Continue on home doses of antihypertensives  -Hold Eliquis for now until seen by cardiology, last dose at 7:30 AM today  -Continue on current insulin regimen  -Check A1c and lipid panel  -Monitor BMP  -Continue on Pomalyst as prescribed for 21 days. Patient is scheduled to take it until next Thursday  -Supportive therapy      DVT Prophylaxis: On Eliquis which is held until seen by cardiology  Diet: ADULT DIET; Regular; 3 carb choices (45 gm/meal); Low Fat/Low Chol/High Fiber/2 gm Na; No Caffeine  Diet NPO  Code Status: Full Code    PT/OT Eval Status: As tolerated    Dispo -GMF with telemetry       Silvana Casarez MD    Thank you Savannah Parkinson MD for the opportunity to be involved in this patient's care. If you have any questions or concerns please feel free to contact me at [phone].

## 2022-09-19 NOTE — PLAN OF CARE
Problem: Pain  Goal: Verbalizes/displays adequate comfort level or baseline comfort level  9/19/2022 1045 by Ebenezer Sánchez  Outcome: Progressing  9/19/2022 0056 by Tereso Gimenez RN  Outcome: Progressing   Pt denies any chest pain or discomfort.

## 2022-09-19 NOTE — PROGRESS NOTES
4 Eyes Admission Assessment     I agree as the admission nurse that 2 RN's have performed a thorough Head to Toe Skin Assessment on the patient. ALL assessment sites listed below have been assessed on admission. Areas assessed by both nurses: Talha Cordell   [x]   Head, Face, and Ears   [x]   Shoulders, Back, and Chest  [x]   Arms, Elbows, and Hands   [x]   Coccyx, Sacrum, and Ischium  [x]   Legs, Feet, and Heels        Does the Patient have Skin Breakdown?   No         Eddy Prevention initiated:  No   Wound Care Orders initiated:  No      [facility] nurse consulted for Pressure Injury (Stage 3,4, Unstageable, DTI, NWPT, and Complex wounds) or Eddy score 18 or lower:  No      Nurse 1 eSignature: Electronically signed by Kaya Zavala RN on 9/18/22 at 11:45 PM EDT    **SHARE this note so that the co-signing nurse is able to place an eSignature**    Nurse 2 eSignature: Electronically signed by Berta Deleon RN on 9/19/22 at 12:48 AM EDT

## 2022-09-19 NOTE — CARE COORDINATION
Case Management Assessment           Initial Evaluation                Date / Time of Evaluation: 9/19/2022 2:13 PM                 Assessment Completed by: KATIE Hunt    Patient Name: Graciela Adler     YOB: 1955  Diagnosis: Hyperkalemia [E87.5]  Chest pain, unspecified type [R07.9]  Chest pain [R07.9]     Date / Time: 9/18/2022  3:36 PM    Patient Admission Status: Inpatient    If patient is discharged prior to next notation, then this note serves as note for discharge by case management. Current PCP: Darion Callahan MD  Clinic Patient: No    Chart Reviewed: Yes  Patient/ Family Interviewed: Yes    Initial assessment completed at bedside with: patient    Hospitalization in the last 30 days: No    Emergency Contacts:  Extended Emergency Contact Information  Primary Emergency Contact: [address] Phone: [phone]  Relation: Child  Secondary Emergency Contact: [address] Phone: [phone]  Work Phone: [phone]  Mobile Phone: [phone]  Relation: Child    Advance Directives:   Code Status: [address] [facility] [address]: No      Financial  Payor: Shyann Veliz / Plan: Alanna 1978 / Product Type: *No Product type* /     Pre-cert required for SNF: Yes    Pharmacy    [place] [id number] #[id number] - [facility] [phone]  [address] [place] [id number]  Phone: [phone] Fax: [phone]      Potential assistance Purchasing Medications:    Does Patient want to participate in local refill/ meds to beds program?:      Meds To Beds General Rules:  1. Can ONLY be done Monday- Friday between 8:30am-5pm  2. Prescription(s) must be in pharmacy by 3pm to be filled same day  3. Copy of patient's insurance/ prescription drug card and patient face sheet must be sent along with the prescription(s)  4.  Cost of Rx cannot be added to pain, unspecified type [R07.9]  Chest pain [R07.9]    The Patient and/or patient representative Iron Irwin and her family were provided with a choice of provider and agrees with the discharge plan Yes    Freedom of choice list was provided with basic dialogue that supports the patient's individualized plan of care/goals and shares the quality data associated with the providers.  Yes    Care Transition patient: No    Herminia Garcia, [address] [place] [address]  Case Management Department  Ph: [phone]   Fax: [phone]

## 2022-09-19 NOTE — FLOWSHEET NOTE
09/18/22 2077   Assessment   Charting Type Admission   Psychosocial   Psychosocial (WDL) WDL   Neurological   Neuro (WDL) WDL   Level of Consciousness 0   Iván Coma Scale   Eye Opening 4   Best Verbal Response 5   Best Motor Response 6   Iván Coma Scale Score 15   HEENT (Head, Ears, Eyes, Nose, & Throat)   HEENT (WDL) X   Right Eye Glasses; Impaired vision   Left Eye Glasses; Impaired vision   Respiratory   Respiratory (WDL) WDL   Cardiac   Cardiac (WDL) X   Cardiac Regularity Regular   Heart Sounds S1, S2   Cardiac Rhythm Sinus scot   Cardiac Symptoms Chest pain   Cardiac Monitor   Cardiac/Telemetry Monitor On Bedside monitor in use   Genitourinary   Genitourinary (WDL) WDL   Peripheral Vascular   Peripheral Vascular (WDL) WDL   Dual Clinician Skin Assessment   Dual Skin Assessment (4 Eyes) WDL   Skin Integumentary    Skin Integumentary (WDL) WDL   Musculoskeletal   Musculoskeletal (WDL) WDL

## 2022-09-19 NOTE — CONSULTS
Cardiology Consultation/History and Physical                                                                  Pt Name: Matt Panchal  Age: 74 y.o. Sex: female  : 1955  Location: [facility]-    Referring Physician: Jem Cortez MD  Primary cardiologist: Dr Maria Guadalupe Bashir      Reason for Consult:     Reason for Consultation/Chief Complaint: chest discomfort    HPI:      Matt Panchal is a 74 y.o. female with a past medical history of CAD s/p CABG presents with chest discomfort. She reports episodes of chest discomfort. Symptoms described as GI discomfort, reflux similar to previous angina. No exertional component, no clear alleviating or aggravating factors. She was taken off of her reflux medications. Takes chemotherapy for multiple myeloma. Histories     Past Medical History:   has a past medical history of CAD (coronary artery disease), Cancer ([facility] Diabetes mellitus ([facility] Hyperlipidemia, Hypertension, and MI (myocardial infarction) ([facility] Surgical History:   has a past surgical history that includes Tubal ligation; Hysterectomy; INSERTION / REMOVAL / REPLACEMENT VENOUS ACCESS CATHETER (N/A, 2019); Coronary artery bypass graft (N/A, 2019); and Colonoscopy. Social History:   reports that she quit smoking about 3 years ago. Her smoking use included cigarettes. She has a 10.00 pack-year smoking history. She has never used smokeless tobacco. She reports current alcohol use. She reports that she does not use drugs. Family History:  No evidence for sudden cardiac death or premature CAD      Medications:       Home Medications  Were reviewed and are listed in nursing record. and/or listed below  Prior to Admission medications    Medication Sig Start Date End Date Taking?  Authorizing Provider   amLODIPine (NORVASC) 10 MG tablet Take 10 mg by mouth at bedtime   Yes Historical Provider, MD   dexamethasone (DECADRON) 4 MG tablet Take 40 mg by mouth See Admin Instructions Per current chemo regimen- taking 40 mg days 8, 15, and 22 of each 28-day chemo cycle. On 9/30/22 is to take 21 mg prior to chemo treatment (Elotuzumab)   Yes Historical Provider, MD   sodium chloride 0.9 % SOLN 250 mL with elotuzumab 300 MG SOLR 10 mg/kg Infuse 10 mg/kg intravenously once Chemo - every 28 days   Yes Historical Provider, MD   sodium chloride 0.9 % SOLN 250 mL with elotuzumab 300 MG SOLR 1,725 mg Infuse 1,725 mg intravenously every 28 days   Yes Historical Provider, MD   apixaban (ELIQUIS) 5 MG TABS tablet Take 5 mg by mouth 2 times daily   Yes Historical Provider, MD   pomalidomide (POMALYST) 4 MG chemo capsule Take 4 mg by mouth every evening Daily on days 1-21 of 28-day cycle. Takes in evening.    9/18/22- per cycle, will take daily through 9/22/22, then 7 days off. 3/4/22   Historical Provider, MD   metoprolol tartrate (LOPRESSOR) 25 MG tablet TAKE 1 TABLET BY MOUTH TWICE DAILY 6/20/22   Rosa Isela Grace MD   rosuvastatin (CRESTOR) 40 MG tablet Take 1 tablet by mouth every evening 6/20/22   Rosa Isela Grace MD   lisinopril (PRINIVIL;ZESTRIL) 40 MG tablet Take 1 tablet by mouth daily  Patient taking differently: Take 40 mg by mouth every evening 12/6/21   Rosa Isela Grace MD   potassium chloride (KLOR-CON) 10 MEQ extended release tablet TAKE 1 TABLET BY MOUTH TWICE DAILY 8/13/21   Lissett Pan APRN - CNP   aspirin 81 MG EC tablet Take 1 tablet by mouth daily 6/19/19   HE Michel - CNP   zoledronic acid (ZOMETA) 4 MG/5ML injection Infuse 4 mg intravenously every 3 months     Historical Provider, MD   Cholecalciferol (VITAMIN D3) 1000 units CAPS Take 1 capsule by mouth daily    Historical Provider, MD   Calcium Carbonate-Vitamin D 600-125 MG-UNIT TABS Take 1 tablet by mouth 2 times daily  8/9/18   Historical Provider, MD          Inpatient Medications:   [Held by provider] apixaban  5 mg Oral BID    aspirin  81 mg Oral Daily    Vitamin D  1,000 Units Oral Daily    rosuvastatin  40 mg Oral QPM    sodium chloride flush  5-40 mL IntraVENous 2 times per day    insulin lispro  0-4 Units SubCUTAneous TID     insulin lispro  0-4 Units SubCUTAneous Nightly    amLODIPine  10 mg Oral Nightly    lisinopril  40 mg Oral QPM    pomalidomide  4 mg Oral QPM       IV drips:   dextrose      sodium chloride         PRN:  glucose, dextrose bolus **OR** dextrose bolus, glucagon (rDNA), dextrose, sodium chloride flush, sodium chloride, ondansetron **OR** ondansetron, acetaminophen **OR** acetaminophen, polyethylene glycol    Allergy:     Pcn [penicillins] and Lipitor [atorvastatin]       Review of Systems:     All 12 point review of symptoms completed. Pertinent positives identified in the HPI, all other review of symptoms negative as below. CONSTITUTIONAL: No fatigue  SKIN: No rash or pruritis. EYES: No visual changes or diplopia. No scleral icterus. ENT: No Headaches, hearing loss or vertigo. No mouth sores or sore throat. CARDIOVASCULAR: see HPI   RESPIRATORY: See HPI . GASTROINTESTINAL: No N/V/D. No abdominal pain, appetite loss, blood in stools. GENITOURINARY: No dysuria, trouble voiding, or hematuria. MUSCULOSKELETAL:  No gait disturbance, weakness or joint complaints. NEUROLOGICAL: No headache, diplopia, change in muscle strength, numbness or tingling. No change in gait, balance, coordination, mood, affect, memory, mentation, behavior. ENDOCRINE: No excessive thirst, fluid intake, or urination. No tremor. HEMATOLOGIC: No abnormal bruising or bleeding. ALLERGY: No nasal congestion or hives.       Physical Examination:     Vitals:    09/19/22 0000 09/19/22 0415 09/19/22 0942 09/19/22 1223   BP: 138/69 114/66 139/72 (!) 147/67   Pulse: 51 58 (!) 46 (!) 47   Resp: 14 14 22 24   Temp: 97.9 °F (36.6 °C) 98.2 °F (36.8 °C) 98.2 °F (36.8 °C) 98.2 °F (36.8 °C)   TempSrc: Oral Oral Oral Oral   SpO2: 94% 96% 97%    Weight:  186 lb 3.2 oz (84.5 kg)     Height:           Wt Readings from Last 3 Encounters: 09/19/22 186 lb 3.2 oz (84.5 kg)   07/14/22 181 lb 1.6 oz (82.1 kg)   06/20/22 192 lb (87.1 kg)         General Appearance:  Alert, cooperative, no distress, appears stated age Appropriate weight   Head:  Normocephalic, without obvious abnormality, atraumatic   Eyes:  PERRL, conjunctiva/corneas clear EOM intact  Ears normal   Throat no lesions       Nose: Nares normal, no drainage or sinus tenderness   Throat: Lips, mucosa, and tongue normal   Neck: Supple, symmetrical, trachea midline, no adenopathy, thyroid: not enlarged, symmetric, no tenderness/mass/nodules, no carotid bruit. Lungs:   Respirations unlabored, clear to auscultation bilaterally, without any wheezes, rubs or ronchi. Chest Wall:  No tenderness or deformity   Heart:  Bradycardic, S1, S2 normal, there is no murmur, there is no rub or gallop, cannot assess jvd, no bilateral lower extremity edema   Abdomen:   Soft, non-tender, bowel sounds active all four quadrants,  no masses, no organomegaly       Extremities: Extremities normal, atraumatic, no cyanosis. Pulses: 2+ and symmetric   Skin: Skin color, texture, turgor normal, no rashes or lesions   Pysch: Normal mood and affect   Neurologic: Normal gross motor and sensory exam.  Cranial nerves intact        Labs:     Recent Labs     09/18/22  1550 09/19/22  0345    140   K 5.4* 3.2*   BUN 13 10   CREATININE 0.9 0.8    107   CO2 20* 24   GLUCOSE 124* 134*   CALCIUM 8.7 8.3   MG  --  2.40     Recent Labs     09/18/22  1550 09/19/22  0345   WBC 5.9 4.2   HGB 11.7* 11.8*   HCT 35.3* 36.5    130*   MCV 85.7 88.1     No results for input(s): CHOLTOT, TRIG, HDL, CHOLHDL, LDL in the last 72 hours. Invalid input(s): Ruslan Lobe  No results for input(s): PTT, INR in the last 72 hours. Invalid input(s): PT  Recent Labs     09/18/22  1550 09/19/22  0030   TROPONINI <0.01 <0.01     No results for input(s): BNP in the last 72 hours.   No results for input(s): TSH in the last 72 hours. No results for input(s): CHOL, HDL, LDLCALC, TRIG in the last 72 hours.]    Lab Results   Component Value Date    TROPONINI <0.01 09/19/2022         Imaging:     I have personally reviewed patient's ECG which shows sinus bradycardia, RBBB    Assessment / Plan:     Chest discomfort, atypical  Sinus bradycardia  CAD s/p CABG   Multiple Myeloma  Reflux  PE/DVT, on Eliquis    Obtain Lexiscan  Continue telemetry for now, consider assessment of chronotropic competence; Avoid AV abad blockers  Consider addition of PPI      I have personally reviewed the reports and images of labs, radiological studies, cardiac studies including ECG's and telemetry, current and old medical records. The note was completed using EMR and Dragon dictation system. Every effort was made to ensure accuracy; however, inadvertent computerized transcription errors may be present. All questions and concerns were addressed to the patient/family. Alternatives to my treatment were discussed. I would like to thank you for providing me the opportunity to participate in the care of your patient. If you have any questions, please do not hesitate to contact me.      Racheal Osborn MD, [facility] [address]  [address] [address]  Ph: [phone]  Fax: [phone]

## 2022-09-19 NOTE — PROGRESS NOTES
Paged Dr. Anjali Ramos    Pt admitted for CP from [address] [place] [address] her K+ resulted at 3.2. Any new orders?

## 2022-09-19 NOTE — PROGRESS NOTES
Patient admitted to room 3521 DA from [facility]. Patient oriented to room, call light, bed rails, phone, lights and bathroom. Bed locked, in lowest position, side rails up 2/4, call light within reach.

## 2022-09-20 VITALS
SYSTOLIC BLOOD PRESSURE: 126 MMHG | DIASTOLIC BLOOD PRESSURE: 71 MMHG | OXYGEN SATURATION: 93 % | WEIGHT: 185.41 LBS | BODY MASS INDEX: 35.01 KG/M2 | HEIGHT: 61 IN | RESPIRATION RATE: 18 BRPM | TEMPERATURE: 97.9 F | HEART RATE: 67 BPM

## 2022-09-20 LAB
ALBUMIN SERPL-MCNC: 3.9 G/DL (ref 3.4–5)
ANION GAP SERPL CALCULATED.3IONS-SCNC: 11 MMOL/L (ref 3–16)
BASOPHILS ABSOLUTE: 0 K/UL (ref 0–0.2)
BASOPHILS RELATIVE PERCENT: 1.1 %
BUN BLDV-MCNC: 9 MG/DL (ref 7–20)
CALCIUM SERPL-MCNC: 8.2 MG/DL (ref 8.3–10.6)
CHLORIDE BLD-SCNC: 108 MMOL/L (ref 99–110)
CO2: 21 MMOL/L (ref 21–32)
CREAT SERPL-MCNC: 0.9 MG/DL (ref 0.6–1.2)
EOSINOPHILS ABSOLUTE: 0.6 K/UL (ref 0–0.6)
EOSINOPHILS RELATIVE PERCENT: 14 %
ESTIMATED AVERAGE GLUCOSE: 145.6 MG/DL
GFR AFRICAN AMERICAN: >60
GFR NON-AFRICAN AMERICAN: >60
GLUCOSE BLD-MCNC: 97 MG/DL (ref 70–99)
HBA1C MFR BLD: 6.7 %
HCT VFR BLD CALC: 40.6 % (ref 36–48)
HEMOGLOBIN: 12.9 G/DL (ref 12–16)
LYMPHOCYTES ABSOLUTE: 0.9 K/UL (ref 1–5.1)
LYMPHOCYTES RELATIVE PERCENT: 21.4 %
MAGNESIUM: 2.5 MG/DL (ref 1.8–2.4)
MCH RBC QN AUTO: 28.1 PG (ref 26–34)
MCHC RBC AUTO-ENTMCNC: 31.9 G/DL (ref 31–36)
MCV RBC AUTO: 88.3 FL (ref 80–100)
MONOCYTES ABSOLUTE: 0.8 K/UL (ref 0–1.3)
MONOCYTES RELATIVE PERCENT: 19.8 %
NEUTROPHILS ABSOLUTE: 1.8 K/UL (ref 1.7–7.7)
NEUTROPHILS RELATIVE PERCENT: 43.7 %
PDW BLD-RTO: 16.5 % (ref 12.4–15.4)
PHOSPHORUS: 2.7 MG/DL (ref 2.5–4.9)
PLATELET # BLD: 135 K/UL (ref 135–450)
PMV BLD AUTO: 8.6 FL (ref 5–10.5)
POTASSIUM SERPL-SCNC: 4 MMOL/L (ref 3.5–5.1)
RBC # BLD: 4.6 M/UL (ref 4–5.2)
SODIUM BLD-SCNC: 140 MMOL/L (ref 136–145)
WBC # BLD: 4 K/UL (ref 4–11)

## 2022-09-20 PROCEDURE — G0378 HOSPITAL OBSERVATION PER HR: HCPCS

## 2022-09-20 PROCEDURE — 80069 RENAL FUNCTION PANEL: CPT

## 2022-09-20 PROCEDURE — 83735 ASSAY OF MAGNESIUM: CPT

## 2022-09-20 PROCEDURE — 6370000000 HC RX 637 (ALT 250 FOR IP): Performed by: INTERNAL MEDICINE

## 2022-09-20 PROCEDURE — 94761 N-INVAS EAR/PLS OXIMETRY MLT: CPT

## 2022-09-20 PROCEDURE — 84443 ASSAY THYROID STIM HORMONE: CPT

## 2022-09-20 PROCEDURE — 85025 COMPLETE CBC W/AUTO DIFF WBC: CPT

## 2022-09-20 PROCEDURE — 99233 SBSQ HOSP IP/OBS HIGH 50: CPT | Performed by: INTERNAL MEDICINE

## 2022-09-20 RX ORDER — PANTOPRAZOLE SODIUM 40 MG/1
40 TABLET, DELAYED RELEASE ORAL
Qty: 30 TABLET | Refills: 1 | Status: SHIPPED | OUTPATIENT
Start: 2022-09-21

## 2022-09-20 RX ORDER — HYDRALAZINE HYDROCHLORIDE 25 MG/1
25 TABLET, FILM COATED ORAL 3 TIMES DAILY
Qty: 90 TABLET | Refills: 0 | Status: SHIPPED | OUTPATIENT
Start: 2022-09-20 | End: 2022-09-28 | Stop reason: ALTCHOICE

## 2022-09-20 RX ADMIN — PANTOPRAZOLE SODIUM 40 MG: 40 TABLET, DELAYED RELEASE ORAL at 09:44

## 2022-09-20 RX ADMIN — APIXABAN 5 MG: 5 TABLET, FILM COATED ORAL at 09:41

## 2022-09-20 RX ADMIN — POTASSIUM CHLORIDE 40 MEQ: 1500 TABLET, EXTENDED RELEASE ORAL at 09:41

## 2022-09-20 RX ADMIN — ASPIRIN 81 MG: 81 TABLET, COATED ORAL at 09:41

## 2022-09-20 RX ADMIN — Medication 1000 UNITS: at 09:41

## 2022-09-20 ASSESSMENT — PAIN SCALES - GENERAL
PAINLEVEL_OUTOF10: 0

## 2022-09-20 NOTE — PLAN OF CARE
Problem: Pain  Goal: Verbalizes/displays adequate comfort level or baseline comfort level  9/19/2022 2022 by Carlos Spears RN  Outcome: Progressing  Pt denies pain. Will continue to assess pain on 0-10 scale for appropriate pain management. Problem: Safety - Adult  Goal: Free from fall injury  Outcome: Progressing   Fall precautions are in place: non-skid socks, call light is in reach, bed is locked and in lowest position. Remains free of falls.

## 2022-09-20 NOTE — PROGRESS NOTES
Scheduled evening medications administered, see MAR. Shift assessment WDL. Pt SB at rest, HR in 50's, low 60's while awake in bed. All other VS WDL. Blood sugar 93, no insulin coverage per MAR parameters. Pt declines snack at this time, aware to be NPO at midnight for stress test tomorrow. Pt is reporting R temple HA pain, rating 2/10. She states it is intermittent and declines tylenol at this time. Denies other needs at this time. Call light is in reach. Bed alarm is engaged.

## 2022-09-20 NOTE — PROGRESS NOTES
[facility] Daily Progress Note      Admit Date:  9/18/2022    CC:  chest pain on admission. Subjective:  Ms. Nic Poole denies any chest pain. Troponin negative x 2. Objective:   /67   Pulse 51   Temp 98.5 °F (36.9 °C) (Oral)   Resp 26   Ht 5' 1\" (1.549 m)   Wt 185 lb 6.5 oz (84.1 kg)   SpO2 93%   BMI 35.03 kg/m²     Intake/Output Summary (Last 24 hours) at 9/20/2022 1329  Last data filed at 9/20/2022 0600  Gross per 24 hour   Intake --   Output 1150 ml   Net -1150 ml       TELEMETRY: Sinus    Physical Exam:  General:  Awake, alert, NAD  Eyes:  EOMI PERRL anicteric  Skin:  Warm and dry. Neck:  JVP normal  Chest:  Diminshed. No Rales. Cardiovascular:  RRR, Normal S1S2. No Murmur. No Rub. No Gallops. Abdomen:  Soft NT  + bs  Extremities: No edema  Neuro:  CN II-XII intact. No focal deficits. No weakness. No lateralizing findings. Psych:  Normal thought and affect. MSK:  No Cyanosis nor Clubbing.   Symmetrical strength upper and lower extremities    Medications:    pantoprazole  40 mg Oral QAM AC    apixaban  5 mg Oral BID    aspirin  81 mg Oral Daily    Vitamin D  1,000 Units Oral Daily    rosuvastatin  40 mg Oral QPM    sodium chloride flush  5-40 mL IntraVENous 2 times per day    insulin lispro  0-4 Units SubCUTAneous TID WC    insulin lispro  0-4 Units SubCUTAneous Nightly    lisinopril  40 mg Oral QPM    pomalidomide  4 mg Oral QPM      dextrose      sodium chloride       glucose, dextrose bolus **OR** dextrose bolus, glucagon (rDNA), dextrose, sodium chloride flush, sodium chloride, ondansetron **OR** ondansetron, acetaminophen **OR** acetaminophen, polyethylene glycol    Lab Data:  CBC:   Recent Labs     09/18/22  1550 09/19/22  0345 09/20/22  0435   WBC 5.9 4.2 4.0   HGB 11.7* 11.8* 12.9   HCT 35.3* 36.5 40.6   MCV 85.7 88.1 88.3    130* 135     BMP:   Recent Labs     09/18/22  1550 09/19/22  0345 09/20/22  0434    140 140   K 5.4* 3.2* 4.0    107 108 CO2 20* 24 21   PHOS  --   --  2.7   BUN 13 10 9   CREATININE 0.9 0.8 0.9     LIVER PROFILE:   Recent Labs     09/18/22  1550   AST 36   ALT 11   LIPASE 22.0   BILITOT 0.3   ALKPHOS 50       Assessment:  Patient Active Problem List    Diagnosis Date Noted    S/P coronary artery bypass graft x 4     HTN (hypertension) 06/12/2019    Hyperlipidemia 06/12/2019    Type 2 diabetes mellitus ([email or web address] 06/12/2019    NSTEMI (non-ST elevated myocardial infarction) ([email or web address] 06/11/2019    Chest pain 06/11/2019    Multiple myeloma in remission ([email or web address] 02/05/2019    Multiple myeloma not having achieved remission ([email or web address]        Plan:  CAD:  negative troponin. [id number] for discharge.   S/p CABG:  will arrange for outpatient stress test    Core Measures:  Discharge instructions:   LVEF documented:   ACEI for LV dysfunction:   Smoking Cessation:    Maryuri Castaneda MD, MD 9/20/2022 1:29 PM

## 2022-09-20 NOTE — DISCHARGE SUMMARY
Patient ID: Isiah Robison      Patient's PCP: Margoth Marvin MD    Admit Date: 9/18/2022   Discharge Date: 9/20/2022  Admitting Physician: Frank Bernabe MD  Discharge Physician: Natasha Hay MD ,MD  Discharge Diagnoses:     #Chest pain, cardiac enzymes x 2-normal, no further chest pain  -EKG with sinus bradycardia (patient is on beta-blocker twice a day) with no ischemic changes  -ct DOAC,statin  Stress test  as outpt as pt would rather not stay till later today as planned     Sinus bradycardia  Hold metoprolol and amlodipine for now  TSH  pending at discharge-pls follow up. GERD hx   Restart PPI       #CAD, status post quadruple bypass in 2019  -ct cardiac meds    #DM-2, does not take any medications. - Diet controlled      #Essential hypertension  Hold metoprolol and amlodipine given bradycardia  Ct lisinopril  -Added hydralazine if systolic [id number] and above    #Hx of VTE  -on Chronic anticoagulation   -resume Eliquis for prior DVT/PE    #Multiple myeloma, currently on chemotherapy  Ok to ct oral chemo inhouse-  on Pomalyst as prescribed for 21 days. #Obesity class II with BMI of 36  Dietary and lifestyle modification     #Hypokalemia-replaced  Told to discuss ongoing K supplement with Oncology      Hospital Course:      74 y.o. female ,hx of NSTEMI and CABG with quadruple bypass in 2019,Multiple myeloma on chemo who presented from [place] ED  with  chest pain. -left sided and substernal chest pain /upper abdominal pain for several hours prior to presenting to ED at [place]. No associated nausea, vomiting, shortness of breath, diaphoresis, palpitations.   Currently patient is chest pain-free   she was admitted and treated as above prior to dc home with no re-occurrence of chest pain    Physical Exam:  /67   Pulse 51   Temp 98.5 °F (36.9 °C) (Oral)   Resp 26   Ht 5' 1\" (1.549 m)   Wt 185 lb 6.5 oz (84.1 kg)   SpO2 93%   BMI 35.03 kg/m²   General appearance: alert, appears stated age and cooperative  Head: Normocephalic, without obvious abnormality, atraumatic  Eyes: conjunctivae/corneas clear. PERRL, EOM's intact. Ears: External ears -normal  Nose: No drainage or sinus tenderness.   Throat: lips, mucosa, and tongue normal; teeth and gums normal  Neck: no adenopathy, no carotid bruit, no JVD, supple, symmetrical, trachea midline and thyroid not enlarged, symmetric, no tenderness/mass/nodules  Lungs: clear to auscultation bilaterally  Heart: regular rate and rhythm, S1, S2 normal, no murmur,   Abdomen: soft, non-tender; bowel sounds normal; no masses,  no organomegaly  Extremities: extremities normal, atraumatic, no cyanosis or edema  Neurologic: Grossly normal    Consults: cardiology  Significant Diagnostic Studies:  chest x-ray  Treatments: IV hydration  Disposition: home  Discharged Condition: Stable  Follow Up: Primary Care Physician in one week  Discharge Medications:     Medication List        START taking these medications      hydrALAZINE 25 MG tablet  Commonly known as: APRESOLINE  Take 1 tablet by mouth 3 times daily If systolic BP GREATER THAN [id number]     pantoprazole 40 MG tablet  Commonly known as: PROTONIX  Take 1 tablet by mouth every morning (before breakfast)  Start taking on: September 21, 2022            CHANGE how you take these medications      lisinopril 40 MG tablet  Commonly known as: PRINIVIL;ZESTRIL  Take 1 tablet by mouth daily  What changed: when to take this            CONTINUE taking these medications      aspirin 81 MG EC tablet  Take 1 tablet by mouth daily     Calcium Carbonate-Vitamin D 600-125 MG-UNIT Tabs     dexamethasone 4 MG tablet  Commonly known as: DECADRON     Eliquis 5 MG Tabs tablet  Generic drug: apixaban     pomalidomide 4 MG chemo capsule  Commonly known as: POMALYST     potassium chloride 10 MEQ extended release tablet  Commonly known as: KLOR-CON  TAKE 1 TABLET BY MOUTH TWICE DAILY     rosuvastatin 40 MG tablet  Commonly known as: CRESTOR  Take 1 tablet by mouth every evening     * sodium chloride 0.9 % SOLN 250 mL with elotuzumab 300 MG SOLR 10 mg/kg     * sodium chloride 0.9 % SOLN 250 mL with elotuzumab 300 MG SOLR 1,725 mg     Vitamin D3 25 MCG (1000 UT) Caps     zoledronic acid 4 MG/5ML injection  Commonly known as: ZOMETA           * This list has 2 medication(s) that are the same as other medications prescribed for you. Read the directions carefully, and ask your doctor or other care provider to review them with you.                 STOP taking these medications      amLODIPine 10 MG tablet  Commonly known as: NORVASC     metoprolol tartrate 25 MG tablet  Commonly known as: LOPRESSOR               Where to Get Your Medications        These medications were sent to [address] [facility] [phone]  [address], [address] [address]      Phone: [phone]   hydrALAZINE 25 MG tablet  pantoprazole 40 MG tablet        Activity: activity as tolerated  Diet: cardiac dietWound Care: none needed  Time Spent on discharge is more than 30 minutes discussing plan of care and discharge medications with patient and nursing staff    Signed:  MD DAKOTA  Hospitalist Service     9/20/2022

## 2022-09-21 ENCOUNTER — TELEPHONE (OUTPATIENT)
Dept: CARDIOLOGY CLINIC | Age: 67
End: 2022-09-21

## 2022-09-21 DIAGNOSIS — R07.9 CHEST PAIN, UNSPECIFIED TYPE: Primary | ICD-10-CM

## 2022-09-21 LAB — TSH REFLEX: 1.97 UIU/ML (ref 0.27–4.2)

## 2022-09-21 NOTE — PROGRESS NOTES
Physician Progress Note      Lux Dickey  CSN #:                  [id number]  :                       1955  ADMIT DATE:       2022 3:36 PM  Salvatore Boyle DATE:        2022 2:11 PM  RESPONDING  PROVIDER #:        Little Arnold MD          QUERY TEXT:    Pt admitted with chest pain. Pt noted to have GERD, CAD s/p CABG in 2019. Per cardiology consult : Symptoms described as GI discomfort, reflux   similar to previous anginaIf possible, please document in progress notes and   discharge summary if you are evaluating and/or treating any of the following: The medical record reflects the following:  Risk Factors: 74 y.o. female with CAD, s/p CABG (2019), GERD, DM, Multiple   Myeloma  Clinical Indicators: presented with chest pain  Treatment: cardiology consult, monitor  Options provided:  -- Chest pain likely due to GERD  -- Chest pain likely due to CAD  -- Chest pain likely due to costochondritis  -- Chest pain due to, Please document suspected etiology. -- Other - I will add my own diagnosis  -- Disagree - Not applicable / Not valid  -- Disagree - Clinically unable to determine / Unknown  -- Refer to Clinical Documentation Reviewer    PROVIDER RESPONSE TEXT:    This patient has chest pain likely due to GERD.     Query created by: Brianna Montes on 2022 2:47 PM      Electronically signed by:  Little Arnold MD 2022 8:51 PM

## 2022-09-21 NOTE — TELEPHONE ENCOUNTER
Patient is calling in to schedule her Lexiscan, but it has been discontinued. Need new orders placed. Please advise.  [phone]

## 2022-09-28 ENCOUNTER — TELEPHONE (OUTPATIENT)
Dept: CARDIOLOGY CLINIC | Age: 67
End: 2022-09-28

## 2022-09-28 RX ORDER — AMLODIPINE BESYLATE 10 MG/1
10 TABLET ORAL DAILY
Qty: 90 TABLET | Refills: 1 | Status: SHIPPED | OUTPATIENT
Start: 2022-09-28

## 2022-09-28 NOTE — TELEPHONE ENCOUNTER
Pt states that the Hydralazine is not controlling her blood pressures and she would like to go back to taking the Norvasc 10 mg Daily.   Will address with Dr Jenelle Rosales

## 2022-09-28 NOTE — TELEPHONE ENCOUNTER
Patient calling regarding her BP numbers. Patient states her medication was changed from amolodipine to hydraziline and it doesn't seem to being working. Her BP this morning was 168/104.  Please reach patient on number listed

## 2022-09-28 NOTE — TELEPHONE ENCOUNTER
Spoke with pt and per Dr Timoteo Toribio, pt may restart Norvasc 10 mg. Discontinue the Hydralazine.   New script sent to pharmacy

## 2022-10-03 ENCOUNTER — HOSPITAL ENCOUNTER (OUTPATIENT)
Dept: NON INVASIVE DIAGNOSTICS | Age: 67
Discharge: HOME | End: 2022-10-03
Payer: MEDICARE

## 2022-10-03 DIAGNOSIS — R07.9 CHEST PAIN, UNSPECIFIED TYPE: ICD-10-CM

## 2022-10-03 LAB
LV EF: 62 %
LVEF MODALITY: NORMAL

## 2022-10-03 PROCEDURE — 6360000002 HC RX W HCPCS: Performed by: INTERNAL MEDICINE

## 2022-10-03 PROCEDURE — A9502 TC99M TETROFOSMIN: HCPCS | Performed by: INTERNAL MEDICINE

## 2022-10-03 PROCEDURE — 93017 CV STRESS TEST TRACING ONLY: CPT

## 2022-10-03 PROCEDURE — 3430000000 HC RX DIAGNOSTIC RADIOPHARMACEUTICAL: Performed by: INTERNAL MEDICINE

## 2022-10-03 PROCEDURE — 78452 HT MUSCLE IMAGE SPECT MULT: CPT

## 2022-10-03 RX ADMIN — TETROFOSMIN 30 MILLICURIE: 1.38 INJECTION, POWDER, LYOPHILIZED, FOR SOLUTION INTRAVENOUS at 09:29

## 2022-10-03 RX ADMIN — TETROFOSMIN 10 MILLICURIE: 1.38 INJECTION, POWDER, LYOPHILIZED, FOR SOLUTION INTRAVENOUS at 08:28

## 2022-10-03 RX ADMIN — REGADENOSON 0.4 MG: 0.08 INJECTION, SOLUTION INTRAVENOUS at 09:30

## 2022-10-06 ENCOUNTER — TELEPHONE (OUTPATIENT)
Dept: CARDIOLOGY CLINIC | Age: 67
End: 2022-10-06

## 2022-10-07 ENCOUNTER — PREP FOR PROCEDURE (OUTPATIENT)
Dept: CARDIOLOGY CLINIC | Age: 67
End: 2022-10-07

## 2022-10-07 DIAGNOSIS — R94.39 ABNORMAL STRESS TEST: Primary | ICD-10-CM

## 2022-10-07 RX ORDER — SODIUM CHLORIDE 0.9 % (FLUSH) 0.9 %
5-40 SYRINGE (ML) INJECTION EVERY 12 HOURS SCHEDULED
OUTPATIENT
Start: 2022-10-07

## 2022-10-07 RX ORDER — SODIUM CHLORIDE 9 MG/ML
INJECTION, SOLUTION INTRAVENOUS PRN
OUTPATIENT
Start: 2022-10-07

## 2022-10-07 RX ORDER — ASPIRIN 325 MG
325 TABLET ORAL ONCE
OUTPATIENT
Start: 2022-10-18

## 2022-10-07 RX ORDER — SODIUM CHLORIDE 0.9 % (FLUSH) 0.9 %
5-40 SYRINGE (ML) INJECTION PRN
OUTPATIENT
Start: 2022-10-07

## 2022-10-07 NOTE — PROGRESS NOTES
Left Heart Catheterization    A left heart catheterization is a procedure that provides your cardiologist with detailed information regarding how your heart functions. A small catheter (long, fine tube) is inserted into an artery (a vessel that carries blood and oxygen) that leads to your heart. While watching with x-ray equipment, small amounts of dye are injected which enables visualization of the heart arteries and chambers. The pictures that your cardiologist receives from the cardiac catheterization enable him or her to decide on the best treatment for you. Date of the procedure:  10/18/22    Time of arrival: 0930    Cardiologist performing the procedure: ___Dr Webster_______________________________    Instructions for your left heart catheterization:    1. Bring a list of your medications to the hospital.    2.  Please notify us before the procedure if you are allergic to anything; especially x-ray contrast dye, iodine, nickel, or any type of jewelry. This is very important! 3. Do not eat or drink anything at all after midnight (or 8 hours) prior to the procedure. 4.  Take all morning medications EXCEPT any diuretics (water pills) the day of the procedure with a small sip of water. 5.  If you are on Coumadin, Warfarin, or Leandro Charm, please notify us so that we can make adjustments to your medication. 6.  If you are taking Xarelto, Eliquis, or Pradaxa, please stop staking these medications two days prior to the procedure (including the day of the procedure). 7.  If you are diabetic, check your blood sugar in the morning. If your blood sugar is 120 or less, do not take insulin. If your blood sugar is more than 120, take half the dose of your normal insulin. Do not take Metformin the night before your procedure or morning of the procedure. 8.  You MUST have someone to drive you home--no driving for 24 hours after your procedure.   If an intervention is performed, you might stay overnight in the hospital.    9.  Discharge instructions will be given to you at the time of your procedure. 10.  For any questions or if you cannot keep this appointment for any reason, please call [phone]. Spoke with pt regarding procedure. Pre-op instructions, time and location of arrival discussed Pt verbalized understanding and all questions answered at this time.

## 2022-10-10 ENCOUNTER — APPOINTMENT (OUTPATIENT)
Dept: GENERAL RADIOLOGY | Age: 67
DRG: 194 | End: 2022-10-10
Payer: MEDICARE

## 2022-10-10 ENCOUNTER — HOSPITAL ENCOUNTER (INPATIENT)
Age: 67
LOS: 2 days | Discharge: HOME | DRG: 194 | End: 2022-10-12
Attending: EMERGENCY MEDICINE | Admitting: INTERNAL MEDICINE
Payer: MEDICARE

## 2022-10-10 DIAGNOSIS — J96.01 ACUTE RESPIRATORY FAILURE WITH HYPOXIA (HCC): Primary | ICD-10-CM

## 2022-10-10 DIAGNOSIS — J11.1 INFLUENZA WITH RESPIRATORY MANIFESTATION OTHER THAN PNEUMONIA: ICD-10-CM

## 2022-10-10 PROBLEM — J10.1 INFLUENZA A: Status: ACTIVE | Noted: 2022-10-10

## 2022-10-10 LAB
ANION GAP SERPL CALCULATED.3IONS-SCNC: 10 MMOL/L (ref 3–16)
BASOPHILS ABSOLUTE: 0 K/UL (ref 0–0.2)
BASOPHILS RELATIVE PERCENT: 0 %
BUN BLDV-MCNC: 10 MG/DL (ref 7–20)
CALCIUM SERPL-MCNC: 9.2 MG/DL (ref 8.3–10.6)
CHLORIDE BLD-SCNC: 102 MMOL/L (ref 99–110)
CO2: 24 MMOL/L (ref 21–32)
CREAT SERPL-MCNC: 0.7 MG/DL (ref 0.6–1.2)
EKG ATRIAL RATE: 93 BPM
EKG DIAGNOSIS: NORMAL
EKG P AXIS: 74 DEGREES
EKG P-R INTERVAL: 152 MS
EKG Q-T INTERVAL: 340 MS
EKG QRS DURATION: 100 MS
EKG QTC CALCULATION (BAZETT): 422 MS
EKG R AXIS: -61 DEGREES
EKG T AXIS: 54 DEGREES
EKG VENTRICULAR RATE: 93 BPM
EOSINOPHILS ABSOLUTE: 0.2 K/UL (ref 0–0.6)
EOSINOPHILS RELATIVE PERCENT: 2 %
GFR AFRICAN AMERICAN: >60
GFR NON-AFRICAN AMERICAN: >60
GLUCOSE BLD-MCNC: 112 MG/DL (ref 70–99)
HCT VFR BLD CALC: 41.3 % (ref 36–48)
HEMOGLOBIN: 13.4 G/DL (ref 12–16)
INFLUENZA A: DETECTED
INFLUENZA B: NOT DETECTED
LACTIC ACID: 1 MMOL/L (ref 0.4–2)
LYMPHOCYTES ABSOLUTE: 0.6 K/UL (ref 1–5.1)
LYMPHOCYTES RELATIVE PERCENT: 7 %
MAGNESIUM: 1.8 MG/DL (ref 1.8–2.4)
MCH RBC QN AUTO: 28.2 PG (ref 26–34)
MCHC RBC AUTO-ENTMCNC: 32.5 G/DL (ref 31–36)
MCV RBC AUTO: 86.8 FL (ref 80–100)
MONOCYTES ABSOLUTE: 0.5 K/UL (ref 0–1.3)
MONOCYTES RELATIVE PERCENT: 6 %
NEUTROPHILS ABSOLUTE: 6.9 K/UL (ref 1.7–7.7)
NEUTROPHILS RELATIVE PERCENT: 85 %
PDW BLD-RTO: 16.1 % (ref 12.4–15.4)
PHOSPHORUS: 2.8 MG/DL (ref 2.5–4.9)
PLATELET # BLD: 175 K/UL (ref 135–450)
PMV BLD AUTO: 8.7 FL (ref 5–10.5)
POTASSIUM SERPL-SCNC: 3.8 MMOL/L (ref 3.5–5.1)
PRO-BNP: 190 PG/ML (ref 0–124)
RBC # BLD: 4.76 M/UL (ref 4–5.2)
SARS-COV-2 RNA, RT PCR: NOT DETECTED
SODIUM BLD-SCNC: 136 MMOL/L (ref 136–145)
TROPONIN: <0.01 NG/ML
WBC # BLD: 8.1 K/UL (ref 4–11)

## 2022-10-10 PROCEDURE — 94761 N-INVAS EAR/PLS OXIMETRY MLT: CPT

## 2022-10-10 PROCEDURE — 36415 COLL VENOUS BLD VENIPUNCTURE: CPT

## 2022-10-10 PROCEDURE — 87040 BLOOD CULTURE FOR BACTERIA: CPT

## 2022-10-10 PROCEDURE — 83605 ASSAY OF LACTIC ACID: CPT

## 2022-10-10 PROCEDURE — 87636 SARSCOV2 & INF A&B AMP PRB: CPT

## 2022-10-10 PROCEDURE — 2700000000 HC OXYGEN THERAPY PER DAY

## 2022-10-10 PROCEDURE — 84100 ASSAY OF PHOSPHORUS: CPT

## 2022-10-10 PROCEDURE — 83735 ASSAY OF MAGNESIUM: CPT

## 2022-10-10 PROCEDURE — 80048 BASIC METABOLIC PNL TOTAL CA: CPT

## 2022-10-10 PROCEDURE — 94640 AIRWAY INHALATION TREATMENT: CPT

## 2022-10-10 PROCEDURE — 99285 EMERGENCY DEPT VISIT HI MDM: CPT

## 2022-10-10 PROCEDURE — 6370000000 HC RX 637 (ALT 250 FOR IP): Performed by: EMERGENCY MEDICINE

## 2022-10-10 PROCEDURE — 84484 ASSAY OF TROPONIN QUANT: CPT

## 2022-10-10 PROCEDURE — 85025 COMPLETE CBC W/AUTO DIFF WBC: CPT

## 2022-10-10 PROCEDURE — 71045 X-RAY EXAM CHEST 1 VIEW: CPT

## 2022-10-10 PROCEDURE — 93005 ELECTROCARDIOGRAM TRACING: CPT | Performed by: EMERGENCY MEDICINE

## 2022-10-10 PROCEDURE — 1200000000 HC SEMI PRIVATE

## 2022-10-10 PROCEDURE — 83880 ASSAY OF NATRIURETIC PEPTIDE: CPT

## 2022-10-10 RX ORDER — IPRATROPIUM BROMIDE AND ALBUTEROL SULFATE 2.5; .5 MG/3ML; MG/3ML
1 SOLUTION RESPIRATORY (INHALATION) ONCE
Status: COMPLETED | OUTPATIENT
Start: 2022-10-10 | End: 2022-10-10

## 2022-10-10 RX ORDER — OSELTAMIVIR PHOSPHATE 75 MG/1
75 CAPSULE ORAL ONCE
Status: COMPLETED | OUTPATIENT
Start: 2022-10-10 | End: 2022-10-10

## 2022-10-10 RX ADMIN — IPRATROPIUM BROMIDE AND ALBUTEROL SULFATE 1 AMPULE: .5; 3 SOLUTION RESPIRATORY (INHALATION) at 20:16

## 2022-10-10 RX ADMIN — OSELTAMIVIR PHOSPHATE 75 MG: 75 CAPSULE ORAL at 21:53

## 2022-10-10 NOTE — ED PROVIDER NOTES
[address]          ATTENDING PHYSICIAN NOTE       Date of evaluation: 10/10/2022    Chief Complaint     Shortness of Breath      History of Present Illness     Jeff Sparks is a 74 y.o. female who presents urgency department with a complaint of increasing shortness of breath and cough. Patient reports that approximately 48-hour history of increasing cough which is primarily nonproductive with some associated shortness of breath. Denies any chest pain or chest heaviness does have some heaviness across her upper back. Has some mild subjective fevers chills nasal congestion denies sore throat denies nausea vomiting or diarrhea. Symptoms are largely constant but are worsened with exertion. She notably has a history of coronary artery disease had a recent positive stress test and is being set up for an outpatient cardiac catheterization. Review of Systems     As documented in the HPI, otherwise all other systems were reviewed and were negative. Past Medical, Surgical, Family, and Social History     She has a past medical history of CAD (coronary artery disease), Cancer ([facility] Diabetes mellitus ([facility] Hyperlipidemia, Hypertension, and MI (myocardial infarction) ([facility] She has a past surgical history that includes Tubal ligation; Hysterectomy; INSERTION / REMOVAL / REPLACEMENT VENOUS ACCESS CATHETER (N/A, 01/24/2019); Coronary artery bypass graft (N/A, 06/13/2019); and Colonoscopy. Her family history is not on file. She reports that she quit smoking about 3 years ago. Her smoking use included cigarettes. She has a 10.00 pack-year smoking history. She has never used smokeless tobacco. She reports current alcohol use. She reports that she does not use drugs.     Medications     Previous Medications    AMLODIPINE (NORVASC) 10 MG TABLET    Take 1 tablet by mouth daily    APIXABAN (ELIQUIS) 5 MG TABS TABLET    Take 5 mg by mouth 2 times daily    ASPIRIN 81 MG EC TABLET Take 1 tablet by mouth daily    CALCIUM CARBONATE-VITAMIN D 600-125 MG-UNIT TABS    Take 1 tablet by mouth 2 times daily     CHOLECALCIFEROL (VITAMIN D3) 1000 UNITS CAPS    Take 1 capsule by mouth daily    DEXAMETHASONE (DECADRON) 4 MG TABLET    Take 40 mg by mouth See Admin Instructions Per current chemo regimen- taking 40 mg days 8, 15, and 22 of each 28-day chemo cycle. On 9/30/22 is to take 21 mg prior to chemo treatment (Elotuzumab)    LISINOPRIL (PRINIVIL;ZESTRIL) 40 MG TABLET    Take 1 tablet by mouth daily    PANTOPRAZOLE (PROTONIX) 40 MG TABLET    Take 1 tablet by mouth every morning (before breakfast)    POMALIDOMIDE (POMALYST) 4 MG CHEMO CAPSULE    Take 4 mg by mouth every evening Daily on days 1-21 of 28-day cycle. Takes in evening. 9/18/22- per cycle, will take daily through 9/22/22, then 7 days off. POTASSIUM CHLORIDE (KLOR-CON) 10 MEQ EXTENDED RELEASE TABLET    TAKE 1 TABLET BY MOUTH TWICE DAILY    ROSUVASTATIN (CRESTOR) 40 MG TABLET    Take 1 tablet by mouth every evening    SODIUM CHLORIDE 0.9 % SOLN 250 ML WITH ELOTUZUMAB 300 MG SOLR 1,725 MG    Infuse 1,725 mg intravenously every 28 days    SODIUM CHLORIDE 0.9 % SOLN 250 ML WITH ELOTUZUMAB 300 MG SOLR 10 MG/KG    Infuse 10 mg/kg intravenously once Chemo - every 28 days    ZOLEDRONIC ACID (ZOMETA) 4 MG/5ML INJECTION    Infuse 4 mg intravenously every 3 months        Allergies     She is allergic to pcn [penicillins] and lipitor [atorvastatin].     Physical Exam     INITIAL VITALS: BP: (!) 167/81, Temp: 99.3 °F (37.4 °C), Heart Rate: (!) 106, Resp: 20, SpO2: 92 %   General: 28-year-old female sitting in bed appears to be dyspneic with some increased respiratory rate  HEENT:  head is atraumatic, pupils equal round and reactive to light, sclera are clear, oropharynx is nonerythematous  Neck: supple, no lymphadenopathy  Chest: Diminished breath sounds in all lung fields, equal chest rise bilaterally, no accessory muscle use but the patient is mildly tachypneic  Cardiovascular: Regular, rate, and rhythm, 2+ radial pulses bilaterally, capillary refill 2 seconds  Abdominal: Soft, nontender, nondistended, positive bowel sounds throughout, no rebound or guarding  Skin: Warm, dry well perfused, no rashes  Musculoskeletal: no obvious deformities, no tenderness to palpation diffusely, no evidence of any significant lower extremity edema  Neurologic:  alert and oriented x4, speech is clear and intact without dysarthria, moves all extremities with full and equal strength    Diagnostic Results     EKG   Right bundle branch block with left axis deviation and left anterior fascicular block no evidence of any ST elevations or T wave changes which would be consistent with ST elevation MI. In comparison to a prior EKG that was done on 9/19/2022 the patient's rate is faster but there are no evidence of any ischemic changes    RADIOLOGY:  XR CHEST PORTABLE   Final Result      No evidence of acute airspace disease.           LABS:   Results for orders placed or performed during the hospital encounter of 10/10/22   COVID-19 & Influenza Combo    Specimen: Nasopharyngeal Swab   Result Value Ref Range    SARS-CoV-2 RNA, RT PCR NOT DETECTED NOT DETECTED    INFLUENZA A DETECTED (A) NOT DETECTED    INFLUENZA B NOT DETECTED NOT DETECTED   CBC with Auto Differential   Result Value Ref Range    WBC 8.1 4.0 - 11.0 K/uL    RBC 4.76 4.00 - 5.20 M/uL    Hemoglobin 13.4 12.0 - 16.0 g/dL    Hematocrit 41.3 36.0 - 48.0 %    MCV 86.8 80.0 - 100.0 fL    MCH 28.2 26.0 - 34.0 pg    MCHC 32.5 31.0 - 36.0 g/dL    RDW 16.1 (H) 12.4 - 15.4 %    Platelets 525 020 - 907 K/uL    MPV 8.7 5.0 - 10.5 fL    Neutrophils % 85.0 %    Lymphocytes % 7.0 %    Monocytes % 6.0 %    Eosinophils % 2.0 %    Basophils % 0.0 %    Neutrophils Absolute 6.9 1.7 - 7.7 K/uL    Lymphocytes Absolute 0.6 (L) 1.0 - 5.1 K/uL    Monocytes Absolute 0.5 0.0 - 1.3 K/uL    Eosinophils Absolute 0.2 0.0 - 0.6 K/uL    Basophils Absolute 0.0 0.0 - 0.2 K/uL   Basic Metabolic Panel   Result Value Ref Range    Sodium 136 136 - 145 mmol/L    Potassium 3.8 3.5 - 5.1 mmol/L    Chloride 102 99 - 110 mmol/L    CO2 24 21 - 32 mmol/L    Anion Gap 10 3 - 16    Glucose 112 (H) 70 - 99 mg/dL    BUN 10 7 - 20 mg/dL    Creatinine 0.7 0.6 - 1.2 mg/dL    GFR Non-African American >60 >60    GFR African American >60 >60    Calcium 9.2 8.3 - 10.6 mg/dL   Magnesium   Result Value Ref Range    Magnesium 1.80 1.80 - 2.40 mg/dL   Phosphorus   Result Value Ref Range    Phosphorus 2.8 2.5 - 4.9 mg/dL   Troponin   Result Value Ref Range    Troponin <0.01 <0.01 ng/mL   Brain Natriuretic Peptide   Result Value Ref Range    Pro- (H) 0 - 124 pg/mL   Lactic Acid   Result Value Ref Range    Lactic Acid 1.0 0.4 - 2.0 mmol/L   EKG 12 Lead   Result Value Ref Range    Ventricular Rate 93 BPM    Atrial Rate 93 BPM    P-R Interval 152 ms    QRS Duration 100 ms    Q-T Interval 340 ms    QTc Calculation (Bazett) 422 ms    P Axis 74 degrees    R Axis -61 degrees    T Axis 54 degrees    Diagnosis       EKG performed in ER and to be interpreted by ER physician. Confirmed by MD, ER ([id number]),  Anup Arriola ([phone]) on 10/10/2022 7:37:32 PM       ED BEDSIDE ULTRASOUND:  No results found. RECENT VITALS:  BP: (!) 170/81, Temp: 99.3 °F (37.4 °C), Heart Rate: 81, Resp: 26, SpO2: 94 %       ED Course     Nursing Notes, Past Medical Hx, Past Surgical Hx, Social Hx, Allergies, and Family Hx were reviewed.     The patient was given the following medications:  Orders Placed This Encounter   Medications    ipratropium-albuterol (DUONEB) nebulizer solution 1 ampule     Order Specific Question:   Initiate RT Bronchodilator Protocol     Answer:   Yes - ED protocol    oseltamivir (TAMIFLU) capsule 75 mg       CONSULTS:  IP CONSULT TO HOSPITALIST    MEDICAL DECISION MAKING / ASSESSMENT / Jack Josh is a 74 y.o. female who presents emergency department with a complaint of nasal congestion cough that is nonproductive and shortness of breath. The patient's symptomatology appear to be consistent with likely pneumonia or infectious cause of her shortness of breath although her recent history of a positive stress test did bring about concern for cardiopulmonary source of her shortness of breath. The patient had an EKG which showed trifascicular block but no evidence of any acute ischemic changes from prior EKGs. The patient had laboratory investigations sent which showed x-ray without evidence of pneumonia CBC with no significant leukocytosis her BNP mildly elevated at normal for age at 190 troponin was less than 0.01. Phosphorus 2.8 magnesium 1.8 basic metabolic profile with a glucose of 112 otherwise no significant abnormalities lactic acid was 1.0.  COVID was negative but influenza A was positive. .  On presentation her oxygen saturations ranged from the upper 80s to low 90s while on room air. She was placed on 2 L nasal cannula. The patient was given a DuoNeb treatment with some subjective improvement of her shortness of breath on repeat evaluation after her breathing treatment she was found to have better air movement and some scattered expiratory wheezing. The patient remained somewhat tachypneic and did require 2 L nasal cannula oxygen to maintain her oxygen saturation greater than 90%. Overall feel the patient does require admission to the hospital for further care and treatment of influenza A. I am speaking with the hospitalist regarding admission. Clinical Impression     1. Acute respiratory failure with hypoxia ([address]    2. Influenza with respiratory manifestation other than pneumonia        Disposition     PATIENT REFERRED TO:  No follow-up provider specified.     DISCHARGE MEDICATIONS:  New Prescriptions    No medications on file       DISPOSITION Decision To Admit 10/10/2022 09:06:12 PM           Rhina Bell MD  10/10/22 [id number]

## 2022-10-10 NOTE — LETTER
[address]  Phone: [phone]    No name on file. October 12, 2022     Patient: Elijah Sherman   YOB: 1955   Date of Visit: 10/10/2022       To Whom It May Concern: It is my medical opinion that Elijah Sherman may return to work on 10/17/2022 per Dr. Samson Davis at [facility]    If you have any questions or concerns, please don't hesitate to call. Sincerely,    Dyllan Bee RN.

## 2022-10-10 NOTE — ED TRIAGE NOTES
Pt presents to the [address] [place] [address] for SOB, that started this afternoon and has progressively gotten worse. Pt states she was recently admitted for CP, had an OP cardiac stress test on 10/3 and needs a cardiac cath. Pt reports she has never had an episode like this. Pt is dyspneic upon ambulation and exertion, respirations easy and non-labored when at rest with SPO2 greater than 90%.

## 2022-10-10 NOTE — LETTER
[address] [facility] [address]  Phone: [phone]             October 12, 2022    Patient: Merlinda Bowler   YOB: 1955   Date of Visit: 10/10/2022       To Whom It May Concern:    Merlinda Bowler was seen and treated in our facility  beginning 10/10/2022 until 10/12/22. She may return to work on 10/13/22.       Sincerely,       Aarti Veras RN        Signature:__________________________________

## 2022-10-11 PROBLEM — I25.10 CORONARY ARTERY DISEASE INVOLVING NATIVE CORONARY ARTERY: Status: ACTIVE | Noted: 2022-10-11

## 2022-10-11 PROBLEM — J98.01 ACUTE BRONCHOSPASM DUE TO VIRAL INFECTION: Status: ACTIVE | Noted: 2022-10-11

## 2022-10-11 PROBLEM — B34.9 ACUTE BRONCHOSPASM DUE TO VIRAL INFECTION: Status: ACTIVE | Noted: 2022-10-11

## 2022-10-11 PROBLEM — J96.01 ACUTE RESPIRATORY FAILURE WITH HYPOXIA (HCC): Status: ACTIVE | Noted: 2022-10-11

## 2022-10-11 LAB
ANION GAP SERPL CALCULATED.3IONS-SCNC: 10 MMOL/L (ref 3–16)
BASOPHILS ABSOLUTE: 0.1 K/UL (ref 0–0.2)
BASOPHILS RELATIVE PERCENT: 0.9 %
BUN BLDV-MCNC: 9 MG/DL (ref 7–20)
CALCIUM SERPL-MCNC: 8.7 MG/DL (ref 8.3–10.6)
CHLORIDE BLD-SCNC: 103 MMOL/L (ref 99–110)
CO2: 25 MMOL/L (ref 21–32)
CREAT SERPL-MCNC: 0.8 MG/DL (ref 0.6–1.2)
D DIMER: 0.55 UG/ML FEU (ref 0–0.6)
EOSINOPHILS ABSOLUTE: 0 K/UL (ref 0–0.6)
EOSINOPHILS RELATIVE PERCENT: 0.2 %
GFR AFRICAN AMERICAN: >60
GFR NON-AFRICAN AMERICAN: >60
GLUCOSE BLD-MCNC: 104 MG/DL (ref 70–99)
GLUCOSE BLD-MCNC: 111 MG/DL (ref 70–99)
GLUCOSE BLD-MCNC: 196 MG/DL (ref 70–99)
GLUCOSE BLD-MCNC: 202 MG/DL (ref 70–99)
GLUCOSE BLD-MCNC: 202 MG/DL (ref 70–99)
HCT VFR BLD CALC: 38.4 % (ref 36–48)
HEMOGLOBIN: 12.6 G/DL (ref 12–16)
LYMPHOCYTES ABSOLUTE: 0.3 K/UL (ref 1–5.1)
LYMPHOCYTES RELATIVE PERCENT: 5.3 %
MCH RBC QN AUTO: 28.2 PG (ref 26–34)
MCHC RBC AUTO-ENTMCNC: 32.7 G/DL (ref 31–36)
MCV RBC AUTO: 86.4 FL (ref 80–100)
MONOCYTES ABSOLUTE: 0.5 K/UL (ref 0–1.3)
MONOCYTES RELATIVE PERCENT: 8.4 %
NEUTROPHILS ABSOLUTE: 5.2 K/UL (ref 1.7–7.7)
NEUTROPHILS RELATIVE PERCENT: 85.2 %
PDW BLD-RTO: 16.2 % (ref 12.4–15.4)
PERFORMED ON: ABNORMAL
PLATELET # BLD: 157 K/UL (ref 135–450)
PMV BLD AUTO: 9.3 FL (ref 5–10.5)
POTASSIUM SERPL-SCNC: 3.4 MMOL/L (ref 3.5–5.1)
PROCALCITONIN: 0.29 NG/ML (ref 0–0.15)
RBC # BLD: 4.45 M/UL (ref 4–5.2)
SODIUM BLD-SCNC: 138 MMOL/L (ref 136–145)
WBC # BLD: 6.1 K/UL (ref 4–11)

## 2022-10-11 PROCEDURE — 87449 NOS EACH ORGANISM AG IA: CPT

## 2022-10-11 PROCEDURE — 94640 AIRWAY INHALATION TREATMENT: CPT

## 2022-10-11 PROCEDURE — 2580000003 HC RX 258

## 2022-10-11 PROCEDURE — 36415 COLL VENOUS BLD VENIPUNCTURE: CPT

## 2022-10-11 PROCEDURE — 6360000002 HC RX W HCPCS: Performed by: INTERNAL MEDICINE

## 2022-10-11 PROCEDURE — 94761 N-INVAS EAR/PLS OXIMETRY MLT: CPT

## 2022-10-11 PROCEDURE — 84145 PROCALCITONIN (PCT): CPT

## 2022-10-11 PROCEDURE — 94664 DEMO&/EVAL PT USE INHALER: CPT

## 2022-10-11 PROCEDURE — 80048 BASIC METABOLIC PNL TOTAL CA: CPT

## 2022-10-11 PROCEDURE — 6360000002 HC RX W HCPCS

## 2022-10-11 PROCEDURE — 2700000000 HC OXYGEN THERAPY PER DAY

## 2022-10-11 PROCEDURE — 85379 FIBRIN DEGRADATION QUANT: CPT

## 2022-10-11 PROCEDURE — 1200000000 HC SEMI PRIVATE

## 2022-10-11 PROCEDURE — 87040 BLOOD CULTURE FOR BACTERIA: CPT

## 2022-10-11 PROCEDURE — 6370000000 HC RX 637 (ALT 250 FOR IP)

## 2022-10-11 PROCEDURE — 6370000000 HC RX 637 (ALT 250 FOR IP): Performed by: INTERNAL MEDICINE

## 2022-10-11 PROCEDURE — 87641 MR-STAPH DNA AMP PROBE: CPT

## 2022-10-11 PROCEDURE — 85025 COMPLETE CBC W/AUTO DIFF WBC: CPT

## 2022-10-11 RX ORDER — ACETAMINOPHEN 325 MG/1
650 TABLET ORAL EVERY 6 HOURS PRN
Status: DISCONTINUED | OUTPATIENT
Start: 2022-10-11 | End: 2022-10-12 | Stop reason: HOSPADM

## 2022-10-11 RX ORDER — SODIUM CHLORIDE 0.9 % (FLUSH) 0.9 %
5-40 SYRINGE (ML) INJECTION EVERY 12 HOURS SCHEDULED
Status: DISCONTINUED | OUTPATIENT
Start: 2022-10-11 | End: 2022-10-12 | Stop reason: HOSPADM

## 2022-10-11 RX ORDER — POTASSIUM CHLORIDE 20 MEQ/1
20 TABLET, EXTENDED RELEASE ORAL ONCE
Status: COMPLETED | OUTPATIENT
Start: 2022-10-11 | End: 2022-10-11

## 2022-10-11 RX ORDER — ALBUTEROL SULFATE 90 UG/1
2 AEROSOL, METERED RESPIRATORY (INHALATION) EVERY 6 HOURS PRN
Status: DISCONTINUED | OUTPATIENT
Start: 2022-10-11 | End: 2022-10-12 | Stop reason: HOSPADM

## 2022-10-11 RX ORDER — IPRATROPIUM BROMIDE AND ALBUTEROL SULFATE 2.5; .5 MG/3ML; MG/3ML
1 SOLUTION RESPIRATORY (INHALATION) EVERY 4 HOURS PRN
Status: DISCONTINUED | OUTPATIENT
Start: 2022-10-11 | End: 2022-10-12 | Stop reason: HOSPADM

## 2022-10-11 RX ORDER — SODIUM CHLORIDE 0.9 % (FLUSH) 0.9 %
5-40 SYRINGE (ML) INJECTION PRN
Status: DISCONTINUED | OUTPATIENT
Start: 2022-10-11 | End: 2022-10-12 | Stop reason: HOSPADM

## 2022-10-11 RX ORDER — MAGNESIUM SULFATE IN WATER 40 MG/ML
2000 INJECTION, SOLUTION INTRAVENOUS ONCE
Status: COMPLETED | OUTPATIENT
Start: 2022-10-11 | End: 2022-10-11

## 2022-10-11 RX ORDER — POLYETHYLENE GLYCOL 3350 17 G/17G
17 POWDER, FOR SOLUTION ORAL DAILY PRN
Status: DISCONTINUED | OUTPATIENT
Start: 2022-10-11 | End: 2022-10-12 | Stop reason: HOSPADM

## 2022-10-11 RX ORDER — INSULIN LISPRO 100 [IU]/ML
0-4 INJECTION, SOLUTION INTRAVENOUS; SUBCUTANEOUS NIGHTLY
Status: DISCONTINUED | OUTPATIENT
Start: 2022-10-11 | End: 2022-10-12 | Stop reason: HOSPADM

## 2022-10-11 RX ORDER — SODIUM CHLORIDE 9 MG/ML
INJECTION, SOLUTION INTRAVENOUS PRN
Status: DISCONTINUED | OUTPATIENT
Start: 2022-10-11 | End: 2022-10-12 | Stop reason: HOSPADM

## 2022-10-11 RX ORDER — POTASSIUM CHLORIDE 20 MEQ/1
20 TABLET, EXTENDED RELEASE ORAL
Status: COMPLETED | OUTPATIENT
Start: 2022-10-11 | End: 2022-10-12

## 2022-10-11 RX ORDER — VITAMIN B COMPLEX
TABLET ORAL DAILY
Status: DISCONTINUED | OUTPATIENT
Start: 2022-10-11 | End: 2022-10-12 | Stop reason: HOSPADM

## 2022-10-11 RX ORDER — IPRATROPIUM BROMIDE AND ALBUTEROL SULFATE 2.5; .5 MG/3ML; MG/3ML
1 SOLUTION RESPIRATORY (INHALATION)
Status: DISCONTINUED | OUTPATIENT
Start: 2022-10-11 | End: 2022-10-11

## 2022-10-11 RX ORDER — ENOXAPARIN SODIUM 100 MG/ML
40 INJECTION SUBCUTANEOUS DAILY
Status: DISCONTINUED | OUTPATIENT
Start: 2022-10-11 | End: 2022-10-11

## 2022-10-11 RX ORDER — PANTOPRAZOLE SODIUM 40 MG/1
40 TABLET, DELAYED RELEASE ORAL
Status: DISCONTINUED | OUTPATIENT
Start: 2022-10-11 | End: 2022-10-12 | Stop reason: HOSPADM

## 2022-10-11 RX ORDER — ONDANSETRON 4 MG/1
4 TABLET, ORALLY DISINTEGRATING ORAL EVERY 8 HOURS PRN
Status: DISCONTINUED | OUTPATIENT
Start: 2022-10-11 | End: 2022-10-12 | Stop reason: HOSPADM

## 2022-10-11 RX ORDER — ACETAMINOPHEN 650 MG/1
650 SUPPOSITORY RECTAL EVERY 6 HOURS PRN
Status: DISCONTINUED | OUTPATIENT
Start: 2022-10-11 | End: 2022-10-12 | Stop reason: HOSPADM

## 2022-10-11 RX ORDER — INSULIN LISPRO 100 [IU]/ML
0-4 INJECTION, SOLUTION INTRAVENOUS; SUBCUTANEOUS
Status: DISCONTINUED | OUTPATIENT
Start: 2022-10-11 | End: 2022-10-12 | Stop reason: HOSPADM

## 2022-10-11 RX ORDER — AMLODIPINE BESYLATE 10 MG/1
10 TABLET ORAL DAILY
Status: DISCONTINUED | OUTPATIENT
Start: 2022-10-11 | End: 2022-10-12 | Stop reason: HOSPADM

## 2022-10-11 RX ORDER — DEXTROSE MONOHYDRATE 100 MG/ML
INJECTION, SOLUTION INTRAVENOUS CONTINUOUS PRN
Status: DISCONTINUED | OUTPATIENT
Start: 2022-10-11 | End: 2022-10-12 | Stop reason: HOSPADM

## 2022-10-11 RX ORDER — OSELTAMIVIR PHOSPHATE 75 MG/1
75 CAPSULE ORAL 2 TIMES DAILY
Status: DISCONTINUED | OUTPATIENT
Start: 2022-10-11 | End: 2022-10-12 | Stop reason: HOSPADM

## 2022-10-11 RX ORDER — ONDANSETRON 2 MG/ML
4 INJECTION INTRAMUSCULAR; INTRAVENOUS EVERY 6 HOURS PRN
Status: DISCONTINUED | OUTPATIENT
Start: 2022-10-11 | End: 2022-10-12 | Stop reason: HOSPADM

## 2022-10-11 RX ORDER — IPRATROPIUM BROMIDE AND ALBUTEROL SULFATE 2.5; .5 MG/3ML; MG/3ML
1 SOLUTION RESPIRATORY (INHALATION) 3 TIMES DAILY
Status: DISCONTINUED | OUTPATIENT
Start: 2022-10-11 | End: 2022-10-12 | Stop reason: HOSPADM

## 2022-10-11 RX ORDER — METHYLPREDNISOLONE SODIUM SUCCINATE 125 MG/2ML
125 INJECTION, POWDER, LYOPHILIZED, FOR SOLUTION INTRAMUSCULAR; INTRAVENOUS ONCE
Status: COMPLETED | OUTPATIENT
Start: 2022-10-11 | End: 2022-10-11

## 2022-10-11 RX ORDER — LISINOPRIL 40 MG/1
40 TABLET ORAL DAILY
Status: DISCONTINUED | OUTPATIENT
Start: 2022-10-11 | End: 2022-10-12 | Stop reason: HOSPADM

## 2022-10-11 RX ORDER — ASPIRIN 81 MG/1
81 TABLET ORAL DAILY
Status: DISCONTINUED | OUTPATIENT
Start: 2022-10-11 | End: 2022-10-12 | Stop reason: HOSPADM

## 2022-10-11 RX ORDER — ROSUVASTATIN CALCIUM 20 MG/1
40 TABLET, COATED ORAL EVERY EVENING
Status: DISCONTINUED | OUTPATIENT
Start: 2022-10-11 | End: 2022-10-12 | Stop reason: HOSPADM

## 2022-10-11 RX ADMIN — APIXABAN 5 MG: 5 TABLET, FILM COATED ORAL at 21:45

## 2022-10-11 RX ADMIN — LISINOPRIL 40 MG: 40 TABLET ORAL at 09:06

## 2022-10-11 RX ADMIN — ASPIRIN 81 MG: 81 TABLET, COATED ORAL at 09:06

## 2022-10-11 RX ADMIN — IPRATROPIUM BROMIDE AND ALBUTEROL SULFATE 1 AMPULE: .5; 3 SOLUTION RESPIRATORY (INHALATION) at 09:14

## 2022-10-11 RX ADMIN — POTASSIUM CHLORIDE 20 MEQ: 1500 TABLET, EXTENDED RELEASE ORAL at 03:04

## 2022-10-11 RX ADMIN — CALCIUM CARBONATE-VITAMIN D TAB 500 MG-200 UNIT 2 TABLET: 500-200 TAB at 06:23

## 2022-10-11 RX ADMIN — IPRATROPIUM BROMIDE AND ALBUTEROL SULFATE 1 AMPULE: 2.5; .5 SOLUTION RESPIRATORY (INHALATION) at 21:06

## 2022-10-11 RX ADMIN — OSELTAMIVIR PHOSPHATE 75 MG: 75 CAPSULE ORAL at 09:07

## 2022-10-11 RX ADMIN — MAGNESIUM SULFATE HEPTAHYDRATE 2000 MG: 40 INJECTION, SOLUTION INTRAVENOUS at 03:06

## 2022-10-11 RX ADMIN — CALCIUM CARBONATE-VITAMIN D TAB 500 MG-200 UNIT 1 TABLET: 500-200 TAB at 09:08

## 2022-10-11 RX ADMIN — IPRATROPIUM BROMIDE AND ALBUTEROL SULFATE 1 AMPULE: 2.5; .5 SOLUTION RESPIRATORY (INHALATION) at 15:26

## 2022-10-11 RX ADMIN — METHYLPREDNISOLONE SODIUM SUCCINATE 125 MG: 125 INJECTION, POWDER, FOR SOLUTION INTRAMUSCULAR; INTRAVENOUS at 09:04

## 2022-10-11 RX ADMIN — ROSUVASTATIN CALCIUM 40 MG: 20 TABLET, COATED ORAL at 18:04

## 2022-10-11 RX ADMIN — AMLODIPINE BESYLATE 10 MG: 10 TABLET ORAL at 09:06

## 2022-10-11 RX ADMIN — APIXABAN 5 MG: 5 TABLET, FILM COATED ORAL at 09:06

## 2022-10-11 RX ADMIN — ACETAMINOPHEN 650 MG: 325 TABLET ORAL at 09:10

## 2022-10-11 RX ADMIN — INSULIN LISPRO 1 UNITS: 100 INJECTION, SOLUTION INTRAVENOUS; SUBCUTANEOUS at 17:36

## 2022-10-11 RX ADMIN — OSELTAMIVIR PHOSPHATE 75 MG: 75 CAPSULE ORAL at 22:01

## 2022-10-11 RX ADMIN — SODIUM CHLORIDE, PRESERVATIVE FREE 10 ML: 5 INJECTION INTRAVENOUS at 21:37

## 2022-10-11 RX ADMIN — POTASSIUM CHLORIDE 20 MEQ: 1500 TABLET, EXTENDED RELEASE ORAL at 18:04

## 2022-10-11 RX ADMIN — APIXABAN 5 MG: 5 TABLET, FILM COATED ORAL at 03:04

## 2022-10-11 RX ADMIN — SODIUM CHLORIDE, PRESERVATIVE FREE 10 ML: 5 INJECTION INTRAVENOUS at 09:07

## 2022-10-11 RX ADMIN — POLYETHYLENE GLYCOL 3350 17 G: 17 POWDER, FOR SOLUTION ORAL at 21:45

## 2022-10-11 RX ADMIN — POTASSIUM CHLORIDE 20 MEQ: 1500 TABLET, EXTENDED RELEASE ORAL at 10:32

## 2022-10-11 RX ADMIN — INSULIN LISPRO 1 UNITS: 100 INJECTION, SOLUTION INTRAVENOUS; SUBCUTANEOUS at 14:24

## 2022-10-11 RX ADMIN — CALCIUM CARBONATE-VITAMIN D TAB 500 MG-200 UNIT 1 TABLET: 500-200 TAB at 21:52

## 2022-10-11 RX ADMIN — PANTOPRAZOLE SODIUM 40 MG: 40 TABLET, DELAYED RELEASE ORAL at 06:23

## 2022-10-11 RX ADMIN — Medication 25 MCG: at 09:06

## 2022-10-11 ASSESSMENT — PAIN SCALES - GENERAL
PAINLEVEL_OUTOF10: 0

## 2022-10-11 ASSESSMENT — PAIN - FUNCTIONAL ASSESSMENT: PAIN_FUNCTIONAL_ASSESSMENT: NONE - DENIES PAIN

## 2022-10-11 NOTE — H&P
Internal Medicine  PGY 1  History & Physical      CC cough and shortness of breath    History Obtained From:  patient    HISTORY OF PRESENT ILLNESS:    Sloan edmond is a 72-year-old female past medical history of CAD, HTN HLD, T2DM, multiple myeloma comes to the ED with 2-day history of shortness of breath and cough. According to the patient she started having shortness of breath 2 days ago which got worse with nonproductive cough. Patient had some  subjective fevers, chills, nasal,congestion denies sore throat denies nausea vomiting or diarrhea,denied any chest pain or chest heaviness does have some heaviness across her upper back. Patient was hypoxic on presentation saturating in the low 80s on room air she was put on 4 L nasal cannula with improvement in saturation to the lower 90s. She notably has a history of coronary artery disease had a recent positive stress test and is being set up for an outpatient cardiac catheterization. In ED BP: (!) 167/81, Temp: 99.3 °F (37.4 °C), Heart Rate: (!) 106, Resp: 20, SpO2: 92 %   Labs: BMPs and CBCs were normal.proBNP 190, respiratory culture positive for influenza A.     Past Medical History:        Diagnosis Date    CAD (coronary artery disease)     Cancer ([facility]     Multiple myeloma    Diabetes mellitus ([facility]     Hyperlipidemia     Hypertension     MI (myocardial infarction) ([facility]        Past Surgical History:        Procedure Laterality Date    COLONOSCOPY      CORONARY ARTERY BYPASS GRAFT N/A 06/13/2019    CORONARY ARTERY BYPASS GRAFT X4, INTERNAL MAMMARY ARTERY AND SAPHENOUS VEIN GRAFT-TCP-BP performed by Cyrus Hay MD at [address] [facility] ([address])      partial for fibroids    INSERTION / REMOVAL / REPLACEMENT VENOUS ACCESS CATHETER N/A 01/24/2019    TRIFUSION CATHETER INSERTION LEFT SUBCLAVIAN performed by Iron Granados MD at [address]         Medications Priorto Admission:    Medications Prior to Admission: amLODIPine (NORVASC) 10 MG tablet, Take 1 tablet by mouth daily  pantoprazole (PROTONIX) 40 MG tablet, Take 1 tablet by mouth every morning (before breakfast)  dexamethasone (DECADRON) 4 MG tablet, Take 40 mg by mouth See Admin Instructions Per current chemo regimen- taking 40 mg days 8, 15, and 22 of each 28-day chemo cycle. On 9/30/22 is to take 21 mg prior to chemo treatment (Elotuzumab)  sodium chloride 0.9 % SOLN 250 mL with elotuzumab 300 MG SOLR 10 mg/kg, Infuse 10 mg/kg intravenously once Chemo - every 28 days  sodium chloride 0.9 % SOLN 250 mL with elotuzumab 300 MG SOLR 1,725 mg, Infuse 1,725 mg intravenously every 28 days  apixaban (ELIQUIS) 5 MG TABS tablet, Take 5 mg by mouth 2 times daily  pomalidomide (POMALYST) 4 MG chemo capsule, Take 4 mg by mouth every evening Daily on days 1-21 of 28-day cycle. Takes in evening. 9/18/22- per cycle, will take daily through 9/22/22, then 7 days off.  rosuvastatin (CRESTOR) 40 MG tablet, Take 1 tablet by mouth every evening  lisinopril (PRINIVIL;ZESTRIL) 40 MG tablet, Take 1 tablet by mouth daily  potassium chloride (KLOR-CON) 10 MEQ extended release tablet, TAKE 1 TABLET BY MOUTH TWICE DAILY  aspirin 81 MG EC tablet, Take 1 tablet by mouth daily  zoledronic acid (ZOMETA) 4 MG/5ML injection, Infuse 4 mg intravenously every 3 months Last dose was in august  Cholecalciferol (VITAMIN D3) 1000 units CAPS, Take 1 capsule by mouth daily  Calcium Carbonate-Vitamin D 600-125 MG-UNIT TABS, Take 1 tablet by mouth 2 times daily     Allergies:  Pcn [penicillins] and Lipitor [atorvastatin]    Social History:   TOBACCO:   reports that she quit smoking about 3 years ago. Her smoking use included cigarettes. She has a 10.00 pack-year smoking history. She has never used smokeless tobacco.  ETOH:   reports current alcohol use.   DRUGS :   Patient currently lives     Family History:       Problem Relation Age of Onset    Breast Cancer Neg Hx     Ovarian Cancer Neg Hx        Review of Systems    ROS: A 10 point review of systems was conducted, significant findings as noted in HPI. Physical Exam  Vitals reviewed. Constitutional:       Appearance: She is normal weight. Cardiovascular:      Rate and Rhythm: Normal rate and regular rhythm. Pulses: Normal pulses. Heart sounds: Normal heart sounds. Pulmonary:      Effort: Pulmonary effort is normal.      Breath sounds: Normal breath sounds and air entry. Abdominal:      General: Abdomen is flat. There is no distension. Palpations: Abdomen is soft. Musculoskeletal:         General: Normal range of motion. Neurological:      Mental Status: She is alert. Psychiatric:         Attention and Perception: Attention and perception normal.         Mood and Affect: Mood and affect normal.         Speech: Speech normal.     Physical exam:       Vitals:    10/11/22 0136   BP: (!) 162/76   Pulse: 89   Resp: 20   Temp: 99.3 °F (37.4 °C)   SpO2: 96%       DATA:    Labs:  CBC:   Recent Labs     10/10/22  1949   WBC 8.1   HGB 13.4   HCT 41.3          BMP:   Recent Labs     10/10/22  1949      K 3.8      CO2 24   BUN 10   CREATININE 0.7   GLUCOSE 112*   PHOS 2.8     LFT's: No results for input(s): AST, ALT, ALB, BILITOT, ALKPHOS in the last 72 hours. Troponin:   Recent Labs     10/10/22  1949   TROPONINI <0.01     BNP:No results for input(s): BNP in the last 72 hours. ABGs: No results for input(s): PHART, SKL2DWH, PO2ART in the last 72 hours. INR: No results for input(s): INR in the last 72 hours. U/A:No results for input(s): NITRITE, COLORU, PHUR, LABCAST, WBCUA, RBCUA, MUCUS, TRICHOMONAS, YEAST, BACTERIA, CLARITYU, SPECGRAV, LEUKOCYTESUR, UROBILINOGEN, BILIRUBINUR, BLOODU, GLUCOSEU, AMORPHOUS in the last 72 hours. Invalid input(s): KETONESU    XR CHEST PORTABLE   Final Result      No evidence of acute airspace disease.               ASSESSMENT AND PLAN:    Franny edmond is a 44-year-old female past medical history of CAD, HTN HLD, T2DM, multiple myeloma comes to the ED with 2-day history of shortness of breath and cough.     Shortness of breath 2/2 influenza virus  Patient presented with shortness of breath and cough with increasing oxygen demand  -Tested positive for influenza A  -Started on tamiflu  -Follow up on blood culture  -Follow up on repiratory culture  -Follow up on legionella  -Follow up d dimer    Chronic conditions  Hypertension(amlodipine lisinopril)  Type 2 diabetes  Hyperlipidemia(Crestor 40 mg)  Multiple myeloma    Code Status:Full code  FEN: Adult diet  PPX: Levonox  DISPO: IP    Cleve Bender MD  10/11/2022,  6:10 AM

## 2022-10-11 NOTE — ED NOTES
O2 saturation dropping to 88% on RA while at rest, pt placed on 2L O2 per NC per MD order.       Wendy Balderrama RN  10/10/22 2003

## 2022-10-11 NOTE — PROGRESS NOTES
Progress Note    Admit Date: 10/10/2022  Day: 1  Diet: ADULT DIET; Regular; 3 carb choices (45 gm/meal); Low Sodium (2 gm); Low Potassium (Less than 3000 mg/day)    Interval history:   Patient seen at bedside this a.m., reports feeling slightly better than yesterday. She endorses that her muscle aches have improved and her shortness of breath is slightly improved. Denies chest pain, headaches, abdominal pain, nausea vomiting. Fever to 102.4 this a.m., now afebrile to 99. Satting mid 90s on 1 L down from 2L. Otherwise BP/HR stable. Cr/lytes WNL, Hgb/WBC WNL    Addition to original HPI:  Patient had recent admission 9/18 - 9/20 for chest pain, ischemic work-up negative, however given high risk patient, stress test was done (outpatient) which was positive thus [facility] scheduled for 10/18 per Dr. Fernandez Vu. Pt had CABG x4 2019 at which time thrombus was also found in atr    Additionally, [facility]. On on chemotherapy: Elotuzumab, Pomalyst, dexamethasone.     Medications:     Scheduled Meds:   sodium chloride flush  5-40 mL IntraVENous 2 times per day    amLODIPine  10 mg Oral Daily    apixaban  5 mg Oral BID    aspirin  81 mg Oral Daily    calcium-cholecalciferol   Oral BID    Vitamin D   Oral Daily    lisinopril  40 mg Oral Daily    pantoprazole  40 mg Oral QAM AC    pomalidomide  4 mg Oral QPM    rosuvastatin  40 mg Oral QPM    insulin lispro  0-4 Units SubCUTAneous TID WC    insulin lispro  0-4 Units SubCUTAneous Nightly    oseltamivir  75 mg Oral BID    ipratropium-albuterol  1 ampule Inhalation TID    potassium chloride  20 mEq Oral TID WC     Continuous Infusions:   sodium chloride      dextrose       PRN Meds:sodium chloride flush, sodium chloride, ondansetron **OR** ondansetron, polyethylene glycol, acetaminophen **OR** acetaminophen, glucose, dextrose bolus **OR** dextrose bolus, glucagon (rDNA), dextrose, albuterol sulfate HFA, ipratropium-albuterol    Objective: Vitals:   T-max:  Patient Vitals for the past 8 hrs:   BP Temp Temp src Pulse Resp SpO2 Weight   10/11/22 1033 -- 99 °F (37.2 °C) Oral -- -- -- --   10/11/22 0910 -- -- -- -- -- 92 % --   10/11/22 0903 -- (!) 102.4 °F (39.1 °C) Axillary -- -- -- --   10/11/22 0902 136/69 (!) 100.6 °F (38.1 °C) Oral 82 20 97 % --   10/11/22 0614 138/61 98.6 °F (37 °C) Oral 90 18 95 % 185 lb 3 oz (84 kg)       Intake/Output Summary (Last 24 hours) at 10/11/2022 1149  Last data filed at 10/11/2022 1425  Gross per 24 hour   Intake --   Output 600 ml   Net -600 ml       Physical Exam  Constitutional:       General: She is not in acute distress. Appearance: She is not ill-appearing. HENT:      Head: Normocephalic and atraumatic. Mouth/Throat:      Mouth: Mucous membranes are moist.      Pharynx: No oropharyngeal exudate or posterior oropharyngeal erythema. Eyes:      General: No scleral icterus. Extraocular Movements: Extraocular movements intact. Pupils: Pupils are equal, round, and reactive to light. Cardiovascular:      Rate and Rhythm: Normal rate and regular rhythm. Pulses: Normal pulses. Heart sounds: Normal heart sounds. Pulmonary:      Effort: No respiratory distress. Breath sounds: No wheezing or rhonchi. Comments: Slightly reduced breath sounds. Abdominal:      General: There is no distension. Palpations: Abdomen is soft. Tenderness: There is no abdominal tenderness. Musculoskeletal:         General: No swelling. Right lower leg: No edema. Left lower leg: No edema. Skin:     General: Skin is warm. Neurological:      General: No focal deficit present. Mental Status: She is alert and oriented to person, place, and time.          LABS:    CBC:   Recent Labs     10/10/22  1949 10/11/22  0811   WBC 8.1 6.1   HGB 13.4 12.6   HCT 41.3 38.4    157   MCV 86.8 86.4     Renal:    Recent Labs     10/10/22  1949 10/11/22  0811    138   K 3.8 3.4*   CL 102 103   CO2 24 25   BUN 10 9   CREATININE 0.7 0.8   GLUCOSE 112* 111*   CALCIUM 9.2 8.7   MG 1.80  --    PHOS 2.8  --    ANIONGAP 10 10     Hepatic: No results for input(s): AST, ALT, BILITOT, BILIDIR, PROT, LABALBU, ALKPHOS in the last 72 hours. Troponin:   Recent Labs     10/10/22  1949   TROPONINI <0.01     BNP: No results for input(s): BNP in the last 72 hours. Lipids: No results for input(s): CHOL, HDL in the last 72 hours. Invalid input(s): LDLCALCU, TRIGLYCERIDE  ABGs:  No results for input(s): PHART, HIX7TIF, PO2ART, OVM6BWN, BEART, THGBART, U4DRNXNA, NNV4EHR in the last 72 hours. INR: No results for input(s): INR in the last 72 hours. Lactate: No results for input(s): LACTATE in the last 72 hours. Cultures:  -----------------------------------------------------------------  RAD:   XR CHEST PORTABLE   Final Result      No evidence of acute airspace disease. Assessment/Plan:     Shortness of Breath 2/2 Influenza A  Test positive in ED, initially required 4 L. Currently weaned down to 1 L satting mid 80s. - S/p 1 dose Solumedrol in ED  - On Tamiflu, started in ED.  - On 1 L NC O2, wean as tolerated  - DuoNeb as needed  - F/U blood culture  - F/U respiratory culture  - F/U MRSA probe nasal    CAD s/p CABG 2019  -In ED, tropes negative, EKG with no new ischemic findings. Has a left bundle branch but it is similar to findings in 2019. Recent admission for chest pain, ischemic work-up negative, however had a positive stress test.  -Continue aspirin and statin  -Has follow-up appointment 10/18 for [facility] with Dr. Prosper Quinn    Chronic anticoagulation  No history of atrial fibrillation seen in charts  Unable to find documentation describing previous DVT/PE. However 1 document on chart review states patient had \"blood clot and heart. \"  Eliquis started in 2019 per charts. - Continue Eliquis 5mg BID. Chronic Medical Problems  Multiple Myeloma  Follows with Dr. Iqra [facility].  On Elotuzumab, Pomalyst, dexamethasone.  - Giving patient supplied Pomalyst, as per her chemotherapy schedule    T2DM  Last A1c 6.7 on 9/19. Not on home meds, diet controlled  -Monitor BG    HTN-continue home amlodipine and lisinopril  HLD-continue home statin    Code Status: Full Code  FEN: Regular  PPX: Eliquis  DISPO: Kassandra Bañuelos MD, PGY-1  10/11/22  11:49 AM    This patient has been staffed and discussed with Jacinta Meneses MD.  Patient seen and examined, labs and imaging studies reviewed, agree with assessment and plan as outlined above. Continue with current care and plan. Discussed case with patients nurse, discussed case with care team, discussed plan.       MD Brennan Madden

## 2022-10-11 NOTE — RT PROTOCOL NOTE
RT Nebulizer Bronchodilator Protocol Note    There is a bronchodilator order in the chart from a provider indicating to follow the RT Bronchodilator Protocol and there is an Initiate RT Bronchodilator Protocol order as well (see protocol at bottom of note). CXR Findings:  XR CHEST PORTABLE    Result Date: 10/10/2022  No evidence of acute airspace disease. The findings from the last RT Protocol Assessment were as follows:  Smoking: Smoker 15 pack years or more  Respiratory Pattern: Regular pattern and RR 12-20 bpm  Breath Sounds: Clear breath sounds  Cough: Strong, spontaneous, non-productive  Indication for Bronchodilator Therapy:    Bronchodilator Assessment Score: 1     Make Duoneb TID & Q4h prn    Aerosolized bronchodilator medication orders have been revised according to the RT Nebulizer Bronchodilator Protocol below. Respiratory Therapist to perform RT Therapy Protocol Assessment initially then follow the protocol. Repeat RT Therapy Protocol Assessment PRN for score 0-3 or on second treatment, BID, and PRN for scores above 3. No Indications - adjust the frequency to every 6 hours PRN wheezing or bronchospasm, if no treatments needed after 48 hours then discontinue using Per Protocol order mode. If indication present, adjust the RT bronchodilator orders based on the Bronchodilator Assessment Score as indicated below. If a patient is on this medication at home then do not decrease Frequency below that used at home. 0-3 - enter or revise RT bronchodilator order(s) to equivalent RT Bronchodilator order with Frequency of every 4 hours PRN for wheezing or increased work of breathing using Per Protocol order mode. 4-6 - enter or revise RT Bronchodilator order(s) to two equivalent RT bronchodilator orders with one order with BID Frequency and one order with Frequency of every 4 hours PRN wheezing or increased work of breathing using Per Protocol order mode.          7-10 - enter or revise RT Bronchodilator order(s) to two equivalent RT bronchodilator orders with one order with TID Frequency and one order with Frequency of every 4 hours PRN wheezing or increased work of breathing using Per Protocol order mode. 11-13 - enter or revise RT Bronchodilator order(s) to one equivalent RT bronchodilator order with QID Frequency and an Albuterol order with Frequency of every 4 hours PRN wheezing or increased work of breathing using Per Protocol order mode. Greater than 13 - enter or revise RT Bronchodilator order(s) to one equivalent RT bronchodilator order with every 4 hours Frequency and an Albuterol order with Frequency of every 2 hours PRN wheezing or increased work of breathing using Per Protocol order mode. RT to enter RT Home Evaluation for COPD & MDI Assessment order using Per Protocol order mode.     Electronically signed by Juan Mccord RCP on 10/11/2022 at 9:30 AM

## 2022-10-11 NOTE — PROGRESS NOTES
4 Eyes Admission Assessment     I agree as the admission nurse that 2 RN's have performed a thorough Head to Toe Skin Assessment on the patient. ALL assessment sites listed below have been assessed on admission. Areas assessed by both nurses: Franky Milly   [x]   Head, Face, and Ears   [x]   Shoulders, Back, and Chest  [x]   Arms, Elbows, and Hands   [x]   Coccyx, Sacrum, and Ischium  [x]   Legs, Feet, and Heels        Does the Patient have Skin Breakdown?   No         Eddy Prevention initiated:  Yes   Wound Care Orders initiated:  No      [facility] nurse consulted for Pressure Injury (Stage 3,4, Unstageable, DTI, NWPT, and Complex wounds) or Eddy score 18 or lower:  No      Nurse 1 eSignature: Electronically signed by Jer Dc RN on 10/11/22 at 8:58 AM EDT    **SHARE this note so that the co-signing nurse is able to place an eSignature**    Nurse 2 eSignature: {Esignature:[id number]}

## 2022-10-11 NOTE — PLAN OF CARE
Problem: Pain  Goal: Verbalizes/displays adequate comfort level or baseline comfort level  Flowsheets (Taken 10/11/2022 1822)  Verbalizes/displays adequate comfort level or baseline comfort level:   Encourage patient to monitor pain and request assistance   Assess pain using appropriate pain scale  Note: Denies pain during this shift. PRN tylenol administered once for a fever. Pt instructed to verbalize and report pain upon onset. Will continue to monitor. Problem: Safety - Adult  Goal: Free from fall injury  Flowsheets (Taken 10/11/2022 1824)  Free From Fall Injury: Instruct family/caregiver on patient safety  Note: Alert and oriented x4. Calls appropriately for assistance. Bed locked in lowest position. 3/4 Bed rails up. Call light within reach. Pt belongings within reach. Bedside table within reach. Pt instructed to use call light prior to getting up. Will continue to monitor.

## 2022-10-12 ENCOUNTER — TELEPHONE (OUTPATIENT)
Dept: CARDIOLOGY CLINIC | Age: 67
End: 2022-10-12

## 2022-10-12 VITALS
BODY MASS INDEX: 34.67 KG/M2 | RESPIRATION RATE: 18 BRPM | HEIGHT: 61 IN | DIASTOLIC BLOOD PRESSURE: 54 MMHG | OXYGEN SATURATION: 94 % | WEIGHT: 183.64 LBS | SYSTOLIC BLOOD PRESSURE: 119 MMHG | TEMPERATURE: 98.2 F | HEART RATE: 89 BPM

## 2022-10-12 LAB
ANION GAP SERPL CALCULATED.3IONS-SCNC: 7 MMOL/L (ref 3–16)
BASOPHILS ABSOLUTE: 0 K/UL (ref 0–0.2)
BASOPHILS RELATIVE PERCENT: 0.1 %
BUN BLDV-MCNC: 18 MG/DL (ref 7–20)
CALCIUM SERPL-MCNC: 8.9 MG/DL (ref 8.3–10.6)
CHLORIDE BLD-SCNC: 105 MMOL/L (ref 99–110)
CO2: 26 MMOL/L (ref 21–32)
CREAT SERPL-MCNC: 0.9 MG/DL (ref 0.6–1.2)
EOSINOPHILS ABSOLUTE: 0 K/UL (ref 0–0.6)
EOSINOPHILS RELATIVE PERCENT: 0 %
GFR AFRICAN AMERICAN: >60
GFR NON-AFRICAN AMERICAN: >60
GLUCOSE BLD-MCNC: 108 MG/DL (ref 70–99)
GLUCOSE BLD-MCNC: 124 MG/DL (ref 70–99)
GLUCOSE BLD-MCNC: 184 MG/DL (ref 70–99)
HCT VFR BLD CALC: 37.7 % (ref 36–48)
HEMOGLOBIN: 12.2 G/DL (ref 12–16)
L. PNEUMOPHILA SEROGP 1 UR AG: NORMAL
LYMPHOCYTES ABSOLUTE: 0.4 K/UL (ref 1–5.1)
LYMPHOCYTES RELATIVE PERCENT: 5.7 %
MCH RBC QN AUTO: 28.1 PG (ref 26–34)
MCHC RBC AUTO-ENTMCNC: 32.4 G/DL (ref 31–36)
MCV RBC AUTO: 86.9 FL (ref 80–100)
MONOCYTES ABSOLUTE: 1.2 K/UL (ref 0–1.3)
MONOCYTES RELATIVE PERCENT: 18.3 %
MRSA SCREEN RT-PCR: NORMAL
NEUTROPHILS ABSOLUTE: 4.9 K/UL (ref 1.7–7.7)
NEUTROPHILS RELATIVE PERCENT: 75.9 %
PDW BLD-RTO: 16.4 % (ref 12.4–15.4)
PERFORMED ON: ABNORMAL
PERFORMED ON: ABNORMAL
PLATELET # BLD: 168 K/UL (ref 135–450)
PMV BLD AUTO: 9 FL (ref 5–10.5)
POTASSIUM SERPL-SCNC: 4 MMOL/L (ref 3.5–5.1)
RBC # BLD: 4.34 M/UL (ref 4–5.2)
SODIUM BLD-SCNC: 138 MMOL/L (ref 136–145)
STREP PNEUMONIAE ANTIGEN, URINE: NORMAL
WBC # BLD: 6.4 K/UL (ref 4–11)

## 2022-10-12 PROCEDURE — 6370000000 HC RX 637 (ALT 250 FOR IP)

## 2022-10-12 PROCEDURE — 87449 NOS EACH ORGANISM AG IA: CPT

## 2022-10-12 PROCEDURE — 85025 COMPLETE CBC W/AUTO DIFF WBC: CPT

## 2022-10-12 PROCEDURE — 94761 N-INVAS EAR/PLS OXIMETRY MLT: CPT

## 2022-10-12 PROCEDURE — 2700000000 HC OXYGEN THERAPY PER DAY

## 2022-10-12 PROCEDURE — 36415 COLL VENOUS BLD VENIPUNCTURE: CPT

## 2022-10-12 PROCEDURE — 6370000000 HC RX 637 (ALT 250 FOR IP): Performed by: INTERNAL MEDICINE

## 2022-10-12 PROCEDURE — 2580000003 HC RX 258

## 2022-10-12 PROCEDURE — 87205 SMEAR GRAM STAIN: CPT

## 2022-10-12 PROCEDURE — 80048 BASIC METABOLIC PNL TOTAL CA: CPT

## 2022-10-12 PROCEDURE — 87070 CULTURE OTHR SPECIMN AEROBIC: CPT

## 2022-10-12 PROCEDURE — 94640 AIRWAY INHALATION TREATMENT: CPT

## 2022-10-12 RX ORDER — OSELTAMIVIR PHOSPHATE 75 MG/1
75 CAPSULE ORAL 2 TIMES DAILY
Qty: 6 CAPSULE | Refills: 0 | Status: CANCELLED | OUTPATIENT
Start: 2022-10-12 | End: 2022-10-15

## 2022-10-12 RX ORDER — OSELTAMIVIR PHOSPHATE 75 MG/1
75 CAPSULE ORAL 2 TIMES DAILY
Qty: 6 CAPSULE | Refills: 0 | Status: SHIPPED | OUTPATIENT
Start: 2022-10-12 | End: 2022-10-15

## 2022-10-12 RX ORDER — OSELTAMIVIR PHOSPHATE 75 MG/1
75 CAPSULE ORAL 2 TIMES DAILY
Qty: 6 CAPSULE | Refills: 0 | Status: SHIPPED | OUTPATIENT
Start: 2022-10-12 | End: 2022-10-12 | Stop reason: SDUPTHER

## 2022-10-12 RX ADMIN — APIXABAN 5 MG: 5 TABLET, FILM COATED ORAL at 09:00

## 2022-10-12 RX ADMIN — CALCIUM CARBONATE-VITAMIN D TAB 500 MG-200 UNIT 1 TABLET: 500-200 TAB at 10:08

## 2022-10-12 RX ADMIN — AMLODIPINE BESYLATE 10 MG: 10 TABLET ORAL at 09:00

## 2022-10-12 RX ADMIN — IPRATROPIUM BROMIDE AND ALBUTEROL SULFATE 1 AMPULE: 2.5; .5 SOLUTION RESPIRATORY (INHALATION) at 13:22

## 2022-10-12 RX ADMIN — LISINOPRIL 40 MG: 40 TABLET ORAL at 09:00

## 2022-10-12 RX ADMIN — ASPIRIN 81 MG: 81 TABLET, COATED ORAL at 09:00

## 2022-10-12 RX ADMIN — Medication 25 MCG: at 09:00

## 2022-10-12 RX ADMIN — SODIUM CHLORIDE, PRESERVATIVE FREE 10 ML: 5 INJECTION INTRAVENOUS at 09:13

## 2022-10-12 RX ADMIN — PANTOPRAZOLE SODIUM 40 MG: 40 TABLET, DELAYED RELEASE ORAL at 08:18

## 2022-10-12 RX ADMIN — IPRATROPIUM BROMIDE AND ALBUTEROL SULFATE 1 AMPULE: 2.5; .5 SOLUTION RESPIRATORY (INHALATION) at 07:48

## 2022-10-12 RX ADMIN — OSELTAMIVIR PHOSPHATE 75 MG: 75 CAPSULE ORAL at 09:04

## 2022-10-12 RX ADMIN — POTASSIUM CHLORIDE 20 MEQ: 1500 TABLET, EXTENDED RELEASE ORAL at 09:00

## 2022-10-12 ASSESSMENT — PAIN SCALES - GENERAL: PAINLEVEL_OUTOF10: 0

## 2022-10-12 NOTE — TELEPHONE ENCOUNTER
The patient has an angiogram scheduled for 10/18/22 but is currently in the hospital with the flu.  The patient would like to know if she needs to reschedule her angiogram. [phone]

## 2022-10-12 NOTE — PROGRESS NOTES
[employer] secure message sent to Dr. Delma Rg MD, as follows:  Please note, discharge medication reconciliation is not complete. Please complete medication reconciliation for discharge. Thank you!

## 2022-10-12 NOTE — TELEPHONE ENCOUNTER
Spoke with patient, thinks she might be discharged from hosp today. Will call pt either late today or tomorrow am to discuss rescheduling [facility] planned for 10/18.

## 2022-10-12 NOTE — PROGRESS NOTES
Patient safely discharged from hospital and escorted via wheelchair to exit by Krista Laurent RN for patient to receive ride home with her family/daughter. Patient had all personal belongings with her at time of discharge.

## 2022-10-12 NOTE — PLAN OF CARE
D; Slept overnight without any c/o pain or SOB. Amb to BR to have lg BM this am with [email or web address] without any distress or SOB. Low light, bed alarm, & non skid socks for safety. A: Cont to monitor during hourly rounds    Problem: Pain  Goal: Verbalizes/displays adequate comfort level or baseline comfort level  Outcome: Progressing     Problem: Safety - Adult  Goal: Free from fall injury  10/11/2022 1824 by Bandar Araujo RN  Flowsheets (Taken 10/11/2022 1824)  Free From Fall Injury: Instruct family/caregiver on patient safety  Note: Alert and oriented x4. Calls appropriately for assistance. Bed locked in lowest position. 3/4 Bed rails up. Call light within reach. Pt belongings within reach. Bedside table within reach. Pt instructed to use call light prior to getting up. Will continue to monitor.

## 2022-10-12 NOTE — DISCHARGE INSTRUCTIONS
Ms. Maya Frye, please make appointment to follow up with your primary care doctor in 1 week. Also, you will begin taking a short course of Tamiflu, which is a medication for the Flu (Influenza A). This will be given to you in the hospital by the pharmacy before you leave.  The full list of medications that you will continue taking, take at a different dosage, and discontinue taking can be found in full the in the medication list.
Seizure disorder

## 2022-10-12 NOTE — DISCHARGE SUMMARY
Patient seen and examined doing great off o2, no breathing complaints, energy improved  Discharge today complete antiviral medications  Discussed droplet precautions at discharge  Asked patient to monitor for recurrence of symptoms or new symptoms including but not limited to chest pain shortness of breath nausea vomiting fevers or chills and seek immediate medical attention or call 911. Greater than 30 minutes spent on case on day of discharge.

## 2022-10-12 NOTE — DISCHARGE SUMMARY
INTERNAL MEDICINE DEPARTMENT AT [address] [place] [address]  DISCHARGE SUMMARY    Patient ID: Wil Foreman                                             Discharge Date: 10/12/2022   Patient's PCP: Tyrese Toscano MD                                          Discharge Physician: Yannick Phan MD MD  Admit Date: 10/10/2022   Admitting Physician: Mt Ratliff MD    PROBLEMS DURING HOSPITALIZATION:  Present on Admission:   Influenza A   HTN (hypertension)   Hyperlipidemia   Type 2 diabetes mellitus ([facility]   Multiple myeloma not having achieved remission ([facility]   Coronary artery disease involving native coronary artery   Acute respiratory failure with hypoxia ([facility]   Acute bronchospasm due to viral infection      DISCHARGE DIAGNOSES:  Influenza A  CAD s/p CABG 2019  Chronic anticoagulation  Multiple Myeloma  T2DM  HTN  HLD    HPI:  Bello Quiles is a 82-year-old female past medical history of CAD, HTN HLD, T2DM, multiple myeloma comes to the ED with 2-day history of shortness of breath and cough. According to the patient she started having shortness of breath 2 days ago which got worse with nonproductive cough. Patient had some  subjective fevers, chills, nasal,congestion denies sore throat denies nausea vomiting or diarrhea,denied any chest pain or chest heaviness does have some heaviness across her upper back. Patient was hypoxic on presentation saturating in the low 80s on room air she was put on 4 L nasal cannula with improvement in saturation to the lower 90s. She notably has a history of coronary artery disease had a recent positive stress test and is being set up for an outpatient cardiac catheterization. In ED BP: (!) 167/81, Temp: 99.3 °F (37.4 °C), Heart Rate: (!) 106, Resp: 20, SpO2: 92 %   Labs: BMPs and CBCs were normal.proBNP 190, respiratory culture positive for influenza A.     The following issues were addressed during hospitalization:    Shortness of Breath 2/2 Influenza A  Presented with shortness of breath, initially required on low-flow oxygen tested positive for influenza in the ED, but negative for COVID. Given 1 dose of Solu-Medrol in the ED as well as started on Tamiflu given early diagnosis of influenza. Patient also given duo nebs and albuterol as needed. Patient also had bacterial infectious work-up, however blood cultures were negative, MRSA probe negative, Legionella and strep pneumo negative. Patient quickly recovered, was weaned off oxygen and returned to her baseline of well rapidly. Patient will take Tamiflu at home (meds to beds) and will follow-up with PCP in 1 week. CAD s/p CABG 2019  Patient with recent admission for chest pain and positive stress test and LHC scheduled for 10/18 with Dr. Cleve Mccollum. This admission however troponins negative, EKG nonischemic. Thus no interventions needed and patient will follow up with cardiology as directed. No changes in medications, will follow-up with PCP in 1 week. Chronic anticoagulation  Patient on Eliquis since 2019. Unknown whether exact reason for this could possibly be for \"blood clot in heart\" seen and chart review or for possible DVT/PE, also seen and recorded in chart review. Nevertheless, home Eliquis 5 mg twice daily continue this admission, will continue upon discharge. Follow-up with PCP in 1 week. Multiple Myeloma  Continued home Pomalyst as per her schedule per [facility]. No other changes made. We will follow-up with [facility] as directed, and with PCP in 1 week. T2DM  Not on home meds, diet controlled, blood glucose monitored, was stable. Will follow with PCP in 1 week as above. HTN  Home amlodipine, and lisinopril continued. Lopressor held given bradycardia, and will be discontinued upon discharge. However will continue amlodipine and lisinopril. PCP follow-up in 1 week. HLD  Home statin given. Physical Exam:  Constitutional:       General: She is not in acute distress. Appearance: She is not ill-appearing. HENT:      Head: Normocephalic and atraumatic. Mouth/Throat:      Mouth: Mucous membranes are moist.      Pharynx: No oropharyngeal exudate or posterior oropharyngeal erythema. Eyes:      General: No scleral icterus. Extraocular Movements: Extraocular movements intact. Pupils: Pupils are equal, round, and reactive to light. Cardiovascular:      Rate and Rhythm: Normal rate and regular rhythm. Pulses: Normal pulses. Heart sounds: Normal heart sounds. Pulmonary:      Effort: No respiratory distress. Breath sounds: No wheezing or rhonchi. Comments: Slightly reduced breath sounds. Abdominal:      General: There is no distension. Palpations: Abdomen is soft. Tenderness: There is no abdominal tenderness. Musculoskeletal:         General: No swelling. Right lower leg: No edema. Left lower leg: No edema. Skin:     General: Skin is warm. Neurological:      General: No focal deficit present. Mental Status: She is alert and oriented to person, place, and     Consults: None  Significant Diagnostic Studies: CXR  Treatments: Tamiflu, supplemental oxygen.   Disposition: Home  Discharged Condition: Stable  Follow Up: Primary Care Physician in 1 week    DISCHARGE MEDICATION:       Medication List        START taking these medications      oseltamivir 75 MG capsule  Commonly known as: TAMIFLU  Take 1 capsule by mouth 2 times daily for 6 doses            CONTINUE taking these medications      amLODIPine 10 MG tablet  Commonly known as: NORVASC  Take 1 tablet by mouth daily     apixaban 5 MG Tabs tablet  Commonly known as: ELIQUIS     aspirin 81 MG EC tablet  Take 1 tablet by mouth daily     Calcium Carbonate-Vitamin D 600-125 MG-UNIT Tabs     dexamethasone 4 MG tablet  Commonly known as: DECADRON     lisinopril 40 MG tablet  Commonly known as: PRINIVIL;ZESTRIL  Take 1 tablet by mouth daily     pantoprazole 40 MG tablet  Commonly known as: PROTONIX  Take 1 tablet by mouth every morning (before breakfast)     pomalidomide 4 MG chemo capsule  Commonly known as: POMALYST     potassium chloride 10 MEQ extended release tablet  Commonly known as: KLOR-CON  TAKE 1 TABLET BY MOUTH TWICE DAILY     rosuvastatin 40 MG tablet  Commonly known as: CRESTOR  Take 1 tablet by mouth every evening     * sodium chloride 0.9 % SOLN 250 mL with elotuzumab 300 MG SOLR 10 mg/kg     * sodium chloride 0.9 % SOLN 250 mL with elotuzumab 300 MG SOLR 1,725 mg     Vitamin D3 25 MCG (1000 UT) Caps     zoledronic acid 4 MG/5ML injection  Commonly known as: ZOMETA           * This list has 2 medication(s) that are the same as other medications prescribed for you. Read the directions carefully, and ask your doctor or other care provider to review them with you. STOP taking these medications      metoprolol tartrate 25 MG tablet  Commonly known as: LOPRESSOR               Where to Get Your Medications        [facility], [address]  [facility]. [facility] [phone] - F [phone] [address] [facility] [address]      Phone: [phone]   oseltamivir 75 MG capsule          Activity: activity as tolerated  Diet: regular diet  Wound Care: none needed    Time Spent on discharge is more than 30 minutes    Signed:  Matt Stanford MD, PGY-1  10/12/2022

## 2022-10-12 NOTE — PLAN OF CARE
Problem: Discharge Planning  Goal: Discharge to home or other facility with appropriate resources  Outcome: Adequate for Discharge     Problem: Chronic Conditions and Co-morbidities  Goal: Patient's chronic conditions and co-morbidity symptoms are monitored and maintained or improved  Outcome: Adequate for Discharge     Problem: Pain  Goal: Verbalizes/displays adequate comfort level or baseline comfort level  10/12/2022 1145 by Margarette Jones RN  Outcome: Adequate for Discharge     Problem: Safety - Adult  Goal: Free from fall injury  Outcome: Adequate for Discharge     Problem: ABCDS Injury Assessment  Goal: Absence of physical injury  Outcome: Adequate for Discharge     Medications per order, patient is improving in oxygenation status/weaned of oxygen to room air with a saturation of 94%. She denies further needs at this time, will continue to monitor.

## 2022-10-12 NOTE — PROGRESS NOTES
Discharge instructions provided to patient and work note given/may return to work on 10/17/22 per discharge/Dr. Heena Mobley attending physician.

## 2022-10-12 NOTE — PROGRESS NOTES
Perfect Serve secure message sent to Dr. Julita Henao MD, as follows: \"Discharge Medication Reconcilation not complete, contact Discharging Provider to complete Reconciliation before printing the AVS (After Visit Summary. \"  The AVS Summary is provided to the patient at discharge. Please advise. Thank you!

## 2022-10-14 LAB
BLOOD CULTURE, ROUTINE: NORMAL
CULTURE, RESPIRATORY: NORMAL
GRAM STAIN RESULT: NORMAL

## 2022-10-15 LAB — CULTURE, BLOOD 2: NORMAL

## 2022-10-24 NOTE — PRE-PROCEDURE INSTRUCTIONS
Called patient about procedure. Told to be here at 0930 for procedure at 1100. NPO after midnight, but can take morning medication with sips of water, patient stated their last dose of Eliquis was Saturday. To have a responsible adult be with patient take them home and stay with them afterwards, if they do not get admitted to [address] [place] [address]. And if available bring current list of medications. No other questions or concerns.

## 2022-10-25 ENCOUNTER — HOSPITAL ENCOUNTER (OUTPATIENT)
Dept: CARDIAC CATH/INVASIVE PROCEDURES | Age: 67
Setting detail: OBSERVATION
Discharge: HOME | End: 2022-10-26
Attending: INTERNAL MEDICINE | Admitting: INTERNAL MEDICINE
Payer: MEDICARE

## 2022-10-25 PROBLEM — I25.10 CAD S/P PERCUTANEOUS CORONARY ANGIOPLASTY: Status: ACTIVE | Noted: 2022-10-25

## 2022-10-25 PROBLEM — Z98.61 CAD S/P PERCUTANEOUS CORONARY ANGIOPLASTY: Status: ACTIVE | Noted: 2022-10-25

## 2022-10-25 LAB
A/G RATIO: 1.9 (ref 1.1–2.2)
ALBUMIN SERPL-MCNC: 3.8 G/DL (ref 3.4–5)
ALP BLD-CCNC: 47 U/L (ref 40–129)
ALT SERPL-CCNC: 14 U/L (ref 10–40)
ANION GAP SERPL CALCULATED.3IONS-SCNC: 10 MMOL/L (ref 3–16)
AST SERPL-CCNC: 17 U/L (ref 15–37)
BILIRUB SERPL-MCNC: 0.5 MG/DL (ref 0–1)
BUN BLDV-MCNC: 14 MG/DL (ref 7–20)
CALCIUM SERPL-MCNC: 9.2 MG/DL (ref 8.3–10.6)
CHLORIDE BLD-SCNC: 106 MMOL/L (ref 99–110)
CO2: 27 MMOL/L (ref 21–32)
CREAT SERPL-MCNC: 0.7 MG/DL (ref 0.6–1.2)
EKG ATRIAL RATE: 59 BPM
EKG ATRIAL RATE: 69 BPM
EKG DIAGNOSIS: NORMAL
EKG DIAGNOSIS: NORMAL
EKG P AXIS: 52 DEGREES
EKG P AXIS: 60 DEGREES
EKG P-R INTERVAL: 164 MS
EKG P-R INTERVAL: 166 MS
EKG Q-T INTERVAL: 424 MS
EKG Q-T INTERVAL: 424 MS
EKG QRS DURATION: 118 MS
EKG QRS DURATION: 124 MS
EKG QTC CALCULATION (BAZETT): 419 MS
EKG QTC CALCULATION (BAZETT): 454 MS
EKG R AXIS: -27 DEGREES
EKG R AXIS: -32 DEGREES
EKG T AXIS: 42 DEGREES
EKG T AXIS: 65 DEGREES
EKG VENTRICULAR RATE: 59 BPM
EKG VENTRICULAR RATE: 69 BPM
GFR SERPL CREATININE-BSD FRML MDRD: >60 ML/MIN/{1.73_M2}
GLUCOSE BLD-MCNC: 107 MG/DL (ref 70–99)
HCT VFR BLD CALC: 39.3 % (ref 36–48)
HEMOGLOBIN: 12.8 G/DL (ref 12–16)
INR BLD: 0.98 (ref 0.87–1.14)
LEFT VENTRICULAR EJECTION FRACTION MODE: NORMAL
LV EF: 60 %
MCH RBC QN AUTO: 28.6 PG (ref 26–34)
MCHC RBC AUTO-ENTMCNC: 32.7 G/DL (ref 31–36)
MCV RBC AUTO: 87.5 FL (ref 80–100)
PDW BLD-RTO: 16.2 % (ref 12.4–15.4)
PLATELET # BLD: 253 K/UL (ref 135–450)
PMV BLD AUTO: 8.3 FL (ref 5–10.5)
POC ACT LR: 253 SEC
POTASSIUM SERPL-SCNC: 3.7 MMOL/L (ref 3.5–5.1)
PROTHROMBIN TIME: 12.9 SEC (ref 11.7–14.5)
RBC # BLD: 4.49 M/UL (ref 4–5.2)
SODIUM BLD-SCNC: 143 MMOL/L (ref 136–145)
TOTAL PROTEIN: 5.8 G/DL (ref 6.4–8.2)
WBC # BLD: 4.2 K/UL (ref 4–11)

## 2022-10-25 PROCEDURE — 93459 L HRT ART/GRFT ANGIO: CPT

## 2022-10-25 PROCEDURE — 6360000002 HC RX W HCPCS

## 2022-10-25 PROCEDURE — C1769 GUIDE WIRE: HCPCS

## 2022-10-25 PROCEDURE — 92937 PRQ TRLUML REVSC CAB GRF 1: CPT | Performed by: INTERNAL MEDICINE

## 2022-10-25 PROCEDURE — C1874 STENT, COATED/COV W/DEL SYS: HCPCS

## 2022-10-25 PROCEDURE — 93010 ELECTROCARDIOGRAM REPORT: CPT | Performed by: INTERNAL MEDICINE

## 2022-10-25 PROCEDURE — C1760 CLOSURE DEV, VASC: HCPCS

## 2022-10-25 PROCEDURE — C9604 PERC D-E COR REVASC T CABG S: HCPCS

## 2022-10-25 PROCEDURE — C1894 INTRO/SHEATH, NON-LASER: HCPCS

## 2022-10-25 PROCEDURE — 93005 ELECTROCARDIOGRAM TRACING: CPT | Performed by: INTERNAL MEDICINE

## 2022-10-25 PROCEDURE — 2580000003 HC RX 258: Performed by: NURSE PRACTITIONER

## 2022-10-25 PROCEDURE — 6370000000 HC RX 637 (ALT 250 FOR IP)

## 2022-10-25 PROCEDURE — 2709999900 HC NON-CHARGEABLE SUPPLY

## 2022-10-25 PROCEDURE — 99153 MOD SED SAME PHYS/QHP EA: CPT

## 2022-10-25 PROCEDURE — 85027 COMPLETE CBC AUTOMATED: CPT

## 2022-10-25 PROCEDURE — 93005 ELECTROCARDIOGRAM TRACING: CPT | Performed by: NURSE PRACTITIONER

## 2022-10-25 PROCEDURE — 99152 MOD SED SAME PHYS/QHP 5/>YRS: CPT

## 2022-10-25 PROCEDURE — 2500000003 HC RX 250 WO HCPCS

## 2022-10-25 PROCEDURE — G0379 DIRECT REFER HOSPITAL OBSERV: HCPCS

## 2022-10-25 PROCEDURE — 6370000000 HC RX 637 (ALT 250 FOR IP): Performed by: NURSE PRACTITIONER

## 2022-10-25 PROCEDURE — G0378 HOSPITAL OBSERVATION PER HR: HCPCS

## 2022-10-25 PROCEDURE — 6360000004 HC RX CONTRAST MEDICATION: Performed by: INTERNAL MEDICINE

## 2022-10-25 PROCEDURE — 80053 COMPREHEN METABOLIC PANEL: CPT

## 2022-10-25 PROCEDURE — C1887 CATHETER, GUIDING: HCPCS

## 2022-10-25 PROCEDURE — 93459 L HRT ART/GRFT ANGIO: CPT | Performed by: INTERNAL MEDICINE

## 2022-10-25 PROCEDURE — 2580000003 HC RX 258: Performed by: INTERNAL MEDICINE

## 2022-10-25 PROCEDURE — 99220 PR INITIAL OBSERVATION CARE/DAY 70 MINUTES: CPT | Performed by: INTERNAL MEDICINE

## 2022-10-25 PROCEDURE — 85347 COAGULATION TIME ACTIVATED: CPT

## 2022-10-25 PROCEDURE — 85610 PROTHROMBIN TIME: CPT

## 2022-10-25 RX ORDER — SODIUM CHLORIDE 0.9 % (FLUSH) 0.9 %
5-40 SYRINGE (ML) INJECTION EVERY 12 HOURS SCHEDULED
Status: DISCONTINUED | OUTPATIENT
Start: 2022-10-25 | End: 2022-10-26 | Stop reason: HOSPADM

## 2022-10-25 RX ORDER — SODIUM CHLORIDE 9 MG/ML
INJECTION, SOLUTION INTRAVENOUS CONTINUOUS
Status: ACTIVE | OUTPATIENT
Start: 2022-10-25 | End: 2022-10-26

## 2022-10-25 RX ORDER — MORPHINE SULFATE 2 MG/ML
2 INJECTION, SOLUTION INTRAMUSCULAR; INTRAVENOUS
Status: DISCONTINUED | OUTPATIENT
Start: 2022-10-25 | End: 2022-10-26 | Stop reason: HOSPADM

## 2022-10-25 RX ORDER — ROSUVASTATIN CALCIUM 20 MG/1
40 TABLET, COATED ORAL EVERY EVENING
Status: DISCONTINUED | OUTPATIENT
Start: 2022-10-25 | End: 2022-10-26 | Stop reason: HOSPADM

## 2022-10-25 RX ORDER — ATROPINE SULFATE 0.4 MG/ML
0.5 AMPUL (ML) INJECTION
Status: DISCONTINUED | OUTPATIENT
Start: 2022-10-25 | End: 2022-10-26 | Stop reason: HOSPADM

## 2022-10-25 RX ORDER — PANTOPRAZOLE SODIUM 40 MG/1
40 TABLET, DELAYED RELEASE ORAL
Status: DISCONTINUED | OUTPATIENT
Start: 2022-10-26 | End: 2022-10-26 | Stop reason: HOSPADM

## 2022-10-25 RX ORDER — LISINOPRIL 40 MG/1
40 TABLET ORAL DAILY
Status: DISCONTINUED | OUTPATIENT
Start: 2022-10-25 | End: 2022-10-26 | Stop reason: HOSPADM

## 2022-10-25 RX ORDER — ONDANSETRON 2 MG/ML
4 INJECTION INTRAMUSCULAR; INTRAVENOUS EVERY 6 HOURS PRN
Status: DISCONTINUED | OUTPATIENT
Start: 2022-10-25 | End: 2022-10-25 | Stop reason: SDUPTHER

## 2022-10-25 RX ORDER — ASPIRIN 81 MG/1
81 TABLET ORAL DAILY
Status: DISCONTINUED | OUTPATIENT
Start: 2022-10-26 | End: 2022-10-26 | Stop reason: HOSPADM

## 2022-10-25 RX ORDER — SODIUM CHLORIDE 9 MG/ML
INJECTION, SOLUTION INTRAVENOUS PRN
Status: DISCONTINUED | OUTPATIENT
Start: 2022-10-25 | End: 2022-10-26 | Stop reason: HOSPADM

## 2022-10-25 RX ORDER — ONDANSETRON 2 MG/ML
4 INJECTION INTRAMUSCULAR; INTRAVENOUS EVERY 6 HOURS PRN
Status: DISCONTINUED | OUTPATIENT
Start: 2022-10-25 | End: 2022-10-26 | Stop reason: HOSPADM

## 2022-10-25 RX ORDER — ACETAMINOPHEN 325 MG/1
650 TABLET ORAL EVERY 4 HOURS PRN
Status: DISCONTINUED | OUTPATIENT
Start: 2022-10-25 | End: 2022-10-26 | Stop reason: HOSPADM

## 2022-10-25 RX ORDER — SODIUM CHLORIDE 9 MG/ML
INJECTION, SOLUTION INTRAVENOUS CONTINUOUS
Status: ACTIVE | OUTPATIENT
Start: 2022-10-25 | End: 2022-10-25

## 2022-10-25 RX ORDER — SODIUM CHLORIDE 0.9 % (FLUSH) 0.9 %
5-40 SYRINGE (ML) INJECTION PRN
Status: DISCONTINUED | OUTPATIENT
Start: 2022-10-25 | End: 2022-10-26 | Stop reason: HOSPADM

## 2022-10-25 RX ORDER — ACETAMINOPHEN 325 MG/1
650 TABLET ORAL EVERY 4 HOURS PRN
Status: DISCONTINUED | OUTPATIENT
Start: 2022-10-25 | End: 2022-10-25 | Stop reason: SDUPTHER

## 2022-10-25 RX ORDER — AMLODIPINE BESYLATE 10 MG/1
10 TABLET ORAL DAILY
Status: DISCONTINUED | OUTPATIENT
Start: 2022-10-25 | End: 2022-10-26 | Stop reason: HOSPADM

## 2022-10-25 RX ORDER — ROSUVASTATIN CALCIUM 20 MG/1
40 TABLET, COATED ORAL NIGHTLY
Status: DISCONTINUED | OUTPATIENT
Start: 2022-10-25 | End: 2022-10-25 | Stop reason: SDUPTHER

## 2022-10-25 RX ORDER — CLOPIDOGREL BISULFATE 75 MG/1
75 TABLET ORAL DAILY
Status: DISCONTINUED | OUTPATIENT
Start: 2022-10-26 | End: 2022-10-26 | Stop reason: HOSPADM

## 2022-10-25 RX ORDER — VITAMIN B COMPLEX
1 TABLET ORAL DAILY
Status: DISCONTINUED | OUTPATIENT
Start: 2022-10-26 | End: 2022-10-26 | Stop reason: HOSPADM

## 2022-10-25 RX ORDER — POTASSIUM CHLORIDE 750 MG/1
10 TABLET, EXTENDED RELEASE ORAL 2 TIMES DAILY
Status: DISCONTINUED | OUTPATIENT
Start: 2022-10-25 | End: 2022-10-26 | Stop reason: HOSPADM

## 2022-10-25 RX ADMIN — SODIUM CHLORIDE: 9 INJECTION, SOLUTION INTRAVENOUS at 18:24

## 2022-10-25 RX ADMIN — CALCIUM CARBONATE-VITAMIN D TAB 500 MG-200 UNIT 1 TABLET: 500-200 TAB at 23:29

## 2022-10-25 RX ADMIN — ROSUVASTATIN CALCIUM 40 MG: 20 TABLET, COATED ORAL at 23:28

## 2022-10-25 RX ADMIN — SODIUM CHLORIDE: 9 INJECTION, SOLUTION INTRAVENOUS at 13:00

## 2022-10-25 RX ADMIN — LISINOPRIL 40 MG: 40 TABLET ORAL at 23:29

## 2022-10-25 RX ADMIN — POTASSIUM CHLORIDE 10 MEQ: 750 TABLET, EXTENDED RELEASE ORAL at 23:29

## 2022-10-25 RX ADMIN — SODIUM CHLORIDE, PRESERVATIVE FREE 10 ML: 5 INJECTION INTRAVENOUS at 23:29

## 2022-10-25 RX ADMIN — AMLODIPINE BESYLATE 10 MG: 10 TABLET ORAL at 23:29

## 2022-10-25 RX ADMIN — IOHEXOL 250 ML: 350 INJECTION, SOLUTION INTRAVENOUS at 12:48

## 2022-10-25 ASSESSMENT — PAIN SCALES - GENERAL
PAINLEVEL_OUTOF10: 0

## 2022-10-25 ASSESSMENT — PAIN - FUNCTIONAL ASSESSMENT: PAIN_FUNCTIONAL_ASSESSMENT: NONE - DENIES PAIN

## 2022-10-25 NOTE — H&P
[address]  CARDIOLOGY  HISTORY AND PHYSICAL        Reason for Consultation/Chief Complaint: \"I have been having sob. \"       History of Present Illness:  True Prieto is a 74 y.o. patient who presented to the hospital with complaints of sob. The patient had nuclear scan that showed apical and inferolateral ischemia. She presents for cath and possible PCI. Pt has history of 4 V CABG    Past Medical History:   has a past medical history of CAD (coronary artery disease), Cancer ([facility] Diabetes mellitus ([facility] Hyperlipidemia, Hypertension, and MI (myocardial infarction) ([facility] Surgical History:   has a past surgical history that includes Tubal ligation; Hysterectomy; INSERTION / REMOVAL / REPLACEMENT VENOUS ACCESS CATHETER (N/A, 01/24/2019); Coronary artery bypass graft (N/A, 06/13/2019); and Colonoscopy. Social History:   reports that she quit smoking about 3 years ago. Her smoking use included cigarettes. She has a 10.00 pack-year smoking history. She has never used smokeless tobacco. She reports current alcohol use. She reports that she does not use drugs. Family History:  No evidence for sudden cardiac death or premature CAD    Home Medications:  Were reviewed and are listed in nursing record. and/or listed below  Prior to Admission medications    Medication Sig Start Date End Date Taking? Authorizing Provider   amLODIPine (NORVASC) 10 MG tablet Take 1 tablet by mouth daily 9/28/22   Cynthia Lou MD   pantoprazole (PROTONIX) 40 MG tablet Take 1 tablet by mouth every morning (before breakfast) 9/21/22   Rosa Maria Brown MD   dexamethasone (DECADRON) 4 MG tablet Take 40 mg by mouth See Admin Instructions Per current chemo regimen- taking 40 mg days 8, 15, and 22 of each 28-day chemo cycle.    On 9/30/22 is to take 21 mg prior to chemo treatment (Elotuzumab)    Historical Provider, MD   sodium chloride 0.9 % SOLN 250 mL with elotuzumab 300 MG SOLR 10 mg/kg Infuse 10 mg/kg intravenously once Chemo - every 28 days    Historical Provider, MD   sodium chloride 0.9 % SOLN 250 mL with elotuzumab 300 MG SOLR 1,725 mg Infuse 1,725 mg intravenously every 28 days    Historical Provider, MD   apixaban (ELIQUIS) 5 MG TABS tablet Take 5 mg by mouth 2 times daily    Historical Provider, MD   pomalidomide (POMALYST) 4 MG chemo capsule Take 4 mg by mouth every evening Daily on days 1-21 of 28-day cycle. Takes in evening.    9/18/22- per cycle, will take daily through 9/22/22, then 7 days off. 3/4/22   Historical Provider, MD   rosuvastatin (CRESTOR) 40 MG tablet Take 1 tablet by mouth every evening 6/20/22   Rosie Ann MD   metoprolol tartrate (LOPRESSOR) 25 MG tablet TAKE 1 TABLET BY MOUTH TWICE DAILY 6/20/22 9/20/22  Rosie Ann MD   lisinopril (PRINIVIL;ZESTRIL) 40 MG tablet Take 1 tablet by mouth daily 12/6/21   Rosie Ann MD   potassium chloride (KLOR-CON) 10 MEQ extended release tablet TAKE 1 TABLET BY MOUTH TWICE DAILY 8/13/21   HE Heredia - CNP   aspirin 81 MG EC tablet Take 1 tablet by mouth daily 6/19/19   HE Alcocer CNP   zoledronic acid (ZOMETA) 4 MG/5ML injection Infuse 4 mg intravenously every 3 months Last dose was in august    Historical Provider, MD   Cholecalciferol (VITAMIN D3) 1000 units CAPS Take 1 capsule by mouth daily    Historical Provider, MD   Calcium Carbonate-Vitamin D 600-125 MG-UNIT TABS Take 1 tablet by mouth 2 times daily  8/9/18   Historical Provider, MD        [facility] Medications:   amLODIPine  10 mg Oral Daily    [START ON 10/26/2022] aspirin  81 mg Oral Daily    oyster shell calcium w/D  1 tablet Oral BID    [START ON 10/26/2022] Vitamin D  1 tablet Oral Daily    lisinopril  40 mg Oral Daily    [START ON 10/26/2022] pantoprazole  40 mg Oral QAM AC    potassium chloride  10 mEq Oral BID    rosuvastatin  40 mg Oral QPM    sodium chloride flush  5-40 mL IntraVENous 2 times per day    [START ON 10/26/2022] clopidogrel  75 mg Oral Daily     sodium chloride flush, sodium chloride, acetaminophen, atropine, ondansetron   sodium chloride      sodium chloride 150 mL/hr at 10/25/22 1824       Allergies:  Pcn [penicillins] and Lipitor [atorvastatin]     Review of Systems:     A 14 ROS obtained and negative except as mentioned in HPI. Constitutional: there has been no unanticipated weight loss. Eyes: No visual changes or diplopia. No scleral icterus. ENT: No Headaches, hearing loss or vertigo. No mouth sores or sore throat. Cardiovascular: No loss of consciousness. No hemoptysis, pleuritic pain, or phlebitis. Respiratory: No cough or wheezing, no sputum production. No hematemesis. Gastrointestinal: No abdominal pain, appetite loss, blood in stools. No change in bowel or bladder habits. Genitourinary: No dysuria, or hematuria. Musculoskeletal:  No gait disturbance, weakness or joint complaints. Integumentary: No rash or pruritis. Neurological: No headache, diplopia,numbness or tingling. No change in gait, balance, coordination, mood, affect, memory, mentation, behavior. Psychiatric: No anxiety,  Endocrine: No malaise,  Hematologic/Lymphatic: No abnormal bruising  Allergic/Immunologic: No nasal congestion      Physical Examination:    Vitals:    10/25/22 1835   BP: 124/69   Pulse: 64   Resp: 16   Temp: 98.2 °F (36.8 °C)   SpO2: 98%    Weight: 184 lb (83.5 kg)         General Appearance:  Alert, cooperative, no distress, appears stated age   Head:  Normocephalic, without obvious abnormality, atraumatic   Eyes:  PERRL   Nose: Nares normal,   Neck: Supple, JVP normal    Lungs:   Clear to auscultation bilaterally, respirations unlabored   Chest Wall:  No tenderness or deformity   Heart:  Regular rate and rhythm, normal S1, S2 normal, no murmur, I/VI HSM No rub.  No S3  gallop   Abdomen:   Soft, non-tender, +bowel sounds   Extremities: no cyanosis, no clubbing , No edema   Pulses: Symmetric extremities   Skin: no gross lesions or rashes   Pysch: Normal mood and affect   Neurologic: No gross deficits. CN II - XII grossly intact        Labs  CBC:   Lab Results   Component Value Date/Time    WBC 4.2 10/25/2022 10:23 AM    RBC 4.49 10/25/2022 10:23 AM    HGB 12.8 10/25/2022 10:23 AM    HCT 39.3 10/25/2022 10:23 AM    MCV 87.5 10/25/2022 10:23 AM    RDW 16.2 10/25/2022 10:23 AM     10/25/2022 10:23 AM     CMP:    Lab Results   Component Value Date/Time     10/25/2022 10:23 AM    K 3.7 10/25/2022 10:23 AM    K 3.2 09/19/2022 03:45 AM     10/25/2022 10:23 AM    CO2 27 10/25/2022 10:23 AM    BUN 14 10/25/2022 10:23 AM    CREATININE 0.7 10/25/2022 10:23 AM    GFRAA >60 10/12/2022 06:47 AM    AGRATIO 1.9 10/25/2022 10:23 AM    LABGLOM >60 10/25/2022 10:23 AM    GLUCOSE 107 10/25/2022 10:23 AM    PROT 5.8 10/25/2022 10:23 AM    CALCIUM 9.2 10/25/2022 10:23 AM    BILITOT 0.5 10/25/2022 10:23 AM    ALKPHOS 47 10/25/2022 10:23 AM    AST 17 10/25/2022 10:23 AM    ALT 14 10/25/2022 10:23 AM     PT/INR:  No results found for: PTINR  No results for input(s): CKTOTAL, CKMB, TROPONINI in the last 72 hours. EKG:  SR with RBBB    Assessment  Patient Active Problem List   Diagnosis    Multiple myeloma not having achieved remission ([facility]    Multiple myeloma in remission ([facility]    NSTEMI (non-ST elevated myocardial infarction) ([facility]    Chest pain    HTN (hypertension)    Hyperlipidemia    Type 2 diabetes mellitus ([facility]    S/P coronary artery bypass graft x 4    Influenza A    Coronary artery disease involving native coronary artery    Acute respiratory failure with hypoxia (H[facility])    Acute bronchospasm due to viral infection    CAD S/P percutaneous coronary angioplasty        Impression:  SOB. CAD s/p CABG. Abnormal MPI with apical and inferolateral ischemia    Recommendations:    I had the opportunity to review the clinical symptoms and presentation of Ajith Quigley.      I recommend that the patient undergo further cardiac evaluation with cardiac catheterization with possible coronary intervention . I recommend that the patient be treated with typical CAD medications/core measures. Thank you for allowing to us to participate in the care or Mark Grace. All questions and concerns were addressed to the patient Alternatives to my treatment were discussed. The note was completed using EMR. Every effort was made to ensure accuracy; however, inadvertent computerized transcription errors may be present.        Johnnie Burkitt, MD [facility]

## 2022-10-25 NOTE — PROGRESS NOTES
4 Eyes Admission Assessment     I agree as the admission nurse that 2 RN's have performed a thorough Head to Toe Skin Assessment on the patient. ALL assessment sites listed below have been assessed on admission. Areas assessed by both nurses: Chanel Rao RN  [x]   Head, Face, and Ears   [x]   Shoulders, Back, and Chest  [x]   Arms, Elbows, and Hands   [x]   Coccyx, Sacrum, and Ischum  [x]   Legs, Feet, and Heels        Does the Patient have Skin Breakdown?   No         Eddy Prevention initiated:  No   Wound Care Orders initiated:  No      [facility] nurse consulted for Pressure Injury (Stage 3,4, Unstageable, DTI, NWPT, and Complex wounds):  No      Nurse 1 eSignature: Electronically signed by Shelbie Thayer RN on 10/25/22 at 6:45 PM EDT    Nurse 2 eSignature: Electronically signed by Adrian Restrepo RN on 10/25/22 at 6:51 PM EDT

## 2022-10-26 VITALS
HEART RATE: 65 BPM | HEIGHT: 61 IN | TEMPERATURE: 97.8 F | DIASTOLIC BLOOD PRESSURE: 84 MMHG | BODY MASS INDEX: 33.71 KG/M2 | OXYGEN SATURATION: 100 % | RESPIRATION RATE: 20 BRPM | WEIGHT: 178.57 LBS | SYSTOLIC BLOOD PRESSURE: 134 MMHG

## 2022-10-26 LAB
ANION GAP SERPL CALCULATED.3IONS-SCNC: 9 MMOL/L (ref 3–16)
BUN BLDV-MCNC: 11 MG/DL (ref 7–20)
CALCIUM SERPL-MCNC: 9.4 MG/DL (ref 8.3–10.6)
CHLORIDE BLD-SCNC: 106 MMOL/L (ref 99–110)
CO2: 23 MMOL/L (ref 21–32)
CREAT SERPL-MCNC: 0.9 MG/DL (ref 0.6–1.2)
GFR SERPL CREATININE-BSD FRML MDRD: >60 ML/MIN/{1.73_M2}
GLUCOSE BLD-MCNC: 159 MG/DL (ref 70–99)
HCT VFR BLD CALC: 43.2 % (ref 36–48)
HEMOGLOBIN: 13.9 G/DL (ref 12–16)
MCH RBC QN AUTO: 28.2 PG (ref 26–34)
MCHC RBC AUTO-ENTMCNC: 32.2 G/DL (ref 31–36)
MCV RBC AUTO: 87.5 FL (ref 80–100)
PDW BLD-RTO: 17 % (ref 12.4–15.4)
PLATELET # BLD: 269 K/UL (ref 135–450)
PMV BLD AUTO: 8.2 FL (ref 5–10.5)
POTASSIUM SERPL-SCNC: 3.3 MMOL/L (ref 3.5–5.1)
RBC # BLD: 4.94 M/UL (ref 4–5.2)
SODIUM BLD-SCNC: 138 MMOL/L (ref 136–145)
WBC # BLD: 3.9 K/UL (ref 4–11)

## 2022-10-26 PROCEDURE — 6370000000 HC RX 637 (ALT 250 FOR IP): Performed by: NURSE PRACTITIONER

## 2022-10-26 PROCEDURE — 36415 COLL VENOUS BLD VENIPUNCTURE: CPT

## 2022-10-26 PROCEDURE — 80061 LIPID PANEL: CPT

## 2022-10-26 PROCEDURE — 2580000003 HC RX 258: Performed by: INTERNAL MEDICINE

## 2022-10-26 PROCEDURE — 85027 COMPLETE CBC AUTOMATED: CPT

## 2022-10-26 PROCEDURE — 80048 BASIC METABOLIC PNL TOTAL CA: CPT

## 2022-10-26 PROCEDURE — 99217 PR OBSERVATION CARE DISCHARGE MANAGEMENT: CPT | Performed by: INTERNAL MEDICINE

## 2022-10-26 PROCEDURE — G0378 HOSPITAL OBSERVATION PER HR: HCPCS

## 2022-10-26 RX ORDER — CLOPIDOGREL BISULFATE 75 MG/1
75 TABLET ORAL DAILY
Qty: 90 TABLET | Refills: 3 | Status: SHIPPED | OUTPATIENT
Start: 2022-10-27

## 2022-10-26 RX ADMIN — PANTOPRAZOLE SODIUM 40 MG: 40 TABLET, DELAYED RELEASE ORAL at 08:49

## 2022-10-26 RX ADMIN — POTASSIUM CHLORIDE 10 MEQ: 750 TABLET, EXTENDED RELEASE ORAL at 08:39

## 2022-10-26 RX ADMIN — Medication 1000 UNITS: at 08:39

## 2022-10-26 RX ADMIN — CALCIUM CARBONATE-VITAMIN D TAB 500 MG-200 UNIT 1 TABLET: 500-200 TAB at 08:39

## 2022-10-26 RX ADMIN — CLOPIDOGREL BISULFATE 75 MG: 75 TABLET ORAL at 08:39

## 2022-10-26 RX ADMIN — SODIUM CHLORIDE, PRESERVATIVE FREE 10 ML: 5 INJECTION INTRAVENOUS at 08:45

## 2022-10-26 RX ADMIN — ASPIRIN 81 MG: 81 TABLET, COATED ORAL at 08:39

## 2022-10-26 ASSESSMENT — PAIN SCALES - GENERAL
PAINLEVEL_OUTOF10: 0

## 2022-10-26 NOTE — PLAN OF CARE
Problem: Discharge Planning  Goal: Discharge to home or other facility with appropriate resources  10/26/2022 1135 by Arik Peters RN  Outcome: Progressing  Flowsheets (Taken 10/26/2022 1135)  Discharge to home or other facility with appropriate resources:   Identify barriers to discharge with patient and caregiver   Arrange for needed discharge resources and transportation as appropriate   Identify discharge learning needs (meds, wound care, etc)  Note: V/S stable. Cath site clean, dry, and intact w/ no signs of swelling, bleeding, pain, or hematoma. Reviewed d/c plan and post-cath care/education w/ patient. Problem: Pain  Goal: Verbalizes/displays adequate comfort level or baseline comfort level  Outcome: Progressing  Flowsheets (Taken 10/26/2022 1135)  Verbalizes/displays adequate comfort level or baseline comfort level:   Encourage patient to monitor pain and request assistance   Assess pain using appropriate pain scale   Administer analgesics based on type and severity of pain and evaluate response   Implement non-pharmacological measures as appropriate and evaluate response  Note: Pt able to utilize numeric pain scale and has denied pain throughout shift.

## 2022-10-26 NOTE — PLAN OF CARE
Pt has denied cardiac discomfort thus far this shift, R groin cath site looks clean/dry/intact/soft. Lower leg pulses felt, good circulation, warm. Problem: Discharge Planning  Goal: Discharge to home or other facility with appropriate resources  Outcome: Progressing    No falls noted thus far this shift, bed in lowest position, non-skid socks on, call light within reach, hourly checks, safety maintained, will continue to monitor. Pt educated on safety and being up ad sharee.      Problem: Safety - Adult  Goal: Free from fall injury  Outcome: Progressing

## 2022-10-26 NOTE — PROCEDURES
[facility], [address]                            CARDIAC CATHETERIZATION    PATIENT NAME: Nazia Ivey                       :        1955  MED REC NO:   [id number]                          ROOM:       3595  ACCOUNT NO:   [de-identified]                           ADMIT DATE: 10/25/2022  PROVIDER:     Med Short. Romana Creed, MD    DATE OF PROCEDURE:  10/25/2022    CARDIAC CATHETERIZATION/PERCUTANEOUS CORONARY INTERVENTION/LEFT  VENTRICULOGRAPHY    INDICATION FOR PROCEDURE:  The patient has increasing shortness of  breath and had an abnormal nuclear cardiac scan that showed apical and  inferolateral ischemia. The patient has a history of coronary artery disease, status post CABG  recently. Because of her shortness of breath that could be an anginal  equivalent with history of coronary artery bypass grafting, she  presented today for coronary angiography given the abnormal nuclear  scan. The patient was prepped, draped in sterile manner, locally anesthetized  with 1% lidocaine in the right femoral region. She was sedated with  Versed and fentanyl in the usual manner. A 5-[place] sheath was placed  in retrograde manner in the right femoral artery over guidewire. JL-4,  JR-4, AR MOD, VNO catheters were used during the diagnostic procedure. All were aspirated, flushed prior to use. All catheters were advanced  under fluoroscopic guidance over guidewire, retracted over guidewire. FINDINGS:  Left main coronary artery is normal.  Left anterior  descending coronary artery has moderate disease proximally. At mid  vessel, there is significant collateral flow into the mid  and distal  LAD, appears to be very diffusely diseased. Circumflex coronary artery proximally is relatively normal.  There is a  paucity of vessels in the posterolateral wall of the left ventricle. Obtuse marginal branch #3 appears to be missing. Obtuse marginal #2 is  diffusely diseased but ANNA grade 3 flow was present and is moderately  diseased. The distal circumflex has mild to moderate luminal  irregularities. No focal obstruction. There is some collateral flow to  the distal right coronary artery from left coronary circulation. JR-4 catheter would not engage the left internal mammary artery. An IM  catheter engaged the left internal mammary artery and injection revealed  a widely patent left internal mammary artery with touchdown to the mid  left anterior descending coronary artery. There is diffuse moderate  disease in the distal LAD but there is ANNA grade 3 flow. No high-grade  focal obstruction. The JR-4 catheter prior to its removal engaged the saphenous vein graft  to the obtuse marginal branch. This was known to be a SCIP graft from  obtuse marginal #2 to the obtuse marginal branch #3. Obtuse marginal  branch #2 touchdown was occluded. Obtuse marginal branch #3 has ANNA-3  blood flow over a small vessel. The proximal aspect of the saphenous  vein graft was diffusely diseased to about 80%. It is a long area of  stenosis. There is no thrombus identified. The JR-4 catheter did engage the saphenous vein graft to the right  coronary artery. The saphenous vein graft was widely patent and touches  down to the right posterior descending coronary artery and backfills a  significant portion of the right coronary artery and the right  posterolateral branch of the right coronary artery. These native  arteries, namely, the PLV and the PDA are both diffusely diseased with  no high-grade focal obstruction. The IM catheter on attempts of engaging the native right coronary artery  did slip into the left ventricle. The LVEDP was 16 mmHg. Hand  injection DÍAZ projection shows LV ejection fraction 55% to 60% without  wall motion abnormality. No mitral regurgitation was seen.   No gradient  upon pullback from left ventricle, ascending aorta.    From prior angiography, it was known that she had a high anterior  takeoff of the right coronary artery and this was engaged with an AR  modified catheter which did, indeed, engage the proximal aspect of right  coronary artery. It is diffusely diseased and occluded at mid vessel. It was elected to proceed with PCI upon the saphenous vein graft to  obtuse marginal branch #3. I elected to continue with the 5-[place]  system. I used a 5-[place] JR-4 guide and heparin was given, a total  5500 units of heparin was given. A 190 BMW guidewire was advanced with  some difficulty. I was able to engage and place an [place] drug-eluting  stent into the 80% diffusely diseased saphenous vein graft in the more  distal aspect of this lesion, and this stent was deployed at a nominal  pressure of 12 atmospheres x30 seconds. There was ANNA-3 blood flow. I  did take another 3.0 x 15 mm Resolute [place] drug-eluting stent and placed  it overlapping in the proximal aspect of the first deployed stent and  was able to cover the ostium with a 3.0 x 15 [place] drug-eluting stent. This stent was deployed at 14 atmospheres x25 seconds. There was ANNA-3  blood flow with what appeared to be nearly 0% residual stenosis. No  dissection or dye extravasation. The patient tolerated the procedure  well and the interventional hardware was removed and the interventional  procedure was concluded. Injection of femoral sheath showed the sheath was located in the normal  right common femoral artery. There was a high bifurcation but still the  sheath was located in the right common femoral artery. For safety and  comfort, a 6-[place] Angio-Seal device was deployed. Hemostasis was  achieved. Single bolus Integrilin was given and 300 mg Plavix was  given. CONCLUSION:  1.   Successful primary stenting x2 of the long saphenous vein graft  lesion 80% reduced to nearly 0% residual stenosis with two [place]  drug-eluting stents, the first one was a 3.0 x 38 mm and the second was  a 3.0 x 15 mm placed overlapping to the proximal aspect of the longer  stent and covered the ostium nicely. There was no ventricularization or  dampening of pressures at the conclusion of the procedure as there was  present with the IM catheter in the original saphenous vein graft  narrowing. 2.  LV ejection fraction 55% to 60% without wall motion abnormality. 3.  Patent left internal mammary artery to the distal LAD with mild  diffuse disease in that distal LAD. 4.  Patent saphenous vein graft to the right posterior descending  coronary artery with backfilling of the distal right coronary  circulation. 5. Intervened upon and successfully intervened upon saphenous vein  graft to the obtuse marginal branch #3, obtuse marginal branch #2 is  occluded from the saphenous vein graft as was dictated in the operative  report. Successful stenting of the proximal aspect of this artery of  the saphenous vein graft with an 80% stenosis reduced to nearly 0%  residual stenosis with the [place] drug-eluting stents x2.  6.  Successful Angio-Seal, right femoral arteriotomy. PLAN:  Hydration, risk factor modification to ensue, telemetry  monitoring overnight. ESTIMATED BLOOD LOSS:  Less than 15 mL. Mallampati and ASA scores were  evaluated prior to the coronary angiography interventional procedure. The Mallampati score was 2 and the ASA score was 2. Liz Bell MD    D: 10/25/2022 18:58:23       T: 10/25/2022 19:02:17     [email or web address]  Job#: [id number]     Doc#: [id number]    CC:  Hue Fink MD

## 2022-10-26 NOTE — DISCHARGE SUMMARY
[address]                [address]                               DISCHARGE SUMMARY  PATIENT NAME: Lori Good                       :        1955  MED REC NO:   [id number]                          ROOM:       9487  ACCOUNT NO:   [de-identified]                           ADMIT DATE: 10/25/2022  PROVIDER:     Julian Henry MD                  DISCHARGE DATE:  10/26/2022    INDICATIONS FOR HOSPITALIZATION AND HOSPITAL COURSE:  The patient is a  pleasant 73-year-old female who underwent bypass surgery several years  ago, who complained of shortness of breath and underwent nuclear cardiac  scan that showed a mid to distal septal wall hypokinesis and small mild  mixed perfusion defect in the apical inferolateral wall suggesting  possible ischemia. The patient presented as an outpatient for diagnostic coronary  angiography with graft angiography and was found to have a normal  ejection fraction on left ventriculography and the saphenous vein graft  feeding obtuse marginal branch #3 and 4 had a long 80% stenosis. The  side-to-side anastomosis to obtuse marginal branch #2 was occluded, #3  was patent. It was elected to place two CARLO drug-eluting stents in the  proximal and ostial aspect of the saphenous vein graft to obtuse  marginal branch #3, which remained patent. There was nearly 0% residual  stenosis after stenting with a 3.0 x 28 mm [place] CARLO and then more  proximally in an overlapping manner with a 3.0 x 15 mm length [place]  drug-eluting stent. These stents were made by [employer]. The patient  tolerated the procedure well. The next day, she achieved maximal  medical benefit. She stated that she felt less short of breath. Creatinine was normal range at 0.9. Hemoglobin was 13.9. It was felt  that she was stable for discharge.   Notably, the patient has had a  relatively low heart rate and cannot take beta blockers as a result. She is on all other core measure medications. It was understood that  she is taking Eliquis in the form of prophylaxis with her  chemotherapeutic drug and so she is going to take Plavix and aspirin 81  mg daily for 2 weeks and then after that the aspirin will fall off and  she will longer take aspirin and continue Plavix with the Eliquis for  the stents that were placed. The patient tolerated the procedure well  and she achieved maximal medical benefit and was discharged in good  condition to home. For the complete list of medications, please see the  MAR.  _____ examination today showed the right groin was clean, dry and  intact with no hematoma. Heart rate was 56 beats per minute, blood  pressure 112/67. The patient had a normal cardiac exam, normal S1, S2. No S3, gallop and no rub. Lungs were clear to auscultation. Extremities, no significant edema. Again, the patient achieved maximal  medical benefit and was discharged home.         Diana Gandara MD    D: 10/26/2022 11:32:04       T: 10/26/2022 11:34:28     [email or web address]  Job#: [id number]     Doc#: [id number]    CC:

## 2022-10-26 NOTE — PROGRESS NOTES
Pt d/c at [id number]. IV and telemetry removed. D/C paperwork and post-cath instructions reviewed and complete. All questions asked and answered.

## 2022-10-27 ENCOUNTER — TELEPHONE (OUTPATIENT)
Dept: CARDIOLOGY CLINIC | Age: 67
End: 2022-10-27

## 2022-10-27 NOTE — TELEPHONE ENCOUNTER
Pt wanted to know if she should be taking the Eliquis and the Plavix.   Informed her that, yes she should be taking both

## 2022-10-27 NOTE — TELEPHONE ENCOUNTER
Patient is calling and questions regarding her medication because she was prescribed Plavix yesterday.  Please advise [phone]

## 2022-10-30 LAB
CHOLESTEROL, TOTAL: 139 MG/DL (ref 0–199)
HDLC SERPL-MCNC: 56 MG/DL (ref 40–60)
LDL CHOLESTEROL CALCULATED: 66 MG/DL
TRIGL SERPL-MCNC: 87 MG/DL (ref 0–150)
VLDLC SERPL CALC-MCNC: 17 MG/DL

## 2022-11-04 ENCOUNTER — OFFICE VISIT (OUTPATIENT)
Dept: CARDIOLOGY CLINIC | Age: 67
End: 2022-11-04
Payer: MEDICARE

## 2022-11-04 VITALS
HEART RATE: 66 BPM | DIASTOLIC BLOOD PRESSURE: 62 MMHG | SYSTOLIC BLOOD PRESSURE: 110 MMHG | OXYGEN SATURATION: 99 % | BODY MASS INDEX: 35.76 KG/M2 | HEIGHT: 61 IN | WEIGHT: 189.4 LBS

## 2022-11-04 DIAGNOSIS — I25.10 CAD S/P PERCUTANEOUS CORONARY ANGIOPLASTY: Primary | ICD-10-CM

## 2022-11-04 DIAGNOSIS — I10 HTN (HYPERTENSION), BENIGN: ICD-10-CM

## 2022-11-04 DIAGNOSIS — E78.2 MIXED HYPERLIPIDEMIA: ICD-10-CM

## 2022-11-04 DIAGNOSIS — Z95.1 HX OF CABG: ICD-10-CM

## 2022-11-04 DIAGNOSIS — Z98.61 CAD S/P PERCUTANEOUS CORONARY ANGIOPLASTY: Primary | ICD-10-CM

## 2022-11-04 PROCEDURE — 3074F SYST BP LT 130 MM HG: CPT | Performed by: NURSE PRACTITIONER

## 2022-11-04 PROCEDURE — 1123F ACP DISCUSS/DSCN MKR DOCD: CPT | Performed by: NURSE PRACTITIONER

## 2022-11-04 PROCEDURE — 3078F DIAST BP <80 MM HG: CPT | Performed by: NURSE PRACTITIONER

## 2022-11-04 PROCEDURE — 99214 OFFICE O/P EST MOD 30 MIN: CPT | Performed by: NURSE PRACTITIONER

## 2022-11-04 NOTE — PROGRESS NOTES
CC CAD s/p PCI   HPI:  74 y.o. patient of Dr Issac Negro with CAD/CABG/PCI, HTN, HLD, bradycardia  DM and multiple myeloma who is here for follow up after coronary angioplasty with stent to SVG-OM3. She is having abdominal discomfort, bloating and gas related to diverticulitis. She denies cp, sob, LH/dizziness, palpitations, syncope or falls. No LE edema, orthopnea, PND or early satiety. No n/v or GI/ bleeding. Denies right groin pain. Past Medical History:   Diagnosis Date    CAD (coronary artery disease)     Cancer ([facility]     Multiple myeloma    Diabetes mellitus ([facility]     Hyperlipidemia     Hypertension     [facility]      Past Surgical History:   Procedure Laterality Date    COLONOSCOPY      CORONARY ARTERY BYPASS GRAFT N/A 06/13/2019    CORONARY ARTERY BYPASS GRAFT X4, INTERNAL MAMMARY ARTERY AND SAPHENOUS VEIN GRAFT-TCP-BP performed by Nico Wei MD at [facility] ([facility])      partial for fibroids    INSERTION / REMOVAL / REPLACEMENT VENOUS ACCESS CATHETER N/A 01/24/2019    TRIFUSION CATHETER INSERTION LEFT SUBCLAVIAN performed by Tino Weiner MD at [address]       Family History   Problem Relation Age of Onset    Breast Cancer Neg Hx     Ovarian Cancer Neg Hx      Social History     Tobacco Use    Smoking status: Former     Packs/day: 0.50     Years: 20.00     Pack years: 10.00     Types: Cigarettes     Quit date: 11/22/2018     Years since quitting: 3.9    Smokeless tobacco: Never   Vaping Use    Vaping Use: Former   Substance Use Topics    Alcohol use: Yes     Comment: rare    Drug use: No     Allergies:Pcn [penicillins] and Lipitor [atorvastatin]    Review of Systems  General: No changes in weight, fatigue, or night sweats. HEENT: No blurry or decreased vision. No changes in hearing, nasal discharge or sore throat. Cardiovascular:  See HPI. Respiratory: No cough, hemoptysis, or wheezing.    Gastrointestinal:  No hematochezia, melana, constipation, diarrhea, or history of GI ulcers. Genito-Urinary: No dysuria or hematuria. No urgency or polyuria. Musculoskeletal:  No complaints of joint pain, joint swelling or muscular weakness/soreness. Neurological:  No dizziness, headaches, numbness/tingling, speech problems or weakness. Psychological:  No anxiety or depression. Hematological and Lymphatic: No abnormal bleeding or bruising, blood clots, jaundice or swollen lymph nodes. Endocrine:   No malaise/lethargy, palpitations, polydipsia/polyuria, temperature intolerance or unexpected weight changes  Skin:  No rashes or non-healing ulcers. Physical Exam:  /62 (Site: Left Upper Arm, Position: Sitting, Cuff Size: Medium Adult)   Pulse 66   Ht 5' 1\" (1.549 m)   Wt 189 lb 6.4 oz (85.9 kg)   SpO2 99%   BMI 35.79 kg/m²    General (appearance):  No acute distress  Eyes: anicteric   Neck: soft, No JVD  Ears/Nose/Mouth/Thorat: No cyanosis  CV: RRR   Respiratory:  clear, normal effort  GI: soft, non-tender, non-distended  Skin: Warm, dry. No rashes  Neuro/Psych: Alert and oriented x 3. Appropriate behavior  Ext:  No c/c. No edema  Pulses:  2+ right femoral. Right groin soft, no hematoma,     Weight  Wt Readings from Last 3 Encounters:   10/26/22 178 lb 9.2 oz (81 kg)   10/12/22 183 lb 10.3 oz (83.3 kg)   09/20/22 185 lb 6.5 oz (84.1 kg)          CBC:   Lab Results   Component Value Date    WBC 3.9 (L) 10/26/2022    HGB 13.9 10/26/2022    HCT 43.2 10/26/2022    MCV 87.5 10/26/2022     10/26/2022     BMP:  Lab Results   Component Value Date    CREATININE 0.9 10/26/2022    BUN 11 10/26/2022     10/26/2022    K 3.3 (L) 10/26/2022     10/26/2022    CO2 23 10/26/2022     CrCl cannot be calculated (Unknown ideal weight. ).   Mag:   Lab Results   Component Value Date/Time    MG 1.80 10/10/2022 07:49 PM     LIVER PROFILE:   Lab Results   Component Value Date    ALT 14 10/25/2022    AST 17 10/25/2022    ALKPHOS 47 10/25/2022    BILITOT 0.5 10/25/2022     PT/INR:   Lab Results   Component Value Date    INR 0.98 10/25/2022    INR 0.94 05/06/2020    INR 1.20 (H) 06/13/2019    PROTIME 12.9 10/25/2022    PROTIME 10.9 05/06/2020    PROTIME 13.7 (H) 06/13/2019     Pro-BNP   Lab Results   Component Value Date/Time    PROBNP 190 10/10/2022 07:49 PM    PROBNP 127 01/18/2020 05:30 PM    PROBNP 70 06/11/2019 01:45 PM     LIPIDS:  No components found for: CHLPL  Lab Results   Component Value Date    TRIG 87 10/26/2022    TRIG 58 12/14/2021    TRIG 73 09/29/2020     Lab Results   Component Value Date    HDL 56 10/26/2022    HDL 61 (H) 12/14/2021    HDL 74 (H) 09/29/2020     Lab Results   Component Value Date    LDLCALC 66 10/26/2022    LDLCALC 82 12/14/2021    [address] 09/29/2020     Lab Results   Component Value Date    LABVLDL 17 10/26/2022    LABVLDL 12 12/14/2021    LABVLDL 15 09/29/2020     TSH:No results found for: TSH, L8TNAIC, B8JUMAT, THYROIDAB    IMAGING:     10/26/2022 [facility]  Normal ejection fraction on left ventriculography and the saphenous vein graft  feeding obtuse marginal branch #3 and 4 had a long 80% stenosis. The  side-to-side anastomosis to obtuse marginal branch #2 was occluded, #3  was patent. It was elected to place two CARLO drug-eluting stents in the  proximal and ostial aspect of the saphenous vein graft to obtuse  marginal branch #3, which remained patent. There was nearly 0% residual  stenosis after stenting with a 3.0 x 28 mm [place] CARLO and then more  proximally in an overlapping manner with a 3.0 x 15 mm length [place]  drug-eluting stent. These stents were made by [employer]. The patient  tolerated the procedure well. 10/3/2022 Nuc stress      There is a small and mild, mixed perfusion defect in the apical    inferolateral wall, suggestive of possible ischemia. Normal LV size and systolic function. Left ventricular ejection fraction of 62 %. There is mid to distal septal wall hypokinesis.     Overall findings represent a intermediate risk scan. Dr Brian Peralta office was    notified and patient will be called today with results. Medications:   Current Outpatient Medications   Medication Sig Dispense Refill    clopidogrel (PLAVIX) 75 MG tablet Take 1 tablet by mouth daily 90 tablet 3    amLODIPine (NORVASC) 10 MG tablet Take 1 tablet by mouth daily 90 tablet 1    pantoprazole (PROTONIX) 40 MG tablet Take 1 tablet by mouth every morning (before breakfast) 30 tablet 1    dexamethasone (DECADRON) 4 MG tablet Take 40 mg by mouth See Admin Instructions Per current chemo regimen- taking 40 mg days 8, 15, and 22 of each 28-day chemo cycle. On 9/30/22 is to take 21 mg prior to chemo treatment (Elotuzumab)      sodium chloride 0.9 % SOLN 250 mL with elotuzumab 300 MG SOLR 10 mg/kg Infuse 10 mg/kg intravenously once Chemo - every 28 days      sodium chloride 0.9 % SOLN 250 mL with elotuzumab 300 MG SOLR 1,725 mg Infuse 1,725 mg intravenously every 28 days      apixaban (ELIQUIS) 5 MG TABS tablet Take 5 mg by mouth 2 times daily      pomalidomide (POMALYST) 4 MG chemo capsule Take 4 mg by mouth every evening Daily on days 1-21 of 28-day cycle. Takes in evening.    9/18/22- per cycle, will take daily through 9/22/22, then 7 days off.      rosuvastatin (CRESTOR) 40 MG tablet Take 1 tablet by mouth every evening 90 tablet 3    lisinopril (PRINIVIL;ZESTRIL) 40 MG tablet Take 1 tablet by mouth daily 90 tablet 3    potassium chloride (KLOR-CON) 10 MEQ extended release tablet TAKE 1 TABLET BY MOUTH TWICE DAILY 180 tablet 3    aspirin 81 MG EC tablet Take 1 tablet by mouth daily 30 tablet 3    zoledronic acid (ZOMETA) 4 MG/5ML injection Infuse 4 mg intravenously every 3 months Last dose was in august      Cholecalciferol (VITAMIN D3) 1000 units CAPS Take 1 capsule by mouth daily      Calcium Carbonate-Vitamin D 600-125 MG-UNIT TABS Take 1 tablet by mouth 2 times daily        No current facility-administered medications for this visit. Assessment:  1. CAD S/P percutaneous coronary angioplasty    2. Hx of CABG    3. HTN (hypertension), benign    4.  Mixed hyperlipidemia        Plan:  CAD/CABG/PCI   Plavix,  Eliquis and asa for 1 more week then stop asa and cont plavix and Eliquis    PPI    Norvasc   Crestor   No BB d/t bradycardia   Lisinopril    HTN   /62   Lisinopril and norvasc  HLD   LDL 66   Crestor     Follow up in 1 month     Reviewed most recent: CBC, BMP, LFT, Lipids, Mag, PT/INR, BNP,  Reviewed most recent: ECG, Echo, Nuc stress test, C

## 2022-11-10 PROBLEM — J10.1 INFLUENZA A: Status: RESOLVED | Noted: 2022-10-10 | Resolved: 2022-11-10

## 2022-11-18 ENCOUNTER — HOSPITAL ENCOUNTER (EMERGENCY)
Age: 67
Discharge: HOME | End: 2022-11-18
Attending: EMERGENCY MEDICINE
Payer: MEDICARE

## 2022-11-18 ENCOUNTER — APPOINTMENT (OUTPATIENT)
Dept: CT IMAGING | Age: 67
End: 2022-11-18
Payer: MEDICARE

## 2022-11-18 VITALS
WEIGHT: 184.5 LBS | HEART RATE: 58 BPM | DIASTOLIC BLOOD PRESSURE: 65 MMHG | HEIGHT: 61 IN | RESPIRATION RATE: 18 BRPM | TEMPERATURE: 97.8 F | OXYGEN SATURATION: 98 % | SYSTOLIC BLOOD PRESSURE: 154 MMHG | BODY MASS INDEX: 34.83 KG/M2

## 2022-11-18 DIAGNOSIS — K57.92 DIVERTICULITIS: Primary | ICD-10-CM

## 2022-11-18 LAB
A/G RATIO: 1.9 (ref 1.1–2.2)
ALBUMIN SERPL-MCNC: 4 G/DL (ref 3.4–5)
ALP BLD-CCNC: 53 U/L (ref 40–129)
ALT SERPL-CCNC: 22 U/L (ref 10–40)
ANION GAP SERPL CALCULATED.3IONS-SCNC: 9 MMOL/L (ref 3–16)
AST SERPL-CCNC: 25 U/L (ref 15–37)
BASOPHILS ABSOLUTE: 0 K/UL (ref 0–0.2)
BASOPHILS RELATIVE PERCENT: 0.4 %
BILIRUB SERPL-MCNC: 0.5 MG/DL (ref 0–1)
BILIRUBIN URINE: NEGATIVE
BLOOD, URINE: NEGATIVE
BUN BLDV-MCNC: 12 MG/DL (ref 7–20)
CALCIUM SERPL-MCNC: 9 MG/DL (ref 8.3–10.6)
CHLORIDE BLD-SCNC: 108 MMOL/L (ref 99–110)
CLARITY: CLEAR
CO2: 27 MMOL/L (ref 21–32)
COLOR: YELLOW
CREAT SERPL-MCNC: 0.7 MG/DL (ref 0.6–1.2)
EOSINOPHILS ABSOLUTE: 0.3 K/UL (ref 0–0.6)
EOSINOPHILS RELATIVE PERCENT: 8.1 %
EPITHELIAL CELLS, UA: NORMAL /HPF (ref 0–5)
GFR SERPL CREATININE-BSD FRML MDRD: >60 ML/MIN/{1.73_M2}
GLUCOSE BLD-MCNC: 108 MG/DL (ref 70–99)
GLUCOSE URINE: NEGATIVE MG/DL
HCT VFR BLD CALC: 38.8 % (ref 36–48)
HEMOGLOBIN: 12.7 G/DL (ref 12–16)
KETONES, URINE: NEGATIVE MG/DL
LACTIC ACID: 0.8 MMOL/L (ref 0.4–2)
LEUKOCYTE ESTERASE, URINE: ABNORMAL
LIPASE: 32 U/L (ref 13–60)
LYMPHOCYTES ABSOLUTE: 1.2 K/UL (ref 1–5.1)
LYMPHOCYTES RELATIVE PERCENT: 29.6 %
MCH RBC QN AUTO: 28.1 PG (ref 26–34)
MCHC RBC AUTO-ENTMCNC: 32.6 G/DL (ref 31–36)
MCV RBC AUTO: 86.3 FL (ref 80–100)
MICROSCOPIC EXAMINATION: YES
MONOCYTES ABSOLUTE: 0.6 K/UL (ref 0–1.3)
MONOCYTES RELATIVE PERCENT: 15.1 %
NEUTROPHILS ABSOLUTE: 1.9 K/UL (ref 1.7–7.7)
NEUTROPHILS RELATIVE PERCENT: 46.8 %
NITRITE, URINE: NEGATIVE
PDW BLD-RTO: 16.2 % (ref 12.4–15.4)
PH UA: 7 (ref 5–8)
PLATELET # BLD: 175 K/UL (ref 135–450)
PMV BLD AUTO: 8.9 FL (ref 5–10.5)
POTASSIUM SERPL-SCNC: 4.1 MMOL/L (ref 3.5–5.1)
PROTEIN UA: 30 MG/DL
RBC # BLD: 4.5 M/UL (ref 4–5.2)
RBC UA: NORMAL /HPF (ref 0–4)
SODIUM BLD-SCNC: 144 MMOL/L (ref 136–145)
SPECIFIC GRAVITY UA: 1.01 (ref 1–1.03)
TOTAL PROTEIN: 6.1 G/DL (ref 6.4–8.2)
URINE TYPE: ABNORMAL
UROBILINOGEN, URINE: 0.2 E.U./DL
WBC # BLD: 4.1 K/UL (ref 4–11)
WBC UA: NORMAL /HPF (ref 0–5)

## 2022-11-18 PROCEDURE — 74177 CT ABD & PELVIS W/CONTRAST: CPT

## 2022-11-18 PROCEDURE — 81001 URINALYSIS AUTO W/SCOPE: CPT

## 2022-11-18 PROCEDURE — 99285 EMERGENCY DEPT VISIT HI MDM: CPT

## 2022-11-18 PROCEDURE — 85025 COMPLETE CBC W/AUTO DIFF WBC: CPT

## 2022-11-18 PROCEDURE — 80053 COMPREHEN METABOLIC PANEL: CPT

## 2022-11-18 PROCEDURE — 6360000004 HC RX CONTRAST MEDICATION: Performed by: EMERGENCY MEDICINE

## 2022-11-18 PROCEDURE — 83605 ASSAY OF LACTIC ACID: CPT

## 2022-11-18 PROCEDURE — 83690 ASSAY OF LIPASE: CPT

## 2022-11-18 PROCEDURE — 6370000000 HC RX 637 (ALT 250 FOR IP): Performed by: EMERGENCY MEDICINE

## 2022-11-18 RX ORDER — CIPROFLOXACIN 500 MG/1
500 TABLET, FILM COATED ORAL 2 TIMES DAILY
Qty: 20 TABLET | Refills: 0 | Status: SHIPPED | OUTPATIENT
Start: 2022-11-18 | End: 2022-11-28

## 2022-11-18 RX ORDER — ACETAMINOPHEN 325 MG/1
650 TABLET ORAL ONCE
Status: COMPLETED | OUTPATIENT
Start: 2022-11-18 | End: 2022-11-18

## 2022-11-18 RX ORDER — METRONIDAZOLE 500 MG/1
500 TABLET ORAL 3 TIMES DAILY
Qty: 30 TABLET | Refills: 0 | Status: SHIPPED | OUTPATIENT
Start: 2022-11-18 | End: 2022-11-28

## 2022-11-18 RX ORDER — HYDROCODONE BITARTRATE AND ACETAMINOPHEN 5; 325 MG/1; MG/1
1 TABLET ORAL EVERY 6 HOURS PRN
Qty: 10 TABLET | Refills: 0 | Status: SHIPPED | OUTPATIENT
Start: 2022-11-18 | End: 2022-11-21

## 2022-11-18 RX ADMIN — IOHEXOL 100 ML: 350 INJECTION, SOLUTION INTRAVENOUS at 10:21

## 2022-11-18 RX ADMIN — ACETAMINOPHEN 650 MG: 325 TABLET ORAL at 09:58

## 2022-11-18 ASSESSMENT — PAIN - FUNCTIONAL ASSESSMENT
PAIN_FUNCTIONAL_ASSESSMENT: 0-10
PAIN_FUNCTIONAL_ASSESSMENT: NONE - DENIES PAIN

## 2022-11-18 ASSESSMENT — PAIN DESCRIPTION - FREQUENCY: FREQUENCY: CONTINUOUS

## 2022-11-18 ASSESSMENT — PAIN DESCRIPTION - LOCATION: LOCATION: ABDOMEN

## 2022-11-18 ASSESSMENT — PAIN SCALES - GENERAL: PAINLEVEL_OUTOF10: 8

## 2022-11-18 ASSESSMENT — PAIN DESCRIPTION - PAIN TYPE: TYPE: ACUTE PAIN

## 2022-11-18 NOTE — ED PROVIDER NOTES
CHIEF COMPLAINT  Abdominal Pain      HISTORY OF PRESENT ILLNESS  Sophia Grijalva is a 74 y.o. female with a history of CAD, diabetes mellitus, hyperlipidemia, hypertension, and MI who presents to the ED complaining of abdominal pain. Patient reports that she was recently treated for diverticulitis in the last 1 to 2 months. Patient reports that she had been well but has had a recurrence of pain in her lower abdomen. Today's pain is rated as 8/10 and is located in the right lower quadrant. Patient denies fevers, chills, or sweats. No nausea, vomiting, or diarrhea. No urinary complaints. Patient reports mild constipation. No rectal pain. .   No other complaints, modifying factors or associated symptoms. I have reviewed the following from the nursing documentation. Past Medical History:   Diagnosis Date    CAD (coronary artery disease)     Cancer ([facility] [address]     Multiple myeloma    Diabetes mellitus ([facility] [address]     Hyperlipidemia     Hypertension     MI (myocardial infarction) ([facility] [address]      Past Surgical History:   Procedure Laterality Date    COLONOSCOPY      CORONARY ARTERY BYPASS GRAFT N/A 06/13/2019    CORONARY ARTERY BYPASS GRAFT X4, INTERNAL MAMMARY ARTERY AND SAPHENOUS VEIN GRAFT-TCP-BP performed by Juan Ford MD at [address] ([address])      partial for fibroids    INSERTION / REMOVAL / REPLACEMENT VENOUS ACCESS CATHETER N/A 01/24/2019    TRIFUSION CATHETER INSERTION LEFT SUBCLAVIAN performed by Beena Guzman MD at [address]       Family History   Problem Relation Age of Onset    Breast Cancer Neg Hx     Ovarian Cancer Neg Hx      Social History     Socioeconomic History    Marital status:       Spouse name: Not on file    Number of children: Not on file    Years of education: Not on file    Highest education level: Not on file   Occupational History    Not on file   Tobacco Use    Smoking status: Former     Packs/day: 0.50     Years: 20.00     Pack years: 10.00 Types: Cigarettes     Quit date: 11/22/2018     Years since quitting: 3.9    Smokeless tobacco: Never   Vaping Use    Vaping Use: Former   Substance and Sexual Activity    Alcohol use: Yes     Comment: rare    Drug use: No    Sexual activity: Not on file   Other Topics Concern    Not on file   Social History Narrative    Not on file     Social Determinants of Health     Financial Resource Strain: Not on file   Food Insecurity: Not on file   Transportation Needs: Not on file   Physical Activity: Not on file   Stress: Not on file   Social Connections: Not on file   Intimate Partner Violence: Not on file   Housing Stability: Not on file     Current Facility-Administered Medications   Medication Dose Route Frequency Provider Last Rate Last Admin    acetaminophen (TYLENOL) tablet 650 mg  650 mg Oral Once Jyoti Richar, DO        iohexol (OMNIPAQUE 350) solution 100 mL  100 mL IntraVENous ONCE PRN Jyoti Mcqueen, DO         Current Outpatient Medications   Medication Sig Dispense Refill    clopidogrel (PLAVIX) 75 MG tablet Take 1 tablet by mouth daily 90 tablet 3    amLODIPine (NORVASC) 10 MG tablet Take 1 tablet by mouth daily 90 tablet 1    pantoprazole (PROTONIX) 40 MG tablet Take 1 tablet by mouth every morning (before breakfast) 30 tablet 1    dexamethasone (DECADRON) 4 MG tablet Take 40 mg by mouth See Admin Instructions Per current chemo regimen- taking 40 mg days 8, 15, and 22 of each 28-day chemo cycle.    On 9/30/22 is to take 21 mg prior to chemo treatment (Elotuzumab) (Patient not taking: No sig reported)      sodium chloride 0.9 % SOLN 250 mL with elotuzumab 300 MG SOLR 10 mg/kg Infuse 10 mg/kg intravenously once Chemo - every 28 days (Patient not taking: Reported on 11/18/2022)      sodium chloride 0.9 % SOLN 250 mL with elotuzumab 300 MG SOLR 1,725 mg Infuse 1,725 mg intravenously every 28 days (Patient not taking: Reported on 11/18/2022)      apixaban (ELIQUIS) 5 MG TABS tablet Take 5 mg by mouth 2 times daily      pomalidomide (POMALYST) 4 MG chemo capsule Take 4 mg by mouth every evening Daily on days 1-21 of 28-day cycle. Takes in evening. 9/18/22- per cycle, will take daily through 9/22/22, then 7 days off.      rosuvastatin (CRESTOR) 40 MG tablet Take 1 tablet by mouth every evening 90 tablet 3    lisinopril (PRINIVIL;ZESTRIL) 40 MG tablet Take 1 tablet by mouth daily 90 tablet 3    potassium chloride (KLOR-CON) 10 MEQ extended release tablet TAKE 1 TABLET BY MOUTH TWICE DAILY 180 tablet 3    aspirin 81 MG EC tablet Take 1 tablet by mouth daily (Patient not taking: Reported on 11/18/2022) 30 tablet 3    zoledronic acid (ZOMETA) 4 MG/5ML injection Infuse 4 mg intravenously every 3 months Last dose was in august      Cholecalciferol (VITAMIN D3) 1000 units CAPS Take 1 capsule by mouth daily      Calcium Carbonate-Vitamin D 600-125 MG-UNIT TABS Take 1 tablet by mouth 2 times daily        Allergies   Allergen Reactions    Pcn [Penicillins]     Lipitor [Atorvastatin] Other (See Comments)     Leg cramping       REVIEW OF SYSTEMS  10 systems reviewed, pertinent positives per HPI otherwise noted to be negative. PHYSICAL EXAM  BP (!) 154/65   Pulse 58   Temp 97.8 °F (36.6 °C) (Oral)   Resp 18   Ht 5' 1\" (1.549 m)   Wt 184 lb 8 oz (83.7 kg)   SpO2 98%   BMI 34.86 kg/m²   GENERAL APPEARANCE: Awake and alert. Cooperative. No acute distress. HEAD: Normocephalic. Atraumatic. EYES: PERRL. EOM's grossly intact. ENT: Mucous membranes are moist.   NECK: Supple, trachea midline. HEART: RRR. Normal S1, S2. No murmurs, rubs or gallops. LUNGS: Respirations unlabored. CTAB. Good air exchange. No wheezes, rales, or rhonchi. Speaking comfortably in full sentences. ABDOMEN: Soft. Non-distended. Right lower quadrant tenderness to palpation. No guarding or rebound. Normal Bowel sounds. EXTREMITIES: No peripheral edema. MAEE. No acute deformities. SKIN: Warm and dry. No acute rashes.    NEUROLOGICAL: Alert and oriented X 3. CN II-XII intact. No gross facial drooping. Strength 5/5 in all extremities. Sensation intact. No pronator drift. Normal coordination. Gait normal.   PSYCHIATRIC: Normal mood and affect. LABS  I have reviewed all labs for this visit.    Results for orders placed or performed during the hospital encounter of 11/18/22   CBC with Auto Differential   Result Value Ref Range    WBC 4.1 4.0 - 11.0 K/uL    RBC 4.50 4.00 - 5.20 M/uL    Hemoglobin 12.7 12.0 - 16.0 g/dL    Hematocrit 38.8 36.0 - 48.0 %    MCV 86.3 80.0 - 100.0 fL    MCH 28.1 26.0 - 34.0 pg    MCHC 32.6 31.0 - 36.0 g/dL    RDW 16.2 (H) 12.4 - 15.4 %    Platelets 158 203 - 297 K/uL    MPV 8.9 5.0 - 10.5 fL    Neutrophils % 46.8 %    Lymphocytes % 29.6 %    Monocytes % 15.1 %    Eosinophils % 8.1 %    Basophils % 0.4 %    Neutrophils Absolute 1.9 1.7 - 7.7 K/uL    Lymphocytes Absolute 1.2 1.0 - 5.1 K/uL    Monocytes Absolute 0.6 0.0 - 1.3 K/uL    Eosinophils Absolute 0.3 0.0 - 0.6 K/uL    Basophils Absolute 0.0 0.0 - 0.2 K/uL   Lactic Acid   Result Value Ref Range    Lactic Acid 0.8 0.4 - 2.0 mmol/L   Lipase   Result Value Ref Range    Lipase 32.0 13.0 - 60.0 U/L   Comprehensive Metabolic Panel   Result Value Ref Range    Sodium 144 136 - 145 mmol/L    Potassium 4.1 3.5 - 5.1 mmol/L    Chloride 108 99 - 110 mmol/L    CO2 27 21 - 32 mmol/L    Anion Gap 9 3 - 16    Glucose 108 (H) 70 - 99 mg/dL    BUN 12 7 - 20 mg/dL    Creatinine 0.7 0.6 - 1.2 mg/dL    Est, Glom Filt Rate >60 >60    Calcium 9.0 8.3 - 10.6 mg/dL    Total Protein 6.1 (L) 6.4 - 8.2 g/dL    Albumin 4.0 3.4 - 5.0 g/dL    Albumin/Globulin Ratio 1.9 1.1 - 2.2    Total Bilirubin 0.5 0.0 - 1.0 mg/dL    Alkaline Phosphatase 53 40 - 129 U/L    ALT 22 10 - 40 U/L    AST 25 15 - 37 U/L   Urinalysis   Result Value Ref Range    Color, UA Yellow Straw/Yellow    Clarity, UA Clear Clear    Glucose, Ur Negative Negative mg/dL    Bilirubin Urine Negative Negative    Ketones, Urine Negative Negative mg/dL    Specific Gravity, UA 1.010 1.005 - 1.030    Blood, Urine Negative Negative    pH, UA 7.0 5.0 - 8.0    Protein, UA 30 (A) Negative mg/dL    Urobilinogen, Urine 0.2 <2.0 E.U./dL    Nitrite, Urine Negative Negative    Leukocyte Esterase, Urine TRACE (A) Negative    Microscopic Examination YES     Urine Type NotGiven    Microscopic Urinalysis   Result Value Ref Range    WBC, UA 0-2 0 - 5 /HPF    RBC, UA 0-2 0 - 4 /HPF    Epithelial Cells, UA 2-5 0 - 5 /HPF             RADIOLOGY  X-RAYS:  I have reviewed radiologic plain film image(s). ALL OTHER NON-PLAIN FILM IMAGES SUCH AS CT, ULTRASOUND AND MRI HAVE BEEN READ BY THE RADIOLOGIST. CT ABDOMEN PELVIS W IV CONTRAST Additional Contrast? None   Final Result      1. Acute uncomplicated sigmoid diverticulitis. 2.  Inflammatory stranding in the left adnexa secondary to the process in the colon. Rechecks: Physical assessment performed. 1052: Patient resting comfortably. Pain moderately controlled post Tylenol. ED COURSE/MDM  Patient seen and evaluated. Old records reviewed. Labs and imaging reviewed and results discussed with patient. Patient was given Tylenol in the ED with good symptomatic relief. Patient was reassessed as noted above . No acute pathology was noted and pt is safe for discharge home to follow up with PCP. Patient's labs are stable. Imaging is concerning for acute uncomplicated sigmoid diverticulitis with mild stranding in the left adnexa. . Plan of care discussed with patient and family. Patient and family in agreement with plan. Patient was given scripts for the following medications. I counseled patient how to take these medications. New Prescriptions    CIPROFLOXACIN (CIPRO) 500 MG TABLET    Take 1 tablet by mouth 2 times daily for 10 days    HYDROCODONE-ACETAMINOPHEN (NORCO) 5-325 MG PER TABLET    Take 1 tablet by mouth every 6 hours as needed for Pain for up to 3 days. Intended supply: 3 days.  Take lowest dose possible to manage pain    METRONIDAZOLE (FLAGYL) 500 MG TABLET    Take 1 tablet by mouth 3 times daily for 10 days       CLINICAL IMPRESSION  1. Diverticulitis        Blood pressure (!) 154/65, pulse 58, temperature 97.8 °F (36.6 °C), temperature source Oral, resp. rate 18, height 5' 1\" (1.549 m), weight 184 lb 8 oz (83.7 kg), SpO2 98 %. DISPOSITION  Elke King was discharged to homein stable condition.          Janette Dickey,   11/18/22 1059

## 2022-11-18 NOTE — ED NOTES
Patient to ed with complaints of lower abd pain which started this am patient denies nausea vomiting or diarrhea.      Shital Shukla, RN  11/18/22 2414

## 2022-11-18 NOTE — ED NOTES
Patient given prescription, discharge instructions verbal and written, patient verbalized understanding. Alert/oriented X4, Clear speech.   Patient exhibits no distress, ambulates with steady gait per self leaving unit, no further request.      Nellie Banda RN  11/18/22 0006

## 2022-12-09 ENCOUNTER — OFFICE VISIT (OUTPATIENT)
Dept: CARDIOLOGY CLINIC | Age: 67
End: 2022-12-09
Payer: MEDICARE

## 2022-12-09 VITALS
WEIGHT: 187 LBS | DIASTOLIC BLOOD PRESSURE: 70 MMHG | SYSTOLIC BLOOD PRESSURE: 124 MMHG | HEART RATE: 66 BPM | BODY MASS INDEX: 35.33 KG/M2

## 2022-12-09 DIAGNOSIS — Z98.61 CAD S/P PERCUTANEOUS CORONARY ANGIOPLASTY: ICD-10-CM

## 2022-12-09 DIAGNOSIS — E78.5 HYPERLIPIDEMIA, UNSPECIFIED HYPERLIPIDEMIA TYPE: ICD-10-CM

## 2022-12-09 DIAGNOSIS — I10 HTN (HYPERTENSION), BENIGN: Primary | ICD-10-CM

## 2022-12-09 DIAGNOSIS — I25.10 CAD S/P PERCUTANEOUS CORONARY ANGIOPLASTY: ICD-10-CM

## 2022-12-09 PROCEDURE — 99214 OFFICE O/P EST MOD 30 MIN: CPT | Performed by: INTERNAL MEDICINE

## 2022-12-09 PROCEDURE — 3078F DIAST BP <80 MM HG: CPT | Performed by: INTERNAL MEDICINE

## 2022-12-09 PROCEDURE — 1123F ACP DISCUSS/DSCN MKR DOCD: CPT | Performed by: INTERNAL MEDICINE

## 2022-12-09 PROCEDURE — 3074F SYST BP LT 130 MM HG: CPT | Performed by: INTERNAL MEDICINE

## 2022-12-09 NOTE — PROGRESS NOTES
Subjective:      Patient ID: Jeff Sparks is a 66 y.o. female    S/P CABG x 4. HLP.  HTN.      HPI:  Jeff Sparks is a 66 y.o. patient who presented to the hospital with complaints of chest pain and back pain. She had a CTPA that was negative for PE. Follow up CAD s/p CABG x 4 on 19. No chest pain. Smoked 1/2 p d for years and quit 2 yrs ago. No chest pains no orthopnea. has relapsed with multiple myeloma after bm txp in 2019      Allergies   Allergen Reactions    Pcn [Penicillins]     Lipitor [Atorvastatin] Other (See Comments)     Leg cramping       Social History     Socioeconomic History    Marital status:       Spouse name: Not on file    Number of children: Not on file    Years of education: Not on file    Highest education level: Not on file   Occupational History    Not on file   Social Needs    Financial resource strain: Not on file    Food insecurity:     Worry: Not on file     Inability: Not on file    Transportation needs:     Medical: Not on file     Non-medical: Not on file   Tobacco Use    Smoking status: Former Smoker     Packs/day: 0.50     Years: 20.00     Pack years: 10.00     Types: Cigarettes     Last attempt to quit: 2018     Years since quittin.6    Smokeless tobacco: Never Used   Substance and Sexual Activity    Alcohol use: Yes     Comment: rare    Drug use: No    Sexual activity: Not on file   Lifestyle    Physical activity:     Days per week: Not on file     Minutes per session: Not on file    Stress: Not on file   Relationships    Social connections:     Talks on phone: Not on file     Gets together: Not on file     Attends Tenriism service: Not on file     Active member of club or organization: Not on file     Attends meetings of clubs or organizations: Not on file     Relationship status: Not on file    Intimate partner violence:     Fear of current or ex partner: Not on file     Emotionally abused: Not on file     Physically abused: Not on file     Forced sexual activity: Not on file   Other Topics Concern    Not on file   Social History Narrative    Not on file       No family history on file. has a past medical history of Cancer ([facility] [address] Diabetes mellitus ([address] Hyperlipidemia, and Hypertension. Vitals:    12/09/22 1507   BP: 124/70   Pulse: 66       Same exam as 12/14/21  Objective:   Physical Exam:    Physical Exam  Constitutional:       Appearance: She is well-developed. HENT:      Head: Normocephalic and atraumatic. Eyes:      Pupils: Pupils are equal, round, and reactive to light. Cardiovascular:      Rate and Rhythm: Normal rate and regular rhythm. Heart sounds: No murmur heard. No friction rub. Pulmonary:      Effort: Pulmonary effort is normal. No respiratory distress. Breath sounds: Normal breath sounds. No wheezing or rales. Abdominal:      General: Bowel sounds are normal.      Palpations: Abdomen is soft. Musculoskeletal:         General: No deformity. Normal range of motion. Cervical back: Normal range of motion and neck supple. Skin:     General: Skin is warm and dry. Neurological:      Mental Status: She is alert and oriented to person, place, and time. Cranial Nerves: No cranial nerve deficit.       Coordination: Coordination normal.   Psychiatric:         Behavior: Behavior normal.         Current Outpatient Medications   Medication Sig Dispense Refill    acetaminophen (TYLENOL) 500 MG tablet Take 500 mg by mouth every 6 hours as needed for Pain      aspirin 81 MG EC tablet Take 1 tablet by mouth daily 30 tablet 3    metoprolol tartrate (LOPRESSOR) 25 MG tablet Take 1 tablet by mouth 2 times daily 60 tablet 3    clopidogrel (PLAVIX) 75 MG tablet Take 1 tablet by mouth daily 30 tablet 3    benzocaine-menthol (CEPACOL SORE THROAT) 15-3.6 MG lozenge Take 1 lozenge by mouth every 2 hours as needed for Sore Throat 168 lozenge 0    rosuvastatin (CRESTOR) 20 MG tablet Take 20 mg by mouth every evening zoledronic acid (ZOMETA) 4 MG/5ML injection Infuse 4 mg intravenously every 3 months      acyclovir (ZOVIRAX) 800 MG tablet Take 1 tablet by mouth 2 times daily 60 tablet 11    famotidine (PEPCID) 20 MG tablet Take 1 tablet by mouth 2 times daily 60 tablet 3    prochlorperazine (COMPAZINE) 10 MG tablet Take 10 mg by mouth as needed       potassium chloride (KLOR-CON) 10 MEQ extended release tablet TK 2 TS PO BID,  3    Cholecalciferol (VITAMIN D3) 1000 units CAPS Take 1 capsule by mouth 2 times daily       Calcium Carbonate-Vitamin D (CALCIUM-CARB 600 + D) 600-125 MG-UNIT TABS Take 1 tablet by mouth 2 times daily        No current facility-administered medications for this visit. 19 ECHO  Summary   Left ventricular cavity size is normal. There is mild concentric left   ventricular hypertrophy. Overall left ventricular systolic function appears   normal with an ejection fraction of 55-60%. No regional wall motion   abnormalities are noted. Normal diastolic function. Trivial tricuspid   regurgitation. Estimated pulmonary artery systolic pressure is at 31 mmHg   assuming a right atrial pressure of 3 mmHg. PATIENT NAME: Bartolo St                       :        1955  MED REC NO:   [id number]                          ROOM:       [facility]  ACCOUNT NO:   [de-identified]                           ADMIT DATE: 2019  PROVIDER:     Tej Byers MD     DATE OF PROCEDURE:  2019     PREOPERATIVE DIAGNOSES:  1. Hypertension. 2.  Hyperlipidemia. 3.  Type 2 diabetes. 4.  Multiple myeloma. 5.  Non-ST MI.  6.  Complex coronary artery disease. 7.  Obese, BMI of 33. POSTOPERATIVE DIAGNOSES:  1. Hypertension. 2.  Hyperlipidemia. 3.  Type 2 diabetes. 4.  Multiple myeloma. 5.  Non-ST MI.  6.  Complex coronary artery disease. 7.  Obese, BMI of 33. OPERATION PERFORMED:  1.   Coronary artery bypass x4, LIMA to LAD, reverse saphenous vein to  OM1, reverse saphenous vein to OM2, and reverse saphenous vein to PDA. 2.  Endovein harvest.  3.  Intercostal nerve block  4. Transesophageal echo. 5.  Doppler assessment of graft. Assessment:     Visit Diagnoses and Associated Orders       HTN (hypertension), benign    -  Primary         Hyperlipidemia, unspecified hyperlipidemia type             CAD S/P percutaneous coronary angioplasty                      Plan:      NSTEMI:  Stable after CABG. Continue plavix. JEAN GARZON II.VIERTEL to LAD. SVG to OM1. SVG to OM2 and SVG to RCA. No chest pain  CAD:  Doing well after CABG. Patient has no chest pain no acid reflux. Takes plavix. HLP:  Crestor and controlled    HTN:  Controlled with medication--metoprolol.

## 2023-03-17 ENCOUNTER — OFFICE VISIT (OUTPATIENT)
Dept: CARDIOLOGY CLINIC | Age: 68
End: 2023-03-17
Payer: MEDICARE

## 2023-03-17 VITALS
DIASTOLIC BLOOD PRESSURE: 60 MMHG | HEART RATE: 58 BPM | SYSTOLIC BLOOD PRESSURE: 112 MMHG | WEIGHT: 189.4 LBS | BODY MASS INDEX: 35.79 KG/M2

## 2023-03-17 DIAGNOSIS — I10 HTN (HYPERTENSION), BENIGN: Primary | ICD-10-CM

## 2023-03-17 DIAGNOSIS — E78.5 HYPERLIPIDEMIA, UNSPECIFIED HYPERLIPIDEMIA TYPE: ICD-10-CM

## 2023-03-17 DIAGNOSIS — I25.10 CORONARY ARTERY DISEASE INVOLVING NATIVE CORONARY ARTERY OF NATIVE HEART WITHOUT ANGINA PECTORIS: ICD-10-CM

## 2023-03-17 PROCEDURE — 99214 OFFICE O/P EST MOD 30 MIN: CPT | Performed by: INTERNAL MEDICINE

## 2023-03-17 PROCEDURE — 3078F DIAST BP <80 MM HG: CPT | Performed by: INTERNAL MEDICINE

## 2023-03-17 PROCEDURE — 3074F SYST BP LT 130 MM HG: CPT | Performed by: INTERNAL MEDICINE

## 2023-03-17 PROCEDURE — 1123F ACP DISCUSS/DSCN MKR DOCD: CPT | Performed by: INTERNAL MEDICINE

## 2023-03-17 NOTE — PROGRESS NOTES
Subjective:      Patient ID: Winnifred Galeazzi is a 66 y.o. female    S/P CABG x 4. HLP.  HTN.      HPI:  Winnifred Galeazzi is a 66 y.o. patient who presented to the hospital with complaints of chest pain and back pain. She had a CTPA that was negative for PE. Follow up CAD s/p CABG x 4 on 19. No chest pain. Smoked 1/2 p d for years and quit 2 yrs ago. No chest pains no orthopnea. has relapsed with multiple myeloma after bm txp in 2019      Allergies   Allergen Reactions    Pcn [Penicillins]     Lipitor [Atorvastatin] Other (See Comments)     Leg cramping       Social History     Socioeconomic History    Marital status:       Spouse name: Not on file    Number of children: Not on file    Years of education: Not on file    Highest education level: Not on file   Occupational History    Not on file   Social Needs    Financial resource strain: Not on file    Food insecurity:     Worry: Not on file     Inability: Not on file    Transportation needs:     Medical: Not on file     Non-medical: Not on file   Tobacco Use    Smoking status: Former Smoker     Packs/day: 0.50     Years: 20.00     Pack years: 10.00     Types: Cigarettes     Last attempt to quit: 2018     Years since quittin.6    Smokeless tobacco: Never Used   Substance and Sexual Activity    Alcohol use: Yes     Comment: rare    Drug use: No    Sexual activity: Not on file   Lifestyle    Physical activity:     Days per week: Not on file     Minutes per session: Not on file    Stress: Not on file   Relationships    Social connections:     Talks on phone: Not on file     Gets together: Not on file     Attends Sikhism service: Not on file     Active member of club or organization: Not on file     Attends meetings of clubs or organizations: Not on file     Relationship status: Not on file    Intimate partner violence:     Fear of current or ex partner: Not on file     Emotionally abused: Not on file     Physically abused: Not on file     Forced sexual activity: Not on file   Other Topics Concern    Not on file   Social History Narrative    Not on file       No family history on file. has a past medical history of Cancer ([facility] [address] Diabetes mellitus ([address] Hyperlipidemia, and Hypertension. Vitals:    03/17/23 1556   BP: 112/60   Pulse: 58     Exam unchanged from 12/9/22. Objective:   Physical Exam:    Physical Exam  Constitutional:       Appearance: She is well-developed. HENT:      Head: Normocephalic and atraumatic. Eyes:      Pupils: Pupils are equal, round, and reactive to light. Cardiovascular:      Rate and Rhythm: Normal rate and regular rhythm. Heart sounds: No murmur heard. No friction rub. Pulmonary:      Effort: Pulmonary effort is normal. No respiratory distress. Breath sounds: Normal breath sounds. No wheezing or rales. Abdominal:      General: Bowel sounds are normal.      Palpations: Abdomen is soft. Musculoskeletal:         General: No deformity. Normal range of motion. Cervical back: Normal range of motion and neck supple. Skin:     General: Skin is warm and dry. Neurological:      Mental Status: She is alert and oriented to person, place, and time. Cranial Nerves: No cranial nerve deficit.       Coordination: Coordination normal.   Psychiatric:         Behavior: Behavior normal.         Current Outpatient Medications   Medication Sig Dispense Refill    acetaminophen (TYLENOL) 500 MG tablet Take 500 mg by mouth every 6 hours as needed for Pain      aspirin 81 MG EC tablet Take 1 tablet by mouth daily 30 tablet 3    metoprolol tartrate (LOPRESSOR) 25 MG tablet Take 1 tablet by mouth 2 times daily 60 tablet 3    clopidogrel (PLAVIX) 75 MG tablet Take 1 tablet by mouth daily 30 tablet 3    benzocaine-menthol (CEPACOL SORE THROAT) 15-3.6 MG lozenge Take 1 lozenge by mouth every 2 hours as needed for Sore Throat 168 lozenge 0    rosuvastatin (CRESTOR) 20 MG tablet Take 20 mg by mouth every evening zoledronic acid (ZOMETA) 4 MG/5ML injection Infuse 4 mg intravenously every 3 months      acyclovir (ZOVIRAX) 800 MG tablet Take 1 tablet by mouth 2 times daily 60 tablet 11    famotidine (PEPCID) 20 MG tablet Take 1 tablet by mouth 2 times daily 60 tablet 3    prochlorperazine (COMPAZINE) 10 MG tablet Take 10 mg by mouth as needed       potassium chloride (KLOR-CON) 10 MEQ extended release tablet TK 2 TS PO BID,  3    Cholecalciferol (VITAMIN D3) 1000 units CAPS Take 1 capsule by mouth 2 times daily       Calcium Carbonate-Vitamin D (CALCIUM-CARB 600 + D) 600-125 MG-UNIT TABS Take 1 tablet by mouth 2 times daily        No current facility-administered medications for this visit. 19 ECHO  Summary   Left ventricular cavity size is normal. There is mild concentric left   ventricular hypertrophy. Overall left ventricular systolic function appears   normal with an ejection fraction of 55-60%. No regional wall motion   abnormalities are noted. Normal diastolic function. Trivial tricuspid   regurgitation. Estimated pulmonary artery systolic pressure is at 31 mmHg   assuming a right atrial pressure of 3 mmHg. PATIENT NAME: Yohan Dee                       :        1955  MED REC NO:   [id number]                          ROOM:       [facility]  ACCOUNT NO:   [de-identified]                           ADMIT DATE: 2019  PROVIDER:     Anselmo Buregss MD     DATE OF PROCEDURE:  2019     PREOPERATIVE DIAGNOSES:  1. Hypertension. 2.  Hyperlipidemia. 3.  Type 2 diabetes. 4.  Multiple myeloma. 5.  Non-ST MI.  6.  Complex coronary artery disease. 7.  Obese, BMI of 33. POSTOPERATIVE DIAGNOSES:  1. Hypertension. 2.  Hyperlipidemia. 3.  Type 2 diabetes. 4.  Multiple myeloma. 5.  Non-ST MI.  6.  Complex coronary artery disease. 7.  Obese, BMI of 33. OPERATION PERFORMED:  1.   Coronary artery bypass x4, LIMA to LAD, reverse saphenous vein to  OM1, reverse saphenous vein to OM2, and reverse saphenous vein to PDA. 2.  Endovein harvest.  3.  Intercostal nerve block  4. Transesophageal echo. 5.  Doppler assessment of graft. Assessment:      Diagnosis Orders   1. HTN (hypertension), benign        2. Hyperlipidemia, unspecified hyperlipidemia type        3. Coronary artery disease involving native coronary artery of native heart without angina pectoris                 Plan:      NSTEMI:  Stable after CABG. Continue plavix. JEAN GARZON II.VIERTEL to LAD. SVG to OM1. SVG to OM2 and SVG to RCA. No chest pain  CAD:  Doing well after CABG. Patient has no chest pain no acid reflux. Takes plavix. HLP:  Crestor and controlled    HTN:  Controlled with medication--metoprolol. Continue current medications. Stable cardiovascular status.

## 2023-04-03 ENCOUNTER — HOSPITAL ENCOUNTER (OUTPATIENT)
Dept: GENERAL RADIOLOGY | Age: 68
Discharge: HOME | End: 2023-04-03
Payer: MEDICARE

## 2023-04-03 DIAGNOSIS — C90.00 IGG MYELOMA (HCC): ICD-10-CM

## 2023-04-03 PROCEDURE — 73030 X-RAY EXAM OF SHOULDER: CPT

## 2023-04-24 SDOH — HEALTH STABILITY: PHYSICAL HEALTH: ON AVERAGE, HOW MANY DAYS PER WEEK DO YOU ENGAGE IN MODERATE TO STRENUOUS EXERCISE (LIKE A BRISK WALK)?: 3 DAYS

## 2023-04-26 ENCOUNTER — OFFICE VISIT (OUTPATIENT)
Dept: ORTHOPEDIC SURGERY | Age: 68
End: 2023-04-26

## 2023-04-26 VITALS — HEIGHT: 61 IN | WEIGHT: 189 LBS | BODY MASS INDEX: 35.68 KG/M2

## 2023-04-26 DIAGNOSIS — M75.101 TEAR OF RIGHT ROTATOR CUFF, UNSPECIFIED TEAR EXTENT, UNSPECIFIED WHETHER TRAUMATIC: Primary | ICD-10-CM

## 2023-04-26 DIAGNOSIS — M25.511 RIGHT SHOULDER PAIN, UNSPECIFIED CHRONICITY: ICD-10-CM

## 2023-04-26 RX ORDER — METHYLPREDNISOLONE ACETATE 40 MG/ML
80 INJECTION, SUSPENSION INTRA-ARTICULAR; INTRALESIONAL; INTRAMUSCULAR; SOFT TISSUE ONCE
Status: COMPLETED | OUTPATIENT
Start: 2023-04-26 | End: 2023-04-26

## 2023-04-26 RX ADMIN — METHYLPREDNISOLONE ACETATE 80 MG: 40 INJECTION, SUSPENSION INTRA-ARTICULAR; INTRALESIONAL; INTRAMUSCULAR; SOFT TISSUE at 17:35

## 2023-04-29 ENCOUNTER — HOSPITAL ENCOUNTER (EMERGENCY)
Age: 68
Discharge: HOME | End: 2023-04-29
Attending: EMERGENCY MEDICINE
Payer: MEDICARE

## 2023-04-29 ENCOUNTER — APPOINTMENT (OUTPATIENT)
Dept: GENERAL RADIOLOGY | Age: 68
End: 2023-04-29
Payer: MEDICARE

## 2023-04-29 VITALS
HEIGHT: 61 IN | OXYGEN SATURATION: 96 % | WEIGHT: 189.03 LBS | DIASTOLIC BLOOD PRESSURE: 68 MMHG | SYSTOLIC BLOOD PRESSURE: 135 MMHG | RESPIRATION RATE: 18 BRPM | TEMPERATURE: 98 F | BODY MASS INDEX: 35.69 KG/M2 | HEART RATE: 58 BPM

## 2023-04-29 DIAGNOSIS — K30 INDIGESTION: Primary | ICD-10-CM

## 2023-04-29 DIAGNOSIS — R07.89 ATYPICAL CHEST PAIN: ICD-10-CM

## 2023-04-29 DIAGNOSIS — I10 HYPERTENSION, UNSPECIFIED TYPE: ICD-10-CM

## 2023-04-29 LAB
ALBUMIN SERPL-MCNC: 4.5 G/DL (ref 3.4–5)
ALBUMIN/GLOB SERPL: 1.9 {RATIO} (ref 1.1–2.2)
ALP SERPL-CCNC: 53 U/L (ref 40–129)
ALT SERPL-CCNC: 19 U/L (ref 10–40)
ANION GAP SERPL CALCULATED.3IONS-SCNC: 10 MMOL/L (ref 3–16)
AST SERPL-CCNC: 26 U/L (ref 15–37)
BASOPHILS # BLD: 0.1 K/UL (ref 0–0.2)
BASOPHILS NFR BLD: 1.7 %
BILIRUB SERPL-MCNC: 0.4 MG/DL (ref 0–1)
BUN SERPL-MCNC: 12 MG/DL (ref 7–20)
CALCIUM SERPL-MCNC: 9.5 MG/DL (ref 8.3–10.6)
CHLORIDE SERPL-SCNC: 109 MMOL/L (ref 99–110)
CO2 SERPL-SCNC: 24 MMOL/L (ref 21–32)
CREAT SERPL-MCNC: 0.8 MG/DL (ref 0.6–1.2)
DEPRECATED RDW RBC AUTO: 17.7 % (ref 12.4–15.4)
EOSINOPHIL # BLD: 0.2 K/UL (ref 0–0.6)
EOSINOPHIL NFR BLD: 2.6 %
GFR SERPLBLD CREATININE-BSD FMLA CKD-EPI: >60 ML/MIN/{1.73_M2}
GLUCOSE SERPL-MCNC: 102 MG/DL (ref 70–99)
HCT VFR BLD AUTO: 42 % (ref 36–48)
HGB BLD-MCNC: 13.6 G/DL (ref 12–16)
LIPASE SERPL-CCNC: 52 U/L (ref 13–60)
LYMPHOCYTES # BLD: 1.2 K/UL (ref 1–5.1)
LYMPHOCYTES NFR BLD: 18.4 %
MCH RBC QN AUTO: 27.5 PG (ref 26–34)
MCHC RBC AUTO-ENTMCNC: 32.4 G/DL (ref 31–36)
MCV RBC AUTO: 84.9 FL (ref 80–100)
MONOCYTES # BLD: 1.1 K/UL (ref 0–1.3)
MONOCYTES NFR BLD: 16.9 %
NEUTROPHILS # BLD: 4 K/UL (ref 1.7–7.7)
NEUTROPHILS NFR BLD: 60.4 %
PLATELET # BLD AUTO: 276 K/UL (ref 135–450)
PMV BLD AUTO: 8.5 FL (ref 5–10.5)
POTASSIUM SERPL-SCNC: 4.4 MMOL/L (ref 3.5–5.1)
PROT SERPL-MCNC: 6.9 G/DL (ref 6.4–8.2)
RBC # BLD AUTO: 4.94 M/UL (ref 4–5.2)
SODIUM SERPL-SCNC: 143 MMOL/L (ref 136–145)
TROPONIN, HIGH SENSITIVITY: 10 NG/L (ref 0–14)
TROPONIN, HIGH SENSITIVITY: 11 NG/L (ref 0–14)
WBC # BLD AUTO: 6.6 K/UL (ref 4–11)

## 2023-04-29 PROCEDURE — 84484 ASSAY OF TROPONIN QUANT: CPT

## 2023-04-29 PROCEDURE — 83690 ASSAY OF LIPASE: CPT

## 2023-04-29 PROCEDURE — 80053 COMPREHEN METABOLIC PANEL: CPT

## 2023-04-29 PROCEDURE — 99285 EMERGENCY DEPT VISIT HI MDM: CPT

## 2023-04-29 PROCEDURE — 85025 COMPLETE CBC W/AUTO DIFF WBC: CPT

## 2023-04-29 PROCEDURE — 71046 X-RAY EXAM CHEST 2 VIEWS: CPT

## 2023-04-29 PROCEDURE — 93005 ELECTROCARDIOGRAM TRACING: CPT | Performed by: EMERGENCY MEDICINE

## 2023-04-29 PROCEDURE — 6370000000 HC RX 637 (ALT 250 FOR IP): Performed by: EMERGENCY MEDICINE

## 2023-04-29 RX ORDER — LABETALOL HYDROCHLORIDE 5 MG/ML
5 INJECTION, SOLUTION INTRAVENOUS ONCE
Status: DISCONTINUED | OUTPATIENT
Start: 2023-04-29 | End: 2023-04-29

## 2023-04-29 RX ADMIN — LIDOCAINE HYDROCHLORIDE: 20 SOLUTION ORAL; TOPICAL at 11:54

## 2023-04-29 ASSESSMENT — ENCOUNTER SYMPTOMS
BACK PAIN: 1
EYE DISCHARGE: 0
DIARRHEA: 0
RHINORRHEA: 0
COUGH: 0
SHORTNESS OF BREATH: 0
ABDOMINAL PAIN: 0
EYE REDNESS: 0
CHEST TIGHTNESS: 0
VOMITING: 0

## 2023-04-29 ASSESSMENT — PAIN - FUNCTIONAL ASSESSMENT: PAIN_FUNCTIONAL_ASSESSMENT: NONE - DENIES PAIN

## 2023-04-29 NOTE — ED PROVIDER NOTES
emergency department. Otherwise, CBC, CMP, lipase, all unremarkable. After GI cocktail, patient's pain is gone. She is asking be discharged home. After 2 hours of observation regimen, blood pressure remained down, she had no significant chest pain, no nausea, diaphoresis, or any other concerns. HEART SCORE:    History: +0 for low suspicion  EKG: +1 for nonspecific changes   Age: +1 for age 44-72 years  Risk factors (includes HLD, HTN, DM, tobacco use, obesity, and +FHx): +2 for known CAD or 3+ risk factors  Initial troponin: +0 for negative troponin    Heart score: 4. This falls under the following category: Score of 4-6, which indicates low/moderate risk for major adverse cardiac event and supports observation with repeated troponins and/or non-invasive testing     Strict return precautions provided. Follow-up with cardiology    I utilized an evidence-based risk rating tool (CMT) along with my training and experience to weigh the risk of discharge against the risks of further testing, imaging, or hospitalization. At this time I estimate the risks of additional testing, imaging, or hospitalization to be equal to or greater than the risk of discharge. I discussed my risk assessment with the patient and the patient consents to the risk of discharge as well as the risk of uncertainty in estimating outcomes. Is this patient to be included in the SEP-1 Core Measure due to severe sepsis or septic shock? No   Exclusion criteria - the patient is NOT to be included for SEP-1 Core Measure due to: Infection is not suspected      CLINICAL IMPRESSION:     ICD-10-CM    1. Indigestion  K30       2. Atypical chest pain  R07.89       3. Hypertension, unspecified type  I10             DISPOSITION:  I discussed the results, including any incidental findings, with patient and through shared decision making. Disposition today from the ED will be: Discharge to home in fair condition. Questions answered.  They

## 2023-04-29 NOTE — ED PROVIDER NOTES
{:65510} and through shared decision making. Disposition today from the ED will be: {Plan; disposition:84887} in {Condition:77168::\"good\",\"fair\",\"stable\",\"guarded\",\"critical\"} condition. Questions answered. They are agreeable to plan and express understanding of plan. {Social Determinants (Optional):76481}      CRITICAL CARE:   Total critical care time is *** minutes, which excludes separately billable procedures and updating family. Time spent is specifically for management of the presenting complaint and symptoms initially, direct bedside care, reevaluation, review of records, and consultation. There was a high probability of clinically significant life-threatening deterioration in the patient's condition, which required my urgent intervention. DISCHARGE MEDICATIONS (if applicable):  New Prescriptions    No medications on file       DISCONTINUED MEDICATIONS:  Discontinued Medications    No medications on file            DISPOSITION REFERRAL (if applicable):  No follow-up provider specified. _____________________________________      Nella Eddy DO, ([facility]) am the primary attending of record and contributed the majority of evaluation and treatment of emergent care for this encounter. This chart was generated in part by using Dragon Dictation system and may contain errors related to that system including errors in grammar, punctuation, and spelling, as well as words and phrases that may be inappropriate. If there are any questions or concerns please feel free to contact the dictating provider for clarification.      RAFFAELE EDDY DO (electronically signed)    [facility]

## 2023-04-30 LAB
EKG ATRIAL RATE: 57 BPM
EKG DIAGNOSIS: NORMAL
EKG P AXIS: 48 DEGREES
EKG P-R INTERVAL: 170 MS
EKG Q-T INTERVAL: 436 MS
EKG QRS DURATION: 110 MS
EKG QTC CALCULATION (BAZETT): 424 MS
EKG R AXIS: -23 DEGREES
EKG T AXIS: 44 DEGREES
EKG VENTRICULAR RATE: 57 BPM

## 2023-05-02 PROCEDURE — 93010 ELECTROCARDIOGRAM REPORT: CPT | Performed by: INTERNAL MEDICINE

## 2023-05-09 ENCOUNTER — TELEPHONE (OUTPATIENT)
Dept: CARDIOLOGY CLINIC | Age: 68
End: 2023-05-09

## 2023-05-09 NOTE — TELEPHONE ENCOUNTER
Spoke w/pt, states has not had add'l pain since treated at ER; BP today: 147/86   ER follow up appt w/NP confirmed; will call office with any concerning changes or discomfort.

## 2023-05-09 NOTE — TELEPHONE ENCOUNTER
The patient said when she went to urgent care she was diagnosed with sinus bradycardia on her after visit summary and she was advised to follow up with her cardiologist. She said she has seen that diagnosis in her chart before. She wants to know if she actually needs to see Dr Gunner Loo for the sinus bradycardia. Please advise and call the patient at [phone].

## 2023-05-12 RX ORDER — AMLODIPINE BESYLATE 10 MG/1
10 TABLET ORAL DAILY
Qty: 90 TABLET | Refills: 1 | Status: SHIPPED | OUTPATIENT
Start: 2023-05-12

## 2023-05-15 ENCOUNTER — OFFICE VISIT (OUTPATIENT)
Dept: CARDIOLOGY CLINIC | Age: 68
End: 2023-05-15
Payer: MEDICARE

## 2023-05-15 VITALS
HEIGHT: 61 IN | WEIGHT: 196 LBS | BODY MASS INDEX: 37 KG/M2 | SYSTOLIC BLOOD PRESSURE: 158 MMHG | DIASTOLIC BLOOD PRESSURE: 78 MMHG | HEART RATE: 53 BPM

## 2023-05-15 DIAGNOSIS — I10 PRIMARY HYPERTENSION: Primary | Chronic | ICD-10-CM

## 2023-05-15 DIAGNOSIS — Z98.61 CAD S/P PERCUTANEOUS CORONARY ANGIOPLASTY: ICD-10-CM

## 2023-05-15 DIAGNOSIS — E78.2 MIXED HYPERLIPIDEMIA: ICD-10-CM

## 2023-05-15 DIAGNOSIS — R00.1 BRADYCARDIA: ICD-10-CM

## 2023-05-15 DIAGNOSIS — I25.10 CAD S/P PERCUTANEOUS CORONARY ANGIOPLASTY: ICD-10-CM

## 2023-05-15 PROCEDURE — 93000 ELECTROCARDIOGRAM COMPLETE: CPT | Performed by: NURSE PRACTITIONER

## 2023-05-15 PROCEDURE — 1123F ACP DISCUSS/DSCN MKR DOCD: CPT | Performed by: NURSE PRACTITIONER

## 2023-05-15 PROCEDURE — 3078F DIAST BP <80 MM HG: CPT | Performed by: NURSE PRACTITIONER

## 2023-05-15 PROCEDURE — 99214 OFFICE O/P EST MOD 30 MIN: CPT | Performed by: NURSE PRACTITIONER

## 2023-05-15 PROCEDURE — 3077F SYST BP >= 140 MM HG: CPT | Performed by: NURSE PRACTITIONER

## 2023-05-15 RX ORDER — HYDRALAZINE HYDROCHLORIDE 25 MG/1
25 TABLET, FILM COATED ORAL 2 TIMES DAILY
Qty: 60 TABLET | Refills: 5 | Status: SHIPPED | OUTPATIENT
Start: 2023-05-15

## 2023-05-15 NOTE — PROGRESS NOTES
is a small and mild, mixed perfusion defect in the apical    inferolateral wall, suggestive of possible ischemia. Normal LV size and systolic function. Left ventricular ejection fraction of 62 %. There is mid to distal septal wall hypokinesis. Overall findings represent a intermediate risk scan. Dr Jaylen Dean office was    notified and patient will be called today with results. Medications:   Current Outpatient Medications   Medication Sig Dispense Refill    amLODIPine (NORVASC) 10 MG tablet TAKE 1 TABLET BY MOUTH DAILY 90 tablet 1    clopidogrel (PLAVIX) 75 MG tablet Take 1 tablet by mouth daily 90 tablet 3    sodium chloride 0.9 % SOLN 250 mL with elotuzumab 300 MG SOLR 10 mg/kg Infuse 10 mg/kg intravenously once Chemo - every 28 days      sodium chloride 0.9 % SOLN 250 mL with elotuzumab 300 MG SOLR 1,725 mg Infuse 1,725 mg intravenously every 28 days      apixaban (ELIQUIS) 5 MG TABS tablet Take 5 mg by mouth 2 times daily      pomalidomide (POMALYST) 4 MG chemo capsule Take 4 mg by mouth every evening Daily on days 1-21 of 28-day cycle. Takes in evening. 9/18/22- per cycle, will take daily through 9/22/22, then 7 days off.      rosuvastatin (CRESTOR) 40 MG tablet Take 1 tablet by mouth every evening 90 tablet 3    lisinopril (PRINIVIL;ZESTRIL) 40 MG tablet Take 1 tablet by mouth daily 90 tablet 3    potassium chloride (KLOR-CON) 10 MEQ extended release tablet TAKE 1 TABLET BY MOUTH TWICE DAILY 180 tablet 3    zoledronic acid (ZOMETA) 4 MG/5ML injection Infuse 4 mg intravenously every 3 months Last dose was in august      Cholecalciferol (VITAMIN D3) 1000 units CAPS Take 1 capsule by mouth daily      Calcium Carbonate-Vitamin D 600-125 MG-UNIT TABS Take 1 tablet by mouth 2 times daily        No current facility-administered medications for this visit. Assessment:  1. Primary hypertension    2. Bradycardia    3. Mixed hyperlipidemia    4.  CAD S/P percutaneous coronary angioplasty

## 2023-05-22 RX ORDER — HYDRALAZINE HYDROCHLORIDE 25 MG/1
25 TABLET, FILM COATED ORAL 2 TIMES DAILY
Qty: 180 TABLET | Refills: 3 | Status: SHIPPED | OUTPATIENT
Start: 2023-05-22

## 2023-05-22 NOTE — TELEPHONE ENCOUNTER
[facility] Medication Refills:    Medication: Hydralazine 25 MG    Dosage:  Take 1 tablet by mouth in the morning and at bedtime    Number: 180    Refills: 3    Last Office Visit: 05/15/2023    Next Office Visit: 09/18/2023    Last Labs: 04/29/2023 Troponin, CMP, CBC

## 2023-06-28 RX ORDER — ROSUVASTATIN CALCIUM 40 MG/1
40 TABLET, COATED ORAL EVERY EVENING
Qty: 90 TABLET | Refills: 3 | Status: SHIPPED | OUTPATIENT
Start: 2023-06-28

## 2023-09-18 ENCOUNTER — OFFICE VISIT (OUTPATIENT)
Dept: CARDIOLOGY CLINIC | Age: 68
End: 2023-09-18
Payer: MEDICARE

## 2023-09-18 VITALS
BODY MASS INDEX: 37.15 KG/M2 | SYSTOLIC BLOOD PRESSURE: 134 MMHG | WEIGHT: 196.6 LBS | DIASTOLIC BLOOD PRESSURE: 68 MMHG | HEART RATE: 56 BPM

## 2023-09-18 DIAGNOSIS — I10 HTN (HYPERTENSION), BENIGN: ICD-10-CM

## 2023-09-18 DIAGNOSIS — Z98.61 CAD S/P PERCUTANEOUS CORONARY ANGIOPLASTY: ICD-10-CM

## 2023-09-18 DIAGNOSIS — E78.5 HYPERLIPIDEMIA, UNSPECIFIED HYPERLIPIDEMIA TYPE: Primary | ICD-10-CM

## 2023-09-18 DIAGNOSIS — E78.5 HYPERLIPIDEMIA, UNSPECIFIED HYPERLIPIDEMIA TYPE: ICD-10-CM

## 2023-09-18 DIAGNOSIS — I25.10 CAD S/P PERCUTANEOUS CORONARY ANGIOPLASTY: ICD-10-CM

## 2023-09-18 DIAGNOSIS — I25.10 CORONARY ARTERY DISEASE INVOLVING NATIVE CORONARY ARTERY OF NATIVE HEART WITHOUT ANGINA PECTORIS: ICD-10-CM

## 2023-09-18 PROCEDURE — 3078F DIAST BP <80 MM HG: CPT | Performed by: INTERNAL MEDICINE

## 2023-09-18 PROCEDURE — 99214 OFFICE O/P EST MOD 30 MIN: CPT | Performed by: INTERNAL MEDICINE

## 2023-09-18 PROCEDURE — 1123F ACP DISCUSS/DSCN MKR DOCD: CPT | Performed by: INTERNAL MEDICINE

## 2023-09-18 PROCEDURE — 3075F SYST BP GE 130 - 139MM HG: CPT | Performed by: INTERNAL MEDICINE

## 2023-09-18 NOTE — PROGRESS NOTES
Fear of current or ex partner: Not on file     Emotionally abused: Not on file     Physically abused: Not on file     Forced sexual activity: Not on file   Other Topics Concern    Not on file   Social History Narrative    Not on file       No family history on file. has a past medical history of Cancer ([address] [facility]), Diabetes mellitus ([address]), Hyperlipidemia, and Hypertension. Vitals:    09/18/23 1507   BP: 134/68   Pulse: 56     Exam unchanged from 12/9/22. Objective:   Physical Exam:    Physical Exam  Constitutional:       Appearance: She is well-developed. HENT:      Head: Normocephalic and atraumatic. Eyes:      Pupils: Pupils are equal, round, and reactive to light. Cardiovascular:      Rate and Rhythm: Normal rate and regular rhythm. Heart sounds: No murmur heard. No friction rub. Pulmonary:      Effort: Pulmonary effort is normal. No respiratory distress. Breath sounds: Normal breath sounds. No wheezing or rales. Abdominal:      General: Bowel sounds are normal.      Palpations: Abdomen is soft. Musculoskeletal:         General: No deformity. Normal range of motion. Cervical back: Normal range of motion and neck supple. Skin:     General: Skin is warm and dry. Neurological:      Mental Status: She is alert and oriented to person, place, and time. Cranial Nerves: No cranial nerve deficit.       Coordination: Coordination normal.   Psychiatric:         Behavior: Behavior normal.           Current Outpatient Medications   Medication Sig Dispense Refill    acetaminophen (TYLENOL) 500 MG tablet Take 500 mg by mouth every 6 hours as needed for Pain      aspirin 81 MG EC tablet Take 1 tablet by mouth daily 30 tablet 3    metoprolol tartrate (LOPRESSOR) 25 MG tablet Take 1 tablet by mouth 2 times daily 60 tablet 3    clopidogrel (PLAVIX) 75 MG tablet Take 1 tablet by mouth daily 30 tablet 3    benzocaine-menthol (CEPACOL SORE THROAT) 15-3.6 MG lozenge Take 1 lozenge by mouth

## 2023-09-19 LAB
ALBUMIN SERPL-MCNC: 4.4 G/DL (ref 3.4–5)
ALBUMIN/GLOB SERPL: 2.2 {RATIO} (ref 1.1–2.2)
ALP SERPL-CCNC: 58 U/L (ref 40–129)
ALT SERPL-CCNC: 22 U/L (ref 10–40)
ANION GAP SERPL CALCULATED.3IONS-SCNC: 10 MMOL/L (ref 3–16)
AST SERPL-CCNC: 21 U/L (ref 15–37)
BASOPHILS # BLD: 0.1 K/UL (ref 0–0.2)
BASOPHILS NFR BLD: 2.5 %
BILIRUB SERPL-MCNC: 0.3 MG/DL (ref 0–1)
BUN SERPL-MCNC: 17 MG/DL (ref 7–20)
CALCIUM SERPL-MCNC: 8.8 MG/DL (ref 8.3–10.6)
CHLORIDE SERPL-SCNC: 105 MMOL/L (ref 99–110)
CHOLEST SERPL-MCNC: 162 MG/DL (ref 0–199)
CO2 SERPL-SCNC: 27 MMOL/L (ref 21–32)
CREAT SERPL-MCNC: 1.4 MG/DL (ref 0.6–1.2)
DEPRECATED RDW RBC AUTO: 17.3 % (ref 12.4–15.4)
EOSINOPHIL # BLD: 0.1 K/UL (ref 0–0.6)
EOSINOPHIL NFR BLD: 3.5 %
GFR SERPLBLD CREATININE-BSD FMLA CKD-EPI: 41 ML/MIN/{1.73_M2}
GLUCOSE SERPL-MCNC: 105 MG/DL (ref 70–99)
HCT VFR BLD AUTO: 39 % (ref 36–48)
HDLC SERPL-MCNC: 64 MG/DL (ref 40–60)
HGB BLD-MCNC: 13.1 G/DL (ref 12–16)
LDLC SERPL CALC-MCNC: 76 MG/DL
LYMPHOCYTES # BLD: 1.1 K/UL (ref 1–5.1)
LYMPHOCYTES NFR BLD: 29.6 %
MCH RBC QN AUTO: 28.9 PG (ref 26–34)
MCHC RBC AUTO-ENTMCNC: 33.6 G/DL (ref 31–36)
MCV RBC AUTO: 86 FL (ref 80–100)
MONOCYTES # BLD: 0.6 K/UL (ref 0–1.3)
MONOCYTES NFR BLD: 16.8 %
NEUTROPHILS # BLD: 1.7 K/UL (ref 1.7–7.7)
NEUTROPHILS NFR BLD: 47.6 %
PLATELET # BLD AUTO: 171 K/UL (ref 135–450)
PMV BLD AUTO: 9.1 FL (ref 5–10.5)
POTASSIUM SERPL-SCNC: 3.6 MMOL/L (ref 3.5–5.1)
PROT SERPL-MCNC: 6.4 G/DL (ref 6.4–8.2)
RBC # BLD AUTO: 4.53 M/UL (ref 4–5.2)
SODIUM SERPL-SCNC: 142 MMOL/L (ref 136–145)
TRIGL SERPL-MCNC: 112 MG/DL (ref 0–150)
VLDLC SERPL CALC-MCNC: 22 MG/DL
WBC # BLD AUTO: 3.6 K/UL (ref 4–11)

## 2023-10-15 ENCOUNTER — APPOINTMENT (OUTPATIENT)
Dept: GENERAL RADIOLOGY | Age: 68
End: 2023-10-15
Payer: MEDICARE

## 2023-10-15 ENCOUNTER — HOSPITAL ENCOUNTER (EMERGENCY)
Age: 68
Discharge: HOME | End: 2023-10-15
Attending: STUDENT IN AN ORGANIZED HEALTH CARE EDUCATION/TRAINING PROGRAM
Payer: MEDICARE

## 2023-10-15 VITALS
SYSTOLIC BLOOD PRESSURE: 133 MMHG | WEIGHT: 197.8 LBS | TEMPERATURE: 98.7 F | OXYGEN SATURATION: 94 % | DIASTOLIC BLOOD PRESSURE: 67 MMHG | RESPIRATION RATE: 19 BRPM | HEART RATE: 54 BPM | BODY MASS INDEX: 37.37 KG/M2

## 2023-10-15 DIAGNOSIS — R07.89 CHEST DISCOMFORT: Primary | ICD-10-CM

## 2023-10-15 LAB
ANION GAP SERPL CALCULATED.3IONS-SCNC: 11 MMOL/L (ref 3–16)
BASOPHILS # BLD: 0 K/UL (ref 0–0.2)
BASOPHILS NFR BLD: 0.5 %
BUN SERPL-MCNC: 11 MG/DL (ref 7–20)
CALCIUM SERPL-MCNC: 9.4 MG/DL (ref 8.3–10.6)
CHLORIDE SERPL-SCNC: 104 MMOL/L (ref 99–110)
CO2 SERPL-SCNC: 23 MMOL/L (ref 21–32)
CREAT SERPL-MCNC: 0.8 MG/DL (ref 0.6–1.2)
DEPRECATED RDW RBC AUTO: 17.1 % (ref 12.4–15.4)
EOSINOPHIL # BLD: 0.2 K/UL (ref 0–0.6)
EOSINOPHIL NFR BLD: 4.6 %
GFR SERPLBLD CREATININE-BSD FMLA CKD-EPI: >60 ML/MIN/{1.73_M2}
GLUCOSE SERPL-MCNC: 91 MG/DL (ref 70–99)
HCT VFR BLD AUTO: 39.4 % (ref 36–48)
HGB BLD-MCNC: 13.1 G/DL (ref 12–16)
LYMPHOCYTES # BLD: 1.2 K/UL (ref 1–5.1)
LYMPHOCYTES NFR BLD: 28.3 %
MCH RBC QN AUTO: 28.2 PG (ref 26–34)
MCHC RBC AUTO-ENTMCNC: 33.2 G/DL (ref 31–36)
MCV RBC AUTO: 85 FL (ref 80–100)
MONOCYTES # BLD: 0.5 K/UL (ref 0–1.3)
MONOCYTES NFR BLD: 13 %
NEUTROPHILS # BLD: 2.3 K/UL (ref 1.7–7.7)
NEUTROPHILS NFR BLD: 53.6 %
PLATELET # BLD AUTO: 174 K/UL (ref 135–450)
PMV BLD AUTO: 8.4 FL (ref 5–10.5)
POTASSIUM SERPL-SCNC: 3.6 MMOL/L (ref 3.5–5.1)
RBC # BLD AUTO: 4.63 M/UL (ref 4–5.2)
SODIUM SERPL-SCNC: 138 MMOL/L (ref 136–145)
TROPONIN, HIGH SENSITIVITY: 12 NG/L (ref 0–14)
TROPONIN, HIGH SENSITIVITY: 12 NG/L (ref 0–14)
WBC # BLD AUTO: 4.2 K/UL (ref 4–11)

## 2023-10-15 PROCEDURE — 85025 COMPLETE CBC W/AUTO DIFF WBC: CPT

## 2023-10-15 PROCEDURE — 93005 ELECTROCARDIOGRAM TRACING: CPT | Performed by: INTERNAL MEDICINE

## 2023-10-15 PROCEDURE — 99285 EMERGENCY DEPT VISIT HI MDM: CPT

## 2023-10-15 PROCEDURE — 93005 ELECTROCARDIOGRAM TRACING: CPT | Performed by: STUDENT IN AN ORGANIZED HEALTH CARE EDUCATION/TRAINING PROGRAM

## 2023-10-15 PROCEDURE — 36415 COLL VENOUS BLD VENIPUNCTURE: CPT

## 2023-10-15 PROCEDURE — 84484 ASSAY OF TROPONIN QUANT: CPT

## 2023-10-15 PROCEDURE — 71046 X-RAY EXAM CHEST 2 VIEWS: CPT

## 2023-10-15 PROCEDURE — 80048 BASIC METABOLIC PNL TOTAL CA: CPT

## 2023-10-15 NOTE — ED PROVIDER NOTES
ED Attending Attestation Note     Date of evaluation: 10/15/2023    I have discussed the case with the resident. I have personally performed a history, physical exam, and my own medical decision making. I have reviewed the note and agree with the findings and plan. I have reviewed the ECG and concur with the resident's interpretation. My assessment reveals a well-appearing 71-year-old female with past medical history of multiple myeloma in remission and MI in the past presenting with chest pain and back pain. Her pain started in her back after she was raking leaves and then subsequently she developed chest pain. Both of these symptoms have been waxing and waning for the last few days and she is currently pain-free at this moment. Her work-up including troponin, EKG, chest x-ray, and BMP are all reassuring. She will be discharged home with instruction to follow-up with her PCP within 3 days and strict return precautions.           Hira Zapata MD  10/15/23 1024

## 2023-10-17 LAB
EKG ATRIAL RATE: 54 BPM
EKG DIAGNOSIS: NORMAL
EKG P AXIS: 41 DEGREES
EKG P-R INTERVAL: 170 MS
EKG Q-T INTERVAL: 456 MS
EKG QRS DURATION: 110 MS
EKG QTC CALCULATION (BAZETT): 432 MS
EKG R AXIS: -23 DEGREES
EKG T AXIS: 44 DEGREES
EKG VENTRICULAR RATE: 54 BPM

## 2023-10-25 RX ORDER — CLOPIDOGREL BISULFATE 75 MG/1
75 TABLET ORAL DAILY
Qty: 90 TABLET | Refills: 3 | Status: SHIPPED | OUTPATIENT
Start: 2023-10-25

## 2023-10-25 NOTE — TELEPHONE ENCOUNTER
Requested Prescriptions     Pending Prescriptions Disp Refills    clopidogrel (PLAVIX) 75 MG tablet [Pharmacy Med Name: CLOPIDOGREL 75MG TABLETS] 90 tablet 3     Sig: TAKE 1 TABLET BY MOUTH DAILY            Last Office Visit: 9/18/2023     Next Office Visit: 3/21/2024

## 2023-11-14 RX ORDER — AMLODIPINE BESYLATE 10 MG/1
10 TABLET ORAL DAILY
Qty: 90 TABLET | Refills: 1 | Status: SHIPPED | OUTPATIENT
Start: 2023-11-14

## 2024-02-10 ENCOUNTER — APPOINTMENT (OUTPATIENT)
Dept: CT IMAGING | Age: 69
End: 2024-02-10
Payer: MEDICARE

## 2024-02-10 ENCOUNTER — HOSPITAL ENCOUNTER (EMERGENCY)
Age: 69
Discharge: HOME | End: 2024-02-10
Attending: STUDENT IN AN ORGANIZED HEALTH CARE EDUCATION/TRAINING PROGRAM
Payer: MEDICARE

## 2024-02-10 VITALS
HEIGHT: 61 IN | DIASTOLIC BLOOD PRESSURE: 79 MMHG | TEMPERATURE: 98.1 F | RESPIRATION RATE: 18 BRPM | SYSTOLIC BLOOD PRESSURE: 176 MMHG | BODY MASS INDEX: 37.37 KG/M2 | OXYGEN SATURATION: 96 % | HEART RATE: 76 BPM

## 2024-02-10 DIAGNOSIS — M54.50 CHRONIC MIDLINE LOW BACK PAIN WITHOUT SCIATICA: Primary | ICD-10-CM

## 2024-02-10 DIAGNOSIS — G89.29 CHRONIC MIDLINE LOW BACK PAIN WITHOUT SCIATICA: Primary | ICD-10-CM

## 2024-02-10 PROCEDURE — 6370000000 HC RX 637 (ALT 250 FOR IP): Performed by: STUDENT IN AN ORGANIZED HEALTH CARE EDUCATION/TRAINING PROGRAM

## 2024-02-10 PROCEDURE — 99284 EMERGENCY DEPT VISIT MOD MDM: CPT

## 2024-02-10 PROCEDURE — 93005 ELECTROCARDIOGRAM TRACING: CPT | Performed by: STUDENT IN AN ORGANIZED HEALTH CARE EDUCATION/TRAINING PROGRAM

## 2024-02-10 PROCEDURE — 72128 CT CHEST SPINE W/O DYE: CPT

## 2024-02-10 PROCEDURE — 72131 CT LUMBAR SPINE W/O DYE: CPT

## 2024-02-10 RX ORDER — OXYCODONE HYDROCHLORIDE 5 MG/1
5 TABLET ORAL ONCE
Status: COMPLETED | OUTPATIENT
Start: 2024-02-10 | End: 2024-02-10

## 2024-02-10 RX ORDER — OXYCODONE HYDROCHLORIDE 5 MG/1
5 TABLET ORAL EVERY 6 HOURS PRN
Qty: 12 TABLET | Refills: 0 | Status: SHIPPED | OUTPATIENT
Start: 2024-02-10 | End: 2024-02-13

## 2024-02-10 RX ADMIN — OXYCODONE 5 MG: 5 TABLET ORAL at 15:08

## 2024-02-10 ASSESSMENT — PAIN DESCRIPTION - PAIN TYPE: TYPE: ACUTE PAIN

## 2024-02-10 ASSESSMENT — PAIN DESCRIPTION - FREQUENCY: FREQUENCY: CONTINUOUS

## 2024-02-10 ASSESSMENT — PAIN DESCRIPTION - LOCATION: LOCATION: BACK

## 2024-02-10 ASSESSMENT — PAIN SCALES - GENERAL: PAINLEVEL_OUTOF10: 6

## 2024-02-10 ASSESSMENT — PAIN DESCRIPTION - ORIENTATION: ORIENTATION: MID;UPPER

## 2024-02-10 ASSESSMENT — PAIN - FUNCTIONAL ASSESSMENT: PAIN_FUNCTIONAL_ASSESSMENT: 0-10

## 2024-02-10 ASSESSMENT — PAIN DESCRIPTION - DESCRIPTORS: DESCRIPTORS: DULL;ACHING

## 2024-02-10 NOTE — ED PROVIDER NOTES
THE [facility]  EMERGENCY DEPARTMENT ENCOUNTER          ATTENDING PHYSICIAN NOTE       Date of evaluation: 2/10/2024    Chief Complaint     Back Pain (Pain in upper back for a few hours and left arm pain)      History of Present Illness     Leslee Maldonado is a 68 y.o. female who presents with past medical history of multiple myeloma and treatment, CAD status post CABG in 2019, type 2 diabetes, hypertension, and hyperlipidemia presenting with back pain.  She has chronic back pain from her multiple myeloma however this pain got worse today which prompted her to come in for evaluation.  She takes Tylenol at home for the pain however it has not helped.  She denies any bowel or bladder incontinence, fevers or chills, or saddle anesthesia.  She otherwise denies any other symptoms aside from the mild, midline thoracic tenderness.    ASSESSMENT / PLAN  (MEDICAL DECISION MAKING)     INITIAL VITALS: BP: (!) 176/79, Temp: 98.1 °F (36.7 °C), Pulse: 76, Respirations: 18, SpO2: 96 %      Leslee Maldonado is a 68 y.o. female with past medical history of multiple myeloma and treatment, CAD status post CABG in 2019, type 2 diabetes, hypertension, hyperlipidemia presenting with midthoracic back pain.    She has had pain for a long time due to her multiple myeloma however the pain got worse today around 1 hour prior to arrival.  She tried taking her Tylenol at home however it did not seem to help.  She does not have any red flag symptoms and denies bowel or bladder incontinence or saddle anesthesia.    CT imaging of the thoracic and lumbar spine do not show any new bony or soft tissue abnormalities to suggest traumatic injuries or new multiple myeloma lesions.    Patient was given one 5 mg dose of oxycodone and reports improvement in her symptoms.  Given her hemodynamic stability, lack of red flag symptoms, and reassuring imaging, feel safe for discharge home and to follow-up with her PCP in the coming days.  She will be discharged  hours as needed for Pain for up to 3 days. Intended supply: 3 days. Take lowest dose possible to manage pain Max Daily Amount: 20 mg     Dispense:  12 tablet     Refill:  0       CONSULTS:  None    Review of Systems     Review of Systems    Past Medical, Surgical, Family, and Social History     She has a past medical history of Arthritis, CAD (coronary artery disease), Cancer (HCC), Diabetes mellitus (HCC), Hyperlipidemia, Hypertension, and MI (myocardial infarction) ([facility]).  She has a past surgical history that includes Tubal ligation; Hysterectomy; INSERTION / REMOVAL / REPLACEMENT VENOUS ACCESS CATHETER (N/A, 01/24/2019); Coronary artery bypass graft (N/A, 06/13/2019); Colonoscopy; Hand surgery (September 2018); and Cardiac surgery (Quadruple by pass).  Her family history includes Diabetes in her sister.  She reports that she quit smoking about 25 years ago. Her smoking use included cigarettes. She has never used smokeless tobacco. She reports that she does not currently use alcohol after a past usage of about 2.0 standard drinks of alcohol per week. She reports that she does not use drugs.    Medications     Discharge Medication List as of 2/10/2024  4:10 PM        CONTINUE these medications which have NOT CHANGED    Details   amLODIPine (NORVASC) 10 MG tablet TAKE 1 TABLET BY MOUTH DAILY, Disp-90 tablet, R-1Normal      clopidogrel (PLAVIX) 75 MG tablet TAKE 1 TABLET BY MOUTH DAILY, Disp-90 tablet, R-3Normal      rosuvastatin (CRESTOR) 40 MG tablet TAKE 1 TABLET BY MOUTH EVERY EVENING, Disp-90 tablet, R-3Normal      hydrALAZINE (APRESOLINE) 25 MG tablet Take 1 tablet by mouth in the morning and at bedtime, Disp-180 tablet, R-3Normal      !! sodium chloride 0.9 % SOLN 250 mL with elotuzumab 300 MG SOLR 10 mg/kg Infuse 10 mg/kg intravenously once Chemo - every 28 daysHistorical Med      !! sodium chloride 0.9 % SOLN 250 mL with elotuzumab 300 MG SOLR 1,725 mg Infuse 1,725 mg intravenously every 28 daysHistorical

## 2024-02-11 LAB
EKG ATRIAL RATE: 63 BPM
EKG DIAGNOSIS: NORMAL
EKG P AXIS: 55 DEGREES
EKG P-R INTERVAL: 166 MS
EKG Q-T INTERVAL: 422 MS
EKG QRS DURATION: 128 MS
EKG QTC CALCULATION (BAZETT): 431 MS
EKG R AXIS: -49 DEGREES
EKG T AXIS: 68 DEGREES
EKG VENTRICULAR RATE: 63 BPM

## 2024-03-29 ENCOUNTER — OFFICE VISIT (OUTPATIENT)
Dept: CARDIOLOGY CLINIC | Age: 69
End: 2024-03-29
Payer: MEDICARE

## 2024-03-29 VITALS
BODY MASS INDEX: 38.13 KG/M2 | DIASTOLIC BLOOD PRESSURE: 80 MMHG | HEART RATE: 72 BPM | SYSTOLIC BLOOD PRESSURE: 124 MMHG | WEIGHT: 201.8 LBS

## 2024-03-29 DIAGNOSIS — I25.10 CORONARY ARTERY DISEASE INVOLVING NATIVE CORONARY ARTERY OF NATIVE HEART WITHOUT ANGINA PECTORIS: ICD-10-CM

## 2024-03-29 DIAGNOSIS — I25.10 CAD S/P PERCUTANEOUS CORONARY ANGIOPLASTY: ICD-10-CM

## 2024-03-29 DIAGNOSIS — I10 HTN (HYPERTENSION), BENIGN: Primary | ICD-10-CM

## 2024-03-29 DIAGNOSIS — E78.5 HYPERLIPIDEMIA, UNSPECIFIED HYPERLIPIDEMIA TYPE: ICD-10-CM

## 2024-03-29 DIAGNOSIS — Z98.61 CAD S/P PERCUTANEOUS CORONARY ANGIOPLASTY: ICD-10-CM

## 2024-03-29 PROCEDURE — 1123F ACP DISCUSS/DSCN MKR DOCD: CPT | Performed by: INTERNAL MEDICINE

## 2024-03-29 PROCEDURE — 99214 OFFICE O/P EST MOD 30 MIN: CPT | Performed by: INTERNAL MEDICINE

## 2024-03-29 PROCEDURE — 3074F SYST BP LT 130 MM HG: CPT | Performed by: INTERNAL MEDICINE

## 2024-03-29 PROCEDURE — 3079F DIAST BP 80-89 MM HG: CPT | Performed by: INTERNAL MEDICINE

## 2024-03-29 RX ORDER — AMLODIPINE BESYLATE 10 MG/1
10 TABLET ORAL DAILY
Qty: 90 TABLET | Refills: 1 | Status: SHIPPED | OUTPATIENT
Start: 2024-03-29

## 2024-03-29 RX ORDER — HYDRALAZINE HYDROCHLORIDE 25 MG/1
25 TABLET, FILM COATED ORAL 2 TIMES DAILY
Qty: 180 TABLET | Refills: 3 | Status: SHIPPED | OUTPATIENT
Start: 2024-03-29

## 2024-03-29 RX ORDER — LISINOPRIL 40 MG/1
40 TABLET ORAL DAILY
Qty: 90 TABLET | Refills: 3 | Status: SHIPPED | OUTPATIENT
Start: 2024-03-29

## 2024-03-29 RX ORDER — ROSUVASTATIN CALCIUM 40 MG/1
40 TABLET, COATED ORAL EVERY EVENING
Qty: 90 TABLET | Refills: 3 | Status: SHIPPED | OUTPATIENT
Start: 2024-03-29

## 2024-03-29 NOTE — PROGRESS NOTES
Subjective:      Patient ID: Leslee Maldonado is a 63 y.o. female    S/P CABG x 4.  HLP.  HTN.      HPI:  Leslee Maldonado is a 63 y.o. patient who presented to the hospital with complaints of chest pain and back pain.  She had a CTPA that was negative for PE.  Follow up CAD s/p CABG x 4 on 19.   No chest pain.  Smoked 1/2 p d for years and quit 5 yrs ago.  No chest pains no orthopnea.  has relapsed with multiple myeloma after bm txp in 1823;  DOING WELL.  NO CHEST PAIN.  NO ORTHOPNEA.  TAKES ELIQUIS AND PLAVIX.  NEED TO CHECK LABS.  ORDERED.  CHECK LIPID TOO.    3/29/24: DOING WELL.  NO CHEST PAIN ON ELIQUIS AND TX FOR MM.  STOP PLAVIX.  START ASA 81 MG QHS.     Allergies   Allergen Reactions    Pcn [Penicillins]     Lipitor [Atorvastatin] Other (See Comments)     Leg cramping       Social History     Socioeconomic History    Marital status:      Spouse name: Not on file    Number of children: Not on file    Years of education: Not on file    Highest education level: Not on file   Occupational History    Not on file   Social Needs    Financial resource strain: Not on file    Food insecurity:     Worry: Not on file     Inability: Not on file    Transportation needs:     Medical: Not on file     Non-medical: Not on file   Tobacco Use    Smoking status: Former Smoker     Packs/day: 0.50     Years: 20.00     Pack years: 10.00     Types: Cigarettes     Last attempt to quit: 2018     Years since quittin.6    Smokeless tobacco: Never Used   Substance and Sexual Activity    Alcohol use: Yes     Comment: rare    Drug use: No    Sexual activity: Not on file   Lifestyle    Physical activity:     Days per week: Not on file     Minutes per session: Not on file    Stress: Not on file   Relationships    Social connections:     Talks on phone: Not on file     Gets together: Not on file     Attends Restorationism service: Not on file     Active member of club or organization: Not on file     Attends meetings of

## 2024-04-19 ENCOUNTER — HOSPITAL ENCOUNTER (OUTPATIENT)
Dept: GENERAL RADIOLOGY | Age: 69
Discharge: HOME | End: 2024-04-19
Payer: MEDICARE

## 2024-04-19 DIAGNOSIS — C90.00 MULTIPLE MYELOMA, REMISSION STATUS UNSPECIFIED (HCC): ICD-10-CM

## 2024-04-19 PROCEDURE — 71046 X-RAY EXAM CHEST 2 VIEWS: CPT

## 2024-04-22 ENCOUNTER — APPOINTMENT (OUTPATIENT)
Dept: CT IMAGING | Age: 69
DRG: 193 | End: 2024-04-22
Payer: MEDICARE

## 2024-04-22 ENCOUNTER — APPOINTMENT (OUTPATIENT)
Dept: GENERAL RADIOLOGY | Age: 69
DRG: 193 | End: 2024-04-22
Payer: MEDICARE

## 2024-04-22 ENCOUNTER — HOSPITAL ENCOUNTER (INPATIENT)
Age: 69
LOS: 3 days | Discharge: HOME | DRG: 193 | End: 2024-04-25
Attending: EMERGENCY MEDICINE | Admitting: INTERNAL MEDICINE
Payer: MEDICARE

## 2024-04-22 DIAGNOSIS — J18.9 PNEUMONIA OF RIGHT LOWER LOBE DUE TO INFECTIOUS ORGANISM: Primary | ICD-10-CM

## 2024-04-22 PROBLEM — R09.02 HYPOXIA: Status: ACTIVE | Noted: 2024-04-22

## 2024-04-22 LAB
ALBUMIN SERPL-MCNC: 4 G/DL (ref 3.4–5)
ALP SERPL-CCNC: 62 U/L (ref 40–129)
ALT SERPL-CCNC: 27 U/L (ref 10–40)
ANION GAP SERPL CALCULATED.3IONS-SCNC: 15 MMOL/L (ref 3–16)
AST SERPL-CCNC: 33 U/L (ref 15–37)
BASE EXCESS BLDV CALC-SCNC: 1.6 MMOL/L (ref -2–3)
BASOPHILS # BLD: 0.1 K/UL (ref 0–0.2)
BASOPHILS NFR BLD: 1.4 %
BILIRUB DIRECT SERPL-MCNC: <0.2 MG/DL (ref 0–0.3)
BILIRUB INDIRECT SERPL-MCNC: NORMAL MG/DL (ref 0–1)
BILIRUB SERPL-MCNC: 0.3 MG/DL (ref 0–1)
BILIRUB UR QL STRIP.AUTO: NEGATIVE
BUN SERPL-MCNC: 8 MG/DL (ref 7–20)
CALCIUM SERPL-MCNC: 8.7 MG/DL (ref 8.3–10.6)
CHLORIDE SERPL-SCNC: 103 MMOL/L (ref 99–110)
CLARITY UR: CLEAR
CO2 BLDV-SCNC: 29 MMOL/L
CO2 SERPL-SCNC: 20 MMOL/L (ref 21–32)
COHGB MFR BLDV: 1.3 % (ref 0–1.5)
COLOR UR: YELLOW
CREAT SERPL-MCNC: 0.9 MG/DL (ref 0.6–1.2)
DEPRECATED RDW RBC AUTO: 17 % (ref 12.4–15.4)
DO-HGB MFR BLDV: 26.1 %
EKG ATRIAL RATE: 81 BPM
EKG DIAGNOSIS: NORMAL
EKG P AXIS: 69 DEGREES
EKG P-R INTERVAL: 158 MS
EKG Q-T INTERVAL: 418 MS
EKG QRS DURATION: 102 MS
EKG QTC CALCULATION (BAZETT): 485 MS
EKG R AXIS: -34 DEGREES
EKG T AXIS: 46 DEGREES
EKG VENTRICULAR RATE: 81 BPM
EOSINOPHIL # BLD: 0 K/UL (ref 0–0.6)
EOSINOPHIL NFR BLD: 0.5 %
GFR SERPLBLD CREATININE-BSD FMLA CKD-EPI: 70 ML/MIN/{1.73_M2}
GLUCOSE SERPL-MCNC: 105 MG/DL (ref 70–99)
GLUCOSE UR STRIP.AUTO-MCNC: NEGATIVE MG/DL
HCO3 BLDV-SCNC: 27.5 MMOL/L (ref 24–28)
HCT VFR BLD AUTO: 41.7 % (ref 36–48)
HGB BLD-MCNC: 13.6 G/DL (ref 12–16)
HGB UR QL STRIP.AUTO: ABNORMAL
KETONES UR STRIP.AUTO-MCNC: NEGATIVE MG/DL
LACTATE BLDV-SCNC: 0.9 MMOL/L (ref 0.4–1.9)
LACTATE BLDV-SCNC: 1.1 MMOL/L (ref 0.4–1.9)
LEUKOCYTE ESTERASE UR QL STRIP.AUTO: NEGATIVE
LYMPHOCYTES # BLD: 0.7 K/UL (ref 1–5.1)
LYMPHOCYTES NFR BLD: 16.6 %
MCH RBC QN AUTO: 28 PG (ref 26–34)
MCHC RBC AUTO-ENTMCNC: 32.6 G/DL (ref 31–36)
MCV RBC AUTO: 85.8 FL (ref 80–100)
METHGB MFR BLDV: 0.5 % (ref 0–1.5)
MONOCYTES # BLD: 0.3 K/UL (ref 0–1.3)
MONOCYTES NFR BLD: 6.6 %
NEUTROPHILS # BLD: 3.3 K/UL (ref 1.7–7.7)
NEUTROPHILS NFR BLD: 74.9 %
NITRITE UR QL STRIP.AUTO: NEGATIVE
NT-PROBNP SERPL-MCNC: 54 PG/ML (ref 0–124)
PCO2 BLDV: 47.2 MMHG (ref 41–51)
PH BLDV: 7.37 [PH] (ref 7.35–7.45)
PH UR STRIP.AUTO: 7 [PH] (ref 5–8)
PLATELET # BLD AUTO: 264 K/UL (ref 135–450)
PMV BLD AUTO: 8.5 FL (ref 5–10.5)
PO2 BLDV: 40.1 MMHG (ref 25–40)
POTASSIUM SERPL-SCNC: 3.9 MMOL/L (ref 3.5–5.1)
PROCALCITONIN SERPL IA-MCNC: 0.21 NG/ML (ref 0–0.15)
PROT SERPL-MCNC: 7.2 G/DL (ref 6.4–8.2)
PROT UR STRIP.AUTO-MCNC: ABNORMAL MG/DL
RBC # BLD AUTO: 4.86 M/UL (ref 4–5.2)
RBC #/AREA URNS HPF: NORMAL /HPF (ref 0–4)
SAO2 % BLDV: 73 %
SODIUM SERPL-SCNC: 138 MMOL/L (ref 136–145)
SP GR UR STRIP.AUTO: 1.01 (ref 1–1.03)
TROPONIN, HIGH SENSITIVITY: 11 NG/L (ref 0–14)
TROPONIN, HIGH SENSITIVITY: 13 NG/L (ref 0–14)
UA DIPSTICK W REFLEX MICRO PNL UR: YES
URN SPEC COLLECT METH UR: ABNORMAL
UROBILINOGEN UR STRIP-ACNC: 0.2 E.U./DL
WBC # BLD AUTO: 4.5 K/UL (ref 4–11)
WBC #/AREA URNS HPF: NORMAL /HPF (ref 0–5)

## 2024-04-22 PROCEDURE — 80048 BASIC METABOLIC PNL TOTAL CA: CPT

## 2024-04-22 PROCEDURE — 87040 BLOOD CULTURE FOR BACTERIA: CPT

## 2024-04-22 PROCEDURE — 82803 BLOOD GASES ANY COMBINATION: CPT

## 2024-04-22 PROCEDURE — 6370000000 HC RX 637 (ALT 250 FOR IP): Performed by: EMERGENCY MEDICINE

## 2024-04-22 PROCEDURE — 2580000003 HC RX 258

## 2024-04-22 PROCEDURE — 99285 EMERGENCY DEPT VISIT HI MDM: CPT

## 2024-04-22 PROCEDURE — 6370000000 HC RX 637 (ALT 250 FOR IP)

## 2024-04-22 PROCEDURE — 2580000003 HC RX 258: Performed by: EMERGENCY MEDICINE

## 2024-04-22 PROCEDURE — 2060000000 HC ICU INTERMEDIATE R&B

## 2024-04-22 PROCEDURE — 94761 N-INVAS EAR/PLS OXIMETRY MLT: CPT

## 2024-04-22 PROCEDURE — 93005 ELECTROCARDIOGRAM TRACING: CPT | Performed by: EMERGENCY MEDICINE

## 2024-04-22 PROCEDURE — 85025 COMPLETE CBC W/AUTO DIFF WBC: CPT

## 2024-04-22 PROCEDURE — 84145 PROCALCITONIN (PCT): CPT

## 2024-04-22 PROCEDURE — 6360000004 HC RX CONTRAST MEDICATION: Performed by: EMERGENCY MEDICINE

## 2024-04-22 PROCEDURE — 96374 THER/PROPH/DIAG INJ IV PUSH: CPT

## 2024-04-22 PROCEDURE — 94150 VITAL CAPACITY TEST: CPT

## 2024-04-22 PROCEDURE — 84484 ASSAY OF TROPONIN QUANT: CPT

## 2024-04-22 PROCEDURE — 71045 X-RAY EXAM CHEST 1 VIEW: CPT

## 2024-04-22 PROCEDURE — 6360000002 HC RX W HCPCS

## 2024-04-22 PROCEDURE — 71275 CT ANGIOGRAPHY CHEST: CPT

## 2024-04-22 PROCEDURE — 81001 URINALYSIS AUTO W/SCOPE: CPT

## 2024-04-22 PROCEDURE — 6360000002 HC RX W HCPCS: Performed by: EMERGENCY MEDICINE

## 2024-04-22 PROCEDURE — 80076 HEPATIC FUNCTION PANEL: CPT

## 2024-04-22 PROCEDURE — 83605 ASSAY OF LACTIC ACID: CPT

## 2024-04-22 PROCEDURE — 36415 COLL VENOUS BLD VENIPUNCTURE: CPT

## 2024-04-22 PROCEDURE — 83880 ASSAY OF NATRIURETIC PEPTIDE: CPT

## 2024-04-22 PROCEDURE — A4217 STERILE WATER/SALINE, 500 ML: HCPCS

## 2024-04-22 PROCEDURE — 2700000000 HC OXYGEN THERAPY PER DAY

## 2024-04-22 RX ORDER — AMLODIPINE BESYLATE 10 MG/1
10 TABLET ORAL DAILY
Status: DISCONTINUED | OUTPATIENT
Start: 2024-04-22 | End: 2024-04-25 | Stop reason: HOSPADM

## 2024-04-22 RX ORDER — CEFEPIME 1 G/50ML
2000 INJECTION, SOLUTION INTRAVENOUS ONCE
Status: DISCONTINUED | OUTPATIENT
Start: 2024-04-22 | End: 2024-04-22 | Stop reason: SDUPTHER

## 2024-04-22 RX ORDER — CEFEPIME 1 G/50ML
2000 INJECTION, SOLUTION INTRAVENOUS ONCE
Status: COMPLETED | OUTPATIENT
Start: 2024-04-22 | End: 2024-04-22

## 2024-04-22 RX ORDER — AZITHROMYCIN 250 MG/1
500 TABLET, FILM COATED ORAL DAILY
Status: DISCONTINUED | OUTPATIENT
Start: 2024-04-23 | End: 2024-04-24

## 2024-04-22 RX ORDER — SODIUM CHLORIDE 9 MG/ML
500 INJECTION, SOLUTION INTRAVENOUS CONTINUOUS PRN
Status: DISCONTINUED | OUTPATIENT
Start: 2024-04-22 | End: 2024-04-25 | Stop reason: HOSPADM

## 2024-04-22 RX ORDER — SODIUM CHLORIDE, SODIUM LACTATE, POTASSIUM CHLORIDE, AND CALCIUM CHLORIDE .6; .31; .03; .02 G/100ML; G/100ML; G/100ML; G/100ML
1000 INJECTION, SOLUTION INTRAVENOUS ONCE
Status: COMPLETED | OUTPATIENT
Start: 2024-04-22 | End: 2024-04-22

## 2024-04-22 RX ORDER — ALBUTEROL SULFATE 2.5 MG/3ML
2.5 SOLUTION RESPIRATORY (INHALATION) EVERY 6 HOURS PRN
Status: DISCONTINUED | OUTPATIENT
Start: 2024-04-22 | End: 2024-04-25 | Stop reason: HOSPADM

## 2024-04-22 RX ORDER — POTASSIUM CHLORIDE 750 MG/1
10 TABLET, EXTENDED RELEASE ORAL 2 TIMES DAILY
Status: DISCONTINUED | OUTPATIENT
Start: 2024-04-22 | End: 2024-04-25 | Stop reason: HOSPADM

## 2024-04-22 RX ORDER — CEFEPIME 1 G/50ML
2000 INJECTION, SOLUTION INTRAVENOUS EVERY 8 HOURS
Status: DISCONTINUED | OUTPATIENT
Start: 2024-04-22 | End: 2024-04-24

## 2024-04-22 RX ORDER — ROSUVASTATIN CALCIUM 20 MG/1
40 TABLET, COATED ORAL EVERY EVENING
Status: DISCONTINUED | OUTPATIENT
Start: 2024-04-22 | End: 2024-04-25 | Stop reason: HOSPADM

## 2024-04-22 RX ORDER — AZITHROMYCIN 250 MG/1
500 TABLET, FILM COATED ORAL ONCE
Status: COMPLETED | OUTPATIENT
Start: 2024-04-22 | End: 2024-04-22

## 2024-04-22 RX ORDER — SODIUM CHLORIDE 0.9 % (FLUSH) 0.9 %
5-40 SYRINGE (ML) INJECTION EVERY 12 HOURS SCHEDULED
Status: DISCONTINUED | OUTPATIENT
Start: 2024-04-22 | End: 2024-04-25 | Stop reason: HOSPADM

## 2024-04-22 RX ORDER — MAGNESIUM SULFATE IN WATER 40 MG/ML
4000 INJECTION, SOLUTION INTRAVENOUS PRN
Status: DISCONTINUED | OUTPATIENT
Start: 2024-04-22 | End: 2024-04-25 | Stop reason: HOSPADM

## 2024-04-22 RX ORDER — SODIUM CHLORIDE 9 MG/ML
INJECTION, SOLUTION INTRAVENOUS PRN
Status: DISCONTINUED | OUTPATIENT
Start: 2024-04-22 | End: 2024-04-25 | Stop reason: HOSPADM

## 2024-04-22 RX ORDER — CEFEPIME 1 G/50ML
2000 INJECTION, SOLUTION INTRAVENOUS EVERY 8 HOURS
Status: DISCONTINUED | OUTPATIENT
Start: 2024-04-22 | End: 2024-04-22 | Stop reason: SDUPTHER

## 2024-04-22 RX ORDER — LISINOPRIL 40 MG/1
40 TABLET ORAL DAILY
Status: DISCONTINUED | OUTPATIENT
Start: 2024-04-22 | End: 2024-04-25 | Stop reason: HOSPADM

## 2024-04-22 RX ORDER — HYDRALAZINE HYDROCHLORIDE 50 MG/1
25 TABLET, FILM COATED ORAL 2 TIMES DAILY
Status: DISCONTINUED | OUTPATIENT
Start: 2024-04-22 | End: 2024-04-25 | Stop reason: HOSPADM

## 2024-04-22 RX ORDER — POTASSIUM CHLORIDE 7.45 MG/ML
20 INJECTION INTRAVENOUS PRN
Status: DISCONTINUED | OUTPATIENT
Start: 2024-04-22 | End: 2024-04-25 | Stop reason: HOSPADM

## 2024-04-22 RX ORDER — SODIUM CHLORIDE 0.9 % (FLUSH) 0.9 %
5-40 SYRINGE (ML) INJECTION PRN
Status: DISCONTINUED | OUTPATIENT
Start: 2024-04-22 | End: 2024-04-25 | Stop reason: HOSPADM

## 2024-04-22 RX ORDER — ALBUTEROL SULFATE 90 UG/1
2 AEROSOL, METERED RESPIRATORY (INHALATION) EVERY 6 HOURS PRN
Status: DISCONTINUED | OUTPATIENT
Start: 2024-04-22 | End: 2024-04-22 | Stop reason: DRUGHIGH

## 2024-04-22 RX ORDER — VITAMIN B COMPLEX
1000 TABLET ORAL DAILY
Status: DISCONTINUED | OUTPATIENT
Start: 2024-04-22 | End: 2024-04-25 | Stop reason: HOSPADM

## 2024-04-22 RX ADMIN — IOPAMIDOL 75 ML: 755 INJECTION, SOLUTION INTRAVENOUS at 10:54

## 2024-04-22 RX ADMIN — WATER 2000 MG: 1 INJECTION INTRAMUSCULAR; INTRAVENOUS; SUBCUTANEOUS at 11:51

## 2024-04-22 RX ADMIN — LISINOPRIL 40 MG: 40 TABLET ORAL at 14:58

## 2024-04-22 RX ADMIN — APIXABAN 5 MG: 5 TABLET, FILM COATED ORAL at 20:17

## 2024-04-22 RX ADMIN — CEFEPIME 2000 MG: 1 INJECTION, SOLUTION INTRAVENOUS at 23:05

## 2024-04-22 RX ADMIN — ROSUVASTATIN CALCIUM 40 MG: 20 TABLET, COATED ORAL at 18:05

## 2024-04-22 RX ADMIN — SODIUM CHLORIDE 15 ML: 900 IRRIGANT IRRIGATION at 18:05

## 2024-04-22 RX ADMIN — AZITHROMYCIN DIHYDRATE 500 MG: 250 TABLET, FILM COATED ORAL at 11:52

## 2024-04-22 RX ADMIN — SODIUM CHLORIDE, PRESERVATIVE FREE 10 ML: 5 INJECTION INTRAVENOUS at 20:18

## 2024-04-22 RX ADMIN — SODIUM CHLORIDE, POTASSIUM CHLORIDE, SODIUM LACTATE AND CALCIUM CHLORIDE 1000 ML: 600; 310; 30; 20 INJECTION, SOLUTION INTRAVENOUS at 11:09

## 2024-04-22 RX ADMIN — POTASSIUM CHLORIDE 10 MEQ: 750 TABLET, EXTENDED RELEASE ORAL at 20:15

## 2024-04-22 RX ADMIN — CEFEPIME 2000 MG: 1 INJECTION, SOLUTION INTRAVENOUS at 15:58

## 2024-04-22 RX ADMIN — CALCIUM CARBONATE-VITAMIN D TAB 500 MG-200 UNIT 1 TABLET: 500-200 TAB at 23:08

## 2024-04-22 SDOH — ECONOMIC STABILITY: TRANSPORTATION INSECURITY
IN THE PAST 12 MONTHS, HAS LACK OF TRANSPORTATION KEPT YOU FROM MEETINGS, WORK, OR FROM GETTING THINGS NEEDED FOR DAILY LIVING?: NO

## 2024-04-22 SDOH — ECONOMIC STABILITY: HOUSING INSECURITY
IN THE LAST 12 MONTHS, WAS THERE A TIME WHEN YOU DID NOT HAVE A STEADY PLACE TO SLEEP OR SLEPT IN A SHELTER (INCLUDING NOW)?: NO

## 2024-04-22 SDOH — ECONOMIC STABILITY: TRANSPORTATION INSECURITY
IN THE PAST 12 MONTHS, HAS THE LACK OF TRANSPORTATION KEPT YOU FROM MEDICAL APPOINTMENTS OR FROM GETTING MEDICATIONS?: NO

## 2024-04-22 SDOH — ECONOMIC STABILITY: HOUSING INSECURITY: IN THE LAST 12 MONTHS, HOW MANY PLACES HAVE YOU LIVED?: 1

## 2024-04-22 SDOH — ECONOMIC STABILITY: FOOD INSECURITY: WITHIN THE PAST 12 MONTHS, YOU WORRIED THAT YOUR FOOD WOULD RUN OUT BEFORE YOU GOT MONEY TO BUY MORE.: NEVER TRUE

## 2024-04-22 SDOH — ECONOMIC STABILITY: FOOD INSECURITY: WITHIN THE PAST 12 MONTHS, THE FOOD YOU BOUGHT JUST DIDN'T LAST AND YOU DIDN'T HAVE MONEY TO GET MORE.: NEVER TRUE

## 2024-04-22 SDOH — ECONOMIC STABILITY: INCOME INSECURITY: IN THE LAST 12 MONTHS, WAS THERE A TIME WHEN YOU WERE NOT ABLE TO PAY THE MORTGAGE OR RENT ON TIME?: NO

## 2024-04-22 ASSESSMENT — LIFESTYLE VARIABLES
HOW OFTEN DO YOU HAVE A DRINK CONTAINING ALCOHOL: NEVER
HOW MANY STANDARD DRINKS CONTAINING ALCOHOL DO YOU HAVE ON A TYPICAL DAY: PATIENT DOES NOT DRINK
HOW OFTEN DO YOU HAVE A DRINK CONTAINING ALCOHOL: NEVER
HOW MANY STANDARD DRINKS CONTAINING ALCOHOL DO YOU HAVE ON A TYPICAL DAY: PATIENT DOES NOT DRINK

## 2024-04-22 ASSESSMENT — PAIN - FUNCTIONAL ASSESSMENT
PAIN_FUNCTIONAL_ASSESSMENT: NONE - DENIES PAIN
PAIN_FUNCTIONAL_ASSESSMENT: NONE - DENIES PAIN

## 2024-04-22 ASSESSMENT — SOCIAL DETERMINANTS OF HEALTH (SDOH): HOW HARD IS IT FOR YOU TO PAY FOR THE VERY BASICS LIKE FOOD, HOUSING, MEDICAL CARE, AND HEATING?: NOT HARD AT ALL

## 2024-04-22 ASSESSMENT — PAIN SCALES - GENERAL: PAINLEVEL_OUTOF10: 0

## 2024-04-22 NOTE — PROGRESS NOTES
Patient admitted to room 3518 at 1340. Vitals stable. Patient on 3L nasal cannula. Slight crackles noted at right lower lobes; pt breathing at 32/min.

## 2024-04-22 NOTE — ED NOTES
[facility] member screened pt for SDOH/RUBY. Pt denied services/resources at this time.      Madhavi Ramirez  04/22/24 0904

## 2024-04-22 NOTE — PROGRESS NOTES
4 Eyes Skin Assessment     NAME:  Leslee Maldonado  YOB: 1955  MEDICAL RECORD NUMBER:  [id number]    The patient is being assessed for  Admission    I agree that at least one RN has performed a thorough Head to Toe Skin Assessment on the patient. ALL assessment sites listed below have been assessed.      Areas assessed by both nurses:    Head, Face, Ears, Shoulders, Back, Chest, Arms, Elbows, Hands, Sacrum. Buttock, Coccyx, Ischium, Legs. Feet and Heels, and Under Medical Devices         Does the Patient have a Wound? No noted wound(s)       Eddy Prevention initiated by RN: Yes  Wound Care Orders initiated by RN: No    Pressure Injury (Stage 3,4, Unstageable, DTI, NWPT, and Complex wounds) if present, place Wound referral order by RN under : No    New Ostomies, if present place, Ostomy referral order under : No     Nurse 1 eSignature: Electronically signed by Denise Ayala RN on 4/22/24 at 1:52 PM EDT    **SHARE this note so that the co-signing nurse can place an eSignature**    Nurse 2 eSignature: Electronically signed by Hannah Saenz RN on 4/22/24 at 6:40 PM EDT

## 2024-04-22 NOTE — CARE COORDINATION
Case Management Assessment  Initial Evaluation    Date/Time of Evaluation: 4/22/2024 2:29 PM  Assessment Completed by: MELISSA Lee   for [facility] ([facility])  [employer] Mobile: [phone]    If patient is discharged prior to next notation, then this note serves as note for discharge by case management.    Patient Name: Leslee Maldonado                   YOB: 1955  Diagnosis: Hypoxia [R09.02]  Pneumonia of right lower lobe due to infectious organism [J18.9]                   Date / Time: 4/22/2024  7:19 AM    Patient Admission Status: Inpatient   Readmission Risk (Low < 19, Mod (19-27), High > 27): No data recorded  Current PCP: Raj Cedeño MD  PCP verified by CM? Yes (Pt has been going to [facility] and her cardiologist)    Chart Reviewed: Yes      History Provided by: Patient  Patient Orientation: Alert and Oriented    Patient Cognition: Alert    Hospitalization in the last 30 days (Readmission):  No    If yes, Readmission Assessment in CM Navigator will be completed.    Advance Directives:      Code Status: Full Code   Patient's Primary Decision Maker is: Legal Next of Kin      Discharge Planning:    Patient lives with: Children, Family Members (Daughter Alessia and grandson) Type of Home: House  Primary Care Giver: Self  Patient Support Systems include: Family Members   Current Financial resources: Medicare ([facility] Medicare)  Current community resources: None  Current services prior to admission: Other (Comment) ([facility])            Current DME:              Type of Home Care services:  None    ADLS  Prior functional level: Independent in ADLs/IADLs  Current functional level: Independent in ADLs/IADLs    PT AM-PAC:   /24  OT AM-PAC:   /24    Family can provide assistance at DC: Yes  Would you like Case Management to discuss the discharge plan with any other family members/significant others, and if so, who? Yes  Plans to Return to Present Housing:

## 2024-04-22 NOTE — ACP (ADVANCE CARE PLANNING)
Advance Care Planning     General Advance Care Planning (ACP) Conversation    Date of Conversation: 4/22/2024  Conducted with: Patient with Decision Making Capacity    Healthcare Decision Maker:  No healthcare decision makers have been documented.  Click here to complete HealthCare Decision Makers including selection of the Healthcare Decision Maker Relationship (ie \"Primary\")   Today we  discussed pt stating her kids are her decision makers if she is unable to make her healthcare decisions.     Length of Voluntary ACP Conversation in minutes:  <16 minutes (Non-Billable)    MELISSA Lee   for [facility] ([facility])  [employer] Mobile: [phone]

## 2024-04-22 NOTE — ED PROVIDER NOTES
(PRINIVIL;ZESTRIL) 40 MG TABLET    Take 1 tablet by mouth daily    POMALIDOMIDE (POMALYST) 4 MG CHEMO CAPSULE    Take 1 capsule by mouth every evening Daily on days 1-21 of 28-day cycle. Takes in evening.   9/18/22- per cycle, will take daily through 9/22/22, then 7 days off.    POTASSIUM CHLORIDE (KLOR-CON) 10 MEQ EXTENDED RELEASE TABLET    TAKE 1 TABLET BY MOUTH TWICE DAILY    ROSUVASTATIN (CRESTOR) 40 MG TABLET    Take 1 tablet by mouth every evening    SODIUM CHLORIDE 0.9 % SOLN 250 ML WITH ELOTUZUMAB 300 MG SOLR 1,725 MG    Infuse 1,725 mg intravenously every 28 days    SODIUM CHLORIDE 0.9 % SOLN 250 ML WITH ELOTUZUMAB 300 MG SOLR 10 MG/KG    Infuse 10 mg/kg intravenously once Chemo - every 28 days    ZOLEDRONIC ACID (ZOMETA) 4 MG/5ML INJECTION    Infuse 5 mLs intravenously every 3 months Last dose was in august       Allergies     She is allergic to pcn [penicillins] and lipitor [atorvastatin].    Physical Exam     INITIAL VITALS: BP: (!) 165/72, Temp: 98.4 °F (36.9 °C), Pulse: 70, Respirations: 20, SpO2: 91 %       General:  Well appearing. No acute distress.    Eyes:  Pupils reactive. No discharge from eyes.     ENT:  No discharge from nose.     Neck:  Supple.    Pulmonary:   Non-labored breathing. Breath sounds clear bilaterally.    Cardiac:  Regular rate and rhythm. No murmurs.    Abdomen:  Soft. Non-tender. Non-distended.     Skin:  No rash.    Neuro:  Alert and oriented x 4. Moves all four extremities to command. No focal deficit.    Extremities:  Warm and perfused. No peripheral edema.          Suzie Trinh MD  04/22/24 7961

## 2024-04-22 NOTE — H&P
[facility] History and Physical       Attending Physician: Suzie Trinh*    Primary Care: Raj Cedeño MD       Referring MD: No referring provider defined for this encounter.    Name: Leslee Maldonado :  1955  MRN:  [id number]    Admission: 2024      Date: 2024    Reason for Admission: Hypoxic Respiratory Failure    History of Present Illness: Leslee Maldonado is a 68 y.o. patient of Dr. Evangelista with IgG Multiple Myeloma. She presented to the emergency department this morning with hypoxia. She recently completed a 10 day course of Levaquin (24-24) for sinusitis symptoms with dry cough. She was seen for an acute visit at [facility] on 24 after this with ongoing cough. At that visit she had a respiratory viral panel done that was positive for human metapneumovirus. She was started on Tessalon Perles. At this time her cough has not gotten any better and her O2 sats are in the 80s on room air. She does not wear oxygen at home. A CTA completed in the emergency demonstrated no evidence of pulmonary embolus but did show ground glass opacity of the right lower lobe and right middle lobe reflecting pneumonia. With these findings she will be admitted for supportive care and antibiotics to treat an apparent secondary bacterial pneumonia..       Past Surgical History:   Procedure Laterality Date    CARDIAC SURGERY  Quadruple by pass    COLONOSCOPY      CORONARY ARTERY BYPASS GRAFT N/A 2019    CORONARY ARTERY BYPASS GRAFT X4, INTERNAL MAMMARY ARTERY AND SAPHENOUS VEIN GRAFT-TCP-BP performed by Maykel Sousa MD at [facility] OR    HAND SURGERY  2018    HYSTERECTOMY (CERVIX STATUS UNKNOWN)      partial for fibroids    INSERTION / REMOVAL / REPLACEMENT VENOUS ACCESS CATHETER N/A 2019    TRIFUSION CATHETER INSERTION LEFT SUBCLAVIAN performed by Munir Hess MD at [facility] OR    TUBAL LIGATION         Past Medical History:   Diagnosis Date    Arthritis Shoulder    CAD (coronary artery

## 2024-04-22 NOTE — CONSENT
Informed Consent for Blood Component Transfusion Note    I have discussed with the patient the rationale for blood component transfusion; its benefits in treating or preventing fatigue, organ damage, or death; and its risk which includes mild transfusion reactions, rare risk of blood borne infection, or more serious but rare reactions. I have discussed the alternatives to transfusion, including the risk and consequences of not receiving transfusion. The patient had an opportunity to ask questions and had agreed to proceed with transfusion of blood components.    Electronically signed by HE James CNP on 4/22/24 at 3:26 PM EDT

## 2024-04-22 NOTE — PLAN OF CARE
Problem: Pain  Goal: Verbalizes/displays adequate comfort level or baseline comfort level  Outcome: Progressing   Denies pain    Problem: Safety - Adult  Goal: Free from fall injury  Outcome: Progressing  Orthostatic vital signs obtained at start of shift - see flowsheet for details.  Pt does not meet criteria for orthostasis.  Pt is a Med fall risk. See Ayers Fall Score and ABCDS Injury Risk assessments.   + Screening for Orthostasis and/or + High Fall Risk per AYERS/ABCDS: Explained fall risk precautions to pt and family and rationale behind their use to keep the patient safe. Pt bed is in low position, side rails up, call light and belongings are in reach. Fall wristband applied and present on pts wrist.  Bed alarm on.  Pt encouraged to call for assistance. Will continue with hourly rounds for PO intake, pain needs, toileting and repositioning as needed.

## 2024-04-22 NOTE — PROGRESS NOTES
[facility] - Clinical Pharmacy Note - Extended Infusion Beta-Lactam Adjustment    Cefepime ordered for treatment of PNA CAP. Per [facility] Extended Infusion Beta-Lactam Policy, cefepime will be changed to 2000 mg every 8 hour.     Estimated Creatinine Clearance: Estimated Creatinine Clearance: 63 mL/min (based on SCr of 0.9 mg/dL).  Dialysis Status, NERY, CKD: n/a  BMI: Body mass index is 37.53 kg/m².    Rationale for Adjustment: Agent demonstrates time-dependent effect on bacterial eradication. Extended-infusion dosing strategy aims to enhance microbiologic and clinical efficacy.    Pharmacy will continue to monitor cultures and sensitivities (where available) and adjust dose as necessary.      Please call with any questions.  Dorothy Marquez, PharmD  Main Pharmacy: [id number]

## 2024-04-23 LAB
ANION GAP SERPL CALCULATED.3IONS-SCNC: 8 MMOL/L (ref 3–16)
BASOPHILS # BLD: 0 K/UL (ref 0–0.2)
BASOPHILS NFR BLD: 1.2 %
BUN SERPL-MCNC: 9 MG/DL (ref 7–20)
CALCIUM SERPL-MCNC: 8.4 MG/DL (ref 8.3–10.6)
CHLORIDE SERPL-SCNC: 104 MMOL/L (ref 99–110)
CO2 SERPL-SCNC: 24 MMOL/L (ref 21–32)
CREAT SERPL-MCNC: 1 MG/DL (ref 0.6–1.2)
DEPRECATED RDW RBC AUTO: 16.6 % (ref 12.4–15.4)
EOSINOPHIL # BLD: 0 K/UL (ref 0–0.6)
EOSINOPHIL NFR BLD: 0.1 %
GFR SERPLBLD CREATININE-BSD FMLA CKD-EPI: 62 ML/MIN/{1.73_M2}
GLUCOSE SERPL-MCNC: 105 MG/DL (ref 70–99)
HCT VFR BLD AUTO: 35.6 % (ref 36–48)
HGB BLD-MCNC: 11.8 G/DL (ref 12–16)
IGG SERPL-MCNC: 749 MG/DL (ref 700–1600)
LYMPHOCYTES # BLD: 0.6 K/UL (ref 1–5.1)
LYMPHOCYTES NFR BLD: 18 %
MCH RBC QN AUTO: 28.4 PG (ref 26–34)
MCHC RBC AUTO-ENTMCNC: 33.1 G/DL (ref 31–36)
MCV RBC AUTO: 85.9 FL (ref 80–100)
MONOCYTES # BLD: 0.3 K/UL (ref 0–1.3)
MONOCYTES NFR BLD: 10.3 %
NEUTROPHILS # BLD: 2.3 K/UL (ref 1.7–7.7)
NEUTROPHILS NFR BLD: 70.4 %
NT-PROBNP SERPL-MCNC: 67 PG/ML (ref 0–124)
PHOSPHATE SERPL-MCNC: 3.1 MG/DL (ref 2.5–4.9)
PLATELET # BLD AUTO: 177 K/UL (ref 135–450)
PMV BLD AUTO: 8.7 FL (ref 5–10.5)
POTASSIUM SERPL-SCNC: 3.6 MMOL/L (ref 3.5–5.1)
RBC # BLD AUTO: 4.14 M/UL (ref 4–5.2)
SODIUM SERPL-SCNC: 136 MMOL/L (ref 136–145)
WBC # BLD AUTO: 3.2 K/UL (ref 4–11)

## 2024-04-23 PROCEDURE — 6370000000 HC RX 637 (ALT 250 FOR IP)

## 2024-04-23 PROCEDURE — 2060000000 HC ICU INTERMEDIATE R&B

## 2024-04-23 PROCEDURE — 6370000000 HC RX 637 (ALT 250 FOR IP): Performed by: NURSE PRACTITIONER

## 2024-04-23 PROCEDURE — 6370000000 HC RX 637 (ALT 250 FOR IP): Performed by: INTERNAL MEDICINE

## 2024-04-23 PROCEDURE — 94761 N-INVAS EAR/PLS OXIMETRY MLT: CPT

## 2024-04-23 PROCEDURE — 2700000000 HC OXYGEN THERAPY PER DAY

## 2024-04-23 PROCEDURE — 83880 ASSAY OF NATRIURETIC PEPTIDE: CPT

## 2024-04-23 PROCEDURE — 87449 NOS EACH ORGANISM AG IA: CPT

## 2024-04-23 PROCEDURE — 2580000003 HC RX 258

## 2024-04-23 PROCEDURE — 82784 ASSAY IGA/IGD/IGG/IGM EACH: CPT

## 2024-04-23 PROCEDURE — 36415 COLL VENOUS BLD VENIPUNCTURE: CPT

## 2024-04-23 PROCEDURE — 6360000002 HC RX W HCPCS

## 2024-04-23 PROCEDURE — 84100 ASSAY OF PHOSPHORUS: CPT

## 2024-04-23 PROCEDURE — 94640 AIRWAY INHALATION TREATMENT: CPT

## 2024-04-23 PROCEDURE — 80048 BASIC METABOLIC PNL TOTAL CA: CPT

## 2024-04-23 PROCEDURE — 85025 COMPLETE CBC W/AUTO DIFF WBC: CPT

## 2024-04-23 RX ORDER — IPRATROPIUM BROMIDE AND ALBUTEROL SULFATE 2.5; .5 MG/3ML; MG/3ML
1 SOLUTION RESPIRATORY (INHALATION)
Status: DISCONTINUED | OUTPATIENT
Start: 2024-04-23 | End: 2024-04-25

## 2024-04-23 RX ADMIN — HYDRALAZINE HYDROCHLORIDE 25 MG: 50 TABLET ORAL at 20:39

## 2024-04-23 RX ADMIN — AZITHROMYCIN DIHYDRATE 500 MG: 250 TABLET, FILM COATED ORAL at 09:15

## 2024-04-23 RX ADMIN — CEFEPIME 2000 MG: 1 INJECTION, SOLUTION INTRAVENOUS at 23:29

## 2024-04-23 RX ADMIN — CALCIUM CARBONATE-VITAMIN D TAB 500 MG-200 UNIT 1 TABLET: 500-200 TAB at 20:39

## 2024-04-23 RX ADMIN — CEFEPIME 2000 MG: 1 INJECTION, SOLUTION INTRAVENOUS at 06:31

## 2024-04-23 RX ADMIN — Medication 1000 UNITS: at 09:15

## 2024-04-23 RX ADMIN — CALCIUM CARBONATE-VITAMIN D TAB 500 MG-200 UNIT 1 TABLET: 500-200 TAB at 09:15

## 2024-04-23 RX ADMIN — GUAIFENESIN, DEXTROMETHORPHAN HBR 1 TABLET: 600; 30 TABLET ORAL at 18:07

## 2024-04-23 RX ADMIN — POTASSIUM CHLORIDE 10 MEQ: 750 TABLET, EXTENDED RELEASE ORAL at 09:14

## 2024-04-23 RX ADMIN — POTASSIUM CHLORIDE 10 MEQ: 750 TABLET, EXTENDED RELEASE ORAL at 20:39

## 2024-04-23 RX ADMIN — AMLODIPINE BESYLATE 10 MG: 10 TABLET ORAL at 09:14

## 2024-04-23 RX ADMIN — SODIUM CHLORIDE, PRESERVATIVE FREE 10 ML: 5 INJECTION INTRAVENOUS at 09:17

## 2024-04-23 RX ADMIN — IPRATROPIUM BROMIDE AND ALBUTEROL SULFATE 1 DOSE: 2.5; .5 SOLUTION RESPIRATORY (INHALATION) at 16:15

## 2024-04-23 RX ADMIN — HYDRALAZINE HYDROCHLORIDE 25 MG: 50 TABLET ORAL at 09:14

## 2024-04-23 RX ADMIN — APIXABAN 5 MG: 5 TABLET, FILM COATED ORAL at 09:15

## 2024-04-23 RX ADMIN — CEFEPIME 2000 MG: 1 INJECTION, SOLUTION INTRAVENOUS at 15:18

## 2024-04-23 RX ADMIN — APIXABAN 5 MG: 5 TABLET, FILM COATED ORAL at 20:40

## 2024-04-23 RX ADMIN — LISINOPRIL 40 MG: 40 TABLET ORAL at 09:15

## 2024-04-23 RX ADMIN — ROSUVASTATIN CALCIUM 40 MG: 20 TABLET, COATED ORAL at 17:33

## 2024-04-23 RX ADMIN — SODIUM CHLORIDE, PRESERVATIVE FREE 10 ML: 5 INJECTION INTRAVENOUS at 20:41

## 2024-04-23 RX ADMIN — SODIUM CHLORIDE 15 ML: 900 IRRIGANT IRRIGATION at 20:41

## 2024-04-23 NOTE — PROGRESS NOTES
[facility] Progress Note    2024     Leslee Maldonado    MRN: [id number]    : 1955    Referring MD: No referring provider defined for this encounter.      SUBJECTIVE:  she is feeling better. Afebrile. Off O2 and saturating well.       ECOG PS:  (2) Ambulatory and capable of self care, unable to carry out work activity, up and about > 50% or waking hours    KPS: 70% Cares for self; unable to carry on normal activity or to do active work    Isolation: None    Medications    Scheduled Meds:   apixaban  5 mg Oral BID    oyster shell calcium w/D  1 tablet Oral BID    Vitamin D  1,000 Units Oral Daily    amLODIPine  10 mg Oral Daily    hydrALAZINE  25 mg Oral BID    lisinopril  40 mg Oral Daily    pomalidomide  4 mg Oral QPM    potassium chloride  10 mEq Oral BID    rosuvastatin  40 mg Oral QPM    sodium chloride flush  5-40 mL IntraVENous 2 times per day    Saline Mouthwash  15 mL Swish & Spit 4x Daily AC & HS    azithromycin  500 mg Oral Daily    cefepime  2,000 mg IntraVENous Q8H     Continuous Infusions:   sodium chloride      sodium chloride       PRN Meds:.sodium chloride, sodium chloride flush, sodium chloride, potassium chloride, magnesium sulfate, magnesium hydroxide, Saline Mouthwash, albuterol    ROS:  As noted above, otherwise remainder of 10-point ROS negative    Physical Exam:     I&O:    Intake/Output Summary (Last 24 hours) at 2024 0828  Last data filed at 2024 0530  Gross per 24 hour   Intake 418 ml   Output 1050 ml   Net -632 ml       Vital Signs:  BP (!) 119/56   Pulse 68   Temp 98.4 °F (36.9 °C) (Oral)   Resp 30   Ht 1.549 m (5' 1\")   Wt 89.6 kg (197 lb 9.6 oz)   SpO2 94%   BMI 37.34 kg/m²     Weight:    Wt Readings from Last 3 Encounters:   24 89.6 kg (197 lb 9.6 oz)   24 91.5 kg (201 lb 12.8 oz)   10/15/23 89.7 kg (197 lb 12.8 oz)         General: Awake, alert and oriented.  HEENT: normocephalic, PERRL, no scleral erythema or icterus, Oral mucosa moist and intact,

## 2024-04-23 NOTE — PLAN OF CARE
Problem: Pain  Goal: Verbalizes/displays adequate comfort level or baseline comfort level  Outcome: Progressing- patient with complaints of back soreness during shift. Patient does not want PRN pain medication, reports discomfort is relieved by ice packs. Packs provided.      Problem: Respiratory - Adult  Goal: Achieves optimal ventilation and oxygenation  Outcome: Progressing- patient ntitrated between room air and 2 L to keep patient at goal of 90% saturation. Care continues.

## 2024-04-24 LAB
ALBUMIN SERPL-MCNC: 3.4 G/DL (ref 3.4–5)
ALP SERPL-CCNC: 45 U/L (ref 40–129)
ALT SERPL-CCNC: 21 U/L (ref 10–40)
ANION GAP SERPL CALCULATED.3IONS-SCNC: 9 MMOL/L (ref 3–16)
AST SERPL-CCNC: 25 U/L (ref 15–37)
BASOPHILS # BLD: 0.1 K/UL (ref 0–0.2)
BASOPHILS NFR BLD: 2 %
BILIRUB DIRECT SERPL-MCNC: <0.2 MG/DL (ref 0–0.3)
BILIRUB INDIRECT SERPL-MCNC: ABNORMAL MG/DL (ref 0–1)
BILIRUB SERPL-MCNC: 0.3 MG/DL (ref 0–1)
BUN SERPL-MCNC: 10 MG/DL (ref 7–20)
CALCIUM SERPL-MCNC: 8.3 MG/DL (ref 8.3–10.6)
CHLORIDE SERPL-SCNC: 107 MMOL/L (ref 99–110)
CO2 SERPL-SCNC: 24 MMOL/L (ref 21–32)
CREAT SERPL-MCNC: 1 MG/DL (ref 0.6–1.2)
DEPRECATED RDW RBC AUTO: 16.3 % (ref 12.4–15.4)
EOSINOPHIL # BLD: 0 K/UL (ref 0–0.6)
EOSINOPHIL NFR BLD: 1.1 %
GFR SERPLBLD CREATININE-BSD FMLA CKD-EPI: 62 ML/MIN/{1.73_M2}
GLUCOSE SERPL-MCNC: 106 MG/DL (ref 70–99)
HCT VFR BLD AUTO: 35.7 % (ref 36–48)
HGB BLD-MCNC: 12 G/DL (ref 12–16)
LDH SERPL L TO P-CCNC: 206 U/L (ref 100–190)
LEGIONELLA AG UR QL: NORMAL
LYMPHOCYTES # BLD: 1.1 K/UL (ref 1–5.1)
LYMPHOCYTES NFR BLD: 34.1 %
MAGNESIUM SERPL-MCNC: 2.4 MG/DL (ref 1.8–2.4)
MCH RBC QN AUTO: 28.3 PG (ref 26–34)
MCHC RBC AUTO-ENTMCNC: 33.6 G/DL (ref 31–36)
MCV RBC AUTO: 84.2 FL (ref 80–100)
MONOCYTES # BLD: 0.4 K/UL (ref 0–1.3)
MONOCYTES NFR BLD: 12.9 %
NEUTROPHILS # BLD: 1.6 K/UL (ref 1.7–7.7)
NEUTROPHILS NFR BLD: 49.9 %
PHOSPHATE SERPL-MCNC: 2.9 MG/DL (ref 2.5–4.9)
PLATELET # BLD AUTO: 175 K/UL (ref 135–450)
PMV BLD AUTO: 8.5 FL (ref 5–10.5)
POTASSIUM SERPL-SCNC: 3.8 MMOL/L (ref 3.5–5.1)
PROT SERPL-MCNC: 6 G/DL (ref 6.4–8.2)
RBC # BLD AUTO: 4.24 M/UL (ref 4–5.2)
S PNEUM AG UR QL: NORMAL
SODIUM SERPL-SCNC: 140 MMOL/L (ref 136–145)
URATE SERPL-MCNC: 1.6 MG/DL (ref 2.6–6)
WBC # BLD AUTO: 3.1 K/UL (ref 4–11)

## 2024-04-24 PROCEDURE — 97165 OT EVAL LOW COMPLEX 30 MIN: CPT

## 2024-04-24 PROCEDURE — 6370000000 HC RX 637 (ALT 250 FOR IP): Performed by: INTERNAL MEDICINE

## 2024-04-24 PROCEDURE — 2060000000 HC ICU INTERMEDIATE R&B

## 2024-04-24 PROCEDURE — 97530 THERAPEUTIC ACTIVITIES: CPT

## 2024-04-24 PROCEDURE — 6370000000 HC RX 637 (ALT 250 FOR IP): Performed by: NURSE PRACTITIONER

## 2024-04-24 PROCEDURE — 97162 PT EVAL MOD COMPLEX 30 MIN: CPT

## 2024-04-24 PROCEDURE — 6370000000 HC RX 637 (ALT 250 FOR IP)

## 2024-04-24 PROCEDURE — 84550 ASSAY OF BLOOD/URIC ACID: CPT

## 2024-04-24 PROCEDURE — 83615 LACTATE (LD) (LDH) ENZYME: CPT

## 2024-04-24 PROCEDURE — 85025 COMPLETE CBC W/AUTO DIFF WBC: CPT

## 2024-04-24 PROCEDURE — 84100 ASSAY OF PHOSPHORUS: CPT

## 2024-04-24 PROCEDURE — 80076 HEPATIC FUNCTION PANEL: CPT

## 2024-04-24 PROCEDURE — 97535 SELF CARE MNGMENT TRAINING: CPT

## 2024-04-24 PROCEDURE — 94640 AIRWAY INHALATION TREATMENT: CPT

## 2024-04-24 PROCEDURE — 97116 GAIT TRAINING THERAPY: CPT

## 2024-04-24 PROCEDURE — 6360000002 HC RX W HCPCS

## 2024-04-24 PROCEDURE — 2580000003 HC RX 258

## 2024-04-24 PROCEDURE — 94760 N-INVAS EAR/PLS OXIMETRY 1: CPT

## 2024-04-24 PROCEDURE — 80048 BASIC METABOLIC PNL TOTAL CA: CPT

## 2024-04-24 PROCEDURE — 83735 ASSAY OF MAGNESIUM: CPT

## 2024-04-24 PROCEDURE — 36415 COLL VENOUS BLD VENIPUNCTURE: CPT

## 2024-04-24 RX ORDER — DOXYCYCLINE 100 MG/1
100 CAPSULE ORAL EVERY 12 HOURS SCHEDULED
Status: DISCONTINUED | OUTPATIENT
Start: 2024-04-24 | End: 2024-04-25 | Stop reason: HOSPADM

## 2024-04-24 RX ADMIN — Medication 1000 UNITS: at 08:49

## 2024-04-24 RX ADMIN — GUAIFENESIN, DEXTROMETHORPHAN HBR 1 TABLET: 600; 30 TABLET ORAL at 17:01

## 2024-04-24 RX ADMIN — IPRATROPIUM BROMIDE AND ALBUTEROL SULFATE 1 DOSE: 2.5; .5 SOLUTION RESPIRATORY (INHALATION) at 08:53

## 2024-04-24 RX ADMIN — IPRATROPIUM BROMIDE AND ALBUTEROL SULFATE 1 DOSE: 2.5; .5 SOLUTION RESPIRATORY (INHALATION) at 11:41

## 2024-04-24 RX ADMIN — AMLODIPINE BESYLATE 10 MG: 10 TABLET ORAL at 08:49

## 2024-04-24 RX ADMIN — AZITHROMYCIN DIHYDRATE 500 MG: 250 TABLET, FILM COATED ORAL at 08:49

## 2024-04-24 RX ADMIN — APIXABAN 5 MG: 5 TABLET, FILM COATED ORAL at 21:36

## 2024-04-24 RX ADMIN — IPRATROPIUM BROMIDE AND ALBUTEROL SULFATE 1 DOSE: 2.5; .5 SOLUTION RESPIRATORY (INHALATION) at 21:17

## 2024-04-24 RX ADMIN — CEFEPIME 2000 MG: 1 INJECTION, SOLUTION INTRAVENOUS at 06:27

## 2024-04-24 RX ADMIN — SODIUM CHLORIDE, PRESERVATIVE FREE 10 ML: 5 INJECTION INTRAVENOUS at 21:36

## 2024-04-24 RX ADMIN — POTASSIUM CHLORIDE 10 MEQ: 750 TABLET, EXTENDED RELEASE ORAL at 08:49

## 2024-04-24 RX ADMIN — CALCIUM CARBONATE-VITAMIN D TAB 500 MG-200 UNIT 1 TABLET: 500-200 TAB at 09:02

## 2024-04-24 RX ADMIN — GUAIFENESIN, DEXTROMETHORPHAN HBR 1 TABLET: 600; 30 TABLET ORAL at 23:56

## 2024-04-24 RX ADMIN — HYDRALAZINE HYDROCHLORIDE 25 MG: 50 TABLET ORAL at 21:36

## 2024-04-24 RX ADMIN — LISINOPRIL 40 MG: 40 TABLET ORAL at 08:49

## 2024-04-24 RX ADMIN — APIXABAN 5 MG: 5 TABLET, FILM COATED ORAL at 08:49

## 2024-04-24 RX ADMIN — ROSUVASTATIN CALCIUM 40 MG: 20 TABLET, COATED ORAL at 17:01

## 2024-04-24 RX ADMIN — SODIUM CHLORIDE 15 ML: 900 IRRIGANT IRRIGATION at 21:38

## 2024-04-24 RX ADMIN — DOXYCYCLINE 100 MG: 100 CAPSULE ORAL at 21:36

## 2024-04-24 RX ADMIN — CALCIUM CARBONATE-VITAMIN D TAB 500 MG-200 UNIT 1 TABLET: 500-200 TAB at 21:36

## 2024-04-24 RX ADMIN — HYDRALAZINE HYDROCHLORIDE 25 MG: 50 TABLET ORAL at 08:49

## 2024-04-24 RX ADMIN — IPRATROPIUM BROMIDE AND ALBUTEROL SULFATE 1 DOSE: 2.5; .5 SOLUTION RESPIRATORY (INHALATION) at 16:20

## 2024-04-24 RX ADMIN — DOXYCYCLINE 100 MG: 100 CAPSULE ORAL at 11:22

## 2024-04-24 RX ADMIN — POTASSIUM CHLORIDE 10 MEQ: 750 TABLET, EXTENDED RELEASE ORAL at 21:36

## 2024-04-24 NOTE — PROGRESS NOTES
[facility] Progress Note    2024     Leslee Maldonado    MRN: [id number]    : 1955    Referring MD: No referring provider defined for this encounter.      SUBJECTIVE:  she is feeling better. Afebrile. Off O2 and saturating well.       ECOG PS:  (2) Ambulatory and capable of self care, unable to carry out work activity, up and about > 50% or waking hours    KPS: 70% Cares for self; unable to carry on normal activity or to do active work    Isolation: None    Medications    Scheduled Meds:   ipratropium 0.5 mg-albuterol 2.5 mg  1 Dose Inhalation Q4H WA RT    apixaban  5 mg Oral BID    oyster shell calcium w/D  1 tablet Oral BID    Vitamin D  1,000 Units Oral Daily    amLODIPine  10 mg Oral Daily    hydrALAZINE  25 mg Oral BID    lisinopril  40 mg Oral Daily    pomalidomide  4 mg Oral QPM    potassium chloride  10 mEq Oral BID    rosuvastatin  40 mg Oral QPM    sodium chloride flush  5-40 mL IntraVENous 2 times per day    Saline Mouthwash  15 mL Swish & Spit 4x Daily AC & HS    azithromycin  500 mg Oral Daily    cefepime  2,000 mg IntraVENous Q8H     Continuous Infusions:   sodium chloride      sodium chloride       PRN Meds:.dextromethorphan-guaiFENesin, sodium chloride, sodium chloride flush, sodium chloride, potassium chloride, magnesium sulfate, magnesium hydroxide, Saline Mouthwash, albuterol    ROS:  As noted above, otherwise remainder of 10-point ROS negative    Physical Exam:     I&O:    Intake/Output Summary (Last 24 hours) at 2024 0730  Last data filed at 2024 0622  Gross per 24 hour   Intake 1418 ml   Output 2500 ml   Net -1082 ml         Vital Signs:  BP (!) 108/54   Pulse 54   Temp 97.8 °F (36.6 °C) (Oral)   Resp 22   Ht 1.549 m (5' 1\")   Wt 89.6 kg (197 lb 9.6 oz)   SpO2 95%   BMI 37.34 kg/m²     Weight:    Wt Readings from Last 3 Encounters:   24 89.6 kg (197 lb 9.6 oz)   24 91.5 kg (201 lb 12.8 oz)   10/15/23 89.7 kg (197 lb 12.8 oz)         General: Awake, alert and

## 2024-04-24 NOTE — CARE COORDINATION
Spoke with PT/OT who states pt will need no services when discharged; see their note.    SW will follow    MELISSA Lee   for [facility] ([facility])  [employer]: [phone]

## 2024-04-24 NOTE — PROGRESS NOTES
Physical Therapy  Facility/Department: [address]  Physical Therapy Initial Assessment and DISCHARGE    Name: Leslee Maldonado  : 1955  MRN: [id number]  Date of Service: 2024    Discharge Recommendations:  Home with assist PRN, No therapy recommended at discharge   PT Equipment Recommendations  Equipment Needed: No      Assessment   Assessment: Pt from home with hypoxia.  Pt demonstrates transfers and gait at/near functional baseline.  Gait is steady without loss of balance.  SpO2 93-95% on room air with activity.  No ongoing PT needs identified at this time.  Pt in agreement and voices no concerns returning home with assist from daughter.  Will sign off.  Recommend continued activity/ambulation per RN staff until discharged.  Decision Making: Medium Complexity  Requires PT Follow-Up: No     Plan   General Plan: Discharge with evaluation only      Safety Devices  Type of Devices: Left in chair, Chair alarm in place, Call light within reach, Nurse notified (left as found)     Restrictions  Other position/activity restrictions: Up as tolerated     Subjective   Pain: Pt denies pain.    Chart Reviewed: Yes  Additional Pertinent Hx: Pt to ED  with cough and low O2 saturations.  Pt admitted for Hypoxia.  CXR (-).  Chest CT (-) PE.  PMH:  DM, multiple myeloma, MI, HTN, CAD, OA    Diagnosis: Hypoxia    Subjective  Subjective: Pt found sitting up in chair.  Pt looking perlexed stating, \"I need therapy?\"  Pt reports feeling about 90% her baseline.  \"I walked almost 2 laps in the [place] yesterday.\"  Pt reports improvement in breathing.    Social/Functional History  Lives With: Family (daughter and grandson (dtr works from home))  Type of Home: House  Home Layout: Multi-level, Able to Live on Main level with bedroom/bathroom, Laundry in basement (reports she stays on first floor; does go to basement for laundry prn, but dtr can assist)  Home Access: Stairs to enter with rails  Entrance Stairs - Number

## 2024-04-24 NOTE — PLAN OF CARE
Problem: Safety - Adult  Goal: Free from fall injury  Outcome: Progressing  Note: Orthostatic vital signs obtained at start of shift - see flowsheet for details.  Pt meets criteria for orthostasis.  Pt is a High fall risk. See Ayers Fall Score and ABCDS Injury Risk assessments.   + Screening for Orthostasis and/or + High Fall Risk per AYERS/ABCDS: Explained fall risk precautions to pt and family and rationale behind their use to keep the patient safe. Pt bed is in low position, side rails up, call light and belongings are in reach. Fall wristband applied and present on pts wrist.  Bed alarm on.  Pt encouraged to call for assistance. Will continue with hourly rounds for PO intake, pain needs, toileting and repositioning as needed.        Problem: Respiratory - Adult  Goal: Achieves optimal ventilation and oxygenation  Outcome: Progressing  Achieves optimal ventilation and oxygenation:   Assess for changes in respiratory status   Assess for changes in mentation and behavior   Position to facilitate oxygenation and minimize respiratory effort   Oxygen supplementation based on oxygen saturation or arterial blood gases   Encourage broncho-pulmonary hygiene including cough, deep breathe, incentive spirometry   Assess the need for suctioning and aspirate as needed   Assess and instruct to report shortness of breath or any respiratory difficulty  Note: Pt weaned off O2 before end of shift. VSS       Problem: Hematologic - Adult  Goal: Maintains hematologic stability  Maintains hematologic stability:   Assess for signs and symptoms of bleeding or hemorrhage   Monitor labs for bleeding or clotting disorders   Administer blood products/factors as ordered  Note: Patient's hemoglobin this AM:   Recent Labs     04/24/24  0334   HGB 12.0     Patient's platelet count this AM:   Recent Labs     04/24/24  0334       Thrombocytopenia not present at this time.  Patient showing no signs or symptoms of active bleeding.

## 2024-04-24 NOTE — DISCHARGE SUMMARY
[facility] Discharge Summary             Attending Physician: Mahad Evangelista MD    Referring MD: No referring provider defined for this encounter.    Name: Leslee Maldonado :  1955  MRN:  [id number]    Admission: 2024   Discharge:   2024    Date: 2024    Diagnosis on admit:  Hypoxic Respiratory Failure     Procedures: Routine chest x-ray, laboratories, EKG, IV fluid hydration, Panculture for fevers, IV antimicrobial therapy, Respiratory therapy, Oxygen therapy, Blood Product Infusions        Medications:      Medication List        START taking these medications      aspirin 81 MG chewable tablet  Commonly known as: Aspirin Childrens  Take 1 tablet by mouth daily     doxycycline monohydrate 100 MG capsule  Commonly known as: MONODOX  Take 1 capsule by mouth every 12 hours for 9 doses            CONTINUE taking these medications      amLODIPine 10 MG tablet  Commonly known as: NORVASC  Take 1 tablet by mouth daily     apixaban 5 MG Tabs tablet  Commonly known as: ELIQUIS     Calcium Carbonate-Vitamin D 600-125 MG-UNIT Tabs     hydrALAZINE 25 MG tablet  Commonly known as: APRESOLINE  Take 1 tablet by mouth in the morning and at bedtime     lisinopril 40 MG tablet  Commonly known as: PRINIVIL;ZESTRIL  Take 1 tablet by mouth daily     pomalidomide 4 MG chemo capsule  Commonly known as: POMALYST     potassium chloride 10 MEQ extended release tablet  Commonly known as: KLOR-CON  TAKE 1 TABLET BY MOUTH TWICE DAILY     rosuvastatin 40 MG tablet  Commonly known as: CRESTOR  Take 1 tablet by mouth every evening     Vitamin D3 25 MCG (1000 UT) Caps  Generic drug: vitamin D     zoledronic acid 4 MG/5ML injection  Commonly known as: ZOMETA            STOP taking these medications      clopidogrel 75 MG tablet  Commonly known as: PLAVIX     metoprolol tartrate 25 MG tablet  Commonly known as: LOPRESSOR     sodium chloride 0.9 % SOLN 250 mL with elotuzumab 300 MG SOLR 1,725 mg     sodium chloride 0.9 % SOLN 250

## 2024-04-24 NOTE — PROGRESS NOTES
Occupational Therapy  Facility/Department: [address]  Occupational Therapy Initial Assessment and Treatment  Discharge    Name: Leslee Maldonado  : 1955  MRN: [id number]  Date of Service: 2024    Discharge Recommendations:  Home with assist PRN  OT Equipment Recommendations  Equipment Needed: No       Patient Diagnosis(es): The encounter diagnosis was Pneumonia of right lower lobe due to infectious organism.  Past Medical History:  has a past medical history of Arthritis, CAD (coronary artery disease), Cancer (HCC), Diabetes mellitus (HCC), Hyperlipidemia, Hypertension, and MI (myocardial infarction) (HCC).  Past Surgical History:  has a past surgical history that includes Tubal ligation; Hysterectomy; INSERTION / REMOVAL / REPLACEMENT VENOUS ACCESS CATHETER (N/A, 2019); Coronary artery bypass graft (N/A, 2019); Colonoscopy; Hand surgery (2018); and Cardiac surgery (Quadruple by pass).           Assessment   Assessment: Pt presenting w/ SOB and was on supplemental O2 earlier during admission. Today, pt demonstrating safety and independence/supervision status for ADLs, functional transfers, and mobility. O2 sats remaining 93-95% on RA throughout session. Recommend pt return home w/ A prn. No further OT needs indicated. No DME needs. Pt denies concerns to return home. Will sign off.  Decision Making: Low Complexity  No Skilled OT: No OT goals identified  REQUIRES OT FOLLOW-UP: No  Activity Tolerance  Activity Tolerance: Patient Tolerated treatment well  Activity Tolerance Comments: tolerated mobility in room for several minutes - O2 sats remaining 93-95% throughout session        Plan   Occupational Therapy Plan  Times Per Week: Discharge acute OT - no further OT needs indicated.     Restrictions  Position Activity Restriction  Other position/activity restrictions: Up as tolerated    Subjective   General  Chart Reviewed: Yes  Additional Pertinent Hx: 68 y.o. F presents

## 2024-04-25 VITALS
DIASTOLIC BLOOD PRESSURE: 53 MMHG | BODY MASS INDEX: 37.16 KG/M2 | TEMPERATURE: 98.1 F | SYSTOLIC BLOOD PRESSURE: 123 MMHG | OXYGEN SATURATION: 96 % | HEART RATE: 62 BPM | WEIGHT: 196.8 LBS | RESPIRATION RATE: 14 BRPM | HEIGHT: 61 IN

## 2024-04-25 LAB
ANION GAP SERPL CALCULATED.3IONS-SCNC: 8 MMOL/L (ref 3–16)
APTT BLD: 31.5 SEC (ref 22.1–36.4)
BASOPHILS # BLD: 0 K/UL (ref 0–0.2)
BASOPHILS NFR BLD: 0.4 %
BUN SERPL-MCNC: 8 MG/DL (ref 7–20)
CALCIUM SERPL-MCNC: 8.4 MG/DL (ref 8.3–10.6)
CHLORIDE SERPL-SCNC: 108 MMOL/L (ref 99–110)
CO2 SERPL-SCNC: 22 MMOL/L (ref 21–32)
CREAT SERPL-MCNC: 0.9 MG/DL (ref 0.6–1.2)
DEPRECATED RDW RBC AUTO: 16.7 % (ref 12.4–15.4)
EOSINOPHIL # BLD: 0.1 K/UL (ref 0–0.6)
EOSINOPHIL NFR BLD: 2.7 %
GFR SERPLBLD CREATININE-BSD FMLA CKD-EPI: 70 ML/MIN/{1.73_M2}
GLUCOSE SERPL-MCNC: 106 MG/DL (ref 70–99)
HCT VFR BLD AUTO: 36.5 % (ref 36–48)
HGB BLD-MCNC: 12.2 G/DL (ref 12–16)
INR PPP: 1.14 (ref 0.85–1.15)
LYMPHOCYTES # BLD: 1.1 K/UL (ref 1–5.1)
LYMPHOCYTES NFR BLD: 29 %
MCH RBC QN AUTO: 28.6 PG (ref 26–34)
MCHC RBC AUTO-ENTMCNC: 33.5 G/DL (ref 31–36)
MCV RBC AUTO: 85.3 FL (ref 80–100)
MONOCYTES # BLD: 0.5 K/UL (ref 0–1.3)
MONOCYTES NFR BLD: 14.9 %
NEUTROPHILS # BLD: 1.9 K/UL (ref 1.7–7.7)
NEUTROPHILS NFR BLD: 53 %
PHOSPHATE SERPL-MCNC: 3 MG/DL (ref 2.5–4.9)
PLATELET # BLD AUTO: 195 K/UL (ref 135–450)
PMV BLD AUTO: 8.6 FL (ref 5–10.5)
POTASSIUM SERPL-SCNC: 4.1 MMOL/L (ref 3.5–5.1)
PROTHROMBIN TIME: 14.8 SEC (ref 11.9–14.9)
RBC # BLD AUTO: 4.28 M/UL (ref 4–5.2)
SODIUM SERPL-SCNC: 138 MMOL/L (ref 136–145)
WBC # BLD AUTO: 3.6 K/UL (ref 4–11)

## 2024-04-25 PROCEDURE — 94761 N-INVAS EAR/PLS OXIMETRY MLT: CPT

## 2024-04-25 PROCEDURE — 85025 COMPLETE CBC W/AUTO DIFF WBC: CPT

## 2024-04-25 PROCEDURE — 85610 PROTHROMBIN TIME: CPT

## 2024-04-25 PROCEDURE — 6370000000 HC RX 637 (ALT 250 FOR IP): Performed by: NURSE PRACTITIONER

## 2024-04-25 PROCEDURE — 94640 AIRWAY INHALATION TREATMENT: CPT

## 2024-04-25 PROCEDURE — 84100 ASSAY OF PHOSPHORUS: CPT

## 2024-04-25 PROCEDURE — 85730 THROMBOPLASTIN TIME PARTIAL: CPT

## 2024-04-25 PROCEDURE — 80048 BASIC METABOLIC PNL TOTAL CA: CPT

## 2024-04-25 PROCEDURE — 36415 COLL VENOUS BLD VENIPUNCTURE: CPT

## 2024-04-25 PROCEDURE — 6370000000 HC RX 637 (ALT 250 FOR IP)

## 2024-04-25 RX ORDER — ASPIRIN 81 MG/1
81 TABLET, CHEWABLE ORAL DAILY
Qty: 30 TABLET | Refills: 3 | Status: SHIPPED | OUTPATIENT
Start: 2024-04-25

## 2024-04-25 RX ORDER — IPRATROPIUM BROMIDE AND ALBUTEROL SULFATE 2.5; .5 MG/3ML; MG/3ML
1 SOLUTION RESPIRATORY (INHALATION)
Status: DISCONTINUED | OUTPATIENT
Start: 2024-04-25 | End: 2024-04-25 | Stop reason: HOSPADM

## 2024-04-25 RX ORDER — DOXYCYCLINE 100 MG/1
100 CAPSULE ORAL EVERY 12 HOURS SCHEDULED
Qty: 9 CAPSULE | Refills: 0 | Status: SHIPPED | OUTPATIENT
Start: 2024-04-25 | End: 2024-04-30

## 2024-04-25 RX ADMIN — Medication 1000 UNITS: at 08:59

## 2024-04-25 RX ADMIN — DOXYCYCLINE 100 MG: 100 CAPSULE ORAL at 08:59

## 2024-04-25 RX ADMIN — IPRATROPIUM BROMIDE AND ALBUTEROL SULFATE 1 DOSE: 2.5; .5 SOLUTION RESPIRATORY (INHALATION) at 08:39

## 2024-04-25 RX ADMIN — AMLODIPINE BESYLATE 10 MG: 10 TABLET ORAL at 08:59

## 2024-04-25 RX ADMIN — APIXABAN 5 MG: 5 TABLET, FILM COATED ORAL at 08:59

## 2024-04-25 RX ADMIN — POTASSIUM CHLORIDE 10 MEQ: 750 TABLET, EXTENDED RELEASE ORAL at 08:59

## 2024-04-25 RX ADMIN — HYDRALAZINE HYDROCHLORIDE 25 MG: 50 TABLET ORAL at 08:59

## 2024-04-25 RX ADMIN — LISINOPRIL 40 MG: 40 TABLET ORAL at 08:59

## 2024-04-25 RX ADMIN — CALCIUM CARBONATE-VITAMIN D TAB 500 MG-200 UNIT 1 TABLET: 500-200 TAB at 08:59

## 2024-04-25 NOTE — DISCHARGE INSTRUCTIONS
[facility] Discharge Instructions    Call for Questions/Concerns:  [phone] [facility] office  The phone number listed above is available 24 hrs/7 days per week  [facility] is open M-F 8am-4:30pm; Sat-Sun/Holidays 8am-1pm    Symptoms to Report Immediately:    Fever of 100.5 or greater  Vomiting without relief after use of anti-nausea medication  Severe abdominal cramping  Diarrhea: More than 3 loose, watery bowel movements in a 24 hour period  Unusual or excessive bleeding from your mouth, nose, rectum, bladder or vagina  Sudden onset of shortness of breath or chest pain  Signs/symptoms of infection: redness, warmth, swelling-particularly to central line site    Report to Physician's office within 24 hours:    Pain not relieved by pain medication  Change in urination-odor, cloudiness, frequency, or pain with urination  Flu-like symptoms  Skin changes-rash, hives, redness or peeling of skin    Additional Instructions:    Avoid people with colds, flu-like symptoms, or any sign of infection  Drink plenty of fluids-attempt to consume 2-3 liters ( ounces) of fluids/24 hour period  Continue low microbial diet until instructed by physician to resume normal diet  Bring all of your medications with you to your doctor's appointments  Bring your current medicine list to each hospital and office visit          4/25/2024 12:13 PM  Tamy Gilliam            My Discharge Checklist    Here at the [facility], we want to make sure you have the help you will need once you leave the hospital.  We are going to go over your discharge instructions with you. We give these to you in writing so you will have a reference if you have questions about symptoms or problems to look for after you leave the hospital.     We know you want to feel better and get home soon. Please answer these questions so we can be sure you have what you need, your questions are answered, and you feel prepared for

## 2024-04-25 NOTE — RT PROTOCOL NOTE
RT Nebulizer Bronchodilator Protocol Note    There is a bronchodilator order in the chart from a provider indicating to follow the RT Bronchodilator Protocol and there is an “Initiate RT Bronchodilator Protocol” order as well (see protocol at bottom of note).    CXR Findings:  No results found.    The findings from the last RT Protocol Assessment were as follows:  Smoking: Smoker 15 pack years or more  Respiratory Pattern: Regular pattern and RR 12-20 bpm  Breath Sounds: Slightly diminished and/or crackles  Cough: Strong, spontaneous, non-productive  Indication for Bronchodilator Therapy:    Bronchodilator Assessment Score: 3    Aerosolized bronchodilator medication orders have been revised according to the RT Nebulizer Bronchodilator Protocol below.    Respiratory Therapist to perform RT Therapy Protocol Assessment initially then follow the protocol.  Repeat RT Therapy Protocol Assessment PRN for score 0-3 or on second treatment, BID, and PRN for scores above 3.    No Indications - adjust the frequency to every 6 hours PRN wheezing or bronchospasm, if no treatments needed after 48 hours then discontinue using Per Protocol order mode.     If indication present, adjust the RT bronchodilator orders based on the Bronchodilator Assessment Score as indicated below.  If a patient is on this medication at home then do not decrease Frequency below that used at home.    0-3 - enter or revise RT bronchodilator order(s) to equivalent RT Bronchodilator order with Frequency of every 4 hours PRN for wheezing or increased work of breathing using Per Protocol order mode.       4-6 - enter or revise RT Bronchodilator order(s) to two equivalent RT bronchodilator orders with one order with BID Frequency and one order with Frequency of every 4 hours PRN wheezing or increased work of breathing using Per Protocol order mode.         7-10 - enter or revise RT Bronchodilator order(s) to two equivalent RT bronchodilator orders with one

## 2024-04-25 NOTE — CARE COORDINATION
Case Management Assessment            Discharge Note                    Date / Time of Note: 4/25/2024 11:02 AM                  Discharge Note Completed by: MELISSA Lee   for [facility] ([facility])  [employer]: [phone]    Patient Name: Leslee Maldonado   YOB: 1955  Diagnosis: Hypoxia [R09.02]  Pneumonia of right lower lobe due to infectious organism [J18.9]   Date / Time: 4/22/2024  7:19 AM    Current PCP: Raj Cedeño MD  Clinic patient: No    Hospitalization in the last 30 days: No     Advance Directives:  Code Status: Full Code  [place] DNR form completed and on chart: Not Indicated    Financial:  Payor: [facility] MEDICARE / Plan: [facility] MEDICARE ADVANTAGE / Product Type: *No Product type* /      Pharmacy:    [facility] [id number] [facility] [address] [facility] [phone] - F [phone]  [address]  [facility] [address]  Phone: [phone] Fax: [phone]      Assistance purchasing medications?: Potential Assistance Purchasing Medications: No  Assistance provided by Case Management: None at this time    Does patient want to participate in local refill/ meds to beds program?:      Meds To Beds General Rules:  1. Can ONLY be done Monday- Friday between 8:30am-5pm  2. Prescription(s) must be in pharmacy by 3pm to be filled same day  3.Copy of patient's insurance/ prescription drug card and patient face sheet must be sent along with the prescription(s)  4. Cost of Rx cannot be added to hospital bill. If financial assistance is needed, please contact unit  or ;  or  CANNOT provide pharmacy voucher for patients co-pays  5. Patients can then  the prescription on their way out of the hospital at discharge, or pharmacy can deliver to the bedside if staff is available. (payment due at time of pick-up or delivery - cash, check, or card accepted)

## 2024-04-25 NOTE — PLAN OF CARE
Problem: Pain  Goal: Verbalizes/displays adequate comfort level or baseline comfort level  Outcome: Completed     Problem: Safety - Adult  Goal: Free from fall injury  4/25/2024 1026 by Yoana Ha, RN  Outcome: Completed     Problem: ABCDS Injury Assessment  Goal: Absence of physical injury  Outcome: Completed     Problem: Respiratory - Adult  Goal: Achieves optimal ventilation and oxygenation  4/25/2024 1026 by Yoana Ha, RN  Outcome: Completed     Problem: Cardiovascular - Adult  Goal: Maintains optimal cardiac output and hemodynamic stability  Outcome: Completed     Problem: Cardiovascular - Adult  Goal: Absence of cardiac dysrhythmias or at baseline  Outcome: Completed     Problem: Gastrointestinal - Adult  Goal: Minimal or absence of nausea and vomiting  Outcome: Completed     Problem: Gastrointestinal - Adult  Goal: Maintains or returns to baseline bowel function  Outcome: Completed     Problem: Gastrointestinal - Adult  Goal: Maintains adequate nutritional intake  Outcome: Completed       Problem: Genitourinary - Adult  Goal: Absence of urinary retention  Outcome: Completed     Problem: Metabolic/Fluid and Electrolytes - Adult  Goal: Electrolytes maintained within normal limits  4/25/2024 1026 by Yoana Ha, RN  Outcome: Completed     Problem: Metabolic/Fluid and Electrolytes - Adult  Goal: Hemodynamic stability and optimal renal function maintained  Outcome: Completed     Problem: Metabolic/Fluid and Electrolytes - Adult  Goal: Glucose maintained within prescribed range  Outcome: Completed     Problem: Hematologic - Adult  Goal: Maintains hematologic stability  4/25/2024 1026 by Yoana Ha, RN  Outcome: Completed

## 2024-04-25 NOTE — PLAN OF CARE
Problem: Safety - Adult  Goal: Free from fall injury  Outcome: Progressing  Free From Fall Injury: Instruct family/caregiver on patient safety  Note: Orthostatic vital signs obtained at start of shift - see flowsheet for details.  Pt does not meet criteria for orthostasis.  Pt is a Med fall risk. See Ayers Fall Score and ABCDS Injury Risk assessments.   - Screening for Orthostasis AND not a [place] Risk per AYERS/ABCDS: Pt bed is in low position, side rails up, call light and belongings are in reach.  Fall risk light is on outside pts room.  Pt encouraged to call for assistance as needed. Will continue with hourly rounds for PO intake, pain needs, toileting and repositioning as needed.        Problem: Respiratory - Adult  Goal: Achieves optimal ventilation and oxygenation  Outcome: Progressing       Problem: Hematologic - Adult  Goal: Maintains hematologic stability  Outcome: Progressing  Maintains hematologic stability:   Assess for signs and symptoms of bleeding or hemorrhage   Monitor labs for bleeding or clotting disorders   Administer blood products/factors as ordered  Note: Patient's hemoglobin this AM:   Recent Labs     04/25/24  0422   HGB 12.2     Patient's platelet count this AM:   Recent Labs     04/25/24  0422       Thrombocytopenia Precautions in place.  Patient showing no signs or symptoms of active bleeding.  Transfusion not indicated at this time.  Patient verbalizes understanding of all instructions. Will continue to assess and implement POC. Call light within reach and hourly rounding in place.        Problem: Metabolic/Fluid and Electrolytes - Adult  Goal: Electrolytes maintained within normal limits  Outcome: Progressing

## 2024-04-26 LAB
BACTERIA BLD CULT ORG #2: NORMAL
BACTERIA BLD CULT: NORMAL

## 2024-07-31 DIAGNOSIS — Z01.818 PRE-TRANSPLANT EVALUATION FOR STEM CELL TRANSPLANT: Primary | ICD-10-CM

## 2024-07-31 DIAGNOSIS — C90.00 MULTIPLE MYELOMA, REMISSION STATUS UNSPECIFIED (HCC): ICD-10-CM

## 2024-08-01 ENCOUNTER — HOSPITAL ENCOUNTER (OUTPATIENT)
Age: 69
Setting detail: SPECIMEN
Discharge: HOME | End: 2024-08-01

## 2024-08-13 ENCOUNTER — HOSPITAL ENCOUNTER (OUTPATIENT)
Dept: ONCOLOGY | Age: 69
Setting detail: INFUSION SERIES
Discharge: HOME | End: 2024-08-13
Payer: MEDICARE

## 2024-08-13 LAB
ABO + RH BLD: NORMAL
ALBUMIN SERPL-MCNC: 3.9 G/DL (ref 3.4–5)
ALBUMIN/GLOB SERPL: 1.3 {RATIO} (ref 1.1–2.2)
ALP SERPL-CCNC: 52 U/L (ref 40–129)
ALT SERPL-CCNC: 24 U/L (ref 10–40)
AMPHETAMINES UR QL SCN>1000 NG/ML: NORMAL
ANION GAP SERPL CALCULATED.3IONS-SCNC: 8 MMOL/L (ref 3–16)
APTT BLD: 29.6 SEC (ref 22.1–36.4)
AST SERPL-CCNC: 21 U/L (ref 15–37)
BACTERIA URNS QL MICRO: ABNORMAL /HPF
BARBITURATES UR QL SCN>200 NG/ML: NORMAL
BASOPHILS # BLD: 0.2 K/UL (ref 0–0.2)
BASOPHILS NFR BLD: 3 %
BENZODIAZ UR QL SCN>200 NG/ML: NORMAL
BILIRUB DIRECT SERPL-MCNC: <0.2 MG/DL (ref 0–0.3)
BILIRUB INDIRECT SERPL-MCNC: NORMAL MG/DL (ref 0–1)
BILIRUB SERPL-MCNC: 0.3 MG/DL (ref 0–1)
BILIRUB UR QL STRIP.AUTO: NEGATIVE
BLD GP AB SCN SERPL QL: NORMAL
BUN SERPL-MCNC: 21 MG/DL (ref 7–20)
CALCIUM SERPL-MCNC: 9.2 MG/DL (ref 8.3–10.6)
CANNABINOIDS UR QL SCN>50 NG/ML: NORMAL
CHLORIDE SERPL-SCNC: 105 MMOL/L (ref 99–110)
CLARITY UR: CLEAR
CO2 SERPL-SCNC: 26 MMOL/L (ref 21–32)
COCAINE UR QL SCN: NORMAL
COLOR UR: YELLOW
CREAT SERPL-MCNC: 0.9 MG/DL (ref 0.6–1.2)
CRP SERPL-MCNC: 3.3 MG/L (ref 0–5.1)
DEPRECATED RDW RBC AUTO: 17.7 % (ref 12.4–15.4)
DRUG SCREEN COMMENT UR-IMP: NORMAL
EOSINOPHIL # BLD: 0.1 K/UL (ref 0–0.6)
EOSINOPHIL NFR BLD: 1.6 %
EPI CELLS #/AREA URNS HPF: ABNORMAL /HPF (ref 0–5)
FENTANYL SCREEN, URINE: NORMAL
FERRITIN SERPL IA-MCNC: 42.6 NG/ML (ref 15–150)
GFR SERPLBLD CREATININE-BSD FMLA CKD-EPI: 70 ML/MIN/{1.73_M2}
GLUCOSE SERPL-MCNC: 79 MG/DL (ref 70–99)
GLUCOSE UR STRIP.AUTO-MCNC: NEGATIVE MG/DL
HCT VFR BLD AUTO: 39.8 % (ref 36–48)
HGB BLD-MCNC: 12.9 G/DL (ref 12–16)
HGB UR QL STRIP.AUTO: NEGATIVE
INR PPP: 1.07 (ref 0.85–1.15)
KETONES UR STRIP.AUTO-MCNC: NEGATIVE MG/DL
LDH SERPL L TO P-CCNC: 163 U/L (ref 100–190)
LEUKOCYTE ESTERASE UR QL STRIP.AUTO: NEGATIVE
LYMPHOCYTES # BLD: 1.4 K/UL (ref 1–5.1)
LYMPHOCYTES NFR BLD: 27.7 %
MCH RBC QN AUTO: 28 PG (ref 26–34)
MCHC RBC AUTO-ENTMCNC: 32.4 G/DL (ref 31–36)
MCV RBC AUTO: 86.5 FL (ref 80–100)
METHADONE UR QL SCN>300 NG/ML: NORMAL
MONOCYTES # BLD: 0.3 K/UL (ref 0–1.3)
MONOCYTES NFR BLD: 5.2 %
MUCOUS THREADS #/AREA URNS LPF: ABNORMAL /LPF
NEUTROPHILS # BLD: 3.1 K/UL (ref 1.7–7.7)
NEUTROPHILS NFR BLD: 62.5 %
NITRITE UR QL STRIP.AUTO: NEGATIVE
OPIATES UR QL SCN>300 NG/ML: NORMAL
OXYCODONE UR QL SCN: NORMAL
PCP UR QL SCN>25 NG/ML: NORMAL
PH UR STRIP.AUTO: 6 [PH] (ref 5–8)
PH UR STRIP: 6 [PH]
PHOSPHATE SERPL-MCNC: 3.7 MG/DL (ref 2.5–4.9)
PLATELET # BLD AUTO: 301 K/UL (ref 135–450)
PMV BLD AUTO: 8.6 FL (ref 5–10.5)
POTASSIUM SERPL-SCNC: 3.6 MMOL/L (ref 3.5–5.1)
PROT SERPL-MCNC: 6.8 G/DL (ref 6.4–8.2)
PROT UR STRIP.AUTO-MCNC: 30 MG/DL
PROTHROMBIN TIME: 14.1 SEC (ref 11.9–14.9)
RBC # BLD AUTO: 4.6 M/UL (ref 4–5.2)
RBC #/AREA URNS HPF: ABNORMAL /HPF (ref 0–4)
SODIUM SERPL-SCNC: 139 MMOL/L (ref 136–145)
SP GR UR STRIP.AUTO: 1.01 (ref 1–1.03)
UA DIPSTICK W REFLEX MICRO PNL UR: YES
URATE SERPL-MCNC: 2.3 MG/DL (ref 2.6–6)
URN SPEC COLLECT METH UR: ABNORMAL
UROBILINOGEN UR STRIP-ACNC: 0.2 E.U./DL
WBC # BLD AUTO: 5 K/UL (ref 4–11)
WBC #/AREA URNS HPF: ABNORMAL /HPF (ref 0–5)

## 2024-08-13 PROCEDURE — 86787 VARICELLA-ZOSTER ANTIBODY: CPT

## 2024-08-13 PROCEDURE — 83615 LACTATE (LD) (LDH) ENZYME: CPT

## 2024-08-13 PROCEDURE — 80053 COMPREHEN METABOLIC PANEL: CPT

## 2024-08-13 PROCEDURE — 82784 ASSAY IGA/IGD/IGG/IGM EACH: CPT

## 2024-08-13 PROCEDURE — 86900 BLOOD TYPING SEROLOGIC ABO: CPT

## 2024-08-13 PROCEDURE — 85730 THROMBOPLASTIN TIME PARTIAL: CPT

## 2024-08-13 PROCEDURE — 85610 PROTHROMBIN TIME: CPT

## 2024-08-13 PROCEDURE — 86696 HERPES SIMPLEX TYPE 2 TEST: CPT

## 2024-08-13 PROCEDURE — 86778 TOXOPLASMA ANTIBODY IGM: CPT

## 2024-08-13 PROCEDURE — 86901 BLOOD TYPING SEROLOGIC RH(D): CPT

## 2024-08-13 PROCEDURE — 86777 TOXOPLASMA ANTIBODY: CPT

## 2024-08-13 PROCEDURE — 86707 HEPATITIS BE ANTIBODY: CPT

## 2024-08-13 PROCEDURE — G0480 DRUG TEST DEF 1-7 CLASSES: HCPCS

## 2024-08-13 PROCEDURE — 84100 ASSAY OF PHOSPHORUS: CPT

## 2024-08-13 PROCEDURE — 86645 CMV ANTIBODY IGM: CPT

## 2024-08-13 PROCEDURE — 82248 BILIRUBIN DIRECT: CPT

## 2024-08-13 PROCEDURE — 82955 ASSAY OF G6PD ENZYME: CPT

## 2024-08-13 PROCEDURE — 86663 EPSTEIN-BARR ANTIBODY: CPT

## 2024-08-13 PROCEDURE — 86664 EPSTEIN-BARR NUCLEAR ANTIGEN: CPT

## 2024-08-13 PROCEDURE — 86850 RBC ANTIBODY SCREEN: CPT

## 2024-08-13 PROCEDURE — 36592 COLLECT BLOOD FROM PICC: CPT

## 2024-08-13 PROCEDURE — 86709 HEPATITIS A IGM ANTIBODY: CPT

## 2024-08-13 PROCEDURE — 81001 URINALYSIS AUTO W/SCOPE: CPT

## 2024-08-13 PROCEDURE — 82728 ASSAY OF FERRITIN: CPT

## 2024-08-13 PROCEDURE — 86695 HERPES SIMPLEX TYPE 1 TEST: CPT

## 2024-08-13 PROCEDURE — 86644 CMV ANTIBODY: CPT

## 2024-08-13 PROCEDURE — 86665 EPSTEIN-BARR CAPSID VCA: CPT

## 2024-08-13 PROCEDURE — 85025 COMPLETE CBC W/AUTO DIFF WBC: CPT

## 2024-08-13 PROCEDURE — 86706 HEP B SURFACE ANTIBODY: CPT

## 2024-08-13 PROCEDURE — 86140 C-REACTIVE PROTEIN: CPT

## 2024-08-13 PROCEDURE — 84550 ASSAY OF BLOOD/URIC ACID: CPT

## 2024-08-13 PROCEDURE — 86694 HERPES SIMPLEX NES ANTBDY: CPT

## 2024-08-13 PROCEDURE — 80307 DRUG TEST PRSMV CHEM ANLYZR: CPT

## 2024-08-13 NOTE — CONSULTS
Nutrition History and Dietary Recall     Food / Nutrition-Related History  Food Allergies or Intolerances: none   Vitamins, Minerals, and other Herbal Supplements: Multivitamin (vitafusion- [employer] certified)   Food Restrictions / Cultural Requests:  none   Followed diet for medical reasons: no   Made dietary/lifestyle changes in past: yes Outcomes: decreased sugar intake (avoiding chocolates, candies, etc);   Would you consider yourself a picky eater? no.  Are there any specific food preferences that may impact your ability to eat adequately during your hospitalization/after your procedure/treamtent?:  no.  If \"YES\", describe the concerns or preferences: n/a    Current Nutrition:   Are you currently following a specific diet? PO Diet: Regular   Oral Nutrition Supplements:  Premier protein shake- 3x per week    24 hour recall/food frequency:  Breakfast:   Consumes: 7 out of 7 days  Current meal includes: Premier protein + cream of wheat + strawberries, blueberries; mixed fruit w/ eggs (2)  PO Intake: %  Fluids: coffee (XL decaf); water     AM Snack:  Consumes: 7 out of 7 days  Current snack includes: pretzels, chips;   PO Intake: %  Fluids: water bottle 16oz     Lunch:   Consumes: 5 out of 7 days  Current meal includes: peanut butter sandwich + fruit; leftovers (tacos); bowl of veggies- ranch  PO Intake: %  Fluids: water     PM Snack:   Consumes: 5 out of 7 days  Current snack includes: pretzels, chips  PO Intake: %  Fluids: water     Dinner:   Consumes: 7 out of 7 days  Current meal includes: 2 veggies + fruit, meat, starch;   PO Intake: %  Fluids: water    HS Snack:  Consumes: 4-5 out of 7 days  Current snack includes: ice cream + pretzels, chips  PO Intake: %  Fluids: water     Daily/Additional Fluids/Drinks: (list below)  Water = 3 bottles of water (16oz) per day   24oz coffee    Do you drink alcohol? no.     Additional Nutrition-related Rx   Appetite Stimulants, which can

## 2024-08-13 NOTE — PROGRESS NOTES
Transplant coordinator met with patient for pre-CAR-T evaluation and education on CAR-T therapy. Discussed purpose of CAR-T (chimergic antigen receptor) T-cells. The entire process of collection of T cells, the re-engineering process, lymphocyte depleting chemotherapy, reinfusion of CAR cells and associated side effects and toxicities were discussed. I specifically went over in detail the expected side effects such as cytokine release syndrome as well as the potential neuro toxicities associated with CAR T-cell infusion. Patient and caregiver made aware of need to have 24 hour caregiver for 30 days after CAR-T infusion and must be within 45 minutes of the hospital. Instructed will be given CAR-T wallet card to carry at all times once CAR-T is infused and will need to present the wallet card when coming to the [facility] . Aware will be on anti-seizure medication for 8 weeks and can not drive or operate machinery x 8 weeks. Discussed daily Outpatient Infusion Department routine up to 30 days. I also explained the potential long-term hypogammaglobulinemia. Patient is interested in proceeding with CAR T-cell infusion. Information on CAR-T given and reviewed. Tentative calendar including collection date, return of cells, dates of lymphodepleting chemotherapy and infusion of cells all discussed.

## 2024-08-13 NOTE — PROGRESS NOTES
Pt seen for labs and teaching.  Port already accessed.  Labs drawn and sent , port flush and remains accessed.

## 2024-08-13 NOTE — CONSULTS
Oncology Nutrition Note    RECOMMENDATIONS:   PO Diet: Continue 3 meals daily w/ snacks- focus on protein intake w/ all meals/snacks  Emphasis on Food Safety for Immunocompromised pt when ANC < 1.5  Current ANC: 3.1 (8/13)  Fluid: Continue w/ >64oz daily  ONS: Continue w/ Premier Protein once daily or increase protein intake w/ AM/PM snack  Nutrition Education:   Completed 8/13; Food Safety booklet w/verbal review provided.   Nutrition follow-up with RD in D+30/60/90, 6 months, 1 year    MALNUTRITION ASSESSMENT  Context of Malnutrition: Chronic Illness   Malnutrition Status: No malnutrition    NUTRITION ASSESSMENT:   Nutritional summary & status: Nutrition evaluation for upcoming CAR-T infusion (Carvykti) planned for October.  Pt endorses patterned meal schedule consuming 3 meals and 2 snacks daily.  Wt stable over ~2 years.  Pt reports intentionally trying to lose wt by focusing on increasing F&V intake and decreasing/avoiding sugar.  RD discussed importance of PO intake and increased kcal/pro needs through CAR-T. Pt and dgtr expressed understanding.  RD reviewed food safety protocol and timeline w/ pt.  Low nutritional risk.   PG-SGA: Score 0-1; No intervention required at this time. Re-assess on routine/regular basis during treatment  Pt reports no complaints at this time   Nutrition Goals:   Pt will meet >60% of kcal/pro needs by consuming >75% of meals/snacks/ONS to meet increased needs r/t CAR-T      Oncology Hx: IgG lambda MM; previously received w/ high dose melphlan and ASCT (2/7/19)    History of Present Illness/Injury:    Patient Active Problem List    Diagnosis Date Noted    CAD S/P percutaneous coronary angioplasty 10/25/2022    Coronary artery disease involving native coronary artery 10/11/2022    Acute respiratory failure with hypoxia (HCC) 10/11/2022    Acute bronchospasm due to viral infection 10/11/2022    Hypoxia 04/22/2024    S/P coronary artery bypass graft x 4     HTN (hypertension)

## 2024-08-13 NOTE — PSYCHOTHERAPY
recommendations including medication management or nutrition management.     SUBSTANCE USE HISTORY    Tobacco: Yes; Patient reported history of smoking 1/2 pack of cigarettes a day before 2018. She clarified she has not smoked since her cancer diagnosis in 2018.    Nicotine screen: Pending.   Alcohol: Reports not drinking alcohol in over a year. Previously consumed alcohol \"occasionally.\"  Denies any history of problematic alcohol use.   Illicit drugs: Denies   Drug screen: Negative.     Leslee Maldonado does not foresee difficulties with abstaining from these substances immediately following her procedure.    NEUROCOGNITIVE FUNCTIONING.     PATIENT: RAPID ESTIMATE OF ADULT LITERACY IN MEDICINE  REALM-R: 8/8; REALM-SF Score: N/A  (0= <3rd grade reading level; 1-3= 4-6th grade reading level; 4-6= 7-8th grade reading level; >7= >9th grade reading level)    PATIENT: DAVID COGNITIVE ASSESSMENT (MoCA)  Total: 29/30  1 point added for education: No    Interpretation of MoCA  18-25 = mild cognitive impairment   10-17= moderate cognitive impairment   less than 10= severe cognitive impairment     BEHAVIORAL OBSERVATIONS    She arrived on time for her scheduled appointment. She presented with her two sisters and was dressed appropriately. During the evaluation, she presented with normal speech and logical thought processes. She was cooperative, engaged, and open. Her mood was good and affect was euthymic. Patient's primary caregiver presented as humorous, committed, and attentive.    STANDARD ASSESSMENT FOR INTEGRATING DATA= 8 ((7-20) Good Candidate: Recommendation is to continue, although monitoring of identified risk factors may be required)   SIPAT Score Interpretation: 0-6 Excellent Candidate, 7-20 Good Candidate, 21-39 Minimally Acceptance Candidate, 40-69 Poor Candidate, >70 High Risk Candidate  The total score was obtained through summing patient's score on the following categories:    Patient's Readiness

## 2024-08-14 ENCOUNTER — HOSPITAL ENCOUNTER (OUTPATIENT)
Age: 69
Discharge: HOME | End: 2024-08-16
Attending: INTERNAL MEDICINE
Payer: MEDICARE

## 2024-08-14 ENCOUNTER — HOSPITAL ENCOUNTER (OUTPATIENT)
Age: 69
Discharge: HOME | End: 2024-08-14
Attending: INTERNAL MEDICINE
Payer: MEDICARE

## 2024-08-14 ENCOUNTER — HOSPITAL ENCOUNTER (OUTPATIENT)
Dept: ONCOLOGY | Age: 69
Setting detail: INFUSION SERIES
Discharge: HOME | End: 2024-08-14

## 2024-08-14 ENCOUNTER — HOSPITAL ENCOUNTER (OUTPATIENT)
Dept: PULMONOLOGY | Age: 69
Discharge: HOME | End: 2024-08-14
Attending: INTERNAL MEDICINE
Payer: MEDICARE

## 2024-08-14 ENCOUNTER — HOSPITAL ENCOUNTER (OUTPATIENT)
Dept: GENERAL RADIOLOGY | Age: 69
Discharge: HOME | End: 2024-08-14
Attending: INTERNAL MEDICINE
Payer: MEDICARE

## 2024-08-14 VITALS — HEIGHT: 61 IN | BODY MASS INDEX: 37.07 KG/M2

## 2024-08-14 VITALS
DIASTOLIC BLOOD PRESSURE: 72 MMHG | TEMPERATURE: 97.5 F | SYSTOLIC BLOOD PRESSURE: 128 MMHG | WEIGHT: 196.21 LBS | HEART RATE: 59 BPM | HEIGHT: 61 IN | RESPIRATION RATE: 16 BRPM | BODY MASS INDEX: 37.04 KG/M2 | OXYGEN SATURATION: 97 %

## 2024-08-14 VITALS
BODY MASS INDEX: 37 KG/M2 | DIASTOLIC BLOOD PRESSURE: 72 MMHG | HEIGHT: 61 IN | SYSTOLIC BLOOD PRESSURE: 128 MMHG | WEIGHT: 196 LBS

## 2024-08-14 DIAGNOSIS — C90.00 MULTIPLE MYELOMA, REMISSION STATUS UNSPECIFIED (HCC): ICD-10-CM

## 2024-08-14 DIAGNOSIS — Z01.818 PRE-TRANSPLANT EVALUATION FOR STEM CELL TRANSPLANT: ICD-10-CM

## 2024-08-14 DIAGNOSIS — Z01.818 PRE-TRANSPLANT EVALUATION FOR STEM CELL TRANSPLANT: Primary | ICD-10-CM

## 2024-08-14 LAB — IGG SERPL-MCNC: 1362 MG/DL (ref 700–1600)

## 2024-08-14 PROCEDURE — C8929 TTE W OR WO FOL WCON,DOPPLER: HCPCS

## 2024-08-14 PROCEDURE — 94664 DEMO&/EVAL PT USE INHALER: CPT

## 2024-08-14 PROCEDURE — 94010 BREATHING CAPACITY TEST: CPT

## 2024-08-14 PROCEDURE — 94760 N-INVAS EAR/PLS OXIMETRY 1: CPT

## 2024-08-14 PROCEDURE — 93005 ELECTROCARDIOGRAM TRACING: CPT

## 2024-08-14 PROCEDURE — 94726 PLETHYSMOGRAPHY LUNG VOLUMES: CPT

## 2024-08-14 PROCEDURE — 6360000004 HC RX CONTRAST MEDICATION: Performed by: INTERNAL MEDICINE

## 2024-08-14 PROCEDURE — 94729 DIFFUSING CAPACITY: CPT

## 2024-08-14 PROCEDURE — 71046 X-RAY EXAM CHEST 2 VIEWS: CPT

## 2024-08-14 RX ADMIN — PERFLUTREN 1.5 ML: 6.52 INJECTION, SUSPENSION INTRAVENOUS at 16:49

## 2024-08-14 ASSESSMENT — ANXIETY QUESTIONNAIRES
GAD7 TOTAL SCORE: 1
2. NOT BEING ABLE TO STOP OR CONTROL WORRYING: NOT AT ALL
3. WORRYING TOO MUCH ABOUT DIFFERENT THINGS: SEVERAL DAYS
6. BECOMING EASILY ANNOYED OR IRRITABLE: NOT AT ALL
4. TROUBLE RELAXING: NOT AT ALL
5. BEING SO RESTLESS THAT IT IS HARD TO SIT STILL: NOT AT ALL
7. FEELING AFRAID AS IF SOMETHING AWFUL MIGHT HAPPEN: NOT AT ALL
1. FEELING NERVOUS, ANXIOUS, OR ON EDGE: NOT AT ALL
IF YOU CHECKED OFF ANY PROBLEMS ON THIS QUESTIONNAIRE, HOW DIFFICULT HAVE THESE PROBLEMS MADE IT FOR YOU TO DO YOUR WORK, TAKE CARE OF THINGS AT HOME, OR GET ALONG WITH OTHER PEOPLE: NOT DIFFICULT AT ALL

## 2024-08-14 ASSESSMENT — PATIENT HEALTH QUESTIONNAIRE - PHQ9
6. FEELING BAD ABOUT YOURSELF - OR THAT YOU ARE A FAILURE OR HAVE LET YOURSELF OR YOUR FAMILY DOWN: NOT AT ALL
4. FEELING TIRED OR HAVING LITTLE ENERGY: NOT AT ALL
3. TROUBLE FALLING OR STAYING ASLEEP: SEVERAL DAYS
SUM OF ALL RESPONSES TO PHQ QUESTIONS 1-9: 1
10. IF YOU CHECKED OFF ANY PROBLEMS, HOW DIFFICULT HAVE THESE PROBLEMS MADE IT FOR YOU TO DO YOUR WORK, TAKE CARE OF THINGS AT HOME, OR GET ALONG WITH OTHER PEOPLE: NOT DIFFICULT AT ALL
SUM OF ALL RESPONSES TO PHQ QUESTIONS 1-9: 1
8. MOVING OR SPEAKING SO SLOWLY THAT OTHER PEOPLE COULD HAVE NOTICED. OR THE OPPOSITE, BEING SO FIGETY OR RESTLESS THAT YOU HAVE BEEN MOVING AROUND A LOT MORE THAN USUAL: NOT AT ALL
1. LITTLE INTEREST OR PLEASURE IN DOING THINGS: NOT AT ALL
SUM OF ALL RESPONSES TO PHQ9 QUESTIONS 1 & 2: 0
7. TROUBLE CONCENTRATING ON THINGS, SUCH AS READING THE NEWSPAPER OR WATCHING TELEVISION: NOT AT ALL
SUM OF ALL RESPONSES TO PHQ QUESTIONS 1-9: 1
2. FEELING DOWN, DEPRESSED OR HOPELESS: NOT AT ALL
5. POOR APPETITE OR OVEREATING: NOT AT ALL
9. THOUGHTS THAT YOU WOULD BE BETTER OFF DEAD, OR OF HURTING YOURSELF: NOT AT ALL
SUM OF ALL RESPONSES TO PHQ QUESTIONS 1-9: 1

## 2024-08-15 LAB
ECHO AO ASC DIAM: 3.2 CM
ECHO AO ASCENDING AORTA INDEX: 1.71 CM/M2
ECHO AO ROOT DIAM: 3.1 CM
ECHO AO ROOT INDEX: 1.66 CM/M2
ECHO AV AREA PEAK VELOCITY: 2.2 CM2
ECHO AV AREA VTI: 2.4 CM2
ECHO AV AREA/BSA PEAK VELOCITY: 1.2 CM2/M2
ECHO AV AREA/BSA VTI: 1.3 CM2/M2
ECHO AV MEAN GRADIENT: 5 MMHG
ECHO AV MEAN VELOCITY: 1 M/S
ECHO AV PEAK GRADIENT: 10 MMHG
ECHO AV PEAK VELOCITY: 1.6 M/S
ECHO AV VELOCITY RATIO: 0.88
ECHO AV VTI: 29.4 CM
ECHO BSA: 1.96 M2
ECHO IVC PROX: 1.4 CM
ECHO LA AREA 2C: 13.4 CM2
ECHO LA AREA 4C: 11.1 CM2
ECHO LA DIAMETER INDEX: 2.09 CM/M2
ECHO LA DIAMETER: 3.9 CM
ECHO LA MAJOR AXIS: 3.9 CM
ECHO LA MINOR AXIS: 4.9 CM
ECHO LA TO AORTIC ROOT RATIO: 1.26
ECHO LA VOL BP: 31 ML (ref 22–52)
ECHO LA VOL MOD A2C: 30 ML (ref 22–52)
ECHO LA VOL MOD A4C: 25 ML (ref 22–52)
ECHO LA VOL/BSA BIPLANE: 17 ML/M2 (ref 16–34)
ECHO LA VOLUME INDEX MOD A2C: 16 ML/M2 (ref 16–34)
ECHO LA VOLUME INDEX MOD A4C: 13 ML/M2 (ref 16–34)
ECHO LV E' LATERAL VELOCITY: 10 CM/S
ECHO LV E' SEPTAL VELOCITY: 8 CM/S
ECHO LV EDV A2C: 66 ML
ECHO LV EDV A4C: 143 ML
ECHO LV EDV INDEX A4C: 76 ML/M2
ECHO LV EDV NDEX A2C: 35 ML/M2
ECHO LV EF PHYSICIAN: 65 %
ECHO LV EJECTION FRACTION A2C: 65 %
ECHO LV EJECTION FRACTION A4C: 65 %
ECHO LV ESV A2C: 23 ML
ECHO LV ESV A4C: 51 ML
ECHO LV ESV INDEX A2C: 12 ML/M2
ECHO LV ESV INDEX A4C: 27 ML/M2
ECHO LV FRACTIONAL SHORTENING: 30 % (ref 28–44)
ECHO LV INTERNAL DIMENSION DIASTOLE INDEX: 2.3 CM/M2
ECHO LV INTERNAL DIMENSION DIASTOLIC: 4.3 CM (ref 3.9–5.3)
ECHO LV INTERNAL DIMENSION SYSTOLIC INDEX: 1.6 CM/M2
ECHO LV INTERNAL DIMENSION SYSTOLIC: 3 CM
ECHO LV IVSD: 1 CM (ref 0.6–0.9)
ECHO LV MASS 2D: 132.7 G (ref 67–162)
ECHO LV MASS INDEX 2D: 71 G/M2 (ref 43–95)
ECHO LV POSTERIOR WALL DIASTOLIC: 0.9 CM (ref 0.6–0.9)
ECHO LV RELATIVE WALL THICKNESS RATIO: 0.42
ECHO LVOT AREA: 2.5 CM2
ECHO LVOT AV VTI INDEX: 0.93
ECHO LVOT DIAM: 1.8 CM
ECHO LVOT MEAN GRADIENT: 4 MMHG
ECHO LVOT PEAK GRADIENT: 7 MMHG
ECHO LVOT PEAK VELOCITY: 1.4 M/S
ECHO LVOT STROKE VOLUME INDEX: 37.1 ML/M2
ECHO LVOT SV: 69.4 ML
ECHO LVOT VTI: 27.3 CM
ECHO MV A VELOCITY: 0.83 M/S
ECHO MV E VELOCITY: 0.6 M/S
ECHO MV E/A RATIO: 0.72
ECHO MV E/E' LATERAL: 6
ECHO MV E/E' RATIO (AVERAGED): 6.75
ECHO MV E/E' SEPTAL: 7.5
ECHO PV MAX VELOCITY: 1.2 M/S
ECHO PV MEAN GRADIENT: 3 MMHG
ECHO PV MEAN VELOCITY: 0.9 M/S
ECHO PV PEAK GRADIENT: 6 MMHG
ECHO PV VTI: 24.7 CM
ECHO RA AREA 4C: 10.9 CM2
ECHO RA END SYSTOLIC VOLUME APICAL 4 CHAMBER INDEX BSA: 12 ML/M2
ECHO RA VOLUME: 22 ML
ECHO RV BASAL DIMENSION: 3.1 CM
ECHO RV FREE WALL PEAK S': 11 CM/S
ECHO RV LONGITUDINAL DIMENSION: 7.4 CM
ECHO RV MID DIMENSION: 2.6 CM
ECHO RV TAPSE: 1.7 CM (ref 1.7–?)
EKG ATRIAL RATE: 62 BPM
EKG DIAGNOSIS: NORMAL
EKG P AXIS: 49 DEGREES
EKG P-R INTERVAL: 150 MS
EKG Q-T INTERVAL: 442 MS
EKG QRS DURATION: 114 MS
EKG QTC CALCULATION (BAZETT): 448 MS
EKG R AXIS: -39 DEGREES
EKG T AXIS: 61 DEGREES
EKG VENTRICULAR RATE: 62 BPM
HBV E AB SERPL QL IA: NEGATIVE

## 2024-08-16 LAB
CMV IGG SERPL IA-ACNC: 5.7 U/ML
CMV IGM SERPL IA-ACNC: <8 AU/ML
EBV EA-D IGG SER-ACNC: >150 U/ML (ref 0–10.9)
EBV NA IGG SER IA-ACNC: 254 U/ML (ref 0–21.9)
EBV VCA IGG SER IA-ACNC: >750 U/ML (ref 0–21.9)
EBV VCA IGM SER IA-ACNC: <10 U/ML (ref 0–43.9)
G6PD RBC-CCNC: 18.3 U/G HB (ref 9.9–16.6)
HAV IGM SERPL QL IA: NORMAL
HBV SURFACE AB SERPL IA-ACNC: <3.5 MIU/ML
HSV1 GG IGG SER-ACNC: 10.1 IV
HSV1+2 IGG SER IA-ACNC: >22.4 IV
HSV1+2 IGM SER IA-ACNC: 0.21 IV
HSV2 GG IGG SER-ACNC: 0.03 IV
T GONDII IGG SER-ACNC: <3 IU/ML
T GONDII IGM SER-ACNC: <3 AU/ML
VZV IGG SER IA-ACNC: 1317 IV
VZV IGM SER IA-ACNC: 0.31 ISR

## 2024-08-16 NOTE — PROCEDURES
PULMONARY FUNCTION TESTING      Patient name:  Leslee Maldonado      Unit #:   [id number]   Date of test:  8/14/2024   Date of interpretation:   8/16/2024    Ms. Leslee Maldonado is a 69 y.o. female. The spirometry data were acceptable and reproducible.     Spirometry:  The FEV-1 was normal: 1.63 liters (88% of predicted).    The FVC was normal: 1.96 liters (84% of predicted).    Response to inhaled bronchodilators (albuterol) was not performed.   The FEV-1/FVC ratio was normal 83%.    Lung volumes:  Lung volumes were tested via plethysmography. The total lung capacity was normal: 91% of predicted. The residual volume was normal: 110% of predicted. The RV/TLC ratio was elevated: 51%    Diffusion Capacity:  DLCO, (corrected for Hb), was normal: 85% of predicted.        Interpretation:  Normal Spirometry, lung volumes and diffusing capacity.     This data was interpreted based upon the 2005 ATS/ERS Task Force Position Statement: Interpretative Strategies for Lung Function Tests.    ------------------------------------------------------  Sukhwinder Shaikh MD  Pulmonary and Critical Care Medicine

## 2024-08-19 ENCOUNTER — TELEPHONE (OUTPATIENT)
Dept: CASE MANAGEMENT | Age: 69
End: 2024-08-19

## 2024-08-19 LAB
ANABASINE UR-MCNC: <5 NG/ML
COTININE UR-MCNC: <15 NG/ML
NICOTINE UR-MCNC: <15 NG/ML
TRANS-3-OH-COTININE UR-MCNC: <50 NG/ML

## 2024-08-19 NOTE — TELEPHONE ENCOUNTER
Per request of Kathleen, Transplant Coordinator, called and spoke with pt. Discussed financial concerns. She stated she has concerns for hospital bills and maybe gas she has to have people bring her since she doesn't drive. She said she has no concerns with finances regarding housing or food.      Discussed potential grants and she said she has assistance from the [facility], along with she believes \"wellness\". She said she has gotten the co-pay howard through the LLS and the emergency howard this year.    She is aware when she comes in to be admitted in september, writer will provide her with the financial assistance number to [facility] and she stated understanding. She denied other needs at this time.     Kathleen updated via email.     [facility] Financial Counselor   [phone].    MELISSA Lee   for [facility] ([facility])  [employer] Mobile: [phone]

## 2024-08-23 DIAGNOSIS — Z01.818 PRE-TRANSPLANT EVALUATION FOR STEM CELL TRANSPLANT: Primary | ICD-10-CM

## 2024-08-28 ENCOUNTER — OFFICE VISIT (OUTPATIENT)
Dept: CARDIOLOGY CLINIC | Age: 69
End: 2024-08-28
Payer: MEDICARE

## 2024-08-28 VITALS
OXYGEN SATURATION: 96 % | BODY MASS INDEX: 37.22 KG/M2 | SYSTOLIC BLOOD PRESSURE: 130 MMHG | DIASTOLIC BLOOD PRESSURE: 70 MMHG | WEIGHT: 197 LBS | HEART RATE: 74 BPM

## 2024-08-28 DIAGNOSIS — I25.10 CORONARY ARTERY DISEASE INVOLVING NATIVE CORONARY ARTERY OF NATIVE HEART WITHOUT ANGINA PECTORIS: ICD-10-CM

## 2024-08-28 DIAGNOSIS — I25.10 CAD S/P PERCUTANEOUS CORONARY ANGIOPLASTY: Primary | ICD-10-CM

## 2024-08-28 DIAGNOSIS — I45.10 RBBB: ICD-10-CM

## 2024-08-28 DIAGNOSIS — Z01.818 PRE-TRANSPLANT EVALUATION FOR STEM CELL TRANSPLANT: Primary | ICD-10-CM

## 2024-08-28 DIAGNOSIS — I25.10 CORONARY ARTERY DISEASE INVOLVING NATIVE HEART WITHOUT ANGINA PECTORIS, UNSPECIFIED VESSEL OR LESION TYPE: ICD-10-CM

## 2024-08-28 DIAGNOSIS — Z98.61 CAD S/P PERCUTANEOUS CORONARY ANGIOPLASTY: Primary | ICD-10-CM

## 2024-08-28 DIAGNOSIS — C90.02 MULTIPLE MYELOMA IN RELAPSE (HCC): ICD-10-CM

## 2024-08-28 PROCEDURE — 3078F DIAST BP <80 MM HG: CPT | Performed by: INTERNAL MEDICINE

## 2024-08-28 PROCEDURE — 3075F SYST BP GE 130 - 139MM HG: CPT | Performed by: INTERNAL MEDICINE

## 2024-08-28 PROCEDURE — 1123F ACP DISCUSS/DSCN MKR DOCD: CPT | Performed by: INTERNAL MEDICINE

## 2024-08-28 PROCEDURE — 93000 ELECTROCARDIOGRAM COMPLETE: CPT | Performed by: INTERNAL MEDICINE

## 2024-08-28 PROCEDURE — 99214 OFFICE O/P EST MOD 30 MIN: CPT | Performed by: INTERNAL MEDICINE

## 2024-08-28 NOTE — PROGRESS NOTES
[facility]     Outpatient Cardiology         Patient Name:  Leslee Maldonado  Primary Care Physician: Raj Cedeño MD  24     Assessment & Plan    Assessment / Plan:     Pre-operative risk assessment for stem cell transplant -   CAD - s/p CABG x4 2019 and PCI 10/2022   Multiple Myeloma -   HTN -   HLD -             Chief Complaint:     No chief complaint on file.      History of Present Illness:       HPI     Leslee Maldonadois a 69 y.o. female with PMH of CAD s/p PCI and CABG x4, HTN, HLD, multiple Myeloma.    Here for follow up and pre-operative risk assessment for stem cell transplant. Previously followed by Dr. Webster.       Patient denies any chest pain, shortness of breath, palpitations, presyncope or syncope. No TIA. No claudication. No recent hospitalizations    PMH  Past Medical History:   Diagnosis Date    Arthritis Shoulder    CAD (coronary artery disease)     Cancer (HCC)     Multiple myeloma    Diabetes mellitus (HCC)     Hyperlipidemia     Hypertension     MI (myocardial infarction) (HCC)        PSH  Past Surgical History:   Procedure Laterality Date    CARDIAC SURGERY  Quadruple by pass    COLONOSCOPY      CORONARY ARTERY BYPASS GRAFT N/A 2019    CORONARY ARTERY BYPASS GRAFT X4, INTERNAL MAMMARY ARTERY AND SAPHENOUS VEIN GRAFT-TCP-BP performed by Maykel Sousa MD at [facility] OR    HAND SURGERY  2018    HYSTERECTOMY (CERVIX STATUS UNKNOWN)      partial for fibroids    INSERTION / REMOVAL / REPLACEMENT VENOUS ACCESS CATHETER N/A 2019    TRIFUSION CATHETER INSERTION LEFT SUBCLAVIAN performed by Munir Hess MD at [facility] OR    TUBAL LIGATION          Social HIstory  Social History     Tobacco Use    Smoking status: Former     Current packs/day: 0.00     Types: Cigarettes     Quit date: 1998     Years since quittin.7    Smokeless tobacco: Never   Vaping Use    Vaping status: Former   Substance Use Topics    Alcohol use: Not Currently      by mouth daily    ProviderDomingo MD   Calcium Carbonate-Vitamin D 600-125 MG-UNIT TABS Take 1 tablet by mouth daily 8/9/18   ProviderDomingo MD          There were no vitals filed for this visit.    BP Readings from Last 3 Encounters:   08/14/24 128/72   08/13/24 128/72   04/25/24 (!) 123/53     Wt Readings from Last 3 Encounters:   08/14/24 88.9 kg (196 lb)   08/13/24 89 kg (196 lb 3.4 oz)   04/25/24 89.3 kg (196 lb 12.8 oz)       Physical Exam  CONSTITUTIONAL: awake, alert, cooperative, no apparent distress, Oriented x 3  HEENT/NECK : Normal. No JVD. No thyromegaly. No bruit  LUNGS: Good respiratory effort. . On auscultation bilateral equal air entry. Normal breath sounds  CARDIOVASCULAR: No left parasternal heave., Regular rate and rhythm, normal S1 and S2, no S3 or S4, and no murmur or rub noted.  ABDOMEN: Soft, nontender, Bowel sounds present. No masses or tenderness.  SKIN: Warm and dry. no jaundice and no petechiae  EXTREMITIES: No lower extremity edema. No cyanosis or clubbing.  NEUROLOGICAL : No gross focal neurological deficit  MUSCULOSKELETAL: Muscle tone: normal. No tenderness  GENITAL/URINARY: not assessed    Labs:       Lab Results   Component Value Date    WBC 5.0 08/13/2024    HGB 12.9 08/13/2024    HCT 39.8 08/13/2024    MCV 86.5 08/13/2024     08/13/2024     Lab Results   Component Value Date     08/13/2024    K 3.6 08/13/2024     08/13/2024    CO2 26 08/13/2024    BUN 21 (H) 08/13/2024    CREATININE 0.9 08/13/2024    GLUCOSE 79 08/13/2024    CALCIUM 9.2 08/13/2024    BILITOT 0.3 08/13/2024    ALKPHOS 52 08/13/2024    AST 21 08/13/2024    ALT 24 08/13/2024    LABGLOM 70 08/13/2024    GFRAA >60 10/12/2022    AGRATIO 1.3 08/13/2024    GLOB 2.0 12/26/2020         Lab Results   Component Value Date    CHOL 162 09/18/2023    CHOL 139 10/26/2022    CHOL 155 12/14/2021     Lab Results   Component Value Date    TRIG 112 09/18/2023    TRIG 87 10/26/2022    TRIG 58 12/14/2021

## 2024-08-28 NOTE — PROGRESS NOTES
[facility]     Outpatient Cardiology         Patient Name:  Leslee Maldonado  Primary Care Physician: Raj Cedeño MD  08/28/24     Assessment & Plan    Assessment / Plan:     Pre-operative risk assessment for starting Car-T treatments -can proceed from cardiac standpoint.  Hemodynamically stable.  Patient was concerned about needing bypass surgery after her prior stem cell transplant.  That was likely due to undiagnosed concurrent cardiac issues.  Patient currently does not have any active anginal symptoms.  Getting Lexiscan stress test as well.  CAD - s/p CABG x4 6/2019 and PCI 10/2022.  No angina symptoms   Multiple Myeloma - s/p stem cell transplant, upcoming Car-T treatment.  Echo images do not show any definite evidence of LV thickening  HTN - well controlled, on hydralazine 25 mg BID, lisinopril 40 mg daily  HLD - last  (9/2023), on Crestor 40 mg daily  H/o DVT/PE - on Eliquis 5 mg BID (per oncology). She will ask her oncologist if she needs to continue OAC  RBBB      Change GXT to Lexiscan d/t RBBB.  With the baseline EKG abnormality, routine treadmill stress test will not be conclusive.  Obtain lipid panel  No contraindications to start Car-T at this point  F/u with oncologist regarding need for continued OAC  Follow up with me in 6 months      Chief Complaint:     Chief Complaint   Patient presents with    Follow-up    Cardiac Clearance       History of Present Illness:       HPI     Leslee Maldonadois a 69 y.o. female with PMH of CAD s/p PCI and CABG x4, HTN, HLD, multiple Myeloma.    Here for follow up and pre-operative risk assessment for initiation of Car-T treatments. Previously followed by Dr. Webster.     Patient denies any chest pain, shortness of breath, palpitations, presyncope or syncope. No TIA. No claudication. No recent hospitalizations. She becomes SOB with 1 flight of stairs. She's been told she snores.    PMH  Past Medical History:   Diagnosis Date

## 2024-08-29 ENCOUNTER — HOSPITAL ENCOUNTER (OUTPATIENT)
Dept: PHYSICAL THERAPY | Age: 69
Setting detail: THERAPIES SERIES
Discharge: HOME | End: 2024-08-29
Attending: INTERNAL MEDICINE
Payer: MEDICARE

## 2024-08-29 DIAGNOSIS — I25.10 CAD S/P PERCUTANEOUS CORONARY ANGIOPLASTY: ICD-10-CM

## 2024-08-29 DIAGNOSIS — R53.0 NEOPLASTIC (MALIGNANT) RELATED FATIGUE: Primary | ICD-10-CM

## 2024-08-29 DIAGNOSIS — Z98.61 CAD S/P PERCUTANEOUS CORONARY ANGIOPLASTY: ICD-10-CM

## 2024-08-29 LAB
CHOLEST SERPL-MCNC: 158 MG/DL (ref 0–199)
HDLC SERPL-MCNC: 66 MG/DL (ref 40–60)
LDLC SERPL CALC-MCNC: 75 MG/DL
TRIGL SERPL-MCNC: 84 MG/DL (ref 0–150)
VLDLC SERPL CALC-MCNC: 17 MG/DL

## 2024-08-29 PROCEDURE — 97161 PT EVAL LOW COMPLEX 20 MIN: CPT | Performed by: PHYSICAL THERAPIST

## 2024-08-29 PROCEDURE — 97110 THERAPEUTIC EXERCISES: CPT | Performed by: PHYSICAL THERAPIST

## 2024-08-29 NOTE — PLAN OF CARE
participation restrictions  [x] A clinical presentation with stable and/or uncomplicated characteristics   [x] Clinical decision making of LOW (81638 - Typically 20 minutes face-to-face) complexity using standardized patient assessment instrument and/or measurable assessment of functional outcome.    Today's Assessment: See above    Medical Necessity Documentation:  I certify that this patient meets the below criteria necessary for medical necessity for care and/or justification of therapy services:  The patient has associated co-morbidities along with primary diagnosis which significantly impact the rate of recovery and contribute to complexities that require skilled therapeutic intervention    Prognosis/Rehab Potential: Good    Patient requires continued skilled intervention: [] Yes  [x] No    CHARGE CAPTURE     PT CHARGE GRID   CPT Code (TIMED) minutes # CPT Code (UNTIMED) #     Therex (39490)  9 min 1  EVAL:LOW (30801 - Typically 20 minutes face-to-face) 1    Neuromusc. Re-ed (75807)    Re-Eval (23832)     Manual (32843)    Estim Unattended (52652)     Ther. Act (23919)    Mech. Traction (58823)     Gait (60151)    Dry Needle 1-2 muscle (44634)     Aquatic Therex (31139)    Dry Needle 3+ muscle (86808)     Iontophoresis (23957)    VASO (51705)     Ultrasound (36665)    Group Therapy (09897)     Estim Attended (59963)    Canalith Repositioning (25296)     Physical Performance Test (61289)         Other:    Other:    Total Timed Code Tx Minutes 9 min 1  1     Total Treatment Minutes 29 min        Charge Justification:  (50270) HOME EXERCISE PROGRAM - Reviewed/Progressed HEP activities related to strengthening, flexibility, endurance, ROM performed to prevent loss of range of motion, maintain or improve muscular strength or increase flexibility, following either an injury or surgery.    GOALS     Patient stated goal: \"Prepare for Car-T therapy\"  [] Progressing: [] Met: [] Not Met: [] Adjusted    Therapist goals for

## 2024-09-04 ENCOUNTER — HOSPITAL ENCOUNTER (OUTPATIENT)
Dept: NUCLEAR MEDICINE | Age: 69
Discharge: HOME | End: 2024-09-04
Attending: INTERNAL MEDICINE
Payer: MEDICARE

## 2024-09-04 ENCOUNTER — TELEPHONE (OUTPATIENT)
Dept: CARDIOLOGY CLINIC | Age: 69
End: 2024-09-04

## 2024-09-04 ENCOUNTER — HOSPITAL ENCOUNTER (OUTPATIENT)
Age: 69
Discharge: HOME | End: 2024-09-06
Attending: INTERNAL MEDICINE
Payer: MEDICARE

## 2024-09-04 DIAGNOSIS — R94.39 ABNORMAL STRESS TEST: Primary | ICD-10-CM

## 2024-09-04 DIAGNOSIS — Z98.61 CAD S/P PERCUTANEOUS CORONARY ANGIOPLASTY: ICD-10-CM

## 2024-09-04 DIAGNOSIS — I25.10 CORONARY ARTERY DISEASE INVOLVING NATIVE CORONARY ARTERY OF NATIVE HEART WITHOUT ANGINA PECTORIS: ICD-10-CM

## 2024-09-04 DIAGNOSIS — I45.10 RBBB: ICD-10-CM

## 2024-09-04 DIAGNOSIS — I25.10 CAD S/P PERCUTANEOUS CORONARY ANGIOPLASTY: ICD-10-CM

## 2024-09-04 LAB
NUC STRESS EJECTION FRACTION: 68 %
NUC STRESS LV EDV: 85 ML (ref 56–104)
NUC STRESS LV ESV: 27 ML (ref 19–49)
NUC STRESS LV MASS: 119 G
NUC STRESS LV STROKE VOLUME: 58 ML
STRESS BASELINE DIAS BP: 82 MMHG
STRESS BASELINE HR: 52 BPM
STRESS BASELINE SYS BP: 140 MMHG
STRESS ESTIMATED WORKLOAD: 1 METS
STRESS EXERCISE DUR MIN: 1 MIN
STRESS EXERCISE DUR SEC: 0 SEC
STRESS PEAK DIAS BP: 74 MMHG
STRESS PEAK SYS BP: 151 MMHG
STRESS PERCENT HR ACHIEVED: 71 %
STRESS POST PEAK HR: 108 BPM
STRESS RATE PRESSURE PRODUCT: NORMAL BPM*MMHG
STRESS TARGET HR: 153 BPM

## 2024-09-04 PROCEDURE — 93017 CV STRESS TEST TRACING ONLY: CPT

## 2024-09-04 PROCEDURE — 3430000000 HC RX DIAGNOSTIC RADIOPHARMACEUTICAL: Performed by: INTERNAL MEDICINE

## 2024-09-04 PROCEDURE — 78452 HT MUSCLE IMAGE SPECT MULT: CPT

## 2024-09-04 PROCEDURE — 93018 CV STRESS TEST I&R ONLY: CPT | Performed by: INTERNAL MEDICINE

## 2024-09-04 PROCEDURE — 78452 HT MUSCLE IMAGE SPECT MULT: CPT | Performed by: INTERNAL MEDICINE

## 2024-09-04 PROCEDURE — 93016 CV STRESS TEST SUPVJ ONLY: CPT | Performed by: INTERNAL MEDICINE

## 2024-09-04 PROCEDURE — A9502 TC99M TETROFOSMIN: HCPCS | Performed by: INTERNAL MEDICINE

## 2024-09-04 PROCEDURE — 6360000002 HC RX W HCPCS: Performed by: INTERNAL MEDICINE

## 2024-09-04 RX ORDER — REGADENOSON 0.08 MG/ML
0.4 INJECTION, SOLUTION INTRAVENOUS
Status: COMPLETED | OUTPATIENT
Start: 2024-09-04 | End: 2024-09-04

## 2024-09-04 RX ADMIN — TETROFOSMIN 11.34 MILLICURIE: 1.38 INJECTION, POWDER, LYOPHILIZED, FOR SOLUTION INTRAVENOUS at 08:45

## 2024-09-04 RX ADMIN — REGADENOSON 0.4 MG: 0.08 INJECTION, SOLUTION INTRAVENOUS at 09:45

## 2024-09-04 RX ADMIN — TETROFOSMIN 32.2 MILLICURIE: 1.38 INJECTION, POWDER, LYOPHILIZED, FOR SOLUTION INTRAVENOUS at 09:45

## 2024-09-04 NOTE — FLOWSHEET NOTE
Called patient about procedure. Told to be here at 0630 for procedure at 0800 Must be NPO after midnight but can take morning medication with sips of water. Patient stated they stopped blood thinners 9/1  prior to procedure as directed by the office. Told to have a responsible adult with them to take them home and stay with them afterwards, if they do not get admitted to hospital. Also, to bring a current list of medications. No other questions or concerns.

## 2024-09-04 NOTE — H&P
stopped after 19 cycles with progression.  - s/p 8 Cycle Leia/Pom/Dex (C1D1 3/11/22)  - Pom maintenance (C1D1 10/28/22)       ASSESSMENT AND PLAN:            1. 1.  IgG Lambda Multiple Myeloma:   - S/p high dose Melpahlan (200 mg/m^2) and ASCT on 2/7/19  - S/p Revlimid 10mg daily maintenance with ASA daily (started 5/11/19) - held with MI early 6/2019 & then changed to 2 Q week Velcade maintenance 7/11/19 and stopped 3/31/20 with progression.  - S/p Daratumumab/Carfilzomib/Dexamethasone (C1D1 5/5/20 X 4) & then switched to Roxi maintenance (8/26/20) with M - spike of 0.2 (8/12/20) - stopped after 19 cycles with progression.  - S/p 8 Cycle Leia/Pom/Dex (C1D1 3/11/22), followed by Pom maintenance as noted below      PLAN: Pom maintenance (D1-21)  (C1D1 10/28/22) (patient on eliquis and plavix, so ASA not needed unless these d/c)   - Repeat MMP 5/1/24 & 5/26/24: IgG lambda with M-spike 0.4. Repeat 6/26/24: 0.6 and new level sent 7/24/24.  Complete the current Pom Rx.     Laboratory studies from July 25, 2024 show continued progression.  IgG is 1202, M spike 0.8, and serum free light chains are significant for lambda light chains and remained with a show.  She has elected to proceed with Carvykti.    Begin CAR-T evaluation 8/13/24    ECHO - LVEF - 60-65%, cardiology consult 8/28/24.    PFT's   FEV1 88%, DlCO 85%    - Will start Bridging chemotherapy @ [facility] on 9/10/24.  - BM biopsy prior to LDC    Collect 9/5/24 with possible LDC 10/16 - 10/18/24.  Dr. Evangelista will see her back after collection for bridging chemo.         Description of procedure:   Pt underwent a 7-12lt collection at a flow rate of 20mls/min. Pt tolerated the procedure well with no complications. She received Ca gluconate empirically. Access was via temp Apheresis cath which is intact at the insertion site.     Day + 1 T-cell collection, goal is ALL cd 3+/kg. She will be notified of the cell number later this evening.     Toxicities: mild weakness and

## 2024-09-05 ENCOUNTER — HOSPITAL ENCOUNTER (OUTPATIENT)
Dept: ONCOLOGY | Age: 69
Setting detail: INFUSION SERIES
Discharge: HOME | End: 2024-09-05
Payer: COMMERCIAL

## 2024-09-05 ENCOUNTER — HOSPITAL ENCOUNTER (OUTPATIENT)
Dept: INTERVENTIONAL RADIOLOGY/VASCULAR | Age: 69
Discharge: HOME | End: 2024-09-07
Attending: INTERNAL MEDICINE
Payer: MEDICARE

## 2024-09-05 VITALS
TEMPERATURE: 98.2 F | SYSTOLIC BLOOD PRESSURE: 142 MMHG | BODY MASS INDEX: 36.82 KG/M2 | DIASTOLIC BLOOD PRESSURE: 80 MMHG | WEIGHT: 195 LBS | HEIGHT: 61 IN | OXYGEN SATURATION: 94 % | RESPIRATION RATE: 15 BRPM | HEART RATE: 64 BPM

## 2024-09-05 VITALS
SYSTOLIC BLOOD PRESSURE: 125 MMHG | TEMPERATURE: 98 F | OXYGEN SATURATION: 95 % | DIASTOLIC BLOOD PRESSURE: 75 MMHG | RESPIRATION RATE: 18 BRPM | HEART RATE: 62 BPM

## 2024-09-05 DIAGNOSIS — C90.01 MULTIPLE MYELOMA IN REMISSION (HCC): ICD-10-CM

## 2024-09-05 DIAGNOSIS — C90.00 MULTIPLE MYELOMA, REMISSION STATUS UNSPECIFIED (HCC): Primary | ICD-10-CM

## 2024-09-05 LAB
ALBUMIN SERPL-MCNC: 4.2 G/DL (ref 3.4–5)
ALBUMIN SERPL-MCNC: 4.2 G/DL (ref 3.4–5)
ALBUMIN/GLOB SERPL: 1.2 {RATIO} (ref 1.1–2.2)
ALBUMIN/GLOB SERPL: 1.4 {RATIO} (ref 1.1–2.2)
ALP SERPL-CCNC: 58 U/L (ref 40–129)
ALP SERPL-CCNC: 59 U/L (ref 40–129)
ALT SERPL-CCNC: 30 U/L (ref 10–40)
ALT SERPL-CCNC: 31 U/L (ref 10–40)
ANION GAP SERPL CALCULATED.3IONS-SCNC: 10 MMOL/L (ref 3–16)
ANION GAP SERPL CALCULATED.3IONS-SCNC: 9 MMOL/L (ref 3–16)
AST SERPL-CCNC: 27 U/L (ref 15–37)
AST SERPL-CCNC: 30 U/L (ref 15–37)
BASOPHILS # BLD: 0 K/UL (ref 0–0.2)
BASOPHILS # BLD: 0 K/UL (ref 0–0.2)
BASOPHILS NFR BLD: 1.1 %
BASOPHILS NFR BLD: 1.4 %
BILIRUB SERPL-MCNC: 0.3 MG/DL (ref 0–1)
BILIRUB SERPL-MCNC: 0.3 MG/DL (ref 0–1)
BUN SERPL-MCNC: 14 MG/DL (ref 7–20)
BUN SERPL-MCNC: 15 MG/DL (ref 7–20)
CALCIUM SERPL-MCNC: 8.9 MG/DL (ref 8.3–10.6)
CALCIUM SERPL-MCNC: 9.1 MG/DL (ref 8.3–10.6)
CHLORIDE SERPL-SCNC: 106 MMOL/L (ref 99–110)
CHLORIDE SERPL-SCNC: 106 MMOL/L (ref 99–110)
CO2 SERPL-SCNC: 25 MMOL/L (ref 21–32)
CO2 SERPL-SCNC: 26 MMOL/L (ref 21–32)
CREAT SERPL-MCNC: 1.1 MG/DL (ref 0.6–1.2)
CREAT SERPL-MCNC: 1.2 MG/DL (ref 0.6–1.2)
DEPRECATED RDW RBC AUTO: 16.6 % (ref 12.4–15.4)
DEPRECATED RDW RBC AUTO: 17 % (ref 12.4–15.4)
DEPRECATED RDW RBC AUTO: 17.2 % (ref 12.4–15.4)
ECHO BSA: 1.95 M2
EOSINOPHIL # BLD: 0 K/UL (ref 0–0.6)
EOSINOPHIL # BLD: 0 K/UL (ref 0–0.6)
EOSINOPHIL NFR BLD: 0.4 %
EOSINOPHIL NFR BLD: 0.9 %
GFR SERPLBLD CREATININE-BSD FMLA CKD-EPI: 50 ML/MIN/{1.73_M2}
GFR SERPLBLD CREATININE-BSD FMLA CKD-EPI: 55 ML/MIN/{1.73_M2}
GLUCOSE SERPL-MCNC: 105 MG/DL (ref 70–99)
GLUCOSE SERPL-MCNC: 121 MG/DL (ref 70–99)
HCT VFR BLD AUTO: 35.7 % (ref 36–48)
HCT VFR BLD AUTO: 39.6 % (ref 36–48)
HCT VFR BLD AUTO: 41.2 % (ref 36–48)
HGB BLD-MCNC: 11.6 G/DL (ref 12–16)
HGB BLD-MCNC: 12.4 G/DL (ref 12–16)
HGB BLD-MCNC: 13.2 G/DL (ref 12–16)
INR PPP: 0.93 (ref 0.85–1.15)
LYMPHOCYTES # BLD: 0.7 K/UL (ref 1–5.1)
LYMPHOCYTES # BLD: 1 K/UL (ref 1–5.1)
LYMPHOCYTES NFR BLD: 21.3 %
LYMPHOCYTES NFR BLD: 29.3 %
MAGNESIUM SERPL-MCNC: 2.3 MG/DL (ref 1.8–2.4)
MCH RBC QN AUTO: 27.5 PG (ref 26–34)
MCH RBC QN AUTO: 27.9 PG (ref 26–34)
MCH RBC QN AUTO: 28 PG (ref 26–34)
MCHC RBC AUTO-ENTMCNC: 31.4 G/DL (ref 31–36)
MCHC RBC AUTO-ENTMCNC: 32.1 G/DL (ref 31–36)
MCHC RBC AUTO-ENTMCNC: 32.5 G/DL (ref 31–36)
MCV RBC AUTO: 86.3 FL (ref 80–100)
MCV RBC AUTO: 86.7 FL (ref 80–100)
MCV RBC AUTO: 87.6 FL (ref 80–100)
MONOCYTES # BLD: 0.2 K/UL (ref 0–1.3)
MONOCYTES # BLD: 0.4 K/UL (ref 0–1.3)
MONOCYTES NFR BLD: 11.9 %
MONOCYTES NFR BLD: 6.8 %
NEUTROPHILS # BLD: 2 K/UL (ref 1.7–7.7)
NEUTROPHILS # BLD: 2.4 K/UL (ref 1.7–7.7)
NEUTROPHILS NFR BLD: 56.8 %
NEUTROPHILS NFR BLD: 70.1 %
PLATELET # BLD AUTO: 234 K/UL (ref 135–450)
PLATELET # BLD AUTO: 374 K/UL (ref 135–450)
PLATELET # BLD AUTO: 376 K/UL (ref 135–450)
PMV BLD AUTO: 6.8 FL (ref 5–10.5)
PMV BLD AUTO: 7.9 FL (ref 5–10.5)
PMV BLD AUTO: 8.2 FL (ref 5–10.5)
POTASSIUM SERPL-SCNC: 3.7 MMOL/L (ref 3.5–5.1)
POTASSIUM SERPL-SCNC: 3.7 MMOL/L (ref 3.5–5.1)
PROT SERPL-MCNC: 7.3 G/DL (ref 6.4–8.2)
PROT SERPL-MCNC: 7.7 G/DL (ref 6.4–8.2)
PROTHROMBIN TIME: 12.6 SEC (ref 11.9–14.9)
RBC # BLD AUTO: 4.14 M/UL (ref 4–5.2)
RBC # BLD AUTO: 4.52 M/UL (ref 4–5.2)
RBC # BLD AUTO: 4.75 M/UL (ref 4–5.2)
SODIUM SERPL-SCNC: 141 MMOL/L (ref 136–145)
SODIUM SERPL-SCNC: 141 MMOL/L (ref 136–145)
WBC # BLD AUTO: 3.4 K/UL (ref 4–11)
WBC # BLD AUTO: 3.6 K/UL (ref 4–11)
WBC # BLD AUTO: 3.8 K/UL (ref 4–11)

## 2024-09-05 PROCEDURE — 36556 INSERT NON-TUNNEL CV CATH: CPT

## 2024-09-05 PROCEDURE — 7100000010 HC PHASE II RECOVERY - FIRST 15 MIN

## 2024-09-05 PROCEDURE — 83735 ASSAY OF MAGNESIUM: CPT

## 2024-09-05 PROCEDURE — C1894 INTRO/SHEATH, NON-LASER: HCPCS

## 2024-09-05 PROCEDURE — 2709999900 HC NON-CHARGEABLE SUPPLY

## 2024-09-05 PROCEDURE — 36592 COLLECT BLOOD FROM PICC: CPT

## 2024-09-05 PROCEDURE — 85027 COMPLETE CBC AUTOMATED: CPT

## 2024-09-05 PROCEDURE — 85610 PROTHROMBIN TIME: CPT

## 2024-09-05 PROCEDURE — 80053 COMPREHEN METABOLIC PANEL: CPT

## 2024-09-05 PROCEDURE — 85025 COMPLETE CBC W/AUTO DIFF WBC: CPT

## 2024-09-05 PROCEDURE — 6360000002 HC RX W HCPCS: Performed by: NURSE PRACTITIONER

## 2024-09-05 PROCEDURE — C1769 GUIDE WIRE: HCPCS

## 2024-09-05 RX ORDER — CALCIUM GLUCONATE 20 MG/ML
2000 INJECTION, SOLUTION INTRAVENOUS ONCE
OUTPATIENT
Start: 2024-09-06 | End: 2024-09-06

## 2024-09-05 RX ORDER — CALCIUM GLUCONATE 20 MG/ML
2000 INJECTION, SOLUTION INTRAVENOUS ONCE
Status: COMPLETED | OUTPATIENT
Start: 2024-09-05 | End: 2024-09-05

## 2024-09-05 RX ORDER — LOPERAMIDE HCL 2 MG
2 CAPSULE ORAL PRN
Status: DISCONTINUED | OUTPATIENT
Start: 2024-09-05 | End: 2024-09-06 | Stop reason: HOSPADM

## 2024-09-05 RX ORDER — METOPROLOL SUCCINATE 50 MG/1
50 TABLET, EXTENDED RELEASE ORAL ONCE
Qty: 1 TABLET | Refills: 0 | Status: SHIPPED | OUTPATIENT
Start: 2024-09-05 | End: 2024-09-05

## 2024-09-05 RX ORDER — PROCHLORPERAZINE EDISYLATE 5 MG/ML
10 INJECTION INTRAMUSCULAR; INTRAVENOUS EVERY 6 HOURS PRN
Status: DISCONTINUED | OUTPATIENT
Start: 2024-09-05 | End: 2024-09-06 | Stop reason: HOSPADM

## 2024-09-05 RX ORDER — PROCHLORPERAZINE EDISYLATE 5 MG/ML
10 INJECTION INTRAMUSCULAR; INTRAVENOUS EVERY 6 HOURS PRN
OUTPATIENT
Start: 2024-09-06

## 2024-09-05 RX ORDER — LOPERAMIDE HCL 2 MG
2 CAPSULE ORAL PRN
OUTPATIENT
Start: 2024-09-06

## 2024-09-05 RX ORDER — PROCHLORPERAZINE MALEATE 10 MG
10 TABLET ORAL EVERY 6 HOURS PRN
Status: DISCONTINUED | OUTPATIENT
Start: 2024-09-05 | End: 2024-09-06 | Stop reason: HOSPADM

## 2024-09-05 RX ORDER — PROCHLORPERAZINE MALEATE 10 MG
10 TABLET ORAL EVERY 6 HOURS PRN
OUTPATIENT
Start: 2024-09-06

## 2024-09-05 RX ADMIN — CALCIUM GLUCONATE 2000 MG: 20 INJECTION, SOLUTION INTRAVENOUS at 10:07

## 2024-09-05 RX ADMIN — CALCIUM GLUCONATE 2000 MG: 20 INJECTION, SOLUTION INTRAVENOUS at 12:00

## 2024-09-05 NOTE — FLOWSHEET NOTE
0850- central line in place, no bleeding noted patient transported via wheelchair to infusion center

## 2024-09-05 NOTE — PROGRESS NOTES
Pt arrived to [facility] today for scheduled stem cell pheresis procedure. Apheresis and education completed by Claude Caraballo RN.  Review Rashmi documentation for details.    Fartun Kapadia RN

## 2024-09-05 NOTE — TELEPHONE ENCOUNTER
Spoke with pt. She verbalized understanding and will call to schedule test. Per Dr. Sullivan, take Toprol xl 50 mg the morning of the test. Script sent to the pharmacy.

## 2024-09-05 NOTE — PROGRESS NOTES
Cath Lab Pre Procedure Flowsheet    Plan of Care:     Hemodynamics and cardiac rhythm will remain stable.   Comfort level will be maintained.   Respiratory function will remain adequate.   Pt/family will verbalize understanding of the procedure.   Procedure will be tolerated without complications.   Patient will recover from procedure without complications.   ID armband on patient and identification verified.   Informed consent obtained.   Non invasive blood pressure cuff applied, monitoring initiated.   EKG pads and pulse oximeter applied, monitoring initiated.   Instructions given. Patient and / or family verbalize understanding.   H&P will be [facility] by [facility].     Pre-procedure:    NPO Status: Pt has been NPO since midnight. .    Contrast / IV Dye Allergy:     Pregnancy Test: N/A.    Prep Sites: Wrist(s)  Chest    Leonel's Test:    Pulses:     Anticoagulants:      Antiplatelets: aspirin last dose 9/3.     Chief Complaint:      Diabetic: Yes, No treatment    Pre EKG Rhythm: sinus bradycardia    Pre SBP:135/74    IV access: right wrist    Pre-procedure blood work collected by: levi    NIH Scale:

## 2024-09-05 NOTE — FLOWSHEET NOTE
1510- patient ambulated,  iv d/c'd, discharge instructions reviewed with patient and patient discharged via wheelchair out the main entrance

## 2024-09-05 NOTE — FLOWSHEET NOTE
1405- temporary line removed using sterile technique pressure held x 15 min and new dressing applied

## 2024-09-05 NOTE — DISCHARGE INSTRUCTIONS
[facility]  Cardiovascular Special Procedures  General Discharge Instructions    PROCEDURE: Line Removal    ____ You may be drowsy or lightheaded after receiving sedation. DO NOT operate a vehicle (automobile, bicycle, motorcycle, machinery, or power tools), make any important decisions or sign any important/legal documents, or drink alcoholic beverages for the next 24 hours  ____ We strongly suggest that a responsible adult be with you for the next 24 hours for your protection and safety  ____ If the intravenous catheter site is painful, apply warm wet compresses on the site until the soreness is relieved and elevate the arm above the heart.  Call your physician if no improvement in 2 to 3 days    DIETARY INSTRUCTIONS:    ____ Drink extra fluids over the next 24 hours (If not contraindicated by illness or by physician order)  ____ Start with clear liquids and progress to normal diet as you feel like eating.  If you experience nausea or repeated episodes of vomiting, which persist beyond 12-24 hours, notify your doctor        _x___ Resume your previous diet    ACTIVITY INSTRUCTIONS:    ____ See other instructions  ____ No special instructions  ____ Rest for 24 hours    ____ Up as tolerated  _x___ Increase activity as tolerated    Wound/Dressing Instructions:  ____ See other instructions  ____ May shower, tomorrow  _x___ Remove bandage within 24 hours    MEDICATION INSTRUCTIONS:    ____ See Medication Reconciliation Sheet      SPECIAL INSTRUCTIONS:  Take bandage off in 24 hours. Place band-aid on site until completely healed.   Showering is okay as long as site remains dry until healed, do not submerge under water until healed.   Do not lift anything over 10 pounds for 3-5 days while site is healing.                                                                                                                                                          Please make sure that you follow-up with your doctor’s

## 2024-09-09 ENCOUNTER — TELEPHONE (OUTPATIENT)
Dept: CARDIOLOGY CLINIC | Age: 69
End: 2024-09-09

## 2024-09-11 ENCOUNTER — HOSPITAL ENCOUNTER (OUTPATIENT)
Dept: CT IMAGING | Age: 69
Discharge: HOME | End: 2024-09-11
Attending: INTERNAL MEDICINE
Payer: MEDICARE

## 2024-09-11 VITALS — SYSTOLIC BLOOD PRESSURE: 113 MMHG | HEART RATE: 54 BPM | DIASTOLIC BLOOD PRESSURE: 62 MMHG

## 2024-09-11 DIAGNOSIS — R94.39 ABNORMAL STRESS TEST: ICD-10-CM

## 2024-09-11 PROCEDURE — 6360000004 HC RX CONTRAST MEDICATION: Performed by: INTERNAL MEDICINE

## 2024-09-11 PROCEDURE — 75574 CT ANGIO HRT W/3D IMAGE: CPT

## 2024-09-11 PROCEDURE — 6370000000 HC RX 637 (ALT 250 FOR IP): Performed by: RADIOLOGY

## 2024-09-11 PROCEDURE — 2500000003 HC RX 250 WO HCPCS: Performed by: RADIOLOGY

## 2024-09-11 RX ORDER — NITROGLYCERIN 0.4 MG/1
0.4 TABLET SUBLINGUAL
Status: COMPLETED | OUTPATIENT
Start: 2024-09-11 | End: 2024-09-11

## 2024-09-11 RX ORDER — SODIUM CHLORIDE 0.9 % (FLUSH) 0.9 %
5-40 SYRINGE (ML) INJECTION PRN
Status: DISCONTINUED | OUTPATIENT
Start: 2024-09-11 | End: 2024-09-12 | Stop reason: HOSPADM

## 2024-09-11 RX ORDER — IOPAMIDOL 755 MG/ML
100 INJECTION, SOLUTION INTRAVASCULAR
Status: COMPLETED | OUTPATIENT
Start: 2024-09-11 | End: 2024-09-11

## 2024-09-11 RX ORDER — METOPROLOL TARTRATE 1 MG/ML
5 INJECTION, SOLUTION INTRAVENOUS EVERY 5 MIN PRN
Status: DISCONTINUED | OUTPATIENT
Start: 2024-09-11 | End: 2024-09-12 | Stop reason: HOSPADM

## 2024-09-11 RX ORDER — NITROGLYCERIN 0.4 MG/1
0.8 TABLET SUBLINGUAL
Status: COMPLETED | OUTPATIENT
Start: 2024-09-11 | End: 2024-09-11

## 2024-09-11 RX ADMIN — METOPROLOL TARTRATE 5 MG: 1 INJECTION, SOLUTION INTRAVENOUS at 12:32

## 2024-09-11 RX ADMIN — NITROGLYCERIN 0.8 MG: 0.4 TABLET, ORALLY DISINTEGRATING SUBLINGUAL at 12:35

## 2024-09-11 RX ADMIN — IOPAMIDOL 100 ML: 755 INJECTION, SOLUTION INTRAVENOUS at 12:35

## 2024-09-18 ENCOUNTER — TELEPHONE (OUTPATIENT)
Dept: CARDIOLOGY CLINIC | Age: 69
End: 2024-09-18

## 2024-09-18 ENCOUNTER — TELEPHONE (OUTPATIENT)
Dept: CARDIOLOGY | Age: 69
End: 2024-09-18

## 2024-09-18 RX ORDER — METOPROLOL SUCCINATE 50 MG/1
50 TABLET, EXTENDED RELEASE ORAL ONCE
Qty: 1 TABLET | Refills: 0 | Status: SHIPPED | OUTPATIENT
Start: 2024-09-18 | End: 2024-09-18

## 2024-09-24 ENCOUNTER — HOSPITAL ENCOUNTER (OUTPATIENT)
Dept: CT IMAGING | Age: 69
Discharge: HOME | End: 2024-09-24
Payer: MEDICARE

## 2024-09-24 VITALS — DIASTOLIC BLOOD PRESSURE: 57 MMHG | HEART RATE: 75 BPM | SYSTOLIC BLOOD PRESSURE: 99 MMHG

## 2024-09-24 PROCEDURE — 6370000000 HC RX 637 (ALT 250 FOR IP): Performed by: RADIOLOGY

## 2024-09-24 PROCEDURE — 2500000003 HC RX 250 WO HCPCS: Performed by: RADIOLOGY

## 2024-09-24 RX ORDER — NITROGLYCERIN 0.4 MG/1
0.4 TABLET SUBLINGUAL
Status: COMPLETED | OUTPATIENT
Start: 2024-09-24 | End: 2024-09-24

## 2024-09-24 RX ORDER — METOPROLOL TARTRATE 1 MG/ML
5 INJECTION, SOLUTION INTRAVENOUS ONCE
Status: COMPLETED | OUTPATIENT
Start: 2024-09-24 | End: 2024-09-24

## 2024-09-24 RX ORDER — NITROGLYCERIN 0.4 MG/1
0.8 TABLET SUBLINGUAL
Status: COMPLETED | OUTPATIENT
Start: 2024-09-24 | End: 2024-09-24

## 2024-09-24 RX ADMIN — METOPROLOL TARTRATE 5 MG: 5 INJECTION INTRAVENOUS at 12:19

## 2024-09-24 RX ADMIN — NITROGLYCERIN 0.8 MG: 0.4 TABLET SUBLINGUAL at 12:02

## 2024-10-08 ENCOUNTER — HOSPITAL ENCOUNTER (OUTPATIENT)
Dept: ONCOLOGY | Age: 69
Setting detail: INFUSION SERIES
Discharge: HOME | End: 2024-10-08
Payer: COMMERCIAL

## 2024-10-08 ENCOUNTER — HOSPITAL ENCOUNTER (OUTPATIENT)
Age: 69
Setting detail: SPECIMEN
Discharge: HOME | End: 2024-10-08
Payer: COMMERCIAL

## 2024-10-08 VITALS
OXYGEN SATURATION: 91 % | TEMPERATURE: 98.3 F | HEIGHT: 62 IN | DIASTOLIC BLOOD PRESSURE: 60 MMHG | BODY MASS INDEX: 36.07 KG/M2 | WEIGHT: 196 LBS | RESPIRATION RATE: 18 BRPM | HEART RATE: 65 BPM | SYSTOLIC BLOOD PRESSURE: 124 MMHG

## 2024-10-08 LAB
BASOPHILS # BLD: 0 K/UL (ref 0–0.2)
BASOPHILS NFR BLD: 0.7 %
DEPRECATED RDW RBC AUTO: 17 % (ref 12.4–15.4)
EOSINOPHIL # BLD: 0 K/UL (ref 0–0.6)
EOSINOPHIL NFR BLD: 1.1 %
HCT VFR BLD AUTO: 38.5 % (ref 36–48)
HGB BLD-MCNC: 12.4 G/DL (ref 12–16)
LYMPHOCYTES # BLD: 0.7 K/UL (ref 1–5.1)
LYMPHOCYTES NFR BLD: 20.2 %
MCH RBC QN AUTO: 27.9 PG (ref 26–34)
MCHC RBC AUTO-ENTMCNC: 32.3 G/DL (ref 31–36)
MCV RBC AUTO: 86.6 FL (ref 80–100)
MONOCYTES # BLD: 0.4 K/UL (ref 0–1.3)
MONOCYTES NFR BLD: 12.8 %
NEUTROPHILS # BLD: 2.2 K/UL (ref 1.7–7.7)
NEUTROPHILS NFR BLD: 65.2 %
PLATELET # BLD AUTO: 108 K/UL (ref 135–450)
PMV BLD AUTO: 8.5 FL (ref 5–10.5)
RBC # BLD AUTO: 4.45 M/UL (ref 4–5.2)
WBC # BLD AUTO: 3.3 K/UL (ref 4–11)

## 2024-10-08 PROCEDURE — 96374 THER/PROPH/DIAG INJ IV PUSH: CPT

## 2024-10-08 PROCEDURE — 96376 TX/PRO/DX INJ SAME DRUG ADON: CPT

## 2024-10-08 PROCEDURE — 85025 COMPLETE CBC W/AUTO DIFF WBC: CPT

## 2024-10-08 PROCEDURE — 88342 IMHCHEM/IMCYTCHM 1ST ANTB: CPT

## 2024-10-08 PROCEDURE — 88313 SPECIAL STAINS GROUP 2: CPT

## 2024-10-08 PROCEDURE — 96375 TX/PRO/DX INJ NEW DRUG ADDON: CPT

## 2024-10-08 PROCEDURE — 88184 FLOWCYTOMETRY/ TC 1 MARKER: CPT

## 2024-10-08 PROCEDURE — 36591 DRAW BLOOD OFF VENOUS DEVICE: CPT

## 2024-10-08 PROCEDURE — 99152 MOD SED SAME PHYS/QHP 5/>YRS: CPT

## 2024-10-08 PROCEDURE — 88311 DECALCIFY TISSUE: CPT

## 2024-10-08 PROCEDURE — 88185 FLOWCYTOMETRY/TC ADD-ON: CPT

## 2024-10-08 PROCEDURE — 6360000002 HC RX W HCPCS: Performed by: INTERNAL MEDICINE

## 2024-10-08 PROCEDURE — 38221 DX BONE MARROW BIOPSIES: CPT

## 2024-10-08 PROCEDURE — 88305 TISSUE EXAM BY PATHOLOGIST: CPT

## 2024-10-08 RX ORDER — ACYCLOVIR 400 MG/1
400 TABLET ORAL 2 TIMES DAILY
COMMUNITY
Start: 2024-09-22

## 2024-10-08 RX ORDER — FLUMAZENIL 0.1 MG/ML
0.5 INJECTION INTRAVENOUS PRN
Status: DISCONTINUED | OUTPATIENT
Start: 2024-10-08 | End: 2024-10-09 | Stop reason: HOSPADM

## 2024-10-08 RX ORDER — MIDAZOLAM HYDROCHLORIDE 1 MG/ML
1 INJECTION INTRAMUSCULAR; INTRAVENOUS PRN
Status: DISCONTINUED | OUTPATIENT
Start: 2024-10-08 | End: 2024-10-09 | Stop reason: HOSPADM

## 2024-10-08 RX ORDER — NALOXONE HYDROCHLORIDE 0.4 MG/ML
0.4 INJECTION, SOLUTION INTRAMUSCULAR; INTRAVENOUS; SUBCUTANEOUS PRN
Status: DISCONTINUED | OUTPATIENT
Start: 2024-10-08 | End: 2024-10-09 | Stop reason: HOSPADM

## 2024-10-08 RX ORDER — FENTANYL CITRATE 50 UG/ML
25 INJECTION, SOLUTION INTRAMUSCULAR; INTRAVENOUS PRN
Status: DISCONTINUED | OUTPATIENT
Start: 2024-10-08 | End: 2024-10-09 | Stop reason: HOSPADM

## 2024-10-08 RX ADMIN — MIDAZOLAM HYDROCHLORIDE 2 MG: 1 INJECTION, SOLUTION INTRAMUSCULAR; INTRAVENOUS at 09:10

## 2024-10-08 RX ADMIN — MIDAZOLAM HYDROCHLORIDE 2 MG: 1 INJECTION, SOLUTION INTRAMUSCULAR; INTRAVENOUS at 09:05

## 2024-10-08 RX ADMIN — FENTANYL CITRATE 50 MCG: 50 INJECTION, SOLUTION INTRAMUSCULAR; INTRAVENOUS at 09:05

## 2024-10-08 NOTE — PROCEDURES
Pt scheduled for bone marrow biopsy with conscious sedation for diagnosis of multiple myeloma.  Pt's history, allergies, and medication list reviewed by nursing and physician prior to start of procedure.  Pt assessed and deemed fit for procedure by physician.    Height: 5'1.5\"  Weight: 196 lb (per pt)    Pre-Procedural Assessment:  Signed, Dated, and timed Informed Consent on the chart  VS's obtained and documented  Pt has a patent IV site (see flowsheets)  Pt has been NPO since 2100 10/7/24  Current Medications, Allergies, and recent H&P reviewed and on chart  Emergency medications and equipment available (crash cart, narcan, flumazenil, O2, suction, etc)  Time out performed prior to procedure (see sedation documentation)  Pre-Procedure Dolores Score: 10    Procedure:  Nursing present throughout procedure to assess, assist, and monitor.  Vital Signs monitored throughout - see sedation documentation.   Medications administered: 4 mg versed and 50 mcg fentanyl  Patient to be discharged from OPO once Dolores Score of 8 or better is achieved or once pre-procedural Dolores Score is obtained.    Post-Procedure:  Pt vital signs stable.  Discharge teaching provided (see AVS).  Post procedure Dolores Score 10.  Pt and family verbalized understanding of all instructions.  Pt discharged from Outpt Oncology with daughter who will drive them home after OHC.

## 2024-10-08 NOTE — PROGRESS NOTES
ETCO2  Monitoring done for conscious sedation.  Patient is on room air for procedure.    Baseline information:  HR: 68  BP: 113/75  RR: 19  LOC: alert  ETCO2: 35 SpO2: 95    5 minutes after sedation administered:  HR: 66  BP: 129/73  RR: 17  LOC: lethargic  ETCO2: 30 SpO2: 94    15 minutes after sedation administered:  HR: 61  BP: 122/61  RR: 24  LOC:alert  ETCO2: 24 SpO2: 91    well    Patient/caregiver was educated on the proper method of use:  Yes      Level of patient/caregiver understanding able to:   [] Verbalize understanding   [] Demonstrate understanding       [] Teach back        [] Needs reinforcement       []  No available caregiver               []  Other:     Response to education:  Excellent

## 2024-10-09 NOTE — PROCEDURES
PROCEDURE NOTE  Date: 10/9/2024   Name: Leslee Maldonado  YOB: 1955    ProceduresBone Marrow Core & Aspirate with Moderate Sedation    Diagnosis: Myeloma  Indication: Pre-CART  Planned Sedation:  Fentanyl: 50 mcg  Versed: 4 mg  Ativan:   Pre-Procedure ASA Score: Class II - mild to moderate systemic disturbance, well-controlled  Airway Assessment Score: II (soft palate, uvula, fauces visible)    Consent:   Consent was obtained from the patient by:   1. Explaining the risks associated with biopsy [bleeding and bruising] and moderate sedation [respiratory depression and sedation], as well as the benefits [an understanding of the current diagnosis to make treatment decisions], and alternatives to biopsy with sedation.  2. The patient voiced an understanding of the risks/benefits and the answers to their questions, then proceeded to sign and date the consent form.     Assessment:  Patient was assessed by myself prior to initiation of sedation and procedure and is deemed medically fit to undergo the procedure.  Recent H&P, current medications, and allergies are on the chart and reviewed.  Time out performed.    Procedure:  Patient placed in the left lateral decubitus position.  The area was prepped with chloraprep. Sterile drapes were applied. A puncture was made with the provided scalpel, then using a l needle a bone marrow aspirate was taken from the right posterior iliac crest.  Then using an 8 G 6\" needle a core biopsy was obtained. A sterile bandage was applied.  The patient had no significant bleeding.  The sample was sent for   , histology, flow cytometry, and cytogenetics and FISH.     Patient tolerated well, no complications. Patient may be discharged from outpt oncology following procedure once Dolores Score is at least 8 or meets pre-procedure Dolores Score.  Nursing to notify physician if pt doesn't meet criteria for discharge.    Mahad Evangelista MD  [facility]  [phone]

## 2024-10-16 RX ORDER — 0.9 % SODIUM CHLORIDE 0.9 %
1000 INTRAVENOUS SOLUTION INTRAVENOUS ONCE
OUTPATIENT
Start: 2024-10-23

## 2024-10-16 RX ORDER — ONDANSETRON 4 MG/1
16 TABLET, FILM COATED ORAL ONCE
OUTPATIENT
Start: 2024-10-23

## 2024-10-16 RX ORDER — PROCHLORPERAZINE EDISYLATE 5 MG/ML
10 INJECTION INTRAMUSCULAR; INTRAVENOUS EVERY 6 HOURS PRN
OUTPATIENT
Start: 2024-10-23

## 2024-10-16 RX ORDER — ALLOPURINOL 300 MG/1
300 TABLET ORAL ONCE
OUTPATIENT
Start: 2024-10-23 | End: 2024-10-23

## 2024-10-16 RX ORDER — PROCHLORPERAZINE MALEATE 10 MG
10 TABLET ORAL EVERY 6 HOURS PRN
OUTPATIENT
Start: 2024-10-23

## 2024-10-17 ENCOUNTER — APPOINTMENT (OUTPATIENT)
Dept: ONCOLOGY | Age: 69
End: 2024-10-17
Payer: COMMERCIAL

## 2024-10-23 ENCOUNTER — HOSPITAL ENCOUNTER (OUTPATIENT)
Dept: ONCOLOGY | Age: 69
Setting detail: INFUSION SERIES
Discharge: HOME | End: 2024-10-23
Payer: COMMERCIAL

## 2024-10-23 VITALS
TEMPERATURE: 98.6 F | HEART RATE: 61 BPM | BODY MASS INDEX: 36.02 KG/M2 | RESPIRATION RATE: 16 BRPM | OXYGEN SATURATION: 94 % | SYSTOLIC BLOOD PRESSURE: 122 MMHG | DIASTOLIC BLOOD PRESSURE: 75 MMHG | WEIGHT: 193.78 LBS

## 2024-10-23 DIAGNOSIS — C90.00 MULTIPLE MYELOMA, REMISSION STATUS UNSPECIFIED (HCC): Primary | ICD-10-CM

## 2024-10-23 LAB
ALBUMIN SERPL-MCNC: 4 G/DL (ref 3.4–5)
ALP SERPL-CCNC: 55 U/L (ref 40–129)
ALT SERPL-CCNC: 28 U/L (ref 10–40)
ANION GAP SERPL CALCULATED.3IONS-SCNC: 10 MMOL/L (ref 3–16)
AST SERPL-CCNC: 24 U/L (ref 15–37)
BASOPHILS # BLD: 0 K/UL (ref 0–0.2)
BASOPHILS NFR BLD: 1 %
BILIRUB DIRECT SERPL-MCNC: 0.2 MG/DL (ref 0–0.3)
BILIRUB INDIRECT SERPL-MCNC: 0.1 MG/DL (ref 0–1)
BILIRUB SERPL-MCNC: 0.3 MG/DL (ref 0–1)
BUN SERPL-MCNC: 11 MG/DL (ref 7–20)
CALCIUM SERPL-MCNC: 8.9 MG/DL (ref 8.3–10.6)
CHLORIDE SERPL-SCNC: 106 MMOL/L (ref 99–110)
CO2 SERPL-SCNC: 25 MMOL/L (ref 21–32)
CREAT SERPL-MCNC: 0.8 MG/DL (ref 0.6–1.2)
DEPRECATED RDW RBC AUTO: 17.4 % (ref 12.4–15.4)
EOSINOPHIL # BLD: 0 K/UL (ref 0–0.6)
EOSINOPHIL NFR BLD: 1 %
GFR SERPLBLD CREATININE-BSD FMLA CKD-EPI: 81 ML/MIN/{1.73_M2}
GLUCOSE SERPL-MCNC: 110 MG/DL (ref 70–99)
HCT VFR BLD AUTO: 37.4 % (ref 36–48)
HGB BLD-MCNC: 12.3 G/DL (ref 12–16)
LDH SERPL L TO P-CCNC: 177 U/L (ref 100–190)
LYMPHOCYTES # BLD: 0.8 K/UL (ref 1–5.1)
LYMPHOCYTES NFR BLD: 30.7 %
Lab: NORMAL
MAGNESIUM SERPL-MCNC: 2.2 MG/DL (ref 1.8–2.4)
MCH RBC QN AUTO: 28.7 PG (ref 26–34)
MCHC RBC AUTO-ENTMCNC: 33 G/DL (ref 31–36)
MCV RBC AUTO: 86.9 FL (ref 80–100)
MONOCYTES # BLD: 0.3 K/UL (ref 0–1.3)
MONOCYTES NFR BLD: 13 %
NEUTROPHILS # BLD: 1.4 K/UL (ref 1.7–7.7)
NEUTROPHILS NFR BLD: 54.3 %
PHOSPHATE SERPL-MCNC: 2.8 MG/DL (ref 2.5–4.9)
PLATELET # BLD AUTO: 242 K/UL (ref 135–450)
PMV BLD AUTO: 7.5 FL (ref 5–10.5)
POTASSIUM SERPL-SCNC: 3.9 MMOL/L (ref 3.5–5.1)
PROT SERPL-MCNC: 6.6 G/DL (ref 6.4–8.2)
RBC # BLD AUTO: 4.31 M/UL (ref 4–5.2)
REPORT: NORMAL
SODIUM SERPL-SCNC: 141 MMOL/L (ref 136–145)
THIS TEST SENT TO: NORMAL
URATE SERPL-MCNC: 2.1 MG/DL (ref 2.6–6)
WBC # BLD AUTO: 2.5 K/UL (ref 4–11)

## 2024-10-23 PROCEDURE — 83615 LACTATE (LD) (LDH) ENZYME: CPT

## 2024-10-23 PROCEDURE — 96417 CHEMO IV INFUS EACH ADDL SEQ: CPT

## 2024-10-23 PROCEDURE — 96413 CHEMO IV INFUSION 1 HR: CPT

## 2024-10-23 PROCEDURE — 36591 DRAW BLOOD OFF VENOUS DEVICE: CPT

## 2024-10-23 PROCEDURE — 96360 HYDRATION IV INFUSION INIT: CPT

## 2024-10-23 PROCEDURE — 84550 ASSAY OF BLOOD/URIC ACID: CPT

## 2024-10-23 PROCEDURE — 2580000003 HC RX 258: Performed by: INTERNAL MEDICINE

## 2024-10-23 PROCEDURE — 83735 ASSAY OF MAGNESIUM: CPT

## 2024-10-23 PROCEDURE — 96361 HYDRATE IV INFUSION ADD-ON: CPT

## 2024-10-23 PROCEDURE — 6370000000 HC RX 637 (ALT 250 FOR IP): Performed by: INTERNAL MEDICINE

## 2024-10-23 PROCEDURE — 85025 COMPLETE CBC W/AUTO DIFF WBC: CPT

## 2024-10-23 PROCEDURE — 6360000002 HC RX W HCPCS: Performed by: INTERNAL MEDICINE

## 2024-10-23 PROCEDURE — 80069 RENAL FUNCTION PANEL: CPT

## 2024-10-23 PROCEDURE — 80076 HEPATIC FUNCTION PANEL: CPT

## 2024-10-23 RX ORDER — PROCHLORPERAZINE MALEATE 10 MG
10 TABLET ORAL EVERY 6 HOURS PRN
Qty: 120 TABLET | Refills: 3 | Status: SHIPPED | OUTPATIENT
Start: 2024-10-23

## 2024-10-23 RX ORDER — ALLOPURINOL 300 MG/1
300 TABLET ORAL DAILY
Qty: 30 TABLET | Refills: 0 | Status: SHIPPED | OUTPATIENT
Start: 2024-10-26

## 2024-10-23 RX ORDER — PROCHLORPERAZINE EDISYLATE 5 MG/ML
10 INJECTION INTRAMUSCULAR; INTRAVENOUS EVERY 6 HOURS PRN
Status: CANCELLED | OUTPATIENT
Start: 2024-10-24

## 2024-10-23 RX ORDER — PROCHLORPERAZINE EDISYLATE 5 MG/ML
10 INJECTION INTRAMUSCULAR; INTRAVENOUS EVERY 6 HOURS PRN
Status: DISCONTINUED | OUTPATIENT
Start: 2024-10-23 | End: 2024-10-24 | Stop reason: HOSPADM

## 2024-10-23 RX ORDER — LEVETIRACETAM 500 MG/1
500 TABLET ORAL 2 TIMES DAILY
Qty: 60 TABLET | Refills: 3
Start: 2024-10-28

## 2024-10-23 RX ORDER — LEVOFLOXACIN 500 MG/1
500 TABLET, FILM COATED ORAL DAILY
Qty: 30 TABLET | Refills: 3 | Status: SHIPPED | OUTPATIENT
Start: 2024-10-23

## 2024-10-23 RX ORDER — ONDANSETRON 4 MG/1
16 TABLET, FILM COATED ORAL ONCE
Status: COMPLETED | OUTPATIENT
Start: 2024-10-23 | End: 2024-10-23

## 2024-10-23 RX ORDER — 0.9 % SODIUM CHLORIDE 0.9 %
1000 INTRAVENOUS SOLUTION INTRAVENOUS ONCE
Status: COMPLETED | OUTPATIENT
Start: 2024-10-23 | End: 2024-10-23

## 2024-10-23 RX ORDER — ONDANSETRON 4 MG/1
16 TABLET, FILM COATED ORAL ONCE
Status: CANCELLED | OUTPATIENT
Start: 2024-10-24

## 2024-10-23 RX ORDER — 0.9 % SODIUM CHLORIDE 0.9 %
1000 INTRAVENOUS SOLUTION INTRAVENOUS ONCE
Status: CANCELLED | OUTPATIENT
Start: 2024-10-24

## 2024-10-23 RX ORDER — PROCHLORPERAZINE MALEATE 10 MG
10 TABLET ORAL EVERY 6 HOURS PRN
Status: DISCONTINUED | OUTPATIENT
Start: 2024-10-23 | End: 2024-10-24 | Stop reason: HOSPADM

## 2024-10-23 RX ORDER — ALLOPURINOL 300 MG/1
300 TABLET ORAL ONCE
Status: COMPLETED | OUTPATIENT
Start: 2024-10-23 | End: 2024-10-23

## 2024-10-23 RX ORDER — ALLOPURINOL 300 MG/1
300 TABLET ORAL ONCE
Status: CANCELLED | OUTPATIENT
Start: 2024-10-24 | End: 2024-10-24

## 2024-10-23 RX ORDER — FLUCONAZOLE 200 MG/1
200 TABLET ORAL DAILY
Qty: 30 TABLET | Refills: 3 | Status: SHIPPED | OUTPATIENT
Start: 2024-10-23

## 2024-10-23 RX ORDER — PROCHLORPERAZINE MALEATE 10 MG
10 TABLET ORAL EVERY 6 HOURS PRN
Status: CANCELLED | OUTPATIENT
Start: 2024-10-24

## 2024-10-23 RX ADMIN — SODIUM CHLORIDE 1000 ML: 9 INJECTION, SOLUTION INTRAVENOUS at 08:25

## 2024-10-23 RX ADMIN — ONDANSETRON HYDROCHLORIDE 16 MG: 4 TABLET, FILM COATED ORAL at 09:51

## 2024-10-23 RX ADMIN — ALLOPURINOL 300 MG: 300 TABLET ORAL at 09:51

## 2024-10-23 RX ADMIN — CYCLOPHOSPHAMIDE 580 MG: 1 INJECTION, POWDER, FOR SOLUTION INTRAVENOUS; ORAL at 11:30

## 2024-10-23 RX ADMIN — FLUDARABINE PHOSPHATE 57.5 MG: 25 INJECTION, SOLUTION INTRAVENOUS at 10:40

## 2024-10-23 NOTE — H&P
Tobacco Use    Smoking status: Former     Current packs/day: 0.00     Types: Cigarettes     Quit date: 1998     Years since quittin.9    Smokeless tobacco: Never   Vaping Use    Vaping status: Former   Substance and Sexual Activity    Alcohol use: Not Currently     Alcohol/week: 2.0 standard drinks of alcohol     Types: 2 Glasses of wine per week     Comment: rare    Drug use: No    Sexual activity: Not Currently   Other Topics Concern    Not on file   Social History Narrative    Not on file     Social Determinants of Health     Financial Resource Strain: Low Risk  (2024)    Overall Financial Resource Strain (CARDIA)     Difficulty of Paying Living Expenses: Not hard at all   Food Insecurity: No Food Insecurity (2024)    Hunger Vital Sign     Worried About Running Out of Food in the Last Year: Never true     Ran Out of Food in the Last Year: Never true   Transportation Needs: No Transportation Needs (2024)    PRAPARE - Transportation     Lack of Transportation (Medical): No     Lack of Transportation (Non-Medical): No   Physical Activity: Unknown (2023)    Exercise Vital Sign     Days of Exercise per Week: 3 days     Minutes of Exercise per Session: Not on file   Stress: Not on File (2019)    Received from [facility]    Stress     Stress: 0   Social Connections: Not on File (2019)    Received from [facility]    Social Connections     Connectedness: 0   Intimate Partner Violence: Unknown (2024)    Received from [facility], [facility]    Interpersonal Safety     Feel physically or emotionally unsafe where currently live: Not on file     Harm by anyone: Not on file     Emotionally Harmed: Not on file   Housing Stability: Low Risk  (2024)    Housing Stability Vital Sign     Unable to Pay for Housing in the Last Year: No     Number of Places Lived in the Last Year: 1     Unstable Housing in the Last Year: No

## 2024-10-23 NOTE — PROGRESS NOTES
Original chemotherapy orders reviewed and acknowledged. Appropriateness of chemotherapy treatment regimen Cyclophosphamide and Fludarabine for diagnosis of Multiple Myeloma was verified.  Patient educated on chemotherapy regimen.  Consent for chemotherapy obtained.      Estimated body surface area is 1.95 meters squared as calculated from the following:    Height as of 10/8/24: 1.562 m (5' 1.5\").    Weight as of this encounter: 87.9 kg (193 lb 12.6 oz). verified.  Appropriate dosing calculations of chemotherapy based on above height, weight, and BSA verified.      Madalyn Gary RN10/23/2024  2:15 PM      Chemotherapy drug Cycophosphamide and Fludarabine independently verified with Misti Marino RN prior to administration.  Informed consent for chemotherapy administration verified.  Original order, appropriateness of regimen, drug supplied, height, weight, BSA, dose calculations, expiration dates/times, drug appearance, and two patient identifiers were verified by both RNs.  Drug checked for vesicant/irritant status and for risk of hypersensitivity.  Most recent laboratory values and allergies, were reviewed.  Positive, brisk blood return via CVC was confirmed prior to administration. Chest x-ray for correct line placement reviewed. Madalyn Gary RN and Misti Marino RN verified correct rate of chemotherapy and maintenance IV fluids.     Monitoring during infusion done per policy, see DocFlowsheets.  Blood return verified throughout infusion; no signs of extravasation.  Pt tolerated chemotherapy well and without incident.  Chemotherapy infusion end time on the MAR.  Will continue to monitor.

## 2024-10-24 ENCOUNTER — HOSPITAL ENCOUNTER (OUTPATIENT)
Dept: ONCOLOGY | Age: 69
Setting detail: INFUSION SERIES
Discharge: HOME | End: 2024-10-24
Payer: COMMERCIAL

## 2024-10-24 ENCOUNTER — HOSPITAL ENCOUNTER (OUTPATIENT)
Dept: GENERAL RADIOLOGY | Age: 69
Discharge: HOME | End: 2024-10-24
Payer: MEDICARE

## 2024-10-24 VITALS
WEIGHT: 194.89 LBS | TEMPERATURE: 98.1 F | RESPIRATION RATE: 18 BRPM | DIASTOLIC BLOOD PRESSURE: 70 MMHG | HEART RATE: 60 BPM | SYSTOLIC BLOOD PRESSURE: 128 MMHG | BODY MASS INDEX: 36.23 KG/M2 | OXYGEN SATURATION: 93 %

## 2024-10-24 DIAGNOSIS — C90.00 MULTIPLE MYELOMA, REMISSION STATUS UNSPECIFIED (HCC): Primary | ICD-10-CM

## 2024-10-24 DIAGNOSIS — R50.81 NEUTROPENIC FEVER (HCC): ICD-10-CM

## 2024-10-24 DIAGNOSIS — D70.9 NEUTROPENIC FEVER (HCC): ICD-10-CM

## 2024-10-24 LAB
ALBUMIN SERPL-MCNC: 4 G/DL (ref 3.4–5)
ALP SERPL-CCNC: 57 U/L (ref 40–129)
ALT SERPL-CCNC: 31 U/L (ref 10–40)
ANION GAP SERPL CALCULATED.3IONS-SCNC: 9 MMOL/L (ref 3–16)
AST SERPL-CCNC: 25 U/L (ref 15–37)
BASOPHILS # BLD: 0 K/UL (ref 0–0.2)
BASOPHILS NFR BLD: 0.9 %
BILIRUB DIRECT SERPL-MCNC: 0.2 MG/DL (ref 0–0.3)
BILIRUB INDIRECT SERPL-MCNC: 0.1 MG/DL (ref 0–1)
BILIRUB SERPL-MCNC: 0.3 MG/DL (ref 0–1)
BILIRUB UR QL STRIP.AUTO: NEGATIVE
BUN SERPL-MCNC: 9 MG/DL (ref 7–20)
CALCIUM SERPL-MCNC: 8.7 MG/DL (ref 8.3–10.6)
CHLORIDE SERPL-SCNC: 106 MMOL/L (ref 99–110)
CLARITY UR: CLEAR
CO2 SERPL-SCNC: 26 MMOL/L (ref 21–32)
COLOR UR: YELLOW
CREAT SERPL-MCNC: 0.9 MG/DL (ref 0.6–1.2)
DEPRECATED RDW RBC AUTO: 16.8 % (ref 12.4–15.4)
EOSINOPHIL # BLD: 0 K/UL (ref 0–0.6)
EOSINOPHIL NFR BLD: 0.7 %
GFR SERPLBLD CREATININE-BSD FMLA CKD-EPI: 70 ML/MIN/{1.73_M2}
GLUCOSE SERPL-MCNC: 115 MG/DL (ref 70–99)
GLUCOSE UR STRIP.AUTO-MCNC: NEGATIVE MG/DL
HCT VFR BLD AUTO: 36.4 % (ref 36–48)
HGB BLD-MCNC: 12.2 G/DL (ref 12–16)
HGB UR QL STRIP.AUTO: NEGATIVE
KETONES UR STRIP.AUTO-MCNC: NEGATIVE MG/DL
LDH SERPL L TO P-CCNC: 171 U/L (ref 100–190)
LEUKOCYTE ESTERASE UR QL STRIP.AUTO: NEGATIVE
LYMPHOCYTES # BLD: 0.5 K/UL (ref 1–5.1)
LYMPHOCYTES NFR BLD: 21.4 %
MAGNESIUM SERPL-MCNC: 2.2 MG/DL (ref 1.8–2.4)
MCH RBC QN AUTO: 28.9 PG (ref 26–34)
MCHC RBC AUTO-ENTMCNC: 33.5 G/DL (ref 31–36)
MCV RBC AUTO: 86.3 FL (ref 80–100)
MONOCYTES # BLD: 0.2 K/UL (ref 0–1.3)
MONOCYTES NFR BLD: 6.7 %
NEUTROPHILS # BLD: 1.7 K/UL (ref 1.7–7.7)
NEUTROPHILS NFR BLD: 70.3 %
NITRITE UR QL STRIP.AUTO: NEGATIVE
PH UR STRIP.AUTO: 6.5 [PH] (ref 5–8)
PHOSPHATE SERPL-MCNC: 3.7 MG/DL (ref 2.5–4.9)
PLATELET # BLD AUTO: 229 K/UL (ref 135–450)
PMV BLD AUTO: 7.3 FL (ref 5–10.5)
POTASSIUM SERPL-SCNC: 4 MMOL/L (ref 3.5–5.1)
PROT SERPL-MCNC: 6.4 G/DL (ref 6.4–8.2)
PROT UR STRIP.AUTO-MCNC: NEGATIVE MG/DL
RBC # BLD AUTO: 4.21 M/UL (ref 4–5.2)
SODIUM SERPL-SCNC: 141 MMOL/L (ref 136–145)
SP GR UR STRIP.AUTO: <=1.005 (ref 1–1.03)
UA DIPSTICK W REFLEX MICRO PNL UR: NORMAL
URATE SERPL-MCNC: 2 MG/DL (ref 2.6–6)
URN SPEC COLLECT METH UR: NORMAL
UROBILINOGEN UR STRIP-ACNC: 0.2 E.U./DL
WBC # BLD AUTO: 2.4 K/UL (ref 4–11)

## 2024-10-24 PROCEDURE — 87253 VIRUS INOCULATE TISSUE ADDL: CPT

## 2024-10-24 PROCEDURE — 84550 ASSAY OF BLOOD/URIC ACID: CPT

## 2024-10-24 PROCEDURE — 85025 COMPLETE CBC W/AUTO DIFF WBC: CPT

## 2024-10-24 PROCEDURE — 96417 CHEMO IV INFUS EACH ADDL SEQ: CPT

## 2024-10-24 PROCEDURE — 81003 URINALYSIS AUTO W/O SCOPE: CPT

## 2024-10-24 PROCEDURE — 87103 BLOOD FUNGUS CULTURE: CPT

## 2024-10-24 PROCEDURE — 6360000002 HC RX W HCPCS: Performed by: INTERNAL MEDICINE

## 2024-10-24 PROCEDURE — 83615 LACTATE (LD) (LDH) ENZYME: CPT

## 2024-10-24 PROCEDURE — 71045 X-RAY EXAM CHEST 1 VIEW: CPT

## 2024-10-24 PROCEDURE — 87086 URINE CULTURE/COLONY COUNT: CPT

## 2024-10-24 PROCEDURE — 80076 HEPATIC FUNCTION PANEL: CPT

## 2024-10-24 PROCEDURE — 96413 CHEMO IV INFUSION 1 HR: CPT

## 2024-10-24 PROCEDURE — 83735 ASSAY OF MAGNESIUM: CPT

## 2024-10-24 PROCEDURE — 87252 VIRUS INOCULATION TISSUE: CPT

## 2024-10-24 PROCEDURE — 96361 HYDRATE IV INFUSION ADD-ON: CPT

## 2024-10-24 PROCEDURE — 87205 SMEAR GRAM STAIN: CPT

## 2024-10-24 PROCEDURE — 6370000000 HC RX 637 (ALT 250 FOR IP): Performed by: INTERNAL MEDICINE

## 2024-10-24 PROCEDURE — 80069 RENAL FUNCTION PANEL: CPT

## 2024-10-24 PROCEDURE — 2580000003 HC RX 258: Performed by: INTERNAL MEDICINE

## 2024-10-24 PROCEDURE — 36591 DRAW BLOOD OFF VENOUS DEVICE: CPT

## 2024-10-24 PROCEDURE — 87070 CULTURE OTHR SPECIMN AEROBIC: CPT

## 2024-10-24 PROCEDURE — 87102 FUNGUS ISOLATION CULTURE: CPT

## 2024-10-24 PROCEDURE — 96360 HYDRATION IV INFUSION INIT: CPT

## 2024-10-24 PROCEDURE — 87040 BLOOD CULTURE FOR BACTERIA: CPT

## 2024-10-24 RX ORDER — PROCHLORPERAZINE EDISYLATE 5 MG/ML
10 INJECTION INTRAMUSCULAR; INTRAVENOUS EVERY 6 HOURS PRN
Status: DISCONTINUED | OUTPATIENT
Start: 2024-10-24 | End: 2024-10-25 | Stop reason: HOSPADM

## 2024-10-24 RX ORDER — PROCHLORPERAZINE MALEATE 10 MG
10 TABLET ORAL EVERY 6 HOURS PRN
Status: CANCELLED | OUTPATIENT
Start: 2024-10-25

## 2024-10-24 RX ORDER — PROCHLORPERAZINE MALEATE 10 MG
10 TABLET ORAL EVERY 6 HOURS PRN
Status: DISCONTINUED | OUTPATIENT
Start: 2024-10-24 | End: 2024-10-25 | Stop reason: HOSPADM

## 2024-10-24 RX ORDER — 0.9 % SODIUM CHLORIDE 0.9 %
1000 INTRAVENOUS SOLUTION INTRAVENOUS ONCE
Status: COMPLETED | OUTPATIENT
Start: 2024-10-24 | End: 2024-10-24

## 2024-10-24 RX ORDER — 0.9 % SODIUM CHLORIDE 0.9 %
1000 INTRAVENOUS SOLUTION INTRAVENOUS ONCE
Status: CANCELLED | OUTPATIENT
Start: 2024-10-25

## 2024-10-24 RX ORDER — ALLOPURINOL 300 MG/1
300 TABLET ORAL ONCE
Status: COMPLETED | OUTPATIENT
Start: 2024-10-24 | End: 2024-10-24

## 2024-10-24 RX ORDER — PROCHLORPERAZINE EDISYLATE 5 MG/ML
10 INJECTION INTRAMUSCULAR; INTRAVENOUS EVERY 6 HOURS PRN
Status: CANCELLED | OUTPATIENT
Start: 2024-10-25

## 2024-10-24 RX ORDER — ONDANSETRON 4 MG/1
16 TABLET, FILM COATED ORAL ONCE
Status: CANCELLED | OUTPATIENT
Start: 2024-10-25

## 2024-10-24 RX ORDER — ALLOPURINOL 300 MG/1
300 TABLET ORAL ONCE
Status: CANCELLED | OUTPATIENT
Start: 2024-10-25 | End: 2024-10-25

## 2024-10-24 RX ORDER — ONDANSETRON 4 MG/1
16 TABLET, FILM COATED ORAL ONCE
Status: COMPLETED | OUTPATIENT
Start: 2024-10-24 | End: 2024-10-24

## 2024-10-24 RX ADMIN — ONDANSETRON HYDROCHLORIDE 16 MG: 4 TABLET, FILM COATED ORAL at 10:02

## 2024-10-24 RX ADMIN — CYCLOPHOSPHAMIDE 580 MG: 1 INJECTION, POWDER, FOR SOLUTION INTRAVENOUS; ORAL at 11:35

## 2024-10-24 RX ADMIN — FLUDARABINE PHOSPHATE 57.5 MG: 25 INJECTION, SOLUTION INTRAVENOUS at 10:56

## 2024-10-24 RX ADMIN — SODIUM CHLORIDE 1000 ML: 9 INJECTION, SOLUTION INTRAVENOUS at 08:22

## 2024-10-24 RX ADMIN — ALLOPURINOL 300 MG: 300 TABLET ORAL at 10:02

## 2024-10-24 NOTE — ONCOLOGY
Administration: Chemotherapy drug Fludarabine and Cytoxan independently verified with Kalee Garcia RN prior to administration.  Acknowledgement of informed consent for chemotherapy administration verified.  Original order, appropriateness of regimen, drug supplied, height, weight, BSA, dose calculations, expiration dates/times, drug appearance, and two patient identifiers were verified by both RNs.  Drug checked for vesicant/irritant status and for risk of hypersensitivity.  Most recent laboratory values and allergies, were reviewed.  Positive, brisk blood return via CVC was confirmed prior to administration. Chest x-ray for correct line placement reviewed. Sunita Henson RN and Kalee Garcia RN verified correct rate of chemotherapy and maintenance IV fluids.  Patient was educated on chemotherapy regimen prior to administration including indication for treatment related to disease & side effects of chemotherapy drug.  Patient verbalizes understanding of all instructions.    Completion of Chemotherapy: Monitoring during infusion done per policy, see Flowsheets.  Blood return verified before, during, and after infusion per policy; no signs of extravasation.  Pt tolerated chemotherapy well and without incident.  Chemotherapy infusion end time on the MAR.        During rounds patient stated she did have a fever yesterday of 101.2, MD notified and okayed chemo for today. Two sets of blood cultures were drawn at 1045.

## 2024-10-24 NOTE — PROGRESS NOTES
BCC Progress Note      10/24/2024    Leslee Maldonado    :  1955    MRN:  [id number]    Referring MD: Kerry Reagan, APRN - NP  [address] [place] [address]      Subjective:  Patient states she had a fever last night of 101, took Tylenol, and presents this morning with mild allergy symptoms but Afebrile. Will get Bld Cx, RR Viral panel and CXR today for further workup.     ECOG PS:  (1) Restricted in physically strenuous activity, ambulatory and able to do work of light nature    KPS: 90% Able to carry on normal activity; minor signs or symptoms of disease    Isolation:  None     Medications    Scheduled Meds:   sodium chloride  1,000 mL IntraVENous Once    ondansetron  16 mg Oral Once    fludarabine (FLUDARA) 57.5 mg in sodium chloride 0.9 % 100 mL chemo IVPB  57.5 mg IntraVENous Once    cycloPHOSphamide (CYTOXAN) 580 mg in sodium chloride 0.9 % 100 mL chemo infusion  580 mg IntraVENous Once    allopurinol  300 mg Oral Once     Continuous Infusions:  PRN Meds:.prochlorperazine, prochlorperazine      ROS:  As noted above, otherwise remainder of 10-point ROS negative      Physical Exam:     Vital Signs:  There were no vitals taken for this visit.    Weight:    Wt Readings from Last 3 Encounters:   10/23/24 87.9 kg (193 lb 12.6 oz)   10/08/24 88.9 kg (196 lb)   24 88.5 kg (195 lb)         General: Awake, alert and oriented.  HEENT: normocephalic, alopecia, PERRL, no scleral erythema or icterus, Oral mucosa moist and intact, throat clear  NECK: supple   BACK: Straight   SKIN: warm dry and intact without lesions rashes or masses  CHEST: CTA bilaterally without use of accessory muscles  CV: Normal S1 S2, RRR, no MRG  ABD: NT ND normoactive BS  EXTREMITIES: without edema, denies calf tenderness  NEURO: CN II - XII grossly intact  CATHETER: PAC: CDI      Laboratory Data:  CBC:   Recent Labs     10/23/24  0838   WBC 2.5*   HGB 12.3   HCT 37.4   MCV 86.9        BMP/Mag:  Recent

## 2024-10-25 ENCOUNTER — HOSPITAL ENCOUNTER (OUTPATIENT)
Dept: ONCOLOGY | Age: 69
Setting detail: INFUSION SERIES
Discharge: HOME | End: 2024-10-25
Payer: COMMERCIAL

## 2024-10-25 VITALS
OXYGEN SATURATION: 92 % | TEMPERATURE: 98.6 F | RESPIRATION RATE: 14 BRPM | SYSTOLIC BLOOD PRESSURE: 111 MMHG | DIASTOLIC BLOOD PRESSURE: 68 MMHG | HEART RATE: 65 BPM | WEIGHT: 195.55 LBS | BODY MASS INDEX: 36.35 KG/M2

## 2024-10-25 DIAGNOSIS — C90.00 MULTIPLE MYELOMA, REMISSION STATUS UNSPECIFIED (HCC): Primary | ICD-10-CM

## 2024-10-25 LAB
ALBUMIN SERPL-MCNC: 4 G/DL (ref 3.4–5)
ALP SERPL-CCNC: 59 U/L (ref 40–129)
ALT SERPL-CCNC: 23 U/L (ref 10–40)
ANION GAP SERPL CALCULATED.3IONS-SCNC: 8 MMOL/L (ref 3–16)
AST SERPL-CCNC: 19 U/L (ref 15–37)
BACTERIA BLD CULT ORG #2: NORMAL
BACTERIA BLD CULT: NORMAL
BACTERIA UR CULT: NORMAL
BASOPHILS # BLD: 0 K/UL (ref 0–0.2)
BASOPHILS NFR BLD: 0.3 %
BILIRUB DIRECT SERPL-MCNC: 0.2 MG/DL (ref 0–0.3)
BILIRUB INDIRECT SERPL-MCNC: 0.2 MG/DL (ref 0–1)
BILIRUB SERPL-MCNC: 0.4 MG/DL (ref 0–1)
BUN SERPL-MCNC: 10 MG/DL (ref 7–20)
CALCIUM SERPL-MCNC: 9.1 MG/DL (ref 8.3–10.6)
CHLORIDE SERPL-SCNC: 105 MMOL/L (ref 99–110)
CO2 SERPL-SCNC: 28 MMOL/L (ref 21–32)
CREAT SERPL-MCNC: 0.9 MG/DL (ref 0.6–1.2)
DEPRECATED RDW RBC AUTO: 17 % (ref 12.4–15.4)
EOSINOPHIL # BLD: 0 K/UL (ref 0–0.6)
EOSINOPHIL NFR BLD: 0.6 %
GFR SERPLBLD CREATININE-BSD FMLA CKD-EPI: 70 ML/MIN/{1.73_M2}
GLUCOSE SERPL-MCNC: 177 MG/DL (ref 70–99)
HCT VFR BLD AUTO: 36 % (ref 36–48)
HGB BLD-MCNC: 11.9 G/DL (ref 12–16)
LDH SERPL L TO P-CCNC: 146 U/L (ref 100–190)
LOEFFLER MB STN SPEC: NORMAL
LOEFFLER MB STN SPEC: NORMAL
LYMPHOCYTES # BLD: 0.1 K/UL (ref 1–5.1)
LYMPHOCYTES NFR BLD: 2.6 %
MAGNESIUM SERPL-MCNC: 2 MG/DL (ref 1.8–2.4)
MCH RBC QN AUTO: 28.7 PG (ref 26–34)
MCHC RBC AUTO-ENTMCNC: 33.1 G/DL (ref 31–36)
MCV RBC AUTO: 86.6 FL (ref 80–100)
MONOCYTES # BLD: 0.1 K/UL (ref 0–1.3)
MONOCYTES NFR BLD: 3.2 %
NEUTROPHILS # BLD: 2.4 K/UL (ref 1.7–7.7)
NEUTROPHILS NFR BLD: 93.3 %
PHOSPHATE SERPL-MCNC: 3.1 MG/DL (ref 2.5–4.9)
PLATELET # BLD AUTO: 210 K/UL (ref 135–450)
PMV BLD AUTO: 7.6 FL (ref 5–10.5)
POTASSIUM SERPL-SCNC: 3.4 MMOL/L (ref 3.5–5.1)
PROT SERPL-MCNC: 6.3 G/DL (ref 6.4–8.2)
RBC # BLD AUTO: 4.15 M/UL (ref 4–5.2)
REPORT: NORMAL
RESP PATH DNA+RNA PNL NPH NAA+NON-PROBE: NORMAL
SODIUM SERPL-SCNC: 141 MMOL/L (ref 136–145)
URATE SERPL-MCNC: 2.1 MG/DL (ref 2.6–6)
WBC # BLD AUTO: 2.6 K/UL (ref 4–11)

## 2024-10-25 PROCEDURE — 96360 HYDRATION IV INFUSION INIT: CPT

## 2024-10-25 PROCEDURE — 83735 ASSAY OF MAGNESIUM: CPT

## 2024-10-25 PROCEDURE — 84550 ASSAY OF BLOOD/URIC ACID: CPT

## 2024-10-25 PROCEDURE — 96361 HYDRATE IV INFUSION ADD-ON: CPT

## 2024-10-25 PROCEDURE — 6370000000 HC RX 637 (ALT 250 FOR IP)

## 2024-10-25 PROCEDURE — 96417 CHEMO IV INFUS EACH ADDL SEQ: CPT

## 2024-10-25 PROCEDURE — 0202U NFCT DS 22 TRGT SARS-COV-2: CPT

## 2024-10-25 PROCEDURE — 80076 HEPATIC FUNCTION PANEL: CPT

## 2024-10-25 PROCEDURE — 6360000002 HC RX W HCPCS: Performed by: INTERNAL MEDICINE

## 2024-10-25 PROCEDURE — 36591 DRAW BLOOD OFF VENOUS DEVICE: CPT

## 2024-10-25 PROCEDURE — 96413 CHEMO IV INFUSION 1 HR: CPT

## 2024-10-25 PROCEDURE — 85025 COMPLETE CBC W/AUTO DIFF WBC: CPT

## 2024-10-25 PROCEDURE — 6370000000 HC RX 637 (ALT 250 FOR IP): Performed by: INTERNAL MEDICINE

## 2024-10-25 PROCEDURE — 2580000003 HC RX 258: Performed by: INTERNAL MEDICINE

## 2024-10-25 PROCEDURE — 80069 RENAL FUNCTION PANEL: CPT

## 2024-10-25 PROCEDURE — 83615 LACTATE (LD) (LDH) ENZYME: CPT

## 2024-10-25 RX ORDER — ONDANSETRON 4 MG/1
16 TABLET, FILM COATED ORAL ONCE
Status: COMPLETED | OUTPATIENT
Start: 2024-10-25 | End: 2024-10-25

## 2024-10-25 RX ORDER — POTASSIUM CHLORIDE 1500 MG/1
40 TABLET, EXTENDED RELEASE ORAL ONCE
Status: COMPLETED | OUTPATIENT
Start: 2024-10-25 | End: 2024-10-25

## 2024-10-25 RX ORDER — PROCHLORPERAZINE MALEATE 10 MG
10 TABLET ORAL EVERY 6 HOURS PRN
Status: CANCELLED | OUTPATIENT
Start: 2024-10-25

## 2024-10-25 RX ORDER — ALLOPURINOL 300 MG/1
300 TABLET ORAL ONCE
Status: COMPLETED | OUTPATIENT
Start: 2024-10-25 | End: 2024-10-25

## 2024-10-25 RX ORDER — ONDANSETRON 4 MG/1
16 TABLET, FILM COATED ORAL ONCE
Status: CANCELLED | OUTPATIENT
Start: 2024-10-25

## 2024-10-25 RX ORDER — 0.9 % SODIUM CHLORIDE 0.9 %
1000 INTRAVENOUS SOLUTION INTRAVENOUS ONCE
Status: CANCELLED | OUTPATIENT
Start: 2024-10-25

## 2024-10-25 RX ORDER — PROCHLORPERAZINE EDISYLATE 5 MG/ML
10 INJECTION INTRAMUSCULAR; INTRAVENOUS EVERY 6 HOURS PRN
Status: CANCELLED | OUTPATIENT
Start: 2024-10-25

## 2024-10-25 RX ORDER — 0.9 % SODIUM CHLORIDE 0.9 %
1000 INTRAVENOUS SOLUTION INTRAVENOUS ONCE
Status: COMPLETED | OUTPATIENT
Start: 2024-10-25 | End: 2024-10-25

## 2024-10-25 RX ORDER — PROCHLORPERAZINE MALEATE 10 MG
10 TABLET ORAL EVERY 6 HOURS PRN
Status: DISCONTINUED | OUTPATIENT
Start: 2024-10-25 | End: 2024-10-26 | Stop reason: HOSPADM

## 2024-10-25 RX ORDER — PROCHLORPERAZINE EDISYLATE 5 MG/ML
10 INJECTION INTRAMUSCULAR; INTRAVENOUS EVERY 6 HOURS PRN
Status: DISCONTINUED | OUTPATIENT
Start: 2024-10-25 | End: 2024-10-26 | Stop reason: HOSPADM

## 2024-10-25 RX ORDER — ALLOPURINOL 300 MG/1
300 TABLET ORAL ONCE
Status: CANCELLED | OUTPATIENT
Start: 2024-10-25 | End: 2024-10-25

## 2024-10-25 RX ADMIN — POTASSIUM CHLORIDE 40 MEQ: 1500 TABLET, EXTENDED RELEASE ORAL at 09:52

## 2024-10-25 RX ADMIN — ALLOPURINOL 300 MG: 300 TABLET ORAL at 09:52

## 2024-10-25 RX ADMIN — FLUDARABINE PHOSPHATE 57.5 MG: 25 INJECTION, SOLUTION INTRAVENOUS at 10:20

## 2024-10-25 RX ADMIN — SODIUM CHLORIDE 1000 ML: 9 INJECTION, SOLUTION INTRAVENOUS at 08:11

## 2024-10-25 RX ADMIN — CYCLOPHOSPHAMIDE 580 MG: 1 INJECTION, POWDER, FOR SOLUTION INTRAVENOUS; ORAL at 11:07

## 2024-10-25 RX ADMIN — ONDANSETRON HYDROCHLORIDE 16 MG: 4 TABLET, FILM COATED ORAL at 09:52

## 2024-10-25 NOTE — PROGRESS NOTES
BCC Progress Note      10/25/2024    Leslee Maldonado    :  1955    MRN:  [id number]    Referring MD: Kerry Reagan, APRN - NP  [address] [place] [address]      Subjective: Infectious workup negative to date. No fevers overnight, feeling well overall.      ECOG PS:  (1) Restricted in physically strenuous activity, ambulatory and able to do work of light nature    KPS: 90% Able to carry on normal activity; minor signs or symptoms of disease    Isolation:  None     Medications    Scheduled Meds:   sodium chloride  1,000 mL IntraVENous Once    ondansetron  16 mg Oral Once    fludarabine (FLUDARA) 57.5 mg in sodium chloride 0.9 % 100 mL chemo IVPB  57.5 mg IntraVENous Once    cycloPHOSphamide (CYTOXAN) 580 mg in sodium chloride 0.9 % 100 mL chemo infusion  580 mg IntraVENous Once    allopurinol  300 mg Oral Once     Continuous Infusions:  PRN Meds:.prochlorperazine, prochlorperazine      ROS:  As noted above, otherwise remainder of 10-point ROS negative      Physical Exam:     Vital Signs:  There were no vitals taken for this visit.    Weight:    Wt Readings from Last 3 Encounters:   10/24/24 88.4 kg (194 lb 14.2 oz)   10/23/24 87.9 kg (193 lb 12.6 oz)   10/08/24 88.9 kg (196 lb)         General: Awake, alert and oriented.  HEENT: normocephalic, alopecia, PERRL, no scleral erythema or icterus, Oral mucosa moist and intact, throat clear  NECK: supple   BACK: Straight   SKIN: warm dry and intact without lesions rashes or masses  CHEST: CTA bilaterally without use of accessory muscles  CV: Normal S1 S2, RRR, no MRG  ABD: NT ND normoactive BS  EXTREMITIES: without edema, denies calf tenderness  NEURO: CN II - XII grossly intact  CATHETER: PAC: CDI      Laboratory Data:  CBC:   Recent Labs     10/23/24  0838 10/24/24  0827   WBC 2.5* 2.4*   HGB 12.3 12.2   HCT 37.4 36.4   MCV 86.9 86.3    229     BMP/Mag:  Recent Labs     10/23/24  0838 10/24/24  0827    141   K 3.9 4.0

## 2024-10-25 NOTE — ONCOLOGY
Administration: Chemotherapy drug Fludarabine and Cytoxan independently verified with Jim GALICIA RN prior to administration.  Acknowledgement of informed consent for chemotherapy administration verified.  Original order, appropriateness of regimen, drug supplied, height, weight, BSA, dose calculations, expiration dates/times, drug appearance, and two patient identifiers were verified by both RNs.  Drug checked for vesicant/irritant status and for risk of hypersensitivity.  Most recent laboratory values and allergies, were reviewed.  Positive, brisk blood return via CVC was confirmed prior to administration. Chest x-ray for correct line placement reviewed. Kalee Garcia RN and Jim GALICIA RN verified correct rate of chemotherapy and maintenance IV fluids.  Patient was educated on chemotherapy regimen prior to administration including indication for treatment related to disease & side effects of chemotherapy drug.  Patient verbalizes understanding of all instructions.    Completion of Chemotherapy: Monitoring during infusion done per policy, see Flowsheets.  Blood return verified before, during, and after infusion per policy; no signs of extravasation.  Pt tolerated chemotherapy well and without incident.  Chemotherapy infusion end time on the MAR.      Pt to  allopurinol script today and begin stool softener at home. 40 meq Potassium oral administered today in OPO.

## 2024-10-25 NOTE — H&P
[facility] History and Physical        Attending Physician: Mahad Evangelista MD    Primary Care: Raj Cedeño MD       Referring MD: Mahad Evangelista MD  [address]  [place] [address]    Name: Leslee Maldonado :  1955  MRN:  [id number]    Admission: 10/28/2024      Date: 10/28/2024    Reason for Admission: Lymphodepleting chemotherapy for Carvykti therapy on 10/28/24    History of Present Illness:   Leslee Maldonado is a 69-year-old  female patient of Dr. Evangelista with a MHx significant for IgG lambda multiple myeloma, HTN, HLD, CAD s/p CABG x4 & CARLO, T2DM, GERD.  She was originally diagnosed in 2018 and subsequently completed 4 cycles of RVD followed by high-dose melphalan (200 mg/m2) and ASCT on 19 with Revlimid 10 mg qd maintenance with ASA qd.  Her treatment was complicated 19 with an NSTEMI revealing multi-vessel disease, necessitating urgent CABG.  She was then transitioned to Velcade maintenance q2w on 19, which was stopped 3/31/20 due to progression. She was then treated with 4 cycles of daratumumab/carfilzomib/dex followed by 19 cycles roxi maintenance, which was stopped 22 due to progression.  She is now s/p 8 cycles of Elia/Pom/Dex followed by Pom maintenance.  Progression was identified on 24 and confirmed 24.  She had her T-Cell collection on 24 and is s/p Roxi/Kyprolis/Dex as bridge to Carvykti. She now has progressive disease.     She received her LDC 10/23 - 10/25/24 in outpatient infusion. She presents to [facility] for admission for Carvykti infusion.  Today, she states she is doing well overall.  She endorses bilateral foot numbness, which she states is a chronic and unchanged issue.  She denies fever, sweats, chills, vision changes/disturbance, headache, lightheadedness, dizziness, sore throat, chest pain/pressure/tightness, dyspnea on exertion, cough, abdominal pain, nausea, vomiting, diarrhea, constipation, dysuria,  History     Socioeconomic History    Marital status:      Spouse name: Not on file    Number of children: Not on file    Years of education: Not on file    Highest education level: Not on file   Occupational History    Not on file   Tobacco Use    Smoking status: Former     Current packs/day: 0.00     Types: Cigarettes     Quit date: 1998     Years since quittin.9    Smokeless tobacco: Never   Vaping Use    Vaping status: Former   Substance and Sexual Activity    Alcohol use: Not Currently     Alcohol/week: 2.0 standard drinks of alcohol     Types: 2 Glasses of wine per week     Comment: rare    Drug use: No    Sexual activity: Not Currently   Other Topics Concern    Not on file   Social History Narrative    Not on file     Social Determinants of Health     Financial Resource Strain: Low Risk  (2024)    Overall Financial Resource Strain (CARDIA)     Difficulty of Paying Living Expenses: Not hard at all   Food Insecurity: No Food Insecurity (10/28/2024)    Hunger Vital Sign     Worried About Running Out of Food in the Last Year: Never true     Ran Out of Food in the Last Year: Never true   Transportation Needs: No Transportation Needs (10/28/2024)    PRAPARE - Transportation     Lack of Transportation (Medical): No     Lack of Transportation (Non-Medical): No   Physical Activity: Unknown (2023)    Exercise Vital Sign     Days of Exercise per Week: 3 days     Minutes of Exercise per Session: Not on file   Stress: Not on File (2019)    Received from [facility]    Stress     Stress: 0   Social Connections: Not on File (2019)    Received from [facility]    Social Connections     Connectedness: 0   Intimate Partner Violence: Unknown (2024)    Received from [facility], [employer]    Interpersonal Safety     Feel physically or emotionally unsafe where currently live: Not on file     Harm by anyone: Not on file     Emotionally

## 2024-10-26 LAB
BACTERIA THROAT AEROBE CULT: NORMAL
FINAL REPORT: NORMAL
PRELIMINARY: NORMAL

## 2024-10-28 ENCOUNTER — APPOINTMENT (OUTPATIENT)
Dept: GENERAL RADIOLOGY | Age: 69
DRG: 018 | End: 2024-10-28
Attending: INTERNAL MEDICINE
Payer: COMMERCIAL

## 2024-10-28 ENCOUNTER — HOSPITAL ENCOUNTER (INPATIENT)
Age: 69
LOS: 2 days | Discharge: HOME | DRG: 018 | End: 2024-10-30
Attending: INTERNAL MEDICINE | Admitting: INTERNAL MEDICINE
Payer: COMMERCIAL

## 2024-10-28 PROBLEM — C90.00 MULTIPLE MYELOMA, REMISSION STATUS UNSPECIFIED (HCC): Status: ACTIVE | Noted: 2024-10-28

## 2024-10-28 LAB
ALBUMIN SERPL-MCNC: 3.7 G/DL (ref 3.4–5)
ALP SERPL-CCNC: 51 U/L (ref 40–129)
ALT SERPL-CCNC: 21 U/L (ref 10–40)
ANION GAP SERPL CALCULATED.3IONS-SCNC: 6 MMOL/L (ref 3–16)
ANISOCYTOSIS BLD QL SMEAR: ABNORMAL
APTT BLD: 29.6 SEC (ref 22.1–36.4)
AST SERPL-CCNC: 24 U/L (ref 15–37)
BACTERIA BLD CULT ORG #2: NORMAL
BACTERIA BLD CULT: NORMAL
BASOPHILS # BLD: 0 K/UL (ref 0–0.2)
BASOPHILS NFR BLD: 2 %
BILIRUB DIRECT SERPL-MCNC: 0.2 MG/DL (ref 0–0.3)
BILIRUB INDIRECT SERPL-MCNC: 0.2 MG/DL (ref 0–1)
BILIRUB SERPL-MCNC: 0.4 MG/DL (ref 0–1)
BUN SERPL-MCNC: 12 MG/DL (ref 7–20)
CALCIUM SERPL-MCNC: 8.8 MG/DL (ref 8.3–10.6)
CHLORIDE SERPL-SCNC: 106 MMOL/L (ref 99–110)
CO2 SERPL-SCNC: 28 MMOL/L (ref 21–32)
CREAT SERPL-MCNC: 0.8 MG/DL (ref 0.6–1.2)
CRP SERPL-MCNC: 3.6 MG/L (ref 0–5.1)
DACRYOCYTES BLD QL SMEAR: ABNORMAL
DEPRECATED RDW RBC AUTO: 17.2 % (ref 12.4–15.4)
EKG ATRIAL RATE: 72 BPM
EKG DIAGNOSIS: NORMAL
EKG P AXIS: 69 DEGREES
EKG P-R INTERVAL: 170 MS
EKG Q-T INTERVAL: 394 MS
EKG QRS DURATION: 118 MS
EKG QTC CALCULATION (BAZETT): 431 MS
EKG R AXIS: -19 DEGREES
EKG T AXIS: 49 DEGREES
EKG VENTRICULAR RATE: 72 BPM
EOSINOPHIL # BLD: 0 K/UL (ref 0–0.6)
EOSINOPHIL NFR BLD: 0.6 %
FERRITIN SERPL IA-MCNC: 111 NG/ML (ref 15–150)
GFR SERPLBLD CREATININE-BSD FMLA CKD-EPI: 81 ML/MIN/{1.73_M2}
GGT SERPL-CCNC: 19 U/L (ref 5–36)
GLUCOSE SERPL-MCNC: 130 MG/DL (ref 70–99)
HCT VFR BLD AUTO: 33.7 % (ref 36–48)
HGB BLD-MCNC: 11.3 G/DL (ref 12–16)
INR PPP: 0.95 (ref 0.85–1.15)
LDH SERPL L TO P-CCNC: 169 U/L (ref 100–190)
LYMPHOCYTES # BLD: 0 K/UL (ref 1–5.1)
LYMPHOCYTES NFR BLD: 3.2 %
MAGNESIUM SERPL-MCNC: 2.4 MG/DL (ref 1.8–2.4)
MCH RBC QN AUTO: 29.1 PG (ref 26–34)
MCHC RBC AUTO-ENTMCNC: 33.5 G/DL (ref 31–36)
MCV RBC AUTO: 86.7 FL (ref 80–100)
MONOCYTES # BLD: 0 K/UL (ref 0–1.3)
MONOCYTES NFR BLD: 2.7 %
NEUTROPHILS # BLD: 0.6 K/UL (ref 1.7–7.7)
NEUTROPHILS NFR BLD: 91.5 %
OVALOCYTES BLD QL SMEAR: ABNORMAL
PHOSPHATE SERPL-MCNC: 2.5 MG/DL (ref 2.5–4.9)
PLATELET # BLD AUTO: 146 K/UL (ref 135–450)
PMV BLD AUTO: 7.4 FL (ref 5–10.5)
POTASSIUM SERPL-SCNC: 3.4 MMOL/L (ref 3.5–5.1)
PROT SERPL-MCNC: 6 G/DL (ref 6.4–8.2)
PROTHROMBIN TIME: 12.9 SEC (ref 11.9–14.9)
RBC # BLD AUTO: 3.89 M/UL (ref 4–5.2)
SLIDE REVIEW: ABNORMAL
SODIUM SERPL-SCNC: 140 MMOL/L (ref 136–145)
URATE SERPL-MCNC: 1.6 MG/DL (ref 2.6–6)
WBC # BLD AUTO: 0.6 K/UL (ref 4–11)

## 2024-10-28 PROCEDURE — 94761 N-INVAS EAR/PLS OXIMETRY MLT: CPT

## 2024-10-28 PROCEDURE — 82977 ASSAY OF GGT: CPT

## 2024-10-28 PROCEDURE — 82728 ASSAY OF FERRITIN: CPT

## 2024-10-28 PROCEDURE — 6370000000 HC RX 637 (ALT 250 FOR IP)

## 2024-10-28 PROCEDURE — 83615 LACTATE (LD) (LDH) ENZYME: CPT

## 2024-10-28 PROCEDURE — 94150 VITAL CAPACITY TEST: CPT

## 2024-10-28 PROCEDURE — 93010 ELECTROCARDIOGRAM REPORT: CPT | Performed by: INTERNAL MEDICINE

## 2024-10-28 PROCEDURE — 83735 ASSAY OF MAGNESIUM: CPT

## 2024-10-28 PROCEDURE — 85730 THROMBOPLASTIN TIME PARTIAL: CPT

## 2024-10-28 PROCEDURE — XW033A7 INTRODUCTION OF CILTACABTAGENE AUTOLEUCEL INTO PERIPHERAL VEIN, PERCUTANEOUS APPROACH, NEW TECHNOLOGY GROUP 7: ICD-10-PCS | Performed by: INTERNAL MEDICINE

## 2024-10-28 PROCEDURE — 2060000000 HC ICU INTERMEDIATE R&B

## 2024-10-28 PROCEDURE — 0540T HC CAR-T THERAPY AUTOLOGOUS CELL ADMIN: CPT | Performed by: INTERNAL MEDICINE

## 2024-10-28 PROCEDURE — 80076 HEPATIC FUNCTION PANEL: CPT

## 2024-10-28 PROCEDURE — 2580000003 HC RX 258

## 2024-10-28 PROCEDURE — 93005 ELECTROCARDIOGRAM TRACING: CPT

## 2024-10-28 PROCEDURE — 84550 ASSAY OF BLOOD/URIC ACID: CPT

## 2024-10-28 PROCEDURE — 8910000000 HC RX FDA APPROVED CELL THERAPY: Performed by: INTERNAL MEDICINE

## 2024-10-28 PROCEDURE — 71046 X-RAY EXAM CHEST 2 VIEWS: CPT

## 2024-10-28 PROCEDURE — 85610 PROTHROMBIN TIME: CPT

## 2024-10-28 PROCEDURE — 84100 ASSAY OF PHOSPHORUS: CPT

## 2024-10-28 PROCEDURE — 86140 C-REACTIVE PROTEIN: CPT

## 2024-10-28 PROCEDURE — 85025 COMPLETE CBC W/AUTO DIFF WBC: CPT

## 2024-10-28 PROCEDURE — 6360000002 HC RX W HCPCS

## 2024-10-28 PROCEDURE — 0539T HC CART-T THERAPY RECEIPT & PREP CAR-T CELLS F/ADMIN: CPT | Performed by: INTERNAL MEDICINE

## 2024-10-28 PROCEDURE — 80048 BASIC METABOLIC PNL TOTAL CA: CPT

## 2024-10-28 RX ORDER — VALACYCLOVIR HYDROCHLORIDE 500 MG/1
500 TABLET, FILM COATED ORAL 2 TIMES DAILY
Status: DISCONTINUED | OUTPATIENT
Start: 2024-10-28 | End: 2024-10-30 | Stop reason: HOSPADM

## 2024-10-28 RX ORDER — LEVOFLOXACIN 500 MG/1
500 TABLET, FILM COATED ORAL DAILY
Status: CANCELLED | OUTPATIENT
Start: 2024-10-28

## 2024-10-28 RX ORDER — SENNOSIDES A AND B 8.6 MG/1
1 TABLET, FILM COATED ORAL DAILY PRN
Status: DISCONTINUED | OUTPATIENT
Start: 2024-10-28 | End: 2024-10-30 | Stop reason: HOSPADM

## 2024-10-28 RX ORDER — ACETAMINOPHEN 325 MG/1
650 TABLET ORAL ONCE
Status: COMPLETED | OUTPATIENT
Start: 2024-10-28 | End: 2024-10-28

## 2024-10-28 RX ORDER — SODIUM CHLORIDE 9 MG/ML
INJECTION, SOLUTION INTRAVENOUS PRN
Status: DISCONTINUED | OUTPATIENT
Start: 2024-10-28 | End: 2024-10-30 | Stop reason: HOSPADM

## 2024-10-28 RX ORDER — MAGNESIUM SULFATE IN WATER 40 MG/ML
4000 INJECTION, SOLUTION INTRAVENOUS PRN
Status: DISCONTINUED | OUTPATIENT
Start: 2024-10-28 | End: 2024-10-30 | Stop reason: HOSPADM

## 2024-10-28 RX ORDER — LEVETIRACETAM 500 MG/1
500 TABLET ORAL 2 TIMES DAILY
Status: DISCONTINUED | OUTPATIENT
Start: 2024-10-28 | End: 2024-10-30 | Stop reason: HOSPADM

## 2024-10-28 RX ORDER — ALLOPURINOL 300 MG/1
300 TABLET ORAL DAILY
Status: DISCONTINUED | OUTPATIENT
Start: 2024-10-28 | End: 2024-10-30 | Stop reason: HOSPADM

## 2024-10-28 RX ORDER — SODIUM CHLORIDE 9 MG/ML
INJECTION, SOLUTION INTRAVENOUS CONTINUOUS PRN
Status: DISCONTINUED | OUTPATIENT
Start: 2024-10-28 | End: 2024-10-30 | Stop reason: HOSPADM

## 2024-10-28 RX ORDER — LISINOPRIL 40 MG/1
40 TABLET ORAL DAILY
Status: DISCONTINUED | OUTPATIENT
Start: 2024-10-28 | End: 2024-10-30 | Stop reason: HOSPADM

## 2024-10-28 RX ORDER — POLYETHYLENE GLYCOL 3350 17 G/17G
17 POWDER, FOR SOLUTION ORAL DAILY PRN
Status: DISCONTINUED | OUTPATIENT
Start: 2024-10-28 | End: 2024-10-30 | Stop reason: HOSPADM

## 2024-10-28 RX ORDER — LEVETIRACETAM 500 MG/1
500 TABLET ORAL 2 TIMES DAILY
Status: CANCELLED | OUTPATIENT
Start: 2024-10-28

## 2024-10-28 RX ORDER — DIPHENHYDRAMINE HYDROCHLORIDE 50 MG/ML
25 INJECTION INTRAMUSCULAR; INTRAVENOUS PRN
Status: ACTIVE | OUTPATIENT
Start: 2024-10-28 | End: 2024-10-29

## 2024-10-28 RX ORDER — AMLODIPINE BESYLATE 10 MG/1
10 TABLET ORAL DAILY
Status: CANCELLED | OUTPATIENT
Start: 2024-10-28

## 2024-10-28 RX ORDER — AMLODIPINE BESYLATE 10 MG/1
10 TABLET ORAL DAILY
Status: DISCONTINUED | OUTPATIENT
Start: 2024-10-28 | End: 2024-10-30 | Stop reason: HOSPADM

## 2024-10-28 RX ORDER — HYDRALAZINE HYDROCHLORIDE 50 MG/1
25 TABLET, FILM COATED ORAL 2 TIMES DAILY
Status: CANCELLED | OUTPATIENT
Start: 2024-10-28

## 2024-10-28 RX ORDER — LISINOPRIL 40 MG/1
40 TABLET ORAL DAILY
Status: CANCELLED | OUTPATIENT
Start: 2024-10-28

## 2024-10-28 RX ORDER — POTASSIUM CHLORIDE 1500 MG/1
40 TABLET, EXTENDED RELEASE ORAL ONCE
Status: COMPLETED | OUTPATIENT
Start: 2024-10-28 | End: 2024-10-28

## 2024-10-28 RX ORDER — LEVOFLOXACIN 500 MG/1
500 TABLET, FILM COATED ORAL DAILY
Status: DISCONTINUED | OUTPATIENT
Start: 2024-10-28 | End: 2024-10-30 | Stop reason: HOSPADM

## 2024-10-28 RX ORDER — ENOXAPARIN SODIUM 100 MG/ML
40 INJECTION SUBCUTANEOUS DAILY
Status: DISCONTINUED | OUTPATIENT
Start: 2024-10-28 | End: 2024-10-30 | Stop reason: HOSPADM

## 2024-10-28 RX ORDER — POTASSIUM CHLORIDE 29.8 MG/ML
20 INJECTION INTRAVENOUS PRN
Status: DISCONTINUED | OUTPATIENT
Start: 2024-10-28 | End: 2024-10-30 | Stop reason: HOSPADM

## 2024-10-28 RX ORDER — FLUCONAZOLE 200 MG/1
200 TABLET ORAL DAILY
Status: CANCELLED | OUTPATIENT
Start: 2024-10-28

## 2024-10-28 RX ORDER — PROCHLORPERAZINE MALEATE 10 MG
10 TABLET ORAL EVERY 6 HOURS PRN
Status: DISCONTINUED | OUTPATIENT
Start: 2024-10-28 | End: 2024-10-30 | Stop reason: HOSPADM

## 2024-10-28 RX ORDER — FLUCONAZOLE 200 MG/1
200 TABLET ORAL DAILY
Status: DISCONTINUED | OUTPATIENT
Start: 2024-10-28 | End: 2024-10-30 | Stop reason: HOSPADM

## 2024-10-28 RX ORDER — EPINEPHRINE 1 MG/ML
0.3 INJECTION, SOLUTION INTRAMUSCULAR; SUBCUTANEOUS
Status: DISPENSED | OUTPATIENT
Start: 2024-10-28 | End: 2024-10-29

## 2024-10-28 RX ORDER — HYDRALAZINE HYDROCHLORIDE 50 MG/1
25 TABLET, FILM COATED ORAL 2 TIMES DAILY
Status: DISCONTINUED | OUTPATIENT
Start: 2024-10-28 | End: 2024-10-30 | Stop reason: HOSPADM

## 2024-10-28 RX ORDER — SODIUM CHLORIDE 0.9 % (FLUSH) 0.9 %
5-40 SYRINGE (ML) INJECTION EVERY 12 HOURS SCHEDULED
Status: DISCONTINUED | OUTPATIENT
Start: 2024-10-28 | End: 2024-10-30 | Stop reason: HOSPADM

## 2024-10-28 RX ORDER — 0.9 % SODIUM CHLORIDE 0.9 %
1000 INTRAVENOUS SOLUTION INTRAVENOUS ONCE
Status: COMPLETED | OUTPATIENT
Start: 2024-10-28 | End: 2024-10-28

## 2024-10-28 RX ORDER — ALLOPURINOL 300 MG/1
300 TABLET ORAL DAILY
Status: CANCELLED | OUTPATIENT
Start: 2024-10-28

## 2024-10-28 RX ORDER — DIPHENHYDRAMINE HCL 25 MG
25 TABLET ORAL ONCE
Status: COMPLETED | OUTPATIENT
Start: 2024-10-28 | End: 2024-10-28

## 2024-10-28 RX ORDER — SODIUM CHLORIDE 0.9 % (FLUSH) 0.9 %
5-40 SYRINGE (ML) INJECTION PRN
Status: DISCONTINUED | OUTPATIENT
Start: 2024-10-28 | End: 2024-10-30 | Stop reason: HOSPADM

## 2024-10-28 RX ADMIN — DIPHENHYDRAMINE HYDROCHLORIDE 25 MG: 25 TABLET ORAL at 11:00

## 2024-10-28 RX ADMIN — SODIUM CHLORIDE 1000 ML: 9 INJECTION, SOLUTION INTRAVENOUS at 09:25

## 2024-10-28 RX ADMIN — HYDRALAZINE HYDROCHLORIDE 25 MG: 50 TABLET ORAL at 19:59

## 2024-10-28 RX ADMIN — LEVETIRACETAM 500 MG: 500 TABLET, FILM COATED ORAL at 19:59

## 2024-10-28 RX ADMIN — VALACYCLOVIR HYDROCHLORIDE 500 MG: 500 TABLET, FILM COATED ORAL at 11:00

## 2024-10-28 RX ADMIN — LISINOPRIL 40 MG: 40 TABLET ORAL at 10:59

## 2024-10-28 RX ADMIN — CILTACABTAGENE AUTOLEUCEL 1 EACH: 100000000 INJECTION, SUSPENSION INTRAVENOUS at 11:52

## 2024-10-28 RX ADMIN — AMLODIPINE BESYLATE 10 MG: 10 TABLET ORAL at 10:59

## 2024-10-28 RX ADMIN — ACETAMINOPHEN 650 MG: 325 TABLET ORAL at 11:00

## 2024-10-28 RX ADMIN — SODIUM CHLORIDE, PRESERVATIVE FREE 10 ML: 5 INJECTION INTRAVENOUS at 11:04

## 2024-10-28 RX ADMIN — LEVOFLOXACIN 500 MG: 500 TABLET, FILM COATED ORAL at 11:00

## 2024-10-28 RX ADMIN — LEVETIRACETAM 500 MG: 500 TABLET, FILM COATED ORAL at 11:00

## 2024-10-28 RX ADMIN — ALLOPURINOL 300 MG: 300 TABLET ORAL at 11:00

## 2024-10-28 RX ADMIN — HYDRALAZINE HYDROCHLORIDE 25 MG: 50 TABLET ORAL at 10:59

## 2024-10-28 RX ADMIN — ENOXAPARIN SODIUM 40 MG: 100 INJECTION SUBCUTANEOUS at 10:58

## 2024-10-28 RX ADMIN — VALACYCLOVIR HYDROCHLORIDE 500 MG: 500 TABLET, FILM COATED ORAL at 19:59

## 2024-10-28 RX ADMIN — FLUCONAZOLE 200 MG: 200 TABLET ORAL at 11:00

## 2024-10-28 RX ADMIN — POTASSIUM CHLORIDE 40 MEQ: 1500 TABLET, EXTENDED RELEASE ORAL at 11:00

## 2024-10-28 ASSESSMENT — PAIN SCALES - GENERAL: PAINLEVEL_OUTOF10: 0

## 2024-10-28 NOTE — ONCOLOGY
Carvykti CART card given to patient with instructions for it's use.   S/SX of CRS, neurotoxicity, Parkinsonism & Cranial nerve palsies reiterated to patient.  Patient aware patient will be on anti-seizure medication for 8 weeks and can not drive or operate machinery x 8 weeks.   Discussed daily Outpatient Infusion Department daily visits upon discharge from [facility].   All questions answered to patient/caregiver satisfaction.

## 2024-10-28 NOTE — PROGRESS NOTES
Availability of two 700 mg doses of Tocilizumab was verified prior to patient CAR-T infusion by this pharmacist.      Crissy Valera, PharmD  [facility] Clinical Pharmacist  Phone: [phone]

## 2024-10-28 NOTE — PROCEDURES
PROCEDURE NOTE  Date: 10/28/2024   Name: Leslee Maldonado  YOB: 1955    Procedures    CAR-T Infusion Summary       Leslee Maldonado                              October 28th, 2024                          Start time:1155 Completion time 1304      Product Type: ciltacabtagene autoleucel (CARVYKTI)    DIN: [id number] BAG 1    Batch Number: [id number] Volume: 70 mL      Catheter lumen used for infusion: SINGLE LUMEN PORT               Positive Blood Return: Yes    Premeds Given: ACETAMINOPHEN, BENADRYL     Adverse Reaction(s) and treatment: Baseline vital signs obtained prior to infusion and monitoring completed throughout per [facility] protocol - see flowsheets. All IVFs turned down to KVO during CAR-T Infusion. CAR-T product verified with 2nd RN Megan Zendejas prior to infusion using 2 patient identifiers. Infused via gravity with rate controlled by Yaya Singer RN per [facility] protocols.    Emergency medications epinephrine and benadryl available as needed. Emergency medical equipment available as needed including telemetry monitoring throughout infusion, supplemental O2, suction equipment, and crash cart. RN at bedside throughout infusion.        Yaya Singer RN

## 2024-10-28 NOTE — PROGRESS NOTES
Pt arrived on unit aprox 0800. VS WNL, no complaints of pain, ortho negative and off bed alarm. Admission complete and 4eyes submitted. Port accessed at this time. Plan of care ongoing.

## 2024-10-28 NOTE — CARE COORDINATION
Met with patient on admission.  Provided written information on admission and discharge. Patient will be admitted for minimum of 48 hours providing there are not s/s of neurotox or crs. Patient will go to outpatient infusion on discharge to get Temp Traq applied. Patient is aware of the discharge plan.  Will continue to monitor for any discharge needs.

## 2024-10-28 NOTE — PROGRESS NOTES
4 Eyes Skin Assessment     NAME:  Leslee Maldonado  YOB: 1955  MEDICAL RECORD NUMBER:  [id number]    The patient is being assessed for  Admission    I agree that at least one RN has performed a thorough Head to Toe Skin Assessment on the patient. ALL assessment sites listed below have been assessed.      Areas assessed by both nurses:    Head, Face, Ears, Shoulders, Back, Chest, Arms, Elbows, Hands, Sacrum. Buttock, Coccyx, Ischium, and Legs. Feet and Heels        Does the Patient have a Wound? No noted wound(s)       Eddy Prevention initiated by RN: No  Wound Care Orders initiated by RN: No    Pressure Injury (Stage 3,4, Unstageable, DTI, NWPT, and Complex wounds) if present, place Wound referral order by RN under : No    New Ostomies, if present place, Ostomy referral order under : No     Nurse 1 eSignature: Electronically signed by Yaya Singer RN on 10/28/24 at 4:56 PM EDT    **SHARE this note so that the co-signing nurse can place an eSignature**    Nurse 2 eSignature: Electronically signed by Hayley Figueredo RN on 10/28/24 at 4:58 PM EDT

## 2024-10-29 PROBLEM — R94.5 ABNORMAL RESULTS OF LIVER FUNCTION STUDIES: Status: ACTIVE | Noted: 2024-10-29

## 2024-10-29 LAB
ANION GAP SERPL CALCULATED.3IONS-SCNC: 6 MMOL/L (ref 3–16)
BASOPHILS # BLD: 0 K/UL (ref 0–0.2)
BASOPHILS NFR BLD: 1.7 %
BUN SERPL-MCNC: 10 MG/DL (ref 7–20)
CALCIUM SERPL-MCNC: 8.2 MG/DL (ref 8.3–10.6)
CHLORIDE SERPL-SCNC: 109 MMOL/L (ref 99–110)
CO2 SERPL-SCNC: 25 MMOL/L (ref 21–32)
CREAT SERPL-MCNC: 0.7 MG/DL (ref 0.6–1.2)
CRP SERPL-MCNC: <3 MG/L (ref 0–5.1)
DEPRECATED RDW RBC AUTO: 17.2 % (ref 12.4–15.4)
EOSINOPHIL # BLD: 0 K/UL (ref 0–0.6)
EOSINOPHIL NFR BLD: 2 %
FERRITIN SERPL IA-MCNC: 105.8 NG/ML (ref 15–150)
GFR SERPLBLD CREATININE-BSD FMLA CKD-EPI: >90 ML/MIN/{1.73_M2}
GLUCOSE SERPL-MCNC: 108 MG/DL (ref 70–99)
HCT VFR BLD AUTO: 33.2 % (ref 36–48)
HGB BLD-MCNC: 10.9 G/DL (ref 12–16)
LYMPHOCYTES # BLD: 0 K/UL (ref 1–5.1)
LYMPHOCYTES NFR BLD: 2.1 %
MAGNESIUM SERPL-MCNC: 2.3 MG/DL (ref 1.8–2.4)
MCH RBC QN AUTO: 28.7 PG (ref 26–34)
MCHC RBC AUTO-ENTMCNC: 32.8 G/DL (ref 31–36)
MCV RBC AUTO: 87.3 FL (ref 80–100)
MONOCYTES # BLD: 0 K/UL (ref 0–1.3)
MONOCYTES NFR BLD: 2.1 %
NEUTROPHILS # BLD: 0.7 K/UL (ref 1.7–7.7)
NEUTROPHILS NFR BLD: 92.1 %
PHOSPHATE SERPL-MCNC: 2.5 MG/DL (ref 2.5–4.9)
PLATELET # BLD AUTO: 135 K/UL (ref 135–450)
PMV BLD AUTO: 7.3 FL (ref 5–10.5)
POTASSIUM SERPL-SCNC: 3.8 MMOL/L (ref 3.5–5.1)
RBC # BLD AUTO: 3.81 M/UL (ref 4–5.2)
SODIUM SERPL-SCNC: 140 MMOL/L (ref 136–145)
WBC # BLD AUTO: 0.8 K/UL (ref 4–11)

## 2024-10-29 PROCEDURE — 6370000000 HC RX 637 (ALT 250 FOR IP)

## 2024-10-29 PROCEDURE — 2580000003 HC RX 258

## 2024-10-29 PROCEDURE — 82728 ASSAY OF FERRITIN: CPT

## 2024-10-29 PROCEDURE — 83735 ASSAY OF MAGNESIUM: CPT

## 2024-10-29 PROCEDURE — 2060000000 HC ICU INTERMEDIATE R&B

## 2024-10-29 PROCEDURE — 85025 COMPLETE CBC W/AUTO DIFF WBC: CPT

## 2024-10-29 PROCEDURE — 86140 C-REACTIVE PROTEIN: CPT

## 2024-10-29 PROCEDURE — 80048 BASIC METABOLIC PNL TOTAL CA: CPT

## 2024-10-29 PROCEDURE — 84100 ASSAY OF PHOSPHORUS: CPT

## 2024-10-29 PROCEDURE — 6360000002 HC RX W HCPCS

## 2024-10-29 PROCEDURE — A4217 STERILE WATER/SALINE, 500 ML: HCPCS

## 2024-10-29 RX ORDER — ONDANSETRON 2 MG/ML
8 INJECTION INTRAMUSCULAR; INTRAVENOUS EVERY 8 HOURS PRN
Status: CANCELLED | OUTPATIENT
Start: 2024-10-31

## 2024-10-29 RX ORDER — HEPARIN 100 UNIT/ML
500 SYRINGE INTRAVENOUS PRN
Status: CANCELLED | OUTPATIENT
Start: 2024-10-31

## 2024-10-29 RX ORDER — OXYCODONE HYDROCHLORIDE 5 MG/1
10 TABLET ORAL EVERY 4 HOURS PRN
Status: CANCELLED | OUTPATIENT
Start: 2024-10-31

## 2024-10-29 RX ORDER — PROCHLORPERAZINE EDISYLATE 5 MG/ML
10 INJECTION INTRAMUSCULAR; INTRAVENOUS EVERY 6 HOURS PRN
Status: CANCELLED | OUTPATIENT
Start: 2024-10-31

## 2024-10-29 RX ORDER — MAGNESIUM SULFATE IN WATER 40 MG/ML
4000 INJECTION, SOLUTION INTRAVENOUS PRN
Status: CANCELLED | OUTPATIENT
Start: 2024-10-31

## 2024-10-29 RX ORDER — OXYCODONE HYDROCHLORIDE 5 MG/1
5 TABLET ORAL EVERY 4 HOURS PRN
Status: CANCELLED | OUTPATIENT
Start: 2024-10-31

## 2024-10-29 RX ORDER — 0.9 % SODIUM CHLORIDE 0.9 %
1000 INTRAVENOUS SOLUTION INTRAVENOUS ONCE
Status: CANCELLED | OUTPATIENT
Start: 2024-10-31 | End: 2024-10-31

## 2024-10-29 RX ORDER — POTASSIUM CHLORIDE 1500 MG/1
40 TABLET, EXTENDED RELEASE ORAL PRN
Status: CANCELLED | OUTPATIENT
Start: 2024-10-31

## 2024-10-29 RX ORDER — PROCHLORPERAZINE MALEATE 10 MG
10 TABLET ORAL EVERY 6 HOURS PRN
Status: CANCELLED | OUTPATIENT
Start: 2024-10-31

## 2024-10-29 RX ORDER — ONDANSETRON 4 MG/1
8 TABLET, FILM COATED ORAL EVERY 8 HOURS PRN
Status: CANCELLED | OUTPATIENT
Start: 2024-10-31

## 2024-10-29 RX ORDER — SODIUM CHLORIDE 9 MG/ML
INJECTION, SOLUTION INTRAVENOUS CONTINUOUS PRN
Status: CANCELLED | OUTPATIENT
Start: 2024-10-31

## 2024-10-29 RX ORDER — POTASSIUM CHLORIDE 29.8 MG/ML
80 INJECTION INTRAVENOUS PRN
Status: CANCELLED | OUTPATIENT
Start: 2024-10-31 | End: 2025-02-08

## 2024-10-29 RX ADMIN — HYDRALAZINE HYDROCHLORIDE 25 MG: 50 TABLET ORAL at 21:34

## 2024-10-29 RX ADMIN — SODIUM CHLORIDE 15 ML: 900 IRRIGANT IRRIGATION at 10:23

## 2024-10-29 RX ADMIN — SODIUM CHLORIDE 15 ML: 900 IRRIGANT IRRIGATION at 15:57

## 2024-10-29 RX ADMIN — LEVETIRACETAM 500 MG: 500 TABLET, FILM COATED ORAL at 21:34

## 2024-10-29 RX ADMIN — ALLOPURINOL 300 MG: 300 TABLET ORAL at 08:53

## 2024-10-29 RX ADMIN — FLUCONAZOLE 200 MG: 200 TABLET ORAL at 08:53

## 2024-10-29 RX ADMIN — HYDRALAZINE HYDROCHLORIDE 25 MG: 50 TABLET ORAL at 08:53

## 2024-10-29 RX ADMIN — VALACYCLOVIR HYDROCHLORIDE 500 MG: 500 TABLET, FILM COATED ORAL at 21:34

## 2024-10-29 RX ADMIN — AMLODIPINE BESYLATE 10 MG: 10 TABLET ORAL at 08:53

## 2024-10-29 RX ADMIN — LEVOFLOXACIN 500 MG: 500 TABLET, FILM COATED ORAL at 08:53

## 2024-10-29 RX ADMIN — ENOXAPARIN SODIUM 40 MG: 100 INJECTION SUBCUTANEOUS at 08:52

## 2024-10-29 RX ADMIN — SODIUM CHLORIDE, PRESERVATIVE FREE 10 ML: 5 INJECTION INTRAVENOUS at 08:59

## 2024-10-29 RX ADMIN — LEVETIRACETAM 500 MG: 500 TABLET, FILM COATED ORAL at 08:53

## 2024-10-29 RX ADMIN — VALACYCLOVIR HYDROCHLORIDE 500 MG: 500 TABLET, FILM COATED ORAL at 08:53

## 2024-10-29 RX ADMIN — LISINOPRIL 40 MG: 40 TABLET ORAL at 08:53

## 2024-10-29 ASSESSMENT — PAIN SCALES - GENERAL: PAINLEVEL_OUTOF10: 0

## 2024-10-29 NOTE — PROGRESS NOTES
[facility] Progress Note        Attending Physician: Mahad Evangelista MD        Primary Care: Raj Cedeño MD       Admission: 10/28/2024      Date: 10/29/2024    Name: Leslee Maldonado   :  1955    MRN:  [id number]    Interval Hx:   Mrs. Maldonado reports she is doing well overall.  She denies headaches, lightheadedness, dizziness, confusion.      ROS:  As noted above, otherwise remainder of 10-point ROS negative    Medication:  Scheduled:   sodium chloride flush  5-40 mL IntraVENous 2 times per day    Saline Mouthwash  15 mL Swish & Spit 4x Daily AC & HS    enoxaparin  40 mg SubCUTAneous Daily    valACYclovir  500 mg Oral BID    allopurinol  300 mg Oral Daily    amLODIPine  10 mg Oral Daily    fluconazole  200 mg Oral Daily    hydrALAZINE  25 mg Oral BID    levETIRAcetam  500 mg Oral BID    levoFLOXacin  500 mg Oral Daily    lisinopril  40 mg Oral Daily     Continuous Infusions:   sodium chloride      sodium chloride      sodium chloride       PRN:  diphenhydrAMINE, EPINEPHrine, sodium chloride, sodium chloride, sodium chloride, sodium chloride flush, sodium chloride, potassium chloride, magnesium sulfate, Saline Mouthwash, senna, polyethylene glycol, ALTEplase, prochlorperazine       Physical Exam:     Vital Signs:  /82   Pulse 78   Temp 98 °F (36.7 °C) (Oral)   Resp 15   Ht 1.575 m (5' 2\")   Wt 86.6 kg (191 lb)   SpO2 92%   BMI 34.93 kg/m²     Weight:    Wt Readings from Last 3 Encounters:   10/28/24 86.6 kg (191 lb)   10/25/24 88.7 kg (195 lb 8.8 oz)   10/24/24 88.4 kg (194 lb 14.2 oz)       KPS:  90% Able to carry on normal activity; minor signs or symptoms of disease    ECOG PS:  (1) Restricted in physically strenuous activity, ambulatory and able to do work of light nature    Gen: Pleasant, well-nourished appearing elderly female, in NAD.   HEENT: NCAT.  No scleral icterus or conjunctival injection.  No rhinorrhea or epistaxis.  Moist, pink mucous membranes w/o any apparent lesions.   Neck:  CCRC  Internal Medicine, PGY-3  Spencer Reynolds MD  Hematology, Bone marrow transplant and Cellular therapy  [facility]

## 2024-10-29 NOTE — CARE COORDINATION
Case Management Assessment  Initial Evaluation    Date/Time of Evaluation: 10/29/2024 10:12 AM  Assessment Completed by: MELISSA Lee   for [facility] [place] ([facility])  [employer] Mobile: [phone]    If patient is discharged prior to next notation, then this note serves as note for discharge by case management.    Patient Name: Leslee Maldonado                   YOB: 1955  Diagnosis: Multiple myeloma, remission status unspecified (HCC) [C90.00]                   Date / Time: 10/28/2024  8:02 AM    Patient Admission Status: Inpatient   Readmission Risk (Low < 19, Mod (19-27), High > 27): Readmission Risk Score: 15.1    Current PCP: Raj Cedeño MD  PCP verified by CM? Yes    Chart Reviewed: Yes      History Provided by: Patient  Patient Orientation: Alert and Oriented    Patient Cognition: Alert    Hospitalization in the last 30 days (Readmission):  No    If yes, Readmission Assessment in CM Navigator will be completed.    Advance Directives:      Code Status: Full Code   Patient's Primary Decision Maker is: Legal Next of Kin      Discharge Planning:    Patient lives with: Children, Family Members Type of Home: House  Primary Care Giver: Self  Patient Support Systems include: Children, Family Members   Current Financial resources: Medicare  Current community resources: None  Current services prior to admission: Other (Comment) ([facility])            Current DME:              Type of Home Care services:  None    ADLS  Prior functional level: Independent in ADLs/IADLs  Current functional level: Independent in ADLs/IADLs    PT AM-PAC:   /24  OT AM-PAC:   /24    Family can provide assistance at DC: Yes  Would you like Case Management to discuss the discharge plan with any other family members/significant others, and if so, who? Yes  Plans to Return to Present Housing: Yes  Other Identified Issues/Barriers to RETURNING to current housing: n/a  Potential

## 2024-10-30 ENCOUNTER — HOSPITAL ENCOUNTER (OUTPATIENT)
Dept: ONCOLOGY | Age: 69
Setting detail: INFUSION SERIES
Discharge: HOME | End: 2024-10-30

## 2024-10-30 VITALS
HEART RATE: 87 BPM | TEMPERATURE: 98.1 F | RESPIRATION RATE: 18 BRPM | BODY MASS INDEX: 35.15 KG/M2 | DIASTOLIC BLOOD PRESSURE: 63 MMHG | OXYGEN SATURATION: 98 % | WEIGHT: 191 LBS | SYSTOLIC BLOOD PRESSURE: 120 MMHG | HEIGHT: 62 IN

## 2024-10-30 DIAGNOSIS — R94.5 ABNORMAL RESULTS OF LIVER FUNCTION STUDIES: Primary | ICD-10-CM

## 2024-10-30 LAB
ALBUMIN SERPL-MCNC: 3.8 G/DL (ref 3.4–5)
ALP SERPL-CCNC: 51 U/L (ref 40–129)
ALT SERPL-CCNC: 19 U/L (ref 10–40)
ANION GAP SERPL CALCULATED.3IONS-SCNC: 8 MMOL/L (ref 3–16)
AST SERPL-CCNC: 19 U/L (ref 15–37)
BASOPHILS # BLD: 0 K/UL (ref 0–0.2)
BASOPHILS NFR BLD: 1 %
BILIRUB DIRECT SERPL-MCNC: 0.2 MG/DL (ref 0–0.3)
BILIRUB INDIRECT SERPL-MCNC: 0.2 MG/DL (ref 0–1)
BILIRUB SERPL-MCNC: 0.4 MG/DL (ref 0–1)
BUN SERPL-MCNC: 7 MG/DL (ref 7–20)
CALCIUM SERPL-MCNC: 8.3 MG/DL (ref 8.3–10.6)
CHLORIDE SERPL-SCNC: 106 MMOL/L (ref 99–110)
CO2 SERPL-SCNC: 24 MMOL/L (ref 21–32)
CREAT SERPL-MCNC: 0.7 MG/DL (ref 0.6–1.2)
CRP SERPL-MCNC: 3.7 MG/L (ref 0–5.1)
DEPRECATED RDW RBC AUTO: 16.7 % (ref 12.4–15.4)
EOSINOPHIL # BLD: 0.1 K/UL (ref 0–0.6)
EOSINOPHIL NFR BLD: 4.5 %
FERRITIN SERPL IA-MCNC: 113.3 NG/ML (ref 15–150)
GFR SERPLBLD CREATININE-BSD FMLA CKD-EPI: >90 ML/MIN/{1.73_M2}
GGT SERPL-CCNC: 19 U/L (ref 5–36)
GLUCOSE SERPL-MCNC: 108 MG/DL (ref 70–99)
HCT VFR BLD AUTO: 33.8 % (ref 36–48)
HGB BLD-MCNC: 11.2 G/DL (ref 12–16)
LDH SERPL L TO P-CCNC: 150 U/L (ref 100–190)
LYMPHOCYTES # BLD: 0 K/UL (ref 1–5.1)
LYMPHOCYTES NFR BLD: 2.2 %
MAGNESIUM SERPL-MCNC: 2.2 MG/DL (ref 1.8–2.4)
MCH RBC QN AUTO: 28.7 PG (ref 26–34)
MCHC RBC AUTO-ENTMCNC: 33.1 G/DL (ref 31–36)
MCV RBC AUTO: 86.7 FL (ref 80–100)
MONOCYTES # BLD: 0 K/UL (ref 0–1.3)
MONOCYTES NFR BLD: 3.3 %
NEUTROPHILS # BLD: 1.1 K/UL (ref 1.7–7.7)
NEUTROPHILS NFR BLD: 89 %
PHOSPHATE SERPL-MCNC: 3 MG/DL (ref 2.5–4.9)
PLATELET # BLD AUTO: 132 K/UL (ref 135–450)
PMV BLD AUTO: 7.6 FL (ref 5–10.5)
POTASSIUM SERPL-SCNC: 3.4 MMOL/L (ref 3.5–5.1)
PROT SERPL-MCNC: 5.6 G/DL (ref 6.4–8.2)
RBC # BLD AUTO: 3.9 M/UL (ref 4–5.2)
SODIUM SERPL-SCNC: 138 MMOL/L (ref 136–145)
URATE SERPL-MCNC: 1.2 MG/DL (ref 2.6–6)
WBC # BLD AUTO: 1.2 K/UL (ref 4–11)

## 2024-10-30 PROCEDURE — 82728 ASSAY OF FERRITIN: CPT

## 2024-10-30 PROCEDURE — 82977 ASSAY OF GGT: CPT

## 2024-10-30 PROCEDURE — 6370000000 HC RX 637 (ALT 250 FOR IP)

## 2024-10-30 PROCEDURE — 6370000000 HC RX 637 (ALT 250 FOR IP): Performed by: NURSE PRACTITIONER

## 2024-10-30 PROCEDURE — 83615 LACTATE (LD) (LDH) ENZYME: CPT

## 2024-10-30 PROCEDURE — 80076 HEPATIC FUNCTION PANEL: CPT

## 2024-10-30 PROCEDURE — 83735 ASSAY OF MAGNESIUM: CPT

## 2024-10-30 PROCEDURE — 80048 BASIC METABOLIC PNL TOTAL CA: CPT

## 2024-10-30 PROCEDURE — 85025 COMPLETE CBC W/AUTO DIFF WBC: CPT

## 2024-10-30 PROCEDURE — 2580000003 HC RX 258

## 2024-10-30 PROCEDURE — 84550 ASSAY OF BLOOD/URIC ACID: CPT

## 2024-10-30 PROCEDURE — 84100 ASSAY OF PHOSPHORUS: CPT

## 2024-10-30 PROCEDURE — 86140 C-REACTIVE PROTEIN: CPT

## 2024-10-30 PROCEDURE — 6360000002 HC RX W HCPCS

## 2024-10-30 RX ORDER — ALLOPURINOL 300 MG/1
300 TABLET ORAL DAILY
Qty: 5 TABLET | Refills: 0 | Status: SHIPPED | OUTPATIENT
Start: 2024-10-30 | End: 2024-11-02 | Stop reason: ALTCHOICE

## 2024-10-30 RX ORDER — LEVETIRACETAM 500 MG/1
500 TABLET ORAL 2 TIMES DAILY
Qty: 60 TABLET | Refills: 1 | Status: SHIPPED | OUTPATIENT
Start: 2024-10-30

## 2024-10-30 RX ORDER — POTASSIUM CHLORIDE 1500 MG/1
40 TABLET, EXTENDED RELEASE ORAL ONCE
Status: COMPLETED | OUTPATIENT
Start: 2024-10-30 | End: 2024-10-30

## 2024-10-30 RX ADMIN — SODIUM CHLORIDE, PRESERVATIVE FREE 10 ML: 5 INJECTION INTRAVENOUS at 09:28

## 2024-10-30 RX ADMIN — POTASSIUM CHLORIDE 40 MEQ: 1500 TABLET, EXTENDED RELEASE ORAL at 06:57

## 2024-10-30 RX ADMIN — FLUCONAZOLE 200 MG: 200 TABLET ORAL at 09:28

## 2024-10-30 RX ADMIN — LEVETIRACETAM 500 MG: 500 TABLET, FILM COATED ORAL at 09:27

## 2024-10-30 RX ADMIN — VALACYCLOVIR HYDROCHLORIDE 500 MG: 500 TABLET, FILM COATED ORAL at 09:28

## 2024-10-30 RX ADMIN — ENOXAPARIN SODIUM 40 MG: 100 INJECTION SUBCUTANEOUS at 09:28

## 2024-10-30 RX ADMIN — ALLOPURINOL 300 MG: 300 TABLET ORAL at 09:28

## 2024-10-30 RX ADMIN — LEVOFLOXACIN 500 MG: 500 TABLET, FILM COATED ORAL at 09:28

## 2024-10-30 ASSESSMENT — PAIN SCALES - GENERAL: PAINLEVEL_OUTOF10: 0

## 2024-10-30 NOTE — CARE COORDINATION
Reviewed discharge instructions with patient and  family members. Provided patient discharge folder when admitted.  Patient will follow up in outpatient for the next 27 days.  Reviewed monitoring for s/s of CRS & Neurotoxicity.  Reviewed discharge medications including dosing, schedule, indication, and adverse reactions.  Reviewed which medications were already taken today and next dosage due for each medication.      Verified patient has medications. Reviewed the ones called to [facility].  Patient already has them.     Reviewed signs and symptoms that prompt a call to the physician and appropriate phone numbers. Purple ER card given to the patient with explanations of its use.  Reviewed follow up appointments that have been made in [facility] and Outpatient Oncology.  Low microbial diet, activity restrictions, and increased risk of infection were reviewed.     Patient is being discharged with IV access d/t need for ongoing therapy:      Type:  De-accessed                           Patient verbalized understanding of all instructions and questions were answered to her. satisfaction.  Signed discharge instructions were given to the patient and a copy placed in the paper-lite chart.  Patient discharged to home per self with family members.    Reviewed instructions with caregiver.  Patient taken to Outpatient to get TempTraq placed after discharge.      Tamy Gilliam

## 2024-10-30 NOTE — CARE COORDINATION
Case Management Assessment            Discharge Note                    Date / Time of Note: 10/30/2024 10:05 AM                  Discharge Note Completed by: MELISSA Lee   for [facility] [place] ([facility])  [employer] Mobile: [phone]    Patient Name: Leslee Maldonado   YOB: 1955  Diagnosis: Multiple myeloma, remission status unspecified (HCC) [C90.00]   Date / Time: 10/28/2024  8:02 AM    Current PCP: Raj Cedeño MD  Clinic patient: No    Hospitalization in the last 30 days: No       Advance Directives:  Code Status: Full Code  [place] DNR form completed and on chart: Not Indicated    Financial:  Payor: [employer] / Plan: [employer] / Product Type: *No Product type* /      Pharmacy:    [facility] [address] [facility] [address] [facility] [phone] - F [phone] [address] [facility] [place] [address]  Phone: [phone] Fax: [phone]      Assistance purchasing medications?: Potential Assistance Purchasing Medications: No  Assistance provided by Case Management: None at this time    Does patient want to participate in local refill/ meds to beds program?:      Meds To Beds General Rules:  1. Can ONLY be done Monday- Friday between 8:30am-5pm  2. Prescription(s) must be in pharmacy by 3pm to be filled same day  3.Copy of patient's insurance/ prescription drug card and patient face sheet must be sent along with the prescription(s)  4. Cost of Rx cannot be added to hospital bill. If financial assistance is needed, please contact unit  or ;  or  CANNOT provide pharmacy voucher for patients co-pays  5. Patients can then  the prescription on their way out of the hospital at discharge, or pharmacy can deliver to the bedside if staff is available. (payment due at time of pick-up or delivery - cash, check, or card accepted)     Able to  afford home medications/ co-pay costs: Yes    ADLS:  Current PT AM-PAC Score:   /24  Current OT AM-PAC Score:   /24    DISCHARGE Disposition: Home- No Services Needed    IMM Completed:   Not Indicated due to: provided 10/28/24 by admitting     Transportation:  Transportation PLAN for discharge: family   Mode of Transport: Private Car    Home Care:  Home Care ordered at discharge: Not Indicated    Home Oxygen and Respiratory Equipment:  Oxygen needed at discharge?: Not Indicated; 98% on room air per vitals in epic.     Additional CM Notes: Discharge order noted. Spoke with pt at bedside after rounds completed. Pt confirmed returning home and her daughter was coming to pick her her. She denied needs from .    Pt returning home with her daughter who is with her 24/7.     No HHC or DME orders entered in epic. No needs identified for discharge.    COVID Result:    Lab Results   Component Value Date/Time    COVID19 NOT DETECTED 10/10/2022 07:59 PM       The Plan for Transition of Care is related to the following treatment goals of Multiple myeloma, remission status unspecified (HCC) [C90.00]    MELISSA Lee   for [facility] ([facility])  [employer] Mobile: [phone]

## 2024-10-30 NOTE — DISCHARGE INSTRUCTIONS
Important Reminders:    Please hold your blood pressure medication if:    Your systolic blood pressure (the top #) is less than 110, or    Your diastolic blood pressure (the bottom #) is less than 60.      [facility] CARVYKTI CAR-BRENDAN Therapy Discharge Instructions    Call for Questions/Concerns:  [phone] [facility] office  The phone number listed above is available 24 hrs/7 days per week    If you experience issues reaching the On-Call physician in a timely manner? Please call the [facility] unit and speak to the Charge RN- [phone]    [facility] Clinic is open M-F 8am-4:30pm; Sat-Sun/Holidays 8am-1pm    Symptoms to Report Immediately:    Fever of 100.4 or greater or chills and shaking present or when TempTraq alerts you that you have a fever.  Signs or symptoms of CRS: racing heart, hypoxia (shortness of breath) low blood pressure, achy, joint pain, muscle stiffness  Any signs of Neurotoxicity:  Confusion, subtle changes in mental status, dizziness, lightheadedness, headache  Vomiting without relief after use of anti-nausea medication  Severe abdominal cramping or muscle/joint pain  Diarrhea: More than 3 loose, watery bowel movements in a 24 hour period  Unusual or excessive bleeding from your mouth, nose, rectum, bladder, or vagina  Sudden onset of shortness of breath or chest pain  Signs/symptoms of infection: redness, warmth, swelling-particularly to central line site    Report to Physician's office within 24 hours:    Pain not relieved by pain medication  Change in urination-odor, cloudiness, frequency, or pain with urination  Flu-like symptoms  Skin changes-rash, hives, redness or peeling of skin    Additional Instructions:    Avoid people with colds, flu-like symptoms, or any sign of infection  Drink plenty of fluids-attempt to consume 2-3 liters ( ounces) of fluids/24 hour period  Continue low microbial diet until instructed by physician to resume normal diet  Bring all of your medications with you  to your doctor's appointments  Bring your current medicine list to each hospital and office visit    10/30/2024 10:34 AM  Tamy Gilliam        If you have any questions after you get home, feel free to call the unit and ask to speak with your nurse.  In about 7-10 days you will receive a survey. We value your opinion and hope that you have received care that will enable you to choose the best scores when completing the survey.    It was our pleasure to take care of you,  [facility] Staff                                                              [facility]   Inpatient Unit           [phone]                                                     Outpatient Infusion [phone]    [facility] Physician Office [phone]  (In)Touch Network Testing or Procedural Scheduling [phone] (14-[facility])

## 2024-10-30 NOTE — DISCHARGE SUMMARY
[facility] Discharge Summary             Attending Physician: No att. providers found    Referring MD: Mahad Evangelista MD  [address]  [facility] [address]  [place] [address]    Name: Leslee Maldonado :  1955  MRN:  [id number]    Admission: 10/28/2024   Discharge: 10/30/2024  10/30/2024    Date: 10/30/2024    Diagnosis on admit:  relapsed/refractory IgG lambda multiple myeloma    Procedures:  routine chest x-ray, laboratories, EKG    Consultations:  none       Medication List        START taking these medications      Calcium Carbonate-Vitamin D 600-125 MG-UNIT Tabs            CONTINUE taking these medications      acyclovir 400 MG tablet  Commonly known as: ZOVIRAX  Notes to patient: Acyclovir (Zovirax)  Use:  to prevent or treat viral infections    Side effects:  weakness, headache, nausea, vomiting, diarrhea     allopurinol 300 MG tablet  Commonly known as: ZYLOPRIM  Take 1 tablet by mouth daily for 5 days  Notes to patient: Helps prevent the build up of uric acid in the blood following treatment     amLODIPine 10 MG tablet  Commonly known as: NORVASC  Take 1 tablet by mouth daily  Notes to patient: Amlodipine (Norvasc)  USE--  Treats high blood pressure (lowers blood pressure)  SIDE EFFECTS-- feeling tired, dizziness     fluconazole 200 MG tablet  Commonly known as: DIFLUCAN  Take 1 tablet by mouth daily  Notes to patient: Fluconazole (Diflucan)  Use:  to prevent or treat fungal infections    Side effects:  headache, rash, nausea, stomach pain, diarrhea       hydrALAZINE 25 MG tablet  Commonly known as: APRESOLINE  Take 1 tablet by mouth in the morning and at bedtime  Notes to patient: Hydralazine  (Apresoline)  USE--  Treat high blood pressure  SIDE EFFECTS--  Headache, feeling lightheaded     levETIRAcetam 500 MG tablet  Commonly known as: Keppra  Take 1 tablet by mouth 2 times daily Unknown if still taking or not  Notes to patient: Anticonvulsant-Keppra Treats seizures.  Important to take this  10/30/24  0335   AST 24 19   ALT 21 19   BILIDIR 0.2 0.2   BILITOT 0.4 0.4   ALKPHOS 51 51     Coag:   Recent Labs     10/28/24  0912   PROTIME 12.9   INR 0.95   APTT 29.6       PROBLEM LIST         IgG Lambda Multiple Myeloma  Hypertension  CAD s/p CABG x4 (6/2019), CARLO x2 (10/25/22)   Hyperlipidemia  RBBB  Type 2 DM  GERD  Hx of hypoxic respiratory failure d/t human metapneumovirus    TREATMENT            IgG Lambda Multiple Myeloma:  8/29 - 11/2/18 VRd x 4 cycles   2/7/19 ASCT s/p high-dose melphalan (200 mg/m2)   5/11/19 - 6/2019 Revlimid 10 mg qd maintenance with ASA qd    - Held in early 6/2019 d/t MI    7/11/19 - 3/31/20 Velcade maintenance    - Stopped d/t progression   5/5/20 C1D1 roxi/carfilzomib/dex x 4 cycles    8/26/20 Roxi maintenance   11/4/20 - 2/23/22 Roxi q4w x 19 cycles    - Stopped d/t progression   3/11/22 C1D1 Leia/Pom/Dex x 8 cycles   10/28/22 - 6/26/24 Pom maintenance    - Stopped d/t progression   9/18/24 Roxi/Kyprolis/Dex as a bridge to Carvykti   10/23 - 10/25/24 Lymphodepletion - fludarabine & cyclophosphamide   10/28/24 Carvykti     - ID #:  [id number]    - DIN:  [id number]    - Batch #:  [id number]    - Progressive disease at time of infusion         ASSESSMENT AND PLAN          1.  IgG Lambda Multiple Myeloma:   Initial dx:  - 7/16/18 -  M-spike 2.47, IgG 3560, Hgb 13.2, creat 0.54, Ca 9.2  - 7/23/18 -  kappa 1.46, lambda 13.7, ratio 0.107.  Beta-2-MG 2.6  - 8/1/18, PET -  negative   - 8/2/18, BMBx -  60% cellular, 60% plasma cells, FISH hyperdiploidy 5, 9, 15 and del 13q, del 17, IgH heavy chain rearranged and dup 1q. Cyto 46XX     - 4/15/20, progression -  SPEP 0.6 g/dL, IgG 1514  - 2/23/22, progression -  M-spike 1.1  - 6/26/24, progression -  SPEP 0.6 g/dL  - 7/24/24, progression confirmed -  SPEP 0.8 g/dL, IgG 1202.  SFLC kappa 21.9, lambda 9034, ratio 0.24.    - 9/5/24 -  T-cells collected  - 10/28/24 -  Carvykti infused    PLAN:    Day +2     Lymphodepletion:  fludarabine and

## 2024-10-31 ENCOUNTER — HOSPITAL ENCOUNTER (OUTPATIENT)
Dept: ONCOLOGY | Age: 69
Setting detail: INFUSION SERIES
Discharge: HOME | End: 2024-10-31
Payer: MEDICARE

## 2024-10-31 VITALS
BODY MASS INDEX: 35.48 KG/M2 | SYSTOLIC BLOOD PRESSURE: 116 MMHG | DIASTOLIC BLOOD PRESSURE: 73 MMHG | OXYGEN SATURATION: 95 % | TEMPERATURE: 98.2 F | RESPIRATION RATE: 16 BRPM | WEIGHT: 194 LBS | HEART RATE: 80 BPM

## 2024-10-31 DIAGNOSIS — R94.5 ABNORMAL RESULTS OF LIVER FUNCTION STUDIES: ICD-10-CM

## 2024-10-31 DIAGNOSIS — C90.00 MULTIPLE MYELOMA, REMISSION STATUS UNSPECIFIED (HCC): Primary | ICD-10-CM

## 2024-10-31 LAB
ANION GAP SERPL CALCULATED.3IONS-SCNC: 9 MMOL/L (ref 3–16)
BASOPHILS # BLD: 0 K/UL (ref 0–0.2)
BASOPHILS NFR BLD: 0.2 %
BUN SERPL-MCNC: 10 MG/DL (ref 7–20)
CALCIUM SERPL-MCNC: 8.8 MG/DL (ref 8.3–10.6)
CHLORIDE SERPL-SCNC: 107 MMOL/L (ref 99–110)
CO2 SERPL-SCNC: 24 MMOL/L (ref 21–32)
CREAT SERPL-MCNC: 0.7 MG/DL (ref 0.6–1.2)
CRP SERPL-MCNC: 4 MG/L (ref 0–5.1)
DEPRECATED RDW RBC AUTO: 16.4 % (ref 12.4–15.4)
EOSINOPHIL # BLD: 0 K/UL (ref 0–0.6)
EOSINOPHIL NFR BLD: 3.8 %
FERRITIN SERPL IA-MCNC: 107.6 NG/ML (ref 15–150)
GFR SERPLBLD CREATININE-BSD FMLA CKD-EPI: >90 ML/MIN/{1.73_M2}
GLUCOSE SERPL-MCNC: 105 MG/DL (ref 70–99)
HCT VFR BLD AUTO: 36.3 % (ref 36–48)
HGB BLD-MCNC: 11.9 G/DL (ref 12–16)
INR PPP: 0.96 (ref 0.85–1.15)
LYMPHOCYTES # BLD: 0 K/UL (ref 1–5.1)
LYMPHOCYTES NFR BLD: 3.6 %
MCH RBC QN AUTO: 28.7 PG (ref 26–34)
MCHC RBC AUTO-ENTMCNC: 32.9 G/DL (ref 31–36)
MCV RBC AUTO: 87.4 FL (ref 80–100)
MONOCYTES # BLD: 0.1 K/UL (ref 0–1.3)
MONOCYTES NFR BLD: 4.6 %
NEUTROPHILS # BLD: 1.1 K/UL (ref 1.7–7.7)
NEUTROPHILS NFR BLD: 87.8 %
PHOSPHATE SERPL-MCNC: 2.5 MG/DL (ref 2.5–4.9)
PLATELET # BLD AUTO: 129 K/UL (ref 135–450)
PMV BLD AUTO: 7.3 FL (ref 5–10.5)
POTASSIUM SERPL-SCNC: 3.5 MMOL/L (ref 3.5–5.1)
PROTHROMBIN TIME: 13 SEC (ref 11.9–14.9)
RBC # BLD AUTO: 4.16 M/UL (ref 4–5.2)
SODIUM SERPL-SCNC: 140 MMOL/L (ref 136–145)
WBC # BLD AUTO: 1.3 K/UL (ref 4–11)

## 2024-10-31 PROCEDURE — 2580000003 HC RX 258: Performed by: NURSE PRACTITIONER

## 2024-10-31 PROCEDURE — 85025 COMPLETE CBC W/AUTO DIFF WBC: CPT

## 2024-10-31 PROCEDURE — 36415 COLL VENOUS BLD VENIPUNCTURE: CPT

## 2024-10-31 PROCEDURE — 84100 ASSAY OF PHOSPHORUS: CPT

## 2024-10-31 PROCEDURE — 80048 BASIC METABOLIC PNL TOTAL CA: CPT

## 2024-10-31 PROCEDURE — 96374 THER/PROPH/DIAG INJ IV PUSH: CPT

## 2024-10-31 PROCEDURE — 6360000002 HC RX W HCPCS: Performed by: NURSE PRACTITIONER

## 2024-10-31 PROCEDURE — 36591 DRAW BLOOD OFF VENOUS DEVICE: CPT

## 2024-10-31 PROCEDURE — 36593 DECLOT VASCULAR DEVICE: CPT

## 2024-10-31 PROCEDURE — 86140 C-REACTIVE PROTEIN: CPT

## 2024-10-31 PROCEDURE — 82728 ASSAY OF FERRITIN: CPT

## 2024-10-31 PROCEDURE — 85610 PROTHROMBIN TIME: CPT

## 2024-10-31 RX ORDER — POTASSIUM CHLORIDE 29.8 MG/ML
80 INJECTION INTRAVENOUS PRN
Status: CANCELLED | OUTPATIENT
Start: 2024-11-01 | End: 2025-02-09

## 2024-10-31 RX ORDER — OXYCODONE HYDROCHLORIDE 5 MG/1
5 TABLET ORAL EVERY 4 HOURS PRN
Status: CANCELLED | OUTPATIENT
Start: 2024-11-01

## 2024-10-31 RX ORDER — POTASSIUM CHLORIDE 1500 MG/1
20 TABLET, EXTENDED RELEASE ORAL DAILY
Qty: 30 TABLET | Refills: 3 | Status: ON HOLD | OUTPATIENT
Start: 2024-10-31

## 2024-10-31 RX ORDER — PROCHLORPERAZINE EDISYLATE 5 MG/ML
10 INJECTION INTRAMUSCULAR; INTRAVENOUS EVERY 6 HOURS PRN
Status: CANCELLED | OUTPATIENT
Start: 2024-11-01

## 2024-10-31 RX ORDER — POTASSIUM CHLORIDE 1500 MG/1
40 TABLET, EXTENDED RELEASE ORAL PRN
Status: DISCONTINUED | OUTPATIENT
Start: 2024-10-31 | End: 2024-11-01 | Stop reason: HOSPADM

## 2024-10-31 RX ORDER — MAGNESIUM SULFATE IN WATER 40 MG/ML
4000 INJECTION, SOLUTION INTRAVENOUS PRN
Status: DISCONTINUED | OUTPATIENT
Start: 2024-10-31 | End: 2024-11-01 | Stop reason: HOSPADM

## 2024-10-31 RX ORDER — SODIUM CHLORIDE 9 MG/ML
INJECTION, SOLUTION INTRAVENOUS CONTINUOUS PRN
Status: CANCELLED | OUTPATIENT
Start: 2024-11-01

## 2024-10-31 RX ORDER — ONDANSETRON 4 MG/1
8 TABLET, FILM COATED ORAL EVERY 8 HOURS PRN
Status: CANCELLED | OUTPATIENT
Start: 2024-11-01

## 2024-10-31 RX ORDER — SODIUM CHLORIDE 9 MG/ML
INJECTION, SOLUTION INTRAVENOUS CONTINUOUS PRN
Status: DISCONTINUED | OUTPATIENT
Start: 2024-10-31 | End: 2024-11-01 | Stop reason: HOSPADM

## 2024-10-31 RX ORDER — MAGNESIUM SULFATE IN WATER 40 MG/ML
4000 INJECTION, SOLUTION INTRAVENOUS PRN
Status: CANCELLED | OUTPATIENT
Start: 2024-11-01

## 2024-10-31 RX ORDER — PROCHLORPERAZINE MALEATE 10 MG
10 TABLET ORAL EVERY 6 HOURS PRN
Status: CANCELLED | OUTPATIENT
Start: 2024-11-01

## 2024-10-31 RX ORDER — POTASSIUM CHLORIDE 1500 MG/1
40 TABLET, EXTENDED RELEASE ORAL PRN
Status: CANCELLED | OUTPATIENT
Start: 2024-11-01

## 2024-10-31 RX ORDER — HEPARIN 100 UNIT/ML
500 SYRINGE INTRAVENOUS PRN
Status: CANCELLED | OUTPATIENT
Start: 2024-11-01

## 2024-10-31 RX ORDER — ONDANSETRON 2 MG/ML
8 INJECTION INTRAMUSCULAR; INTRAVENOUS EVERY 8 HOURS PRN
Status: CANCELLED | OUTPATIENT
Start: 2024-11-01

## 2024-10-31 RX ORDER — POTASSIUM CHLORIDE 29.8 MG/ML
20 INJECTION INTRAVENOUS PRN
Status: DISCONTINUED | OUTPATIENT
Start: 2024-10-31 | End: 2024-11-01 | Stop reason: HOSPADM

## 2024-10-31 RX ORDER — OXYCODONE HYDROCHLORIDE 5 MG/1
10 TABLET ORAL EVERY 4 HOURS PRN
Status: CANCELLED | OUTPATIENT
Start: 2024-11-01

## 2024-10-31 RX ADMIN — WATER 2 MG: 1 INJECTION INTRAMUSCULAR; INTRAVENOUS; SUBCUTANEOUS at 08:42

## 2024-10-31 NOTE — PROGRESS NOTES
Short Stay Communication Note  Leslee Maldonado  Diagnosis:  Primary MD:  Treatment: Melphalan Fludarabine/Cytoxan  CAR-T Administration Date:   Day +3 of  CARVYKTI    Pt seen in outpatient infusion today. Labs drawn and reviewed.   CBC:   Recent Labs     10/29/24  0340 10/30/24  0335 10/31/24  0939   WBC 0.8* 1.2* 1.3*   HGB 10.9* 11.2* 11.9*   HCT 33.2* 33.8* 36.3   MCV 87.3 86.7 87.4    132* 129*     BMP/Mag:  Recent Labs     10/29/24  0340 10/30/24  0335 10/31/24  0939    138 140   K 3.8 3.4* 3.5    106 107   CO2 25 24 24   PHOS 2.5 3.0 2.5   BUN 10 7 10   CREATININE 0.7 0.7 0.7   MG 2.30 2.20  --      Standing parameters for replacement for this patient:   1 unit of pack red blood cells for a hemoglobin < or equal to 7  1 pack of platelets for a platelet count less than or equal to 10  40 MeQ of Potassium administered for a potassium level less than or equal to 3.4  4g of Magnesium Sulfate for a magnesum level less than or equal to 1.4  No transfusions required for the above lab values.    Urinalysis last done: 10/24/24   Urinalysis next due: 11/04/24    Chest X-Ray last done: 10/28/24   Chest X-Ray next due: 11/04/24    Symptoms addressed and reported to care team this date: Pt reported fatigue.     Treatments this date:   10/31/24 Right port cath-yassine instilled and aspirated, brisk blood return noted.  10/31/24- Patient started on 20mEq of oral potassium daily    Reviewed medication schedule with pt and caregiver. Both able to verbalize all medications and schedule. Pt to be seen again tomorrow. Patient and caregiver verbalized understanding of discharge instructions including when and how to call the doctor and when to report  to the ER. TempTraq successfully [placed 10/31/24. Temperatures flowing into cruz accurately at this time. Discharged ambulatory to home.   
rearranged and dup 1q. Cyto 46XX      - 4/15/20, progression -  SPEP 0.6 g/dL, IgG 1514  - 2/23/22, progression -  M-spike 1.1  - 6/26/24, progression -  SPEP 0.6 g/dL  - 7/24/24, progression confirmed -  SPEP 0.8 g/dL, IgG 1202.  SFLC kappa 21.9, lambda 9034, ratio 0.24.   - 9/5/24 -  T-cells collected  - S/p Roxi/Kyprolis/Dex as bridge to Carvykti C1D1: 9/18/24   - 10/28/24 -  CAR-T Carvykti infused    PLAN:  Carvykti CAR-T  Day + 3      2. CRS / Neuro: none  CRS Grade: none    ICANS Grade: none  CAR-T Score:  10/10  - Neuro checks w/ CARTOX 10-point assessment Q4hrs  - Cont Keppra 500 mg BID (10/28/24)  NEURO:  Numbness/tingling bilateral plantar feet, chronic: stable   - She denies taking any medication    3.  ID: Afebrile  - Resp viral panel (4/19/24): human metapneumovirus  - Continue Valtrex, Diflucan, and Levaquin ppx      Hypogammaglobulinemia  - IgG 4/21/24: 493  - IgG 5/1/24: 900 (likely represents disease)      Post-CAR T monitoring/maintenance  - IgG & CD4 level monthly starting on day +30 (11/28/24)  - Check disease titers on day +30 or when WBC recovered     - Re-vaccinate vs booster based on titer      - Assure titers are drawn prior to administering immunoglobulins  - Start PCP ppx on day +30 (11/28/24)     - Stop when CD4 > 250  - Continue Valtrex for 1 year post-CAR T      4. 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 Metabolic: Hypo K+, Mild Hyperglycemia, and renal fxn stable.    Risk for TLS:  No evidence  - Cont allopurinol daily until 11/3/24  - Daily TLS labs   - Replace K+ & Mg per PRN orders  - Start 20 mEq K+ PO daily (10/31/24)      6. GI / Nutrition: Nausea, GERD, Eating and drinking well  Nausea:  - Cont prn antiemetics  GERD:  - Tums PRN  - Instructed to avoid spicy foods       7. Endo: Type 2 DM  Type 2 DM  - No medications at this time, controlled with diet  - Follow up with PCP     - Xanderb

## 2024-11-01 ENCOUNTER — HOSPITAL ENCOUNTER (OUTPATIENT)
Dept: ONCOLOGY | Age: 69
Setting detail: INFUSION SERIES
Discharge: HOME | End: 2024-11-01
Payer: COMMERCIAL

## 2024-11-01 VITALS
HEART RATE: 83 BPM | TEMPERATURE: 98.6 F | SYSTOLIC BLOOD PRESSURE: 103 MMHG | DIASTOLIC BLOOD PRESSURE: 68 MMHG | WEIGHT: 193.34 LBS | OXYGEN SATURATION: 97 % | BODY MASS INDEX: 35.36 KG/M2 | RESPIRATION RATE: 16 BRPM

## 2024-11-01 DIAGNOSIS — R94.5 ABNORMAL RESULTS OF LIVER FUNCTION STUDIES: ICD-10-CM

## 2024-11-01 DIAGNOSIS — C90.00 MULTIPLE MYELOMA, REMISSION STATUS UNSPECIFIED (HCC): Primary | ICD-10-CM

## 2024-11-01 LAB
ALBUMIN SERPL-MCNC: 3.8 G/DL (ref 3.4–5)
ALP SERPL-CCNC: 52 U/L (ref 40–129)
ALT SERPL-CCNC: 35 U/L (ref 10–40)
ANION GAP SERPL CALCULATED.3IONS-SCNC: 11 MMOL/L (ref 3–16)
ANISOCYTOSIS BLD QL SMEAR: ABNORMAL
AST SERPL-CCNC: 29 U/L (ref 15–37)
BASOPHILS # BLD: 0 K/UL (ref 0–0.2)
BASOPHILS NFR BLD: 0.2 %
BILIRUB DIRECT SERPL-MCNC: 0.2 MG/DL (ref 0–0.3)
BILIRUB INDIRECT SERPL-MCNC: 0.2 MG/DL (ref 0–1)
BILIRUB SERPL-MCNC: 0.4 MG/DL (ref 0–1)
BUN SERPL-MCNC: 8 MG/DL (ref 7–20)
CALCIUM SERPL-MCNC: 8.8 MG/DL (ref 8.3–10.6)
CHLORIDE SERPL-SCNC: 108 MMOL/L (ref 99–110)
CO2 SERPL-SCNC: 24 MMOL/L (ref 21–32)
CREAT SERPL-MCNC: 0.8 MG/DL (ref 0.6–1.2)
CRP SERPL-MCNC: 3.5 MG/L (ref 0–5.1)
DACRYOCYTES BLD QL SMEAR: ABNORMAL
DEPRECATED RDW RBC AUTO: 16.8 % (ref 12.4–15.4)
EOSINOPHIL # BLD: 0 K/UL (ref 0–0.6)
EOSINOPHIL NFR BLD: 5.5 %
FERRITIN SERPL IA-MCNC: 83.6 NG/ML (ref 15–150)
GFR SERPLBLD CREATININE-BSD FMLA CKD-EPI: 81 ML/MIN/{1.73_M2}
GLUCOSE SERPL-MCNC: 152 MG/DL (ref 70–99)
HCT VFR BLD AUTO: 34.8 % (ref 36–48)
HGB BLD-MCNC: 11.6 G/DL (ref 12–16)
LDH SERPL L TO P-CCNC: 172 U/L (ref 100–190)
LYMPHOCYTES # BLD: 0 K/UL (ref 1–5.1)
LYMPHOCYTES NFR BLD: 6.6 %
MAGNESIUM SERPL-MCNC: 2.1 MG/DL (ref 1.8–2.4)
MCH RBC QN AUTO: 29.1 PG (ref 26–34)
MCHC RBC AUTO-ENTMCNC: 33.5 G/DL (ref 31–36)
MCV RBC AUTO: 87.1 FL (ref 80–100)
MICROCYTES BLD QL SMEAR: ABNORMAL
MONOCYTES # BLD: 0.1 K/UL (ref 0–1.3)
MONOCYTES NFR BLD: 10.5 %
NEUTROPHILS # BLD: 0.6 K/UL (ref 1.7–7.7)
NEUTROPHILS NFR BLD: 77.2 %
OVALOCYTES BLD QL SMEAR: ABNORMAL
PHOSPHATE SERPL-MCNC: 2.8 MG/DL (ref 2.5–4.9)
PLATELET # BLD AUTO: 132 K/UL (ref 135–450)
PMV BLD AUTO: 7.3 FL (ref 5–10.5)
POTASSIUM SERPL-SCNC: 3.5 MMOL/L (ref 3.5–5.1)
PROT SERPL-MCNC: 6.3 G/DL (ref 6.4–8.2)
RBC # BLD AUTO: 3.99 M/UL (ref 4–5.2)
SLIDE REVIEW: ABNORMAL
SODIUM SERPL-SCNC: 143 MMOL/L (ref 136–145)
URATE SERPL-MCNC: 1.6 MG/DL (ref 2.6–6)
WBC # BLD AUTO: 0.7 K/UL (ref 4–11)

## 2024-11-01 PROCEDURE — 82728 ASSAY OF FERRITIN: CPT

## 2024-11-01 PROCEDURE — 83615 LACTATE (LD) (LDH) ENZYME: CPT

## 2024-11-01 PROCEDURE — 80048 BASIC METABOLIC PNL TOTAL CA: CPT

## 2024-11-01 PROCEDURE — 84550 ASSAY OF BLOOD/URIC ACID: CPT

## 2024-11-01 PROCEDURE — 83735 ASSAY OF MAGNESIUM: CPT

## 2024-11-01 PROCEDURE — 85025 COMPLETE CBC W/AUTO DIFF WBC: CPT

## 2024-11-01 PROCEDURE — 86140 C-REACTIVE PROTEIN: CPT

## 2024-11-01 PROCEDURE — 6360000002 HC RX W HCPCS: Performed by: NURSE PRACTITIONER

## 2024-11-01 PROCEDURE — 84100 ASSAY OF PHOSPHORUS: CPT

## 2024-11-01 PROCEDURE — 36591 DRAW BLOOD OFF VENOUS DEVICE: CPT

## 2024-11-01 PROCEDURE — 80076 HEPATIC FUNCTION PANEL: CPT

## 2024-11-01 RX ORDER — OXYCODONE HYDROCHLORIDE 5 MG/1
5 TABLET ORAL EVERY 4 HOURS PRN
Status: DISCONTINUED | OUTPATIENT
Start: 2024-11-01 | End: 2024-11-02 | Stop reason: HOSPADM

## 2024-11-01 RX ORDER — PROCHLORPERAZINE EDISYLATE 5 MG/ML
10 INJECTION INTRAMUSCULAR; INTRAVENOUS EVERY 6 HOURS PRN
Status: DISCONTINUED | OUTPATIENT
Start: 2024-11-01 | End: 2024-11-02 | Stop reason: HOSPADM

## 2024-11-01 RX ORDER — PROCHLORPERAZINE EDISYLATE 5 MG/ML
10 INJECTION INTRAMUSCULAR; INTRAVENOUS EVERY 6 HOURS PRN
Status: CANCELLED | OUTPATIENT
Start: 2024-11-02

## 2024-11-01 RX ORDER — ONDANSETRON 2 MG/ML
8 INJECTION INTRAMUSCULAR; INTRAVENOUS EVERY 8 HOURS PRN
Status: DISCONTINUED | OUTPATIENT
Start: 2024-11-01 | End: 2024-11-02 | Stop reason: HOSPADM

## 2024-11-01 RX ORDER — ONDANSETRON 4 MG/1
8 TABLET, FILM COATED ORAL EVERY 8 HOURS PRN
Status: CANCELLED | OUTPATIENT
Start: 2024-11-02

## 2024-11-01 RX ORDER — ONDANSETRON 4 MG/1
8 TABLET, FILM COATED ORAL EVERY 8 HOURS PRN
Status: DISCONTINUED | OUTPATIENT
Start: 2024-11-01 | End: 2024-11-02 | Stop reason: HOSPADM

## 2024-11-01 RX ORDER — SODIUM CHLORIDE 9 MG/ML
INJECTION, SOLUTION INTRAVENOUS CONTINUOUS PRN
Status: CANCELLED | OUTPATIENT
Start: 2024-11-02

## 2024-11-01 RX ORDER — MAGNESIUM SULFATE IN WATER 40 MG/ML
4000 INJECTION, SOLUTION INTRAVENOUS PRN
Status: DISCONTINUED | OUTPATIENT
Start: 2024-11-01 | End: 2024-11-02 | Stop reason: HOSPADM

## 2024-11-01 RX ORDER — PROCHLORPERAZINE MALEATE 10 MG
10 TABLET ORAL EVERY 6 HOURS PRN
Status: CANCELLED | OUTPATIENT
Start: 2024-11-02

## 2024-11-01 RX ORDER — POTASSIUM CHLORIDE 29.8 MG/ML
80 INJECTION INTRAVENOUS PRN
Status: DISCONTINUED | OUTPATIENT
Start: 2024-11-01 | End: 2024-11-02 | Stop reason: HOSPADM

## 2024-11-01 RX ORDER — MAGNESIUM SULFATE IN WATER 40 MG/ML
4000 INJECTION, SOLUTION INTRAVENOUS PRN
Status: CANCELLED | OUTPATIENT
Start: 2024-11-02

## 2024-11-01 RX ORDER — POTASSIUM CHLORIDE 1500 MG/1
40 TABLET, EXTENDED RELEASE ORAL PRN
Status: DISCONTINUED | OUTPATIENT
Start: 2024-11-01 | End: 2024-11-02 | Stop reason: HOSPADM

## 2024-11-01 RX ORDER — PROCHLORPERAZINE MALEATE 10 MG
10 TABLET ORAL EVERY 6 HOURS PRN
Status: DISCONTINUED | OUTPATIENT
Start: 2024-11-01 | End: 2024-11-02 | Stop reason: HOSPADM

## 2024-11-01 RX ORDER — HEPARIN 100 UNIT/ML
500 SYRINGE INTRAVENOUS PRN
Status: CANCELLED | OUTPATIENT
Start: 2024-11-02

## 2024-11-01 RX ORDER — HEPARIN 100 UNIT/ML
500 SYRINGE INTRAVENOUS PRN
Status: DISCONTINUED | OUTPATIENT
Start: 2024-11-01 | End: 2024-11-02 | Stop reason: HOSPADM

## 2024-11-01 RX ORDER — OXYCODONE HYDROCHLORIDE 5 MG/1
5 TABLET ORAL EVERY 4 HOURS PRN
Status: CANCELLED | OUTPATIENT
Start: 2024-11-02

## 2024-11-01 RX ORDER — POTASSIUM CHLORIDE 1500 MG/1
40 TABLET, EXTENDED RELEASE ORAL PRN
Status: CANCELLED | OUTPATIENT
Start: 2024-11-02

## 2024-11-01 RX ORDER — POTASSIUM CHLORIDE 29.8 MG/ML
80 INJECTION INTRAVENOUS PRN
Status: CANCELLED | OUTPATIENT
Start: 2024-11-02 | End: 2025-02-10

## 2024-11-01 RX ORDER — OXYCODONE HYDROCHLORIDE 5 MG/1
10 TABLET ORAL EVERY 4 HOURS PRN
Status: DISCONTINUED | OUTPATIENT
Start: 2024-11-01 | End: 2024-11-02 | Stop reason: HOSPADM

## 2024-11-01 RX ORDER — OXYCODONE HYDROCHLORIDE 5 MG/1
10 TABLET ORAL EVERY 4 HOURS PRN
Status: CANCELLED | OUTPATIENT
Start: 2024-11-02

## 2024-11-01 RX ORDER — ONDANSETRON 2 MG/ML
8 INJECTION INTRAMUSCULAR; INTRAVENOUS EVERY 8 HOURS PRN
Status: CANCELLED | OUTPATIENT
Start: 2024-11-02

## 2024-11-01 RX ADMIN — HEPARIN 500 UNITS: 100 SYRINGE at 10:41

## 2024-11-01 NOTE — PROGRESS NOTES
Short Stay Communication Note  Leslee Maldonado  Diagnosis: Multiple Myeloma  Primary MD: Dr. Cazares  Treatment:  Fludarabine/Cytoxan  CAR-T Administration Date: 10/28/2024  Day +4 of  CARVYKTI    Pt seen in outpatient infusion today. Labs drawn and reviewed.   CBC:   Recent Labs     10/30/24  0335 10/31/24  0939 11/01/24  0808   WBC 1.2* 1.3* 0.7*   HGB 11.2* 11.9* 11.6*   HCT 33.8* 36.3 34.8*   MCV 86.7 87.4 87.1   * 129* 132*     BMP/Mag:  Recent Labs     10/30/24  0335 10/31/24  0939 11/01/24  0808    140 143   K 3.4* 3.5 3.5    107 108   CO2 24 24 24   PHOS 3.0 2.5 2.8   BUN 7 10 8   CREATININE 0.7 0.7 0.8   MG 2.20  --  2.10     Standing parameters for replacement for this patient:   1 unit of pack red blood cells for a hemoglobin < or equal to 7  1 pack of platelets for a platelet count less than or equal to 10  40 MeQ of Potassium administered for a potassium level less than or equal to 3.4  4g of Magnesium Sulfate for a magnesum level less than or equal to 1.4  No transfusions required for the above lab values.    Urinalysis last done: 10/24/24   Urinalysis next due: 11/04/24    Chest X-Ray last done: 10/28/24   Chest X-Ray next due: 11/04/24    Symptoms addressed and reported to care team this date: Pt reports mild fatigue.     Treatments this date: labs, port hep locked, pt instructed to take OTC allergy medication to help improve eye irritation. Pt instructed to  either zyrtec or Claritin.       Reviewed medication schedule with pt and caregiver. Both able to verbalize all medications and schedule. Pt to be seen again tomorrow. Patient and caregiver verbalized understanding of discharge instructions including when and how to call the doctor and when to report  to the ER. TempTraq successfully placed 10/31/24. Temperatures flowing into cruz accurately at this time. Discharged ambulatory to home.   
7/23/18 -  kappa 1.46, lambda 13.7, ratio 0.107.  Beta-2-MG 2.6  - 8/1/18, PET -  negative   - 8/2/18, BMBx -  60% cellular, 60% plasma cells, FISH hyperdiploidy 5, 9, 15 and del 13q, del 17, IgH heavy chain rearranged and dup 1q. Cyto 46XX      - 4/15/20, progression -  SPEP 0.6 g/dL, IgG 1514  - 2/23/22, progression -  M-spike 1.1  - 6/26/24, progression -  SPEP 0.6 g/dL  - 7/24/24, progression confirmed -  SPEP 0.8 g/dL, IgG 1202.  SFLC kappa 21.9, lambda 9034, ratio 0.24.   - 9/5/24 -  T-cells collected  - S/p Roxi/Kyprolis/Dex as bridge to Carvykti C1D1: 9/18/24   - 10/28/24 -  CAR-T Carvykti infused    PLAN:  Carvykti CAR-T  Day + 3      2. CRS / Neuro: none  CRS Grade: none    ICANS Grade: none  CAR-T Score:  10/10  - Neuro checks w/ CARTOX 10-point assessment Q4hrs  - Cont Keppra 500 mg BID (10/28/24)  NEURO:  Numbness/tingling bilateral plantar feet, chronic: stable   - She denies taking any medication    3.  ID: Afebrile  - Resp viral panel (4/19/24): human metapneumovirus  - Continue Valtrex, Diflucan, and Levaquin ppx      Hypogammaglobulinemia  - IgG 4/21/24: 493  - IgG 5/1/24: 900 (likely represents disease)      Post-CAR T monitoring/maintenance  - IgG & CD4 level monthly starting on day +30 (11/28/24)  - Check disease titers on day +30 or when WBC recovered     - Re-vaccinate vs booster based on titer      - Assure titers are drawn prior to administering immunoglobulins  - Start PCP ppx on day +30 (11/28/24)     - Stop when CD4 > 250  - Continue Valtrex for 1 year post-CAR T      4. 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 Metabolic: Hypo K+, Mild Hyperglycemia, and renal fxn stable.    Risk for TLS:  No evidence  - Cont allopurinol daily until 11/3/24  - Daily TLS labs   - Replace K+ & Mg per PRN orders  - Start 20 mEq K+ PO daily (10/31/24)      6. GI / Nutrition: Nausea, GERD, Eating and drinking 
DIN:  [id number]    - Batch #:  [id number]    - Progressive disease at time of infusion              ASSESSMENT AND PLAN          1.  IgG Lambda Multiple Myeloma:   Initial dx:  - 7/16/18 -  M-spike 2.47, IgG 3560, Hgb 13.2, creat 0.54, Ca 9.2  - 7/23/18 -  kappa 1.46, lambda 13.7, ratio 0.107.  Beta-2-MG 2.6  - 8/1/18, PET -  negative   - 8/2/18, BMBx -  60% cellular, 60% plasma cells, FISH hyperdiploidy 5, 9, 15 and del 13q, del 17, IgH heavy chain rearranged and dup 1q. Cyto 46XX      - 4/15/20, progression -  SPEP 0.6 g/dL, IgG 1514  - 2/23/22, progression -  M-spike 1.1  - 6/26/24, progression -  SPEP 0.6 g/dL  - 7/24/24, progression confirmed -  SPEP 0.8 g/dL, IgG 1202.  SFLC kappa 21.9, lambda 9034, ratio 0.24.   - 9/5/24 -  T-cells collected  - S/p Roxi/Kyprolis/Dex as bridge to Carvykti C1D1: 9/18/24   - 10/28/24 -  CAR-T Carvykti infused    PLAN:  Carvykti CAR-T  Day + 4      2. CRS / Neuro: none  CRS Grade: none    ICANS Grade: none  CAR-T Score:  10/10  - Neuro checks w/ CARTOX 10-point assessment Q4hrs  - Cont Keppra 500 mg BID (10/28/24)  NEURO:  Numbness/tingling bilateral plantar feet, chronic: stable   - She denies taking any medication    3.  ID: Afebrile  - Resp viral panel (4/19/24): human metapneumovirus  - Continue Valtrex, Diflucan, and Levaquin ppx      Hypogammaglobulinemia  - IgG 4/21/24: 493  - IgG 5/1/24: 900 (likely represents disease)      Post-CAR T monitoring/maintenance  - IgG & CD4 level monthly starting on day +30 (11/28/24)  - Check disease titers on day +30 or when WBC recovered     - Re-vaccinate vs booster based on titer      - Assure titers are drawn prior to administering immunoglobulins  - Start PCP ppx on day +30 (11/28/24)     - Stop when CD4 > 250  - Continue Valtrex for 1 year post-CAR T      4. Heme:   Pancytopenia 2/2 heme malignancy & recent chemo  - Transfuse for Hgb < 7 and Platelets < 10K  - No transfusion today    5. Metabolic:   Hypo K+, Mild Hyperglycemia, Hyp

## 2024-11-02 ENCOUNTER — APPOINTMENT (OUTPATIENT)
Dept: GENERAL RADIOLOGY | Age: 69
DRG: 809 | End: 2024-11-02
Payer: COMMERCIAL

## 2024-11-02 ENCOUNTER — HOSPITAL ENCOUNTER (INPATIENT)
Age: 69
LOS: 4 days | Discharge: HOME | DRG: 809 | End: 2024-11-06
Attending: EMERGENCY MEDICINE | Admitting: INTERNAL MEDICINE
Payer: COMMERCIAL

## 2024-11-02 ENCOUNTER — HOSPITAL ENCOUNTER (OUTPATIENT)
Dept: ONCOLOGY | Age: 69
Setting detail: INFUSION SERIES
Discharge: HOME | End: 2024-11-02
Payer: MEDICARE

## 2024-11-02 VITALS
HEART RATE: 100 BPM | BODY MASS INDEX: 34.96 KG/M2 | OXYGEN SATURATION: 96 % | TEMPERATURE: 98.6 F | RESPIRATION RATE: 16 BRPM | SYSTOLIC BLOOD PRESSURE: 115 MMHG | WEIGHT: 191.14 LBS | DIASTOLIC BLOOD PRESSURE: 66 MMHG

## 2024-11-02 DIAGNOSIS — R94.5 ABNORMAL RESULTS OF LIVER FUNCTION STUDIES: ICD-10-CM

## 2024-11-02 DIAGNOSIS — R50.81 NEUTROPENIC FEVER (HCC): Primary | ICD-10-CM

## 2024-11-02 DIAGNOSIS — C90.00 MULTIPLE MYELOMA, REMISSION STATUS UNSPECIFIED (HCC): Primary | ICD-10-CM

## 2024-11-02 DIAGNOSIS — D70.9 NEUTROPENIC FEVER (HCC): Primary | ICD-10-CM

## 2024-11-02 LAB
ALBUMIN SERPL-MCNC: 3.7 G/DL (ref 3.4–5)
ALP SERPL-CCNC: 51 U/L (ref 40–129)
ALT SERPL-CCNC: 29 U/L (ref 10–40)
ANION GAP SERPL CALCULATED.3IONS-SCNC: 10 MMOL/L (ref 3–16)
ANION GAP SERPL CALCULATED.3IONS-SCNC: 11 MMOL/L (ref 3–16)
AST SERPL-CCNC: 23 U/L (ref 15–37)
BILIRUB DIRECT SERPL-MCNC: 0.2 MG/DL (ref 0–0.3)
BILIRUB INDIRECT SERPL-MCNC: 0.2 MG/DL (ref 0–1)
BILIRUB SERPL-MCNC: 0.4 MG/DL (ref 0–1)
BUN SERPL-MCNC: 10 MG/DL (ref 7–20)
BUN SERPL-MCNC: 11 MG/DL (ref 7–20)
CALCIUM SERPL-MCNC: 8.7 MG/DL (ref 8.3–10.6)
CALCIUM SERPL-MCNC: 9.1 MG/DL (ref 8.3–10.6)
CHLORIDE SERPL-SCNC: 104 MMOL/L (ref 99–110)
CHLORIDE SERPL-SCNC: 107 MMOL/L (ref 99–110)
CO2 SERPL-SCNC: 21 MMOL/L (ref 21–32)
CO2 SERPL-SCNC: 23 MMOL/L (ref 21–32)
CREAT SERPL-MCNC: 0.7 MG/DL (ref 0.6–1.2)
CREAT SERPL-MCNC: 0.9 MG/DL (ref 0.6–1.2)
CRP SERPL-MCNC: 4.1 MG/L (ref 0–5.1)
DEPRECATED RDW RBC AUTO: 17 % (ref 12.4–15.4)
FERRITIN SERPL IA-MCNC: 72.5 NG/ML (ref 15–150)
FLUAV RNA RESP QL NAA+PROBE: NOT DETECTED
FLUBV RNA RESP QL NAA+PROBE: NOT DETECTED
GFR SERPLBLD CREATININE-BSD FMLA CKD-EPI: 70 ML/MIN/{1.73_M2}
GFR SERPLBLD CREATININE-BSD FMLA CKD-EPI: >90 ML/MIN/{1.73_M2}
GLUCOSE SERPL-MCNC: 115 MG/DL (ref 70–99)
GLUCOSE SERPL-MCNC: 178 MG/DL (ref 70–99)
HCT VFR BLD AUTO: 34.9 % (ref 36–48)
HGB BLD-MCNC: 11.5 G/DL (ref 12–16)
LACTATE BLDV-SCNC: 1 MMOL/L (ref 0.4–1.9)
LDH SERPL L TO P-CCNC: 244 U/L (ref 100–190)
MAGNESIUM SERPL-MCNC: 2 MG/DL (ref 1.8–2.4)
MCH RBC QN AUTO: 29 PG (ref 26–34)
MCHC RBC AUTO-ENTMCNC: 33 G/DL (ref 31–36)
MCV RBC AUTO: 88 FL (ref 80–100)
PHOSPHATE SERPL-MCNC: 3.4 MG/DL (ref 2.5–4.9)
PLATELET # BLD AUTO: 126 K/UL (ref 135–450)
PMV BLD AUTO: 7.6 FL (ref 5–10.5)
POTASSIUM SERPL-SCNC: 3.3 MMOL/L (ref 3.5–5.1)
POTASSIUM SERPL-SCNC: 4.2 MMOL/L (ref 3.5–5.1)
PROT SERPL-MCNC: 6.2 G/DL (ref 6.4–8.2)
RBC # BLD AUTO: 3.96 M/UL (ref 4–5.2)
SARS-COV-2 RNA RESP QL NAA+PROBE: NOT DETECTED
SODIUM SERPL-SCNC: 135 MMOL/L (ref 136–145)
SODIUM SERPL-SCNC: 141 MMOL/L (ref 136–145)
URATE SERPL-MCNC: 1.4 MG/DL (ref 2.6–6)
WBC # BLD AUTO: 0.5 K/UL (ref 4–11)

## 2024-11-02 PROCEDURE — 87040 BLOOD CULTURE FOR BACTERIA: CPT

## 2024-11-02 PROCEDURE — 87636 SARSCOV2 & INF A&B AMP PRB: CPT

## 2024-11-02 PROCEDURE — 87070 CULTURE OTHR SPECIMN AEROBIC: CPT

## 2024-11-02 PROCEDURE — 99285 EMERGENCY DEPT VISIT HI MDM: CPT

## 2024-11-02 PROCEDURE — 85025 COMPLETE CBC W/AUTO DIFF WBC: CPT

## 2024-11-02 PROCEDURE — 71045 X-RAY EXAM CHEST 1 VIEW: CPT

## 2024-11-02 PROCEDURE — 80048 BASIC METABOLIC PNL TOTAL CA: CPT

## 2024-11-02 PROCEDURE — 84100 ASSAY OF PHOSPHORUS: CPT

## 2024-11-02 PROCEDURE — 87103 BLOOD FUNGUS CULTURE: CPT

## 2024-11-02 PROCEDURE — 83605 ASSAY OF LACTIC ACID: CPT

## 2024-11-02 PROCEDURE — 2580000003 HC RX 258: Performed by: EMERGENCY MEDICINE

## 2024-11-02 PROCEDURE — 2060000000 HC ICU INTERMEDIATE R&B

## 2024-11-02 PROCEDURE — 86140 C-REACTIVE PROTEIN: CPT

## 2024-11-02 PROCEDURE — 87102 FUNGUS ISOLATION CULTURE: CPT

## 2024-11-02 PROCEDURE — 36591 DRAW BLOOD OFF VENOUS DEVICE: CPT

## 2024-11-02 PROCEDURE — 87205 SMEAR GRAM STAIN: CPT

## 2024-11-02 PROCEDURE — 84550 ASSAY OF BLOOD/URIC ACID: CPT

## 2024-11-02 PROCEDURE — 82728 ASSAY OF FERRITIN: CPT

## 2024-11-02 PROCEDURE — 85027 COMPLETE CBC AUTOMATED: CPT

## 2024-11-02 PROCEDURE — 6360000002 HC RX W HCPCS: Performed by: EMERGENCY MEDICINE

## 2024-11-02 PROCEDURE — 87253 VIRUS INOCULATE TISSUE ADDL: CPT

## 2024-11-02 PROCEDURE — 96374 THER/PROPH/DIAG INJ IV PUSH: CPT

## 2024-11-02 PROCEDURE — 87252 VIRUS INOCULATION TISSUE: CPT

## 2024-11-02 PROCEDURE — 80076 HEPATIC FUNCTION PANEL: CPT

## 2024-11-02 PROCEDURE — 83615 LACTATE (LD) (LDH) ENZYME: CPT

## 2024-11-02 PROCEDURE — 83735 ASSAY OF MAGNESIUM: CPT

## 2024-11-02 PROCEDURE — 6370000000 HC RX 637 (ALT 250 FOR IP): Performed by: NURSE PRACTITIONER

## 2024-11-02 PROCEDURE — 36415 COLL VENOUS BLD VENIPUNCTURE: CPT

## 2024-11-02 RX ORDER — ONDANSETRON 4 MG/1
8 TABLET, FILM COATED ORAL EVERY 8 HOURS PRN
Status: DISCONTINUED | OUTPATIENT
Start: 2024-11-02 | End: 2024-11-03 | Stop reason: HOSPADM

## 2024-11-02 RX ORDER — POTASSIUM CHLORIDE 1500 MG/1
40 TABLET, EXTENDED RELEASE ORAL PRN
OUTPATIENT
Start: 2024-11-03

## 2024-11-02 RX ORDER — PROCHLORPERAZINE MALEATE 10 MG
10 TABLET ORAL EVERY 6 HOURS PRN
OUTPATIENT
Start: 2024-11-03

## 2024-11-02 RX ORDER — PROCHLORPERAZINE EDISYLATE 5 MG/ML
10 INJECTION INTRAMUSCULAR; INTRAVENOUS EVERY 6 HOURS PRN
Status: DISCONTINUED | OUTPATIENT
Start: 2024-11-02 | End: 2024-11-03 | Stop reason: HOSPADM

## 2024-11-02 RX ORDER — POTASSIUM CHLORIDE 1500 MG/1
40 TABLET, EXTENDED RELEASE ORAL PRN
Status: DISCONTINUED | OUTPATIENT
Start: 2024-11-02 | End: 2024-11-03 | Stop reason: HOSPADM

## 2024-11-02 RX ORDER — OXYCODONE HYDROCHLORIDE 5 MG/1
10 TABLET ORAL EVERY 4 HOURS PRN
OUTPATIENT
Start: 2024-11-03

## 2024-11-02 RX ORDER — HEPARIN 100 UNIT/ML
500 SYRINGE INTRAVENOUS PRN
OUTPATIENT
Start: 2024-11-03

## 2024-11-02 RX ORDER — SODIUM CHLORIDE 9 MG/ML
INJECTION, SOLUTION INTRAVENOUS CONTINUOUS PRN
OUTPATIENT
Start: 2024-11-03

## 2024-11-02 RX ORDER — OXYCODONE HYDROCHLORIDE 5 MG/1
5 TABLET ORAL EVERY 4 HOURS PRN
Status: DISCONTINUED | OUTPATIENT
Start: 2024-11-02 | End: 2024-11-03 | Stop reason: HOSPADM

## 2024-11-02 RX ORDER — OXYCODONE HYDROCHLORIDE 5 MG/1
5 TABLET ORAL EVERY 4 HOURS PRN
OUTPATIENT
Start: 2024-11-03

## 2024-11-02 RX ORDER — OXYCODONE HYDROCHLORIDE 5 MG/1
10 TABLET ORAL EVERY 4 HOURS PRN
Status: DISCONTINUED | OUTPATIENT
Start: 2024-11-02 | End: 2024-11-03 | Stop reason: HOSPADM

## 2024-11-02 RX ORDER — POTASSIUM CHLORIDE 29.8 MG/ML
80 INJECTION INTRAVENOUS PRN
OUTPATIENT
Start: 2024-11-03 | End: 2025-02-11

## 2024-11-02 RX ORDER — SODIUM CHLORIDE 9 MG/ML
INJECTION, SOLUTION INTRAVENOUS CONTINUOUS PRN
Status: DISCONTINUED | OUTPATIENT
Start: 2024-11-02 | End: 2024-11-03 | Stop reason: HOSPADM

## 2024-11-02 RX ORDER — ONDANSETRON 2 MG/ML
8 INJECTION INTRAMUSCULAR; INTRAVENOUS EVERY 8 HOURS PRN
OUTPATIENT
Start: 2024-11-03

## 2024-11-02 RX ORDER — MAGNESIUM SULFATE IN WATER 40 MG/ML
4000 INJECTION, SOLUTION INTRAVENOUS PRN
Status: DISCONTINUED | OUTPATIENT
Start: 2024-11-02 | End: 2024-11-03 | Stop reason: HOSPADM

## 2024-11-02 RX ORDER — HEPARIN 100 UNIT/ML
500 SYRINGE INTRAVENOUS PRN
Status: DISCONTINUED | OUTPATIENT
Start: 2024-11-02 | End: 2024-11-03 | Stop reason: HOSPADM

## 2024-11-02 RX ORDER — ONDANSETRON 2 MG/ML
8 INJECTION INTRAMUSCULAR; INTRAVENOUS EVERY 8 HOURS PRN
Status: DISCONTINUED | OUTPATIENT
Start: 2024-11-02 | End: 2024-11-03 | Stop reason: HOSPADM

## 2024-11-02 RX ORDER — ONDANSETRON 4 MG/1
8 TABLET, FILM COATED ORAL EVERY 8 HOURS PRN
OUTPATIENT
Start: 2024-11-03

## 2024-11-02 RX ORDER — MAGNESIUM SULFATE IN WATER 40 MG/ML
4000 INJECTION, SOLUTION INTRAVENOUS PRN
OUTPATIENT
Start: 2024-11-03

## 2024-11-02 RX ORDER — POTASSIUM CHLORIDE 29.8 MG/ML
20 INJECTION INTRAVENOUS PRN
Status: DISCONTINUED | OUTPATIENT
Start: 2024-11-02 | End: 2024-11-03 | Stop reason: HOSPADM

## 2024-11-02 RX ORDER — PROCHLORPERAZINE MALEATE 10 MG
10 TABLET ORAL EVERY 6 HOURS PRN
Status: DISCONTINUED | OUTPATIENT
Start: 2024-11-02 | End: 2024-11-03 | Stop reason: HOSPADM

## 2024-11-02 RX ORDER — PROCHLORPERAZINE EDISYLATE 5 MG/ML
10 INJECTION INTRAMUSCULAR; INTRAVENOUS EVERY 6 HOURS PRN
OUTPATIENT
Start: 2024-11-03

## 2024-11-02 RX ADMIN — POTASSIUM CHLORIDE 40 MEQ: 1500 TABLET, EXTENDED RELEASE ORAL at 10:29

## 2024-11-02 RX ADMIN — CEFEPIME 2000 MG: 2 INJECTION, POWDER, FOR SOLUTION INTRAVENOUS at 22:14

## 2024-11-02 ASSESSMENT — PAIN - FUNCTIONAL ASSESSMENT: PAIN_FUNCTIONAL_ASSESSMENT: NONE - DENIES PAIN

## 2024-11-02 ASSESSMENT — LIFESTYLE VARIABLES
HOW OFTEN DO YOU HAVE A DRINK CONTAINING ALCOHOL: NEVER
HOW MANY STANDARD DRINKS CONTAINING ALCOHOL DO YOU HAVE ON A TYPICAL DAY: PATIENT DOES NOT DRINK

## 2024-11-02 NOTE — PROGRESS NOTES
Short Stay Communication Note  Leslee Maldonado  Diagnosis: Multiple Myeloma  Primary MD: Dr. Cazares  Treatment:  Fludarabine/Cytoxan  CAR-T Administration Date: 10/28/2024  Day +5 of  CARVYKTI    Pt seen in outpatient infusion today. Labs drawn and reviewed.   CBC:   Recent Labs     10/31/24  0939 11/01/24  0808 11/02/24  0819   WBC 1.3* 0.7* 0.5*   HGB 11.9* 11.6* 11.5*   HCT 36.3 34.8* 34.9*   MCV 87.4 87.1 88.0   * 132* 126*     BMP/Mag:  Recent Labs     10/31/24  0939 11/01/24  0808 11/02/24  0819    143 141   K 3.5 3.5 3.3*    108 107   CO2 24 24 23   PHOS 2.5 2.8 3.4   BUN 10 8 11   CREATININE 0.7 0.8 0.9   MG  --  2.10  --      Standing parameters for replacement for this patient:   1 unit of pack red blood cells for a hemoglobin < or equal to 7  1 pack of platelets for a platelet count less than or equal to 10  40 MeQ of Potassium administered for a potassium level less than or equal to 3.4  4g of Magnesium Sulfate for a magnesum level less than or equal to 1.4  40meq of oral potassium given for the above lab values    Urinalysis last done: 10/24/24   Urinalysis next due: 11/04/24    Chest X-Ray last done: 10/28/24   Chest X-Ray next due: 11/04/24    Symptoms addressed and reported to care team this date: Pt reports mild fatigue.     Treatments this date: labs, PO potassium       Reviewed medication schedule with pt and caregiver. Both able to verbalize all medications and schedule. Pt to be seen again tomorrow. Patient and caregiver verbalized understanding of discharge instructions including when and how to call the doctor and when to report  to the ER. TempTraq successfully placed 10/31/24. Temperatures flowing into cruz accurately at this time. Discharged ambulatory to home.   
  7. Endo: Type 2 DM  Type 2 DM  - No medications at this time, controlled with diet  - Follow up with PCP     - Hgb A1C 9/19/22: 6.7      8.  CVS: stable  RBBB  CAD s/p CABG & PCI  - 6/11/19:  PCI recannulization of occluded mid-left Cx (residual 70% stenosis)  - 6/13/22:  CABG - LIMA to LAD, reverse saphenous v. to OM1, reverse saphenous v. to OM2, reverse saphenous v. to PDA  - 10/25/22:  CARLO x 2 (proximal & ostial aspect of saphenous v. graft to OM3)   Hyperlipidemia:  - Hold rosuvastatin 40 mg qd during CAR-T process   Hypertension:  - Continue amlodipine 10 mg qd  - Continue hydralazine 25 mg bid  - Continue lisinopril 40 mg qd     9.  PULM: No active issues  - Hx of admission in April 2024 for hypoxia 2/2 human metapneumovirus   - Encourage IS & ambulation     10. MSK: Bone health, chronic pain  Bone Health:  Month  - Cont Zometa Q 3 month (last given 3/11/24, 6/3/24 and 8/13/24)  - Continue Calcium and Vit D  - Recommended not to get Invisalign secondary to the risk of osteonecrosis of the jaw  Pain: chronic back, hip, and right shoulder pain  - Cont to monitor and if pain is changing/ worsening, obtain imaging   - Tylenol PRN   Right shoulder pain  - Referred to Dr Langford and referred to PT     11. Ophthalmic:  Dry Eyes   - Feels like something is in her eye, nothing visible on exam  - Eyes appear red and dry, likely allergies   - Continue artificial tears   - Start OTC Claritin       - Disposition: Patient will return daily to OP Infusion for CAR-T assessment /monitoring for s/s of toxicity including CRS and ICANS, labs and provider visit for 30 days post CAR-T Infusion.     The patient was seen and examined by Dr.Waterhouse Jody M Moehring, APRN - CNP    David M. Waterhouse, M.D., MPH  Director, Early Phase Clinical Trials  [facility] ([facility] [place] [address]  Office [phone]  Cell [phone]

## 2024-11-03 LAB
ABO + RH BLD: NORMAL
ALBUMIN SERPL-MCNC: 3.6 G/DL (ref 3.4–5)
ALP SERPL-CCNC: 48 U/L (ref 40–129)
ALT SERPL-CCNC: 23 U/L (ref 10–40)
ANION GAP SERPL CALCULATED.3IONS-SCNC: 8 MMOL/L (ref 3–16)
AST SERPL-CCNC: 16 U/L (ref 15–37)
BASOPHILS # BLD: 0 K/UL (ref 0–0.2)
BASOPHILS # BLD: 0 K/UL (ref 0–0.2)
BASOPHILS NFR BLD: 0 %
BASOPHILS NFR BLD: 0 %
BILIRUB DIRECT SERPL-MCNC: 0.2 MG/DL (ref 0–0.3)
BILIRUB INDIRECT SERPL-MCNC: 0.2 MG/DL (ref 0–1)
BILIRUB SERPL-MCNC: 0.4 MG/DL (ref 0–1)
BILIRUB UR QL STRIP.AUTO: NEGATIVE
BLD GP AB SCN SERPL QL: NORMAL
BUN SERPL-MCNC: 9 MG/DL (ref 7–20)
CALCIUM SERPL-MCNC: 8.8 MG/DL (ref 8.3–10.6)
CHLORIDE SERPL-SCNC: 106 MMOL/L (ref 99–110)
CLARITY UR: CLEAR
CO2 SERPL-SCNC: 24 MMOL/L (ref 21–32)
COLOR UR: YELLOW
CREAT SERPL-MCNC: 0.8 MG/DL (ref 0.6–1.2)
CRP SERPL-MCNC: 6.8 MG/L (ref 0–5.1)
DACRYOCYTES BLD QL SMEAR: ABNORMAL
DEPRECATED RDW RBC AUTO: 17 % (ref 12.4–15.4)
DEPRECATED RDW RBC AUTO: 17 % (ref 12.4–15.4)
EOSINOPHIL # BLD: 0 K/UL (ref 0–0.6)
EOSINOPHIL # BLD: 0 K/UL (ref 0–0.6)
EOSINOPHIL NFR BLD: 4 %
EOSINOPHIL NFR BLD: 7 %
FERRITIN SERPL IA-MCNC: 76.7 NG/ML (ref 15–150)
FIBRINOGEN PPP-MCNC: 364 MG/DL (ref 227–534)
GFR SERPLBLD CREATININE-BSD FMLA CKD-EPI: 81 ML/MIN/{1.73_M2}
GLUCOSE SERPL-MCNC: 114 MG/DL (ref 70–99)
GLUCOSE UR STRIP.AUTO-MCNC: NEGATIVE MG/DL
HCT VFR BLD AUTO: 32.2 % (ref 36–48)
HCT VFR BLD AUTO: 35 % (ref 36–48)
HGB BLD-MCNC: 10.9 G/DL (ref 12–16)
HGB BLD-MCNC: 11.6 G/DL (ref 12–16)
HGB UR QL STRIP.AUTO: NEGATIVE
KETONES UR STRIP.AUTO-MCNC: NEGATIVE MG/DL
LACTATE BLDV-SCNC: 1.5 MMOL/L (ref 0.4–1.9)
LEUKOCYTE ESTERASE UR QL STRIP.AUTO: NEGATIVE
LYMPHOCYTES # BLD: 0.1 K/UL (ref 1–5.1)
LYMPHOCYTES # BLD: 0.1 K/UL (ref 1–5.1)
LYMPHOCYTES NFR BLD: 15 %
LYMPHOCYTES NFR BLD: 17 %
MAGNESIUM SERPL-MCNC: 2 MG/DL (ref 1.8–2.4)
MCH RBC QN AUTO: 29.1 PG (ref 26–34)
MCH RBC QN AUTO: 29.6 PG (ref 26–34)
MCHC RBC AUTO-ENTMCNC: 33.2 G/DL (ref 31–36)
MCHC RBC AUTO-ENTMCNC: 33.8 G/DL (ref 31–36)
MCV RBC AUTO: 87.5 FL (ref 80–100)
MCV RBC AUTO: 87.7 FL (ref 80–100)
MICROCYTES BLD QL SMEAR: ABNORMAL
MICROCYTES BLD QL SMEAR: ABNORMAL
MONOCYTES # BLD: 0.1 K/UL (ref 0–1.3)
MONOCYTES # BLD: 0.2 K/UL (ref 0–1.3)
MONOCYTES NFR BLD: 24 %
MONOCYTES NFR BLD: 27 %
NEUTROPHILS # BLD: 0.3 K/UL (ref 1.7–7.7)
NEUTROPHILS # BLD: 0.3 K/UL (ref 1.7–7.7)
NEUTROPHILS NFR BLD: 51 %
NEUTROPHILS NFR BLD: 55 %
NITRITE UR QL STRIP.AUTO: NEGATIVE
OVALOCYTES BLD QL SMEAR: ABNORMAL
OVALOCYTES BLD QL SMEAR: ABNORMAL
PH UR STRIP.AUTO: 6 [PH] (ref 5–8)
PHOSPHATE SERPL-MCNC: 3.5 MG/DL (ref 2.5–4.9)
PLATELET # BLD AUTO: 116 K/UL (ref 135–450)
PLATELET # BLD AUTO: 129 K/UL (ref 135–450)
PLATELET BLD QL SMEAR: ABNORMAL
PLATELET BLD QL SMEAR: ABNORMAL
PMV BLD AUTO: 7.7 FL (ref 5–10.5)
PMV BLD AUTO: 7.9 FL (ref 5–10.5)
POTASSIUM SERPL-SCNC: 3.7 MMOL/L (ref 3.5–5.1)
PROT SERPL-MCNC: 5.7 G/DL (ref 6.4–8.2)
PROT UR STRIP.AUTO-MCNC: NEGATIVE MG/DL
RBC # BLD AUTO: 3.68 M/UL (ref 4–5.2)
RBC # BLD AUTO: 3.99 M/UL (ref 4–5.2)
SLIDE REVIEW: ABNORMAL
SLIDE REVIEW: ABNORMAL
SODIUM SERPL-SCNC: 138 MMOL/L (ref 136–145)
SP GR UR STRIP.AUTO: 1.01 (ref 1–1.03)
UA DIPSTICK W REFLEX MICRO PNL UR: NORMAL
URN SPEC COLLECT METH UR: NORMAL
UROBILINOGEN UR STRIP-ACNC: 0.2 E.U./DL
WBC # BLD AUTO: 0.6 K/UL (ref 4–11)
WBC # BLD AUTO: 0.6 K/UL (ref 4–11)

## 2024-11-03 PROCEDURE — 80048 BASIC METABOLIC PNL TOTAL CA: CPT

## 2024-11-03 PROCEDURE — 86900 BLOOD TYPING SEROLOGIC ABO: CPT

## 2024-11-03 PROCEDURE — 85384 FIBRINOGEN ACTIVITY: CPT

## 2024-11-03 PROCEDURE — 81003 URINALYSIS AUTO W/O SCOPE: CPT

## 2024-11-03 PROCEDURE — 36415 COLL VENOUS BLD VENIPUNCTURE: CPT

## 2024-11-03 PROCEDURE — 86850 RBC ANTIBODY SCREEN: CPT

## 2024-11-03 PROCEDURE — 84100 ASSAY OF PHOSPHORUS: CPT

## 2024-11-03 PROCEDURE — 80076 HEPATIC FUNCTION PANEL: CPT

## 2024-11-03 PROCEDURE — 85025 COMPLETE CBC W/AUTO DIFF WBC: CPT

## 2024-11-03 PROCEDURE — 6360000002 HC RX W HCPCS: Performed by: NURSE PRACTITIONER

## 2024-11-03 PROCEDURE — 87086 URINE CULTURE/COLONY COUNT: CPT

## 2024-11-03 PROCEDURE — 86901 BLOOD TYPING SEROLOGIC RH(D): CPT

## 2024-11-03 PROCEDURE — 36591 DRAW BLOOD OFF VENOUS DEVICE: CPT

## 2024-11-03 PROCEDURE — 83605 ASSAY OF LACTIC ACID: CPT

## 2024-11-03 PROCEDURE — 2060000000 HC ICU INTERMEDIATE R&B

## 2024-11-03 PROCEDURE — 6370000000 HC RX 637 (ALT 250 FOR IP): Performed by: NURSE PRACTITIONER

## 2024-11-03 PROCEDURE — 2580000003 HC RX 258: Performed by: NURSE PRACTITIONER

## 2024-11-03 PROCEDURE — 87103 BLOOD FUNGUS CULTURE: CPT

## 2024-11-03 PROCEDURE — 86140 C-REACTIVE PROTEIN: CPT

## 2024-11-03 PROCEDURE — 82728 ASSAY OF FERRITIN: CPT

## 2024-11-03 PROCEDURE — 83735 ASSAY OF MAGNESIUM: CPT

## 2024-11-03 RX ORDER — ACETAMINOPHEN 325 MG/1
650 TABLET ORAL EVERY 4 HOURS PRN
Status: DISCONTINUED | OUTPATIENT
Start: 2024-11-03 | End: 2024-11-06 | Stop reason: HOSPADM

## 2024-11-03 RX ORDER — ACYCLOVIR 400 MG/1
400 TABLET ORAL 2 TIMES DAILY
Status: DISCONTINUED | OUTPATIENT
Start: 2024-11-03 | End: 2024-11-06 | Stop reason: HOSPADM

## 2024-11-03 RX ORDER — ENOXAPARIN SODIUM 100 MG/ML
40 INJECTION SUBCUTANEOUS DAILY
Status: DISCONTINUED | OUTPATIENT
Start: 2024-11-03 | End: 2024-11-06 | Stop reason: HOSPADM

## 2024-11-03 RX ORDER — PROCHLORPERAZINE MALEATE 10 MG
10 TABLET ORAL EVERY 6 HOURS PRN
Status: DISCONTINUED | OUTPATIENT
Start: 2024-11-03 | End: 2024-11-06 | Stop reason: HOSPADM

## 2024-11-03 RX ORDER — LISINOPRIL 40 MG/1
40 TABLET ORAL DAILY
Status: DISCONTINUED | OUTPATIENT
Start: 2024-11-03 | End: 2024-11-06 | Stop reason: HOSPADM

## 2024-11-03 RX ORDER — POTASSIUM CHLORIDE 1500 MG/1
20 TABLET, EXTENDED RELEASE ORAL DAILY
Status: DISCONTINUED | OUTPATIENT
Start: 2024-11-03 | End: 2024-11-06 | Stop reason: HOSPADM

## 2024-11-03 RX ORDER — POTASSIUM CHLORIDE 29.8 MG/ML
20 INJECTION INTRAVENOUS PRN
Status: DISCONTINUED | OUTPATIENT
Start: 2024-11-03 | End: 2024-11-06 | Stop reason: HOSPADM

## 2024-11-03 RX ORDER — AMLODIPINE BESYLATE 10 MG/1
10 TABLET ORAL DAILY
Status: DISCONTINUED | OUTPATIENT
Start: 2024-11-03 | End: 2024-11-06 | Stop reason: HOSPADM

## 2024-11-03 RX ORDER — SODIUM CHLORIDE 9 MG/ML
INJECTION, SOLUTION INTRAVENOUS CONTINUOUS
Status: ACTIVE | OUTPATIENT
Start: 2024-11-03 | End: 2024-11-03

## 2024-11-03 RX ORDER — SODIUM CHLORIDE 9 MG/ML
INJECTION, SOLUTION INTRAVENOUS PRN
Status: DISCONTINUED | OUTPATIENT
Start: 2024-11-03 | End: 2024-11-06 | Stop reason: HOSPADM

## 2024-11-03 RX ORDER — MAGNESIUM SULFATE IN WATER 40 MG/ML
4000 INJECTION, SOLUTION INTRAVENOUS PRN
Status: DISCONTINUED | OUTPATIENT
Start: 2024-11-03 | End: 2024-11-06 | Stop reason: HOSPADM

## 2024-11-03 RX ORDER — SODIUM CHLORIDE 0.9 % (FLUSH) 0.9 %
5-40 SYRINGE (ML) INJECTION PRN
Status: DISCONTINUED | OUTPATIENT
Start: 2024-11-03 | End: 2024-11-06 | Stop reason: HOSPADM

## 2024-11-03 RX ORDER — FLUCONAZOLE 200 MG/1
200 TABLET ORAL DAILY
Status: DISCONTINUED | OUTPATIENT
Start: 2024-11-03 | End: 2024-11-06 | Stop reason: HOSPADM

## 2024-11-03 RX ORDER — SODIUM CHLORIDE 0.9 % (FLUSH) 0.9 %
5-40 SYRINGE (ML) INJECTION EVERY 12 HOURS SCHEDULED
Status: DISCONTINUED | OUTPATIENT
Start: 2024-11-03 | End: 2024-11-06 | Stop reason: HOSPADM

## 2024-11-03 RX ORDER — HYDRALAZINE HYDROCHLORIDE 50 MG/1
25 TABLET, FILM COATED ORAL 2 TIMES DAILY
Status: DISCONTINUED | OUTPATIENT
Start: 2024-11-03 | End: 2024-11-06 | Stop reason: HOSPADM

## 2024-11-03 RX ORDER — LEVETIRACETAM 500 MG/1
500 TABLET ORAL 2 TIMES DAILY
Status: DISCONTINUED | OUTPATIENT
Start: 2024-11-03 | End: 2024-11-06 | Stop reason: HOSPADM

## 2024-11-03 RX ADMIN — ENOXAPARIN SODIUM 40 MG: 100 INJECTION SUBCUTANEOUS at 17:45

## 2024-11-03 RX ADMIN — CALCIUM CARBONATE-CHOLECALCIFEROL TAB 250 MG-125 UNIT 2 TABLET: 250-125 TAB at 10:58

## 2024-11-03 RX ADMIN — HYDRALAZINE HYDROCHLORIDE 25 MG: 50 TABLET ORAL at 09:39

## 2024-11-03 RX ADMIN — HYDRALAZINE HYDROCHLORIDE 25 MG: 50 TABLET ORAL at 21:33

## 2024-11-03 RX ADMIN — SODIUM CHLORIDE: 9 INJECTION, SOLUTION INTRAVENOUS at 01:00

## 2024-11-03 RX ADMIN — POTASSIUM CHLORIDE 20 MEQ: 1500 TABLET, EXTENDED RELEASE ORAL at 09:39

## 2024-11-03 RX ADMIN — CEFEPIME 2000 MG: 2 INJECTION, POWDER, FOR SOLUTION INTRAVENOUS at 14:02

## 2024-11-03 RX ADMIN — ACETAMINOPHEN 650 MG: 325 TABLET ORAL at 15:51

## 2024-11-03 RX ADMIN — ACETAMINOPHEN 650 MG: 325 TABLET ORAL at 09:39

## 2024-11-03 RX ADMIN — AMLODIPINE BESYLATE 10 MG: 10 TABLET ORAL at 09:39

## 2024-11-03 RX ADMIN — ACETAMINOPHEN 650 MG: 325 TABLET ORAL at 01:03

## 2024-11-03 RX ADMIN — FLUCONAZOLE 200 MG: 200 TABLET ORAL at 09:39

## 2024-11-03 RX ADMIN — ACYCLOVIR 400 MG: 400 TABLET ORAL at 21:33

## 2024-11-03 RX ADMIN — CEFEPIME 2000 MG: 2 INJECTION, POWDER, FOR SOLUTION INTRAVENOUS at 21:32

## 2024-11-03 RX ADMIN — SODIUM CHLORIDE, PRESERVATIVE FREE 10 ML: 5 INJECTION INTRAVENOUS at 21:33

## 2024-11-03 RX ADMIN — LISINOPRIL 40 MG: 40 TABLET ORAL at 09:39

## 2024-11-03 RX ADMIN — ACYCLOVIR 400 MG: 400 TABLET ORAL at 09:39

## 2024-11-03 RX ADMIN — CEFEPIME 2000 MG: 2 INJECTION, POWDER, FOR SOLUTION INTRAVENOUS at 05:59

## 2024-11-03 RX ADMIN — LEVETIRACETAM 500 MG: 500 TABLET, FILM COATED ORAL at 09:39

## 2024-11-03 RX ADMIN — SODIUM CHLORIDE: 9 INJECTION, SOLUTION INTRAVENOUS at 05:58

## 2024-11-03 RX ADMIN — LEVETIRACETAM 500 MG: 500 TABLET, FILM COATED ORAL at 21:33

## 2024-11-03 NOTE — PROGRESS NOTES
4 Eyes Skin Assessment     NAME:  Leslee Maldonado  YOB: 1955  MEDICAL RECORD NUMBER:  [id number]    The patient is being assessed for  Admission    I agree that at least one RN has performed a thorough Head to Toe Skin Assessment on the patient. ALL assessment sites listed below have been assessed.      Areas assessed by both nurses:    Head, Face, Ears, Shoulders, Back, Chest, Arms, Elbows, Hands, Sacrum. Buttock, Coccyx, Ischium, Legs. Feet and Heels, and Under Medical Devices         Does the Patient have a Wound? No noted wound(s)       Eddy Prevention initiated by RN: No  Wound Care Orders initiated by RN: No    Pressure Injury (Stage 3,4, Unstageable, DTI, NWPT, and Complex wounds) if present, place Wound referral order by RN under : No    New Ostomies, if present place, Ostomy referral order under : No     Nurse 1 eSignature: Electronically signed by Hayley Jasso RN on 11/3/24 at 12:58 AM EDT    **SHARE this note so that the co-signing nurse can place an eSignature**    Nurse 2 eSignature: {Esignature:[id number]}

## 2024-11-03 NOTE — PROGRESS NOTES
[facility] - Clinical Pharmacy Note - Extended Infusion Beta-Lactam Adjustment    Cefepime ordered for treatment of febrile neutropenia. Per [facility] Extended Infusion Beta-Lactam Policy, Cefepime 2000mg Q8H (30 minute infusion) will be changed to 2000 mg Q8H (extended infusion).     Estimated Creatinine Clearance: Estimated Creatinine Clearance: 80 mL/min (based on SCr of 0.7 mg/dL).  Dialysis Status, NERY, CKD: Baselline  BMI: Body mass index is 34.93 kg/m².    Rationale for Adjustment: Agent demonstrates time-dependent effect on bacterial eradication. Extended-infusion dosing strategy aims to enhance microbiologic and clinical efficacy.    Pharmacy will continue to monitor cultures and sensitivities (where available) and adjust dose as necessary.      Please call with any questions.    Kody Puente, [facility] ext [phone]

## 2024-11-03 NOTE — CONSENT
Informed Consent for Blood Component Transfusion Note    I have discussed with the patient the rationale for blood component transfusion; its benefits in treating or preventing fatigue, organ damage, or death; and its risk which includes mild transfusion reactions, rare risk of blood borne infection, or more serious but rare reactions. I have discussed the alternatives to transfusion, including the risk and consequences of not receiving transfusion. The patient had an opportunity to ask questions and had agreed to proceed with transfusion of blood components.    Electronically signed by HE Mcmahan CNP on 11/3/24 at 10:02 AM EST

## 2024-11-03 NOTE — ED PROVIDER NOTES
[facility]  EMERGENCY DEPARTMENT ENCOUNTER          ATTENDING PHYSICIAN NOTE       Date of evaluation: 11/2/2024    Chief Complaint     No chief complaint on file.      History of Present Illness     Leslee Maldonado is a 69 y.o. female who presents to the emergency department with complaint of fever up to 100.6 at home.  She has a history of relapsed refractory IgG lambda multiple myeloma and was admitted for CAR T-cell therapy 10/28-10/30.  \"She received her lymphoid depleting chemotherapy 10/23-10/25/24 as an outpatient.  Carvikty was infused inpatient on 10/28/24 without any complications.\"  She has a axillary remote temperature monitoring device which has been reading elevated temperatures throughout the course of the day today with a Tmax of 100.6.  She reports that when she takes her oral temperature at home it is not quite that high.  She called her oncologist who instructed her if the temperature got greater than 100.5 to come to the emergency department.  She otherwise denies any significant complaints.  States she is otherwise feeling well with no cough productive cough nasal congestion sore throat dysuria urinary frequency difficulty with bowel movements.    ASSESSMENT / PLAN  (MEDICAL DECISION MAKING)     INITIAL VITALS: BP: 130/67, Temp: 99.2 °F (37.3 °C), Pulse: 89, Respirations: 16, SpO2: 100 %      Leslee Maldonado is a 69 y.o. female who presented to the Emergency Department with concerns for elevated temperature at home.  On physical examination patient was overall well-appearing had clear lung sounds a soft and benign abdomen.  She had an oral temperature of 99.2 here her home temperatures did read a Tmax of 100.6 her recent labs were reviewed as well as her recent hospitalization and initiation of CAR-T therapy.  I contacted the oncologist on-call.  They will be admitting the patient to the hospital for further care and management of her neutropenic fever.    Addendum: After the patient had

## 2024-11-03 NOTE — H&P
Cyto 46XX      - 4/15/20, progression -  SPEP 0.6 g/dL, IgG 1514  - 2/23/22, progression -  M-spike 1.1  - 6/26/24, progression -  SPEP 0.6 g/dL  - 7/24/24, progression confirmed -  SPEP 0.8 g/dL, IgG 1202.  SFLC kappa 21.9, lambda 9034, ratio 0.24.   - 9/5/24 -  T-cells collected  - S/p Roxi/Kyprolis/Dex as bridge to Carvykti C1D1: 9/18/24   - 10/28/24 -  CAR-T Carvykti infused    PLAN:  Carvykti CAR-T  Day + 6    2. CRS / Neuro: At risk post-infusion  CRS Grade: grade 1, fever only 11/02/24  - Monitor CRP and Ferrtin closely   Lab Results   Component Value Date    CRP 6.8 (H) 11/03/2024    CRP 4.1 11/02/2024    CRP 3.5 11/01/2024     Lab Results   Component Value Date    FERRITIN 76.7 11/03/2024    FERRITIN 72.5 11/02/2024    FERRITIN 83.6 11/01/2024       ICANS Grade:  None  ICE Score:  CAR-T Score: 10  - Neuro checks w/ CARTOX 10-point assessment Q4hrs  - Cont Keppra     3.  ID:  Neutropenic fever, POA  - Cont Valtrex, Diflucan  Neutropenic fever  - Pan-cx pending  -Cont Cefepime Day 2  - CXray 11/02/24:  No acute changes      Post CAR- T monitoring / maintenance:  - IgG & CD4 level monthly starting on day + 30  - Check disease titers on day + 30 or when WBC recovered. Re-vaccinate vs booster based on titer (assure titers are drawn prior to administering immunoglobulins)  - Start PCP ppx on day + 30 - stop when CD4 > 250  - Cont Valtrex for 1 year post CAR-T     4. Heme:   Pancytopenia 2/2 recent chemo  - Transfuse for Hgb < 7 and Platelets < 10K  - No transfusion today     5. Metabolic:   Hypo K+, Mild Hyperglycemia, renal fxn stable.    Risk for TLS:  No evidence  - Cont allopurinol daily until 11/3/24  - Daily TLS labs   - Replace K+ & Mg per PRN orders  - Start 20 mEq K+ PO daily (10/31/24)      6. GI / Nutrition: Nausea, GERD, Eating and drinking well  Nausea:  - Cont prn antiemetics  GERD:  - Tums PRN  - Instructed to avoid spicy foods       7. Endo: Type 2 DM  Type 2 DM  - No medications at this time,

## 2024-11-03 NOTE — PLAN OF CARE
Problem: Safety - Adult  Goal: Free from fall injury  Outcome: Progressing  Note: Orthostatic vital signs obtained at start of shift - see flowsheet for details.  Pt does not meet criteria for orthostasis.  Pt is a Med fall risk. See Harley Fall Score and ABCDS Injury Risk assessments. Pt bed is in low position, side rails up, call light and belongings are in reach.  Fall risk light is on outside pts room.  Pt encouraged to call for assistance as needed. Will continue with hourly rounds for PO intake, pain needs, toileting and repositioning as needed.       Problem: Neurosensory - Adult  Goal: Achieves stable or improved neurological status  Outcome: Progressing  Note: Pt A&Ox4, CAR-T  assessment 10/10.

## 2024-11-04 ENCOUNTER — APPOINTMENT (OUTPATIENT)
Dept: GENERAL RADIOLOGY | Age: 69
DRG: 809 | End: 2024-11-04
Payer: COMMERCIAL

## 2024-11-04 LAB
ALBUMIN SERPL-MCNC: 3.5 G/DL (ref 3.4–5)
ALP SERPL-CCNC: 45 U/L (ref 40–129)
ALT SERPL-CCNC: 19 U/L (ref 10–40)
ANION GAP SERPL CALCULATED.3IONS-SCNC: 7 MMOL/L (ref 3–16)
ANISOCYTOSIS BLD QL SMEAR: ABNORMAL
APTT BLD: 30.6 SEC (ref 22.1–36.4)
AST SERPL-CCNC: 19 U/L (ref 15–37)
BACTERIA THROAT AEROBE CULT: NORMAL
BACTERIA UR CULT: NORMAL
BASOPHILS # BLD: 0 K/UL (ref 0–0.2)
BASOPHILS NFR BLD: 1.1 %
BILIRUB DIRECT SERPL-MCNC: 0.2 MG/DL (ref 0–0.3)
BILIRUB INDIRECT SERPL-MCNC: 0.1 MG/DL (ref 0–1)
BILIRUB SERPL-MCNC: 0.3 MG/DL (ref 0–1)
BILIRUB UR QL STRIP.AUTO: NEGATIVE
BUN SERPL-MCNC: 9 MG/DL (ref 7–20)
CALCIUM SERPL-MCNC: 8.4 MG/DL (ref 8.3–10.6)
CHLORIDE SERPL-SCNC: 109 MMOL/L (ref 99–110)
CLARITY UR: CLEAR
CO2 SERPL-SCNC: 24 MMOL/L (ref 21–32)
COLOR UR: YELLOW
CREAT SERPL-MCNC: 0.9 MG/DL (ref 0.6–1.2)
CRP SERPL-MCNC: 33.2 MG/L (ref 0–5.1)
DACRYOCYTES BLD QL SMEAR: ABNORMAL
DEPRECATED RDW RBC AUTO: 17.1 % (ref 12.4–15.4)
EOSINOPHIL # BLD: 0.1 K/UL (ref 0–0.6)
EOSINOPHIL NFR BLD: 8.1 %
FERRITIN SERPL IA-MCNC: 105.9 NG/ML (ref 15–150)
FIBRINOGEN PPP-MCNC: 473 MG/DL (ref 227–534)
FUNGUS BLD CULT: NORMAL
FUNGUS BLD CULT: NORMAL
FUNGUS SPEC CULT: NORMAL
FUNGUS SPEC CULT: NORMAL
GFR SERPLBLD CREATININE-BSD FMLA CKD-EPI: 70 ML/MIN/{1.73_M2}
GLUCOSE SERPL-MCNC: 109 MG/DL (ref 70–99)
GLUCOSE UR STRIP.AUTO-MCNC: NEGATIVE MG/DL
HCT VFR BLD AUTO: 32.2 % (ref 36–48)
HGB BLD-MCNC: 10.7 G/DL (ref 12–16)
HGB UR QL STRIP.AUTO: NEGATIVE
INR PPP: 1.03 (ref 0.85–1.15)
KETONES UR STRIP.AUTO-MCNC: NEGATIVE MG/DL
LDH SERPL L TO P-CCNC: 229 U/L (ref 100–190)
LEUKOCYTE ESTERASE UR QL STRIP.AUTO: NEGATIVE
LOEFFLER MB STN SPEC: NORMAL
LYMPHOCYTES # BLD: 0.2 K/UL (ref 1–5.1)
LYMPHOCYTES NFR BLD: 19.3 %
MAGNESIUM SERPL-MCNC: 2.1 MG/DL (ref 1.8–2.4)
MCH RBC QN AUTO: 29.1 PG (ref 26–34)
MCHC RBC AUTO-ENTMCNC: 33.2 G/DL (ref 31–36)
MCV RBC AUTO: 87.8 FL (ref 80–100)
MICROCYTES BLD QL SMEAR: ABNORMAL
MONOCYTES # BLD: 0.2 K/UL (ref 0–1.3)
MONOCYTES NFR BLD: 19.3 %
NEUTROPHILS # BLD: 0.5 K/UL (ref 1.7–7.7)
NEUTROPHILS NFR BLD: 52.2 %
NITRITE UR QL STRIP.AUTO: NEGATIVE
OVALOCYTES BLD QL SMEAR: ABNORMAL
PH UR STRIP.AUTO: 6.5 [PH] (ref 5–8)
PHOSPHATE SERPL-MCNC: 3.3 MG/DL (ref 2.5–4.9)
PLATELET # BLD AUTO: 108 K/UL (ref 135–450)
PMV BLD AUTO: 8.1 FL (ref 5–10.5)
POTASSIUM SERPL-SCNC: 3.8 MMOL/L (ref 3.5–5.1)
PROT SERPL-MCNC: 5.7 G/DL (ref 6.4–8.2)
PROT UR STRIP.AUTO-MCNC: NEGATIVE MG/DL
PROTHROMBIN TIME: 13.7 SEC (ref 11.9–14.9)
RBC # BLD AUTO: 3.67 M/UL (ref 4–5.2)
SCHISTOCYTES BLD QL SMEAR: ABNORMAL
SLIDE REVIEW: ABNORMAL
SODIUM SERPL-SCNC: 140 MMOL/L (ref 136–145)
SP GR UR STRIP.AUTO: 1.01 (ref 1–1.03)
UA DIPSTICK W REFLEX MICRO PNL UR: NORMAL
URATE SERPL-MCNC: 1.5 MG/DL (ref 2.6–6)
URN SPEC COLLECT METH UR: NORMAL
UROBILINOGEN UR STRIP-ACNC: 0.2 E.U./DL
WBC # BLD AUTO: 0.9 K/UL (ref 4–11)

## 2024-11-04 PROCEDURE — 6360000002 HC RX W HCPCS: Performed by: NURSE PRACTITIONER

## 2024-11-04 PROCEDURE — 2580000003 HC RX 258: Performed by: NURSE PRACTITIONER

## 2024-11-04 PROCEDURE — 2580000003 HC RX 258

## 2024-11-04 PROCEDURE — 6370000000 HC RX 637 (ALT 250 FOR IP): Performed by: NURSE PRACTITIONER

## 2024-11-04 PROCEDURE — 85025 COMPLETE CBC W/AUTO DIFF WBC: CPT

## 2024-11-04 PROCEDURE — 85384 FIBRINOGEN ACTIVITY: CPT

## 2024-11-04 PROCEDURE — 71045 X-RAY EXAM CHEST 1 VIEW: CPT

## 2024-11-04 PROCEDURE — 82728 ASSAY OF FERRITIN: CPT

## 2024-11-04 PROCEDURE — 2060000000 HC ICU INTERMEDIATE R&B

## 2024-11-04 PROCEDURE — 83735 ASSAY OF MAGNESIUM: CPT

## 2024-11-04 PROCEDURE — 81003 URINALYSIS AUTO W/O SCOPE: CPT

## 2024-11-04 PROCEDURE — 86140 C-REACTIVE PROTEIN: CPT

## 2024-11-04 PROCEDURE — 84550 ASSAY OF BLOOD/URIC ACID: CPT

## 2024-11-04 PROCEDURE — 80076 HEPATIC FUNCTION PANEL: CPT

## 2024-11-04 PROCEDURE — 83615 LACTATE (LD) (LDH) ENZYME: CPT

## 2024-11-04 PROCEDURE — 84100 ASSAY OF PHOSPHORUS: CPT

## 2024-11-04 PROCEDURE — 80048 BASIC METABOLIC PNL TOTAL CA: CPT

## 2024-11-04 PROCEDURE — 85730 THROMBOPLASTIN TIME PARTIAL: CPT

## 2024-11-04 PROCEDURE — 85610 PROTHROMBIN TIME: CPT

## 2024-11-04 RX ORDER — SODIUM CHLORIDE 9 MG/ML
INJECTION, SOLUTION INTRAVENOUS CONTINUOUS
Status: DISCONTINUED | OUTPATIENT
Start: 2024-11-04 | End: 2024-11-06 | Stop reason: HOSPADM

## 2024-11-04 RX ADMIN — SODIUM CHLORIDE: 9 INJECTION, SOLUTION INTRAVENOUS at 08:55

## 2024-11-04 RX ADMIN — CALCIUM CARBONATE-CHOLECALCIFEROL TAB 250 MG-125 UNIT 2 TABLET: 250-125 TAB at 08:55

## 2024-11-04 RX ADMIN — CEFEPIME 2000 MG: 2 INJECTION, POWDER, FOR SOLUTION INTRAVENOUS at 13:30

## 2024-11-04 RX ADMIN — LEVETIRACETAM 500 MG: 500 TABLET, FILM COATED ORAL at 08:54

## 2024-11-04 RX ADMIN — ENOXAPARIN SODIUM 40 MG: 100 INJECTION SUBCUTANEOUS at 18:22

## 2024-11-04 RX ADMIN — HYDRALAZINE HYDROCHLORIDE 25 MG: 50 TABLET ORAL at 08:54

## 2024-11-04 RX ADMIN — SODIUM CHLORIDE, PRESERVATIVE FREE 10 ML: 5 INJECTION INTRAVENOUS at 21:47

## 2024-11-04 RX ADMIN — CEFEPIME 2000 MG: 2 INJECTION, POWDER, FOR SOLUTION INTRAVENOUS at 06:05

## 2024-11-04 RX ADMIN — LISINOPRIL 40 MG: 40 TABLET ORAL at 08:54

## 2024-11-04 RX ADMIN — FLUCONAZOLE 200 MG: 200 TABLET ORAL at 08:54

## 2024-11-04 RX ADMIN — CEFEPIME 2000 MG: 2 INJECTION, POWDER, FOR SOLUTION INTRAVENOUS at 21:46

## 2024-11-04 RX ADMIN — ACYCLOVIR 400 MG: 400 TABLET ORAL at 08:54

## 2024-11-04 RX ADMIN — MAGNESIUM HYDROXIDE 30 ML: 400 SUSPENSION ORAL at 08:59

## 2024-11-04 RX ADMIN — AMLODIPINE BESYLATE 10 MG: 10 TABLET ORAL at 08:54

## 2024-11-04 RX ADMIN — ACYCLOVIR 400 MG: 400 TABLET ORAL at 20:01

## 2024-11-04 RX ADMIN — POTASSIUM CHLORIDE 20 MEQ: 1500 TABLET, EXTENDED RELEASE ORAL at 08:54

## 2024-11-04 RX ADMIN — ACETAMINOPHEN 650 MG: 325 TABLET ORAL at 00:04

## 2024-11-04 RX ADMIN — LEVETIRACETAM 500 MG: 500 TABLET, FILM COATED ORAL at 20:01

## 2024-11-04 RX ADMIN — HYDRALAZINE HYDROCHLORIDE 25 MG: 50 TABLET ORAL at 20:00

## 2024-11-04 NOTE — PROGRESS NOTES
[facility] Progress Note    2024     Leslee Maldonado    MRN: [id number]    : 1955    Referring MD: No referring provider defined for this encounter.      SUBJECTIVE:  Doing well, feeling better, anxious to discharge.     ECOG PS:  (2) Ambulatory and capable of self care, unable to carry out work activity, up and about > 50% or waking hours    KPS: 90% Able to carry on normal activity; minor signs or symptoms of disease    Isolation: None    Medications    Scheduled Meds:   acyclovir  400 mg Oral BID    amLODIPine  10 mg Oral Daily    calcium carb-cholecalciferol  2 tablet Oral Daily    fluconazole  200 mg Oral Daily    hydrALAZINE  25 mg Oral BID    levETIRAcetam  500 mg Oral BID    lisinopril  40 mg Oral Daily    potassium chloride  20 mEq Oral Daily    sodium chloride flush  5-40 mL IntraVENous 2 times per day    Saline Mouthwash  15 mL Swish & Spit 4x Daily AC & HS    enoxaparin  40 mg SubCUTAneous Daily    cefepime  2,000 mg IntraVENous Q8H     Continuous Infusions:   sodium chloride 42 mL/hr at 24 0855    sodium chloride 5 mL/hr at 24 0558     PRN Meds:.prochlorperazine, sodium chloride flush, sodium chloride, potassium chloride, magnesium sulfate, acetaminophen, magnesium hydroxide, ALTEplase (CATHFLO) 2 mg in sterile water 2 mL injection    ROS:  As noted above, otherwise remainder of 10-point ROS negative    Physical Exam:     I&O:    Intake/Output Summary (Last 24 hours) at 2024 1205  Last data filed at 2024 0835  Gross per 24 hour   Intake 2605 ml   Output 1900 ml   Net 705 ml       Vital Signs:  BP (!) 117/57   Pulse 82   Temp 100.1 °F (37.8 °C) (Oral)   Resp 20   Ht 1.575 m (5' 2\")   Wt 86 kg (189 lb 9.5 oz)   SpO2 94%   BMI 34.68 kg/m²     Weight:    Wt Readings from Last 3 Encounters:   24 86 kg (189 lb 9.5 oz)   24 86.7 kg (191 lb 2.2 oz)   24 87.7 kg (193 lb 5.5 oz)         General: Awake, alert and oriented.  HEENT: normocephalic, PERRL, no

## 2024-11-04 NOTE — FLOWSHEET NOTE
Pt educated on central line cap change/port re-access for Sunday protocol. Because pt was accessed on Saturday 11/2, pt did not want port to be re-accessed. Care continues.

## 2024-11-04 NOTE — PROGRESS NOTES
Patient seen and assessed for standard line care needs.  CVC site remains free of visible signs and symptoms of infection.  No drainage, edema, erythema, pain, itching, or warmth noted at and around the insertion site.  Port accessed on 11/2, needle remains intact.Line care performed by Lakeisha Mahajan RN.  The need for continued use of the CVC is due to ongoing therapy.  Patient verbalized understanding of the line care education provided by line care RN.  Staff RNs will continue to monitor and assess the CVC site throughout the patient's hospital stay.  Sterile dressing changes will continue to be changed per policy.

## 2024-11-04 NOTE — CARE COORDINATION
Case Management Assessment  Initial Evaluation    Date/Time of Evaluation: 11/4/2024 11:39 AM  Assessment Completed by: MELISSA Lee   for [facility] ([facility])  [employer] Mobile: [phone]    If patient is discharged prior to next notation, then this note serves as note for discharge by case management.    Patient Name: Leslee Maldonado                   YOB: 1955  Diagnosis: Neutropenic fever (HCC) [D70.9, R50.81]                   Date / Time: 11/2/2024  9:38 PM    Patient Admission Status: Inpatient   Readmission Risk (Low < 19, Mod (19-27), High > 27): Readmission Risk Score: 20.8    Current PCP: Raj Cedeño MD  PCP verified by CM? Yes (has been going to [facility])    Chart Reviewed: Yes      History Provided by: Patient  Patient Orientation: Alert and Oriented    Patient Cognition: Alert    Hospitalization in the last 30 days (Readmission):  Yes    If yes, Readmission Assessment in  Navigator will be completed.    Advance Directives:      Code Status: Full Code   Patient's Primary Decision Maker is: Legal Next of Kin      Discharge Planning:    Patient lives with: Children, Family Members Type of Home: House  Primary Care Giver: Self  Patient Support Systems include: Family Members, Children   Current Financial resources: Medicare ([facility] Medicare)  Current community resources: None  Current services prior to admission: Other (Comment) ([facility])            Current DME:              Type of Home Care services:  None    ADLS  Prior functional level: Independent in ADLs/IADLs  Current functional level: Independent in ADLs/IADLs    PT AM-PAC:   /24  OT AM-PAC:   /24    Family can provide assistance at DC: Yes  Would you like Case Management to discuss the discharge plan with any other family members/significant others, and if so, who? Yes  Plans to Return to Present Housing: Yes  Other Identified Issues/Barriers to RETURNING to current housing:

## 2024-11-04 NOTE — PSYCHOTHERAPY
Psychology Follow-up Consultation    Patient Name: Leslee Maldonado  MRN: [id number]  Admission Date: 11/2/2024  Today's date: 11/4/2024    Reason for Consult: follow-up for readmission to hospital post CAR-T      HPI:   Leslee Maldonado is a 69 y.o.   female with multiple myeloma and PMHx of GERD, type 2 DM, CAD S/P CABG & stent placement, hypertension, and hyperlipidemia, who initially presented on 11/2/2024 for a fever post CAR-T infusion (Day 0: 10/28/2024)    Pre-CAR-T Psychological Evaluation recommendations:  Complete advanced directive.   Initially, patient identified her daughter, Alessia, as her primary caregiver. Alessia attended CAR-T education. During today's evaluation, Alessia was not present due to working. Patient brought two sisters. Appears that sister, Yolis, will be the one primarily driving patient to appointments and attending appointments and Alessia will be with patient at home. Therefore, patient has two primary caregivers who should be educated regarding CAR-T. Yolis, sister, was not present for initial education with coordinator, but was instructed to review all information today. Yolis will be main point of contact for treatment team due to being the one with whom the treatment team will have the most contact.- Update: Daughters (Alessia and Jessica) and well as sister (Yolis) will be primary caregivers    Interval History:    Patient reports doing well despite recent set back of readmission to the hospital. States she is trying to remain positive and take it one day at a time. Denied any other behavioral needs today.     Current psychiatric treatment: None.   Other relevant medications: Keppra and compazine    History obtained 9/06/2024 during pre-CAR-T psychological evaluation:    PSYCHIATRIC HISTORY:  Current mental health treatment: Denies  Other relevant medications: None.      Past mental health treatment: Denies  Family psychiatric history: Yes; Paternal grandfather and

## 2024-11-05 PROBLEM — D70.9 NEUTROPENIC FEVER (HCC): Status: RESOLVED | Noted: 2024-11-02 | Resolved: 2024-11-05

## 2024-11-05 PROBLEM — R50.81 NEUTROPENIC FEVER (HCC): Status: RESOLVED | Noted: 2024-11-02 | Resolved: 2024-11-05

## 2024-11-05 LAB
ALBUMIN SERPL-MCNC: 3.4 G/DL (ref 3.4–5)
ALP SERPL-CCNC: 42 U/L (ref 40–129)
ALT SERPL-CCNC: 16 U/L (ref 10–40)
ANION GAP SERPL CALCULATED.3IONS-SCNC: 7 MMOL/L (ref 3–16)
AST SERPL-CCNC: 16 U/L (ref 15–37)
BASOPHILS # BLD: 0 K/UL (ref 0–0.2)
BASOPHILS NFR BLD: 0.5 %
BILIRUB DIRECT SERPL-MCNC: 0.2 MG/DL (ref 0–0.3)
BILIRUB INDIRECT SERPL-MCNC: 0.1 MG/DL (ref 0–1)
BILIRUB SERPL-MCNC: 0.3 MG/DL (ref 0–1)
BUN SERPL-MCNC: 7 MG/DL (ref 7–20)
CALCIUM SERPL-MCNC: 8 MG/DL (ref 8.3–10.6)
CHLORIDE SERPL-SCNC: 105 MMOL/L (ref 99–110)
CO2 SERPL-SCNC: 25 MMOL/L (ref 21–32)
CREAT SERPL-MCNC: 0.8 MG/DL (ref 0.6–1.2)
CRP SERPL-MCNC: 36.7 MG/L (ref 0–5.1)
DEPRECATED RDW RBC AUTO: 17.1 % (ref 12.4–15.4)
EOSINOPHIL # BLD: 0 K/UL (ref 0–0.6)
EOSINOPHIL NFR BLD: 3.1 %
FERRITIN SERPL IA-MCNC: 114.4 NG/ML (ref 15–150)
FIBRINOGEN PPP-MCNC: 473 MG/DL (ref 227–534)
GFR SERPLBLD CREATININE-BSD FMLA CKD-EPI: 81 ML/MIN/{1.73_M2}
GLUCOSE SERPL-MCNC: 110 MG/DL (ref 70–99)
HCT VFR BLD AUTO: 31.6 % (ref 36–48)
HGB BLD-MCNC: 10.6 G/DL (ref 12–16)
LYMPHOCYTES # BLD: 0.4 K/UL (ref 1–5.1)
LYMPHOCYTES NFR BLD: 43.9 %
MAGNESIUM SERPL-MCNC: 2.2 MG/DL (ref 1.8–2.4)
MCH RBC QN AUTO: 29.4 PG (ref 26–34)
MCHC RBC AUTO-ENTMCNC: 33.6 G/DL (ref 31–36)
MCV RBC AUTO: 87.5 FL (ref 80–100)
MONOCYTES # BLD: 0.2 K/UL (ref 0–1.3)
MONOCYTES NFR BLD: 15.4 %
NEUTROPHILS # BLD: 0.4 K/UL (ref 1.7–7.7)
NEUTROPHILS NFR BLD: 37.1 %
PHOSPHATE SERPL-MCNC: 2.5 MG/DL (ref 2.5–4.9)
PLATELET # BLD AUTO: 106 K/UL (ref 135–450)
PMV BLD AUTO: 8.4 FL (ref 5–10.5)
POTASSIUM SERPL-SCNC: 4 MMOL/L (ref 3.5–5.1)
PROT SERPL-MCNC: 5.4 G/DL (ref 6.4–8.2)
RBC # BLD AUTO: 3.61 M/UL (ref 4–5.2)
SODIUM SERPL-SCNC: 137 MMOL/L (ref 136–145)
WBC # BLD AUTO: 1 K/UL (ref 4–11)

## 2024-11-05 PROCEDURE — 85384 FIBRINOGEN ACTIVITY: CPT

## 2024-11-05 PROCEDURE — 6360000002 HC RX W HCPCS: Performed by: NURSE PRACTITIONER

## 2024-11-05 PROCEDURE — 80076 HEPATIC FUNCTION PANEL: CPT

## 2024-11-05 PROCEDURE — 6370000000 HC RX 637 (ALT 250 FOR IP)

## 2024-11-05 PROCEDURE — 80048 BASIC METABOLIC PNL TOTAL CA: CPT

## 2024-11-05 PROCEDURE — 2060000000 HC ICU INTERMEDIATE R&B

## 2024-11-05 PROCEDURE — 36591 DRAW BLOOD OFF VENOUS DEVICE: CPT

## 2024-11-05 PROCEDURE — 85025 COMPLETE CBC W/AUTO DIFF WBC: CPT

## 2024-11-05 PROCEDURE — 2580000003 HC RX 258

## 2024-11-05 PROCEDURE — 86140 C-REACTIVE PROTEIN: CPT

## 2024-11-05 PROCEDURE — 82728 ASSAY OF FERRITIN: CPT

## 2024-11-05 PROCEDURE — 2580000003 HC RX 258: Performed by: NURSE PRACTITIONER

## 2024-11-05 PROCEDURE — 84100 ASSAY OF PHOSPHORUS: CPT

## 2024-11-05 PROCEDURE — 6370000000 HC RX 637 (ALT 250 FOR IP): Performed by: NURSE PRACTITIONER

## 2024-11-05 PROCEDURE — 83735 ASSAY OF MAGNESIUM: CPT

## 2024-11-05 RX ORDER — LEVOFLOXACIN 500 MG/1
500 TABLET, FILM COATED ORAL EVERY EVENING
Status: DISCONTINUED | OUTPATIENT
Start: 2024-11-05 | End: 2024-11-06 | Stop reason: HOSPADM

## 2024-11-05 RX ADMIN — HYDRALAZINE HYDROCHLORIDE 25 MG: 50 TABLET ORAL at 08:43

## 2024-11-05 RX ADMIN — POTASSIUM CHLORIDE 20 MEQ: 1500 TABLET, EXTENDED RELEASE ORAL at 08:43

## 2024-11-05 RX ADMIN — SODIUM CHLORIDE: 9 INJECTION, SOLUTION INTRAVENOUS at 07:15

## 2024-11-05 RX ADMIN — ACYCLOVIR 400 MG: 400 TABLET ORAL at 20:10

## 2024-11-05 RX ADMIN — SODIUM CHLORIDE, PRESERVATIVE FREE 10 ML: 5 INJECTION INTRAVENOUS at 20:11

## 2024-11-05 RX ADMIN — FLUCONAZOLE 200 MG: 200 TABLET ORAL at 08:43

## 2024-11-05 RX ADMIN — ACYCLOVIR 400 MG: 400 TABLET ORAL at 08:43

## 2024-11-05 RX ADMIN — LEVETIRACETAM 500 MG: 500 TABLET, FILM COATED ORAL at 20:10

## 2024-11-05 RX ADMIN — LEVETIRACETAM 500 MG: 500 TABLET, FILM COATED ORAL at 08:43

## 2024-11-05 RX ADMIN — LEVOFLOXACIN 500 MG: 500 TABLET, FILM COATED ORAL at 18:02

## 2024-11-05 RX ADMIN — SODIUM CHLORIDE, PRESERVATIVE FREE 10 ML: 5 INJECTION INTRAVENOUS at 08:43

## 2024-11-05 RX ADMIN — AMLODIPINE BESYLATE 10 MG: 10 TABLET ORAL at 08:43

## 2024-11-05 RX ADMIN — HYDRALAZINE HYDROCHLORIDE 25 MG: 50 TABLET ORAL at 20:10

## 2024-11-05 RX ADMIN — CALCIUM CARBONATE-CHOLECALCIFEROL TAB 250 MG-125 UNIT 2 TABLET: 250-125 TAB at 08:43

## 2024-11-05 RX ADMIN — CEFEPIME 2000 MG: 2 INJECTION, POWDER, FOR SOLUTION INTRAVENOUS at 06:58

## 2024-11-05 RX ADMIN — ENOXAPARIN SODIUM 40 MG: 100 INJECTION SUBCUTANEOUS at 18:02

## 2024-11-05 RX ADMIN — LISINOPRIL 40 MG: 40 TABLET ORAL at 08:43

## 2024-11-05 NOTE — CARE COORDINATION
Spoke with pt at bedside. She confirmed she will return home when able with no anticipated needs from SW. She believes this will be tomorrow. She was given her IMM.     JONATHON will follow    MELISSA Lee   for [facility] ([facility])  [employer]: [phone]

## 2024-11-05 NOTE — PLAN OF CARE
Problem: Safety - Adult  Goal: Free from fall injury  Outcome: Progressing  Note: Orthostatic vital signs obtained at start of shift - see flowsheet for details.  Pt does not meet criteria for orthostasis.  Pt is a Med fall risk. See Ayers Fall Score and ABCDS Injury Risk assessments.   - Screening for Orthostasis AND not a [place] Risk per AYERS/ABCDS: Pt bed is in low position, side rails up, call light and belongings are in reach.  Fall risk light is on outside pts room.  Pt encouraged to call for assistance as needed. Will continue with hourly rounds for PO intake, pain needs, toileting and repositioning as needed.        Problem: Neurosensory - Adult  Goal: Achieves stable or improved neurological status  Outcome: Progressing  Note: Car T continues to be 10/10.  No neuro changes this shift.

## 2024-11-05 NOTE — PROGRESS NOTES
[facility] Progress Note    Attending Physician: Waterhouse, David M, MD        Primary Care: Raj Cedeño MD       Admission: 2024      Date: 2024    Name: Leslee Maldonado   :  1955    MRN:  [id number]    Interval Hx:  Mrs. Maldonado endorses 1 loose non-bloody BM since starting milk of magnesia for constipation, lightheadedness for less than 30 sec while showering this morning, and difficulty sleeping (chronic issue; unchanged from her baseline).  She denies sweats, chills, confusion, vision changes/disturbance, headaches, dyspnea, chest pain/pressure/tightness, nausea, vomiting, abdominal pain, weakness.  Last 24 hours:  Tmax 100.1 °F yesterday afternoon  Saturating well on room air  Normotensive        ROS:  As noted above, otherwise remainder of 10-point ROS negative    Medication:  Scheduled:   acyclovir  400 mg Oral BID    amLODIPine  10 mg Oral Daily    calcium carb-cholecalciferol  2 tablet Oral Daily    fluconazole  200 mg Oral Daily    hydrALAZINE  25 mg Oral BID    levETIRAcetam  500 mg Oral BID    lisinopril  40 mg Oral Daily    potassium chloride  20 mEq Oral Daily    sodium chloride flush  5-40 mL IntraVENous 2 times per day    Saline Mouthwash  15 mL Swish & Spit 4x Daily AC & HS    enoxaparin  40 mg SubCUTAneous Daily    cefepime  2,000 mg IntraVENous Q8H     Continuous Infusions:   sodium chloride 42 mL/hr at 24 0715    sodium chloride 5 mL/hr at 24 0558     PRN:  prochlorperazine, sodium chloride flush, sodium chloride, potassium chloride, magnesium sulfate, acetaminophen, magnesium hydroxide, ALTEplase (CATHFLO) 2 mg in sterile water 2 mL injection       Physical Exam:     Vital Signs:  /64   Pulse 79   Temp 98.7 °F (37.1 °C) (Oral)   Resp 17   Ht 1.575 m (5' 2\")   Wt 86 kg (189 lb 9.5 oz)   SpO2 91%   BMI 34.68 kg/m²     Weight:    Wt Readings from Last 3 Encounters:   24 86 kg (189 lb 9.5 oz)   24 86.7 kg (191 lb 2.2 oz)   24 87.7 kg

## 2024-11-05 NOTE — DISCHARGE SUMMARY
rosuvastatin 40 mg qd    Hypertension  - Continue amlodipine 10 mg qd  - Continue hydralazine 25 mg bid  - Continue lisinopril 40 mg qd       10.  PULM:  no pulm issues throughout this admission      11.  MSK  Chronic back, hip, and right shoulder pain  - Tylenol PRN       Condition on discharge:  Stable    Discharge Instructions:    Mrs. Maldonado was advised on activity and dietary restrictions.    Mrs. Maldonado was advised to follow up in the emergency department or contact the physician with any unresolved nausea/vomiting/diarrhea/pain or temperature greater than 100.5 F or any other unusual symptoms.       This discharge summary and plan was discussed and agreed upon with Dr. Iván Jones.    Cheryl Hazel MD, ALEXANDER, CCRC  Internal Medicine, PGY-3

## 2024-11-05 NOTE — PLAN OF CARE
Problem: Safety - Adult  Goal: Free from fall injury  11/5/2024 1201 by Bernice Mckay, RN  Outcome: Progressing  Note: Orthostatic vital signs obtained at start of shift - see flowsheet for details.  Pt does not meet criteria for orthostasis.  Pt is a Med fall risk. See Harley Fall Score and ABCDS Injury Risk assessments. Pt bed is in low position, side rails up, call light and belongings are in reach.  Fall risk light is on outside pts room.  Pt encouraged to call for assistance as needed. Will continue with hourly rounds for PO intake, pain needs, toileting and repositioning as needed.       Problem: Neurosensory - Adult  Goal: Achieves stable or improved neurological status  11/5/2024 1201 by Bernice Mckay, RN  Outcome: Progressing  Note: Pt CAR-T assesment 10/10, neuro assessment intact.     Problem: Infection - Adult  Goal: Absence of fever/infection during anticipated neutropenic period  Outcome: Progressing  Note: Pt afebrile during shift, educated on antibiotics and remaining afebrile before discharge. Pt cefepime order discontinued, new orders for PO Levaquin.

## 2024-11-06 VITALS
BODY MASS INDEX: 35 KG/M2 | HEIGHT: 62 IN | OXYGEN SATURATION: 97 % | SYSTOLIC BLOOD PRESSURE: 133 MMHG | TEMPERATURE: 97.2 F | WEIGHT: 190.2 LBS | DIASTOLIC BLOOD PRESSURE: 70 MMHG | RESPIRATION RATE: 16 BRPM | HEART RATE: 86 BPM

## 2024-11-06 LAB
ALBUMIN SERPL-MCNC: 3.2 G/DL (ref 3.4–5)
ALP SERPL-CCNC: 42 U/L (ref 40–129)
ALT SERPL-CCNC: 14 U/L (ref 10–40)
ANION GAP SERPL CALCULATED.3IONS-SCNC: 7 MMOL/L (ref 3–16)
ANISOCYTOSIS BLD QL SMEAR: ABNORMAL
AST SERPL-CCNC: 17 U/L (ref 15–37)
BACTERIA BLD CULT ORG #2: NORMAL
BACTERIA BLD CULT: NORMAL
BASOPHILS # BLD: 0 K/UL (ref 0–0.2)
BASOPHILS NFR BLD: 0 %
BILIRUB DIRECT SERPL-MCNC: <0.1 MG/DL (ref 0–0.3)
BILIRUB INDIRECT SERPL-MCNC: 0.2 MG/DL (ref 0–1)
BILIRUB SERPL-MCNC: 0.3 MG/DL (ref 0–1)
BUN SERPL-MCNC: 7 MG/DL (ref 7–20)
CALCIUM SERPL-MCNC: 8.1 MG/DL (ref 8.3–10.6)
CHLORIDE SERPL-SCNC: 108 MMOL/L (ref 99–110)
CO2 SERPL-SCNC: 24 MMOL/L (ref 21–32)
CREAT SERPL-MCNC: 0.7 MG/DL (ref 0.6–1.2)
CRP SERPL-MCNC: 27.7 MG/L (ref 0–5.1)
DACRYOCYTES BLD QL SMEAR: ABNORMAL
DEPRECATED RDW RBC AUTO: 17.3 % (ref 12.4–15.4)
EOSINOPHIL # BLD: 0 K/UL (ref 0–0.6)
EOSINOPHIL NFR BLD: 0 %
FERRITIN SERPL IA-MCNC: 94.4 NG/ML (ref 15–150)
FINAL REPORT: NORMAL
GFR SERPLBLD CREATININE-BSD FMLA CKD-EPI: >90 ML/MIN/{1.73_M2}
GLUCOSE SERPL-MCNC: 109 MG/DL (ref 70–99)
HCT VFR BLD AUTO: 30.7 % (ref 36–48)
HGB BLD-MCNC: 10.2 G/DL (ref 12–16)
LDH SERPL L TO P-CCNC: 254 U/L (ref 100–190)
LYMPHOCYTES # BLD: 0.8 K/UL (ref 1–5.1)
LYMPHOCYTES NFR BLD: 60 %
MAGNESIUM SERPL-MCNC: 2.1 MG/DL (ref 1.8–2.4)
MCH RBC QN AUTO: 29.2 PG (ref 26–34)
MCHC RBC AUTO-ENTMCNC: 33.4 G/DL (ref 31–36)
MCV RBC AUTO: 87.4 FL (ref 80–100)
MICROCYTES BLD QL SMEAR: ABNORMAL
MONOCYTES # BLD: 0.1 K/UL (ref 0–1.3)
MONOCYTES NFR BLD: 8 %
NEUTROPHILS # BLD: 0.4 K/UL (ref 1.7–7.7)
NEUTROPHILS NFR BLD: 32 %
OVALOCYTES BLD QL SMEAR: ABNORMAL
PHOSPHATE SERPL-MCNC: 2.6 MG/DL (ref 2.5–4.9)
PLATELET # BLD AUTO: 107 K/UL (ref 135–450)
PMV BLD AUTO: 7.9 FL (ref 5–10.5)
POIKILOCYTOSIS BLD QL SMEAR: ABNORMAL
POTASSIUM SERPL-SCNC: 3.6 MMOL/L (ref 3.5–5.1)
PRELIMINARY: NORMAL
PROT SERPL-MCNC: 5.2 G/DL (ref 6.4–8.2)
RBC # BLD AUTO: 3.51 M/UL (ref 4–5.2)
SCHISTOCYTES BLD QL SMEAR: ABNORMAL
SODIUM SERPL-SCNC: 139 MMOL/L (ref 136–145)
URATE SERPL-MCNC: 1.7 MG/DL (ref 2.6–6)
WBC # BLD AUTO: 1.4 K/UL (ref 4–11)

## 2024-11-06 PROCEDURE — 82728 ASSAY OF FERRITIN: CPT

## 2024-11-06 PROCEDURE — 83615 LACTATE (LD) (LDH) ENZYME: CPT

## 2024-11-06 PROCEDURE — 80048 BASIC METABOLIC PNL TOTAL CA: CPT

## 2024-11-06 PROCEDURE — 6370000000 HC RX 637 (ALT 250 FOR IP): Performed by: NURSE PRACTITIONER

## 2024-11-06 PROCEDURE — 36591 DRAW BLOOD OFF VENOUS DEVICE: CPT

## 2024-11-06 PROCEDURE — 85025 COMPLETE CBC W/AUTO DIFF WBC: CPT

## 2024-11-06 PROCEDURE — 86140 C-REACTIVE PROTEIN: CPT

## 2024-11-06 PROCEDURE — 83735 ASSAY OF MAGNESIUM: CPT

## 2024-11-06 PROCEDURE — 80076 HEPATIC FUNCTION PANEL: CPT

## 2024-11-06 PROCEDURE — 2580000003 HC RX 258: Performed by: NURSE PRACTITIONER

## 2024-11-06 PROCEDURE — 84550 ASSAY OF BLOOD/URIC ACID: CPT

## 2024-11-06 PROCEDURE — 2580000003 HC RX 258

## 2024-11-06 PROCEDURE — 84100 ASSAY OF PHOSPHORUS: CPT

## 2024-11-06 RX ADMIN — ACYCLOVIR 400 MG: 400 TABLET ORAL at 08:20

## 2024-11-06 RX ADMIN — FLUCONAZOLE 200 MG: 200 TABLET ORAL at 08:20

## 2024-11-06 RX ADMIN — CALCIUM CARBONATE-CHOLECALCIFEROL TAB 250 MG-125 UNIT 2 TABLET: 250-125 TAB at 08:20

## 2024-11-06 RX ADMIN — SODIUM CHLORIDE, PRESERVATIVE FREE 10 ML: 5 INJECTION INTRAVENOUS at 08:21

## 2024-11-06 RX ADMIN — HYDRALAZINE HYDROCHLORIDE 25 MG: 50 TABLET ORAL at 08:20

## 2024-11-06 RX ADMIN — LEVETIRACETAM 500 MG: 500 TABLET, FILM COATED ORAL at 08:20

## 2024-11-06 RX ADMIN — LISINOPRIL 40 MG: 40 TABLET ORAL at 08:20

## 2024-11-06 RX ADMIN — POTASSIUM CHLORIDE 20 MEQ: 1500 TABLET, EXTENDED RELEASE ORAL at 08:20

## 2024-11-06 RX ADMIN — SODIUM CHLORIDE: 9 INJECTION, SOLUTION INTRAVENOUS at 03:59

## 2024-11-06 RX ADMIN — AMLODIPINE BESYLATE 10 MG: 10 TABLET ORAL at 08:20

## 2024-11-06 NOTE — CARE COORDINATION
Reviewed discharge instructions with patient and  family members.  Reviewed discharge medications including dosing, schedule, indication, and adverse reactions.  Reviewed which medications were already taken today and next dosage due for each medication.      Reviewed signs and symptoms that prompt a call to the physician and appropriate phone numbers. Purple ER card given to the patient with explanations of its use.  Reviewed follow up appointments that have been made in [facility] and Outpatient Oncology.  Low microbial diet, activity restrictions, and increased risk of infection were reviewed.     Patient education provided rgarding s/s of CRS and Neurotoxicity. Written information provided.     Patient verbalized understanding of all instructions and questions were answered to her. satisfaction.  Signed discharge instructions were given to the patient and a copy placed in the paper-lite chart.  Patient discharged to home per wheelchair with family members.      Tamy Gilliam

## 2024-11-06 NOTE — CARE COORDINATION
Case Management Assessment            Discharge Note                    Date / Time of Note: 11/6/2024 10:22 AM                  Discharge Note Completed by: MELISSA Lee   for [facility] ([facility])  [employer] Mobile: [phone]    Patient Name: Leslee Maldonado   YOB: 1955  Diagnosis: Neutropenic fever (HCC) [D70.9, R50.81]   Date / Time: 11/2/2024  9:38 PM    Current PCP: Raj Cedeño MD  Clinic patient: No    Hospitalization in the last 30 days: Yes  Readmission Assessment  Number of Days since last admission?: 1-7 days  Previous Disposition: Home with Family  Who is being Interviewed: Patient  What was the patient's/caregiver's perception as to why they think they needed to return back to the hospital?: Other (Comment) (Fever)  Did you visit your Primary Care Physician after you left the hospital, before you returned this time?: No  Why weren't you able to visit your PCP?: Did not have an appointment  Did you see a specialist, such as Cardiac, Pulmonary, Orthopedic Physician, etc. after you left the hospital?: Yes  Who advised the patient to return to the hospital?: Physician, Self-referral, Caregiver  Does the patient report anything that got in the way of taking their medications?: No  In our efforts to provide the best possible care to you and others like you, can you think of anything that we could have done to help you after you left the hospital the first time, so that you might not have needed to return so soon?: Other (Comment) (nothing additional per pt)    Advance Directives:  Code Status: Full Code  [place] DNR form completed and on chart: Not Indicated    Financial:  Payor: [facility] MEDICARE / Plan: [facility] MEDICARE ADVANTAGE / Product Type: *No Product type* /      Pharmacy:    [id number] [place], [place] - [address] [place] [phone] - F [phone]  [id number]  [facility] [id number]  Phone:

## 2024-11-06 NOTE — PLAN OF CARE
Problem: Chronic Conditions and Co-morbidities  Goal: Patient's chronic conditions and co-morbidity symptoms are monitored and maintained or improved  Outcome: Progressing  Note: Patient has been afebrile this shift.  No other issues noted.      Problem: Safety - Adult  Goal: Free from fall injury  Outcome: Progressing  Note: Orthostatic vital signs obtained at start of shift - see flowsheet for details.  Pt does not meet criteria for orthostasis.  Pt is a Med fall risk. See Ayers Fall Score and ABCDS Injury Risk assessments.   - Screening for Orthostasis AND not a [place] Risk per AYERS/ABCDS: Pt bed is in low position, side rails up, call light and belongings are in reach.  Fall risk light is on outside pts room.  Pt encouraged to call for assistance as needed. Will continue with hourly rounds for PO intake, pain needs, toileting and repositioning as needed.         Problem: Neurosensory - Adult  Goal: Achieves stable or improved neurological status  Note: Car T continues to be 10/10 this shift.  No neuro changes noted.

## 2024-11-06 NOTE — DISCHARGE INSTRUCTIONS
[facility] CARVYKTI CAR-T Therapy Discharge Instructions    Call for Questions/Concerns:  [phone] ([phone]) [facility] office  The phone number listed above is available 24 hrs/7 days per week    If you experience issues reaching the On-Call physician in a timely manner? Please call the BCC unit and speak to the Charge RN- [phone]    [facility] is open M-F 8am-4:30pm; Sat-Sun/Holidays 8am-1pm    Symptoms to Report Immediately:    Fever of 100.4 or greater or chills and shaking present or when TempTraq alerts you that you have a fever.r  Signs or symptoms of CRS: racing heart, hypoxia (Shortness of breath) low blood pressure, achy, joint pain, muscle stiffness  Any signs of Neurotoxicity: Confusion, Subtle changes in mental status, dizziness, lightheadedness, Headache  Vomiting without relief after use of anti-nausea medication  Severe abdominal cramping or muscle/joint pain  Diarrhea: More than 3 loose, watery bowel movements in a 24 hour period  Unusual or excessive bleeding from your mouth, nose, rectum, bladder or vagina  Sudden onset of shortness of breath or chest pain  Signs/symptoms of infection: redness, warmth, swelling-particularly to central line site    Report to Physician's office within 24 hours:    Pain not relieved by pain medication  Change in urination-odor, cloudiness, frequency, or pain with urination  Flu-like symptoms  Skin changes-rash, hives, redness or peeling of skin    Additional Instructions:    Avoid people with colds, flu-like symptoms, or any sign of infection  Drink plenty of fluids-attempt to consume 2-3 liters ( ounces) of fluids/24 hour period  Continue low microbial diet until instructed by physician to resume normal diet  Bring all of your medications with you to your doctor's appointments  Bring your current medicine list to each hospital and office visit      PAC  -- de accessed     11/6/2024 10:26 AM  Tamy Gilliam        If you have any questions after you

## 2024-11-07 ENCOUNTER — HOSPITAL ENCOUNTER (OUTPATIENT)
Dept: ONCOLOGY | Age: 69
Setting detail: INFUSION SERIES
Discharge: HOME | End: 2024-11-07
Payer: COMMERCIAL

## 2024-11-07 VITALS
DIASTOLIC BLOOD PRESSURE: 70 MMHG | SYSTOLIC BLOOD PRESSURE: 110 MMHG | OXYGEN SATURATION: 98 % | RESPIRATION RATE: 16 BRPM | HEIGHT: 62 IN | TEMPERATURE: 98 F | HEART RATE: 82 BPM | BODY MASS INDEX: 35.13 KG/M2 | WEIGHT: 190.92 LBS

## 2024-11-07 DIAGNOSIS — C90.00 MULTIPLE MYELOMA, REMISSION STATUS UNSPECIFIED (HCC): Primary | ICD-10-CM

## 2024-11-07 DIAGNOSIS — R94.5 ABNORMAL RESULTS OF LIVER FUNCTION STUDIES: ICD-10-CM

## 2024-11-07 LAB
ANION GAP SERPL CALCULATED.3IONS-SCNC: 7 MMOL/L (ref 3–16)
BASOPHILS # BLD: 0 K/UL (ref 0–0.2)
BASOPHILS NFR BLD: 0.8 %
BUN SERPL-MCNC: 10 MG/DL (ref 7–20)
CALCIUM SERPL-MCNC: 8.7 MG/DL (ref 8.3–10.6)
CHLORIDE SERPL-SCNC: 108 MMOL/L (ref 99–110)
CO2 SERPL-SCNC: 24 MMOL/L (ref 21–32)
CREAT SERPL-MCNC: 0.8 MG/DL (ref 0.6–1.2)
CRP SERPL-MCNC: 21.4 MG/L (ref 0–5.1)
DEPRECATED RDW RBC AUTO: 17.4 % (ref 12.4–15.4)
EOSINOPHIL # BLD: 0 K/UL (ref 0–0.6)
EOSINOPHIL NFR BLD: 0.6 %
FERRITIN SERPL IA-MCNC: 87.9 NG/ML (ref 15–150)
GFR SERPLBLD CREATININE-BSD FMLA CKD-EPI: 81 ML/MIN/{1.73_M2}
GLUCOSE SERPL-MCNC: 140 MG/DL (ref 70–99)
HCT VFR BLD AUTO: 32.9 % (ref 36–48)
HGB BLD-MCNC: 11 G/DL (ref 12–16)
INR PPP: 1.02 (ref 0.85–1.15)
LYMPHOCYTES # BLD: 1.2 K/UL (ref 1–5.1)
LYMPHOCYTES NFR BLD: 67.9 %
MCH RBC QN AUTO: 29.3 PG (ref 26–34)
MCHC RBC AUTO-ENTMCNC: 33.4 G/DL (ref 31–36)
MCV RBC AUTO: 87.8 FL (ref 80–100)
MONOCYTES # BLD: 0.2 K/UL (ref 0–1.3)
MONOCYTES NFR BLD: 9.2 %
NEUTROPHILS # BLD: 0.4 K/UL (ref 1.7–7.7)
NEUTROPHILS NFR BLD: 21.5 %
PHOSPHATE SERPL-MCNC: 2.7 MG/DL (ref 2.5–4.9)
PLATELET # BLD AUTO: 141 K/UL (ref 135–450)
PMV BLD AUTO: 8.6 FL (ref 5–10.5)
POTASSIUM SERPL-SCNC: 3.6 MMOL/L (ref 3.5–5.1)
PROTHROMBIN TIME: 13.6 SEC (ref 11.9–14.9)
RBC # BLD AUTO: 3.75 M/UL (ref 4–5.2)
SODIUM SERPL-SCNC: 139 MMOL/L (ref 136–145)
WBC # BLD AUTO: 1.8 K/UL (ref 4–11)

## 2024-11-07 PROCEDURE — 85610 PROTHROMBIN TIME: CPT

## 2024-11-07 PROCEDURE — 82728 ASSAY OF FERRITIN: CPT

## 2024-11-07 PROCEDURE — 86140 C-REACTIVE PROTEIN: CPT

## 2024-11-07 PROCEDURE — 80048 BASIC METABOLIC PNL TOTAL CA: CPT

## 2024-11-07 PROCEDURE — 85025 COMPLETE CBC W/AUTO DIFF WBC: CPT

## 2024-11-07 PROCEDURE — 36591 DRAW BLOOD OFF VENOUS DEVICE: CPT

## 2024-11-07 PROCEDURE — 84100 ASSAY OF PHOSPHORUS: CPT

## 2024-11-07 RX ORDER — MAGNESIUM SULFATE IN WATER 40 MG/ML
4000 INJECTION, SOLUTION INTRAVENOUS PRN
Status: DISCONTINUED | OUTPATIENT
Start: 2024-11-07 | End: 2024-11-08 | Stop reason: HOSPADM

## 2024-11-07 RX ORDER — PROCHLORPERAZINE EDISYLATE 5 MG/ML
10 INJECTION INTRAMUSCULAR; INTRAVENOUS EVERY 6 HOURS PRN
Status: DISCONTINUED | OUTPATIENT
Start: 2024-11-07 | End: 2024-11-08 | Stop reason: HOSPADM

## 2024-11-07 RX ORDER — POTASSIUM CHLORIDE 29.8 MG/ML
80 INJECTION INTRAVENOUS PRN
Status: DISCONTINUED | OUTPATIENT
Start: 2024-11-07 | End: 2024-11-08 | Stop reason: HOSPADM

## 2024-11-07 RX ORDER — PROCHLORPERAZINE MALEATE 10 MG
10 TABLET ORAL EVERY 6 HOURS PRN
Status: CANCELLED | OUTPATIENT
Start: 2024-11-08

## 2024-11-07 RX ORDER — ONDANSETRON 2 MG/ML
8 INJECTION INTRAMUSCULAR; INTRAVENOUS EVERY 8 HOURS PRN
Status: DISCONTINUED | OUTPATIENT
Start: 2024-11-07 | End: 2024-11-08 | Stop reason: HOSPADM

## 2024-11-07 RX ORDER — ONDANSETRON 4 MG/1
8 TABLET, FILM COATED ORAL EVERY 8 HOURS PRN
Status: CANCELLED | OUTPATIENT
Start: 2024-11-08

## 2024-11-07 RX ORDER — OXYCODONE HYDROCHLORIDE 5 MG/1
10 TABLET ORAL EVERY 4 HOURS PRN
Status: DISCONTINUED | OUTPATIENT
Start: 2024-11-07 | End: 2024-11-08 | Stop reason: HOSPADM

## 2024-11-07 RX ORDER — POTASSIUM CHLORIDE 1500 MG/1
40 TABLET, EXTENDED RELEASE ORAL PRN
Status: CANCELLED | OUTPATIENT
Start: 2024-11-08

## 2024-11-07 RX ORDER — SODIUM CHLORIDE 9 MG/ML
INJECTION, SOLUTION INTRAVENOUS CONTINUOUS PRN
Status: CANCELLED | OUTPATIENT
Start: 2024-11-08

## 2024-11-07 RX ORDER — HEPARIN 100 UNIT/ML
500 SYRINGE INTRAVENOUS PRN
Status: DISCONTINUED | OUTPATIENT
Start: 2024-11-07 | End: 2024-11-08 | Stop reason: HOSPADM

## 2024-11-07 RX ORDER — OXYCODONE HYDROCHLORIDE 5 MG/1
5 TABLET ORAL EVERY 4 HOURS PRN
Status: CANCELLED | OUTPATIENT
Start: 2024-11-08

## 2024-11-07 RX ORDER — SODIUM CHLORIDE 9 MG/ML
INJECTION, SOLUTION INTRAVENOUS CONTINUOUS PRN
Status: DISCONTINUED | OUTPATIENT
Start: 2024-11-07 | End: 2024-11-08 | Stop reason: HOSPADM

## 2024-11-07 RX ORDER — ONDANSETRON 4 MG/1
8 TABLET, FILM COATED ORAL EVERY 8 HOURS PRN
Status: DISCONTINUED | OUTPATIENT
Start: 2024-11-07 | End: 2024-11-08 | Stop reason: HOSPADM

## 2024-11-07 RX ORDER — PROCHLORPERAZINE EDISYLATE 5 MG/ML
10 INJECTION INTRAMUSCULAR; INTRAVENOUS EVERY 6 HOURS PRN
Status: CANCELLED | OUTPATIENT
Start: 2024-11-08

## 2024-11-07 RX ORDER — POTASSIUM CHLORIDE 1500 MG/1
40 TABLET, EXTENDED RELEASE ORAL PRN
Status: DISCONTINUED | OUTPATIENT
Start: 2024-11-07 | End: 2024-11-08 | Stop reason: HOSPADM

## 2024-11-07 RX ORDER — ONDANSETRON 2 MG/ML
8 INJECTION INTRAMUSCULAR; INTRAVENOUS EVERY 8 HOURS PRN
Status: CANCELLED | OUTPATIENT
Start: 2024-11-08

## 2024-11-07 RX ORDER — HEPARIN 100 UNIT/ML
500 SYRINGE INTRAVENOUS PRN
Status: CANCELLED | OUTPATIENT
Start: 2024-11-08

## 2024-11-07 RX ORDER — OXYCODONE HYDROCHLORIDE 5 MG/1
5 TABLET ORAL EVERY 4 HOURS PRN
Status: DISCONTINUED | OUTPATIENT
Start: 2024-11-07 | End: 2024-11-08 | Stop reason: HOSPADM

## 2024-11-07 RX ORDER — POTASSIUM CHLORIDE 29.8 MG/ML
80 INJECTION INTRAVENOUS PRN
Status: CANCELLED | OUTPATIENT
Start: 2024-11-08 | End: 2025-02-16

## 2024-11-07 RX ORDER — MAGNESIUM SULFATE IN WATER 40 MG/ML
4000 INJECTION, SOLUTION INTRAVENOUS PRN
Status: CANCELLED | OUTPATIENT
Start: 2024-11-08

## 2024-11-07 RX ORDER — PROCHLORPERAZINE MALEATE 10 MG
10 TABLET ORAL EVERY 6 HOURS PRN
Status: DISCONTINUED | OUTPATIENT
Start: 2024-11-07 | End: 2024-11-08 | Stop reason: HOSPADM

## 2024-11-07 RX ORDER — OXYCODONE HYDROCHLORIDE 5 MG/1
10 TABLET ORAL EVERY 4 HOURS PRN
Status: CANCELLED | OUTPATIENT
Start: 2024-11-08

## 2024-11-07 NOTE — PROGRESS NOTES
Short Stay Communication Note  Leslee Maldonado  Diagnosis:  Primary MD:  Treatment: Melphalan Fludarabine/Cytoxan  CAR-T Administration Date:   Day +10 of  CARVYKTI    Pt seen in outpatient infusion today. Labs drawn and reviewed.   CBC:   Recent Labs     11/05/24 0425 11/06/24 0400 11/07/24  0823   WBC 1.0* 1.4* 1.8*   HGB 10.6* 10.2* 11.0*   HCT 31.6* 30.7* 32.9*   MCV 87.5 87.4 87.8   * 107* 141     BMP/Mag:  Recent Labs     11/05/24 0425 11/06/24 0400 11/07/24  0823    139 139   K 4.0 3.6 3.6    108 108   CO2 25 24 24   PHOS 2.5 2.6 2.7   BUN 7 7 10   CREATININE 0.8 0.7 0.8   MG 2.20 2.10  --      Standing parameters for replacement for this patient:   1 unit of pack red blood cells for a hemoglobin < or equal to 7  1 pack of platelets for a platelet count less than or equal to 10  40 MeQ of Potassium administered for a potassium level less than or equal to 3.4  4g of Magnesium Sulfate for a magnesum level less than or equal to 1.4  No transfusions required for the above lab values.    Urinalysis last done: 11/04/2024   Urinalysis next due: 11/11/2024    Chest X-Ray last done: 11/4/2024  Chest X-Ray next due: 11/112024    Symptoms addressed and reported to care team this date: Pt reported constipation.  Provider stated patient can take stool softner if needed and or other otc medications.      Treatments this date:   11/7/24 Right port cath-yassine instilled and aspirated, brisk blood return noted. Deaccessed on discharge    Education provided on neutropenic precautions. ANC 0.4    Reviewed medication schedule with pt and caregiver. Both able to verbalize all medications and schedule. Pt to be seen again tomorrow. Patient and caregiver verbalized understanding of discharge instructions including when and how to call the doctor and when to report  to the ER. TempTraq successfully [placed 11/6/2024 following discharged from inpatient unit. Temperatures flowing into cruz accurately at this 
Patient arrived with caregiver. Patient reported eating and drinking well.  Patient reports peanut butter sandwich, 2 cutie oranges, doritos, pretzels, and cereal with milk for breakfast. Encouraged to eat small snacks all day include protein. Patient drinking adequately 1230 ml pt encouraged to increase volume.     Patient educated on ANC 0.4.  Neutropenic precautions.     Potassium 3.6 replace with 40 mEq tablet if less than 3.4.  Replacement not indicated.   
5, 9, 15 and Del 13q, del 17, IgH heavy chain rearranged and dup 1q. Cyto 46XX   - 4/15/20, progression:  SPEP 0.6 g/dL, IgG 1514  - 2/23/22, progression:  M-spike 1.1  - 6/26/24, progression:  SPEP 0.6 g/dL  - 7/24/24, progression confirmed:  SPEP 0.8 g/dL, IgG 1202.  SFLC kappa 21.9, lambda 9034, ratio 0.24  - 9/5/24:  T-cells collected  - 10/23-10/25/24, lymphodepletion:  fludarabine, cyclophosphamide  - 10/28/24:  Carvykti infusion (progressive disease at the time of the infusion)    PLAN:   s/p Carvykti infusion Day 10        2.  Cytokine Release Syndrome:  No evidence for > 48 hours    CRS stGstrstastdstest:st st1st at discharge  - 11/2-11/4/24:  CRS grade 1 - fever only     3.  NEURO  Numbness/tingling bilateral plantar feet, chronic:  Stable   - She denies taking any medication  ICANS:  No evidence throughout this admission  - Continue Keppra 500 mg bid     4.  ID:    ID Hx:  - 4/9 - 4/18/24:  Levaquin for sinusitis   - 4/19/24, resp viral panel:  human metapneumovirus  - 11/2/24:  PanCx with NGTD  - 11/2-11/5/24:  empiric coverage with cefepime     Hypogammaglobulinemia  - IgG 4/21/24: 493  - IgG 5/1/24: 900 (likely represents disease)      Current PPx  - Continue acyclovir 400 mg bid  - Continue fluconazole 200 mg qd  - Continue Levaquin 500 mg qd     Post-CAR T monitoring/maintenance  - IgG & CD4 level monthly starting on day + 30  - Check disease titers on day + 30 or when WBC recovered. Re-vaccinate vs booster based on titer (assure titers are drawn prior to administering immunoglobulins)  - Start PCP ppx on day + 30 - stop when CD4 > 250  - Continue Valtrex for 1 year post CAR-T        5.  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  METABOLIC   Risk for TLS:  no evidence throughout this hospitalization  - Discontinue allopurinol 300 mg qd        7.  GI, NUTRITION   GERD:  well-controlled   - Tums PRN        8.

## 2024-11-08 ENCOUNTER — HOSPITAL ENCOUNTER (OUTPATIENT)
Dept: ONCOLOGY | Age: 69
Setting detail: INFUSION SERIES
Discharge: HOME | End: 2024-11-08
Payer: COMMERCIAL

## 2024-11-08 VITALS
SYSTOLIC BLOOD PRESSURE: 102 MMHG | WEIGHT: 192.68 LBS | HEART RATE: 89 BPM | TEMPERATURE: 98.5 F | DIASTOLIC BLOOD PRESSURE: 61 MMHG | OXYGEN SATURATION: 97 % | BODY MASS INDEX: 35.24 KG/M2 | RESPIRATION RATE: 16 BRPM

## 2024-11-08 DIAGNOSIS — R94.5 ABNORMAL RESULTS OF LIVER FUNCTION STUDIES: ICD-10-CM

## 2024-11-08 DIAGNOSIS — C90.00 MULTIPLE MYELOMA, REMISSION STATUS UNSPECIFIED (HCC): Primary | ICD-10-CM

## 2024-11-08 LAB
ALBUMIN SERPL-MCNC: 3.7 G/DL (ref 3.4–5)
ALP SERPL-CCNC: 49 U/L (ref 40–129)
ALT SERPL-CCNC: 22 U/L (ref 10–40)
ANION GAP SERPL CALCULATED.3IONS-SCNC: 9 MMOL/L (ref 3–16)
ANISOCYTOSIS BLD QL SMEAR: ABNORMAL
AST SERPL-CCNC: 27 U/L (ref 15–37)
BASOPHILS # BLD: 0 K/UL (ref 0–0.2)
BASOPHILS NFR BLD: 0 %
BILIRUB DIRECT SERPL-MCNC: 0.2 MG/DL (ref 0–0.3)
BILIRUB INDIRECT SERPL-MCNC: 0.2 MG/DL (ref 0–1)
BILIRUB SERPL-MCNC: 0.4 MG/DL (ref 0–1)
BUN SERPL-MCNC: 14 MG/DL (ref 7–20)
CALCIUM SERPL-MCNC: 8.9 MG/DL (ref 8.3–10.6)
CHLORIDE SERPL-SCNC: 107 MMOL/L (ref 99–110)
CO2 SERPL-SCNC: 24 MMOL/L (ref 21–32)
CREAT SERPL-MCNC: 0.8 MG/DL (ref 0.6–1.2)
CRP SERPL-MCNC: 15.1 MG/L (ref 0–5.1)
DACRYOCYTES BLD QL SMEAR: ABNORMAL
DEPRECATED RDW RBC AUTO: 17.9 % (ref 12.4–15.4)
EOSINOPHIL # BLD: 0 K/UL (ref 0–0.6)
EOSINOPHIL NFR BLD: 0 %
FERRITIN SERPL IA-MCNC: 75.2 NG/ML (ref 15–150)
GFR SERPLBLD CREATININE-BSD FMLA CKD-EPI: 81 ML/MIN/{1.73_M2}
GLUCOSE SERPL-MCNC: 146 MG/DL (ref 70–99)
HCT VFR BLD AUTO: 33.6 % (ref 36–48)
HGB BLD-MCNC: 11 G/DL (ref 12–16)
LDH SERPL L TO P-CCNC: 222 U/L (ref 100–190)
LYMPHOCYTES # BLD: 0.8 K/UL (ref 1–5.1)
LYMPHOCYTES NFR BLD: 51 %
MAGNESIUM SERPL-MCNC: 2.1 MG/DL (ref 1.8–2.4)
MCH RBC QN AUTO: 28.7 PG (ref 26–34)
MCHC RBC AUTO-ENTMCNC: 32.6 G/DL (ref 31–36)
MCV RBC AUTO: 88 FL (ref 80–100)
MONOCYTES # BLD: 0.2 K/UL (ref 0–1.3)
MONOCYTES NFR BLD: 16 %
NEUTROPHILS # BLD: 0.5 K/UL (ref 1.7–7.7)
NEUTROPHILS NFR BLD: 31 %
NRBC BLD-RTO: 1 /100 WBC
OVALOCYTES BLD QL SMEAR: ABNORMAL
PHOSPHATE SERPL-MCNC: 3.4 MG/DL (ref 2.5–4.9)
PLATELET # BLD AUTO: 145 K/UL (ref 135–450)
PMV BLD AUTO: 8.3 FL (ref 5–10.5)
POTASSIUM SERPL-SCNC: 3.7 MMOL/L (ref 3.5–5.1)
PROT SERPL-MCNC: 5.6 G/DL (ref 6.4–8.2)
RBC # BLD AUTO: 3.82 M/UL (ref 4–5.2)
SODIUM SERPL-SCNC: 140 MMOL/L (ref 136–145)
URATE SERPL-MCNC: 2 MG/DL (ref 2.6–6)
VARIANT LYMPHS NFR BLD MANUAL: 2 % (ref 0–6)
WBC # BLD AUTO: 1.5 K/UL (ref 4–11)

## 2024-11-08 PROCEDURE — 84550 ASSAY OF BLOOD/URIC ACID: CPT

## 2024-11-08 PROCEDURE — 82728 ASSAY OF FERRITIN: CPT

## 2024-11-08 PROCEDURE — 36591 DRAW BLOOD OFF VENOUS DEVICE: CPT

## 2024-11-08 PROCEDURE — 83615 LACTATE (LD) (LDH) ENZYME: CPT

## 2024-11-08 PROCEDURE — 80048 BASIC METABOLIC PNL TOTAL CA: CPT

## 2024-11-08 PROCEDURE — 86140 C-REACTIVE PROTEIN: CPT

## 2024-11-08 PROCEDURE — 83735 ASSAY OF MAGNESIUM: CPT

## 2024-11-08 PROCEDURE — 84100 ASSAY OF PHOSPHORUS: CPT

## 2024-11-08 PROCEDURE — 80076 HEPATIC FUNCTION PANEL: CPT

## 2024-11-08 PROCEDURE — 85025 COMPLETE CBC W/AUTO DIFF WBC: CPT

## 2024-11-08 RX ORDER — OXYCODONE HYDROCHLORIDE 5 MG/1
5 TABLET ORAL EVERY 4 HOURS PRN
Status: DISCONTINUED | OUTPATIENT
Start: 2024-11-08 | End: 2024-11-09 | Stop reason: HOSPADM

## 2024-11-08 RX ORDER — ONDANSETRON 4 MG/1
8 TABLET, FILM COATED ORAL EVERY 8 HOURS PRN
Status: CANCELLED | OUTPATIENT
Start: 2024-11-09

## 2024-11-08 RX ORDER — ONDANSETRON 4 MG/1
8 TABLET, FILM COATED ORAL EVERY 8 HOURS PRN
Status: DISCONTINUED | OUTPATIENT
Start: 2024-11-08 | End: 2024-11-09 | Stop reason: HOSPADM

## 2024-11-08 RX ORDER — HEPARIN 100 UNIT/ML
500 SYRINGE INTRAVENOUS PRN
Status: DISCONTINUED | OUTPATIENT
Start: 2024-11-08 | End: 2024-11-09 | Stop reason: HOSPADM

## 2024-11-08 RX ORDER — PROCHLORPERAZINE MALEATE 10 MG
10 TABLET ORAL EVERY 6 HOURS PRN
Status: DISCONTINUED | OUTPATIENT
Start: 2024-11-08 | End: 2024-11-09 | Stop reason: HOSPADM

## 2024-11-08 RX ORDER — SODIUM CHLORIDE 9 MG/ML
INJECTION, SOLUTION INTRAVENOUS CONTINUOUS PRN
Status: DISCONTINUED | OUTPATIENT
Start: 2024-11-08 | End: 2024-11-09 | Stop reason: HOSPADM

## 2024-11-08 RX ORDER — PROCHLORPERAZINE EDISYLATE 5 MG/ML
10 INJECTION INTRAMUSCULAR; INTRAVENOUS EVERY 6 HOURS PRN
Status: DISCONTINUED | OUTPATIENT
Start: 2024-11-08 | End: 2024-11-09 | Stop reason: HOSPADM

## 2024-11-08 RX ORDER — POTASSIUM CHLORIDE 29.8 MG/ML
80 INJECTION INTRAVENOUS PRN
Status: DISCONTINUED | OUTPATIENT
Start: 2024-11-08 | End: 2024-11-09 | Stop reason: HOSPADM

## 2024-11-08 RX ORDER — HEPARIN 100 UNIT/ML
500 SYRINGE INTRAVENOUS PRN
Status: CANCELLED | OUTPATIENT
Start: 2024-11-09

## 2024-11-08 RX ORDER — PROCHLORPERAZINE EDISYLATE 5 MG/ML
10 INJECTION INTRAMUSCULAR; INTRAVENOUS EVERY 6 HOURS PRN
Status: CANCELLED | OUTPATIENT
Start: 2024-11-09

## 2024-11-08 RX ORDER — SODIUM CHLORIDE 9 MG/ML
INJECTION, SOLUTION INTRAVENOUS CONTINUOUS PRN
Status: CANCELLED | OUTPATIENT
Start: 2024-11-09

## 2024-11-08 RX ORDER — ONDANSETRON 2 MG/ML
8 INJECTION INTRAMUSCULAR; INTRAVENOUS EVERY 8 HOURS PRN
Status: DISCONTINUED | OUTPATIENT
Start: 2024-11-08 | End: 2024-11-09 | Stop reason: HOSPADM

## 2024-11-08 RX ORDER — OXYCODONE HYDROCHLORIDE 5 MG/1
10 TABLET ORAL EVERY 4 HOURS PRN
Status: DISCONTINUED | OUTPATIENT
Start: 2024-11-08 | End: 2024-11-09 | Stop reason: HOSPADM

## 2024-11-08 RX ORDER — POTASSIUM CHLORIDE 1500 MG/1
40 TABLET, EXTENDED RELEASE ORAL PRN
Status: CANCELLED | OUTPATIENT
Start: 2024-11-09

## 2024-11-08 RX ORDER — OXYCODONE HYDROCHLORIDE 5 MG/1
5 TABLET ORAL EVERY 4 HOURS PRN
Status: CANCELLED | OUTPATIENT
Start: 2024-11-09

## 2024-11-08 RX ORDER — PROCHLORPERAZINE MALEATE 10 MG
10 TABLET ORAL EVERY 6 HOURS PRN
Status: CANCELLED | OUTPATIENT
Start: 2024-11-09

## 2024-11-08 RX ORDER — MAGNESIUM SULFATE IN WATER 40 MG/ML
4000 INJECTION, SOLUTION INTRAVENOUS PRN
Status: CANCELLED | OUTPATIENT
Start: 2024-11-09

## 2024-11-08 RX ORDER — OXYCODONE HYDROCHLORIDE 5 MG/1
10 TABLET ORAL EVERY 4 HOURS PRN
Status: CANCELLED | OUTPATIENT
Start: 2024-11-09

## 2024-11-08 RX ORDER — POTASSIUM CHLORIDE 29.8 MG/ML
80 INJECTION INTRAVENOUS PRN
Status: CANCELLED | OUTPATIENT
Start: 2024-11-09 | End: 2025-02-17

## 2024-11-08 RX ORDER — ONDANSETRON 2 MG/ML
8 INJECTION INTRAMUSCULAR; INTRAVENOUS EVERY 8 HOURS PRN
Status: CANCELLED | OUTPATIENT
Start: 2024-11-09

## 2024-11-08 RX ORDER — POTASSIUM CHLORIDE 1500 MG/1
40 TABLET, EXTENDED RELEASE ORAL PRN
Status: DISCONTINUED | OUTPATIENT
Start: 2024-11-08 | End: 2024-11-09 | Stop reason: HOSPADM

## 2024-11-08 RX ORDER — MAGNESIUM SULFATE IN WATER 40 MG/ML
4000 INJECTION, SOLUTION INTRAVENOUS PRN
Status: DISCONTINUED | OUTPATIENT
Start: 2024-11-08 | End: 2024-11-09 | Stop reason: HOSPADM

## 2024-11-08 NOTE — PROGRESS NOTES
BCC Progress Note      2024    Leslee Maldonado    :  1955    MRN:  [id number]    Referring MD: Mahad Evangelista MD  [address]  [address] [address]      Subjective:  No new complaints.  Remains afeb.      ECOG PS:  (1) Restricted in physically strenuous activity, ambulatory and able to do work of light nature    KPS: 90% Able to carry on normal activity; minor signs or symptoms of disease    Isolation:  None     Medications    Scheduled Meds:      Continuous Infusions:      PRN Meds:.      ROS:  As noted above, otherwise remainder of 10-point ROS negative      Physical Exam:     Vital Signs:  There were no vitals taken for this visit.    Weight:    Wt Readings from Last 3 Encounters:   24 86.6 kg (190 lb 14.7 oz)   24 86.3 kg (190 lb 3.2 oz)   24 86.7 kg (191 lb 2.2 oz)       General: Awake, alert and oriented.  HEENT: normocephalic, alopecia, PERRL, no scleral erythema or icterus, Oral mucosa moist and intact, throat clear  NECK: supple   BACK: Straight   SKIN: warm dry and intact without lesions rashes or masses  CHEST: CTA bilaterally without use of accessory muscles  CV: Normal S1 S2, RRR, no MRG  ABD: NT ND normoactive BS  EXTREMITIES: without edema, denies calf tenderness  NEURO: CN II - XII grossly intact  CATHETER: PAC    Laboratory Data:  CBC:   Recent Labs     24  0400 24  0823   WBC 1.4* 1.8*   HGB 10.2* 11.0*   HCT 30.7* 32.9*   MCV 87.4 87.8   * 141     BMP/Mag:  Recent Labs     24  0400 24  0823    139   K 3.6 3.6    108   CO2  24   PHOS 2.6 2.7   BUN 7 10   CREATININE 0.7 0.8   MG 2.10  --      LIVP:   Recent Labs     24  040   AST 17   ALT 14   BILIDIR <0.1   BILITOT 0.3   ALKPHOS 42     Coags:   Recent Labs     24  0823   PROTIME 13.6   INR 1.02     Uric Acid No results for input(s): \"LABURIC\" in the last 72 hours.  Lab Results   Component Value Date    CRP 21.4 (H) 2024    CRP

## 2024-11-08 NOTE — PROGRESS NOTES
Short Stay Communication Note  Leslee Maldonado  Diagnosis:  Primary MD:  Treatment: Melphalan Fludarabine/Cytoxan  CAR-T Administration Date: 10/28/24  Day +11 of  CARVYKTI    Pt seen in outpatient infusion today. Labs drawn and reviewed.   CBC:   Recent Labs     11/06/24 0400 11/07/24 0823 11/08/24 0827   WBC 1.4* 1.8* 1.5*   HGB 10.2* 11.0* 11.0*   HCT 30.7* 32.9* 33.6*   MCV 87.4 87.8 88.0   * 141 145     BMP/Mag:  Recent Labs     11/06/24 0400 11/07/24 0823 11/08/24 0827    139 140   K 3.6 3.6 3.7    108 107   CO2 24 24 24   PHOS 2.6 2.7 3.4   BUN 7 10 14   CREATININE 0.7 0.8 0.8   MG 2.10  --  2.10     Standing parameters for replacement for this patient:   1 unit of pack red blood cells for a hemoglobin < or equal to 7  1 pack of platelets for a platelet count less than or equal to 10  40 MeQ of Potassium administered for a potassium level less than or equal to 3.4  4g of Magnesium Sulfate for a magnesum level less than or equal to 1.4  No transfusions required for the above lab values.    Urinalysis last done: 11/04/2024   Urinalysis next due: 11/11/2024    Chest X-Ray last done: 11/4/2024  Chest X-Ray next due: 11/112024    Symptoms addressed and reported to care team this date: None    Treatments this date: none      Reviewed medication schedule with pt and caregiver. Both able to verbalize all medications and schedule. Pt to be seen again tomorrow. Patient and caregiver verbalized understanding of discharge instructions including when and how to call the doctor and when to report  to the ER. TempTraq successfully placed 11/6/2024 following discharged from inpatient unit. Temperatures flowing into cruz accurately at this time. Discharged ambulatory to home.

## 2024-11-08 NOTE — PROGRESS NOTES
Spiritual Health History and Assessment/Progress Note  [facility]    (P) Initial Encounter,  ,  ,      Name: Leslee Maldonado MRN: [id number]    Age: 69 y.o.     Sex: female   Language: English   Christian:  Adventism Jainism   <principal problem not specified>     Date: 11/8/2024            Total Time Calculated: (P) 11 min              Spiritual Assessment began in [facility]        Referral/Consult From: (P) Rounding   Encounter Overview/Reason: (P) Initial Encounter  Service Provided For: (P) Patient    Mary, Belief, Meaning:   Patient is connected with a mary tradition or spiritual practice  Identifies as Adventism and has been part of a Scientologist for over 20 years       Importance and Influence:  Patient unable to assess at this time      Community:  Patient feels well-supported. Support system includes: Children and extended family      Assessment and Plan of Care:     Patient Interventions include: Facilitated expression of thoughts and feelings      Patient Plan of Care: Spiritual Care available upon further referral      Electronically signed by Jenny Lagos,  Intern on 11/8/2024 at 9:03 AM

## 2024-11-09 ENCOUNTER — HOSPITAL ENCOUNTER (OUTPATIENT)
Dept: ONCOLOGY | Age: 69
Setting detail: INFUSION SERIES
Discharge: HOME | End: 2024-11-09
Payer: COMMERCIAL

## 2024-11-09 VITALS
HEART RATE: 87 BPM | WEIGHT: 192.24 LBS | BODY MASS INDEX: 35.16 KG/M2 | DIASTOLIC BLOOD PRESSURE: 62 MMHG | RESPIRATION RATE: 17 BRPM | OXYGEN SATURATION: 97 % | SYSTOLIC BLOOD PRESSURE: 110 MMHG | TEMPERATURE: 98.5 F

## 2024-11-09 DIAGNOSIS — C90.00 MULTIPLE MYELOMA, REMISSION STATUS UNSPECIFIED (HCC): Primary | ICD-10-CM

## 2024-11-09 DIAGNOSIS — R94.5 ABNORMAL RESULTS OF LIVER FUNCTION STUDIES: ICD-10-CM

## 2024-11-09 LAB
ANION GAP SERPL CALCULATED.3IONS-SCNC: 8 MMOL/L (ref 3–16)
BASOPHILS # BLD: 0 K/UL (ref 0–0.2)
BASOPHILS NFR BLD: 1 %
BUN SERPL-MCNC: 13 MG/DL (ref 7–20)
CALCIUM SERPL-MCNC: 9.3 MG/DL (ref 8.3–10.6)
CHLORIDE SERPL-SCNC: 107 MMOL/L (ref 99–110)
CO2 SERPL-SCNC: 26 MMOL/L (ref 21–32)
CREAT SERPL-MCNC: 0.9 MG/DL (ref 0.6–1.2)
CRP SERPL-MCNC: 10.1 MG/L (ref 0–5.1)
DEPRECATED RDW RBC AUTO: 17.6 % (ref 12.4–15.4)
EOSINOPHIL # BLD: 0 K/UL (ref 0–0.6)
EOSINOPHIL NFR BLD: 0.9 %
FERRITIN SERPL IA-MCNC: 66.4 NG/ML (ref 15–150)
GFR SERPLBLD CREATININE-BSD FMLA CKD-EPI: 70 ML/MIN/{1.73_M2}
GLUCOSE SERPL-MCNC: 127 MG/DL (ref 70–99)
HCT VFR BLD AUTO: 35.1 % (ref 36–48)
HGB BLD-MCNC: 11.5 G/DL (ref 12–16)
LYMPHOCYTES # BLD: 0.7 K/UL (ref 1–5.1)
LYMPHOCYTES NFR BLD: 56.4 %
MCH RBC QN AUTO: 28.9 PG (ref 26–34)
MCHC RBC AUTO-ENTMCNC: 32.7 G/DL (ref 31–36)
MCV RBC AUTO: 88.1 FL (ref 80–100)
MONOCYTES # BLD: 0.3 K/UL (ref 0–1.3)
MONOCYTES NFR BLD: 21.6 %
NEUTROPHILS # BLD: 0.2 K/UL (ref 1.7–7.7)
NEUTROPHILS NFR BLD: 20.1 %
PHOSPHATE SERPL-MCNC: 3.7 MG/DL (ref 2.5–4.9)
PLATELET # BLD AUTO: 150 K/UL (ref 135–450)
PMV BLD AUTO: 8.1 FL (ref 5–10.5)
POTASSIUM SERPL-SCNC: 3.5 MMOL/L (ref 3.5–5.1)
RBC # BLD AUTO: 3.98 M/UL (ref 4–5.2)
SODIUM SERPL-SCNC: 141 MMOL/L (ref 136–145)
WBC # BLD AUTO: 1.2 K/UL (ref 4–11)

## 2024-11-09 PROCEDURE — 84100 ASSAY OF PHOSPHORUS: CPT

## 2024-11-09 PROCEDURE — 82728 ASSAY OF FERRITIN: CPT

## 2024-11-09 PROCEDURE — 36591 DRAW BLOOD OFF VENOUS DEVICE: CPT

## 2024-11-09 PROCEDURE — 80048 BASIC METABOLIC PNL TOTAL CA: CPT

## 2024-11-09 PROCEDURE — 86140 C-REACTIVE PROTEIN: CPT

## 2024-11-09 PROCEDURE — 85025 COMPLETE CBC W/AUTO DIFF WBC: CPT

## 2024-11-09 RX ORDER — PROCHLORPERAZINE MALEATE 10 MG
10 TABLET ORAL EVERY 6 HOURS PRN
Status: DISCONTINUED | OUTPATIENT
Start: 2024-11-09 | End: 2024-11-10 | Stop reason: HOSPADM

## 2024-11-09 RX ORDER — HEPARIN 100 UNIT/ML
500 SYRINGE INTRAVENOUS PRN
Status: DISCONTINUED | OUTPATIENT
Start: 2024-11-09 | End: 2024-11-10 | Stop reason: HOSPADM

## 2024-11-09 RX ORDER — OXYCODONE HYDROCHLORIDE 5 MG/1
10 TABLET ORAL EVERY 4 HOURS PRN
OUTPATIENT
Start: 2024-11-10

## 2024-11-09 RX ORDER — HEPARIN 100 UNIT/ML
500 SYRINGE INTRAVENOUS PRN
OUTPATIENT
Start: 2024-11-10

## 2024-11-09 RX ORDER — OXYCODONE HYDROCHLORIDE 5 MG/1
5 TABLET ORAL EVERY 4 HOURS PRN
OUTPATIENT
Start: 2024-11-10

## 2024-11-09 RX ORDER — PROCHLORPERAZINE EDISYLATE 5 MG/ML
10 INJECTION INTRAMUSCULAR; INTRAVENOUS EVERY 6 HOURS PRN
OUTPATIENT
Start: 2024-11-10

## 2024-11-09 RX ORDER — ONDANSETRON 4 MG/1
8 TABLET, FILM COATED ORAL EVERY 8 HOURS PRN
OUTPATIENT
Start: 2024-11-10

## 2024-11-09 RX ORDER — OXYCODONE HYDROCHLORIDE 5 MG/1
5 TABLET ORAL EVERY 4 HOURS PRN
Status: DISCONTINUED | OUTPATIENT
Start: 2024-11-09 | End: 2024-11-10 | Stop reason: HOSPADM

## 2024-11-09 RX ORDER — MAGNESIUM SULFATE IN WATER 40 MG/ML
4000 INJECTION, SOLUTION INTRAVENOUS PRN
Status: DISCONTINUED | OUTPATIENT
Start: 2024-11-09 | End: 2024-11-10 | Stop reason: HOSPADM

## 2024-11-09 RX ORDER — POTASSIUM CHLORIDE 29.8 MG/ML
80 INJECTION INTRAVENOUS PRN
OUTPATIENT
Start: 2024-11-10 | End: 2025-02-18

## 2024-11-09 RX ORDER — POTASSIUM CHLORIDE 1500 MG/1
40 TABLET, EXTENDED RELEASE ORAL PRN
OUTPATIENT
Start: 2024-11-10

## 2024-11-09 RX ORDER — POTASSIUM CHLORIDE 29.8 MG/ML
80 INJECTION INTRAVENOUS PRN
Status: DISCONTINUED | OUTPATIENT
Start: 2024-11-09 | End: 2024-11-10 | Stop reason: HOSPADM

## 2024-11-09 RX ORDER — POTASSIUM CHLORIDE 1500 MG/1
40 TABLET, EXTENDED RELEASE ORAL PRN
Status: DISCONTINUED | OUTPATIENT
Start: 2024-11-09 | End: 2024-11-10 | Stop reason: HOSPADM

## 2024-11-09 RX ORDER — OXYCODONE HYDROCHLORIDE 5 MG/1
10 TABLET ORAL EVERY 4 HOURS PRN
Status: DISCONTINUED | OUTPATIENT
Start: 2024-11-09 | End: 2024-11-10 | Stop reason: HOSPADM

## 2024-11-09 RX ORDER — SODIUM CHLORIDE 9 MG/ML
INJECTION, SOLUTION INTRAVENOUS CONTINUOUS PRN
OUTPATIENT
Start: 2024-11-10

## 2024-11-09 RX ORDER — SODIUM CHLORIDE 9 MG/ML
INJECTION, SOLUTION INTRAVENOUS CONTINUOUS PRN
Status: DISCONTINUED | OUTPATIENT
Start: 2024-11-09 | End: 2024-11-10 | Stop reason: HOSPADM

## 2024-11-09 RX ORDER — MAGNESIUM SULFATE IN WATER 40 MG/ML
4000 INJECTION, SOLUTION INTRAVENOUS PRN
OUTPATIENT
Start: 2024-11-10

## 2024-11-09 RX ORDER — ONDANSETRON 4 MG/1
8 TABLET, FILM COATED ORAL EVERY 8 HOURS PRN
Status: DISCONTINUED | OUTPATIENT
Start: 2024-11-09 | End: 2024-11-10 | Stop reason: HOSPADM

## 2024-11-09 RX ORDER — ONDANSETRON 2 MG/ML
8 INJECTION INTRAMUSCULAR; INTRAVENOUS EVERY 8 HOURS PRN
OUTPATIENT
Start: 2024-11-10

## 2024-11-09 RX ORDER — PROCHLORPERAZINE MALEATE 10 MG
10 TABLET ORAL EVERY 6 HOURS PRN
OUTPATIENT
Start: 2024-11-10

## 2024-11-09 RX ORDER — PROCHLORPERAZINE EDISYLATE 5 MG/ML
10 INJECTION INTRAMUSCULAR; INTRAVENOUS EVERY 6 HOURS PRN
Status: DISCONTINUED | OUTPATIENT
Start: 2024-11-09 | End: 2024-11-10 | Stop reason: HOSPADM

## 2024-11-09 RX ORDER — ONDANSETRON 2 MG/ML
8 INJECTION INTRAMUSCULAR; INTRAVENOUS EVERY 8 HOURS PRN
Status: DISCONTINUED | OUTPATIENT
Start: 2024-11-09 | End: 2024-11-10 | Stop reason: HOSPADM

## 2024-11-09 NOTE — PROGRESS NOTES
Short Stay Communication Note  Leslee Maldonado  Diagnosis:MM  Primary MD: John  Treatment: Melphalan Fludarabine/Cytoxan  CAR-T Administration Date: 10/28/24  Day +12 of  CARVYKTI    Pt seen in outpatient infusion today. Labs drawn and reviewed.   CBC:   Recent Labs     11/07/24 0823 11/08/24 0827 11/09/24  0849   WBC 1.8* 1.5* 1.2*   HGB 11.0* 11.0* 11.5*   HCT 32.9* 33.6* 35.1*   MCV 87.8 88.0 88.1    145 150     BMP/Mag:  Recent Labs     11/07/24 0823 11/08/24 0827 11/09/24  0849    140 141   K 3.6 3.7 3.5    107 107   CO2 24 24 26   PHOS 2.7 3.4 3.7   BUN 10 14 13   CREATININE 0.8 0.8 0.9   MG  --  2.10  --      Standing parameters for replacement for this patient:   1 unit of pack red blood cells for a hemoglobin < or equal to 7  1 pack of platelets for a platelet count less than or equal to 10  40 MeQ of Potassium administered for a potassium level less than or equal to 3.4  4g of Magnesium Sulfate for a magnesum level less than or equal to 1.4  No transfusions required for the above lab values.    Urinalysis last done: 11/04/2024   Urinalysis next due: 11/11/2024    Chest X-Ray last done: 11/4/2024  Chest X-Ray next due: 11/112024    Symptoms addressed and reported to care team this date: None    Treatments this date: none. Patient was told to hold Norvasc today due to BP reading this morning.      Reviewed medication schedule with pt and caregiver. Both able to verbalize all medications and schedule. Pt to be seen again tomorrow. Patient and caregiver verbalized understanding of discharge instructions including when and how to call the doctor and when to report  to the ER. TempTraq successfully placed 11/6/2024 following discharged from inpatient unit. Temperatures flowing into cruz accurately at this time. Discharged ambulatory to home.   
60% plasma cells, FISH hyperdiploidy 5, 9, 15 and Del 13q, del 17, IgH heavy chain rearranged and dup 1q. Cyto 46XX   - 4/15/20, progression:  SPEP 0.6 g/dL, IgG 1514  - 2/23/22, progression:  M-spike 1.1  - 6/26/24, progression:  SPEP 0.6 g/dL  - 7/24/24, progression confirmed:  SPEP 0.8 g/dL, IgG 1202.  SFLC kappa 21.9, lambda 9034, ratio 0.24  - 9/5/24:  T-cells collected  - 10/23-10/25/24, lymphodepletion:  fludarabine, cyclophosphamide  - 10/28/24:  Carvykti infusion (progressive disease at the time of the infusion)    PLAN:   s/p Carvykti infusion Day 12        2.  Cytokine Release Syndrome:  No evidence for > 48 hours    CRS stGstrstastdstest:st st1st at discharge  - 11/2-11/4/24:  CRS grade 1 - fever only     3.  NEURO  Numbness/tingling bilateral plantar feet, chronic:  Stable   - She denies taking any medication  ICANS:  No evidence throughout this admission  - Continue Keppra 500 mg bid     4.  ID:    ID Hx:  - 4/9 - 4/18/24:  Levaquin for sinusitis   - 4/19/24, resp viral panel:  human metapneumovirus  - 11/2/24:  PanCx with NGTD  - 11/2-11/5/24:  empiric coverage with cefepime     Hypogammaglobulinemia  - IgG 4/21/24: 493  - IgG 5/1/24: 900 (likely represents disease)      Current PPx  - Continue acyclovir 400 mg bid  - Continue fluconazole 200 mg qd  - Continue Levaquin 500 mg qd     Post-CAR T monitoring/maintenance  - IgG & CD4 level monthly starting on day + 30  - Check disease titers on day + 30 or when WBC recovered. Re-vaccinate vs booster based on titer (assure titers are drawn prior to administering immunoglobulins)  - Start PCP ppx on day + 30 - stop when CD4 > 250  - Continue Valtrex for 1 year post CAR-T        5.  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  METABOLIC   Renal fnx and e-lytes stable  - Encourage PO intake  - Monitor labs daily         7.  GI, NUTRITION   GERD:  well-controlled   - Tums PRN

## 2024-11-10 ENCOUNTER — HOSPITAL ENCOUNTER (OUTPATIENT)
Dept: ONCOLOGY | Age: 69
Setting detail: INFUSION SERIES
Discharge: HOME | End: 2024-11-10
Payer: COMMERCIAL

## 2024-11-10 VITALS
HEART RATE: 93 BPM | TEMPERATURE: 98 F | RESPIRATION RATE: 18 BRPM | WEIGHT: 192.9 LBS | DIASTOLIC BLOOD PRESSURE: 82 MMHG | OXYGEN SATURATION: 96 % | SYSTOLIC BLOOD PRESSURE: 135 MMHG | BODY MASS INDEX: 35.28 KG/M2

## 2024-11-10 DIAGNOSIS — C90.00 MULTIPLE MYELOMA, REMISSION STATUS UNSPECIFIED (HCC): Primary | ICD-10-CM

## 2024-11-10 DIAGNOSIS — R94.5 ABNORMAL RESULTS OF LIVER FUNCTION STUDIES: ICD-10-CM

## 2024-11-10 LAB
ANION GAP SERPL CALCULATED.3IONS-SCNC: 10 MMOL/L (ref 3–16)
BASOPHILS # BLD: 0 K/UL (ref 0–0.2)
BASOPHILS NFR BLD: 0 %
BUN SERPL-MCNC: 16 MG/DL (ref 7–20)
CALCIUM SERPL-MCNC: 9.2 MG/DL (ref 8.3–10.6)
CHLORIDE SERPL-SCNC: 105 MMOL/L (ref 99–110)
CO2 SERPL-SCNC: 25 MMOL/L (ref 21–32)
CREAT SERPL-MCNC: 0.9 MG/DL (ref 0.6–1.2)
CRP SERPL-MCNC: 7.6 MG/L (ref 0–5.1)
DEPRECATED RDW RBC AUTO: 17.4 % (ref 12.4–15.4)
EOSINOPHIL # BLD: 0 K/UL (ref 0–0.6)
EOSINOPHIL NFR BLD: 1 %
FERRITIN SERPL IA-MCNC: 62.6 NG/ML (ref 15–150)
GFR SERPLBLD CREATININE-BSD FMLA CKD-EPI: 70 ML/MIN/{1.73_M2}
GLUCOSE SERPL-MCNC: 112 MG/DL (ref 70–99)
HCT VFR BLD AUTO: 35.4 % (ref 36–48)
HGB BLD-MCNC: 11.6 G/DL (ref 12–16)
LYMPHOCYTES # BLD: 0.6 K/UL (ref 1–5.1)
LYMPHOCYTES NFR BLD: 47 %
MCH RBC QN AUTO: 28.7 PG (ref 26–34)
MCHC RBC AUTO-ENTMCNC: 32.8 G/DL (ref 31–36)
MCV RBC AUTO: 87.7 FL (ref 80–100)
MONOCYTES # BLD: 0.2 K/UL (ref 0–1.3)
MONOCYTES NFR BLD: 18 %
NEUTROPHILS # BLD: 0.4 K/UL (ref 1.7–7.7)
NEUTROPHILS NFR BLD: 34 %
OVALOCYTES BLD QL SMEAR: ABNORMAL
PHOSPHATE SERPL-MCNC: 3.8 MG/DL (ref 2.5–4.9)
PLATELET # BLD AUTO: 159 K/UL (ref 135–450)
PLATELET BLD QL SMEAR: ADEQUATE
PMV BLD AUTO: 7.9 FL (ref 5–10.5)
POLYCHROMASIA BLD QL SMEAR: ABNORMAL
POTASSIUM SERPL-SCNC: 3.8 MMOL/L (ref 3.5–5.1)
RBC # BLD AUTO: 4.04 M/UL (ref 4–5.2)
SODIUM SERPL-SCNC: 140 MMOL/L (ref 136–145)
WBC # BLD AUTO: 1.3 K/UL (ref 4–11)

## 2024-11-10 PROCEDURE — 85025 COMPLETE CBC W/AUTO DIFF WBC: CPT

## 2024-11-10 PROCEDURE — 80048 BASIC METABOLIC PNL TOTAL CA: CPT

## 2024-11-10 PROCEDURE — 84100 ASSAY OF PHOSPHORUS: CPT

## 2024-11-10 PROCEDURE — 82728 ASSAY OF FERRITIN: CPT

## 2024-11-10 PROCEDURE — 36591 DRAW BLOOD OFF VENOUS DEVICE: CPT

## 2024-11-10 PROCEDURE — 86140 C-REACTIVE PROTEIN: CPT

## 2024-11-10 RX ORDER — PROCHLORPERAZINE EDISYLATE 5 MG/ML
10 INJECTION INTRAMUSCULAR; INTRAVENOUS EVERY 6 HOURS PRN
Status: CANCELLED | OUTPATIENT
Start: 2024-11-11

## 2024-11-10 RX ORDER — HEPARIN 100 UNIT/ML
500 SYRINGE INTRAVENOUS PRN
Status: CANCELLED | OUTPATIENT
Start: 2024-11-11

## 2024-11-10 RX ORDER — OXYCODONE HYDROCHLORIDE 5 MG/1
5 TABLET ORAL EVERY 4 HOURS PRN
Status: CANCELLED | OUTPATIENT
Start: 2024-11-11

## 2024-11-10 RX ORDER — PROCHLORPERAZINE MALEATE 10 MG
10 TABLET ORAL EVERY 6 HOURS PRN
Status: CANCELLED | OUTPATIENT
Start: 2024-11-11

## 2024-11-10 RX ORDER — ONDANSETRON 2 MG/ML
8 INJECTION INTRAMUSCULAR; INTRAVENOUS EVERY 8 HOURS PRN
Status: CANCELLED | OUTPATIENT
Start: 2024-11-11

## 2024-11-10 RX ORDER — POTASSIUM CHLORIDE 1500 MG/1
40 TABLET, EXTENDED RELEASE ORAL PRN
Status: CANCELLED | OUTPATIENT
Start: 2024-11-11

## 2024-11-10 RX ORDER — OXYCODONE HYDROCHLORIDE 5 MG/1
10 TABLET ORAL EVERY 4 HOURS PRN
Status: CANCELLED | OUTPATIENT
Start: 2024-11-11

## 2024-11-10 RX ORDER — ONDANSETRON 4 MG/1
8 TABLET, FILM COATED ORAL EVERY 8 HOURS PRN
Status: CANCELLED | OUTPATIENT
Start: 2024-11-11

## 2024-11-10 RX ORDER — SODIUM CHLORIDE 9 MG/ML
INJECTION, SOLUTION INTRAVENOUS CONTINUOUS PRN
Status: CANCELLED | OUTPATIENT
Start: 2024-11-11

## 2024-11-10 RX ORDER — POTASSIUM CHLORIDE 29.8 MG/ML
80 INJECTION INTRAVENOUS PRN
Status: CANCELLED | OUTPATIENT
Start: 2024-11-11 | End: 2025-02-19

## 2024-11-10 RX ORDER — MAGNESIUM SULFATE IN WATER 40 MG/ML
4000 INJECTION, SOLUTION INTRAVENOUS PRN
Status: CANCELLED | OUTPATIENT
Start: 2024-11-11

## 2024-11-10 NOTE — PROGRESS NOTES
Short Stay Communication Note  Leslee Mladonado  Diagnosis:MM  Primary MD: John  Treatment: Melphalan Fludarabine/Cytoxan  CAR-T Administration Date: 10/28/24  Day +13 of  CARVYKTI    Pt seen in outpatient infusion today. Labs drawn and reviewed.   CBC:   Recent Labs     11/08/24 0827 11/09/24  0849 11/10/24  0850   WBC 1.5* 1.2* 1.3*   HGB 11.0* 11.5* 11.6*   HCT 33.6* 35.1* 35.4*   MCV 88.0 88.1 87.7    150 159     BMP/Mag:  Recent Labs     11/08/24 0827 11/09/24  0849 11/10/24  0850    141 140   K 3.7 3.5 3.8    107 105   CO2 24 26 25   PHOS 3.4 3.7 3.8   BUN 14 13 16   CREATININE 0.8 0.9 0.9   MG 2.10  --   --      Standing parameters for replacement for this patient:   1 unit of pack red blood cells for a hemoglobin < or equal to 7  1 pack of platelets for a platelet count less than or equal to 10  40 MeQ of Potassium administered for a potassium level less than or equal to 3.4  4g of Magnesium Sulfate for a magnesum level less than or equal to 1.4  No transfusions required for the above lab values.    Urinalysis last done: 11/04/2024   Urinalysis next due: 11/11/2024    Chest X-Ray last done: 11/4/2024  Chest X-Ray next due: 11/112024    Symptoms addressed and reported to care team this date: None    Treatments this date: none. Patient to resume all BP Meds.     Reviewed medication schedule with pt and caregiver. Both able to verbalize all medications and schedule. Pt to be seen again tomorrow. Patient and caregiver verbalized understanding of discharge instructions including when and how to call the doctor and when to report  to the ER. TempTraq successfully placed 11/6/2024 following discharged from inpatient unit. Temperatures flowing into cruz accurately at this time. Discharged ambulatory to home.

## 2024-11-10 NOTE — PROGRESS NOTES
[facility] Progress Note      11/10/2024    Leslee Maldonado    :  1955    MRN:  [id number]    Referring MD: Mahad Evangelista MD  [address]  [address] [address]      Subjective:  No new complaints.  Remains afeb. No neurologic complaints.  Had noticed that her blood pressure was running low so she stopped, on around, her antihypertensives.     ECOG PS:  (1) Restricted in physically strenuous activity, ambulatory and able to do work of light nature    KPS: 90% Able to carry on normal activity; minor signs or symptoms of disease    Isolation:  None     Medications    Scheduled Meds:        Continuous Infusions:        PRN Meds:.      ROS:  As noted above, otherwise remainder of 10-point ROS negative      Physical Exam:     Vital Signs:  There were no vitals taken for this visit.    Weight:    Wt Readings from Last 3 Encounters:   24 87.2 kg (192 lb 3.9 oz)   24 87.4 kg (192 lb 10.9 oz)   24 86.6 kg (190 lb 14.7 oz)       General: Awake, alert and oriented.  HEENT: normocephalic, alopecia, no scleral erythema or icterus, Oral mucosa moist and intact, throat clear  NECK: supple   BACK: Straight   SKIN: warm dry and intact without lesions rashes or masses  CHEST: CTA bilaterally without use of accessory muscles  CV: Normal S1 S2, RRR, no MRG  ABD: NT ND normoactive BS  EXTREMITIES: without edema, denies calf tenderness  NEURO: Grossly intact  CATHETER: PAC    Laboratory Data:  CBC:   Recent Labs     24  0849   WBC 1.5* 1.2*   HGB 11.0* 11.5*   HCT 33.6* 35.1*   MCV 88.0 88.1    150     BMP/Mag:  Recent Labs     24  0824  0849    141   K 3.7 3.5    107   CO2 24 26   PHOS 3.4 3.7   BUN 14 13   CREATININE 0.8 0.9   MG 2.10  --      LIVP:   Recent Labs     24  0827   AST 27   ALT 22   BILIDIR 0.2   BILITOT 0.4   ALKPHOS 49     Coags:   No results for input(s): \"PROTIME\", \"INR\", \"APTT\" in the last 72 hours.    Uric Acid

## 2024-11-11 ENCOUNTER — HOSPITAL ENCOUNTER (OUTPATIENT)
Dept: ONCOLOGY | Age: 69
Setting detail: INFUSION SERIES
Discharge: HOME | End: 2024-11-11
Payer: COMMERCIAL

## 2024-11-11 ENCOUNTER — HOSPITAL ENCOUNTER (OUTPATIENT)
Dept: GENERAL RADIOLOGY | Age: 69
Discharge: HOME | End: 2024-11-11
Payer: COMMERCIAL

## 2024-11-11 VITALS
TEMPERATURE: 98.2 F | OXYGEN SATURATION: 93 % | BODY MASS INDEX: 34.72 KG/M2 | SYSTOLIC BLOOD PRESSURE: 91 MMHG | DIASTOLIC BLOOD PRESSURE: 52 MMHG | WEIGHT: 189.82 LBS | HEART RATE: 81 BPM | RESPIRATION RATE: 16 BRPM

## 2024-11-11 DIAGNOSIS — R94.5 ABNORMAL RESULTS OF LIVER FUNCTION STUDIES: ICD-10-CM

## 2024-11-11 DIAGNOSIS — C90.00 MULTIPLE MYELOMA, REMISSION STATUS UNSPECIFIED (HCC): Primary | ICD-10-CM

## 2024-11-11 LAB
ALBUMIN SERPL-MCNC: 3.9 G/DL (ref 3.4–5)
ALP SERPL-CCNC: 49 U/L (ref 40–129)
ALT SERPL-CCNC: 19 U/L (ref 10–40)
ANION GAP SERPL CALCULATED.3IONS-SCNC: 9 MMOL/L (ref 3–16)
ANISOCYTOSIS BLD QL SMEAR: ABNORMAL
AST SERPL-CCNC: 17 U/L (ref 15–37)
BASOPHILS # BLD: 0 K/UL (ref 0–0.2)
BASOPHILS NFR BLD: 0 %
BILIRUB DIRECT SERPL-MCNC: 0.2 MG/DL (ref 0–0.3)
BILIRUB INDIRECT SERPL-MCNC: 0.3 MG/DL (ref 0–1)
BILIRUB SERPL-MCNC: 0.5 MG/DL (ref 0–1)
BILIRUB UR QL STRIP.AUTO: NEGATIVE
BUN SERPL-MCNC: 13 MG/DL (ref 7–20)
CALCIUM SERPL-MCNC: 9.3 MG/DL (ref 8.3–10.6)
CHLORIDE SERPL-SCNC: 105 MMOL/L (ref 99–110)
CLARITY UR: CLEAR
CO2 SERPL-SCNC: 25 MMOL/L (ref 21–32)
COLOR UR: YELLOW
CREAT SERPL-MCNC: 0.9 MG/DL (ref 0.6–1.2)
CRP SERPL-MCNC: 5.9 MG/L (ref 0–5.1)
DEPRECATED RDW RBC AUTO: 17.3 % (ref 12.4–15.4)
EOSINOPHIL # BLD: 0 K/UL (ref 0–0.6)
EOSINOPHIL NFR BLD: 0 %
FERRITIN SERPL IA-MCNC: 60.1 NG/ML (ref 15–150)
FUNGUS BLD CULT: NORMAL
FUNGUS SPEC CULT: NORMAL
GFR SERPLBLD CREATININE-BSD FMLA CKD-EPI: 70 ML/MIN/{1.73_M2}
GLUCOSE SERPL-MCNC: 131 MG/DL (ref 70–99)
GLUCOSE UR STRIP.AUTO-MCNC: NEGATIVE MG/DL
HCT VFR BLD AUTO: 35.2 % (ref 36–48)
HGB BLD-MCNC: 11.6 G/DL (ref 12–16)
HGB UR QL STRIP.AUTO: NEGATIVE
INR PPP: 1.01 (ref 0.85–1.15)
KETONES UR STRIP.AUTO-MCNC: NEGATIVE MG/DL
LDH SERPL L TO P-CCNC: 202 U/L (ref 100–190)
LEUKOCYTE ESTERASE UR QL STRIP.AUTO: NEGATIVE
LOEFFLER MB STN SPEC: NORMAL
LYMPHOCYTES # BLD: 0.5 K/UL (ref 1–5.1)
LYMPHOCYTES NFR BLD: 46 %
MAGNESIUM SERPL-MCNC: 1.95 MG/DL (ref 1.8–2.4)
MCH RBC QN AUTO: 29.1 PG (ref 26–34)
MCHC RBC AUTO-ENTMCNC: 32.9 G/DL (ref 31–36)
MCV RBC AUTO: 88.4 FL (ref 80–100)
MONOCYTES # BLD: 0.1 K/UL (ref 0–1.3)
MONOCYTES NFR BLD: 6 %
NEUTROPHILS # BLD: 0.5 K/UL (ref 1.7–7.7)
NEUTROPHILS NFR BLD: 47 %
NITRITE UR QL STRIP.AUTO: NEGATIVE
PH UR STRIP.AUTO: 6.5 [PH] (ref 5–8)
PHOSPHATE SERPL-MCNC: 3.7 MG/DL (ref 2.5–4.9)
PLATELET # BLD AUTO: 130 K/UL (ref 135–450)
PMV BLD AUTO: 7.8 FL (ref 5–10.5)
POTASSIUM SERPL-SCNC: 3.7 MMOL/L (ref 3.5–5.1)
PROT SERPL-MCNC: 5.7 G/DL (ref 6.4–8.2)
PROT UR STRIP.AUTO-MCNC: NEGATIVE MG/DL
PROTHROMBIN TIME: 13.5 SEC (ref 11.9–14.9)
RBC # BLD AUTO: 3.98 M/UL (ref 4–5.2)
SODIUM SERPL-SCNC: 139 MMOL/L (ref 136–145)
SP GR UR STRIP.AUTO: 1.02 (ref 1–1.03)
UA DIPSTICK W REFLEX MICRO PNL UR: NORMAL
URATE SERPL-MCNC: 2.7 MG/DL (ref 2.6–6)
URN SPEC COLLECT METH UR: NORMAL
UROBILINOGEN UR STRIP-ACNC: 0.2 E.U./DL
VARIANT LYMPHS NFR BLD MANUAL: 1 % (ref 0–6)
WBC # BLD AUTO: 1 K/UL (ref 4–11)

## 2024-11-11 PROCEDURE — 84100 ASSAY OF PHOSPHORUS: CPT

## 2024-11-11 PROCEDURE — 83735 ASSAY OF MAGNESIUM: CPT

## 2024-11-11 PROCEDURE — 80048 BASIC METABOLIC PNL TOTAL CA: CPT

## 2024-11-11 PROCEDURE — 86140 C-REACTIVE PROTEIN: CPT

## 2024-11-11 PROCEDURE — 85610 PROTHROMBIN TIME: CPT

## 2024-11-11 PROCEDURE — 85025 COMPLETE CBC W/AUTO DIFF WBC: CPT

## 2024-11-11 PROCEDURE — 80076 HEPATIC FUNCTION PANEL: CPT

## 2024-11-11 PROCEDURE — 84550 ASSAY OF BLOOD/URIC ACID: CPT

## 2024-11-11 PROCEDURE — 82728 ASSAY OF FERRITIN: CPT

## 2024-11-11 PROCEDURE — 83615 LACTATE (LD) (LDH) ENZYME: CPT

## 2024-11-11 PROCEDURE — 81003 URINALYSIS AUTO W/O SCOPE: CPT

## 2024-11-11 PROCEDURE — 71045 X-RAY EXAM CHEST 1 VIEW: CPT

## 2024-11-11 PROCEDURE — 36591 DRAW BLOOD OFF VENOUS DEVICE: CPT

## 2024-11-11 RX ORDER — PROCHLORPERAZINE EDISYLATE 5 MG/ML
10 INJECTION INTRAMUSCULAR; INTRAVENOUS EVERY 6 HOURS PRN
Status: DISCONTINUED | OUTPATIENT
Start: 2024-11-11 | End: 2024-11-12 | Stop reason: HOSPADM

## 2024-11-11 RX ORDER — MAGNESIUM SULFATE IN WATER 40 MG/ML
4000 INJECTION, SOLUTION INTRAVENOUS PRN
Status: CANCELLED | OUTPATIENT
Start: 2024-11-12

## 2024-11-11 RX ORDER — POTASSIUM CHLORIDE 29.8 MG/ML
80 INJECTION INTRAVENOUS PRN
Status: DISCONTINUED | OUTPATIENT
Start: 2024-11-11 | End: 2024-11-12 | Stop reason: HOSPADM

## 2024-11-11 RX ORDER — ONDANSETRON 2 MG/ML
8 INJECTION INTRAMUSCULAR; INTRAVENOUS EVERY 8 HOURS PRN
Status: CANCELLED | OUTPATIENT
Start: 2024-11-12

## 2024-11-11 RX ORDER — POTASSIUM CHLORIDE 29.8 MG/ML
80 INJECTION INTRAVENOUS PRN
Status: CANCELLED | OUTPATIENT
Start: 2024-11-12 | End: 2025-02-20

## 2024-11-11 RX ORDER — ONDANSETRON 4 MG/1
8 TABLET, FILM COATED ORAL EVERY 8 HOURS PRN
Status: CANCELLED | OUTPATIENT
Start: 2024-11-12

## 2024-11-11 RX ORDER — HEPARIN 100 UNIT/ML
500 SYRINGE INTRAVENOUS PRN
Status: DISCONTINUED | OUTPATIENT
Start: 2024-11-11 | End: 2024-11-12 | Stop reason: HOSPADM

## 2024-11-11 RX ORDER — OXYCODONE HYDROCHLORIDE 5 MG/1
5 TABLET ORAL EVERY 4 HOURS PRN
Status: DISCONTINUED | OUTPATIENT
Start: 2024-11-11 | End: 2024-11-12 | Stop reason: HOSPADM

## 2024-11-11 RX ORDER — ONDANSETRON 4 MG/1
8 TABLET, FILM COATED ORAL EVERY 8 HOURS PRN
Status: DISCONTINUED | OUTPATIENT
Start: 2024-11-11 | End: 2024-11-12 | Stop reason: HOSPADM

## 2024-11-11 RX ORDER — POTASSIUM CHLORIDE 1500 MG/1
40 TABLET, EXTENDED RELEASE ORAL PRN
Status: CANCELLED | OUTPATIENT
Start: 2024-11-12

## 2024-11-11 RX ORDER — OXYCODONE HYDROCHLORIDE 5 MG/1
5 TABLET ORAL EVERY 4 HOURS PRN
Status: CANCELLED | OUTPATIENT
Start: 2024-11-12

## 2024-11-11 RX ORDER — PROCHLORPERAZINE MALEATE 10 MG
10 TABLET ORAL EVERY 6 HOURS PRN
Status: DISCONTINUED | OUTPATIENT
Start: 2024-11-11 | End: 2024-11-12 | Stop reason: HOSPADM

## 2024-11-11 RX ORDER — SODIUM CHLORIDE 9 MG/ML
INJECTION, SOLUTION INTRAVENOUS CONTINUOUS PRN
Status: CANCELLED | OUTPATIENT
Start: 2024-11-12

## 2024-11-11 RX ORDER — OXYCODONE HYDROCHLORIDE 5 MG/1
10 TABLET ORAL EVERY 4 HOURS PRN
Status: CANCELLED | OUTPATIENT
Start: 2024-11-12

## 2024-11-11 RX ORDER — ONDANSETRON 2 MG/ML
8 INJECTION INTRAMUSCULAR; INTRAVENOUS EVERY 8 HOURS PRN
Status: DISCONTINUED | OUTPATIENT
Start: 2024-11-11 | End: 2024-11-12 | Stop reason: HOSPADM

## 2024-11-11 RX ORDER — PROCHLORPERAZINE EDISYLATE 5 MG/ML
10 INJECTION INTRAMUSCULAR; INTRAVENOUS EVERY 6 HOURS PRN
Status: CANCELLED | OUTPATIENT
Start: 2024-11-12

## 2024-11-11 RX ORDER — OXYCODONE HYDROCHLORIDE 5 MG/1
10 TABLET ORAL EVERY 4 HOURS PRN
Status: DISCONTINUED | OUTPATIENT
Start: 2024-11-11 | End: 2024-11-12 | Stop reason: HOSPADM

## 2024-11-11 RX ORDER — HEPARIN 100 UNIT/ML
500 SYRINGE INTRAVENOUS PRN
Status: CANCELLED | OUTPATIENT
Start: 2024-11-12

## 2024-11-11 RX ORDER — PROCHLORPERAZINE MALEATE 10 MG
10 TABLET ORAL EVERY 6 HOURS PRN
Status: CANCELLED | OUTPATIENT
Start: 2024-11-12

## 2024-11-11 RX ORDER — MAGNESIUM SULFATE IN WATER 40 MG/ML
4000 INJECTION, SOLUTION INTRAVENOUS PRN
Status: DISCONTINUED | OUTPATIENT
Start: 2024-11-11 | End: 2024-11-12 | Stop reason: HOSPADM

## 2024-11-11 RX ORDER — SODIUM CHLORIDE 9 MG/ML
INJECTION, SOLUTION INTRAVENOUS CONTINUOUS PRN
Status: DISCONTINUED | OUTPATIENT
Start: 2024-11-11 | End: 2024-11-12 | Stop reason: HOSPADM

## 2024-11-11 RX ORDER — POTASSIUM CHLORIDE 1500 MG/1
40 TABLET, EXTENDED RELEASE ORAL PRN
Status: DISCONTINUED | OUTPATIENT
Start: 2024-11-11 | End: 2024-11-12 | Stop reason: HOSPADM

## 2024-11-11 NOTE — PROGRESS NOTES
Short Stay Communication Note  Leslee Maldonado  Diagnosis:MM  Primary MD: Dominique Lopez  Treatment: Fludarabine/Cytoxan  CAR-T Administration Date: 10/28/24  Day +14 of  CARVYKTI    Pt seen in outpatient infusion today. Labs drawn and reviewed.   CBC:   Recent Labs     11/09/24 0849 11/10/24  0850 11/11/24  0825   WBC 1.2* 1.3* 1.0*   HGB 11.5* 11.6* 11.6*   HCT 35.1* 35.4* 35.2*   MCV 88.1 87.7 88.4    159 130*     BMP/Mag:  Recent Labs     11/09/24 0849 11/10/24  0850 11/11/24 0825    140 139   K 3.5 3.8 3.7    105 105   CO2 26 25 25   PHOS 3.7 3.8 3.7   BUN 13 16 13   CREATININE 0.9 0.9 0.9   MG  --   --  1.95     Standing parameters for replacement for this patient:   1 unit of pack red blood cells for a hemoglobin < or equal to 7  1 pack of platelets for a platelet count less than or equal to 10  40 MeQ of Potassium administered for a potassium level less than or equal to 3.4  4g of Magnesium Sulfate for a magnesum level less than or equal to 1.4  No transfusions required for the above lab values.    Urinalysis last done: 11/11/2024 Urinalysis next due: 11/18/2024    Chest X-Ray last done: 11/11/2024 Chest X-Ray next due: 11/18/2024    Symptoms addressed and reported to care team this date: None    Treatments this date: none.     Reviewed medication schedule with pt and caregiver. Both able to verbalize all medications and schedule. Pt to be seen again tomorrow. Patient and caregiver verbalized understanding of discharge instructions including when and how to call the doctor and when to report  to the ER. TempTraq successfully placed 11/6/2024 following discharged from inpatient unit. Temperatures flowing into cruz accurately at this time. Discharged ambulatory to home.

## 2024-11-11 NOTE — PROGRESS NOTES
[facility] Progress Note      2024    Leslee Maldonado    :  1955    MRN:  [id number]    Referring MD: Mahad Evangelista MD  [address]  [address] [address]      Subjective:  No new complaints.  Remains afeb. No neurologic complaints.  With low BP, plan to stop Hydraalazine, continue Lisinopril and amlodipine.     ECOG PS:  (1) Restricted in physically strenuous activity, ambulatory and able to do work of light nature    KPS: 90% Able to carry on normal activity; minor signs or symptoms of disease    Isolation:  None     Medications    Scheduled Meds:      Continuous Infusions:      PRN Meds:.      ROS:  As noted above, otherwise remainder of 10-point ROS negative      Physical Exam:     Vital Signs:  There were no vitals taken for this visit.    Weight:    Wt Readings from Last 3 Encounters:   11/10/24 87.5 kg (192 lb 14.4 oz)   24 87.2 kg (192 lb 3.9 oz)   24 87.4 kg (192 lb 10.9 oz)       General: Awake, alert and oriented.  HEENT: normocephalic, alopecia, no scleral erythema or icterus, Oral mucosa moist and intact, throat clear  NECK: supple   BACK: Straight   SKIN: warm dry and intact without lesions rashes or masses  CHEST: CTA bilaterally without use of accessory muscles  CV: Normal S1 S2, RRR, no MRG  ABD: NT ND normoactive BS  EXTREMITIES: without edema, denies calf tenderness  NEURO: Grossly intact  CATHETER: PAC    Laboratory Data:  CBC:   Recent Labs     24  0849 11/10/24  0850   WBC 1.5* 1.2* 1.3*   HGB 11.0* 11.5* 11.6*   HCT 33.6* 35.1* 35.4*   MCV 88.0 88.1 87.7    150 159     BMP/Mag:  Recent Labs     24  0849 11/10/24  0850    141 140   K 3.7 3.5 3.8    107 105   CO2 24 26 25   PHOS 3.4 3.7 3.8   BUN 14 13 16   CREATININE 0.8 0.9 0.9   MG 2.10  --   --      LIVP:   Recent Labs     24  0827   AST 27   ALT 22   BILIDIR 0.2   BILITOT 0.4   ALKPHOS 49     Coags:   No results for input(s):

## 2024-11-11 NOTE — PROGRESS NOTES
Short Stay Communication Note  Leslee Maldonado  Diagnosis:MM  Primary MD: Dominique Lopez  Treatment: Fludarabine/Cytoxan  CAR-T Administration Date: 10/28/24  Day +13 of  CARVYKTI    Pt seen in outpatient infusion today. Labs drawn and reviewed.   CBC:   Recent Labs     11/09/24  0849 11/10/24  0850 11/11/24  0825   WBC 1.2* 1.3* 1.0*   HGB 11.5* 11.6* 11.6*   HCT 35.1* 35.4* 35.2*   MCV 88.1 87.7 88.4    159 130*     BMP/Mag:  Recent Labs     11/09/24 0849 11/10/24  0850 11/11/24  0825    140 139   K 3.5 3.8 3.7    105 105   CO2 26 25 25   PHOS 3.7 3.8 3.7   BUN 13 16 13   CREATININE 0.9 0.9 0.9   MG  --   --  1.95     Standing parameters for replacement for this patient:   1 unit of pack red blood cells for a hemoglobin < or equal to 7  1 pack of platelets for a platelet count less than or equal to 10  40 MeQ of Potassium administered for a potassium level less than or equal to 3.4  4g of Magnesium Sulfate for a magnesum level less than or equal to 1.4  No transfusions required for the above lab values.    Urinalysis last done: 11/11/2024 Urinalysis next due: 11/18/2024    Chest X-Ray last done: 11/11/2024 Chest X-Ray next due: 11/18/2024    Symptoms addressed and reported to care team this date: None    Treatments this date: none.    Reviewed medication schedule with pt. Pt able to verbalize all medications and schedule. Pt to be seen again Wednesday. Patient verbalized understanding of discharge instructions including when and how to call the doctor and when to report  to the ER. TempTraq successfully placed 11/6/2024 following discharged from inpatient unit. Temperatures flowing into cruz accurately at this time. Discharged ambulatory to home.     Misti Marino RN

## 2024-11-12 NOTE — PROGRESS NOTES
Physician Progress Note      PATIENT:               RHYS VILLARREAL  [facility] #:                  [id number]  :                       1955  ADMIT DATE:       10/28/2024 8:02 AM  DISCH DATE:        10/30/2024 1:59 PM  RESPONDING  PROVIDER #:        DAPHNEY SANTOS          QUERY TEXT:    Patient admitted with IgG Lambda Multiple Myeloma s/p fludarabine and   cyclophosphamide and CAR-T infusion date: 10/28/24, noted to have low RBCs,   WBC count or neutrophils and low platelet count. If possible, please document   in progress notes and discharge summary if you are evaluating and/or treating   any of the following:    The medical record reflects the following:  Risk Factors: IgG Lambda Multiple Myeloma s/p fludarabine and cyclophosphamide   and CAR-T infusion date: 10/28/24  Clinical Indicators: 10/30/24 WBC 1.2/H/H 11.2/33.8/Platelets 132, 24 WBC   0.7/H/H 11.6/34.8/Platelets 132  Treatment: Daily lab monitoring, transfusion protocols, Neutropenic   precautions  Options provided:  -- Antineoplastic (chemotherapy) induced pancytopenia  -- Pancytopenia due to IgG Lambda Multiple Myeloma and or Antineoplastic   (chemotherapy) induced  -- Pancytopenia due to IgG Lambda Multiple Myeloma  -- Other - I will add my own diagnosis  -- Disagree - Not applicable / Not valid  -- Disagree - Clinically unable to determine / Unknown  -- Refer to Clinical Documentation Reviewer    PROVIDER RESPONSE TEXT:    Patient has antineoplastic (chemotherapy) induced pancytopenia.    Query created by: Alisson Dalton on 2024 10:49 AM      Electronically signed by:  DAPHNEY SANTOS 2024 2:53 PM

## 2024-11-13 ENCOUNTER — APPOINTMENT (OUTPATIENT)
Dept: ONCOLOGY | Age: 69
End: 2024-11-13
Payer: COMMERCIAL

## 2024-11-13 ENCOUNTER — HOSPITAL ENCOUNTER (OUTPATIENT)
Dept: ONCOLOGY | Age: 69
Setting detail: INFUSION SERIES
Discharge: HOME | End: 2024-11-13
Payer: COMMERCIAL

## 2024-11-13 VITALS
RESPIRATION RATE: 16 BRPM | OXYGEN SATURATION: 97 % | BODY MASS INDEX: 34.56 KG/M2 | WEIGHT: 188.93 LBS | TEMPERATURE: 98.6 F | HEART RATE: 90 BPM | SYSTOLIC BLOOD PRESSURE: 104 MMHG | DIASTOLIC BLOOD PRESSURE: 61 MMHG

## 2024-11-13 DIAGNOSIS — R94.5 ABNORMAL RESULTS OF LIVER FUNCTION STUDIES: ICD-10-CM

## 2024-11-13 DIAGNOSIS — C90.00 MULTIPLE MYELOMA, REMISSION STATUS UNSPECIFIED (HCC): Primary | ICD-10-CM

## 2024-11-13 LAB
ALBUMIN SERPL-MCNC: 3.7 G/DL (ref 3.4–5)
ALP SERPL-CCNC: 49 U/L (ref 40–129)
ALT SERPL-CCNC: 14 U/L (ref 10–40)
ANION GAP SERPL CALCULATED.3IONS-SCNC: 10 MMOL/L (ref 3–16)
ANISOCYTOSIS BLD QL SMEAR: ABNORMAL
AST SERPL-CCNC: 17 U/L (ref 15–37)
BASOPHILS # BLD: 0 K/UL (ref 0–0.2)
BASOPHILS NFR BLD: 1 %
BILIRUB DIRECT SERPL-MCNC: 0.2 MG/DL (ref 0–0.3)
BILIRUB INDIRECT SERPL-MCNC: 0.2 MG/DL (ref 0–1)
BILIRUB SERPL-MCNC: 0.4 MG/DL (ref 0–1)
BUN SERPL-MCNC: 12 MG/DL (ref 7–20)
CALCIUM SERPL-MCNC: 9.1 MG/DL (ref 8.3–10.6)
CHLORIDE SERPL-SCNC: 107 MMOL/L (ref 99–110)
CO2 SERPL-SCNC: 23 MMOL/L (ref 21–32)
CREAT SERPL-MCNC: 0.9 MG/DL (ref 0.6–1.2)
CRP SERPL-MCNC: 4.8 MG/L (ref 0–5.1)
DACRYOCYTES BLD QL SMEAR: ABNORMAL
DEPRECATED RDW RBC AUTO: 17.5 % (ref 12.4–15.4)
EOSINOPHIL # BLD: 0 K/UL (ref 0–0.6)
EOSINOPHIL NFR BLD: 2 %
FERRITIN SERPL IA-MCNC: 93.7 NG/ML (ref 15–150)
GFR SERPLBLD CREATININE-BSD FMLA CKD-EPI: 70 ML/MIN/{1.73_M2}
GLUCOSE SERPL-MCNC: 125 MG/DL (ref 70–99)
HCT VFR BLD AUTO: 33.7 % (ref 36–48)
HGB BLD-MCNC: 11.1 G/DL (ref 12–16)
LDH SERPL L TO P-CCNC: 188 U/L (ref 100–190)
LYMPHOCYTES # BLD: 0.5 K/UL (ref 1–5.1)
LYMPHOCYTES NFR BLD: 40 %
MAGNESIUM SERPL-MCNC: 2.2 MG/DL (ref 1.8–2.4)
MCH RBC QN AUTO: 29.1 PG (ref 26–34)
MCHC RBC AUTO-ENTMCNC: 33 G/DL (ref 31–36)
MCV RBC AUTO: 88.3 FL (ref 80–100)
MICROCYTES BLD QL SMEAR: ABNORMAL
MONOCYTES # BLD: 0.3 K/UL (ref 0–1.3)
MONOCYTES NFR BLD: 29 %
NEUTROPHILS # BLD: 0.3 K/UL (ref 1.7–7.7)
NEUTROPHILS NFR BLD: 23 %
OVALOCYTES BLD QL SMEAR: ABNORMAL
PHOSPHATE SERPL-MCNC: 2.9 MG/DL (ref 2.5–4.9)
PLATELET # BLD AUTO: 100 K/UL (ref 135–450)
PLATELET BLD QL SMEAR: ABNORMAL
PMV BLD AUTO: 8 FL (ref 5–10.5)
POTASSIUM SERPL-SCNC: 3.7 MMOL/L (ref 3.5–5.1)
PROT SERPL-MCNC: 5.6 G/DL (ref 6.4–8.2)
RBC # BLD AUTO: 3.81 M/UL (ref 4–5.2)
SODIUM SERPL-SCNC: 140 MMOL/L (ref 136–145)
URATE SERPL-MCNC: 2.8 MG/DL (ref 2.6–6)
VARIANT LYMPHS NFR BLD MANUAL: 5 % (ref 0–6)
WBC # BLD AUTO: 1.1 K/UL (ref 4–11)

## 2024-11-13 PROCEDURE — 6360000002 HC RX W HCPCS: Performed by: NURSE PRACTITIONER

## 2024-11-13 PROCEDURE — 80076 HEPATIC FUNCTION PANEL: CPT

## 2024-11-13 PROCEDURE — 86140 C-REACTIVE PROTEIN: CPT

## 2024-11-13 PROCEDURE — 84550 ASSAY OF BLOOD/URIC ACID: CPT

## 2024-11-13 PROCEDURE — 83735 ASSAY OF MAGNESIUM: CPT

## 2024-11-13 PROCEDURE — 82728 ASSAY OF FERRITIN: CPT

## 2024-11-13 PROCEDURE — 36591 DRAW BLOOD OFF VENOUS DEVICE: CPT

## 2024-11-13 PROCEDURE — 85025 COMPLETE CBC W/AUTO DIFF WBC: CPT

## 2024-11-13 PROCEDURE — 80048 BASIC METABOLIC PNL TOTAL CA: CPT

## 2024-11-13 PROCEDURE — 83615 LACTATE (LD) (LDH) ENZYME: CPT

## 2024-11-13 PROCEDURE — 96372 THER/PROPH/DIAG INJ SC/IM: CPT

## 2024-11-13 PROCEDURE — 84100 ASSAY OF PHOSPHORUS: CPT

## 2024-11-13 RX ORDER — OXYCODONE HYDROCHLORIDE 5 MG/1
5 TABLET ORAL EVERY 4 HOURS PRN
OUTPATIENT
Start: 2024-11-14

## 2024-11-13 RX ORDER — PROCHLORPERAZINE EDISYLATE 5 MG/ML
10 INJECTION INTRAMUSCULAR; INTRAVENOUS EVERY 6 HOURS PRN
OUTPATIENT
Start: 2024-11-14

## 2024-11-13 RX ORDER — POTASSIUM CHLORIDE 1500 MG/1
40 TABLET, EXTENDED RELEASE ORAL PRN
Status: DISCONTINUED | OUTPATIENT
Start: 2024-11-13 | End: 2024-11-14 | Stop reason: HOSPADM

## 2024-11-13 RX ORDER — ONDANSETRON 2 MG/ML
8 INJECTION INTRAMUSCULAR; INTRAVENOUS EVERY 8 HOURS PRN
Status: DISCONTINUED | OUTPATIENT
Start: 2024-11-13 | End: 2024-11-14 | Stop reason: HOSPADM

## 2024-11-13 RX ORDER — PROCHLORPERAZINE MALEATE 10 MG
10 TABLET ORAL EVERY 6 HOURS PRN
OUTPATIENT
Start: 2024-11-14

## 2024-11-13 RX ORDER — ONDANSETRON 4 MG/1
8 TABLET, FILM COATED ORAL EVERY 8 HOURS PRN
Status: DISCONTINUED | OUTPATIENT
Start: 2024-11-13 | End: 2024-11-14 | Stop reason: HOSPADM

## 2024-11-13 RX ORDER — OXYCODONE HYDROCHLORIDE 5 MG/1
10 TABLET ORAL EVERY 4 HOURS PRN
OUTPATIENT
Start: 2024-11-14

## 2024-11-13 RX ORDER — MAGNESIUM SULFATE IN WATER 40 MG/ML
4000 INJECTION, SOLUTION INTRAVENOUS PRN
Status: DISCONTINUED | OUTPATIENT
Start: 2024-11-13 | End: 2024-11-14 | Stop reason: HOSPADM

## 2024-11-13 RX ORDER — MAGNESIUM SULFATE IN WATER 40 MG/ML
4000 INJECTION, SOLUTION INTRAVENOUS PRN
Status: CANCELLED | OUTPATIENT
Start: 2024-11-14

## 2024-11-13 RX ORDER — SODIUM CHLORIDE 9 MG/ML
INJECTION, SOLUTION INTRAVENOUS CONTINUOUS PRN
Status: DISCONTINUED | OUTPATIENT
Start: 2024-11-13 | End: 2024-11-14 | Stop reason: HOSPADM

## 2024-11-13 RX ORDER — OXYCODONE HYDROCHLORIDE 5 MG/1
5 TABLET ORAL EVERY 4 HOURS PRN
Status: DISCONTINUED | OUTPATIENT
Start: 2024-11-13 | End: 2024-11-14 | Stop reason: HOSPADM

## 2024-11-13 RX ORDER — HEPARIN 100 UNIT/ML
500 SYRINGE INTRAVENOUS PRN
Status: CANCELLED | OUTPATIENT
Start: 2024-11-14

## 2024-11-13 RX ORDER — HEPARIN 100 UNIT/ML
500 SYRINGE INTRAVENOUS PRN
Status: DISCONTINUED | OUTPATIENT
Start: 2024-11-13 | End: 2024-11-14 | Stop reason: HOSPADM

## 2024-11-13 RX ORDER — POTASSIUM CHLORIDE 1500 MG/1
40 TABLET, EXTENDED RELEASE ORAL PRN
Status: CANCELLED | OUTPATIENT
Start: 2024-11-14

## 2024-11-13 RX ORDER — SODIUM CHLORIDE 9 MG/ML
INJECTION, SOLUTION INTRAVENOUS CONTINUOUS PRN
Status: CANCELLED | OUTPATIENT
Start: 2024-11-14

## 2024-11-13 RX ORDER — PROCHLORPERAZINE EDISYLATE 5 MG/ML
10 INJECTION INTRAMUSCULAR; INTRAVENOUS EVERY 6 HOURS PRN
Status: DISCONTINUED | OUTPATIENT
Start: 2024-11-13 | End: 2024-11-14 | Stop reason: HOSPADM

## 2024-11-13 RX ORDER — ONDANSETRON 2 MG/ML
8 INJECTION INTRAMUSCULAR; INTRAVENOUS EVERY 8 HOURS PRN
OUTPATIENT
Start: 2024-11-14

## 2024-11-13 RX ORDER — PROCHLORPERAZINE MALEATE 10 MG
10 TABLET ORAL EVERY 6 HOURS PRN
Status: DISCONTINUED | OUTPATIENT
Start: 2024-11-13 | End: 2024-11-14 | Stop reason: HOSPADM

## 2024-11-13 RX ORDER — ONDANSETRON 4 MG/1
8 TABLET, FILM COATED ORAL EVERY 8 HOURS PRN
OUTPATIENT
Start: 2024-11-14

## 2024-11-13 RX ORDER — POTASSIUM CHLORIDE 29.8 MG/ML
80 INJECTION INTRAVENOUS PRN
Status: DISCONTINUED | OUTPATIENT
Start: 2024-11-13 | End: 2024-11-14 | Stop reason: HOSPADM

## 2024-11-13 RX ORDER — POTASSIUM CHLORIDE 29.8 MG/ML
80 INJECTION INTRAVENOUS PRN
Status: CANCELLED | OUTPATIENT
Start: 2024-11-14 | End: 2025-02-22

## 2024-11-13 RX ORDER — OXYCODONE HYDROCHLORIDE 5 MG/1
10 TABLET ORAL EVERY 4 HOURS PRN
Status: DISCONTINUED | OUTPATIENT
Start: 2024-11-13 | End: 2024-11-14 | Stop reason: HOSPADM

## 2024-11-13 RX ADMIN — FILGRASTIM-AAFI 300 MCG: 300 INJECTION, SOLUTION SUBCUTANEOUS at 11:57

## 2024-11-13 NOTE — PROGRESS NOTES
Short Stay Communication Note  Leslee Maldonado  Diagnosis:  Primary MD:  Treatment: Melphalan Fludarabine/Cytoxan  CAR-T Administration Date:   Day +15 of carvykti  Pt seen in outpatient infusion today. Labs drawn and reviewed.   CBC:   Recent Labs     11/11/24 0825 11/13/24 0827   WBC 1.0* 1.1*   HGB 11.6* 11.1*   HCT 35.2* 33.7*   MCV 88.4 88.3   * 100*     BMP/Mag:  Recent Labs     11/11/24 0825 11/13/24 0827    140   K 3.7 3.7    107   CO2 25 23   PHOS 3.7 2.9   BUN 13 12   CREATININE 0.9 0.9   MG 1.95 2.20     Standing parameters for replacement for this patient:   1 unit of pack red blood cells for a hemoglobin < or equal to 7  1 pack of platelets for a platelet count less than or equal to 10  40 MeQ of Potassium administered for a potassium level less than or equal to 3.4  4g of Magnesium Sulfate for a magnesum level less than or equal to 1.4  No transfusions required for the above lab values.    Urinalysis last done: 11/11/24 Urinalysis next due: 11/18/24    Chest X-Ray last done: 11/11/24 Chest X-Ray next due: 11/18/24    Symptoms addressed and reported to care team this date: n/a  Treatments this date:labs, granix injection    Reviewed medication schedule with pt and caregiver. Both able to verbalize all medications and schedule. Pt to be seen again tomorrow. Patient and caregiver verbalized understanding of discharge instructions including when and how to call the doctor and when to report  to the ER. Discharged ambulatory to home with daughter  
today  - Give GCSF once today (11/13/24)        5.  METABOLIC Electrolytes are WNL and renal fxn stable  Risk for TLS:  No evidence  - Cont allopurinol daily  - Daily TLS labs   - No daily IV fluids in [facility] Infusion unless clinically indicated   - Replace K+ & Mg per PRN orders       6.  GI / NUTRITION:   Nutrition:  Appetite and oral intake is adequate  - Cont low microbial diet   - Follow closely with dietician  GERD: well-controlled   - Cont Tums PRN     7.  ENDO  Type 2 DM:  well-controlled with diet     8.  CVS:  stable   Hx of RBBB, CAD s/p CABG & PCI:  - 6/11/19:  PCI recannulization of occluded mid-left Cx (residual 70% stenosis)  - 6/13/22:  CABG - LIMA to LAD, reverse saphenous v. to OM1, reverse saphenous v. to OM2, reverse saphenous v. to PDA  - 10/25/22:  CARLO x 2 (proximal & ostial aspect of saphenous v. graft to OM3)  Hyperlipidemia:   - Hold rosuvastatin 40 mg qd during CAR-T   Hypertension:  - Continue amlodipine 10 mg qd- Continue  - S/p Hydralazine 25 mg bid - Hold  - S/p Lisinopril 40 mg qd- Hold     9.  MSK: Chronic back, hip, and right shoulder pain  - Tylenol PRN          - Disposition: Patient will return to OP Infusion MWF for CAR-T assessment, labs, and provider visit.         Patient was seen and examined by Dr. Dominique Lopez DO.       HE Milton - NP

## 2024-11-15 ENCOUNTER — HOSPITAL ENCOUNTER (OUTPATIENT)
Dept: ONCOLOGY | Age: 69
Setting detail: INFUSION SERIES
Discharge: HOME | End: 2024-11-15
Payer: COMMERCIAL

## 2024-11-15 VITALS
WEIGHT: 189.38 LBS | BODY MASS INDEX: 34.64 KG/M2 | TEMPERATURE: 98.3 F | RESPIRATION RATE: 16 BRPM | OXYGEN SATURATION: 96 % | DIASTOLIC BLOOD PRESSURE: 64 MMHG | SYSTOLIC BLOOD PRESSURE: 108 MMHG | HEART RATE: 96 BPM

## 2024-11-15 DIAGNOSIS — C90.00 MULTIPLE MYELOMA, REMISSION STATUS UNSPECIFIED (HCC): Primary | ICD-10-CM

## 2024-11-15 DIAGNOSIS — R94.5 ABNORMAL RESULTS OF LIVER FUNCTION STUDIES: ICD-10-CM

## 2024-11-15 LAB
ALBUMIN SERPL-MCNC: 3.8 G/DL (ref 3.4–5)
ALP SERPL-CCNC: 58 U/L (ref 40–129)
ALT SERPL-CCNC: 12 U/L (ref 10–40)
ANION GAP SERPL CALCULATED.3IONS-SCNC: 10 MMOL/L (ref 3–16)
AST SERPL-CCNC: 19 U/L (ref 15–37)
BASOPHILS # BLD: 0 K/UL (ref 0–0.2)
BASOPHILS NFR BLD: 0.4 %
BILIRUB DIRECT SERPL-MCNC: <0.1 MG/DL (ref 0–0.3)
BILIRUB INDIRECT SERPL-MCNC: 0.3 MG/DL (ref 0–1)
BILIRUB SERPL-MCNC: 0.4 MG/DL (ref 0–1)
BUN SERPL-MCNC: 12 MG/DL (ref 7–20)
CALCIUM SERPL-MCNC: 8.7 MG/DL (ref 8.3–10.6)
CHLORIDE SERPL-SCNC: 105 MMOL/L (ref 99–110)
CO2 SERPL-SCNC: 23 MMOL/L (ref 21–32)
CREAT SERPL-MCNC: 0.9 MG/DL (ref 0.6–1.2)
CRP SERPL-MCNC: 4.6 MG/L (ref 0–5.1)
DEPRECATED RDW RBC AUTO: 17.7 % (ref 12.4–15.4)
EOSINOPHIL # BLD: 0 K/UL (ref 0–0.6)
EOSINOPHIL NFR BLD: 0.2 %
FERRITIN SERPL IA-MCNC: 142 NG/ML (ref 15–150)
GFR SERPLBLD CREATININE-BSD FMLA CKD-EPI: 70 ML/MIN/{1.73_M2}
GLUCOSE SERPL-MCNC: 129 MG/DL (ref 70–99)
HCT VFR BLD AUTO: 35.7 % (ref 36–48)
HGB BLD-MCNC: 11.4 G/DL (ref 12–16)
INR PPP: 1 (ref 0.85–1.15)
LDH SERPL L TO P-CCNC: 184 U/L (ref 100–190)
LYMPHOCYTES # BLD: 0.9 K/UL (ref 1–5.1)
LYMPHOCYTES NFR BLD: 14.8 %
MAGNESIUM SERPL-MCNC: 2.14 MG/DL (ref 1.8–2.4)
MCH RBC QN AUTO: 28.4 PG (ref 26–34)
MCHC RBC AUTO-ENTMCNC: 31.9 G/DL (ref 31–36)
MCV RBC AUTO: 88.9 FL (ref 80–100)
MONOCYTES # BLD: 0.6 K/UL (ref 0–1.3)
MONOCYTES NFR BLD: 9.4 %
NEUTROPHILS # BLD: 4.5 K/UL (ref 1.7–7.7)
NEUTROPHILS NFR BLD: 75.2 %
PHOSPHATE SERPL-MCNC: 2.1 MG/DL (ref 2.5–4.9)
PLATELET # BLD AUTO: 73 K/UL (ref 135–450)
PMV BLD AUTO: 8.2 FL (ref 5–10.5)
POTASSIUM SERPL-SCNC: 3.6 MMOL/L (ref 3.5–5.1)
PROT SERPL-MCNC: 5.6 G/DL (ref 6.4–8.2)
PROTHROMBIN TIME: 13.4 SEC (ref 11.9–14.9)
RBC # BLD AUTO: 4.02 M/UL (ref 4–5.2)
SODIUM SERPL-SCNC: 138 MMOL/L (ref 136–145)
URATE SERPL-MCNC: 2.4 MG/DL (ref 2.6–6)
WBC # BLD AUTO: 6 K/UL (ref 4–11)

## 2024-11-15 PROCEDURE — 83735 ASSAY OF MAGNESIUM: CPT

## 2024-11-15 PROCEDURE — 86140 C-REACTIVE PROTEIN: CPT

## 2024-11-15 PROCEDURE — 85025 COMPLETE CBC W/AUTO DIFF WBC: CPT

## 2024-11-15 PROCEDURE — 82728 ASSAY OF FERRITIN: CPT

## 2024-11-15 PROCEDURE — 83615 LACTATE (LD) (LDH) ENZYME: CPT

## 2024-11-15 PROCEDURE — 80048 BASIC METABOLIC PNL TOTAL CA: CPT

## 2024-11-15 PROCEDURE — 85610 PROTHROMBIN TIME: CPT

## 2024-11-15 PROCEDURE — 36591 DRAW BLOOD OFF VENOUS DEVICE: CPT

## 2024-11-15 PROCEDURE — 80076 HEPATIC FUNCTION PANEL: CPT

## 2024-11-15 PROCEDURE — 84100 ASSAY OF PHOSPHORUS: CPT

## 2024-11-15 PROCEDURE — 84550 ASSAY OF BLOOD/URIC ACID: CPT

## 2024-11-15 RX ORDER — OXYCODONE HYDROCHLORIDE 5 MG/1
5 TABLET ORAL EVERY 4 HOURS PRN
OUTPATIENT
Start: 2024-11-16

## 2024-11-15 RX ORDER — ONDANSETRON 2 MG/ML
8 INJECTION INTRAMUSCULAR; INTRAVENOUS EVERY 8 HOURS PRN
OUTPATIENT
Start: 2024-11-16

## 2024-11-15 RX ORDER — PROCHLORPERAZINE EDISYLATE 5 MG/ML
10 INJECTION INTRAMUSCULAR; INTRAVENOUS EVERY 6 HOURS PRN
OUTPATIENT
Start: 2024-11-16

## 2024-11-15 RX ORDER — HEPARIN 100 UNIT/ML
500 SYRINGE INTRAVENOUS PRN
Status: DISCONTINUED | OUTPATIENT
Start: 2024-11-15 | End: 2024-11-16 | Stop reason: HOSPADM

## 2024-11-15 RX ORDER — OXYCODONE HYDROCHLORIDE 5 MG/1
10 TABLET ORAL EVERY 4 HOURS PRN
OUTPATIENT
Start: 2024-11-16

## 2024-11-15 RX ORDER — ONDANSETRON 4 MG/1
8 TABLET, FILM COATED ORAL EVERY 8 HOURS PRN
OUTPATIENT
Start: 2024-11-16

## 2024-11-15 RX ORDER — SODIUM CHLORIDE 9 MG/ML
INJECTION, SOLUTION INTRAVENOUS CONTINUOUS PRN
OUTPATIENT
Start: 2024-11-16

## 2024-11-15 RX ORDER — MAGNESIUM SULFATE IN WATER 40 MG/ML
4000 INJECTION, SOLUTION INTRAVENOUS PRN
OUTPATIENT
Start: 2024-11-16

## 2024-11-15 RX ORDER — POTASSIUM CHLORIDE 1500 MG/1
40 TABLET, EXTENDED RELEASE ORAL PRN
Status: DISCONTINUED | OUTPATIENT
Start: 2024-11-15 | End: 2024-11-16 | Stop reason: HOSPADM

## 2024-11-15 RX ORDER — PROCHLORPERAZINE MALEATE 10 MG
10 TABLET ORAL EVERY 6 HOURS PRN
OUTPATIENT
Start: 2024-11-16

## 2024-11-15 RX ORDER — MAGNESIUM SULFATE IN WATER 40 MG/ML
4000 INJECTION, SOLUTION INTRAVENOUS PRN
Status: DISCONTINUED | OUTPATIENT
Start: 2024-11-15 | End: 2024-11-16 | Stop reason: HOSPADM

## 2024-11-15 RX ORDER — POTASSIUM CHLORIDE 29.8 MG/ML
80 INJECTION INTRAVENOUS PRN
Status: DISCONTINUED | OUTPATIENT
Start: 2024-11-15 | End: 2024-11-16 | Stop reason: HOSPADM

## 2024-11-15 RX ORDER — HEPARIN 100 UNIT/ML
500 SYRINGE INTRAVENOUS PRN
OUTPATIENT
Start: 2024-11-16

## 2024-11-15 RX ORDER — SODIUM CHLORIDE 9 MG/ML
INJECTION, SOLUTION INTRAVENOUS CONTINUOUS PRN
Status: DISCONTINUED | OUTPATIENT
Start: 2024-11-15 | End: 2024-11-16 | Stop reason: HOSPADM

## 2024-11-15 RX ORDER — POTASSIUM CHLORIDE 1500 MG/1
40 TABLET, EXTENDED RELEASE ORAL PRN
OUTPATIENT
Start: 2024-11-16

## 2024-11-15 RX ORDER — POTASSIUM CHLORIDE 29.8 MG/ML
80 INJECTION INTRAVENOUS PRN
OUTPATIENT
Start: 2024-11-16 | End: 2025-02-24

## 2024-11-15 NOTE — PROGRESS NOTES
Short Stay Communication Note  Leslee Maldonado  Diagnosis:  Primary MD:  Treatment: Melphalan Fludarabine/Cytoxan  CAR-T Administration Date:   Day +16 of carvykti  Pt seen in outpatient infusion today. Labs drawn and reviewed.   CBC:   Recent Labs     11/13/24  0827 11/15/24  0835   WBC 1.1* 6.0   HGB 11.1* 11.4*   HCT 33.7* 35.7*   MCV 88.3 88.9   * 73*     BMP/Mag:  Recent Labs     11/13/24  0827 11/15/24  0835    138   K 3.7 3.6    105   CO2 23 23   PHOS 2.9 2.1*   BUN 12 12   CREATININE 0.9 0.9   MG 2.20 2.14     Standing parameters for replacement for this patient:   1 unit of pack red blood cells for a hemoglobin < or equal to 7  1 pack of platelets for a platelet count less than or equal to 10  40 MeQ of Potassium administered for a potassium level less than or equal to 3.4  4g of Magnesium Sulfate for a magnesum level less than or equal to 1.4  No transfusions required for the above lab values.    Urinalysis last done: 11/11/24 Urinalysis next due: 11/18/24    Chest X-Ray last done: 11/11/24 Chest X-Ray next due: 11/18/24    Symptoms addressed and reported to care team this date: n/a  Treatments this date:labs    Reviewed medication schedule with pt and caregiver. Both able to verbalize all medications and schedule. Pt to be seen again tomorrow. Patient and caregiver verbalized understanding of discharge instructions including when and how to call the doctor and when to report  to the ER. Discharged ambulatory to home with daughter

## 2024-11-15 NOTE — PROGRESS NOTES
[facility] Progress Note      11/15/2024    Leslee Maldonado    :  1955    MRN:  [id number]    Referring MD: Kerry Reagan, APRN - NP  [address] [place] [address]      Subjective:   She is doing great, no issues. Afebrile taking her medications    ECOG PS:  (1) Restricted in physically strenuous activity, ambulatory and able to do work of light nature    KPS: 90% Able to carry on normal activity; minor signs or symptoms of disease    Isolation:  None     Medications    Scheduled Meds:   ALTEplase (CATHFLO) 2 mg in sterile water 2 mL injection  2 mg IntraCATHeter Once       Continuous Infusions:   sodium chloride         PRN Meds:.sodium chloride, potassium chloride, potassium chloride, magnesium sulfate, magnesium hydroxide, heparin flush      ROS:  As noted above, otherwise remainder of 10-point ROS negative      Physical Exam:     Vital Signs:  /64   Pulse 96   Temp 98.3 °F (36.8 °C) (Oral)   Resp 16   Wt 85.9 kg (189 lb 6 oz)   SpO2 96%   BMI 34.64 kg/m²     Weight:    Wt Readings from Last 3 Encounters:   11/15/24 85.9 kg (189 lb 6 oz)   24 85.7 kg (188 lb 15 oz)   24 86.1 kg (189 lb 13.1 oz)       General: Awake, alert and oriented.  HEENT: normocephalic, alopecia, no scleral erythema or icterus, Oral mucosa moist and intact, throat clear  NECK: supple   BACK: Straight   SKIN: warm dry and intact without lesions rashes or masses  CHEST: CTA bilaterally without use of accessory muscles  CV: Normal S1 S2, RRR, no MRG  ABD: NT ND normoactive BS  EXTREMITIES: without edema, denies calf tenderness  NEURO: Grossly intact  CATHETER: PAC    Laboratory Data:  CBC:   Recent Labs     24  0827 11/15/24  0835   WBC 1.1* 6.0   HGB 11.1* 11.4*   HCT 33.7* 35.7*   MCV 88.3 88.9   * 73*     BMP/Mag:  Recent Labs     24  0827 11/15/24  0835    138   K 3.7 3.6    105   CO2 23 23   PHOS 2.9 2.1*   BUN 12 12   CREATININE 0.9 0.9   MG 2.20 2.14

## 2024-11-18 ENCOUNTER — HOSPITAL ENCOUNTER (OUTPATIENT)
Dept: GENERAL RADIOLOGY | Age: 69
Discharge: HOME | End: 2024-11-18
Payer: COMMERCIAL

## 2024-11-18 ENCOUNTER — HOSPITAL ENCOUNTER (OUTPATIENT)
Dept: ONCOLOGY | Age: 69
Setting detail: INFUSION SERIES
Discharge: HOME | End: 2024-11-18
Payer: COMMERCIAL

## 2024-11-18 VITALS
WEIGHT: 188.71 LBS | SYSTOLIC BLOOD PRESSURE: 101 MMHG | DIASTOLIC BLOOD PRESSURE: 62 MMHG | BODY MASS INDEX: 34.52 KG/M2 | HEART RATE: 72 BPM | OXYGEN SATURATION: 97 % | RESPIRATION RATE: 18 BRPM | TEMPERATURE: 98.5 F

## 2024-11-18 DIAGNOSIS — R94.5 ABNORMAL RESULTS OF LIVER FUNCTION STUDIES: ICD-10-CM

## 2024-11-18 DIAGNOSIS — C90.00 MULTIPLE MYELOMA, REMISSION STATUS UNSPECIFIED (HCC): Primary | ICD-10-CM

## 2024-11-18 LAB
ALBUMIN SERPL-MCNC: 3.8 G/DL (ref 3.4–5)
ALP SERPL-CCNC: 60 U/L (ref 40–129)
ALT SERPL-CCNC: 14 U/L (ref 10–40)
ANION GAP SERPL CALCULATED.3IONS-SCNC: 9 MMOL/L (ref 3–16)
ANISOCYTOSIS BLD QL SMEAR: ABNORMAL
AST SERPL-CCNC: 19 U/L (ref 15–37)
BACTERIA URNS QL MICRO: ABNORMAL /HPF
BASOPHILS # BLD: 0 K/UL (ref 0–0.2)
BASOPHILS NFR BLD: 0 %
BILIRUB DIRECT SERPL-MCNC: <0.1 MG/DL (ref 0–0.3)
BILIRUB INDIRECT SERPL-MCNC: 0.2 MG/DL (ref 0–1)
BILIRUB SERPL-MCNC: 0.3 MG/DL (ref 0–1)
BILIRUB UR QL STRIP.AUTO: NEGATIVE
BUN SERPL-MCNC: 9 MG/DL (ref 7–20)
CALCIUM SERPL-MCNC: 8.5 MG/DL (ref 8.3–10.6)
CHLORIDE SERPL-SCNC: 107 MMOL/L (ref 99–110)
CLARITY UR: CLEAR
CO2 SERPL-SCNC: 24 MMOL/L (ref 21–32)
COLOR UR: YELLOW
CREAT SERPL-MCNC: 0.8 MG/DL (ref 0.6–1.2)
CRP SERPL-MCNC: <3 MG/L (ref 0–5.1)
DEPRECATED RDW RBC AUTO: 17.1 % (ref 12.4–15.4)
EOSINOPHIL # BLD: 0 K/UL (ref 0–0.6)
EOSINOPHIL NFR BLD: 2 %
EPI CELLS #/AREA URNS HPF: ABNORMAL /HPF (ref 0–5)
FERRITIN SERPL IA-MCNC: 180 NG/ML (ref 15–150)
FUNGUS BLD CULT: NORMAL
FUNGUS SPEC CULT: NORMAL
GFR SERPLBLD CREATININE-BSD FMLA CKD-EPI: 81 ML/MIN/{1.73_M2}
GLUCOSE SERPL-MCNC: 125 MG/DL (ref 70–99)
GLUCOSE UR STRIP.AUTO-MCNC: NEGATIVE MG/DL
HCT VFR BLD AUTO: 34.1 % (ref 36–48)
HGB BLD-MCNC: 11.4 G/DL (ref 12–16)
HGB UR QL STRIP.AUTO: NEGATIVE
HYALINE CASTS #/AREA URNS LPF: ABNORMAL /LPF (ref 0–2)
INR PPP: 1.01 (ref 0.85–1.15)
KETONES UR STRIP.AUTO-MCNC: NEGATIVE MG/DL
LDH SERPL L TO P-CCNC: 181 U/L (ref 100–190)
LEUKOCYTE ESTERASE UR QL STRIP.AUTO: NEGATIVE
LOEFFLER MB STN SPEC: NORMAL
LYMPHOCYTES # BLD: 0.7 K/UL (ref 1–5.1)
LYMPHOCYTES NFR BLD: 47 %
MAGNESIUM SERPL-MCNC: 2.17 MG/DL (ref 1.8–2.4)
MCH RBC QN AUTO: 29.3 PG (ref 26–34)
MCHC RBC AUTO-ENTMCNC: 33.5 G/DL (ref 31–36)
MCV RBC AUTO: 87.4 FL (ref 80–100)
MONOCYTES # BLD: 0.3 K/UL (ref 0–1.3)
MONOCYTES NFR BLD: 23 %
MUCOUS THREADS #/AREA URNS LPF: ABNORMAL /LPF
NEUTROPHILS # BLD: 0.4 K/UL (ref 1.7–7.7)
NEUTROPHILS NFR BLD: 28 %
NITRITE UR QL STRIP.AUTO: NEGATIVE
OVALOCYTES BLD QL SMEAR: ABNORMAL
PH UR STRIP.AUTO: 6.5 [PH] (ref 5–8)
PHOSPHATE SERPL-MCNC: 2.5 MG/DL (ref 2.5–4.9)
PLATELET # BLD AUTO: 71 K/UL (ref 135–450)
PLATELET BLD QL SMEAR: ABNORMAL
PMV BLD AUTO: 7.6 FL (ref 5–10.5)
POTASSIUM SERPL-SCNC: 3.6 MMOL/L (ref 3.5–5.1)
PROT SERPL-MCNC: 5.6 G/DL (ref 6.4–8.2)
PROT UR STRIP.AUTO-MCNC: ABNORMAL MG/DL
PROTHROMBIN TIME: 13.5 SEC (ref 11.9–14.9)
RBC # BLD AUTO: 3.9 M/UL (ref 4–5.2)
RBC #/AREA URNS HPF: ABNORMAL /HPF (ref 0–4)
RENAL EPI CELLS #/AREA UR COMP ASSIST: ABNORMAL /HPF (ref 0–1)
SCHISTOCYTES BLD QL SMEAR: ABNORMAL
SODIUM SERPL-SCNC: 140 MMOL/L (ref 136–145)
SP GR UR STRIP.AUTO: 1.01 (ref 1–1.03)
UA DIPSTICK W REFLEX MICRO PNL UR: YES
URATE SERPL-MCNC: 2.1 MG/DL (ref 2.6–6)
URN SPEC COLLECT METH UR: ABNORMAL
UROBILINOGEN UR STRIP-ACNC: 0.2 E.U./DL
WBC # BLD AUTO: 1.4 K/UL (ref 4–11)
WBC #/AREA URNS HPF: ABNORMAL /HPF (ref 0–5)

## 2024-11-18 PROCEDURE — 86140 C-REACTIVE PROTEIN: CPT

## 2024-11-18 PROCEDURE — 6360000002 HC RX W HCPCS: Performed by: NURSE PRACTITIONER

## 2024-11-18 PROCEDURE — 80076 HEPATIC FUNCTION PANEL: CPT

## 2024-11-18 PROCEDURE — 83735 ASSAY OF MAGNESIUM: CPT

## 2024-11-18 PROCEDURE — 36591 DRAW BLOOD OFF VENOUS DEVICE: CPT

## 2024-11-18 PROCEDURE — 85025 COMPLETE CBC W/AUTO DIFF WBC: CPT

## 2024-11-18 PROCEDURE — 81001 URINALYSIS AUTO W/SCOPE: CPT

## 2024-11-18 PROCEDURE — 84100 ASSAY OF PHOSPHORUS: CPT

## 2024-11-18 PROCEDURE — 80048 BASIC METABOLIC PNL TOTAL CA: CPT

## 2024-11-18 PROCEDURE — 71045 X-RAY EXAM CHEST 1 VIEW: CPT

## 2024-11-18 PROCEDURE — 96372 THER/PROPH/DIAG INJ SC/IM: CPT

## 2024-11-18 PROCEDURE — 82728 ASSAY OF FERRITIN: CPT

## 2024-11-18 PROCEDURE — 85610 PROTHROMBIN TIME: CPT

## 2024-11-18 PROCEDURE — 84550 ASSAY OF BLOOD/URIC ACID: CPT

## 2024-11-18 PROCEDURE — 83615 LACTATE (LD) (LDH) ENZYME: CPT

## 2024-11-18 RX ORDER — HEPARIN 100 UNIT/ML
500 SYRINGE INTRAVENOUS PRN
Status: CANCELLED | OUTPATIENT
Start: 2024-11-19

## 2024-11-18 RX ORDER — PROCHLORPERAZINE EDISYLATE 5 MG/ML
10 INJECTION INTRAMUSCULAR; INTRAVENOUS EVERY 6 HOURS PRN
Status: CANCELLED | OUTPATIENT
Start: 2024-11-19

## 2024-11-18 RX ORDER — POTASSIUM CHLORIDE 1500 MG/1
40 TABLET, EXTENDED RELEASE ORAL PRN
Status: CANCELLED | OUTPATIENT
Start: 2024-11-19

## 2024-11-18 RX ORDER — OXYCODONE HYDROCHLORIDE 5 MG/1
10 TABLET ORAL EVERY 4 HOURS PRN
Status: CANCELLED | OUTPATIENT
Start: 2024-11-19

## 2024-11-18 RX ORDER — POTASSIUM CHLORIDE 29.8 MG/ML
80 INJECTION INTRAVENOUS PRN
Status: CANCELLED | OUTPATIENT
Start: 2024-11-19 | End: 2025-02-27

## 2024-11-18 RX ORDER — SODIUM CHLORIDE 9 MG/ML
INJECTION, SOLUTION INTRAVENOUS CONTINUOUS PRN
Status: CANCELLED | OUTPATIENT
Start: 2024-11-19

## 2024-11-18 RX ORDER — ONDANSETRON 2 MG/ML
8 INJECTION INTRAMUSCULAR; INTRAVENOUS EVERY 8 HOURS PRN
Status: CANCELLED | OUTPATIENT
Start: 2024-11-19

## 2024-11-18 RX ORDER — OXYCODONE HYDROCHLORIDE 5 MG/1
5 TABLET ORAL EVERY 4 HOURS PRN
Status: CANCELLED | OUTPATIENT
Start: 2024-11-19

## 2024-11-18 RX ORDER — PROCHLORPERAZINE MALEATE 10 MG
10 TABLET ORAL EVERY 6 HOURS PRN
Status: CANCELLED | OUTPATIENT
Start: 2024-11-19

## 2024-11-18 RX ORDER — MAGNESIUM SULFATE IN WATER 40 MG/ML
4000 INJECTION, SOLUTION INTRAVENOUS PRN
Status: CANCELLED | OUTPATIENT
Start: 2024-11-19

## 2024-11-18 RX ORDER — ONDANSETRON 4 MG/1
8 TABLET, FILM COATED ORAL EVERY 8 HOURS PRN
Status: CANCELLED | OUTPATIENT
Start: 2024-11-19

## 2024-11-18 RX ADMIN — FILGRASTIM-AAFI 300 MCG: 300 INJECTION, SOLUTION SUBCUTANEOUS at 11:54

## 2024-11-18 NOTE — PROGRESS NOTES
Short Stay Communication Note  Leslee Maldonado  Diagnosis:  Primary MD:  Treatment:  Fludarabine/Cytoxan  CAR-T Administration Date:   Day +20of carvykti  Pt seen in outpatient infusion today. Labs drawn and reviewed.   CBC:   Recent Labs     11/18/24  0815   WBC 1.4*   HGB 11.4*   HCT 34.1*   MCV 87.4   PLT 71*     BMP/Mag:  Recent Labs     11/18/24 0815      K 3.6      CO2 24   PHOS 2.5   BUN 9   CREATININE 0.8   MG 2.17     Standing parameters for replacement for this patient:   1 unit of pack red blood cells for a hemoglobin < or equal to 7  1 pack of platelets for a platelet count less than or equal to 10  40 MeQ of Potassium administered for a potassium level less than or equal to 3.4  4g of Magnesium Sulfate for a magnesum level less than or equal to 1.4  No transfusions required for the above lab values.    Urinalysis last done: 11/18/24 Urinalysis next due: 11/25/2024  Chest X-Ray last done: 11/18/24 Chest X-Ray next due: 11/25/24    Symptoms addressed and reported to care team this date: n/a  Treatments this date:G-CSF injection    Reviewed medication schedule with pt and caregiver. Both able to verbalize all medications and schedule. Pt to be seen Mon-Wed-Fri. Patient and caregiver verbalized understanding of discharge instructions including when and how to call the doctor and when to report  to the ER. Discharged ambulatory to home with daughter  
closely with dietician  GERD: well-controlled   - Cont Tums PRN     7.  ENDO  Type 2 DM:  well-controlled with diet     8.  CVS:  stable   Hx of RBBB, CAD s/p CABG & PCI:  - 6/11/19:  PCI recannulization of occluded mid-left Cx (residual 70% stenosis)  - 6/13/22:  CABG - LIMA to LAD, reverse saphenous v. to OM1, reverse saphenous v. to OM2, reverse saphenous v. to PDA  - 10/25/22:  CARLO x 2 (proximal & ostial aspect of saphenous v. graft to OM3)  Hyperlipidemia:   - Hold rosuvastatin 40 mg qd during CAR-T   Hypertension:  - Continue amlodipine 10 mg qd- Continue  - S/p Hydralazine 25 mg bid - Hold  - S/p Lisinopril 40 mg qd- Hold     9.  MSK: Chronic back, hip, and right shoulder pain  - Tylenol PRN          - Disposition: Patient will return to [facility] Infusion MWF for CAR-T assessment, labs, and provider visit.         Patient was seen and examined by Dr. Dominique Lopez DO.       HE Milton - JENNIFER

## 2024-11-19 ENCOUNTER — HOSPITAL ENCOUNTER (OUTPATIENT)
Dept: CT IMAGING | Age: 69
Discharge: HOME | End: 2024-11-19
Payer: COMMERCIAL

## 2024-11-19 ENCOUNTER — HOSPITAL ENCOUNTER (OUTPATIENT)
Dept: ONCOLOGY | Age: 69
Setting detail: INFUSION SERIES
Discharge: HOME | End: 2024-11-19
Payer: COMMERCIAL

## 2024-11-19 VITALS
RESPIRATION RATE: 20 BRPM | WEIGHT: 187.83 LBS | SYSTOLIC BLOOD PRESSURE: 117 MMHG | DIASTOLIC BLOOD PRESSURE: 74 MMHG | HEART RATE: 77 BPM | OXYGEN SATURATION: 99 % | BODY MASS INDEX: 34.35 KG/M2 | TEMPERATURE: 98.7 F

## 2024-11-19 DIAGNOSIS — G51.0 FACIAL PARALYSIS: ICD-10-CM

## 2024-11-19 DIAGNOSIS — C90.00 MULTIPLE MYELOMA, REMISSION STATUS UNSPECIFIED (HCC): Primary | ICD-10-CM

## 2024-11-19 DIAGNOSIS — R94.5 ABNORMAL RESULTS OF LIVER FUNCTION STUDIES: ICD-10-CM

## 2024-11-19 LAB
ANION GAP SERPL CALCULATED.3IONS-SCNC: 10 MMOL/L (ref 3–16)
BASOPHILS # BLD: 0 K/UL (ref 0–0.2)
BASOPHILS NFR BLD: 0.5 %
BUN SERPL-MCNC: 8 MG/DL (ref 7–20)
CALCIUM SERPL-MCNC: 8.9 MG/DL (ref 8.3–10.6)
CHLORIDE SERPL-SCNC: 106 MMOL/L (ref 99–110)
CO2 SERPL-SCNC: 24 MMOL/L (ref 21–32)
CREAT SERPL-MCNC: 0.9 MG/DL (ref 0.6–1.2)
CRP SERPL-MCNC: <3 MG/L (ref 0–5.1)
DEPRECATED RDW RBC AUTO: 17.3 % (ref 12.4–15.4)
EOSINOPHIL # BLD: 0 K/UL (ref 0–0.6)
EOSINOPHIL NFR BLD: 0.2 %
FERRITIN SERPL IA-MCNC: 184 NG/ML (ref 15–150)
GFR SERPLBLD CREATININE-BSD FMLA CKD-EPI: 70 ML/MIN/{1.73_M2}
GLUCOSE SERPL-MCNC: 161 MG/DL (ref 70–99)
HCT VFR BLD AUTO: 34.5 % (ref 36–48)
HGB BLD-MCNC: 11.5 G/DL (ref 12–16)
LYMPHOCYTES # BLD: 1 K/UL (ref 1–5.1)
LYMPHOCYTES NFR BLD: 10.4 %
MCH RBC QN AUTO: 29.5 PG (ref 26–34)
MCHC RBC AUTO-ENTMCNC: 33.2 G/DL (ref 31–36)
MCV RBC AUTO: 88.8 FL (ref 80–100)
MONOCYTES # BLD: 1 K/UL (ref 0–1.3)
MONOCYTES NFR BLD: 11 %
NEUTROPHILS # BLD: 7.1 K/UL (ref 1.7–7.7)
NEUTROPHILS NFR BLD: 77.9 %
PHOSPHATE SERPL-MCNC: 2.7 MG/DL (ref 2.5–4.9)
PLATELET # BLD AUTO: 69 K/UL (ref 135–450)
PMV BLD AUTO: 8.3 FL (ref 5–10.5)
POTASSIUM SERPL-SCNC: 3.5 MMOL/L (ref 3.5–5.1)
RBC # BLD AUTO: 3.88 M/UL (ref 4–5.2)
SODIUM SERPL-SCNC: 140 MMOL/L (ref 136–145)
WBC # BLD AUTO: 9.2 K/UL (ref 4–11)

## 2024-11-19 PROCEDURE — 80048 BASIC METABOLIC PNL TOTAL CA: CPT

## 2024-11-19 PROCEDURE — 36591 DRAW BLOOD OFF VENOUS DEVICE: CPT

## 2024-11-19 PROCEDURE — 82728 ASSAY OF FERRITIN: CPT

## 2024-11-19 PROCEDURE — 70450 CT HEAD/BRAIN W/O DYE: CPT

## 2024-11-19 PROCEDURE — 86140 C-REACTIVE PROTEIN: CPT

## 2024-11-19 PROCEDURE — 85025 COMPLETE CBC W/AUTO DIFF WBC: CPT

## 2024-11-19 PROCEDURE — 84100 ASSAY OF PHOSPHORUS: CPT

## 2024-11-19 PROCEDURE — 87799 DETECT AGENT NOS DNA QUANT: CPT

## 2024-11-19 PROCEDURE — 87533 HHV-6 DNA QUANT: CPT

## 2024-11-19 RX ORDER — PROCHLORPERAZINE MALEATE 10 MG
10 TABLET ORAL EVERY 6 HOURS PRN
Status: CANCELLED | OUTPATIENT
Start: 2024-11-20

## 2024-11-19 RX ORDER — MAGNESIUM SULFATE IN WATER 40 MG/ML
4000 INJECTION, SOLUTION INTRAVENOUS PRN
Status: CANCELLED | OUTPATIENT
Start: 2024-11-20

## 2024-11-19 RX ORDER — PROCHLORPERAZINE EDISYLATE 5 MG/ML
10 INJECTION INTRAMUSCULAR; INTRAVENOUS EVERY 6 HOURS PRN
Status: CANCELLED | OUTPATIENT
Start: 2024-11-20

## 2024-11-19 RX ORDER — OXYCODONE HYDROCHLORIDE 5 MG/1
5 TABLET ORAL EVERY 4 HOURS PRN
Status: CANCELLED | OUTPATIENT
Start: 2024-11-20

## 2024-11-19 RX ORDER — ONDANSETRON 2 MG/ML
8 INJECTION INTRAMUSCULAR; INTRAVENOUS EVERY 8 HOURS PRN
Status: CANCELLED | OUTPATIENT
Start: 2024-11-20

## 2024-11-19 RX ORDER — POTASSIUM CHLORIDE 1500 MG/1
40 TABLET, EXTENDED RELEASE ORAL PRN
Status: CANCELLED | OUTPATIENT
Start: 2024-11-20

## 2024-11-19 RX ORDER — HEPARIN 100 UNIT/ML
500 SYRINGE INTRAVENOUS PRN
Status: CANCELLED | OUTPATIENT
Start: 2024-11-20

## 2024-11-19 RX ORDER — SODIUM CHLORIDE 9 MG/ML
INJECTION, SOLUTION INTRAVENOUS CONTINUOUS PRN
Status: CANCELLED | OUTPATIENT
Start: 2024-11-20

## 2024-11-19 RX ORDER — OXYCODONE HYDROCHLORIDE 5 MG/1
10 TABLET ORAL EVERY 4 HOURS PRN
Status: CANCELLED | OUTPATIENT
Start: 2024-11-20

## 2024-11-19 RX ORDER — ONDANSETRON 4 MG/1
8 TABLET, FILM COATED ORAL EVERY 8 HOURS PRN
Status: CANCELLED | OUTPATIENT
Start: 2024-11-20

## 2024-11-19 RX ORDER — POTASSIUM CHLORIDE 29.8 MG/ML
80 INJECTION INTRAVENOUS PRN
Status: CANCELLED | OUTPATIENT
Start: 2024-11-20 | End: 2025-02-28

## 2024-11-19 NOTE — PROGRESS NOTES
Short Stay Communication Note  Leslee Maldonado  Diagnosis: Multiple Myeloma  Primary MD: Dr. Evangelista  Treatment: Fludarabine/Cytoxan  CAR-T Administration Date: 10/28/24  Day +22 of carvykti  Pt seen in outpatient infusion today. Labs drawn and reviewed.   CBC:   Recent Labs     11/18/24  0815 11/19/24  0820   WBC 1.4* 9.2   HGB 11.4* 11.5*   HCT 34.1* 34.5*   MCV 87.4 88.8   PLT 71* 69*     BMP/Mag:  Recent Labs     11/18/24  0815 11/19/24  0820    140   K 3.6 3.5    106   CO2 24 24   PHOS 2.5 2.7   BUN 9 8   CREATININE 0.8 0.9   MG 2.17  --      Standing parameters for replacement for this patient:   1 unit of pack red blood cells for a hemoglobin < or equal to 7  1 pack of platelets for a platelet count less than or equal to 10  40 MeQ of Potassium administered for a potassium level less than or equal to 3.4  4g of Magnesium Sulfate for a magnesum level less than or equal to 1.4  No transfusions required for the above lab values.    Urinalysis last done: 11/18/24 Urinalysis next due: 11/25/24    Chest X-Ray last done: 11/18/24 Chest X-Ray next due: 11/25/24    Symptoms addressed and reported to care team this date: L facial paralysis. Seen in [facility] yesterday post discharge  Treatments this date:labs, stat head CT      Reviewed medication schedule with pt and caregiver. Both able to verbalize all medications and schedule. Pt to be seen again tomorrow. Patient and caregiver verbalized understanding of discharge instructions including when and how to call the doctor and when to report  to the ER. Discharged ambulatory to home with daughter  
lambda 9034, ratio 0.24  - 9/5/24:  T-cells collected  - 10/23-10/25/24, lymphodepletion:  fludarabine, cyclophosphamide  - 10/28/24:  Carvykti infusion (progressive disease at the time of the infusion)    PLAN: S/p Carvykti Infusion (10/28/24) Day + 21          2. CRS / Neuro: At risk post-infusion  CRS Grade: None  - 11/2-11/4/24:  CRS grade 1 - fever only  - Monitor CRP and Ferrtin closely   Lab Results   Component Value Date    CRP 5.9 (H) 11/11/2024    CRP 7.6 (H) 11/10/2024    CRP 10.1 (H) 11/09/2024     Lab Results   Component Value Date    FERRITIN 60.1 11/11/2024    FERRITIN 62.6 11/10/2024    FERRITIN 66.4 11/09/2024     NEURO:  ICANS Grade: None  Numbness/tingling bilateral plantar feet, chronic: Stable  - She denies taking any medication  Left facial nerve weakness (11/18/24):    - Pt evaluated by Dr. Lopez @ [facility].   - STAT head CT (11/19/24) in [facility]: Neg  - STAT LP & MRI w/ orbits (11/20/24): Scheduled / Pending     ICANS: None  CAR-T Score: 10/10  - Continue Keppra 500 mg bid      3.  ID:  Afebrile without signs or symptoms of infection   ID Hx:  - 4/9 - 4/18/24:  Levaquin for sinusitis   - 4/19/24, resp viral panel:  human metapneumovirus  - 11/2/24:  PanCx with NGTD  - 11/2-11/5/24:  empiric coverage with cefepime  - CXR & UA 11/11/24: Negative   Current PPx:  - Continue acyclovir 400 mg bid  - Continue fluconazole 200 mg qd  - Continue Levaquin 500 mg qd  Hypogammaglobulinemia:  - IgG 4/21/24: 493  - IgG 5/1/24: 900 (likely represents disease)         Post-CAR T monitoring/maintenance  - IgG & CD4 level monthly starting on day + 30  - Check disease titers on day + 30 or when WBC recovered. Re-vaccinate vs booster based on titer (assure titers are drawn prior to administering immunoglobulins)  - Start PCP ppx on day + 30 - stop when CD4 > 250  - Continue Valtrex for 1 year post CAR-T        4.  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

## 2024-11-20 ENCOUNTER — HOSPITAL ENCOUNTER (OUTPATIENT)
Dept: MRI IMAGING | Age: 69
Discharge: HOME | End: 2024-11-20
Payer: COMMERCIAL

## 2024-11-20 ENCOUNTER — HOSPITAL ENCOUNTER (OUTPATIENT)
Dept: ONCOLOGY | Age: 69
Setting detail: INFUSION SERIES
Discharge: HOME | End: 2024-11-20
Payer: COMMERCIAL

## 2024-11-20 ENCOUNTER — HOSPITAL ENCOUNTER (OUTPATIENT)
Dept: MRI IMAGING | Age: 69
End: 2024-11-20
Payer: COMMERCIAL

## 2024-11-20 ENCOUNTER — HOSPITAL ENCOUNTER (OUTPATIENT)
Dept: GENERAL RADIOLOGY | Age: 69
Discharge: HOME | End: 2024-11-20
Payer: COMMERCIAL

## 2024-11-20 VITALS
HEART RATE: 83 BPM | SYSTOLIC BLOOD PRESSURE: 122 MMHG | BODY MASS INDEX: 33.67 KG/M2 | RESPIRATION RATE: 18 BRPM | DIASTOLIC BLOOD PRESSURE: 68 MMHG | TEMPERATURE: 99 F | WEIGHT: 184.08 LBS | OXYGEN SATURATION: 97 %

## 2024-11-20 DIAGNOSIS — R94.5 ABNORMAL RESULTS OF LIVER FUNCTION STUDIES: ICD-10-CM

## 2024-11-20 DIAGNOSIS — C90.00 MULTIPLE MYELOMA, REMISSION STATUS UNSPECIFIED (HCC): Primary | ICD-10-CM

## 2024-11-20 LAB
ALBUMIN SERPL-MCNC: 3.8 G/DL (ref 3.4–5)
ALP SERPL-CCNC: 72 U/L (ref 40–129)
ALT SERPL-CCNC: 15 U/L (ref 10–40)
ANION GAP SERPL CALCULATED.3IONS-SCNC: 9 MMOL/L (ref 3–16)
APPEARANCE CSF: CLEAR
AST SERPL-CCNC: 17 U/L (ref 15–37)
BASOPHILS # BLD: 0 K/UL (ref 0–0.2)
BASOPHILS NFR BLD: 0.3 %
BILIRUB DIRECT SERPL-MCNC: <0.1 MG/DL (ref 0–0.3)
BILIRUB INDIRECT SERPL-MCNC: 0.2 MG/DL (ref 0–1)
BILIRUB SERPL-MCNC: 0.3 MG/DL (ref 0–1)
BUN SERPL-MCNC: 10 MG/DL (ref 7–20)
CALCIUM SERPL-MCNC: 8.9 MG/DL (ref 8.3–10.6)
CHLORIDE SERPL-SCNC: 109 MMOL/L (ref 99–110)
CLOT EVALUATION CSF: ABNORMAL
CO2 SERPL-SCNC: 24 MMOL/L (ref 21–32)
COLOR CSF: COLORLESS
CREAT SERPL-MCNC: 0.8 MG/DL (ref 0.6–1.2)
CRP SERPL-MCNC: 3.4 MG/L (ref 0–5.1)
DEPRECATED RDW RBC AUTO: 17.9 % (ref 12.4–15.4)
EOSINOPHIL # BLD: 0 K/UL (ref 0–0.6)
EOSINOPHIL NFR BLD: 0.2 %
FERRITIN SERPL IA-MCNC: 219 NG/ML (ref 15–150)
GFR SERPLBLD CREATININE-BSD FMLA CKD-EPI: 81 ML/MIN/{1.73_M2}
GLUCOSE CSF-MCNC: 66 MG/DL (ref 40–80)
GLUCOSE SERPL-MCNC: 112 MG/DL (ref 70–99)
HCT VFR BLD AUTO: 34.3 % (ref 36–48)
HGB BLD-MCNC: 11.3 G/DL (ref 12–16)
LDH SERPL L TO P-CCNC: 191 U/L (ref 100–190)
LYMPHOCYTES # BLD: 0.8 K/UL (ref 1–5.1)
LYMPHOCYTES NFR BLD: 11 %
MAGNESIUM SERPL-MCNC: 2.08 MG/DL (ref 1.8–2.4)
MANUAL DIF COMMENT CSF-IMP: ABNORMAL
MCH RBC QN AUTO: 29.3 PG (ref 26–34)
MCHC RBC AUTO-ENTMCNC: 33 G/DL (ref 31–36)
MCV RBC AUTO: 89 FL (ref 80–100)
MENING+ENC PNL CSF NAA+NON-PROBE: NORMAL
MONOCYTES # BLD: 0.8 K/UL (ref 0–1.3)
MONOCYTES NFR BLD: 10.7 %
NEUTROPHILS # BLD: 5.6 K/UL (ref 1.7–7.7)
NEUTROPHILS NFR BLD: 77.8 %
NUC CELL # FLD MANUAL: 1 /CUMM (ref 0–5)
PHOSPHATE SERPL-MCNC: 3.1 MG/DL (ref 2.5–4.9)
PLATELET # BLD AUTO: 73 K/UL (ref 135–450)
PMV BLD AUTO: 8.1 FL (ref 5–10.5)
POTASSIUM SERPL-SCNC: 3.6 MMOL/L (ref 3.5–5.1)
PROT CSF-MCNC: 29 MG/DL (ref 15–45)
PROT SERPL-MCNC: 5.7 G/DL (ref 6.4–8.2)
RBC # BLD AUTO: 3.85 M/UL (ref 4–5.2)
RBC # FLD MANUAL: <1000 /CUMM
REPORT: NORMAL
SODIUM SERPL-SCNC: 142 MMOL/L (ref 136–145)
SPECIMEN SOURCE: NORMAL
TUBE # CSF: ABNORMAL
TUBE # CSF: NORMAL
URATE SERPL-MCNC: 2.5 MG/DL (ref 2.6–6)
WBC # BLD AUTO: 7.2 K/UL (ref 4–11)

## 2024-11-20 PROCEDURE — 83615 LACTATE (LD) (LDH) ENZYME: CPT

## 2024-11-20 PROCEDURE — 88185 FLOWCYTOMETRY/TC ADD-ON: CPT

## 2024-11-20 PROCEDURE — 86317 IMMUNOASSAY INFECTIOUS AGENT: CPT

## 2024-11-20 PROCEDURE — 80048 BASIC METABOLIC PNL TOTAL CA: CPT

## 2024-11-20 PROCEDURE — 83735 ASSAY OF MAGNESIUM: CPT

## 2024-11-20 PROCEDURE — 2500000003 HC RX 250 WO HCPCS: Performed by: NURSE PRACTITIONER

## 2024-11-20 PROCEDURE — 80076 HEPATIC FUNCTION PANEL: CPT

## 2024-11-20 PROCEDURE — A9576 INJ PROHANCE MULTIPACK: HCPCS | Performed by: INTERNAL MEDICINE

## 2024-11-20 PROCEDURE — 87899 AGENT NOS ASSAY W/OPTIC: CPT

## 2024-11-20 PROCEDURE — 85025 COMPLETE CBC W/AUTO DIFF WBC: CPT

## 2024-11-20 PROCEDURE — 86140 C-REACTIVE PROTEIN: CPT

## 2024-11-20 PROCEDURE — 2709999900 FL LUMBAR PUNCTURE DIAG

## 2024-11-20 PROCEDURE — 84100 ASSAY OF PHOSPHORUS: CPT

## 2024-11-20 PROCEDURE — 70553 MRI BRAIN STEM W/O & W/DYE: CPT

## 2024-11-20 PROCEDURE — 87205 SMEAR GRAM STAIN: CPT

## 2024-11-20 PROCEDURE — 84157 ASSAY OF PROTEIN OTHER: CPT

## 2024-11-20 PROCEDURE — 88112 CYTOPATH CELL ENHANCE TECH: CPT

## 2024-11-20 PROCEDURE — 84550 ASSAY OF BLOOD/URIC ACID: CPT

## 2024-11-20 PROCEDURE — 87070 CULTURE OTHR SPECIMN AEROBIC: CPT

## 2024-11-20 PROCEDURE — 36415 COLL VENOUS BLD VENIPUNCTURE: CPT

## 2024-11-20 PROCEDURE — 82945 GLUCOSE OTHER FLUID: CPT

## 2024-11-20 PROCEDURE — 82728 ASSAY OF FERRITIN: CPT

## 2024-11-20 PROCEDURE — 89050 BODY FLUID CELL COUNT: CPT

## 2024-11-20 PROCEDURE — 6360000004 HC RX CONTRAST MEDICATION: Performed by: INTERNAL MEDICINE

## 2024-11-20 PROCEDURE — 0035U NEURO CSF PRION PRTN QUAL: CPT

## 2024-11-20 PROCEDURE — 36591 DRAW BLOOD OFF VENOUS DEVICE: CPT

## 2024-11-20 PROCEDURE — 87483 CNS DNA AMP PROBE TYPE 12-25: CPT

## 2024-11-20 PROCEDURE — 84182 PROTEIN WESTERN BLOT TEST: CPT

## 2024-11-20 PROCEDURE — 88184 FLOWCYTOMETRY/ TC 1 MARKER: CPT

## 2024-11-20 RX ORDER — PREDNISONE 20 MG/1
40 TABLET ORAL DAILY
Qty: 60 TABLET | Refills: 3 | Status: SHIPPED | OUTPATIENT
Start: 2024-11-20 | End: 2024-11-20

## 2024-11-20 RX ORDER — POTASSIUM CHLORIDE 1500 MG/1
40 TABLET, EXTENDED RELEASE ORAL PRN
Status: CANCELLED | OUTPATIENT
Start: 2024-11-21

## 2024-11-20 RX ORDER — MAGNESIUM SULFATE IN WATER 40 MG/ML
4000 INJECTION, SOLUTION INTRAVENOUS PRN
OUTPATIENT
Start: 2024-11-21

## 2024-11-20 RX ORDER — OXYCODONE HYDROCHLORIDE 5 MG/1
5 TABLET ORAL EVERY 4 HOURS PRN
Status: DISCONTINUED | OUTPATIENT
Start: 2024-11-20 | End: 2024-11-21 | Stop reason: HOSPADM

## 2024-11-20 RX ORDER — OXYCODONE HYDROCHLORIDE 5 MG/1
10 TABLET ORAL EVERY 4 HOURS PRN
Status: DISCONTINUED | OUTPATIENT
Start: 2024-11-20 | End: 2024-11-21 | Stop reason: HOSPADM

## 2024-11-20 RX ORDER — POTASSIUM CHLORIDE 29.8 MG/ML
80 INJECTION INTRAVENOUS PRN
OUTPATIENT
Start: 2024-11-21 | End: 2025-03-01

## 2024-11-20 RX ORDER — HEPARIN 100 UNIT/ML
500 SYRINGE INTRAVENOUS PRN
OUTPATIENT
Start: 2024-11-21

## 2024-11-20 RX ORDER — ONDANSETRON 2 MG/ML
8 INJECTION INTRAMUSCULAR; INTRAVENOUS EVERY 8 HOURS PRN
Status: DISCONTINUED | OUTPATIENT
Start: 2024-11-20 | End: 2024-11-21 | Stop reason: HOSPADM

## 2024-11-20 RX ORDER — ONDANSETRON 2 MG/ML
8 INJECTION INTRAMUSCULAR; INTRAVENOUS EVERY 8 HOURS PRN
OUTPATIENT
Start: 2024-11-21

## 2024-11-20 RX ORDER — PROCHLORPERAZINE MALEATE 10 MG
10 TABLET ORAL EVERY 6 HOURS PRN
Status: DISCONTINUED | OUTPATIENT
Start: 2024-11-20 | End: 2024-11-21 | Stop reason: HOSPADM

## 2024-11-20 RX ORDER — POTASSIUM CHLORIDE 1500 MG/1
40 TABLET, EXTENDED RELEASE ORAL PRN
Status: DISCONTINUED | OUTPATIENT
Start: 2024-11-20 | End: 2024-11-21 | Stop reason: HOSPADM

## 2024-11-20 RX ORDER — MAGNESIUM SULFATE IN WATER 40 MG/ML
4000 INJECTION, SOLUTION INTRAVENOUS PRN
Status: DISCONTINUED | OUTPATIENT
Start: 2024-11-20 | End: 2024-11-21 | Stop reason: HOSPADM

## 2024-11-20 RX ORDER — LIDOCAINE HYDROCHLORIDE 10 MG/ML
5 INJECTION, SOLUTION EPIDURAL; INFILTRATION; INTRACAUDAL; PERINEURAL ONCE
Status: COMPLETED | OUTPATIENT
Start: 2024-11-20 | End: 2024-11-20

## 2024-11-20 RX ORDER — ONDANSETRON 4 MG/1
8 TABLET, FILM COATED ORAL EVERY 8 HOURS PRN
Status: DISCONTINUED | OUTPATIENT
Start: 2024-11-20 | End: 2024-11-21 | Stop reason: HOSPADM

## 2024-11-20 RX ORDER — ONDANSETRON 4 MG/1
8 TABLET, FILM COATED ORAL EVERY 8 HOURS PRN
OUTPATIENT
Start: 2024-11-21

## 2024-11-20 RX ORDER — SODIUM CHLORIDE 9 MG/ML
INJECTION, SOLUTION INTRAVENOUS CONTINUOUS PRN
OUTPATIENT
Start: 2024-11-21

## 2024-11-20 RX ORDER — SODIUM CHLORIDE 9 MG/ML
INJECTION, SOLUTION INTRAVENOUS CONTINUOUS PRN
Status: DISCONTINUED | OUTPATIENT
Start: 2024-11-20 | End: 2024-11-21 | Stop reason: HOSPADM

## 2024-11-20 RX ORDER — PREDNISONE 20 MG/1
40 TABLET ORAL DAILY
Qty: 60 TABLET | Refills: 3 | Status: ON HOLD | OUTPATIENT
Start: 2024-11-20

## 2024-11-20 RX ORDER — OXYCODONE HYDROCHLORIDE 5 MG/1
10 TABLET ORAL EVERY 4 HOURS PRN
OUTPATIENT
Start: 2024-11-21

## 2024-11-20 RX ORDER — OXYCODONE HYDROCHLORIDE 5 MG/1
5 TABLET ORAL EVERY 4 HOURS PRN
OUTPATIENT
Start: 2024-11-21

## 2024-11-20 RX ORDER — POTASSIUM CHLORIDE 29.8 MG/ML
80 INJECTION INTRAVENOUS PRN
Status: DISCONTINUED | OUTPATIENT
Start: 2024-11-20 | End: 2024-11-21 | Stop reason: HOSPADM

## 2024-11-20 RX ORDER — PROCHLORPERAZINE EDISYLATE 5 MG/ML
10 INJECTION INTRAMUSCULAR; INTRAVENOUS EVERY 6 HOURS PRN
Status: DISCONTINUED | OUTPATIENT
Start: 2024-11-20 | End: 2024-11-21 | Stop reason: HOSPADM

## 2024-11-20 RX ORDER — PROCHLORPERAZINE EDISYLATE 5 MG/ML
10 INJECTION INTRAMUSCULAR; INTRAVENOUS EVERY 6 HOURS PRN
OUTPATIENT
Start: 2024-11-21

## 2024-11-20 RX ORDER — PROCHLORPERAZINE MALEATE 10 MG
10 TABLET ORAL EVERY 6 HOURS PRN
OUTPATIENT
Start: 2024-11-21

## 2024-11-20 RX ADMIN — LIDOCAINE HYDROCHLORIDE 5 ML: 10 INJECTION, SOLUTION EPIDURAL; INFILTRATION; INTRACAUDAL; PERINEURAL at 09:55

## 2024-11-20 RX ADMIN — GADOTERIDOL 18 ML: 279.3 INJECTION, SOLUTION INTRAVENOUS at 11:13

## 2024-11-20 NOTE — PROGRESS NOTES
Short Stay Communication Note  Leslee Maldonado  Diagnosis: Multiple Myeloma  Primary MD:  Treatment:  Fludarabine/Cytoxan  CAR-T Administration Date: 10/28/24  Day +23 of carvykti  Pt seen in outpatient infusion today. Labs drawn and reviewed. LP and MRI done downstairs.  CBC:   Recent Labs     11/18/24 0815 11/19/24 0820 11/20/24  0814   WBC 1.4* 9.2 7.2   HGB 11.4* 11.5* 11.3*   HCT 34.1* 34.5* 34.3*   MCV 87.4 88.8 89.0   PLT 71* 69* 73*     BMP/Mag:  Recent Labs     11/18/24 0815 11/19/24 0820 11/20/24 0814    140 142   K 3.6 3.5 3.6    106 109   CO2 24 24 24   PHOS 2.5 2.7 3.1   BUN 9 8 10   CREATININE 0.8 0.9 0.8   MG 2.17  --  2.08     Standing parameters for replacement for this patient:   1 unit of pack red blood cells for a hemoglobin < or equal to 7  1 pack of platelets for a platelet count less than or equal to 10  40 MeQ of Potassium administered for a potassium level less than or equal to 3.4  4g of Magnesium Sulfate for a magnesum level less than or equal to 1.4  No transfusions required for the above lab values.    Urinalysis last done: 11/18/24 Urinalysis next due: 11/25/2024  Chest X-Ray last done: 11/18/24 Chest X-Ray next due: 11/25/24    Symptoms addressed and reported to care team this date: n/a  Treatments this date: Labs, LP and MRI    Pt instructed to start Prednisone 40 mg daily on 11/20 pt will get from her pharmacy on the way home.  Reviewed medication schedule with pt and caregiver. Both able to verbalize all medications and schedule. Pt to be seen again Fri. Patient and caregiver verbalized understanding of discharge instructions including when and how to call the doctor and when to report  to the ER. Discharged ambulatory to home with daughter

## 2024-11-20 NOTE — PROGRESS NOTES
[facility] Progress Note      2024    Leslee Maldonado    :  1955    MRN:  [id number]    Referring MD: Kerry Reagan, APRN - NP  [address] [place] [address]      Subjective: Ms. Maldonado presents to OP Infusion today after MRI and LP to evaluate CN palsy. She feels okay, stable, facial paralysis not better but not worse. Eating and drinking okay. No fevers, no confusion, eating and drinking well.      ECOG PS:  (1) Restricted in physically strenuous activity, ambulatory and able to do work of light nature    KPS: 90% Able to carry on normal activity; minor signs or symptoms of disease    Isolation:  None     Medications    Scheduled Meds:        Continuous Infusions:   sodium chloride           PRN Meds:.sodium chloride, potassium chloride, potassium chloride, magnesium sulfate, magnesium hydroxide, oxyCODONE, oxyCODONE, prochlorperazine, prochlorperazine, ondansetron, ondansetron      ROS:  As noted above, otherwise remainder of 10-point ROS negative      Physical Exam:     Vital Signs:  /68   Pulse 83   Temp 99 °F (37.2 °C) (Oral)   Resp 18   Wt 83.5 kg (184 lb 1.4 oz)   SpO2 97%   BMI 33.67 kg/m²     Weight:    Wt Readings from Last 3 Encounters:   24 83.5 kg (184 lb 1.4 oz)   24 85.2 kg (187 lb 13.3 oz)   24 85.6 kg (188 lb 11.4 oz)     PE performed by Dr. Dominique Lopez:   General: Awake, alert and oriented.  HEENT: normocephalic, alopecia, no scleral erythema or icterus, Oral mucosa moist and intact, throat clear  NECK: supple   BACK: Straight   SKIN: warm dry and intact without lesions rashes or masses  CHEST: CTA bilaterally without use of accessory muscles  CV: Normal S1 S2, RRR, no MRG  ABD: NT ND normoactive BS  EXTREMITIES: without edema, denies calf tenderness  NEURO: Left sided facial paralysis   CATHETER: PAC    Laboratory Data:  CBC:   Recent Labs     24  0815 24  0820 24  0814   WBC 1.4* 9.2 7.2   HGB 11.4* 11.5* 11.3*   HCT

## 2024-11-21 LAB — CRYPTOC AG CSF QL: NEGATIVE

## 2024-11-22 ENCOUNTER — HOSPITAL ENCOUNTER (OUTPATIENT)
Dept: ONCOLOGY | Age: 69
Setting detail: INFUSION SERIES
Discharge: HOME | End: 2024-11-22
Payer: COMMERCIAL

## 2024-11-22 VITALS
OXYGEN SATURATION: 98 % | DIASTOLIC BLOOD PRESSURE: 70 MMHG | SYSTOLIC BLOOD PRESSURE: 121 MMHG | RESPIRATION RATE: 18 BRPM | WEIGHT: 189.15 LBS | TEMPERATURE: 98.3 F | HEART RATE: 69 BPM | BODY MASS INDEX: 34.6 KG/M2

## 2024-11-22 DIAGNOSIS — R94.5 ABNORMAL RESULTS OF LIVER FUNCTION STUDIES: ICD-10-CM

## 2024-11-22 DIAGNOSIS — C90.00 MULTIPLE MYELOMA, REMISSION STATUS UNSPECIFIED (HCC): Primary | ICD-10-CM

## 2024-11-22 LAB
ALBUMIN SERPL-MCNC: 3.8 G/DL (ref 3.4–5)
ALP SERPL-CCNC: 65 U/L (ref 40–129)
ALT SERPL-CCNC: 16 U/L (ref 10–40)
ANION GAP SERPL CALCULATED.3IONS-SCNC: 10 MMOL/L (ref 3–16)
AST SERPL-CCNC: 19 U/L (ref 15–37)
BACTERIA CSF CULT: NORMAL
BASOPHILS # BLD: 0 K/UL (ref 0–0.2)
BASOPHILS NFR BLD: 0.2 %
BILIRUB DIRECT SERPL-MCNC: <0.1 MG/DL (ref 0–0.3)
BILIRUB INDIRECT SERPL-MCNC: ABNORMAL MG/DL (ref 0–1)
BILIRUB SERPL-MCNC: <0.2 MG/DL (ref 0–1)
BUN SERPL-MCNC: 12 MG/DL (ref 7–20)
CALCIUM SERPL-MCNC: 9 MG/DL (ref 8.3–10.6)
CHLORIDE SERPL-SCNC: 108 MMOL/L (ref 99–110)
CMV DNA SERPL NAA+PROBE-ACNC: NOT DETECTED IU/ML
CMV DNA SERPL NAA+PROBE-LOG IU: NOT DETECTED LOG IU/ML
CMV DNA SERPL QL NAA+PROBE: NOT DETECTED
CO2 SERPL-SCNC: 24 MMOL/L (ref 21–32)
CREAT SERPL-MCNC: 0.8 MG/DL (ref 0.6–1.2)
CRP SERPL-MCNC: <3 MG/L (ref 0–5.1)
DEPRECATED RDW RBC AUTO: 17.8 % (ref 12.4–15.4)
EBV DNA SERPL NAA+PROBE-ACNC: NOT DETECTED IU/ML
EBV DNA SERPL NAA+PROBE-LOG#: NOT DETECTED LOG IU/ML
EBV DNA SPEC QL NAA+PROBE: NOT DETECTED
EOSINOPHIL # BLD: 0 K/UL (ref 0–0.6)
EOSINOPHIL NFR BLD: 0.3 %
FERRITIN SERPL IA-MCNC: 156 NG/ML (ref 15–150)
GFR SERPLBLD CREATININE-BSD FMLA CKD-EPI: 81 ML/MIN/{1.73_M2}
GLUCOSE SERPL-MCNC: 119 MG/DL (ref 70–99)
GRAM STN SPEC: NORMAL
HCT VFR BLD AUTO: 32.3 % (ref 36–48)
HGB BLD-MCNC: 10.8 G/DL (ref 12–16)
HHV6 DNA # SPEC NAA+PROBE: <1000 CPY/ML
HHV6 DNA SPEC NAA+PROBE-LOG#: <3 LOG
HHV6 DNA SPEC NAA+PROBE: NORMAL
HHV6 IGG+IGM SER-IMP: NOT DETECTED
INR PPP: 1 (ref 0.85–1.15)
LDH SERPL L TO P-CCNC: 179 U/L (ref 100–190)
LYMPHOCYTES # BLD: 0.5 K/UL (ref 1–5.1)
LYMPHOCYTES NFR BLD: 16.4 %
MAGNESIUM SERPL-MCNC: 2.29 MG/DL (ref 1.8–2.4)
MCH RBC QN AUTO: 29.5 PG (ref 26–34)
MCHC RBC AUTO-ENTMCNC: 33.3 G/DL (ref 31–36)
MCV RBC AUTO: 88.5 FL (ref 80–100)
MONOCYTES # BLD: 0.5 K/UL (ref 0–1.3)
MONOCYTES NFR BLD: 14.9 %
NEUTROPHILS # BLD: 2.1 K/UL (ref 1.7–7.7)
NEUTROPHILS NFR BLD: 68.2 %
PHOSPHATE SERPL-MCNC: 2.5 MG/DL (ref 2.5–4.9)
PLATELET # BLD AUTO: 85 K/UL (ref 135–450)
PMV BLD AUTO: 8.2 FL (ref 5–10.5)
POTASSIUM SERPL-SCNC: 3.4 MMOL/L (ref 3.5–5.1)
PROT SERPL-MCNC: 5.4 G/DL (ref 6.4–8.2)
PROTHROMBIN TIME: 13.4 SEC (ref 11.9–14.9)
RBC # BLD AUTO: 3.64 M/UL (ref 4–5.2)
SODIUM SERPL-SCNC: 142 MMOL/L (ref 136–145)
SPECIMEN SOURCE: NORMAL
URATE SERPL-MCNC: 2.4 MG/DL (ref 2.6–6)
WBC # BLD AUTO: 3.1 K/UL (ref 4–11)

## 2024-11-22 PROCEDURE — 36591 DRAW BLOOD OFF VENOUS DEVICE: CPT

## 2024-11-22 PROCEDURE — 83735 ASSAY OF MAGNESIUM: CPT

## 2024-11-22 PROCEDURE — 83615 LACTATE (LD) (LDH) ENZYME: CPT

## 2024-11-22 PROCEDURE — 84550 ASSAY OF BLOOD/URIC ACID: CPT

## 2024-11-22 PROCEDURE — 85610 PROTHROMBIN TIME: CPT

## 2024-11-22 PROCEDURE — 85025 COMPLETE CBC W/AUTO DIFF WBC: CPT

## 2024-11-22 PROCEDURE — 80076 HEPATIC FUNCTION PANEL: CPT

## 2024-11-22 PROCEDURE — 82728 ASSAY OF FERRITIN: CPT

## 2024-11-22 PROCEDURE — 84100 ASSAY OF PHOSPHORUS: CPT

## 2024-11-22 PROCEDURE — 6370000000 HC RX 637 (ALT 250 FOR IP): Performed by: NURSE PRACTITIONER

## 2024-11-22 PROCEDURE — 86140 C-REACTIVE PROTEIN: CPT

## 2024-11-22 PROCEDURE — 80048 BASIC METABOLIC PNL TOTAL CA: CPT

## 2024-11-22 RX ORDER — POTASSIUM CHLORIDE 29.8 MG/ML
80 INJECTION INTRAVENOUS PRN
OUTPATIENT
Start: 2024-11-23 | End: 2025-03-03

## 2024-11-22 RX ORDER — OXYCODONE HYDROCHLORIDE 5 MG/1
5 TABLET ORAL EVERY 4 HOURS PRN
OUTPATIENT
Start: 2024-11-23

## 2024-11-22 RX ORDER — POTASSIUM CHLORIDE 1500 MG/1
40 TABLET, EXTENDED RELEASE ORAL PRN
OUTPATIENT
Start: 2024-11-23

## 2024-11-22 RX ORDER — SODIUM CHLORIDE 9 MG/ML
INJECTION, SOLUTION INTRAVENOUS CONTINUOUS PRN
OUTPATIENT
Start: 2024-11-23

## 2024-11-22 RX ORDER — POTASSIUM CHLORIDE 1500 MG/1
40 TABLET, EXTENDED RELEASE ORAL PRN
Status: DISCONTINUED | OUTPATIENT
Start: 2024-11-22 | End: 2024-11-23 | Stop reason: HOSPADM

## 2024-11-22 RX ORDER — ONDANSETRON 2 MG/ML
8 INJECTION INTRAMUSCULAR; INTRAVENOUS EVERY 8 HOURS PRN
OUTPATIENT
Start: 2024-11-23

## 2024-11-22 RX ORDER — HEPARIN 100 UNIT/ML
500 SYRINGE INTRAVENOUS PRN
OUTPATIENT
Start: 2024-11-23

## 2024-11-22 RX ORDER — MAGNESIUM SULFATE IN WATER 40 MG/ML
4000 INJECTION, SOLUTION INTRAVENOUS PRN
OUTPATIENT
Start: 2024-11-23

## 2024-11-22 RX ORDER — OXYCODONE HYDROCHLORIDE 5 MG/1
10 TABLET ORAL EVERY 4 HOURS PRN
OUTPATIENT
Start: 2024-11-23

## 2024-11-22 RX ORDER — PROCHLORPERAZINE EDISYLATE 5 MG/ML
10 INJECTION INTRAMUSCULAR; INTRAVENOUS EVERY 6 HOURS PRN
OUTPATIENT
Start: 2024-11-23

## 2024-11-22 RX ORDER — PROCHLORPERAZINE MALEATE 10 MG
10 TABLET ORAL EVERY 6 HOURS PRN
OUTPATIENT
Start: 2024-11-23

## 2024-11-22 RX ORDER — ONDANSETRON 4 MG/1
8 TABLET, FILM COATED ORAL EVERY 8 HOURS PRN
OUTPATIENT
Start: 2024-11-23

## 2024-11-22 RX ADMIN — POTASSIUM CHLORIDE 40 MEQ: 1500 TABLET, EXTENDED RELEASE ORAL at 10:04

## 2024-11-22 NOTE — PROGRESS NOTES
[facility] Progress Note      2024    Leslee Maldonado    :  1955    MRN:  [id number]    Referring MD: Kerry Reagan, APRN - NP  [address] [place] [address]      Subjective:   - Doing well overall  - Potassium replaced today    ECOG PS:  (1) Restricted in physically strenuous activity, ambulatory and able to do work of light nature    KPS: 90% Able to carry on normal activity; minor signs or symptoms of disease    Isolation:  None     Medications    Scheduled Meds:        Continuous Infusions:          PRN Meds:.      ROS:  As noted above, otherwise remainder of 10-point ROS negative      Physical Exam:     Vital Signs:  /70   Pulse 69   Temp 98.3 °F (36.8 °C)   Resp 18   Wt 85.8 kg (189 lb 2.5 oz)   SpO2 98%   BMI 34.60 kg/m²     Weight:    Wt Readings from Last 3 Encounters:   24 85.8 kg (189 lb 2.5 oz)   24 83.5 kg (184 lb 1.4 oz)   24 85.2 kg (187 lb 13.3 oz)     PE performed by Dr. Dominique Lopez:   General: Awake, alert and oriented.  HEENT: normocephalic, alopecia, no scleral erythema or icterus, Oral mucosa moist and intact, throat clear  NECK: supple   BACK: Straight   SKIN: warm dry and intact without lesions rashes or masses  CHEST: CTA bilaterally without use of accessory muscles  CV: Normal S1 S2, RRR, no MRG  ABD: NT ND normoactive BS  EXTREMITIES: without edema, denies calf tenderness  NEURO: Left sided facial paralysis   CATHETER: PAC    Laboratory Data:  CBC:   Recent Labs     24  0814   WBC 7.2   HGB 11.3*   HCT 34.3*   MCV 89.0   PLT 73*     BMP/Mag:  Recent Labs     24  0814      K 3.6      CO2 24   PHOS 3.1   BUN 10   CREATININE 0.8   MG 2.08     LIVP:   Recent Labs     24  0814   AST 17   ALT 15   BILIDIR <0.1   BILITOT 0.3   ALKPHOS 72     Coags:   No results for input(s): \"PROTIME\", \"INR\", \"APTT\" in the last 72 hours.      Uric Acid No results for input(s): \"LABURIC\" in the last 72 hours.  Lab Results

## 2024-11-22 NOTE — PROGRESS NOTES
Short Stay Communication Note  Leslee Maldonado  Diagnosis: Multiple Myeloma  Primary MD: Dr. Dominique Lopez  Treatment:  Fludarabine/Cytoxan  CAR-T Administration Date: 10/28/24  Day +24 of carvykti  Pt seen in outpatient infusion today. Labs drawn and reviewed.   CBC:   Recent Labs     11/20/24  0814 11/22/24  0820   WBC 7.2 3.1*   HGB 11.3* 10.8*   HCT 34.3* 32.3*   MCV 89.0 88.5   PLT 73* 85*     BMP/Mag:  Recent Labs     11/20/24  0814 11/22/24  0820    142   K 3.6 3.4*    108   CO2 24 24   PHOS 3.1 2.5   BUN 10 12   CREATININE 0.8 0.8   MG 2.08 2.29     Standing parameters for replacement for this patient:   1 unit of pack red blood cells for a hemoglobin < or equal to 7  1 pack of platelets for a platelet count less than or equal to 10  40 MeQ of Potassium administered for a potassium level less than or equal to 3.4  4g of Magnesium Sulfate for a magnesum level less than or equal to 1.4  Potassium administered per standing order for the above lab values.    Urinalysis last done: 11/18/24 Urinalysis next due: 11/25/2024  Chest X-Ray last done: 11/18/24 Chest X-Ray next due: 11/25/24    Symptoms addressed and reported to care team this date: L facial droop, L eye watering  Treatments this date: Labs, oral potassium    Pt to d/c levaquin and Fluconazole today.  Reviewed medication schedule with pt and caregiver. Both able to verbalize all medications and schedule. Pt to be seen again Monday. Patient and caregiver verbalized understanding of discharge instructions including when and how to call the doctor and when to report  to the ER. Discharged ambulatory to home with daughter

## 2024-11-23 ENCOUNTER — APPOINTMENT (OUTPATIENT)
Dept: GENERAL RADIOLOGY | Age: 69
DRG: 313 | End: 2024-11-23
Payer: COMMERCIAL

## 2024-11-23 ENCOUNTER — HOSPITAL ENCOUNTER (INPATIENT)
Age: 69
LOS: 2 days | Discharge: HOME | DRG: 313 | End: 2024-11-25
Attending: EMERGENCY MEDICINE | Admitting: STUDENT IN AN ORGANIZED HEALTH CARE EDUCATION/TRAINING PROGRAM
Payer: COMMERCIAL

## 2024-11-23 DIAGNOSIS — I21.4 NSTEMI (NON-ST ELEVATED MYOCARDIAL INFARCTION) (HCC): Primary | ICD-10-CM

## 2024-11-23 DIAGNOSIS — I25.792 CORONARY ARTERY DISEASE INVOLVING OTHER CORONARY ARTERY BYPASS GRAFT WITH REFRACTORY ANGINA PECTORIS (HCC): ICD-10-CM

## 2024-11-23 DIAGNOSIS — R07.9 CHEST PAIN, UNSPECIFIED TYPE: ICD-10-CM

## 2024-11-23 PROBLEM — I25.119 CHEST PAIN DUE TO CAD (HCC): Status: ACTIVE | Noted: 2024-11-23

## 2024-11-23 LAB
ANION GAP SERPL CALCULATED.3IONS-SCNC: 11 MMOL/L (ref 3–16)
BASOPHILS # BLD: 0 K/UL (ref 0–0.2)
BASOPHILS NFR BLD: 0.3 %
BUN SERPL-MCNC: 12 MG/DL (ref 7–20)
CALCIUM SERPL-MCNC: 10 MG/DL (ref 8.3–10.6)
CHLORIDE SERPL-SCNC: 104 MMOL/L (ref 99–110)
CO2 SERPL-SCNC: 24 MMOL/L (ref 21–32)
CREAT SERPL-MCNC: 0.8 MG/DL (ref 0.6–1.2)
DEPRECATED RDW RBC AUTO: 18 % (ref 12.4–15.4)
EOSINOPHIL # BLD: 0 K/UL (ref 0–0.6)
EOSINOPHIL NFR BLD: 0.2 %
GFR SERPLBLD CREATININE-BSD FMLA CKD-EPI: 81 ML/MIN/{1.73_M2}
GLUCOSE SERPL-MCNC: 133 MG/DL (ref 70–99)
HCT VFR BLD AUTO: 35.9 % (ref 36–48)
HGB BLD-MCNC: 11.9 G/DL (ref 12–16)
LYMPHOCYTES # BLD: 0.5 K/UL (ref 1–5.1)
LYMPHOCYTES NFR BLD: 14.4 %
MCH RBC QN AUTO: 29.4 PG (ref 26–34)
MCHC RBC AUTO-ENTMCNC: 33 G/DL (ref 31–36)
MCV RBC AUTO: 89 FL (ref 80–100)
MONOCYTES # BLD: 0.3 K/UL (ref 0–1.3)
MONOCYTES NFR BLD: 10.5 %
NEUTROPHILS # BLD: 2.4 K/UL (ref 1.7–7.7)
NEUTROPHILS NFR BLD: 74.6 %
NT-PROBNP SERPL-MCNC: 144 PG/ML (ref 0–124)
PLATELET # BLD AUTO: 101 K/UL (ref 135–450)
PMV BLD AUTO: 8.3 FL (ref 5–10.5)
POTASSIUM SERPL-SCNC: 3.9 MMOL/L (ref 3.5–5.1)
RBC # BLD AUTO: 4.03 M/UL (ref 4–5.2)
SODIUM SERPL-SCNC: 139 MMOL/L (ref 136–145)
TROPONIN, HIGH SENSITIVITY: 17 NG/L (ref 0–14)
TROPONIN, HIGH SENSITIVITY: 19 NG/L (ref 0–14)
WBC # BLD AUTO: 3.2 K/UL (ref 4–11)

## 2024-11-23 PROCEDURE — G0378 HOSPITAL OBSERVATION PER HR: HCPCS

## 2024-11-23 PROCEDURE — 6360000002 HC RX W HCPCS: Performed by: STUDENT IN AN ORGANIZED HEALTH CARE EDUCATION/TRAINING PROGRAM

## 2024-11-23 PROCEDURE — 2580000003 HC RX 258: Performed by: STUDENT IN AN ORGANIZED HEALTH CARE EDUCATION/TRAINING PROGRAM

## 2024-11-23 PROCEDURE — 84484 ASSAY OF TROPONIN QUANT: CPT

## 2024-11-23 PROCEDURE — 99285 EMERGENCY DEPT VISIT HI MDM: CPT

## 2024-11-23 PROCEDURE — 36415 COLL VENOUS BLD VENIPUNCTURE: CPT

## 2024-11-23 PROCEDURE — 85025 COMPLETE CBC W/AUTO DIFF WBC: CPT

## 2024-11-23 PROCEDURE — 71046 X-RAY EXAM CHEST 2 VIEWS: CPT

## 2024-11-23 PROCEDURE — 6370000000 HC RX 637 (ALT 250 FOR IP): Performed by: EMERGENCY MEDICINE

## 2024-11-23 PROCEDURE — 83880 ASSAY OF NATRIURETIC PEPTIDE: CPT

## 2024-11-23 PROCEDURE — 2060000000 HC ICU INTERMEDIATE R&B

## 2024-11-23 PROCEDURE — 96372 THER/PROPH/DIAG INJ SC/IM: CPT

## 2024-11-23 PROCEDURE — 6370000000 HC RX 637 (ALT 250 FOR IP): Performed by: STUDENT IN AN ORGANIZED HEALTH CARE EDUCATION/TRAINING PROGRAM

## 2024-11-23 PROCEDURE — 80048 BASIC METABOLIC PNL TOTAL CA: CPT

## 2024-11-23 PROCEDURE — A4217 STERILE WATER/SALINE, 500 ML: HCPCS | Performed by: STUDENT IN AN ORGANIZED HEALTH CARE EDUCATION/TRAINING PROGRAM

## 2024-11-23 PROCEDURE — 93005 ELECTROCARDIOGRAM TRACING: CPT | Performed by: STUDENT IN AN ORGANIZED HEALTH CARE EDUCATION/TRAINING PROGRAM

## 2024-11-23 RX ORDER — SODIUM CHLORIDE 0.9 % (FLUSH) 0.9 %
5-40 SYRINGE (ML) INJECTION EVERY 12 HOURS SCHEDULED
Status: DISCONTINUED | OUTPATIENT
Start: 2024-11-23 | End: 2024-11-25 | Stop reason: HOSPADM

## 2024-11-23 RX ORDER — METOPROLOL TARTRATE 25 MG/1
25 TABLET, FILM COATED ORAL 2 TIMES DAILY
Status: DISCONTINUED | OUTPATIENT
Start: 2024-11-23 | End: 2024-11-25 | Stop reason: HOSPADM

## 2024-11-23 RX ORDER — SODIUM CHLORIDE 0.9 % (FLUSH) 0.9 %
5-40 SYRINGE (ML) INJECTION PRN
Status: DISCONTINUED | OUTPATIENT
Start: 2024-11-23 | End: 2024-11-25 | Stop reason: HOSPADM

## 2024-11-23 RX ORDER — PANTOPRAZOLE SODIUM 40 MG/1
40 TABLET, DELAYED RELEASE ORAL
Status: DISCONTINUED | OUTPATIENT
Start: 2024-11-24 | End: 2024-11-25 | Stop reason: HOSPADM

## 2024-11-23 RX ORDER — ENOXAPARIN SODIUM 100 MG/ML
40 INJECTION SUBCUTANEOUS EVERY EVENING
Status: DISCONTINUED | OUTPATIENT
Start: 2024-11-23 | End: 2024-11-25 | Stop reason: HOSPADM

## 2024-11-23 RX ORDER — ASPIRIN 81 MG/1
81 TABLET, CHEWABLE ORAL DAILY
Status: DISCONTINUED | OUTPATIENT
Start: 2024-11-24 | End: 2024-11-25 | Stop reason: HOSPADM

## 2024-11-23 RX ORDER — POTASSIUM CHLORIDE 29.8 MG/ML
20 INJECTION INTRAVENOUS PRN
Status: DISCONTINUED | OUTPATIENT
Start: 2024-11-23 | End: 2024-11-25 | Stop reason: HOSPADM

## 2024-11-23 RX ORDER — MAGNESIUM SULFATE IN WATER 40 MG/ML
4000 INJECTION, SOLUTION INTRAVENOUS PRN
Status: DISCONTINUED | OUTPATIENT
Start: 2024-11-23 | End: 2024-11-25 | Stop reason: HOSPADM

## 2024-11-23 RX ORDER — ASPIRIN 81 MG/1
324 TABLET, CHEWABLE ORAL ONCE
Status: COMPLETED | OUTPATIENT
Start: 2024-11-23 | End: 2024-11-23

## 2024-11-23 RX ORDER — PREDNISONE 20 MG/1
40 TABLET ORAL DAILY
Status: DISCONTINUED | OUTPATIENT
Start: 2024-11-24 | End: 2024-11-25 | Stop reason: HOSPADM

## 2024-11-23 RX ORDER — ACETAMINOPHEN 650 MG/1
650 SUPPOSITORY RECTAL EVERY 6 HOURS PRN
Status: DISCONTINUED | OUTPATIENT
Start: 2024-11-23 | End: 2024-11-25 | Stop reason: HOSPADM

## 2024-11-23 RX ORDER — ACETAMINOPHEN 325 MG/1
650 TABLET ORAL EVERY 6 HOURS PRN
Status: DISCONTINUED | OUTPATIENT
Start: 2024-11-23 | End: 2024-11-25 | Stop reason: HOSPADM

## 2024-11-23 RX ORDER — SODIUM CHLORIDE 9 MG/ML
INJECTION, SOLUTION INTRAVENOUS PRN
Status: DISCONTINUED | OUTPATIENT
Start: 2024-11-23 | End: 2024-11-25 | Stop reason: HOSPADM

## 2024-11-23 RX ORDER — AMLODIPINE BESYLATE 10 MG/1
10 TABLET ORAL DAILY
Status: DISCONTINUED | OUTPATIENT
Start: 2024-11-23 | End: 2024-11-23

## 2024-11-23 RX ORDER — AMLODIPINE BESYLATE 10 MG/1
10 TABLET ORAL DAILY
Status: DISCONTINUED | OUTPATIENT
Start: 2024-11-23 | End: 2024-11-25 | Stop reason: HOSPADM

## 2024-11-23 RX ORDER — PROCHLORPERAZINE MALEATE 10 MG
10 TABLET ORAL EVERY 6 HOURS PRN
Status: DISCONTINUED | OUTPATIENT
Start: 2024-11-23 | End: 2024-11-25 | Stop reason: HOSPADM

## 2024-11-23 RX ORDER — ACYCLOVIR 400 MG/1
400 TABLET ORAL 2 TIMES DAILY
Status: DISCONTINUED | OUTPATIENT
Start: 2024-11-23 | End: 2024-11-25 | Stop reason: HOSPADM

## 2024-11-23 RX ORDER — LEVETIRACETAM 500 MG/1
500 TABLET ORAL 2 TIMES DAILY
Status: DISCONTINUED | OUTPATIENT
Start: 2024-11-23 | End: 2024-11-25 | Stop reason: HOSPADM

## 2024-11-23 RX ADMIN — SODIUM CHLORIDE, PRESERVATIVE FREE 10 ML: 5 INJECTION INTRAVENOUS at 20:25

## 2024-11-23 RX ADMIN — LEVETIRACETAM 500 MG: 500 TABLET, FILM COATED ORAL at 20:22

## 2024-11-23 RX ADMIN — ASPIRIN 324 MG: 81 TABLET, CHEWABLE ORAL at 15:26

## 2024-11-23 RX ADMIN — AMLODIPINE BESYLATE 10 MG: 10 TABLET ORAL at 20:22

## 2024-11-23 RX ADMIN — METOPROLOL TARTRATE 25 MG: 25 TABLET, FILM COATED ORAL at 20:22

## 2024-11-23 RX ADMIN — ENOXAPARIN SODIUM 40 MG: 100 INJECTION SUBCUTANEOUS at 17:46

## 2024-11-23 RX ADMIN — ACYCLOVIR 400 MG: 400 TABLET ORAL at 20:22

## 2024-11-23 RX ADMIN — SODIUM CHLORIDE 15 ML: 900 IRRIGANT IRRIGATION at 20:28

## 2024-11-23 ASSESSMENT — PAIN SCALES - GENERAL
PAINLEVEL_OUTOF10: 0
PAINLEVEL_OUTOF10: 0

## 2024-11-23 ASSESSMENT — LIFESTYLE VARIABLES
HOW MANY STANDARD DRINKS CONTAINING ALCOHOL DO YOU HAVE ON A TYPICAL DAY: PATIENT DOES NOT DRINK
HOW OFTEN DO YOU HAVE A DRINK CONTAINING ALCOHOL: NEVER

## 2024-11-23 NOTE — PROGRESS NOTES
Patient admitted to [facility] via wheelchair from ED for diagnosis of Multiple Myeloma, experiencing Chest Pain, here for observation. Estimated body surface area is 1.94 meters squared as calculated from the following:    Height as of this encounter: 1.575 m (5' 2\").    Weight as of this encounter: 85.7 kg (188 lb 15 oz). verified.  Appropriate dosing calculations of chemotherapy based on above height, weight, and BSA verified.      Patient  oriented to patient room including call light and bed controls.  Admission assessment completed - see admission flowsheet documentation.  Patient is a low fall risk.  Safety measures instituted per policy.    Patient  oriented to unit policies and procedures including: pain management practices, unit safety precautions, family rapid response, q4h vital signs and assessments, daily 4am lab draws, weekly chest x-rays, daily chlorhexidine bathing, standing transfusion orders, and routine central line care.  Also discussed use of call light and how to get in touch with nursing staff.  Stressed the importance of calling out immediately for any changes in condition including but not limited to: pain, chills, fever, nausea, vomiting, diarrhea, chest pain, sob/ernst, assistance with toileting, bleeding, or any other symptoms that are out of the ordinary for the patient.  Patient verbalizes understanding of all instructions and will call for assistance as needed.

## 2024-11-23 NOTE — ED PROVIDER NOTES
THE [facility]  EMERGENCY DEPARTMENT ENCOUNTER          PHYSICIAN ASSISTANT NOTE       Date of evaluation: 11/23/2024    Chief Complaint     Chest Pain (Patient complaint of chest pressure starting this morning with no relief from tums)      History of Present Illness     Leslee Maldonado is a 69 y.o. female who presents to the emergency department with chest pain.  The patient states this started this morning.  She describes it as a pressure type pain that she rates a 3 out of 10.  The pain does not radiate.  She denies any associated shortness of breath.  She states initially she thought this was due to acid reflux and took Tums with little relief.  She states after the onset of the symptoms she did take an approximately 10-minute walk and states that this did not worsen her pain.  She states she did have a heart attack in 2019 but this pain feels \"different\".  The patient does have a history of multiple myeloma and underwent CAR-T cell therapy around October 28.  She states when she called her oncologist today they told her to come in for evaluation    ASSESSMENT / PLAN  (MEDICAL DECISION MAKING)     INITIAL VITALS: BP: (!) 144/91, Temp: 98.2 °F (36.8 °C), Pulse: 80, Respirations: 18, SpO2: 96 %    Leslee Maldonado is a 69 y.o. female who presented to the emergency department with chest pain.      Is this patient to be included in the SEP-1 core measure? No Exclusion criteria - the patient is NOT to be included for SEP-1 Core Measure due to: Infection is not suspected    Medical Decision Making  This is a 69-year-old female who presented to the emergency department with chest pain that started this morning.  On examination she has clear and equal breath sounds bilaterally.  No lower extremity swelling.  EKG shows right bundle branch block with no acute ischemic patterns and is unchanged from previous EKG.  She had an IV established with labs drawn.  CBC shows a white count of 3.2 hemoglobin of 1.9 with hematocrit

## 2024-11-23 NOTE — ED NOTES
How does patient ambulate?   [x]Low Fall Risk (ambulates by themselves without support)  []Stand by assist   []Contact Guard   []Front wheel walker  []Wheelchair   []Steady  []Bed bound  []History of Lower Extremity Amputation  []Unknown, did not assess in the emergency department   How does patient take pills?  [x]Whole with Water  []Crushed in applesauce  []Crushed in pudding  []Other  []Unknown no oral medications were given in the ED  Is patient alert?   [x]Alert  []Drowsy but responds to voice  []Doesn't respond to voice but responds to painful stimuli  []Unresponsive  Is patient oriented?   [x]To person  [x]To place  [x]To time  [x]To situation  []Confused  []Agitated  [x]Follows commands  If patient is disoriented or from a Skill Nursing Facility has family been notified of admission?   []Yes   [x]No  Patient belongings?   [x]Cell phone  [x]Wallet   []Dentures  [x]Clothing  Any specific patient or family belongings/needs/dynamics?   NA  Miscellaneous comments/pending orders?  NA    If there are any additional questions please reach out to the Emergency Department.           Keon Soto RN  11/23/24 3613

## 2024-11-23 NOTE — PROGRESS NOTES
Patient seen in ED. Patient day +25 of Carvykti. CARTOX score 10/10. Patient with L sided facial droop and eye watering that has been followed outpatient. No current needs from oncology standpoint. ED to reach out to RN or on call BMT MD at appropriate times when patient triaged.        11/23/24 1301   CAR-T Assessment   Orientation to Year 1   Orientation to Month 1   Orientation to City 1   Orientation to Hospital 1   Name three (3) objects 3   Ability to write a standard sentence 1   Count backwards from 100 by ten 1   Follow simple command 1   CAR-T Score 10       Electronically signed by Fatuma Reyes RN on 11/23/2024 at 1:05 PM

## 2024-11-23 NOTE — PROGRESS NOTES
4 Eyes Admission Assessment     I agree as the admission nurse that 2 RN's have performed a thorough Head to Toe Skin Assessment on the patient. ALL assessment sites listed below have been assessed on admission.       Areas assessed by both nurses:   [x]   Head, Face, and Ears   [x]   Shoulders, Back, and Chest  [x]   Arms, Elbows, and Hands   [x]   Coccyx, Sacrum, and Ischium  [x]   Legs, Feet, and Heels        Does the Patient have Skin Breakdown?  No         Eddy Prevention initiated:  Yes   Wound Care Orders initiated:  No      [facility] nurse consulted for Pressure Injury (Stage 3,4, Unstageable, DTI, NWPT, and Complex wounds) or Eddy score 18 or lower:  No      Nurse 1 eSignature: Electronically signed by Kimberly Banegas RN on 11/23/24 at 4:57 PM EST    **SHARE this note so that the co-signing nurse is able to place an eSignature**    Nurse 2 eSignature: Electronically signed by Marixa Marino RN on 11/23/24 at 4:57 PM EST

## 2024-11-24 LAB
ANION GAP SERPL CALCULATED.3IONS-SCNC: 7 MMOL/L (ref 3–16)
BASOPHILS # BLD: 0 K/UL (ref 0–0.2)
BASOPHILS NFR BLD: 0.4 %
BUN SERPL-MCNC: 15 MG/DL (ref 7–20)
CALCIUM SERPL-MCNC: 8.7 MG/DL (ref 8.3–10.6)
CHLORIDE SERPL-SCNC: 108 MMOL/L (ref 99–110)
CMV DNA SPEC QL NAA+PROBE: NOT DETECTED
CO2 SERPL-SCNC: 26 MMOL/L (ref 21–32)
CREAT SERPL-MCNC: 0.8 MG/DL (ref 0.6–1.2)
DEPRECATED RDW RBC AUTO: 17.6 % (ref 12.4–15.4)
EKG ATRIAL RATE: 69 BPM
EKG DIAGNOSIS: NORMAL
EKG P AXIS: 59 DEGREES
EKG P-R INTERVAL: 152 MS
EKG Q-T INTERVAL: 398 MS
EKG QRS DURATION: 108 MS
EKG QTC CALCULATION (BAZETT): 426 MS
EKG R AXIS: -43 DEGREES
EKG T AXIS: 49 DEGREES
EKG VENTRICULAR RATE: 69 BPM
EOSINOPHIL # BLD: 0 K/UL (ref 0–0.6)
EOSINOPHIL NFR BLD: 0.3 %
FIBRINOGEN PPP-MCNC: 279 MG/DL (ref 227–534)
GFR SERPLBLD CREATININE-BSD FMLA CKD-EPI: 81 ML/MIN/{1.73_M2}
GLUCOSE SERPL-MCNC: 91 MG/DL (ref 70–99)
HCT VFR BLD AUTO: 31.2 % (ref 36–48)
HGB BLD-MCNC: 10.4 G/DL (ref 12–16)
LYMPHOCYTES # BLD: 0.5 K/UL (ref 1–5.1)
LYMPHOCYTES NFR BLD: 19.7 %
MCH RBC QN AUTO: 29.6 PG (ref 26–34)
MCHC RBC AUTO-ENTMCNC: 33.3 G/DL (ref 31–36)
MCV RBC AUTO: 88.8 FL (ref 80–100)
MISCELLANEOUS LAB TEST ORDER: NORMAL
MONOCYTES # BLD: 0.7 K/UL (ref 0–1.3)
MONOCYTES NFR BLD: 29.8 %
NEUTROPHILS # BLD: 1.2 K/UL (ref 1.7–7.7)
NEUTROPHILS NFR BLD: 49.8 %
PLATELET # BLD AUTO: 88 K/UL (ref 135–450)
PMV BLD AUTO: 7.7 FL (ref 5–10.5)
POTASSIUM SERPL-SCNC: 3.6 MMOL/L (ref 3.5–5.1)
RBC # BLD AUTO: 3.51 M/UL (ref 4–5.2)
SODIUM SERPL-SCNC: 141 MMOL/L (ref 136–145)
SPECIMEN SOURCE: NORMAL
TROPONIN, HIGH SENSITIVITY: 27 NG/L (ref 0–14)
WBC # BLD AUTO: 2.3 K/UL (ref 4–11)

## 2024-11-24 PROCEDURE — 36591 DRAW BLOOD OFF VENOUS DEVICE: CPT

## 2024-11-24 PROCEDURE — 2580000003 HC RX 258: Performed by: STUDENT IN AN ORGANIZED HEALTH CARE EDUCATION/TRAINING PROGRAM

## 2024-11-24 PROCEDURE — 2060000000 HC ICU INTERMEDIATE R&B

## 2024-11-24 PROCEDURE — G0378 HOSPITAL OBSERVATION PER HR: HCPCS

## 2024-11-24 PROCEDURE — 85025 COMPLETE CBC W/AUTO DIFF WBC: CPT

## 2024-11-24 PROCEDURE — 6370000000 HC RX 637 (ALT 250 FOR IP): Performed by: STUDENT IN AN ORGANIZED HEALTH CARE EDUCATION/TRAINING PROGRAM

## 2024-11-24 PROCEDURE — 84484 ASSAY OF TROPONIN QUANT: CPT

## 2024-11-24 PROCEDURE — 96372 THER/PROPH/DIAG INJ SC/IM: CPT

## 2024-11-24 PROCEDURE — 80048 BASIC METABOLIC PNL TOTAL CA: CPT

## 2024-11-24 PROCEDURE — 93010 ELECTROCARDIOGRAM REPORT: CPT | Performed by: INTERNAL MEDICINE

## 2024-11-24 PROCEDURE — 85384 FIBRINOGEN ACTIVITY: CPT

## 2024-11-24 PROCEDURE — 6360000002 HC RX W HCPCS: Performed by: STUDENT IN AN ORGANIZED HEALTH CARE EDUCATION/TRAINING PROGRAM

## 2024-11-24 RX ADMIN — ACYCLOVIR 400 MG: 400 TABLET ORAL at 20:46

## 2024-11-24 RX ADMIN — PANTOPRAZOLE SODIUM 40 MG: 40 TABLET, DELAYED RELEASE ORAL at 06:47

## 2024-11-24 RX ADMIN — SODIUM CHLORIDE, PRESERVATIVE FREE 10 ML: 5 INJECTION INTRAVENOUS at 20:46

## 2024-11-24 RX ADMIN — AMLODIPINE BESYLATE 10 MG: 10 TABLET ORAL at 20:46

## 2024-11-24 RX ADMIN — LEVETIRACETAM 500 MG: 500 TABLET, FILM COATED ORAL at 20:46

## 2024-11-24 RX ADMIN — PREDNISONE 40 MG: 20 TABLET ORAL at 09:23

## 2024-11-24 RX ADMIN — SODIUM CHLORIDE, PRESERVATIVE FREE 10 ML: 5 INJECTION INTRAVENOUS at 09:24

## 2024-11-24 RX ADMIN — ENOXAPARIN SODIUM 40 MG: 100 INJECTION SUBCUTANEOUS at 18:17

## 2024-11-24 RX ADMIN — METOPROLOL TARTRATE 25 MG: 25 TABLET, FILM COATED ORAL at 09:23

## 2024-11-24 RX ADMIN — ACYCLOVIR 400 MG: 400 TABLET ORAL at 09:23

## 2024-11-24 RX ADMIN — SODIUM CHLORIDE 15 ML: 900 IRRIGANT IRRIGATION at 06:53

## 2024-11-24 RX ADMIN — ASPIRIN 81 MG: 81 TABLET, CHEWABLE ORAL at 09:23

## 2024-11-24 RX ADMIN — LEVETIRACETAM 500 MG: 500 TABLET, FILM COATED ORAL at 09:23

## 2024-11-24 RX ADMIN — METOPROLOL TARTRATE 25 MG: 25 TABLET, FILM COATED ORAL at 20:46

## 2024-11-24 ASSESSMENT — PAIN SCALES - GENERAL: PAINLEVEL_OUTOF10: 0

## 2024-11-24 NOTE — H&P
[facility] History and Physical        Attending Physician: Dominique Lopez DO    Primary Care: Raj Cedeño MD       Referring MD: No referring provider defined for this encounter.    Name: Leslee Maldonado :  1955  MRN:  [id number]    Admission: 2024      Date: 2024    Reason for Admission: Chest Pain     History of Present Illness: Mrs. Leslee Maldonado is a 69-year-old female with a MHx significant for relapsed refractory IgG lambda multiple myeloma (disease course & tx outlined in detail below), HTN, HLD, CAD s/p CABG x 4 () & CARLO x 2 (10/25/22), diet-controlled T2DM, and GERD, who received CAR T-cell therapy.  She received her LDC 10/23-10/25/24 outpatient. Carvikty was infused inpatient on 10/28/24 without any complications.  She did not have any signs or symptoms of cytokine release syndrome or ICANS throughout her 2-day hospitalization. Patient was seen daily in outpatient oncology. She was admitted with neutropenic fever/ grade 1 CRS, fever only, from 11/3/24- 24. During that admission her infectious workup was negative and she did not require tocilizumab or steroids. On 24 she presented to [facility] with complaints of left facial nerve weakness. Head CT, MRI, and LP were all negative. She was started on Prednisone 40 mg daily on 24 for cranial nerve palsy. She remained outpatient until 24 when she was admitted through the emergency department with chest pain and elevated troponin concerning form NSTEMI.        Past Surgical History:   Procedure Laterality Date    CARDIAC SURGERY  Quadruple by pass    COLONOSCOPY      CORONARY ARTERY BYPASS GRAFT N/A 2019    CORONARY ARTERY BYPASS GRAFT X4, INTERNAL MAMMARY ARTERY AND SAPHENOUS VEIN GRAFT-TCP-BP performed by Maykel Sousa MD at [facility] OR    HAND SURGERY  2018    HYSTERECTOMY (CERVIX STATUS UNKNOWN)      partial for fibroids    INSERTION / REMOVAL / REPLACEMENT VENOUS ACCESS CATHETER N/A 2019

## 2024-11-24 NOTE — PROGRESS NOTES
De-assessed patient's port for Sunday needle changes. Attempted to reassess twice, both unsuccessful. Patient would like to wait for the night shift nurse to attempt to reassess the port at this time. Patient has a peripheral IV in place in the R Hand for use if needed at this time.

## 2024-11-24 NOTE — CONSULTS
11/24/24 0359   BP: 115/61   Pulse: 64   Resp: 16   Temp: 98.5 °F (36.9 °C)   SpO2: 99%    Weight - Scale: 84 kg (185 lb 3.2 oz)         General Appearance:  Alert, cooperative, no distress, appears stated age   Head:  Normocephalic, without obvious abnormality, atraumatic   Eyes:  PERRL, conjunctiva/corneas clear       Nose: Nares normal, no drainage or sinus tenderness   Throat: Lips, mucosa, and tongue normal   Neck: Supple, symmetrical, trachea midline, no adenopathy, thyroid: not enlarged, symmetric, no tenderness/mass/nodules, no carotid bruit or JVD       Lungs:   Clear to auscultation bilaterally, respirations unlabored   Chest Wall:  No tenderness or deformity   Heart:  Regular rate and rhythm, S1, S2 normal, no murmur, rub or gallop   Abdomen:   Soft, non-tender, bowel sounds active all four quadrants,  no masses, no organomegaly           Extremities: Extremities normal, atraumatic, no cyanosis or edema   Pulses: 2+ and symmetric   Skin: Skin color, texture, turgor normal, no rashes or lesions   Pysch: Normal mood and affect   Neurologic: Normal gross motor and sensory exam.         Labs  CBC:   Lab Results   Component Value Date/Time    WBC 2.3 11/24/2024 04:03 AM    RBC 3.51 11/24/2024 04:03 AM    HGB 10.4 11/24/2024 04:03 AM    HCT 31.2 11/24/2024 04:03 AM    MCV 88.8 11/24/2024 04:03 AM    RDW 17.6 11/24/2024 04:03 AM    PLT 88 11/24/2024 04:03 AM     CMP:    Lab Results   Component Value Date/Time     11/24/2024 04:03 AM    K 3.6 11/24/2024 04:03 AM    K 3.9 11/23/2024 01:28 PM     11/24/2024 04:03 AM    CO2 26 11/24/2024 04:03 AM    BUN 15 11/24/2024 04:03 AM    CREATININE 0.8 11/24/2024 04:03 AM    GFRAA >60 10/12/2022 06:47 AM    AGRATIO 1.4 09/05/2024 10:43 AM    LABGLOM 81 11/24/2024 04:03 AM    LABGLOM 70 04/25/2024 04:22 AM    GLUCOSE 91 11/24/2024 04:03 AM    CALCIUM 8.7 11/24/2024 04:03 AM    BILITOT <0.2 11/22/2024 08:20 AM    ALKPHOS 65 11/22/2024 08:20 AM    AST 19

## 2024-11-25 ENCOUNTER — APPOINTMENT (OUTPATIENT)
Dept: GENERAL RADIOLOGY | Age: 69
DRG: 313 | End: 2024-11-25
Payer: COMMERCIAL

## 2024-11-25 ENCOUNTER — APPOINTMENT (OUTPATIENT)
Age: 69
DRG: 313 | End: 2024-11-25
Attending: INTERNAL MEDICINE
Payer: COMMERCIAL

## 2024-11-25 VITALS
RESPIRATION RATE: 16 BRPM | DIASTOLIC BLOOD PRESSURE: 86 MMHG | BODY MASS INDEX: 34.19 KG/M2 | OXYGEN SATURATION: 96 % | TEMPERATURE: 98.7 F | SYSTOLIC BLOOD PRESSURE: 138 MMHG | HEART RATE: 106 BPM | HEIGHT: 62 IN | WEIGHT: 185.8 LBS

## 2024-11-25 LAB
ALBUMIN SERPL-MCNC: 3.5 G/DL (ref 3.4–5)
ALP SERPL-CCNC: 49 U/L (ref 40–129)
ALT SERPL-CCNC: 17 U/L (ref 10–40)
ANION GAP SERPL CALCULATED.3IONS-SCNC: 7 MMOL/L (ref 3–16)
APTT BLD: 29.8 SEC (ref 22.1–36.4)
AST SERPL-CCNC: 14 U/L (ref 15–37)
BASOPHILS # BLD: 0 K/UL (ref 0–0.2)
BASOPHILS NFR BLD: 0.3 %
BILIRUB DIRECT SERPL-MCNC: <0.1 MG/DL (ref 0–0.3)
BILIRUB INDIRECT SERPL-MCNC: ABNORMAL MG/DL (ref 0–1)
BILIRUB SERPL-MCNC: <0.2 MG/DL (ref 0–1)
BUN SERPL-MCNC: 14 MG/DL (ref 7–20)
CALCIUM SERPL-MCNC: 8.7 MG/DL (ref 8.3–10.6)
CHLORIDE SERPL-SCNC: 112 MMOL/L (ref 99–110)
CO2 SERPL-SCNC: 26 MMOL/L (ref 21–32)
CREAT SERPL-MCNC: 0.8 MG/DL (ref 0.6–1.2)
DEPRECATED RDW RBC AUTO: 17.5 % (ref 12.4–15.4)
ECHO AO ASC DIAM: 2.8 CM
ECHO AO ASCENDING AORTA INDEX: 1.51 CM/M2
ECHO AV MEAN GRADIENT: 3 MMHG
ECHO AV MEAN VELOCITY: 0.9 M/S
ECHO AV PEAK GRADIENT: 6 MMHG
ECHO AV PEAK VELOCITY: 1.2 M/S
ECHO AV VELOCITY RATIO: 0.83
ECHO AV VTI: 27.5 CM
ECHO BSA: 1.94 M2
ECHO IVC PROX: 0.9 CM
ECHO LA AREA 2C: 15.8 CM2
ECHO LA AREA 4C: 20.7 CM2
ECHO LA DIAMETER INDEX: 1.84 CM/M2
ECHO LA DIAMETER: 3.4 CM
ECHO LA MAJOR AXIS: 5 CM
ECHO LA MINOR AXIS: 5.1 CM
ECHO LA VOL BP: 54 ML (ref 22–52)
ECHO LA VOL MOD A2C: 42 ML (ref 22–52)
ECHO LA VOL MOD A4C: 69 ML (ref 22–52)
ECHO LA VOL/BSA BIPLANE: 29 ML/M2 (ref 16–34)
ECHO LA VOLUME INDEX MOD A2C: 23 ML/M2 (ref 16–34)
ECHO LA VOLUME INDEX MOD A4C: 37 ML/M2 (ref 16–34)
ECHO LV E' LATERAL VELOCITY: 9.57 CM/S
ECHO LV E' SEPTAL VELOCITY: 11 CM/S
ECHO LV EDV A2C: 77 ML
ECHO LV EDV A4C: 75 ML
ECHO LV EDV INDEX A4C: 41 ML/M2
ECHO LV EDV NDEX A2C: 42 ML/M2
ECHO LV EF PHYSICIAN: 56 %
ECHO LV EJECTION FRACTION A2C: 59 %
ECHO LV EJECTION FRACTION A4C: 57 %
ECHO LV EJECTION FRACTION BIPLANE: 58 % (ref 55–100)
ECHO LV ESV A2C: 31 ML
ECHO LV ESV A4C: 32 ML
ECHO LV ESV INDEX A2C: 17 ML/M2
ECHO LV ESV INDEX A4C: 17 ML/M2
ECHO LV FRACTIONAL SHORTENING: 30 % (ref 28–44)
ECHO LV INTERNAL DIMENSION DIASTOLE INDEX: 2.32 CM/M2
ECHO LV INTERNAL DIMENSION DIASTOLIC: 4.3 CM (ref 3.9–5.3)
ECHO LV INTERNAL DIMENSION SYSTOLIC INDEX: 1.62 CM/M2
ECHO LV INTERNAL DIMENSION SYSTOLIC: 3 CM
ECHO LV IVSD: 0.9 CM (ref 0.6–0.9)
ECHO LV MASS 2D: 132.7 G (ref 67–162)
ECHO LV MASS INDEX 2D: 71.8 G/M2 (ref 43–95)
ECHO LV POSTERIOR WALL DIASTOLIC: 1 CM (ref 0.6–0.9)
ECHO LV RELATIVE WALL THICKNESS RATIO: 0.47
ECHO LVOT AV VTI INDEX: 0.75
ECHO LVOT MEAN GRADIENT: 2 MMHG
ECHO LVOT PEAK GRADIENT: 4 MMHG
ECHO LVOT PEAK VELOCITY: 1 M/S
ECHO LVOT VTI: 20.6 CM
ECHO MV A VELOCITY: 1.11 M/S
ECHO MV E VELOCITY: 0.81 M/S
ECHO MV E/A RATIO: 0.73
ECHO MV E/E' LATERAL: 8.46
ECHO MV E/E' RATIO (AVERAGED): 7.91
ECHO MV E/E' SEPTAL: 7.36
ECHO PULMONARY ARTERY SYSTOLIC PRESSURE (PASP): 25 MMHG
ECHO PV MAX VELOCITY: 0.9 M/S
ECHO PV MEAN GRADIENT: 2 MMHG
ECHO PV MEAN VELOCITY: 0.7 M/S
ECHO PV PEAK GRADIENT: 4 MMHG
ECHO PV VTI: 22.2 CM
ECHO RA AREA 4C: 11.9 CM2
ECHO RA END SYSTOLIC VOLUME APICAL 4 CHAMBER INDEX BSA: 15 ML/M2
ECHO RA VOLUME: 27 ML
ECHO RV FREE WALL PEAK S': 10.6 CM/S
ECHO RV TAPSE: 2.1 CM (ref 1.7–?)
ECHO TV REGURGITANT MAX VELOCITY: 2.34 M/S
ECHO TV REGURGITANT PEAK GRADIENT: 22 MMHG
EOSINOPHIL # BLD: 0 K/UL (ref 0–0.6)
EOSINOPHIL NFR BLD: 0.2 %
FUNGUS BLD CULT: NORMAL
FUNGUS SPEC CULT: NORMAL
GFR SERPLBLD CREATININE-BSD FMLA CKD-EPI: 81 ML/MIN/{1.73_M2}
GLUCOSE SERPL-MCNC: 89 MG/DL (ref 70–99)
HCT VFR BLD AUTO: 31.3 % (ref 36–48)
HGB BLD-MCNC: 10.6 G/DL (ref 12–16)
HHV6 DNA # SPEC NAA+PROBE: <1000 CPY/ML
HHV6 DNA SPEC NAA+PROBE-LOG#: <3 LOG
HHV6 DNA SPEC NAA+PROBE: NORMAL
HHV6 IGG+IGM SER-IMP: NOT DETECTED
INR PPP: 0.99 (ref 0.85–1.15)
LDH SERPL L TO P-CCNC: 152 U/L (ref 100–190)
LOEFFLER MB STN SPEC: NORMAL
LYMPHOCYTES # BLD: 0.5 K/UL (ref 1–5.1)
LYMPHOCYTES NFR BLD: 18.9 %
MAGNESIUM SERPL-MCNC: 2.39 MG/DL (ref 1.8–2.4)
MCH RBC QN AUTO: 30 PG (ref 26–34)
MCHC RBC AUTO-ENTMCNC: 33.8 G/DL (ref 31–36)
MCV RBC AUTO: 88.6 FL (ref 80–100)
MONOCYTES # BLD: 0.7 K/UL (ref 0–1.3)
MONOCYTES NFR BLD: 29.5 %
NEUTROPHILS # BLD: 1.2 K/UL (ref 1.7–7.7)
NEUTROPHILS NFR BLD: 51.1 %
PHOSPHATE SERPL-MCNC: 3.5 MG/DL (ref 2.5–4.9)
PLATELET # BLD AUTO: 102 K/UL (ref 135–450)
PMV BLD AUTO: 7.7 FL (ref 5–10.5)
POTASSIUM SERPL-SCNC: 3.5 MMOL/L (ref 3.5–5.1)
PROT SERPL-MCNC: 5.1 G/DL (ref 6.4–8.2)
PROTHROMBIN TIME: 13.3 SEC (ref 11.9–14.9)
RBC # BLD AUTO: 3.53 M/UL (ref 4–5.2)
SODIUM SERPL-SCNC: 145 MMOL/L (ref 136–145)
SPECIMEN SOURCE: NORMAL
URATE SERPL-MCNC: 2.7 MG/DL (ref 2.6–6)
WBC # BLD AUTO: 2.4 K/UL (ref 4–11)

## 2024-11-25 PROCEDURE — 84550 ASSAY OF BLOOD/URIC ACID: CPT

## 2024-11-25 PROCEDURE — 85610 PROTHROMBIN TIME: CPT

## 2024-11-25 PROCEDURE — 80048 BASIC METABOLIC PNL TOTAL CA: CPT

## 2024-11-25 PROCEDURE — 84100 ASSAY OF PHOSPHORUS: CPT

## 2024-11-25 PROCEDURE — G0378 HOSPITAL OBSERVATION PER HR: HCPCS

## 2024-11-25 PROCEDURE — 83615 LACTATE (LD) (LDH) ENZYME: CPT

## 2024-11-25 PROCEDURE — 85025 COMPLETE CBC W/AUTO DIFF WBC: CPT

## 2024-11-25 PROCEDURE — 6370000000 HC RX 637 (ALT 250 FOR IP): Performed by: STUDENT IN AN ORGANIZED HEALTH CARE EDUCATION/TRAINING PROGRAM

## 2024-11-25 PROCEDURE — 93306 TTE W/DOPPLER COMPLETE: CPT

## 2024-11-25 PROCEDURE — 93306 TTE W/DOPPLER COMPLETE: CPT | Performed by: INTERNAL MEDICINE

## 2024-11-25 PROCEDURE — 83735 ASSAY OF MAGNESIUM: CPT

## 2024-11-25 PROCEDURE — 85730 THROMBOPLASTIN TIME PARTIAL: CPT

## 2024-11-25 PROCEDURE — 80076 HEPATIC FUNCTION PANEL: CPT

## 2024-11-25 PROCEDURE — 2580000003 HC RX 258: Performed by: STUDENT IN AN ORGANIZED HEALTH CARE EDUCATION/TRAINING PROGRAM

## 2024-11-25 PROCEDURE — 6360000004 HC RX CONTRAST MEDICATION: Performed by: INTERNAL MEDICINE

## 2024-11-25 RX ORDER — ASPIRIN 81 MG/1
81 TABLET, CHEWABLE ORAL DAILY
Qty: 30 TABLET | Refills: 3 | Status: SHIPPED | OUTPATIENT
Start: 2024-11-26 | End: 2024-11-27 | Stop reason: HOSPADM

## 2024-11-25 RX ORDER — PANTOPRAZOLE SODIUM 40 MG/1
40 TABLET, DELAYED RELEASE ORAL
Qty: 30 TABLET | Refills: 3 | Status: SHIPPED | OUTPATIENT
Start: 2024-11-26 | End: 2024-11-25

## 2024-11-25 RX ORDER — METOPROLOL TARTRATE 25 MG/1
TABLET, FILM COATED ORAL
Qty: 180 TABLET | Refills: 3 | Status: SHIPPED | OUTPATIENT
Start: 2024-11-25 | End: 2024-11-25

## 2024-11-25 RX ORDER — METOPROLOL TARTRATE 25 MG/1
TABLET, FILM COATED ORAL
Qty: 180 TABLET | Refills: 3 | Status: SHIPPED | OUTPATIENT
Start: 2024-11-25

## 2024-11-25 RX ORDER — PANTOPRAZOLE SODIUM 40 MG/1
40 TABLET, DELAYED RELEASE ORAL
Qty: 30 TABLET | Refills: 3 | Status: SHIPPED | OUTPATIENT
Start: 2024-11-26

## 2024-11-25 RX ADMIN — PANTOPRAZOLE SODIUM 40 MG: 40 TABLET, DELAYED RELEASE ORAL at 07:09

## 2024-11-25 RX ADMIN — PERFLUTREN 1.5 ML: 6.52 INJECTION, SUSPENSION INTRAVENOUS at 10:34

## 2024-11-25 RX ADMIN — METOPROLOL TARTRATE 25 MG: 25 TABLET, FILM COATED ORAL at 07:46

## 2024-11-25 RX ADMIN — LEVETIRACETAM 500 MG: 500 TABLET, FILM COATED ORAL at 07:47

## 2024-11-25 RX ADMIN — SODIUM CHLORIDE, PRESERVATIVE FREE 10 ML: 5 INJECTION INTRAVENOUS at 08:24

## 2024-11-25 RX ADMIN — ASPIRIN 81 MG: 81 TABLET, CHEWABLE ORAL at 07:46

## 2024-11-25 RX ADMIN — ACYCLOVIR 400 MG: 400 TABLET ORAL at 07:47

## 2024-11-25 RX ADMIN — PREDNISONE 40 MG: 20 TABLET ORAL at 07:46

## 2024-11-25 ASSESSMENT — PAIN SCALES - GENERAL: PAINLEVEL_OUTOF10: 0

## 2024-11-25 NOTE — DISCHARGE INSTRUCTIONS
call the unit and ask to speak with your nurse.  In about 7-10 days you will receive a survey. We value your opinion and hope that you have received care that will enable you to choose the best scores when completing the survey.    It was our pleasure to take care of you,  [facility] Staff                                                              [facility]   Inpatient Unit           [phone]                                                     Outpatient Infusion [phone]    [facility] Physician Office [phone]  [facility] Testing or Procedural Scheduling [phone] ([facility])

## 2024-11-25 NOTE — DISCHARGE SUMMARY
Pt discharged at 1212 via wheelchair to her daughters car. She reviewed her discharge instructions with the nurse and understands her home medications. She verbalized understanding her next follow up appointments and is agreeable to discharge at this time. The nurse de-accessed her port and removed her PIV before discharge. All belongings went with her.

## 2024-11-25 NOTE — DISCHARGE SUMMARY
[facility] Discharge Summary             Attending Physician: Dominique Lopez DO    Referring MD: No referring provider defined for this encounter.    Name: Leslee Maldonado :  1955  MRN:  [id number]    Admission: 2024   Discharge:   2024    Date: 2024    Diagnosis on admit: Chest Pain     Consultations:   Cardiology- Dr. Ledesma    Medications:      Medication List        START taking these medications      aspirin 81 MG chewable tablet  Take 1 tablet by mouth daily  Start taking on: 2024  Notes to patient: Aspirin  USE--Prevents heart attack and stroke (decreases platelet adhesion)  SIDE EFFECTS-- Stomach upset, bleeding/bruising more easily     pantoprazole 40 MG tablet  Commonly known as: PROTONIX  Take 1 tablet by mouth every morning (before breakfast)  Start taking on: 2024  Notes to patient: Pantoprazole  (Protonix)  USE--  Reduce stomach acid, protect stomach lining  SIDE EFFECTS--  Headache, diarrhea            CONTINUE taking these medications      acyclovir 400 MG tablet  Commonly known as: ZOVIRAX  Notes to patient: Acyclovir (Zovirax)  Use:  to prevent or treat viral infections    Side effects:  weakness, headache, nausea, vomiting, diarrhea     amLODIPine 10 MG tablet  Commonly known as: NORVASC  Take 1 tablet by mouth daily  Notes to patient: Amlodipine (Norvasc)  USE--  Treats high blood pressure (lowers blood pressure)  SIDE EFFECTS-- feeling tired, dizziness     levETIRAcetam 500 MG tablet  Commonly known as: Keppra  Take 1 tablet by mouth 2 times daily Unknown if still taking or not  Notes to patient: Anticonvulsant-Keppra Treats seizures.  Important to take this medication as directed by your physician.  No Driving while taking this medication.     Side effects;  Drowsiness, weakness      metoprolol tartrate 25 MG tablet  Commonly known as: LOPRESSOR  TAKE 1 TABLET BY MOUTH TWICE DAILY  Notes to patient: Metoprolol  (Lopressor, Toprol XL)  USE--  Lower

## 2024-11-25 NOTE — PLAN OF CARE
Problem: Chronic Conditions and Co-morbidities  Goal: Patient's chronic conditions and co-morbidity symptoms are monitored and maintained or improved  Outcome: Progressing  Flowsheets (Taken 11/24/2024 1659)  Care Plan - Patient's Chronic Conditions and Co-Morbidity Symptoms are Monitored and Maintained or Improved:   Monitor and assess patient's chronic conditions and comorbid symptoms for stability, deterioration, or improvement   Collaborate with multidisciplinary team to address chronic and comorbid conditions and prevent exacerbation or deterioration   Update acute care plan with appropriate goals if chronic or comorbid symptoms are exacerbated and prevent overall improvement and discharge  Note: Monitoring patient for deterioration this shift. Patient has been resting comfortably in her room. No complaints of pain this shift. Medications administered per MAR orders. Care continues.      Problem: Safety - Adult  Goal: Free from fall injury  Outcome: Progressing  Flowsheets (Taken 11/24/2024 1659)  Free From Fall Injury:   Instruct family/caregiver on patient safety   Based on caregiver fall risk screen, instruct family/caregiver to ask for assistance with transferring infant if caregiver noted to have fall risk factors  Note: Orthostatic vital signs obtained at start of shift - see flowsheet for details.  Pt does not meet criteria for orthostasis.  Pt is a Low fall risk. See Ayers Fall Score and ABCDS Injury Risk assessments.     - Screening for Orthostasis AND not a [place] Risk per AYERS/ABCDS: Pt bed is in low position, side rails up, call light and belongings are in reach.  Fall risk light is on outside pts room.  Pt encouraged to call for assistance as needed. Will continue with hourly rounds for PO intake, pain needs, toileting and repositioning as needed.      Problem: ABCDS Injury Assessment  Goal: Absence of physical injury  Outcome: Progressing  Flowsheets (Taken 11/24/2024 1659)  Absence of 
  Problem: Chronic Conditions and Co-morbidities  Goal: Patient's chronic conditions and co-morbidity symptoms are monitored and maintained or improved  Outcome: Progressing  Reviewed neutropenic safety measure, hand hygiene and fever monitoring.      Problem: Safety - Adult  Goal: Free from fall injury  Outcome: Progressing   Ortho negative, ambulates well and independently, educated on falls safety with non-skid footwear use and slow position changes.   
this time. PRNs available if needed. Patient resting and repositioning. Care continues.

## 2024-11-25 NOTE — CARE COORDINATION
9:37am: Case Management Assessment  Initial Evaluation    Date/Time of Evaluation: 11/25/2024 9:37 AM  Assessment Completed by: MELISSA Lee   for [facility] [place] ([facility])  [employer]: [phone]    If patient is discharged prior to next notation, then this note serves as note for discharge by case management.    Patient Name: Leslee Maldonado                   YOB: 1955  Diagnosis: NSTEMI (non-ST elevated myocardial infarction) ([facility]) [I21.4]  Chest pain due to CAD ([facility]) [I25.119]  Coronary artery disease involving other coronary artery bypass graft with refractory angina pectoris ([facility]) [I25.792]                   Date / Time: 11/23/2024 12:50 PM    Patient Admission Status: Inpatient   Readmission Risk (Low < 19, Mod (19-27), High > 27): Readmission Risk Score: 23.7    Current PCP: Raj Cedeño MD  PCP verified by CM? Yes (has been going to [facility])    Chart Reviewed: Yes      History Provided by: Patient  Patient Orientation: Alert and Oriented    Patient Cognition: Alert    Hospitalization in the last 30 days (Readmission):  Yes    If yes, Readmission Assessment in CM Navigator will be completed.    Advance Directives:      Code Status: Full Code   Patient's Primary Decision Maker is: Legal Next of Kin      Discharge Planning:    Patient lives with: Children Type of Home: House  Primary Care Giver: Self  Patient Support Systems include: Children, Family Members   Current Financial resources: Medicare ([facility] Medicare)  Current community resources: None  Current services prior to admission: Other (Comment) ([facility])            Current DME:              Type of Home Care services:  None    ADLS  Prior functional level: Independent in ADLs/IADLs  Current functional level: Independent in ADLs/IADLs    PT AM-PAC:   /24  OT AM-PAC:   /24    Family can provide assistance at DC: Yes  Would you like Case Management to discuss the discharge plan with any other

## 2024-11-25 NOTE — CARE COORDINATION
Reviewed Carvykti discharge instructions with patient.  Reviewed discharge medications including dosing, schedule, indication, and adverse reactions.  Reviewed which medications were already taken today and next dosage due for each medication.  Scripts picked up from pharmacy.     Reviewed signs and symptoms that prompt a call to the physician and appropriate phone numbers. Reviewed follow up appointments that have been made in [facility] and Outpatient Oncology.  Low microbial diet, activity restrictions, and increased risk of infection were reviewed.     Patient verbalized understanding of all instructions and questions were answered to her. satisfaction.  Patient discharged to home per wheelchair with caregiver.      Tamy Gilliam

## 2024-11-27 ENCOUNTER — HOSPITAL ENCOUNTER (OUTPATIENT)
Dept: ONCOLOGY | Age: 69
Setting detail: INFUSION SERIES
Discharge: HOME | End: 2024-11-27
Payer: COMMERCIAL

## 2024-11-27 VITALS
OXYGEN SATURATION: 97 % | TEMPERATURE: 98.2 F | WEIGHT: 190.7 LBS | HEART RATE: 58 BPM | RESPIRATION RATE: 18 BRPM | DIASTOLIC BLOOD PRESSURE: 73 MMHG | SYSTOLIC BLOOD PRESSURE: 146 MMHG | BODY MASS INDEX: 34.88 KG/M2

## 2024-11-27 DIAGNOSIS — R94.5 ABNORMAL RESULTS OF LIVER FUNCTION STUDIES: ICD-10-CM

## 2024-11-27 DIAGNOSIS — C90.00 MULTIPLE MYELOMA, REMISSION STATUS UNSPECIFIED (HCC): Primary | ICD-10-CM

## 2024-11-27 LAB
ALBUMIN SERPL-MCNC: 3.6 G/DL (ref 3.4–5)
ALP SERPL-CCNC: 44 U/L (ref 40–129)
ALT SERPL-CCNC: 35 U/L (ref 10–40)
ANION GAP SERPL CALCULATED.3IONS-SCNC: 9 MMOL/L (ref 3–16)
ANISOCYTOSIS BLD QL SMEAR: ABNORMAL
AST SERPL-CCNC: 18 U/L (ref 15–37)
BASOPHILS # BLD: 0 K/UL (ref 0–0.2)
BASOPHILS NFR BLD: 0 %
BILIRUB DIRECT SERPL-MCNC: <0.1 MG/DL (ref 0–0.3)
BILIRUB INDIRECT SERPL-MCNC: 0.2 MG/DL (ref 0–1)
BILIRUB SERPL-MCNC: 0.3 MG/DL (ref 0–1)
BUN SERPL-MCNC: 11 MG/DL (ref 7–20)
CALCIUM SERPL-MCNC: 8.7 MG/DL (ref 8.3–10.6)
CHLORIDE SERPL-SCNC: 107 MMOL/L (ref 99–110)
CO2 SERPL-SCNC: 26 MMOL/L (ref 21–32)
CREAT SERPL-MCNC: 0.8 MG/DL (ref 0.6–1.2)
CRP SERPL-MCNC: <3 MG/L (ref 0–5.1)
DACRYOCYTES BLD QL SMEAR: ABNORMAL
DEPRECATED RDW RBC AUTO: 18.4 % (ref 12.4–15.4)
EOSINOPHIL # BLD: 0 K/UL (ref 0–0.6)
EOSINOPHIL NFR BLD: 0 %
FERRITIN SERPL IA-MCNC: 127 NG/ML (ref 15–150)
GFR SERPLBLD CREATININE-BSD FMLA CKD-EPI: 81 ML/MIN/{1.73_M2}
GLUCOSE SERPL-MCNC: 99 MG/DL (ref 70–99)
HCT VFR BLD AUTO: 32 % (ref 36–48)
HGB BLD-MCNC: 10.5 G/DL (ref 12–16)
IGG SERPL-MCNC: 395 MG/DL (ref 700–1600)
INR PPP: 0.92 (ref 0.85–1.15)
LDH SERPL L TO P-CCNC: 157 U/L (ref 100–190)
LYMPHOCYTES # BLD: 0.7 K/UL (ref 1–5.1)
LYMPHOCYTES NFR BLD: 29 %
MACROCYTES BLD QL SMEAR: ABNORMAL
MAGNESIUM SERPL-MCNC: 2.36 MG/DL (ref 1.8–2.4)
MCH RBC QN AUTO: 29.6 PG (ref 26–34)
MCHC RBC AUTO-ENTMCNC: 33 G/DL (ref 31–36)
MCV RBC AUTO: 89.8 FL (ref 80–100)
MICROCYTES BLD QL SMEAR: ABNORMAL
MONOCYTES # BLD: 0.4 K/UL (ref 0–1.3)
MONOCYTES NFR BLD: 22 %
NEUTROPHILS # BLD: 0.8 K/UL (ref 1.7–7.7)
NEUTROPHILS NFR BLD: 43 %
NRBC BLD-RTO: 1 /100 WBC
OVALOCYTES BLD QL SMEAR: ABNORMAL
PHOSPHATE SERPL-MCNC: 2.3 MG/DL (ref 2.5–4.9)
PLATELET # BLD AUTO: 119 K/UL (ref 135–450)
PLATELET BLD QL SMEAR: ABNORMAL
PMV BLD AUTO: 8.7 FL (ref 5–10.5)
POTASSIUM SERPL-SCNC: 3 MMOL/L (ref 3.5–5.1)
PROT SERPL-MCNC: 5.1 G/DL (ref 6.4–8.2)
PROTHROMBIN TIME: 12.6 SEC (ref 11.9–14.9)
RBC # BLD AUTO: 3.56 M/UL (ref 4–5.2)
SCHISTOCYTES BLD QL SMEAR: ABNORMAL
SODIUM SERPL-SCNC: 142 MMOL/L (ref 136–145)
STOMATOCYTES BLD QL SMEAR: ABNORMAL
URATE SERPL-MCNC: 2.5 MG/DL (ref 2.6–6)
VARIANT LYMPHS NFR BLD MANUAL: 6 % (ref 0–6)
WBC # BLD AUTO: 1.9 K/UL (ref 4–11)

## 2024-11-27 PROCEDURE — 84550 ASSAY OF BLOOD/URIC ACID: CPT

## 2024-11-27 PROCEDURE — 6360000002 HC RX W HCPCS: Performed by: NURSE PRACTITIONER

## 2024-11-27 PROCEDURE — 85610 PROTHROMBIN TIME: CPT

## 2024-11-27 PROCEDURE — 85025 COMPLETE CBC W/AUTO DIFF WBC: CPT

## 2024-11-27 PROCEDURE — 82784 ASSAY IGA/IGD/IGG/IGM EACH: CPT

## 2024-11-27 PROCEDURE — 86140 C-REACTIVE PROTEIN: CPT

## 2024-11-27 PROCEDURE — 83735 ASSAY OF MAGNESIUM: CPT

## 2024-11-27 PROCEDURE — 84100 ASSAY OF PHOSPHORUS: CPT

## 2024-11-27 PROCEDURE — 6370000000 HC RX 637 (ALT 250 FOR IP): Performed by: NURSE PRACTITIONER

## 2024-11-27 PROCEDURE — 80076 HEPATIC FUNCTION PANEL: CPT

## 2024-11-27 PROCEDURE — 83615 LACTATE (LD) (LDH) ENZYME: CPT

## 2024-11-27 PROCEDURE — 86360 T CELL ABSOLUTE COUNT/RATIO: CPT

## 2024-11-27 PROCEDURE — 96372 THER/PROPH/DIAG INJ SC/IM: CPT

## 2024-11-27 PROCEDURE — 36591 DRAW BLOOD OFF VENOUS DEVICE: CPT

## 2024-11-27 PROCEDURE — 82728 ASSAY OF FERRITIN: CPT

## 2024-11-27 PROCEDURE — 80048 BASIC METABOLIC PNL TOTAL CA: CPT

## 2024-11-27 RX ORDER — MAGNESIUM SULFATE IN WATER 40 MG/ML
4000 INJECTION, SOLUTION INTRAVENOUS PRN
OUTPATIENT
Start: 2024-11-28

## 2024-11-27 RX ORDER — OXYCODONE HYDROCHLORIDE 5 MG/1
10 TABLET ORAL EVERY 4 HOURS PRN
OUTPATIENT
Start: 2024-11-28

## 2024-11-27 RX ORDER — POTASSIUM CHLORIDE 1500 MG/1
20 TABLET, EXTENDED RELEASE ORAL DAILY
Qty: 14 TABLET | Refills: 1 | Status: SHIPPED | OUTPATIENT
Start: 2024-11-27 | End: 2024-12-25

## 2024-11-27 RX ORDER — POTASSIUM CHLORIDE 1500 MG/1
40 TABLET, EXTENDED RELEASE ORAL PRN
Status: CANCELLED | OUTPATIENT
Start: 2024-11-28

## 2024-11-27 RX ORDER — OXYCODONE HYDROCHLORIDE 5 MG/1
5 TABLET ORAL EVERY 4 HOURS PRN
OUTPATIENT
Start: 2024-11-28

## 2024-11-27 RX ORDER — PREDNISONE 20 MG/1
30 TABLET ORAL DAILY
Qty: 60 TABLET | Refills: 3
Start: 2024-11-27

## 2024-11-27 RX ORDER — ONDANSETRON 2 MG/ML
8 INJECTION INTRAMUSCULAR; INTRAVENOUS EVERY 8 HOURS PRN
OUTPATIENT
Start: 2024-11-28

## 2024-11-27 RX ORDER — PROCHLORPERAZINE MALEATE 10 MG
10 TABLET ORAL EVERY 6 HOURS PRN
OUTPATIENT
Start: 2024-11-28

## 2024-11-27 RX ORDER — SODIUM CHLORIDE 9 MG/ML
INJECTION, SOLUTION INTRAVENOUS CONTINUOUS PRN
OUTPATIENT
Start: 2024-11-28

## 2024-11-27 RX ORDER — POTASSIUM CHLORIDE 1500 MG/1
40 TABLET, EXTENDED RELEASE ORAL PRN
Status: DISCONTINUED | OUTPATIENT
Start: 2024-11-27 | End: 2024-11-28 | Stop reason: HOSPADM

## 2024-11-27 RX ORDER — HEPARIN 100 UNIT/ML
500 SYRINGE INTRAVENOUS PRN
Status: CANCELLED | OUTPATIENT
Start: 2024-11-28

## 2024-11-27 RX ORDER — POTASSIUM CHLORIDE 29.8 MG/ML
80 INJECTION INTRAVENOUS PRN
OUTPATIENT
Start: 2024-11-28 | End: 2025-03-08

## 2024-11-27 RX ORDER — PROCHLORPERAZINE EDISYLATE 5 MG/ML
10 INJECTION INTRAMUSCULAR; INTRAVENOUS EVERY 6 HOURS PRN
OUTPATIENT
Start: 2024-11-28

## 2024-11-27 RX ORDER — SULFAMETHOXAZOLE AND TRIMETHOPRIM 400; 80 MG/1; MG/1
1 TABLET ORAL DAILY
Qty: 90 TABLET | Refills: 1 | Status: SHIPPED | OUTPATIENT
Start: 2024-11-27 | End: 2025-05-26

## 2024-11-27 RX ORDER — ONDANSETRON 4 MG/1
8 TABLET, FILM COATED ORAL EVERY 8 HOURS PRN
OUTPATIENT
Start: 2024-11-28

## 2024-11-27 RX ADMIN — FILGRASTIM-AAFI 300 MCG: 300 INJECTION, SOLUTION SUBCUTANEOUS at 10:02

## 2024-11-27 RX ADMIN — POTASSIUM CHLORIDE 40 MEQ: 1500 TABLET, EXTENDED RELEASE ORAL at 10:02

## 2024-11-27 NOTE — PROGRESS NOTES
[facility] Progress Note      2024    Leslee Maldonado    :  1955    MRN:  [id number]    Referring MD: Kerry Reagan, APRN - NP  [address] [place] [address]      Subjective:  Facial paralysis stable     ECOG PS:  (2) Ambulatory and capable of self care, unable to carry out work activity, up and about > 50% or waking hours    KPS: 70% Cares for self; unable to carry on normal activity or to do active work    Isolation:  None         ROS:  As noted above, otherwise remainder of 10-point ROS negative      Physical Exam:     Vital Signs:  There were no vitals taken for this visit.    Weight:    Wt Readings from Last 3 Encounters:   24 84.3 kg (185 lb 12.8 oz)   24 85.8 kg (189 lb 2.5 oz)   24 83.5 kg (184 lb 1.4 oz)       General: Awake, alert and oriented.  HEENT: normocephalic, alopecia, PERRL, no scleral erythema or icterus, Oral mucosa moist and intact, throat clear.  Left facial weakness  NECK: supple   BACK: Straight   SKIN: warm dry and intact without lesions rashes or masses  CHEST: CTA bilaterally without use of accessory muscles  CV: Normal S1 S2, RRR, no MRG  ABD: NT ND normoactive BS  EXTREMITIES: without edema, denies calf tenderness  NEURO: CN II - XII grossly intact  CATHETER: R PAC    Laboratory Data:  CBC:   Recent Labs     24   WBC 2.4*   HGB 10.6*   HCT 31.3*   MCV 88.6   *     BMP/Mag:  Recent Labs     24      K 3.5   *   CO2 26   PHOS 3.5   BUN 14   CREATININE 0.8   MG 2.39     LIVP:   Recent Labs     24   AST 14*   ALT 17   BILIDIR <0.1   BILITOT <0.2   ALKPHOS 49     Coags:   Recent Labs     24   PROTIME 13.3   INR 0.99   APTT 29.8     Uric Acid No results for input(s): \"LABURIC\" in the last 72 hours.  Lab Results   Component Value Date    CRP <3.0 2024    CRP 3.4 2024    CRP <3.0 2024     Lab Results   Component Value Date    FERRITIN 156.0 (H) 2024

## 2024-11-27 NOTE — PROGRESS NOTES
Short Stay Communication Note  Leslee Maldonado  Diagnosis: Multiple Myeloma  Primary MD: Dr. Dominique Lopez  Treatment:  Fludarabine/Cytoxan  CAR-T Administration Date: 10/28/24  Day +30 of carvykti  Pt seen in outpatient infusion today. Labs drawn and reviewed.   CBC:   Recent Labs     11/25/24  0435 11/27/24  0826   WBC 2.4* 1.9*   HGB 10.6* 10.5*   HCT 31.3* 32.0*   MCV 88.6 89.8   * 119*     BMP/Mag:  Recent Labs     11/25/24  0435 11/27/24  0826    142   K 3.5 3.0*   * 107   CO2 26 26   PHOS 3.5 2.3*   BUN 14 11   CREATININE 0.8 0.8   MG 2.39 2.36     Standing parameters for replacement for this patient:   1 unit of pack red blood cells for a hemoglobin < or equal to 7  1 pack of platelets for a platelet count less than or equal to 10  40 MeQ of Potassium administered for a potassium level less than or equal to 3.4  4g of Magnesium Sulfate for a magnesum level less than or equal to 1.4  Potassium administered per standing order for the above lab values.    Urinalysis last done: 11/18/24 Urinalysis next due: 11/29/2024  Chest X-Ray last done: 11/23/24 Chest X-Ray next due: 11/25/24    Symptoms addressed and reported to care team this date: L facial droop (unchanged), L eye watering  Treatments this date: Labs, oral potassium (40 me eq), granix    Pt to decrease prednisone dose to 30  Reviewed medication schedule with pt and caregiver. Both able to verbalize all medications and schedule. Pt to be seen again Friday. Patient and caregiver verbalized understanding of discharge instructions including when and how to call the doctor and when to report  to the ER. Discharged ambulatory to home with daughter

## 2024-11-28 LAB
BACTERIA CSF CULT: NORMAL
GRAM STN SPEC: NORMAL

## 2024-11-29 ENCOUNTER — HOSPITAL ENCOUNTER (OUTPATIENT)
Dept: ONCOLOGY | Age: 69
Setting detail: INFUSION SERIES
Discharge: HOME | End: 2024-11-29
Payer: COMMERCIAL

## 2024-11-29 VITALS
TEMPERATURE: 97.9 F | WEIGHT: 190.26 LBS | HEART RATE: 59 BPM | SYSTOLIC BLOOD PRESSURE: 131 MMHG | BODY MASS INDEX: 34.8 KG/M2 | RESPIRATION RATE: 18 BRPM | OXYGEN SATURATION: 96 % | DIASTOLIC BLOOD PRESSURE: 67 MMHG

## 2024-11-29 DIAGNOSIS — R94.5 ABNORMAL RESULTS OF LIVER FUNCTION STUDIES: ICD-10-CM

## 2024-11-29 DIAGNOSIS — C90.00 MULTIPLE MYELOMA, REMISSION STATUS UNSPECIFIED (HCC): Primary | ICD-10-CM

## 2024-11-29 LAB
ALBUMIN SERPL-MCNC: 3.6 G/DL (ref 3.4–5)
ALP SERPL-CCNC: 67 U/L (ref 40–129)
ALT SERPL-CCNC: 25 U/L (ref 10–40)
ANION GAP SERPL CALCULATED.3IONS-SCNC: 8 MMOL/L (ref 3–16)
AST SERPL-CCNC: 13 U/L (ref 15–37)
BASOPHILS # BLD: 0 K/UL (ref 0–0.2)
BASOPHILS NFR BLD: 0.3 %
BILIRUB DIRECT SERPL-MCNC: <0.1 MG/DL (ref 0–0.3)
BILIRUB INDIRECT SERPL-MCNC: 0.2 MG/DL (ref 0–1)
BILIRUB SERPL-MCNC: 0.3 MG/DL (ref 0–1)
BUN SERPL-MCNC: 14 MG/DL (ref 7–20)
CALCIUM SERPL-MCNC: 9 MG/DL (ref 8.3–10.6)
CHLORIDE SERPL-SCNC: 108 MMOL/L (ref 99–110)
CO2 SERPL-SCNC: 27 MMOL/L (ref 21–32)
CREAT SERPL-MCNC: 0.9 MG/DL (ref 0.6–1.2)
CRP SERPL-MCNC: <3 MG/L (ref 0–5.1)
DEPRECATED RDW RBC AUTO: 19.9 % (ref 12.4–15.4)
EOSINOPHIL # BLD: 0 K/UL (ref 0–0.6)
EOSINOPHIL NFR BLD: 0.3 %
FERRITIN SERPL IA-MCNC: 93.8 NG/ML (ref 15–150)
GFR SERPLBLD CREATININE-BSD FMLA CKD-EPI: 70 ML/MIN/{1.73_M2}
GLUCOSE SERPL-MCNC: 110 MG/DL (ref 70–99)
HCT VFR BLD AUTO: 32.3 % (ref 36–48)
HGB BLD-MCNC: 10.6 G/DL (ref 12–16)
LDH SERPL L TO P-CCNC: 176 U/L (ref 100–190)
LYMPHOCYTES # BLD: 0.7 K/UL (ref 1–5.1)
LYMPHOCYTES NFR BLD: 5.3 %
MAGNESIUM SERPL-MCNC: 2.19 MG/DL (ref 1.8–2.4)
MCH RBC QN AUTO: 29.9 PG (ref 26–34)
MCHC RBC AUTO-ENTMCNC: 32.9 G/DL (ref 31–36)
MCV RBC AUTO: 90.9 FL (ref 80–100)
MONOCYTES # BLD: 0.6 K/UL (ref 0–1.3)
MONOCYTES NFR BLD: 4.9 %
NEUTROPHILS # BLD: 11.8 K/UL (ref 1.7–7.7)
NEUTROPHILS NFR BLD: 89.2 %
PHOSPHATE SERPL-MCNC: 3.5 MG/DL (ref 2.5–4.9)
PLATELET # BLD AUTO: 119 K/UL (ref 135–450)
PMV BLD AUTO: 7.9 FL (ref 5–10.5)
POTASSIUM SERPL-SCNC: 3.4 MMOL/L (ref 3.5–5.1)
PROT SERPL-MCNC: 5.2 G/DL (ref 6.4–8.2)
RBC # BLD AUTO: 3.55 M/UL (ref 4–5.2)
SODIUM SERPL-SCNC: 143 MMOL/L (ref 136–145)
URATE SERPL-MCNC: 2.8 MG/DL (ref 2.6–6)
WBC # BLD AUTO: 13.2 K/UL (ref 4–11)

## 2024-11-29 PROCEDURE — 36591 DRAW BLOOD OFF VENOUS DEVICE: CPT

## 2024-11-29 PROCEDURE — 80048 BASIC METABOLIC PNL TOTAL CA: CPT

## 2024-11-29 PROCEDURE — 80076 HEPATIC FUNCTION PANEL: CPT

## 2024-11-29 PROCEDURE — 84100 ASSAY OF PHOSPHORUS: CPT

## 2024-11-29 PROCEDURE — 83615 LACTATE (LD) (LDH) ENZYME: CPT

## 2024-11-29 PROCEDURE — 86140 C-REACTIVE PROTEIN: CPT

## 2024-11-29 PROCEDURE — 84550 ASSAY OF BLOOD/URIC ACID: CPT

## 2024-11-29 PROCEDURE — 83735 ASSAY OF MAGNESIUM: CPT

## 2024-11-29 PROCEDURE — 82728 ASSAY OF FERRITIN: CPT

## 2024-11-29 PROCEDURE — 85025 COMPLETE CBC W/AUTO DIFF WBC: CPT

## 2024-11-29 RX ORDER — HEPARIN 100 UNIT/ML
500 SYRINGE INTRAVENOUS PRN
OUTPATIENT
Start: 2024-11-30

## 2024-11-29 RX ORDER — MAGNESIUM SULFATE IN WATER 40 MG/ML
4000 INJECTION, SOLUTION INTRAVENOUS PRN
OUTPATIENT
Start: 2024-11-30

## 2024-11-29 RX ORDER — POTASSIUM CHLORIDE 29.8 MG/ML
80 INJECTION INTRAVENOUS PRN
OUTPATIENT
Start: 2024-11-30 | End: 2025-03-10

## 2024-11-29 RX ORDER — HEPARIN 100 UNIT/ML
500 SYRINGE INTRAVENOUS PRN
Status: DISCONTINUED | OUTPATIENT
Start: 2024-11-29 | End: 2024-11-30 | Stop reason: HOSPADM

## 2024-11-29 RX ORDER — POTASSIUM CHLORIDE 1500 MG/1
40 TABLET, EXTENDED RELEASE ORAL PRN
Status: DISCONTINUED | OUTPATIENT
Start: 2024-11-29 | End: 2024-11-30 | Stop reason: HOSPADM

## 2024-11-29 RX ORDER — ONDANSETRON 2 MG/ML
8 INJECTION INTRAMUSCULAR; INTRAVENOUS EVERY 8 HOURS PRN
OUTPATIENT
Start: 2024-11-30

## 2024-11-29 RX ORDER — POTASSIUM CHLORIDE 1500 MG/1
40 TABLET, EXTENDED RELEASE ORAL PRN
OUTPATIENT
Start: 2024-11-30

## 2024-11-29 RX ORDER — OXYCODONE HYDROCHLORIDE 5 MG/1
10 TABLET ORAL EVERY 4 HOURS PRN
OUTPATIENT
Start: 2024-11-30

## 2024-11-29 RX ORDER — SODIUM CHLORIDE 9 MG/ML
INJECTION, SOLUTION INTRAVENOUS CONTINUOUS PRN
OUTPATIENT
Start: 2024-11-30

## 2024-11-29 RX ORDER — PROCHLORPERAZINE MALEATE 10 MG
10 TABLET ORAL EVERY 6 HOURS PRN
OUTPATIENT
Start: 2024-11-30

## 2024-11-29 RX ORDER — ONDANSETRON 4 MG/1
8 TABLET, FILM COATED ORAL EVERY 8 HOURS PRN
OUTPATIENT
Start: 2024-11-30

## 2024-11-29 RX ORDER — PROCHLORPERAZINE EDISYLATE 5 MG/ML
10 INJECTION INTRAMUSCULAR; INTRAVENOUS EVERY 6 HOURS PRN
OUTPATIENT
Start: 2024-11-30

## 2024-11-29 RX ORDER — OXYCODONE HYDROCHLORIDE 5 MG/1
5 TABLET ORAL EVERY 4 HOURS PRN
OUTPATIENT
Start: 2024-11-30

## 2024-11-29 NOTE — PROGRESS NOTES
Short Stay Communication Note  Leslee Maldonado  Diagnosis: Multiple Myeloma  Primary MD: Dr. Dominique Lopez  Treatment:  Fludarabine/Cytoxan  CAR-T Administration Date: 10/28/24  Day +32 of carvykti  Pt seen in outpatient infusion today. Labs drawn and reviewed.   CBC:   Recent Labs     11/27/24  0826 11/29/24  0811   WBC 1.9* 13.2*   HGB 10.5* 10.6*   HCT 32.0* 32.3*   MCV 89.8 90.9   * 119*     BMP/Mag:  Recent Labs     11/27/24  0826 11/29/24  0811    143   K 3.0* 3.4*    108   CO2 26 27   PHOS 2.3* 3.5   BUN 11 14   CREATININE 0.8 0.9   MG 2.36 2.19     Standing parameters for replacement for this patient:   1 unit of pack red blood cells for a hemoglobin < or equal to 7  1 pack of platelets for a platelet count less than or equal to 10  40 MeQ of Potassium administered for a potassium level less than or equal to 3.4  4g of Magnesium Sulfate for a magnesum level less than or equal to 1.4  Potassium administered per standing order for the above lab values.    Urinalysis last done: 11/18/24 Urinalysis next due: 12/2/24  Chest X-Ray last done: 11/23/24 Chest X-Ray next due: 12/2/24    Symptoms addressed and reported to care team this date:   Treatments this date: Labs, Pt takes k+ at home already and took one this morning, will take another one when she gets home.    Reviewed medication schedule with pt and caregiver. Both able to verbalize all medications and schedule. Pt to be seen again Monday at [facility]. Patient and caregiver verbalized understanding of discharge instructions including when and how to call the doctor and when to report  to the ER. Discharged ambulatory to home with daughter  
taking any medication  Left facial nerve weakness (11/18/24):    - Pt evaluated by Dr. Lopez @ [facility].   - STAT head CT (11/19/24) in OPO: Neg  - Viral studies 11/19/24: CMV: Not Detected,  HHV6, EBV,  Parvo Negative  - STAT LP & MRI w/ orbits (11/20/24): MRI with no acute intracranial abnormality, Meningitis panel negative, Crypto Ag negative  - Continue Prednisone 40 mg daily (11/20/24)   - Decreased to 30 mg PO daily 11/27/24- palsy stable  - Decreased to 20 mg PO daily starting 11/30/24     ICANS: None  CAR-T Score: 10/10  - Continue Keppra 500 mg bid     3.  ID:  Afebrile without signs or symptoms of infection   Current PPx:  - Continue acyclovir 400 mg bid  - Continue fluconazole 200 mg qd  - Continue Levaquin 500 mg qd  - Start Bactrim SS daily for PCP ppx  - GCSF x 1 dose 11/27/24  H/O Hypogammaglobulinemia  - IgG pending 11/27/24       Post-CAR T monitoring/maintenance  - IgG & CD4 level monthly starting on day + 30- Pending 11/27/24  - Check disease titers on day + 30 or when WBC recovered. Re-vaccinate vs booster based on titer (assure titers are drawn prior to administering immunoglobulins)  - Start PCP ppx on day + 30 - stop when CD4 > 250  - Continue Valtrex for 1 year post CAR-T     4.  HEME:   Leukopenia and anemia 2/2 heme malignancy and recent chemotherapy  - Transfuse for Hgb < 7 and Platelets < 10K  - No transfusion today    5.  METABOLIC   Electrolytes are WNL and renal fxn stable  - No daily IV fluids in OP Infusion unless clinically indicated   - Replace K+ & Mg per PRN orders  - Start KCL 20 mEq PO daily on 11/27/24 x 14 days     6.  GI / NUTRITION:   Nutrition:  Appetite and oral intake is adequate  - Cont low microbial diet   - Follow closely with dietician  GERD: well-controlled   - Cont Tums PRN     7.  ENDO  Type 2 DM:  well-controlled with diet     8.  CVS:  stable   Hx of RBBB, CAD s/p CABG & PCI:  - 6/11/19:  PCI recannulization of occluded mid-left Cx (residual 70% stenosis)  - 6/13/22:

## 2024-12-02 LAB
FUNGUS BLD CULT: NORMAL
FUNGUS BLD CULT: NORMAL
FUNGUS SPEC CULT: NORMAL
LOEFFLER MB STN SPEC: NORMAL

## 2024-12-09 LAB — MISCELLANEOUS LAB TEST ORDER: NORMAL

## 2024-12-11 ENCOUNTER — OFFICE VISIT (OUTPATIENT)
Dept: CARDIOLOGY CLINIC | Age: 69
End: 2024-12-11

## 2024-12-11 VITALS
DIASTOLIC BLOOD PRESSURE: 64 MMHG | BODY MASS INDEX: 34.57 KG/M2 | SYSTOLIC BLOOD PRESSURE: 128 MMHG | WEIGHT: 189 LBS | HEART RATE: 60 BPM

## 2024-12-11 DIAGNOSIS — I24.89 DEMAND ISCHEMIA (HCC): ICD-10-CM

## 2024-12-11 DIAGNOSIS — I10 PRIMARY HYPERTENSION: ICD-10-CM

## 2024-12-11 DIAGNOSIS — Z95.1 HX OF CABG: ICD-10-CM

## 2024-12-11 DIAGNOSIS — R07.89 OTHER CHEST PAIN: Primary | ICD-10-CM

## 2024-12-11 DIAGNOSIS — E78.2 MIXED HYPERLIPIDEMIA: ICD-10-CM

## 2024-12-11 DIAGNOSIS — Z98.61 CAD S/P PERCUTANEOUS CORONARY ANGIOPLASTY: ICD-10-CM

## 2024-12-11 DIAGNOSIS — I25.10 CAD S/P PERCUTANEOUS CORONARY ANGIOPLASTY: ICD-10-CM

## 2024-12-11 RX ORDER — LISINOPRIL 20 MG/1
20 TABLET ORAL DAILY
COMMUNITY

## 2024-12-11 NOTE — PROGRESS NOTES
Take 1 tablet by mouth daily 90 tablet 1    potassium chloride (KLOR-CON M) 20 MEQ extended release tablet Take 1 tablet by mouth daily for 28 days 14 tablet 1    metoprolol tartrate (LOPRESSOR) 25 MG tablet TAKE 1 TABLET BY MOUTH TWICE DAILY 180 tablet 3    pantoprazole (PROTONIX) 40 MG tablet Take 1 tablet by mouth every morning (before breakfast) 30 tablet 3    levETIRAcetam (KEPPRA) 500 MG tablet Take 1 tablet by mouth 2 times daily Unknown if still taking or not 60 tablet 1    acyclovir (ZOVIRAX) 400 MG tablet Take 1 tablet by mouth 2 times daily      amLODIPine (NORVASC) 10 MG tablet Take 1 tablet by mouth daily 90 tablet 1    prochlorperazine (COMPAZINE) 10 MG tablet Take 1 tablet by mouth every 6 hours as needed (Nausea / Vomitting) (Patient not taking: Reported on 12/11/2024) 120 tablet 3     No current facility-administered medications for this visit.       Assessment:  1. Other chest pain    2. Demand ischemia (HCC)    3. Primary hypertension    4. CAD S/P percutaneous coronary angioplasty    5. Hx of CABG    6. Mixed hyperlipidemia      Plan:    Chest pain, demand ischemia: stable    CP resolved with protonix   No acute ischemic ecg changes   Tn minimally elevated and flat   Echo EF 55%   September coronary CTA: patent grafts    HTN: stable    /64   Restarting lisinopril at 10 mg po daily tomorrow   Cont norvasc 10 mg daily   Cont metoprolol 25 mg po bid     CAD/CABG/PCI: stable     Not on asa d/t thrombocytopenia related to cancer treatment   No statin d/t cancer treatment    Metoprolol 25 mg po bid   Lisinopril 10 mg    Echo EF 55%   September coronary CTA: patent grafts    HLD; stable    LDL 75   Crestor held     Follow up with Dr Sullivan in February

## 2024-12-13 NOTE — PROCEDURES
[address], [address]                            CARDIAC CATHETERIZATION    PATIENT NAME: Melani Caceres                       :        1955  MED REC NO:   [id number]                          ROOM:       4514  ACCOUNT NO:   [de-identified]                           ADMIT DATE: 2019  PROVIDER:     Saumya Oliveira. Denisse Forrester MD    DATE OF PROCEDURE:  2019    INDICATION FOR PROCEDURE:  Chest pain. HISTORY OF PRESENT ILLNESS:  This is an unfortunate 44-year-old female  with history of diabetes, hypertension and recent development of  multiple myeloma, who is status post bone marrow transplant in 2019,  who has been having chest pain and upper back pain for the last 12 or so  hours. The pain started at about 04:30 this morning and apparently  awaken her from sleep. Pain has been unrelenting, she describes it as 6  or 7/10. EKG is unremarkable. She had a CTPA that was negative for  pulmonary embolism. Because of her anginal chest pain history, I  believe she requires invasive risk stratification with coronary  angiography. DESCRIPTION OF PROCEDURE:  Prepped and draped in a sterile manner,  locally anesthetized with 2% lidocaine, right femoral artery area. A  6-English sheath placed in a retrograde manner, right femoral artery over  guidewire. JL-4, JR-4, and 3DRC and AR1 diagnostic catheters were used  during the procedure. Pigtail catheter was used. All catheters were  aspirated and flushed prior to use and advanced under fluoroscopic  guidance over the guidewire and retracted over guidewire. FINDINGS:  The left main coronary artery is grossly normal  It divides  as expected into the circumflex and left anterior descending coronary  arteries. There is ANNA grade 3 flow in the left main coronary artery. The LAD in its proximal aspect, narrows to about 50%, it is a fairly  large artery.   Her septal interventional procedure as I established ANNA-3  blood flow once again. PLAN:  Hydration, bedrest.  The patient will be referred with coronary  artery bypass surgery. She requires ICU management. I am going to  continue the Aggrastat. No Plavix will be given. The heparin drip will  be stopped. I spoke personally with Dr. Yaritza Montes and formal  cardiothoracic surgery consult will be performed in the morning. She is  critically ill. I have described the findings to the family, her  daughters in particular. Radha Peters MD    D: 06/11/2019 19:01:29       T: 06/11/2019 20:43:57     [email or web address]  Job#: [id number]     Doc#: [id number]    CC:  Susan Shetty.  MD Araceli Novoa MD Spontaneous, unlabored and symmetrical

## 2025-01-01 ENCOUNTER — APPOINTMENT (OUTPATIENT)
Dept: GENERAL RADIOLOGY | Age: 70
DRG: 814 | End: 2025-01-01
Attending: STUDENT IN AN ORGANIZED HEALTH CARE EDUCATION/TRAINING PROGRAM
Payer: MEDICARE

## 2025-01-01 ENCOUNTER — APPOINTMENT (OUTPATIENT)
Dept: ENDOSCOPY | Age: 70
DRG: 814 | End: 2025-01-01
Attending: INTERNAL MEDICINE
Payer: MEDICARE

## 2025-01-01 ENCOUNTER — APPOINTMENT (OUTPATIENT)
Dept: CT IMAGING | Age: 70
DRG: 814 | End: 2025-01-01
Attending: STUDENT IN AN ORGANIZED HEALTH CARE EDUCATION/TRAINING PROGRAM
Payer: MEDICARE

## 2025-01-01 ENCOUNTER — HOSPITAL ENCOUNTER (INPATIENT)
Dept: VASCULAR LAB | Age: 70
Discharge: HOME | DRG: 814 | End: 2025-03-01
Attending: STUDENT IN AN ORGANIZED HEALTH CARE EDUCATION/TRAINING PROGRAM
Payer: MEDICARE

## 2025-01-01 ENCOUNTER — HOSPITAL ENCOUNTER (INPATIENT)
Age: 70
LOS: 31 days | DRG: 814 | End: 2025-02-28
Attending: STUDENT IN AN ORGANIZED HEALTH CARE EDUCATION/TRAINING PROGRAM | Admitting: STUDENT IN AN ORGANIZED HEALTH CARE EDUCATION/TRAINING PROGRAM
Payer: MEDICARE

## 2025-01-01 VITALS
HEIGHT: 62 IN | OXYGEN SATURATION: 73 % | DIASTOLIC BLOOD PRESSURE: 67 MMHG | SYSTOLIC BLOOD PRESSURE: 108 MMHG | TEMPERATURE: 98.3 F | WEIGHT: 176.2 LBS | BODY MASS INDEX: 32.42 KG/M2

## 2025-01-01 DIAGNOSIS — N17.9 AKI (ACUTE KIDNEY INJURY): ICD-10-CM

## 2025-01-01 DIAGNOSIS — E87.6 HYPOKALEMIA: ICD-10-CM

## 2025-01-01 DIAGNOSIS — C90.00 MULTIPLE MYELOMA (HCC): Primary | ICD-10-CM

## 2025-01-01 DIAGNOSIS — R19.7 DIARRHEA, UNSPECIFIED TYPE: ICD-10-CM

## 2025-01-01 DIAGNOSIS — E87.20 ACIDOSIS: ICD-10-CM

## 2025-01-01 DIAGNOSIS — E87.8 ELECTROLYTE IMBALANCE: ICD-10-CM

## 2025-01-01 LAB
25(OH)D3 SERPL-MCNC: 18.2 NG/ML
ABO + RH BLD: NORMAL
ALBUMIN SERPL ELPH-MCNC: 1.9 G/DL (ref 3.1–4.9)
ALBUMIN SERPL-MCNC: 2.2 G/DL (ref 3.4–5)
ALBUMIN SERPL-MCNC: 2.2 G/DL (ref 3.4–5)
ALBUMIN SERPL-MCNC: 2.5 G/DL (ref 3.4–5)
ALBUMIN SERPL-MCNC: 2.5 G/DL (ref 3.4–5)
ALBUMIN SERPL-MCNC: 2.6 G/DL (ref 3.4–5)
ALBUMIN SERPL-MCNC: 2.7 G/DL (ref 3.4–5)
ALBUMIN SERPL-MCNC: 2.7 G/DL (ref 3.4–5)
ALBUMIN SERPL-MCNC: 2.8 G/DL (ref 3.4–5)
ALBUMIN SERPL-MCNC: 2.9 G/DL (ref 3.4–5)
ALBUMIN SERPL-MCNC: 2.9 G/DL (ref 3.4–5)
ALBUMIN SERPL-MCNC: 3 G/DL (ref 3.4–5)
ALBUMIN/GLOB SERPL: 1.8 {RATIO} (ref 1.1–2.2)
ALP SERPL-CCNC: 40 U/L (ref 40–129)
ALP SERPL-CCNC: 41 U/L (ref 40–129)
ALP SERPL-CCNC: 41 U/L (ref 40–129)
ALP SERPL-CCNC: 44 U/L (ref 40–129)
ALP SERPL-CCNC: 44 U/L (ref 40–129)
ALP SERPL-CCNC: 45 U/L (ref 40–129)
ALP SERPL-CCNC: 46 U/L (ref 40–129)
ALP SERPL-CCNC: 51 U/L (ref 40–129)
ALP SERPL-CCNC: 65 U/L (ref 40–129)
ALP SERPL-CCNC: 73 U/L (ref 40–129)
ALP SERPL-CCNC: 81 U/L (ref 40–129)
ALP SERPL-CCNC: 88 U/L (ref 40–129)
ALPHA1 GLOB SERPL ELPH-MCNC: 0.2 G/DL (ref 0.2–0.4)
ALPHA2 GLOB SERPL ELPH-MCNC: 0.8 G/DL (ref 0.4–1.1)
ALT SERPL-CCNC: 160 U/L (ref 10–40)
ALT SERPL-CCNC: 22 U/L (ref 10–40)
ALT SERPL-CCNC: 23 U/L (ref 10–40)
ALT SERPL-CCNC: 26 U/L (ref 10–40)
ALT SERPL-CCNC: 27 U/L (ref 10–40)
ALT SERPL-CCNC: 27 U/L (ref 10–40)
ALT SERPL-CCNC: 33 U/L (ref 10–40)
ALT SERPL-CCNC: 35 U/L (ref 10–40)
ALT SERPL-CCNC: 36 U/L (ref 10–40)
ALT SERPL-CCNC: 37 U/L (ref 10–40)
ALT SERPL-CCNC: 42 U/L (ref 10–40)
ALT SERPL-CCNC: 43 U/L (ref 10–40)
ALT SERPL-CCNC: 46 U/L (ref 10–40)
ALT SERPL-CCNC: 47 U/L (ref 10–40)
ALT SERPL-CCNC: 48 U/L (ref 10–40)
ANION GAP SERPL CALCULATED.3IONS-SCNC: 10 MMOL/L (ref 3–16)
ANION GAP SERPL CALCULATED.3IONS-SCNC: 11 MMOL/L (ref 3–16)
ANION GAP SERPL CALCULATED.3IONS-SCNC: 12 MMOL/L (ref 3–16)
ANION GAP SERPL CALCULATED.3IONS-SCNC: 13 MMOL/L (ref 3–16)
ANION GAP SERPL CALCULATED.3IONS-SCNC: 16 MMOL/L (ref 3–16)
ANION GAP SERPL CALCULATED.3IONS-SCNC: 22 MMOL/L (ref 3–16)
ANION GAP SERPL CALCULATED.3IONS-SCNC: 22 MMOL/L (ref 3–16)
ANION GAP SERPL CALCULATED.3IONS-SCNC: 5 MMOL/L (ref 3–16)
ANION GAP SERPL CALCULATED.3IONS-SCNC: 6 MMOL/L (ref 3–16)
ANION GAP SERPL CALCULATED.3IONS-SCNC: 7 MMOL/L (ref 3–16)
ANION GAP SERPL CALCULATED.3IONS-SCNC: 8 MMOL/L (ref 3–16)
ANION GAP SERPL CALCULATED.3IONS-SCNC: 9 MMOL/L (ref 3–16)
ANISOCYTOSIS BLD QL SMEAR: ABNORMAL
ANTIBODY SCREEN: NORMAL
APTT BLD: 28.8 SEC (ref 22.1–36.4)
APTT BLD: 30.4 SEC (ref 22.1–36.4)
APTT BLD: 30.6 SEC (ref 22.1–36.4)
APTT BLD: 30.6 SEC (ref 22.1–36.4)
APTT BLD: 31.5 SEC (ref 22.1–36.4)
APTT BLD: 31.5 SEC (ref 22.1–36.4)
APTT BLD: 31.9 SEC (ref 22.1–36.4)
APTT BLD: 32.2 SEC (ref 22.1–36.4)
APTT BLD: 33.5 SEC (ref 22.1–36.4)
APTT BLD: 38.2 SEC (ref 22.1–36.4)
APTT BLD: 43.8 SEC (ref 22.1–36.4)
AST SERPL-CCNC: 276 U/L (ref 15–37)
AST SERPL-CCNC: 28 U/L (ref 15–37)
AST SERPL-CCNC: 28 U/L (ref 15–37)
AST SERPL-CCNC: 29 U/L (ref 15–37)
AST SERPL-CCNC: 30 U/L (ref 15–37)
AST SERPL-CCNC: 31 U/L (ref 15–37)
AST SERPL-CCNC: 31 U/L (ref 15–37)
AST SERPL-CCNC: 32 U/L (ref 15–37)
AST SERPL-CCNC: 32 U/L (ref 15–37)
AST SERPL-CCNC: 34 U/L (ref 15–37)
AST SERPL-CCNC: 37 U/L (ref 15–37)
AST SERPL-CCNC: 39 U/L (ref 15–37)
AST SERPL-CCNC: 40 U/L (ref 15–37)
AST SERPL-CCNC: 41 U/L (ref 15–37)
AST SERPL-CCNC: 68 U/L (ref 15–37)
B-GLOBULIN SERPL ELPH-MCNC: 0.5 G/DL (ref 0.9–1.6)
BACTERIA UR CULT: ABNORMAL
BACTERIA URNS QL MICRO: ABNORMAL /HPF
BASE EXCESS BLDA CALC-SCNC: -12 MMOL/L (ref -3–3)
BASE EXCESS BLDA CALC-SCNC: -8 MMOL/L (ref -3–3)
BASE EXCESS BLDA CALC-SCNC: 0 MMOL/L (ref -3–3)
BASOPHILS # BLD: 0 K/UL (ref 0–0.2)
BASOPHILS # BLD: 0.1 K/UL (ref 0–0.2)
BASOPHILS # BLD: 0.1 K/UL (ref 0–0.2)
BASOPHILS NFR BLD: 0 %
BASOPHILS NFR BLD: 0.2 %
BASOPHILS NFR BLD: 0.3 %
BASOPHILS NFR BLD: 0.4 %
BASOPHILS NFR BLD: 0.4 %
BASOPHILS NFR BLD: 0.5 %
BASOPHILS NFR BLD: 0.5 %
BASOPHILS NFR BLD: 0.6 %
BASOPHILS NFR BLD: 0.7 %
BASOPHILS NFR BLD: 0.7 %
BASOPHILS NFR BLD: 0.8 %
BASOPHILS NFR BLD: 0.9 %
BASOPHILS NFR BLD: 1.1 %
BASOPHILS NFR BLD: 1.3 %
BILIRUB DIRECT SERPL-MCNC: 0.2 MG/DL (ref 0–0.3)
BILIRUB DIRECT SERPL-MCNC: 0.3 MG/DL (ref 0–0.3)
BILIRUB DIRECT SERPL-MCNC: 0.3 MG/DL (ref 0–0.3)
BILIRUB DIRECT SERPL-MCNC: 0.4 MG/DL (ref 0–0.3)
BILIRUB DIRECT SERPL-MCNC: 1.1 MG/DL (ref 0–0.3)
BILIRUB DIRECT SERPL-MCNC: <0.1 MG/DL (ref 0–0.3)
BILIRUB INDIRECT SERPL-MCNC: 0.1 MG/DL (ref 0–1)
BILIRUB INDIRECT SERPL-MCNC: 0.2 MG/DL (ref 0–1)
BILIRUB INDIRECT SERPL-MCNC: 0.4 MG/DL (ref 0–1)
BILIRUB INDIRECT SERPL-MCNC: 0.5 MG/DL (ref 0–1)
BILIRUB INDIRECT SERPL-MCNC: ABNORMAL MG/DL (ref 0–1)
BILIRUB INDIRECT SERPL-MCNC: ABNORMAL MG/DL (ref 0–1)
BILIRUB SERPL-MCNC: 0.3 MG/DL (ref 0–1)
BILIRUB SERPL-MCNC: 0.4 MG/DL (ref 0–1)
BILIRUB SERPL-MCNC: 0.5 MG/DL (ref 0–1)
BILIRUB SERPL-MCNC: 0.5 MG/DL (ref 0–1)
BILIRUB SERPL-MCNC: 0.8 MG/DL (ref 0–1)
BILIRUB SERPL-MCNC: 1.6 MG/DL (ref 0–1)
BILIRUB SERPL-MCNC: 1.8 MG/DL (ref 0–1)
BILIRUB SERPL-MCNC: <0.2 MG/DL (ref 0–1)
BILIRUB SERPL-MCNC: <0.2 MG/DL (ref 0–1)
BILIRUB UR QL STRIP.AUTO: ABNORMAL
BILIRUB UR QL STRIP.AUTO: NEGATIVE
BLD GP AB SCN SERPL QL: NORMAL
BLD GP AB SCN SERPL QL: NORMAL
BLOOD BANK DISPENSE STATUS: NORMAL
BLOOD BANK PRODUCT CODE: NORMAL
BPU ID: NORMAL
BUN SERPL-MCNC: 10 MG/DL (ref 7–20)
BUN SERPL-MCNC: 11 MG/DL (ref 7–20)
BUN SERPL-MCNC: 12 MG/DL (ref 7–20)
BUN SERPL-MCNC: 13 MG/DL (ref 7–20)
BUN SERPL-MCNC: 15 MG/DL (ref 7–20)
BUN SERPL-MCNC: 16 MG/DL (ref 7–20)
BUN SERPL-MCNC: 17 MG/DL (ref 7–20)
BUN SERPL-MCNC: 22 MG/DL (ref 7–20)
BUN SERPL-MCNC: 24 MG/DL (ref 7–20)
BUN SERPL-MCNC: 24 MG/DL (ref 7–20)
BUN SERPL-MCNC: 25 MG/DL (ref 7–20)
BUN SERPL-MCNC: 3 MG/DL (ref 7–20)
BUN SERPL-MCNC: 4 MG/DL (ref 7–20)
BUN SERPL-MCNC: 5 MG/DL (ref 7–20)
BUN SERPL-MCNC: 6 MG/DL (ref 7–20)
BUN SERPL-MCNC: 8 MG/DL (ref 7–20)
BUN SERPL-MCNC: 8 MG/DL (ref 7–20)
BUN SERPL-MCNC: 9 MG/DL (ref 7–20)
BUN SERPL-MCNC: <2 MG/DL (ref 7–20)
BURR CELLS BLD QL SMEAR: ABNORMAL
C DIFF TOX A+B STL QL IA: NORMAL
C DIFF TOX A+B STL QL IA: NORMAL
CA-I BLD-SCNC: 0.89 MMOL/L (ref 1.12–1.32)
CA-I BLD-SCNC: 0.89 MMOL/L (ref 1.12–1.32)
CALCIUM SERPL-MCNC: 6.3 MG/DL (ref 8.3–10.6)
CALCIUM SERPL-MCNC: 6.4 MG/DL (ref 8.3–10.6)
CALCIUM SERPL-MCNC: 6.4 MG/DL (ref 8.3–10.6)
CALCIUM SERPL-MCNC: 6.5 MG/DL (ref 8.3–10.6)
CALCIUM SERPL-MCNC: 6.6 MG/DL (ref 8.3–10.6)
CALCIUM SERPL-MCNC: 6.6 MG/DL (ref 8.3–10.6)
CALCIUM SERPL-MCNC: 6.8 MG/DL (ref 8.3–10.6)
CALCIUM SERPL-MCNC: 6.8 MG/DL (ref 8.3–10.6)
CALCIUM SERPL-MCNC: 6.9 MG/DL (ref 8.3–10.6)
CALCIUM SERPL-MCNC: 6.9 MG/DL (ref 8.3–10.6)
CALCIUM SERPL-MCNC: 7 MG/DL (ref 8.3–10.6)
CALCIUM SERPL-MCNC: 7.1 MG/DL (ref 8.3–10.6)
CALCIUM SERPL-MCNC: 7.2 MG/DL (ref 8.3–10.6)
CALCIUM SERPL-MCNC: 7.3 MG/DL (ref 8.3–10.6)
CALCIUM SERPL-MCNC: 7.4 MG/DL (ref 8.3–10.6)
CALCIUM SERPL-MCNC: 7.5 MG/DL (ref 8.3–10.6)
CALCIUM SERPL-MCNC: 7.6 MG/DL (ref 8.3–10.6)
CALCIUM SERPL-MCNC: 7.6 MG/DL (ref 8.3–10.6)
CALCIUM SERPL-MCNC: 7.7 MG/DL (ref 8.3–10.6)
CALCIUM SERPL-MCNC: 7.8 MG/DL (ref 8.3–10.6)
CALCIUM SERPL-MCNC: 7.8 MG/DL (ref 8.3–10.6)
CALCIUM SERPL-MCNC: 7.9 MG/DL (ref 8.3–10.6)
CALCIUM SERPL-MCNC: 8 MG/DL (ref 8.3–10.6)
CALCIUM SERPL-MCNC: 8.1 MG/DL (ref 8.3–10.6)
CALCIUM SERPL-MCNC: 8.1 MG/DL (ref 8.3–10.6)
CHLORIDE SERPL-SCNC: 102 MMOL/L (ref 99–110)
CHLORIDE SERPL-SCNC: 103 MMOL/L (ref 99–110)
CHLORIDE SERPL-SCNC: 103 MMOL/L (ref 99–110)
CHLORIDE SERPL-SCNC: 104 MMOL/L (ref 99–110)
CHLORIDE SERPL-SCNC: 105 MMOL/L (ref 99–110)
CHLORIDE SERPL-SCNC: 106 MMOL/L (ref 99–110)
CHLORIDE SERPL-SCNC: 107 MMOL/L (ref 99–110)
CHLORIDE SERPL-SCNC: 108 MMOL/L (ref 99–110)
CHLORIDE SERPL-SCNC: 109 MMOL/L (ref 99–110)
CHLORIDE SERPL-SCNC: 110 MMOL/L (ref 99–110)
CHLORIDE SERPL-SCNC: 112 MMOL/L (ref 99–110)
CHLORIDE SERPL-SCNC: 113 MMOL/L (ref 99–110)
CHLORIDE SERPL-SCNC: 114 MMOL/L (ref 99–110)
CHLORIDE SERPL-SCNC: 115 MMOL/L (ref 99–110)
CHLORIDE SERPL-SCNC: 116 MMOL/L (ref 99–110)
CHLORIDE SERPL-SCNC: 117 MMOL/L (ref 99–110)
CHLORIDE SERPL-SCNC: 118 MMOL/L (ref 99–110)
CHLORIDE SERPL-SCNC: 118 MMOL/L (ref 99–110)
CHLORIDE SERPL-SCNC: 119 MMOL/L (ref 99–110)
CHLORIDE SERPL-SCNC: 120 MMOL/L (ref 99–110)
CHLORIDE SERPL-SCNC: 120 MMOL/L (ref 99–110)
CHLORIDE SERPL-SCNC: 122 MMOL/L (ref 99–110)
CHLORIDE SERPL-SCNC: 123 MMOL/L (ref 99–110)
CHLORIDE SERPL-SCNC: 123 MMOL/L (ref 99–110)
CLARITY UR: CLEAR
CMV DNA SERPL NAA+PROBE-ACNC: 171 IU/ML
CMV DNA SERPL NAA+PROBE-ACNC: 450 IU/ML
CMV DNA SERPL NAA+PROBE-ACNC: 60 IU/ML
CMV DNA SERPL NAA+PROBE-ACNC: ABNORMAL IU/ML
CMV DNA SERPL NAA+PROBE-ACNC: ABNORMAL IU/ML
CMV DNA SERPL NAA+PROBE-LOG IU: 1.78 LOG IU/ML
CMV DNA SERPL NAA+PROBE-LOG IU: 2.23 LOG IU/ML
CMV DNA SERPL NAA+PROBE-LOG IU: 2.65 LOG IU/ML
CMV DNA SERPL NAA+PROBE-LOG IU: ABNORMAL LOG IU/ML
CMV DNA SERPL NAA+PROBE-LOG IU: ABNORMAL LOG IU/ML
CMV DNA SERPL QL NAA+PROBE: DETECTED
CO2 BLDA-SCNC: 14 MMOL/L
CO2 BLDA-SCNC: 19 MMOL/L
CO2 BLDA-SCNC: 24 MMOL/L
CO2 SERPL-SCNC: 15 MMOL/L (ref 21–32)
CO2 SERPL-SCNC: 16 MMOL/L (ref 21–32)
CO2 SERPL-SCNC: 17 MMOL/L (ref 21–32)
CO2 SERPL-SCNC: 18 MMOL/L (ref 21–32)
CO2 SERPL-SCNC: 18 MMOL/L (ref 21–32)
CO2 SERPL-SCNC: 19 MMOL/L (ref 21–32)
CO2 SERPL-SCNC: 20 MMOL/L (ref 21–32)
CO2 SERPL-SCNC: 21 MMOL/L (ref 21–32)
CO2 SERPL-SCNC: 22 MMOL/L (ref 21–32)
CO2 SERPL-SCNC: 23 MMOL/L (ref 21–32)
CO2 SERPL-SCNC: 24 MMOL/L (ref 21–32)
CO2 SERPL-SCNC: 25 MMOL/L (ref 21–32)
CO2 SERPL-SCNC: 26 MMOL/L (ref 21–32)
CO2 SERPL-SCNC: 26 MMOL/L (ref 21–32)
CO2 SERPL-SCNC: 27 MMOL/L (ref 21–32)
CO2 SERPL-SCNC: 28 MMOL/L (ref 21–32)
CO2 SERPL-SCNC: 29 MMOL/L (ref 21–32)
CO2 SERPL-SCNC: 30 MMOL/L (ref 21–32)
CO2 SERPL-SCNC: 31 MMOL/L (ref 21–32)
CO2 SERPL-SCNC: 32 MMOL/L (ref 21–32)
CO2 SERPL-SCNC: 33 MMOL/L (ref 21–32)
COARSE GRAN CASTS #/AREA URNS LPF: ABNORMAL /LPF (ref 0–2)
COLOR UR: YELLOW
CREAT SERPL-MCNC: 0.7 MG/DL (ref 0.6–1.2)
CREAT SERPL-MCNC: 0.8 MG/DL (ref 0.6–1.2)
CREAT SERPL-MCNC: 0.9 MG/DL (ref 0.6–1.2)
CREAT SERPL-MCNC: 1 MG/DL (ref 0.6–1.2)
CREAT SERPL-MCNC: 1.1 MG/DL (ref 0.6–1.2)
CREAT SERPL-MCNC: 1.1 MG/DL (ref 0.6–1.2)
CREAT SERPL-MCNC: 1.2 MG/DL (ref 0.6–1.2)
CREAT SERPL-MCNC: 1.7 MG/DL (ref 0.6–1.2)
CREAT SERPL-MCNC: 2 MG/DL (ref 0.6–1.2)
CREAT SERPL-MCNC: 2.2 MG/DL (ref 0.6–1.2)
CREAT SERPL-MCNC: 2.3 MG/DL (ref 0.6–1.2)
DACRYOCYTES BLD QL SMEAR: ABNORMAL
DEPRECATED RDW RBC AUTO: 17.6 % (ref 12.4–15.4)
DEPRECATED RDW RBC AUTO: 17.7 % (ref 12.4–15.4)
DEPRECATED RDW RBC AUTO: 17.8 % (ref 12.4–15.4)
DEPRECATED RDW RBC AUTO: 17.8 % (ref 12.4–15.4)
DEPRECATED RDW RBC AUTO: 17.9 % (ref 12.4–15.4)
DEPRECATED RDW RBC AUTO: 17.9 % (ref 12.4–15.4)
DEPRECATED RDW RBC AUTO: 18 % (ref 12.4–15.4)
DEPRECATED RDW RBC AUTO: 18 % (ref 12.4–15.4)
DEPRECATED RDW RBC AUTO: 18.1 % (ref 12.4–15.4)
DEPRECATED RDW RBC AUTO: 18.2 % (ref 12.4–15.4)
DEPRECATED RDW RBC AUTO: 18.2 % (ref 12.4–15.4)
DEPRECATED RDW RBC AUTO: 18.3 % (ref 12.4–15.4)
DEPRECATED RDW RBC AUTO: 18.4 % (ref 12.4–15.4)
DEPRECATED RDW RBC AUTO: 18.5 % (ref 12.4–15.4)
DEPRECATED RDW RBC AUTO: 18.5 % (ref 12.4–15.4)
DEPRECATED RDW RBC AUTO: 18.6 % (ref 12.4–15.4)
DEPRECATED RDW RBC AUTO: 18.7 % (ref 12.4–15.4)
DEPRECATED RDW RBC AUTO: 18.7 % (ref 12.4–15.4)
DEPRECATED RDW RBC AUTO: 18.8 % (ref 12.4–15.4)
DEPRECATED RDW RBC AUTO: 18.8 % (ref 12.4–15.4)
DEPRECATED RDW RBC AUTO: 18.9 % (ref 12.4–15.4)
DEPRECATED RDW RBC AUTO: 19.1 % (ref 12.4–15.4)
DESCRIPTION BLOOD BANK: NORMAL
DOHLE BOD BLD QL SMEAR: PRESENT
ECHO BSA: 1.83 M2
EKG ATRIAL RATE: 52 BPM
EKG DIAGNOSIS: NORMAL
EKG P AXIS: 58 DEGREES
EKG P-R INTERVAL: 150 MS
EKG Q-T INTERVAL: 458 MS
EKG QRS DURATION: 98 MS
EKG QTC CALCULATION (BAZETT): 425 MS
EKG R AXIS: -21 DEGREES
EKG T AXIS: 45 DEGREES
EKG VENTRICULAR RATE: 52 BPM
EOSINOPHIL # BLD: 0 K/UL (ref 0–0.6)
EOSINOPHIL NFR BLD: 0 %
EOSINOPHIL NFR BLD: 0.2 %
EOSINOPHIL NFR BLD: 0.2 %
EPI CELLS #/AREA URNS HPF: ABNORMAL /HPF (ref 0–5)
FAT STL QL: NORMAL
FERRITIN SERPL IA-MCNC: 467 NG/ML (ref 15–150)
FIBRINOGEN PPP-MCNC: 100 MG/DL (ref 227–534)
FIBRINOGEN PPP-MCNC: 101 MG/DL (ref 227–534)
FIBRINOGEN PPP-MCNC: 112 MG/DL (ref 227–534)
FIBRINOGEN PPP-MCNC: 113 MG/DL (ref 227–534)
FIBRINOGEN PPP-MCNC: 113 MG/DL (ref 227–534)
FIBRINOGEN PPP-MCNC: 115 MG/DL (ref 227–534)
FIBRINOGEN PPP-MCNC: 121 MG/DL (ref 227–534)
FIBRINOGEN PPP-MCNC: 124 MG/DL (ref 227–534)
FIBRINOGEN PPP-MCNC: 124 MG/DL (ref 227–534)
FIBRINOGEN PPP-MCNC: 126 MG/DL (ref 227–534)
FIBRINOGEN PPP-MCNC: 126 MG/DL (ref 227–534)
FIBRINOGEN PPP-MCNC: 127 MG/DL (ref 227–534)
FIBRINOGEN PPP-MCNC: 134 MG/DL (ref 227–534)
FIBRINOGEN PPP-MCNC: 139 MG/DL (ref 227–534)
FIBRINOGEN PPP-MCNC: 140 MG/DL (ref 227–534)
FIBRINOGEN PPP-MCNC: 147 MG/DL (ref 227–534)
FIBRINOGEN PPP-MCNC: 150 MG/DL (ref 227–534)
FIBRINOGEN PPP-MCNC: 152 MG/DL (ref 227–534)
FIBRINOGEN PPP-MCNC: 153 MG/DL (ref 227–534)
FIBRINOGEN PPP-MCNC: 155 MG/DL (ref 227–534)
FIBRINOGEN PPP-MCNC: 155 MG/DL (ref 227–534)
FIBRINOGEN PPP-MCNC: 156 MG/DL (ref 227–534)
FIBRINOGEN PPP-MCNC: 160 MG/DL (ref 227–534)
FIBRINOGEN PPP-MCNC: 195 MG/DL (ref 227–534)
FIBRINOGEN PPP-MCNC: 218 MG/DL (ref 227–534)
FIBRINOGEN PPP-MCNC: 227 MG/DL (ref 227–534)
FIBRINOGEN PPP-MCNC: 259 MG/DL (ref 227–534)
FIBRINOGEN PPP-MCNC: 264 MG/DL (ref 227–534)
FIBRINOGEN PPP-MCNC: 292 MG/DL (ref 227–534)
FIBRINOGEN PPP-MCNC: 311 MG/DL (ref 227–534)
FIBRINOGEN PPP-MCNC: 351 MG/DL (ref 227–534)
FIBRINOGEN PPP-MCNC: 84 MG/DL (ref 227–534)
GAMMA GLOB SERPL ELPH-MCNC: 0.5 G/DL (ref 0.6–1.8)
GFR SERPLBLD CREATININE-BSD FMLA CKD-EPI: 23 ML/MIN/{1.73_M2}
GFR SERPLBLD CREATININE-BSD FMLA CKD-EPI: 24 ML/MIN/{1.73_M2}
GFR SERPLBLD CREATININE-BSD FMLA CKD-EPI: 27 ML/MIN/{1.73_M2}
GFR SERPLBLD CREATININE-BSD FMLA CKD-EPI: 33 ML/MIN/{1.73_M2}
GFR SERPLBLD CREATININE-BSD FMLA CKD-EPI: 49 ML/MIN/{1.73_M2}
GFR SERPLBLD CREATININE-BSD FMLA CKD-EPI: 55 ML/MIN/{1.73_M2}
GFR SERPLBLD CREATININE-BSD FMLA CKD-EPI: 55 ML/MIN/{1.73_M2}
GFR SERPLBLD CREATININE-BSD FMLA CKD-EPI: 62 ML/MIN/{1.73_M2}
GFR SERPLBLD CREATININE-BSD FMLA CKD-EPI: 70 ML/MIN/{1.73_M2}
GFR SERPLBLD CREATININE-BSD FMLA CKD-EPI: 80 ML/MIN/{1.73_M2}
GFR SERPLBLD CREATININE-BSD FMLA CKD-EPI: >90 ML/MIN/{1.73_M2}
GI PATHOGENS PNL STL NAA+PROBE: NORMAL
GI PATHOGENS PNL STL NAA+PROBE: NORMAL
GLUCOSE BLD-MCNC: 100 MG/DL (ref 70–99)
GLUCOSE BLD-MCNC: 100 MG/DL (ref 70–99)
GLUCOSE BLD-MCNC: 101 MG/DL (ref 70–99)
GLUCOSE BLD-MCNC: 103 MG/DL (ref 70–99)
GLUCOSE BLD-MCNC: 104 MG/DL (ref 70–99)
GLUCOSE BLD-MCNC: 105 MG/DL (ref 70–99)
GLUCOSE BLD-MCNC: 106 MG/DL (ref 70–99)
GLUCOSE BLD-MCNC: 108 MG/DL (ref 70–99)
GLUCOSE BLD-MCNC: 110 MG/DL (ref 70–99)
GLUCOSE BLD-MCNC: 111 MG/DL (ref 70–99)
GLUCOSE BLD-MCNC: 112 MG/DL (ref 70–99)
GLUCOSE BLD-MCNC: 112 MG/DL (ref 70–99)
GLUCOSE BLD-MCNC: 114 MG/DL (ref 70–99)
GLUCOSE BLD-MCNC: 115 MG/DL (ref 70–99)
GLUCOSE BLD-MCNC: 116 MG/DL (ref 70–99)
GLUCOSE BLD-MCNC: 117 MG/DL (ref 70–99)
GLUCOSE BLD-MCNC: 117 MG/DL (ref 70–99)
GLUCOSE BLD-MCNC: 119 MG/DL (ref 70–99)
GLUCOSE BLD-MCNC: 120 MG/DL (ref 70–99)
GLUCOSE BLD-MCNC: 121 MG/DL (ref 70–99)
GLUCOSE BLD-MCNC: 123 MG/DL (ref 70–99)
GLUCOSE BLD-MCNC: 124 MG/DL (ref 70–99)
GLUCOSE BLD-MCNC: 124 MG/DL (ref 70–99)
GLUCOSE BLD-MCNC: 125 MG/DL (ref 70–99)
GLUCOSE BLD-MCNC: 127 MG/DL (ref 70–99)
GLUCOSE BLD-MCNC: 127 MG/DL (ref 70–99)
GLUCOSE BLD-MCNC: 128 MG/DL (ref 70–99)
GLUCOSE BLD-MCNC: 129 MG/DL (ref 70–99)
GLUCOSE BLD-MCNC: 130 MG/DL (ref 70–99)
GLUCOSE BLD-MCNC: 132 MG/DL (ref 70–99)
GLUCOSE BLD-MCNC: 133 MG/DL (ref 70–99)
GLUCOSE BLD-MCNC: 133 MG/DL (ref 70–99)
GLUCOSE BLD-MCNC: 134 MG/DL (ref 70–99)
GLUCOSE BLD-MCNC: 134 MG/DL (ref 70–99)
GLUCOSE BLD-MCNC: 136 MG/DL (ref 70–99)
GLUCOSE BLD-MCNC: 137 MG/DL (ref 70–99)
GLUCOSE BLD-MCNC: 138 MG/DL (ref 70–99)
GLUCOSE BLD-MCNC: 138 MG/DL (ref 70–99)
GLUCOSE BLD-MCNC: 140 MG/DL (ref 70–99)
GLUCOSE BLD-MCNC: 141 MG/DL (ref 70–99)
GLUCOSE BLD-MCNC: 142 MG/DL (ref 70–99)
GLUCOSE BLD-MCNC: 144 MG/DL (ref 70–99)
GLUCOSE BLD-MCNC: 144 MG/DL (ref 70–99)
GLUCOSE BLD-MCNC: 145 MG/DL (ref 70–99)
GLUCOSE BLD-MCNC: 145 MG/DL (ref 70–99)
GLUCOSE BLD-MCNC: 147 MG/DL (ref 70–99)
GLUCOSE BLD-MCNC: 147 MG/DL (ref 70–99)
GLUCOSE BLD-MCNC: 149 MG/DL (ref 70–99)
GLUCOSE BLD-MCNC: 150 MG/DL (ref 70–99)
GLUCOSE BLD-MCNC: 152 MG/DL (ref 70–99)
GLUCOSE BLD-MCNC: 153 MG/DL (ref 70–99)
GLUCOSE BLD-MCNC: 153 MG/DL (ref 70–99)
GLUCOSE BLD-MCNC: 154 MG/DL (ref 70–99)
GLUCOSE BLD-MCNC: 155 MG/DL (ref 70–99)
GLUCOSE BLD-MCNC: 156 MG/DL (ref 70–99)
GLUCOSE BLD-MCNC: 159 MG/DL (ref 70–99)
GLUCOSE BLD-MCNC: 159 MG/DL (ref 70–99)
GLUCOSE BLD-MCNC: 16 MG/DL (ref 70–99)
GLUCOSE BLD-MCNC: 160 MG/DL (ref 70–99)
GLUCOSE BLD-MCNC: 160 MG/DL (ref 70–99)
GLUCOSE BLD-MCNC: 161 MG/DL (ref 70–99)
GLUCOSE BLD-MCNC: 161 MG/DL (ref 70–99)
GLUCOSE BLD-MCNC: 162 MG/DL (ref 70–99)
GLUCOSE BLD-MCNC: 163 MG/DL (ref 70–99)
GLUCOSE BLD-MCNC: 166 MG/DL (ref 70–99)
GLUCOSE BLD-MCNC: 166 MG/DL (ref 70–99)
GLUCOSE BLD-MCNC: 168 MG/DL (ref 70–99)
GLUCOSE BLD-MCNC: 169 MG/DL (ref 70–99)
GLUCOSE BLD-MCNC: 169 MG/DL (ref 70–99)
GLUCOSE BLD-MCNC: 171 MG/DL (ref 70–99)
GLUCOSE BLD-MCNC: 175 MG/DL (ref 70–99)
GLUCOSE BLD-MCNC: 178 MG/DL (ref 70–99)
GLUCOSE BLD-MCNC: 178 MG/DL (ref 70–99)
GLUCOSE BLD-MCNC: 179 MG/DL (ref 70–99)
GLUCOSE BLD-MCNC: 182 MG/DL (ref 70–99)
GLUCOSE BLD-MCNC: 182 MG/DL (ref 70–99)
GLUCOSE BLD-MCNC: 183 MG/DL (ref 70–99)
GLUCOSE BLD-MCNC: 185 MG/DL (ref 70–99)
GLUCOSE BLD-MCNC: 186 MG/DL (ref 70–99)
GLUCOSE BLD-MCNC: 186 MG/DL (ref 70–99)
GLUCOSE BLD-MCNC: 187 MG/DL (ref 70–99)
GLUCOSE BLD-MCNC: 190 MG/DL (ref 70–99)
GLUCOSE BLD-MCNC: 192 MG/DL (ref 70–99)
GLUCOSE BLD-MCNC: 198 MG/DL (ref 70–99)
GLUCOSE BLD-MCNC: 201 MG/DL (ref 70–99)
GLUCOSE BLD-MCNC: 202 MG/DL (ref 70–99)
GLUCOSE BLD-MCNC: 208 MG/DL (ref 70–99)
GLUCOSE BLD-MCNC: 209 MG/DL (ref 70–99)
GLUCOSE BLD-MCNC: 210 MG/DL (ref 70–99)
GLUCOSE BLD-MCNC: 219 MG/DL (ref 70–99)
GLUCOSE BLD-MCNC: 224 MG/DL (ref 70–99)
GLUCOSE BLD-MCNC: 231 MG/DL (ref 70–99)
GLUCOSE BLD-MCNC: 302 MG/DL (ref 70–99)
GLUCOSE BLD-MCNC: 43 MG/DL (ref 70–99)
GLUCOSE BLD-MCNC: 76 MG/DL (ref 70–99)
GLUCOSE BLD-MCNC: 76 MG/DL (ref 70–99)
GLUCOSE BLD-MCNC: 77 MG/DL (ref 70–99)
GLUCOSE BLD-MCNC: 80 MG/DL (ref 70–99)
GLUCOSE BLD-MCNC: 81 MG/DL (ref 70–99)
GLUCOSE BLD-MCNC: 85 MG/DL (ref 70–99)
GLUCOSE BLD-MCNC: 90 MG/DL (ref 70–99)
GLUCOSE BLD-MCNC: 94 MG/DL (ref 70–99)
GLUCOSE BLD-MCNC: 97 MG/DL (ref 70–99)
GLUCOSE BLD-MCNC: 99 MG/DL (ref 70–99)
GLUCOSE SERPL-MCNC: 108 MG/DL (ref 70–99)
GLUCOSE SERPL-MCNC: 117 MG/DL (ref 70–99)
GLUCOSE SERPL-MCNC: 121 MG/DL (ref 70–99)
GLUCOSE SERPL-MCNC: 123 MG/DL (ref 70–99)
GLUCOSE SERPL-MCNC: 127 MG/DL (ref 70–99)
GLUCOSE SERPL-MCNC: 128 MG/DL (ref 70–99)
GLUCOSE SERPL-MCNC: 129 MG/DL (ref 70–99)
GLUCOSE SERPL-MCNC: 130 MG/DL (ref 70–99)
GLUCOSE SERPL-MCNC: 135 MG/DL (ref 70–99)
GLUCOSE SERPL-MCNC: 141 MG/DL (ref 70–99)
GLUCOSE SERPL-MCNC: 142 MG/DL (ref 70–99)
GLUCOSE SERPL-MCNC: 142 MG/DL (ref 70–99)
GLUCOSE SERPL-MCNC: 143 MG/DL (ref 70–99)
GLUCOSE SERPL-MCNC: 147 MG/DL (ref 70–99)
GLUCOSE SERPL-MCNC: 147 MG/DL (ref 70–99)
GLUCOSE SERPL-MCNC: 149 MG/DL (ref 70–99)
GLUCOSE SERPL-MCNC: 151 MG/DL (ref 70–99)
GLUCOSE SERPL-MCNC: 156 MG/DL (ref 70–99)
GLUCOSE SERPL-MCNC: 159 MG/DL (ref 70–99)
GLUCOSE SERPL-MCNC: 159 MG/DL (ref 70–99)
GLUCOSE SERPL-MCNC: 162 MG/DL (ref 70–99)
GLUCOSE SERPL-MCNC: 164 MG/DL (ref 70–99)
GLUCOSE SERPL-MCNC: 164 MG/DL (ref 70–99)
GLUCOSE SERPL-MCNC: 166 MG/DL (ref 70–99)
GLUCOSE SERPL-MCNC: 168 MG/DL (ref 70–99)
GLUCOSE SERPL-MCNC: 168 MG/DL (ref 70–99)
GLUCOSE SERPL-MCNC: 170 MG/DL (ref 70–99)
GLUCOSE SERPL-MCNC: 170 MG/DL (ref 70–99)
GLUCOSE SERPL-MCNC: 172 MG/DL (ref 70–99)
GLUCOSE SERPL-MCNC: 176 MG/DL (ref 70–99)
GLUCOSE SERPL-MCNC: 177 MG/DL (ref 70–99)
GLUCOSE SERPL-MCNC: 180 MG/DL (ref 70–99)
GLUCOSE SERPL-MCNC: 180 MG/DL (ref 70–99)
GLUCOSE SERPL-MCNC: 185 MG/DL (ref 70–99)
GLUCOSE SERPL-MCNC: 187 MG/DL (ref 70–99)
GLUCOSE SERPL-MCNC: 187 MG/DL (ref 70–99)
GLUCOSE SERPL-MCNC: 189 MG/DL (ref 70–99)
GLUCOSE SERPL-MCNC: 191 MG/DL (ref 70–99)
GLUCOSE SERPL-MCNC: 192 MG/DL (ref 70–99)
GLUCOSE SERPL-MCNC: 193 MG/DL (ref 70–99)
GLUCOSE SERPL-MCNC: 196 MG/DL (ref 70–99)
GLUCOSE SERPL-MCNC: 197 MG/DL (ref 70–99)
GLUCOSE SERPL-MCNC: 202 MG/DL (ref 70–99)
GLUCOSE SERPL-MCNC: 204 MG/DL (ref 70–99)
GLUCOSE SERPL-MCNC: 205 MG/DL (ref 70–99)
GLUCOSE SERPL-MCNC: 208 MG/DL (ref 70–99)
GLUCOSE SERPL-MCNC: 209 MG/DL (ref 70–99)
GLUCOSE SERPL-MCNC: 210 MG/DL (ref 70–99)
GLUCOSE SERPL-MCNC: 210 MG/DL (ref 70–99)
GLUCOSE SERPL-MCNC: 218 MG/DL (ref 70–99)
GLUCOSE SERPL-MCNC: 220 MG/DL (ref 70–99)
GLUCOSE SERPL-MCNC: 221 MG/DL (ref 70–99)
GLUCOSE SERPL-MCNC: 236 MG/DL (ref 70–99)
GLUCOSE SERPL-MCNC: 240 MG/DL (ref 70–99)
GLUCOSE SERPL-MCNC: 324 MG/DL (ref 70–99)
GLUCOSE SERPL-MCNC: 69 MG/DL (ref 70–99)
GLUCOSE SERPL-MCNC: 71 MG/DL (ref 70–99)
GLUCOSE SERPL-MCNC: 83 MG/DL (ref 70–99)
GLUCOSE SERPL-MCNC: 94 MG/DL (ref 70–99)
GLUCOSE SERPL-MCNC: 97 MG/DL (ref 70–99)
GLUCOSE UR STRIP.AUTO-MCNC: 100 MG/DL
GLUCOSE UR STRIP.AUTO-MCNC: 250 MG/DL
GLUCOSE UR STRIP.AUTO-MCNC: NEGATIVE MG/DL
GLUCOSE UR STRIP.AUTO-MCNC: NEGATIVE MG/DL
HCO3 BLDA-SCNC: 12.9 MMOL/L (ref 21–29)
HCO3 BLDA-SCNC: 18.3 MMOL/L (ref 21–29)
HCO3 BLDA-SCNC: 23.1 MMOL/L (ref 21–29)
HCT VFR BLD AUTO: 16.1 % (ref 36–48)
HCT VFR BLD AUTO: 21 % (ref 36–48)
HCT VFR BLD AUTO: 22.5 % (ref 36–48)
HCT VFR BLD AUTO: 24.3 % (ref 36–48)
HCT VFR BLD AUTO: 24.6 % (ref 36–48)
HCT VFR BLD AUTO: 24.9 % (ref 36–48)
HCT VFR BLD AUTO: 25.4 % (ref 36–48)
HCT VFR BLD AUTO: 26.8 % (ref 36–48)
HCT VFR BLD AUTO: 26.9 % (ref 36–48)
HCT VFR BLD AUTO: 27.2 % (ref 36–48)
HCT VFR BLD AUTO: 27.6 % (ref 36–48)
HCT VFR BLD AUTO: 28 % (ref 36–48)
HCT VFR BLD AUTO: 28.3 % (ref 36–48)
HCT VFR BLD AUTO: 28.6 % (ref 36–48)
HCT VFR BLD AUTO: 28.6 % (ref 36–48)
HCT VFR BLD AUTO: 28.9 % (ref 36–48)
HCT VFR BLD AUTO: 29.6 % (ref 36–48)
HCT VFR BLD AUTO: 30.3 % (ref 36–48)
HCT VFR BLD AUTO: 30.7 % (ref 36–48)
HCT VFR BLD AUTO: 31.3 % (ref 36–48)
HCT VFR BLD AUTO: 31.4 % (ref 36–48)
HCT VFR BLD AUTO: 31.7 % (ref 36–48)
HCT VFR BLD AUTO: 32.1 % (ref 36–48)
HCT VFR BLD AUTO: 32.3 % (ref 36–48)
HCT VFR BLD AUTO: 33 % (ref 36–48)
HCT VFR BLD AUTO: 33.8 % (ref 36–48)
HCT VFR BLD AUTO: 34.2 % (ref 36–48)
HCT VFR BLD AUTO: 34.8 % (ref 36–48)
HCT VFR BLD AUTO: 35.1 % (ref 36–48)
HCT VFR BLD AUTO: 35.9 % (ref 36–48)
HCT VFR BLD AUTO: 36 % (ref 36–48)
HGB BLD-MCNC: 10 G/DL (ref 12–16)
HGB BLD-MCNC: 10 G/DL (ref 12–16)
HGB BLD-MCNC: 10.3 G/DL (ref 12–16)
HGB BLD-MCNC: 10.3 G/DL (ref 12–16)
HGB BLD-MCNC: 10.4 G/DL (ref 12–16)
HGB BLD-MCNC: 10.6 G/DL (ref 12–16)
HGB BLD-MCNC: 10.6 G/DL (ref 12–16)
HGB BLD-MCNC: 10.8 G/DL (ref 12–16)
HGB BLD-MCNC: 10.9 G/DL (ref 12–16)
HGB BLD-MCNC: 11 G/DL (ref 12–16)
HGB BLD-MCNC: 11.2 G/DL (ref 12–16)
HGB BLD-MCNC: 11.2 G/DL (ref 12–16)
HGB BLD-MCNC: 11.6 G/DL (ref 12–16)
HGB BLD-MCNC: 11.8 G/DL (ref 12–16)
HGB BLD-MCNC: 5 G/DL (ref 12–16)
HGB BLD-MCNC: 6.7 G/DL (ref 12–16)
HGB BLD-MCNC: 7.3 G/DL (ref 12–16)
HGB BLD-MCNC: 7.7 G/DL (ref 12–16)
HGB BLD-MCNC: 8 G/DL (ref 12–16)
HGB BLD-MCNC: 8 G/DL (ref 12–16)
HGB BLD-MCNC: 8.1 G/DL (ref 12–16)
HGB BLD-MCNC: 8.6 G/DL (ref 12–16)
HGB BLD-MCNC: 8.7 G/DL (ref 12–16)
HGB BLD-MCNC: 8.8 G/DL (ref 12–16)
HGB BLD-MCNC: 8.8 G/DL (ref 12–16)
HGB BLD-MCNC: 9 G/DL (ref 12–16)
HGB BLD-MCNC: 9.2 G/DL (ref 12–16)
HGB BLD-MCNC: 9.2 G/DL (ref 12–16)
HGB BLD-MCNC: 9.3 G/DL (ref 12–16)
HGB BLD-MCNC: 9.3 G/DL (ref 12–16)
HGB BLD-MCNC: 9.5 G/DL (ref 12–16)
HGB BLD-MCNC: 9.9 G/DL (ref 12–16)
HGB BLD-MCNC: 9.9 G/DL (ref 12–16)
HGB UR QL STRIP.AUTO: ABNORMAL
HGB UR QL STRIP.AUTO: NEGATIVE
HYALINE CASTS #/AREA URNS LPF: ABNORMAL /LPF (ref 0–2)
IGA SERPL-MCNC: <10 MG/DL (ref 70–400)
IGG SERPL-MCNC: 497 MG/DL (ref 700–1600)
IGM SERPL-MCNC: 11.6 MG/DL (ref 40–230)
INR PPP: 1.25 (ref 0.85–1.15)
INR PPP: 1.31 (ref 0.85–1.15)
INR PPP: 1.33 (ref 0.85–1.15)
INR PPP: 1.33 (ref 0.85–1.15)
INR PPP: 1.37 (ref 0.85–1.15)
INR PPP: 1.39 (ref 0.85–1.15)
INR PPP: 1.45 (ref 0.85–1.15)
INR PPP: 1.48 (ref 0.85–1.15)
INR PPP: 1.49 (ref 0.85–1.15)
INR PPP: 1.75 (ref 0.85–1.15)
INR PPP: 1.95 (ref 0.85–1.15)
INR PPP: 3.17 (ref 0.85–1.15)
KAPPA LC FREE SER-MCNC: 4.5 MG/L (ref 2.37–20.73)
KAPPA LC FREE/LAMBDA FREE SER: 1.29 {RATIO} (ref 0.22–1.74)
KETONES UR STRIP.AUTO-MCNC: NEGATIVE MG/DL
LACTATE BLD-SCNC: 16.05 MMOL/L (ref 0.4–2)
LACTATE BLD-SCNC: 9.17 MMOL/L (ref 0.4–2)
LACTATE BLDV-SCNC: 10.8 MMOL/L (ref 0.4–2)
LACTATE BLDV-SCNC: 12.4 MMOL/L (ref 0.4–2)
LACTATE BLDV-SCNC: 5.1 MMOL/L (ref 0.4–2)
LAMBDA LC FREE SERPL-MCNC: <3.5 MG/L (ref 4.23–27.69)
LDH SERPL L TO P-CCNC: 241 U/L (ref 100–190)
LDH SERPL L TO P-CCNC: 268 U/L (ref 100–190)
LDH SERPL L TO P-CCNC: 292 U/L (ref 100–190)
LDH SERPL L TO P-CCNC: 319 U/L (ref 100–190)
LDH SERPL L TO P-CCNC: 332 U/L (ref 100–190)
LDH SERPL L TO P-CCNC: 401 U/L (ref 100–190)
LDH SERPL L TO P-CCNC: 425 U/L (ref 100–190)
LDH SERPL L TO P-CCNC: 429 U/L (ref 100–190)
LDH SERPL L TO P-CCNC: 432 U/L (ref 100–190)
LDH SERPL L TO P-CCNC: 460 U/L (ref 100–190)
LDH SERPL L TO P-CCNC: 463 U/L (ref 100–190)
LDH SERPL L TO P-CCNC: 490 U/L (ref 100–190)
LDH SERPL L TO P-CCNC: 499 U/L (ref 100–190)
LDH SERPL L TO P-CCNC: 509 U/L (ref 100–190)
LEUKOCYTE ESTERASE UR QL STRIP.AUTO: NEGATIVE
LYMPHOCYTES # BLD: 0.3 K/UL (ref 1–5.1)
LYMPHOCYTES # BLD: 0.4 K/UL (ref 1–5.1)
LYMPHOCYTES # BLD: 0.5 K/UL (ref 1–5.1)
LYMPHOCYTES # BLD: 0.6 K/UL (ref 1–5.1)
LYMPHOCYTES # BLD: 0.7 K/UL (ref 1–5.1)
LYMPHOCYTES # BLD: 0.7 K/UL (ref 1–5.1)
LYMPHOCYTES # BLD: 0.8 K/UL (ref 1–5.1)
LYMPHOCYTES # BLD: 0.9 K/UL (ref 1–5.1)
LYMPHOCYTES # BLD: 1.4 K/UL (ref 1–5.1)
LYMPHOCYTES NFR BLD: 10 %
LYMPHOCYTES NFR BLD: 10 %
LYMPHOCYTES NFR BLD: 10.4 %
LYMPHOCYTES NFR BLD: 10.4 %
LYMPHOCYTES NFR BLD: 10.9 %
LYMPHOCYTES NFR BLD: 11 %
LYMPHOCYTES NFR BLD: 11.2 %
LYMPHOCYTES NFR BLD: 12 %
LYMPHOCYTES NFR BLD: 12 %
LYMPHOCYTES NFR BLD: 12.5 %
LYMPHOCYTES NFR BLD: 13 %
LYMPHOCYTES NFR BLD: 14.7 %
LYMPHOCYTES NFR BLD: 16 %
LYMPHOCYTES NFR BLD: 16 %
LYMPHOCYTES NFR BLD: 25 %
LYMPHOCYTES NFR BLD: 32.4 %
LYMPHOCYTES NFR BLD: 42.4 %
LYMPHOCYTES NFR BLD: 6 %
LYMPHOCYTES NFR BLD: 6.5 %
LYMPHOCYTES NFR BLD: 7 %
LYMPHOCYTES NFR BLD: 7.4 %
LYMPHOCYTES NFR BLD: 7.5 %
LYMPHOCYTES NFR BLD: 8 %
LYMPHOCYTES NFR BLD: 8.2 %
LYMPHOCYTES NFR BLD: 8.7 %
LYMPHOCYTES NFR BLD: 9.5 %
LYMPHOCYTES NFR BLD: 9.9 %
MAGNESIUM SERPL-MCNC: 1.36 MG/DL (ref 1.8–2.4)
MAGNESIUM SERPL-MCNC: 1.38 MG/DL (ref 1.8–2.4)
MAGNESIUM SERPL-MCNC: 1.43 MG/DL (ref 1.8–2.4)
MAGNESIUM SERPL-MCNC: 1.44 MG/DL (ref 1.8–2.4)
MAGNESIUM SERPL-MCNC: 1.47 MG/DL (ref 1.8–2.4)
MAGNESIUM SERPL-MCNC: 1.53 MG/DL (ref 1.8–2.4)
MAGNESIUM SERPL-MCNC: 1.54 MG/DL (ref 1.8–2.4)
MAGNESIUM SERPL-MCNC: 1.55 MG/DL (ref 1.8–2.4)
MAGNESIUM SERPL-MCNC: 1.56 MG/DL (ref 1.8–2.4)
MAGNESIUM SERPL-MCNC: 1.59 MG/DL (ref 1.8–2.4)
MAGNESIUM SERPL-MCNC: 1.6 MG/DL (ref 1.8–2.4)
MAGNESIUM SERPL-MCNC: 1.61 MG/DL (ref 1.8–2.4)
MAGNESIUM SERPL-MCNC: 1.66 MG/DL (ref 1.8–2.4)
MAGNESIUM SERPL-MCNC: 1.67 MG/DL (ref 1.8–2.4)
MAGNESIUM SERPL-MCNC: 1.68 MG/DL (ref 1.8–2.4)
MAGNESIUM SERPL-MCNC: 1.77 MG/DL (ref 1.8–2.4)
MAGNESIUM SERPL-MCNC: 1.81 MG/DL (ref 1.8–2.4)
MAGNESIUM SERPL-MCNC: 1.84 MG/DL (ref 1.8–2.4)
MAGNESIUM SERPL-MCNC: 1.85 MG/DL (ref 1.8–2.4)
MAGNESIUM SERPL-MCNC: 1.91 MG/DL (ref 1.8–2.4)
MAGNESIUM SERPL-MCNC: 1.92 MG/DL (ref 1.8–2.4)
MAGNESIUM SERPL-MCNC: 1.94 MG/DL (ref 1.8–2.4)
MAGNESIUM SERPL-MCNC: 2.02 MG/DL (ref 1.8–2.4)
MAGNESIUM SERPL-MCNC: 2.07 MG/DL (ref 1.8–2.4)
MAGNESIUM SERPL-MCNC: 2.09 MG/DL (ref 1.8–2.4)
MAGNESIUM SERPL-MCNC: 2.18 MG/DL (ref 1.8–2.4)
MAGNESIUM SERPL-MCNC: 2.22 MG/DL (ref 1.8–2.4)
MAGNESIUM SERPL-MCNC: 2.33 MG/DL (ref 1.8–2.4)
MAGNESIUM SERPL-MCNC: 2.35 MG/DL (ref 1.8–2.4)
MAGNESIUM SERPL-MCNC: 2.56 MG/DL (ref 1.8–2.4)
MAGNESIUM SERPL-MCNC: 2.77 MG/DL (ref 1.8–2.4)
MCH RBC QN AUTO: 28.5 PG (ref 26–34)
MCH RBC QN AUTO: 28.6 PG (ref 26–34)
MCH RBC QN AUTO: 28.7 PG (ref 26–34)
MCH RBC QN AUTO: 28.7 PG (ref 26–34)
MCH RBC QN AUTO: 28.8 PG (ref 26–34)
MCH RBC QN AUTO: 28.9 PG (ref 26–34)
MCH RBC QN AUTO: 29 PG (ref 26–34)
MCH RBC QN AUTO: 29.1 PG (ref 26–34)
MCH RBC QN AUTO: 29.2 PG (ref 26–34)
MCH RBC QN AUTO: 29.3 PG (ref 26–34)
MCH RBC QN AUTO: 29.3 PG (ref 26–34)
MCH RBC QN AUTO: 29.4 PG (ref 26–34)
MCHC RBC AUTO-ENTMCNC: 31 G/DL (ref 31–36)
MCHC RBC AUTO-ENTMCNC: 31.3 G/DL (ref 31–36)
MCHC RBC AUTO-ENTMCNC: 31.5 G/DL (ref 31–36)
MCHC RBC AUTO-ENTMCNC: 31.6 G/DL (ref 31–36)
MCHC RBC AUTO-ENTMCNC: 31.6 G/DL (ref 31–36)
MCHC RBC AUTO-ENTMCNC: 31.8 G/DL (ref 31–36)
MCHC RBC AUTO-ENTMCNC: 31.8 G/DL (ref 31–36)
MCHC RBC AUTO-ENTMCNC: 31.9 G/DL (ref 31–36)
MCHC RBC AUTO-ENTMCNC: 32 G/DL (ref 31–36)
MCHC RBC AUTO-ENTMCNC: 32.1 G/DL (ref 31–36)
MCHC RBC AUTO-ENTMCNC: 32.2 G/DL (ref 31–36)
MCHC RBC AUTO-ENTMCNC: 32.3 G/DL (ref 31–36)
MCHC RBC AUTO-ENTMCNC: 32.4 G/DL (ref 31–36)
MCHC RBC AUTO-ENTMCNC: 32.5 G/DL (ref 31–36)
MCHC RBC AUTO-ENTMCNC: 32.6 G/DL (ref 31–36)
MCHC RBC AUTO-ENTMCNC: 32.7 G/DL (ref 31–36)
MCV RBC AUTO: 89 FL (ref 80–100)
MCV RBC AUTO: 89.1 FL (ref 80–100)
MCV RBC AUTO: 89.1 FL (ref 80–100)
MCV RBC AUTO: 89.2 FL (ref 80–100)
MCV RBC AUTO: 89.4 FL (ref 80–100)
MCV RBC AUTO: 89.5 FL (ref 80–100)
MCV RBC AUTO: 89.6 FL (ref 80–100)
MCV RBC AUTO: 89.7 FL (ref 80–100)
MCV RBC AUTO: 89.8 FL (ref 80–100)
MCV RBC AUTO: 89.9 FL (ref 80–100)
MCV RBC AUTO: 90 FL (ref 80–100)
MCV RBC AUTO: 90 FL (ref 80–100)
MCV RBC AUTO: 90.1 FL (ref 80–100)
MCV RBC AUTO: 90.1 FL (ref 80–100)
MCV RBC AUTO: 90.2 FL (ref 80–100)
MCV RBC AUTO: 90.3 FL (ref 80–100)
MCV RBC AUTO: 90.3 FL (ref 80–100)
MCV RBC AUTO: 90.4 FL (ref 80–100)
MCV RBC AUTO: 90.4 FL (ref 80–100)
MCV RBC AUTO: 90.5 FL (ref 80–100)
MCV RBC AUTO: 90.6 FL (ref 80–100)
MCV RBC AUTO: 91.1 FL (ref 80–100)
MCV RBC AUTO: 91.1 FL (ref 80–100)
MCV RBC AUTO: 91.2 FL (ref 80–100)
MCV RBC AUTO: 91.2 FL (ref 80–100)
MCV RBC AUTO: 91.3 FL (ref 80–100)
MCV RBC AUTO: 91.3 FL (ref 80–100)
MCV RBC AUTO: 94.7 FL (ref 80–100)
METAMYELOCYTES NFR BLD MANUAL: 1 %
METAMYELOCYTES NFR BLD MANUAL: 2 %
METAMYELOCYTES NFR BLD MANUAL: 4 %
MICROCYTES BLD QL SMEAR: ABNORMAL
MICROCYTES BLD QL SMEAR: ABNORMAL
MISCELLANEOUS LAB TEST ORDER: NORMAL
MONOCYTES # BLD: 0 K/UL (ref 0–1.3)
MONOCYTES # BLD: 0.1 K/UL (ref 0–1.3)
MONOCYTES # BLD: 0.2 K/UL (ref 0–1.3)
MONOCYTES # BLD: 0.4 K/UL (ref 0–1.3)
MONOCYTES NFR BLD: 1 %
MONOCYTES NFR BLD: 1.3 %
MONOCYTES NFR BLD: 1.3 %
MONOCYTES NFR BLD: 1.5 %
MONOCYTES NFR BLD: 12.9 %
MONOCYTES NFR BLD: 2 %
MONOCYTES NFR BLD: 2.2 %
MONOCYTES NFR BLD: 2.4 %
MONOCYTES NFR BLD: 2.5 %
MONOCYTES NFR BLD: 2.6 %
MONOCYTES NFR BLD: 2.7 %
MONOCYTES NFR BLD: 2.7 %
MONOCYTES NFR BLD: 2.8 %
MONOCYTES NFR BLD: 3 %
MONOCYTES NFR BLD: 3.2 %
MONOCYTES NFR BLD: 3.2 %
MONOCYTES NFR BLD: 4 %
MONOCYTES NFR BLD: 5 %
MONOCYTES NFR BLD: 6.4 %
MONOCYTES NFR BLD: 9 %
MUCOUS THREADS #/AREA URNS LPF: ABNORMAL /LPF
MUCOUS THREADS #/AREA URNS LPF: ABNORMAL /LPF
NEUTRAL FAT STL QL: NORMAL
NEUTROPHILS # BLD: 0.7 K/UL (ref 1.7–7.7)
NEUTROPHILS # BLD: 1.5 K/UL (ref 1.7–7.7)
NEUTROPHILS # BLD: 1.9 K/UL (ref 1.7–7.7)
NEUTROPHILS # BLD: 2.5 K/UL (ref 1.7–7.7)
NEUTROPHILS # BLD: 2.8 K/UL (ref 1.7–7.7)
NEUTROPHILS # BLD: 3 K/UL (ref 1.7–7.7)
NEUTROPHILS # BLD: 3.1 K/UL (ref 1.7–7.7)
NEUTROPHILS # BLD: 3.3 K/UL (ref 1.7–7.7)
NEUTROPHILS # BLD: 3.4 K/UL (ref 1.7–7.7)
NEUTROPHILS # BLD: 3.5 K/UL (ref 1.7–7.7)
NEUTROPHILS # BLD: 3.6 K/UL (ref 1.7–7.7)
NEUTROPHILS # BLD: 3.6 K/UL (ref 1.7–7.7)
NEUTROPHILS # BLD: 3.7 K/UL (ref 1.7–7.7)
NEUTROPHILS # BLD: 3.8 K/UL (ref 1.7–7.7)
NEUTROPHILS # BLD: 3.9 K/UL (ref 1.7–7.7)
NEUTROPHILS # BLD: 4 K/UL (ref 1.7–7.7)
NEUTROPHILS # BLD: 4.1 K/UL (ref 1.7–7.7)
NEUTROPHILS # BLD: 4.1 K/UL (ref 1.7–7.7)
NEUTROPHILS # BLD: 4.2 K/UL (ref 1.7–7.7)
NEUTROPHILS # BLD: 4.2 K/UL (ref 1.7–7.7)
NEUTROPHILS # BLD: 4.3 K/UL (ref 1.7–7.7)
NEUTROPHILS # BLD: 4.4 K/UL (ref 1.7–7.7)
NEUTROPHILS # BLD: 4.4 K/UL (ref 1.7–7.7)
NEUTROPHILS # BLD: 4.5 K/UL (ref 1.7–7.7)
NEUTROPHILS # BLD: 4.5 K/UL (ref 1.7–7.7)
NEUTROPHILS # BLD: 4.6 K/UL (ref 1.7–7.7)
NEUTROPHILS # BLD: 4.9 K/UL (ref 1.7–7.7)
NEUTROPHILS # BLD: 5.1 K/UL (ref 1.7–7.7)
NEUTROPHILS NFR BLD: 43.6 %
NEUTROPHILS NFR BLD: 52 %
NEUTROPHILS NFR BLD: 65.6 %
NEUTROPHILS NFR BLD: 78.7 %
NEUTROPHILS NFR BLD: 79 %
NEUTROPHILS NFR BLD: 79 %
NEUTROPHILS NFR BLD: 81 %
NEUTROPHILS NFR BLD: 83.2 %
NEUTROPHILS NFR BLD: 84 %
NEUTROPHILS NFR BLD: 85.4 %
NEUTROPHILS NFR BLD: 85.6 %
NEUTROPHILS NFR BLD: 85.7 %
NEUTROPHILS NFR BLD: 85.9 %
NEUTROPHILS NFR BLD: 86 %
NEUTROPHILS NFR BLD: 86 %
NEUTROPHILS NFR BLD: 86.2 %
NEUTROPHILS NFR BLD: 86.6 %
NEUTROPHILS NFR BLD: 86.9 %
NEUTROPHILS NFR BLD: 87 %
NEUTROPHILS NFR BLD: 87.9 %
NEUTROPHILS NFR BLD: 88.8 %
NEUTROPHILS NFR BLD: 89 %
NEUTROPHILS NFR BLD: 89.2 %
NEUTROPHILS NFR BLD: 89.3 %
NEUTROPHILS NFR BLD: 90.6 %
NEUTROPHILS NFR BLD: 92 %
NEUTS BAND NFR BLD MANUAL: 1 % (ref 0–7)
NEUTS BAND NFR BLD MANUAL: 1 % (ref 0–7)
NEUTS BAND NFR BLD MANUAL: 10 % (ref 0–7)
NEUTS HYPERSEG BLD QL SMEAR: PRESENT
NITRITE UR QL STRIP.AUTO: NEGATIVE
NRBC BLD-RTO: 1 /100 WBC
NRBC BLD-RTO: 1 /100 WBC
ORGANISM: ABNORMAL
OVALOCYTES BLD QL SMEAR: ABNORMAL
PATH INTERP BLD-IMP: NO
PCO2 BLDA: 19.7 MM HG (ref 35–45)
PCO2 BLDA: 27.6 MM HG (ref 35–45)
PCO2 BLDA: 36.5 MM HG (ref 35–45)
PERFORMED ON: ABNORMAL
PERFORMED ON: NORMAL
PH BLDA: 7.31 [PH] (ref 7.35–7.45)
PH BLDA: 7.42 [PH] (ref 7.35–7.45)
PH BLDA: 7.53 [PH] (ref 7.35–7.45)
PH UR STRIP.AUTO: 6 [PH] (ref 5–8)
PHOSPHATE SERPL-MCNC: 1.8 MG/DL (ref 2.5–4.9)
PHOSPHATE SERPL-MCNC: 1.9 MG/DL (ref 2.5–4.9)
PHOSPHATE SERPL-MCNC: 2 MG/DL (ref 2.5–4.9)
PHOSPHATE SERPL-MCNC: 2 MG/DL (ref 2.5–4.9)
PHOSPHATE SERPL-MCNC: 2.1 MG/DL (ref 2.5–4.9)
PHOSPHATE SERPL-MCNC: 2.2 MG/DL (ref 2.5–4.9)
PHOSPHATE SERPL-MCNC: 2.2 MG/DL (ref 2.5–4.9)
PHOSPHATE SERPL-MCNC: 2.3 MG/DL (ref 2.5–4.9)
PHOSPHATE SERPL-MCNC: 2.4 MG/DL (ref 2.5–4.9)
PHOSPHATE SERPL-MCNC: 2.5 MG/DL (ref 2.5–4.9)
PHOSPHATE SERPL-MCNC: 2.5 MG/DL (ref 2.5–4.9)
PHOSPHATE SERPL-MCNC: 2.6 MG/DL (ref 2.5–4.9)
PHOSPHATE SERPL-MCNC: 2.7 MG/DL (ref 2.5–4.9)
PHOSPHATE SERPL-MCNC: 2.8 MG/DL (ref 2.5–4.9)
PHOSPHATE SERPL-MCNC: 2.8 MG/DL (ref 2.5–4.9)
PHOSPHATE SERPL-MCNC: 2.9 MG/DL (ref 2.5–4.9)
PHOSPHATE SERPL-MCNC: 3.1 MG/DL (ref 2.5–4.9)
PHOSPHATE SERPL-MCNC: 3.2 MG/DL (ref 2.5–4.9)
PHOSPHATE SERPL-MCNC: 4.5 MG/DL (ref 2.5–4.9)
PHOSPHATE SERPL-MCNC: 4.9 MG/DL (ref 2.5–4.9)
PHOSPHATE SERPL-MCNC: 6.7 MG/DL (ref 2.5–4.9)
PLATELET # BLD AUTO: 10 K/UL (ref 135–450)
PLATELET # BLD AUTO: 102 K/UL (ref 135–450)
PLATELET # BLD AUTO: 102 K/UL (ref 135–450)
PLATELET # BLD AUTO: 103 K/UL (ref 135–450)
PLATELET # BLD AUTO: 108 K/UL (ref 135–450)
PLATELET # BLD AUTO: 115 K/UL (ref 135–450)
PLATELET # BLD AUTO: 34 K/UL (ref 135–450)
PLATELET # BLD AUTO: 36 K/UL (ref 135–450)
PLATELET # BLD AUTO: 39 K/UL (ref 135–450)
PLATELET # BLD AUTO: 41 K/UL (ref 135–450)
PLATELET # BLD AUTO: 41 K/UL (ref 135–450)
PLATELET # BLD AUTO: 42 K/UL (ref 135–450)
PLATELET # BLD AUTO: 43 K/UL (ref 135–450)
PLATELET # BLD AUTO: 53 K/UL (ref 135–450)
PLATELET # BLD AUTO: 55 K/UL (ref 135–450)
PLATELET # BLD AUTO: 61 K/UL (ref 135–450)
PLATELET # BLD AUTO: 62 K/UL (ref 135–450)
PLATELET # BLD AUTO: 67 K/UL (ref 135–450)
PLATELET # BLD AUTO: 71 K/UL (ref 135–450)
PLATELET # BLD AUTO: 77 K/UL (ref 135–450)
PLATELET # BLD AUTO: 79 K/UL (ref 135–450)
PLATELET # BLD AUTO: 82 K/UL (ref 135–450)
PLATELET # BLD AUTO: 83 K/UL (ref 135–450)
PLATELET # BLD AUTO: 84 K/UL (ref 135–450)
PLATELET # BLD AUTO: 94 K/UL (ref 135–450)
PLATELET # BLD AUTO: 97 K/UL (ref 135–450)
PLATELET BLD QL SMEAR: ABNORMAL
PMV BLD AUTO: 10.3 FL (ref 5–10.5)
PMV BLD AUTO: 10.5 FL (ref 5–10.5)
PMV BLD AUTO: 12.7 FL (ref 5–10.5)
PMV BLD AUTO: 7.7 FL (ref 5–10.5)
PMV BLD AUTO: 7.9 FL (ref 5–10.5)
PMV BLD AUTO: 8 FL (ref 5–10.5)
PMV BLD AUTO: 8.1 FL (ref 5–10.5)
PMV BLD AUTO: 8.2 FL (ref 5–10.5)
PMV BLD AUTO: 8.3 FL (ref 5–10.5)
PMV BLD AUTO: 8.3 FL (ref 5–10.5)
PMV BLD AUTO: 8.4 FL (ref 5–10.5)
PMV BLD AUTO: 8.5 FL (ref 5–10.5)
PMV BLD AUTO: 8.6 FL (ref 5–10.5)
PMV BLD AUTO: 8.7 FL (ref 5–10.5)
PMV BLD AUTO: 8.8 FL (ref 5–10.5)
PMV BLD AUTO: 8.8 FL (ref 5–10.5)
PMV BLD AUTO: 8.9 FL (ref 5–10.5)
PMV BLD AUTO: 9 FL (ref 5–10.5)
PMV BLD AUTO: 9.1 FL (ref 5–10.5)
PMV BLD AUTO: 9.2 FL (ref 5–10.5)
PMV BLD AUTO: 9.8 FL (ref 5–10.5)
PO2 BLDA: 120.5 MM HG (ref 75–108)
PO2 BLDA: 456.5 MM HG (ref 75–108)
PO2 BLDA: 95.6 MM HG (ref 75–108)
POC SAMPLE TYPE: ABNORMAL
POIKILOCYTOSIS BLD QL SMEAR: ABNORMAL
POIKILOCYTOSIS BLD QL SMEAR: ABNORMAL
POLYCHROMASIA BLD QL SMEAR: ABNORMAL
POLYCHROMASIA BLD QL SMEAR: ABNORMAL
POTASSIUM BLD-SCNC: 5.6 MMOL/L (ref 3.5–5.1)
POTASSIUM BLD-SCNC: 7.6 MMOL/L (ref 3.5–5.1)
POTASSIUM SERPL-SCNC: 2.6 MMOL/L (ref 3.5–5.1)
POTASSIUM SERPL-SCNC: 2.8 MMOL/L (ref 3.5–5.1)
POTASSIUM SERPL-SCNC: 2.8 MMOL/L (ref 3.5–5.1)
POTASSIUM SERPL-SCNC: 3 MMOL/L (ref 3.5–5.1)
POTASSIUM SERPL-SCNC: 3 MMOL/L (ref 3.5–5.1)
POTASSIUM SERPL-SCNC: 3.1 MMOL/L (ref 3.5–5.1)
POTASSIUM SERPL-SCNC: 3.2 MMOL/L (ref 3.5–5.1)
POTASSIUM SERPL-SCNC: 3.3 MMOL/L (ref 3.5–5.1)
POTASSIUM SERPL-SCNC: 3.4 MMOL/L (ref 3.5–5.1)
POTASSIUM SERPL-SCNC: 3.5 MMOL/L (ref 3.5–5.1)
POTASSIUM SERPL-SCNC: 3.6 MMOL/L (ref 3.5–5.1)
POTASSIUM SERPL-SCNC: 3.7 MMOL/L (ref 3.5–5.1)
POTASSIUM SERPL-SCNC: 3.8 MMOL/L (ref 3.5–5.1)
POTASSIUM SERPL-SCNC: 3.9 MMOL/L (ref 3.5–5.1)
POTASSIUM SERPL-SCNC: 4 MMOL/L (ref 3.5–5.1)
POTASSIUM SERPL-SCNC: 4.1 MMOL/L (ref 3.5–5.1)
POTASSIUM SERPL-SCNC: 4.2 MMOL/L (ref 3.5–5.1)
POTASSIUM SERPL-SCNC: 4.2 MMOL/L (ref 3.5–5.1)
POTASSIUM SERPL-SCNC: 4.3 MMOL/L (ref 3.5–5.1)
POTASSIUM SERPL-SCNC: 4.4 MMOL/L (ref 3.5–5.1)
POTASSIUM SERPL-SCNC: 4.5 MMOL/L (ref 3.5–5.1)
POTASSIUM SERPL-SCNC: 4.5 MMOL/L (ref 3.5–5.1)
POTASSIUM SERPL-SCNC: 4.7 MMOL/L (ref 3.5–5.1)
POTASSIUM SERPL-SCNC: 5.3 MMOL/L (ref 3.5–5.1)
POTASSIUM SERPL-SCNC: 5.7 MMOL/L (ref 3.5–5.1)
POTASSIUM SERPL-SCNC: 6.2 MMOL/L (ref 3.5–5.1)
POTASSIUM SERPL-SCNC: 7.1 MMOL/L (ref 3.5–5.1)
PROCALCITONIN SERPL IA-MCNC: 24.5 NG/ML (ref 0–0.15)
PROT SERPL-MCNC: 3.4 G/DL (ref 6.4–8.2)
PROT SERPL-MCNC: 3.8 G/DL (ref 6.4–8.2)
PROT SERPL-MCNC: 4 G/DL (ref 6.4–8.2)
PROT SERPL-MCNC: 4 G/DL (ref 6.4–8.2)
PROT SERPL-MCNC: 4.2 G/DL (ref 6.4–8.2)
PROT SERPL-MCNC: 4.3 G/DL (ref 6.4–8.2)
PROT SERPL-MCNC: 4.3 G/DL (ref 6.4–8.2)
PROT SERPL-MCNC: 4.4 G/DL (ref 6.4–8.2)
PROT SERPL-MCNC: 4.6 G/DL (ref 6.4–8.2)
PROT UR STRIP.AUTO-MCNC: 100 MG/DL
PROT UR STRIP.AUTO-MCNC: 30 MG/DL
PROT UR STRIP.AUTO-MCNC: ABNORMAL MG/DL
PROT UR STRIP.AUTO-MCNC: NEGATIVE MG/DL
PROTHROMBIN TIME: 15.9 SEC (ref 11.9–14.9)
PROTHROMBIN TIME: 16.4 SEC (ref 11.9–14.9)
PROTHROMBIN TIME: 16.6 SEC (ref 11.9–14.9)
PROTHROMBIN TIME: 16.6 SEC (ref 11.9–14.9)
PROTHROMBIN TIME: 17 SEC (ref 11.9–14.9)
PROTHROMBIN TIME: 17.3 SEC (ref 11.9–14.9)
PROTHROMBIN TIME: 17.8 SEC (ref 11.9–14.9)
PROTHROMBIN TIME: 18.1 SEC (ref 11.9–14.9)
PROTHROMBIN TIME: 18.1 SEC (ref 11.9–14.9)
PROTHROMBIN TIME: 20.5 SEC (ref 11.9–14.9)
PROTHROMBIN TIME: 22.3 SEC (ref 11.9–14.9)
PROTHROMBIN TIME: 32.3 SEC (ref 11.9–14.9)
PTH-INTACT SERPL-MCNC: 457 PG/ML (ref 14–72)
RBC # BLD AUTO: 1.7 M/UL (ref 4–5.2)
RBC # BLD AUTO: 2.3 M/UL (ref 4–5.2)
RBC # BLD AUTO: 2.51 M/UL (ref 4–5.2)
RBC # BLD AUTO: 2.66 M/UL (ref 4–5.2)
RBC # BLD AUTO: 2.75 M/UL (ref 4–5.2)
RBC # BLD AUTO: 2.76 M/UL (ref 4–5.2)
RBC # BLD AUTO: 2.81 M/UL (ref 4–5.2)
RBC # BLD AUTO: 2.98 M/UL (ref 4–5.2)
RBC # BLD AUTO: 2.98 M/UL (ref 4–5.2)
RBC # BLD AUTO: 3.03 M/UL (ref 4–5.2)
RBC # BLD AUTO: 3.06 M/UL (ref 4–5.2)
RBC # BLD AUTO: 3.1 M/UL (ref 4–5.2)
RBC # BLD AUTO: 3.13 M/UL (ref 4–5.2)
RBC # BLD AUTO: 3.17 M/UL (ref 4–5.2)
RBC # BLD AUTO: 3.21 M/UL (ref 4–5.2)
RBC # BLD AUTO: 3.22 M/UL (ref 4–5.2)
RBC # BLD AUTO: 3.29 M/UL (ref 4–5.2)
RBC # BLD AUTO: 3.38 M/UL (ref 4–5.2)
RBC # BLD AUTO: 3.42 M/UL (ref 4–5.2)
RBC # BLD AUTO: 3.47 M/UL (ref 4–5.2)
RBC # BLD AUTO: 3.48 M/UL (ref 4–5.2)
RBC # BLD AUTO: 3.53 M/UL (ref 4–5.2)
RBC # BLD AUTO: 3.57 M/UL (ref 4–5.2)
RBC # BLD AUTO: 3.62 M/UL (ref 4–5.2)
RBC # BLD AUTO: 3.64 M/UL (ref 4–5.2)
RBC # BLD AUTO: 3.7 M/UL (ref 4–5.2)
RBC # BLD AUTO: 3.74 M/UL (ref 4–5.2)
RBC # BLD AUTO: 3.74 M/UL (ref 4–5.2)
RBC # BLD AUTO: 3.8 M/UL (ref 4–5.2)
RBC # BLD AUTO: 3.82 M/UL (ref 4–5.2)
RBC # BLD AUTO: 3.84 M/UL (ref 4–5.2)
RBC # BLD AUTO: 3.99 M/UL (ref 4–5.2)
RBC # BLD AUTO: 4.04 M/UL (ref 4–5.2)
RBC #/AREA URNS HPF: ABNORMAL /HPF (ref 0–4)
RBC MORPH BLD: NORMAL
RENAL EPI CELLS #/AREA UR COMP ASSIST: ABNORMAL /HPF (ref 0–1)
RENAL EPI CELLS #/AREA UR COMP ASSIST: ABNORMAL /HPF (ref 0–1)
RPT COMMENT: ABNORMAL
RV AG STL QL IA: NEGATIVE
SAO2 % BLDA: 100 % (ref 93–100)
SAO2 % BLDA: 98 % (ref 93–100)
SAO2 % BLDA: 98 % (ref 93–100)
SCHISTOCYTES BLD QL SMEAR: ABNORMAL
SLIDE REVIEW: ABNORMAL
SMUDGE CELLS BLD QL SMEAR: PRESENT
SODIUM BLD-SCNC: 145 MMOL/L (ref 136–145)
SODIUM BLD-SCNC: 147 MMOL/L (ref 136–145)
SODIUM SERPL-SCNC: 138 MMOL/L (ref 136–145)
SODIUM SERPL-SCNC: 139 MMOL/L (ref 136–145)
SODIUM SERPL-SCNC: 140 MMOL/L (ref 136–145)
SODIUM SERPL-SCNC: 141 MMOL/L (ref 136–145)
SODIUM SERPL-SCNC: 142 MMOL/L (ref 136–145)
SODIUM SERPL-SCNC: 143 MMOL/L (ref 136–145)
SODIUM SERPL-SCNC: 144 MMOL/L (ref 136–145)
SODIUM SERPL-SCNC: 145 MMOL/L (ref 136–145)
SODIUM SERPL-SCNC: 146 MMOL/L (ref 136–145)
SODIUM SERPL-SCNC: 147 MMOL/L (ref 136–145)
SODIUM SERPL-SCNC: 148 MMOL/L (ref 136–145)
SODIUM SERPL-SCNC: 149 MMOL/L (ref 136–145)
SP GR UR STRIP.AUTO: 1.02 (ref 1–1.03)
SP GR UR STRIP.AUTO: >=1.03 (ref 1–1.03)
SPE/IFE INTERPRETATION: NORMAL
T4 FREE SERPL-MCNC: 1 NG/DL (ref 0.9–1.8)
TSH SERPL DL<=0.005 MIU/L-ACNC: <0.01 UIU/ML (ref 0.27–4.2)
UA DIPSTICK W REFLEX MICRO PNL UR: NORMAL
UA DIPSTICK W REFLEX MICRO PNL UR: YES
URATE SERPL-MCNC: 2 MG/DL (ref 2.6–6)
URATE SERPL-MCNC: 2.1 MG/DL (ref 2.6–6)
URATE SERPL-MCNC: 2.3 MG/DL (ref 2.6–6)
URATE SERPL-MCNC: 2.3 MG/DL (ref 2.6–6)
URATE SERPL-MCNC: 2.4 MG/DL (ref 2.6–6)
URATE SERPL-MCNC: 2.6 MG/DL (ref 2.6–6)
URATE SERPL-MCNC: 2.9 MG/DL (ref 2.6–6)
URATE SERPL-MCNC: 3 MG/DL (ref 2.6–6)
URATE SERPL-MCNC: 3.1 MG/DL (ref 2.6–6)
URATE SERPL-MCNC: 3.3 MG/DL (ref 2.6–6)
URATE SERPL-MCNC: 3.3 MG/DL (ref 2.6–6)
URATE SERPL-MCNC: 3.8 MG/DL (ref 2.6–6)
URN SPEC COLLECT METH UR: ABNORMAL
URN SPEC COLLECT METH UR: NORMAL
UROBILINOGEN UR STRIP-ACNC: 0.2 E.U./DL
VANCOMYCIN SERPL-MCNC: 13.7 UG/ML
VARIANT LYMPHS NFR BLD MANUAL: 4 % (ref 0–6)
WBC # BLD AUTO: 0.1 K/UL (ref 4–11)
WBC # BLD AUTO: 0.2 K/UL (ref 4–11)
WBC # BLD AUTO: 0.2 K/UL (ref 4–11)
WBC # BLD AUTO: 1 K/UL (ref 4–11)
WBC # BLD AUTO: 2.8 K/UL (ref 4–11)
WBC # BLD AUTO: 2.9 K/UL (ref 4–11)
WBC # BLD AUTO: 3.4 K/UL (ref 4–11)
WBC # BLD AUTO: 3.5 K/UL (ref 4–11)
WBC # BLD AUTO: 3.6 K/UL (ref 4–11)
WBC # BLD AUTO: 3.7 K/UL (ref 4–11)
WBC # BLD AUTO: 3.8 K/UL (ref 4–11)
WBC # BLD AUTO: 3.9 K/UL (ref 4–11)
WBC # BLD AUTO: 4.1 K/UL (ref 4–11)
WBC # BLD AUTO: 4.2 K/UL (ref 4–11)
WBC # BLD AUTO: 4.2 K/UL (ref 4–11)
WBC # BLD AUTO: 4.3 K/UL (ref 4–11)
WBC # BLD AUTO: 4.4 K/UL (ref 4–11)
WBC # BLD AUTO: 4.5 K/UL (ref 4–11)
WBC # BLD AUTO: 4.6 K/UL (ref 4–11)
WBC # BLD AUTO: 4.7 K/UL (ref 4–11)
WBC # BLD AUTO: 4.8 K/UL (ref 4–11)
WBC # BLD AUTO: 5 K/UL (ref 4–11)
WBC # BLD AUTO: 5 K/UL (ref 4–11)
WBC # BLD AUTO: 5.2 K/UL (ref 4–11)
WBC # BLD AUTO: 5.2 K/UL (ref 4–11)
WBC # BLD AUTO: 5.3 K/UL (ref 4–11)
WBC # BLD AUTO: 5.5 K/UL (ref 4–11)
WBC # BLD AUTO: 5.5 K/UL (ref 4–11)
WBC # BLD AUTO: 5.7 K/UL (ref 4–11)
WBC #/AREA URNS HPF: ABNORMAL /HPF (ref 0–5)

## 2025-01-01 PROCEDURE — 87425 ROTAVIRUS AG IA: CPT

## 2025-01-01 PROCEDURE — 6360000002 HC RX W HCPCS

## 2025-01-01 PROCEDURE — 80048 BASIC METABOLIC PNL TOTAL CA: CPT

## 2025-01-01 PROCEDURE — 80076 HEPATIC FUNCTION PANEL: CPT

## 2025-01-01 PROCEDURE — 84155 ASSAY OF PROTEIN SERUM: CPT

## 2025-01-01 PROCEDURE — 6360000002 HC RX W HCPCS: Performed by: INTERNAL MEDICINE

## 2025-01-01 PROCEDURE — 82784 ASSAY IGA/IGD/IGG/IGM EACH: CPT

## 2025-01-01 PROCEDURE — 6370000000 HC RX 637 (ALT 250 FOR IP): Performed by: STUDENT IN AN ORGANIZED HEALTH CARE EDUCATION/TRAINING PROGRAM

## 2025-01-01 PROCEDURE — 87449 NOS EACH ORGANISM AG IA: CPT

## 2025-01-01 PROCEDURE — 6360000002 HC RX W HCPCS: Performed by: NURSE PRACTITIONER

## 2025-01-01 PROCEDURE — 6370000000 HC RX 637 (ALT 250 FOR IP): Performed by: NURSE PRACTITIONER

## 2025-01-01 PROCEDURE — 83735 ASSAY OF MAGNESIUM: CPT

## 2025-01-01 PROCEDURE — 85730 THROMBOPLASTIN TIME PARTIAL: CPT

## 2025-01-01 PROCEDURE — 85025 COMPLETE CBC W/AUTO DIFF WBC: CPT

## 2025-01-01 PROCEDURE — 83605 ASSAY OF LACTIC ACID: CPT

## 2025-01-01 PROCEDURE — 85384 FIBRINOGEN ACTIVITY: CPT

## 2025-01-01 PROCEDURE — 84100 ASSAY OF PHOSPHORUS: CPT

## 2025-01-01 PROCEDURE — 87103 BLOOD FUNGUS CULTURE: CPT

## 2025-01-01 PROCEDURE — 36430 TRANSFUSION BLD/BLD COMPNT: CPT

## 2025-01-01 PROCEDURE — 83615 LACTATE (LD) (LDH) ENZYME: CPT

## 2025-01-01 PROCEDURE — 6360000002 HC RX W HCPCS: Performed by: STUDENT IN AN ORGANIZED HEALTH CARE EDUCATION/TRAINING PROGRAM

## 2025-01-01 PROCEDURE — 2580000003 HC RX 258

## 2025-01-01 PROCEDURE — 2060000000 HC ICU INTERMEDIATE R&B

## 2025-01-01 PROCEDURE — 36591 DRAW BLOOD OFF VENOUS DEVICE: CPT

## 2025-01-01 PROCEDURE — 2500000003 HC RX 250 WO HCPCS: Performed by: NURSE PRACTITIONER

## 2025-01-01 PROCEDURE — 36592 COLLECT BLOOD FROM PICC: CPT

## 2025-01-01 PROCEDURE — 6370000000 HC RX 637 (ALT 250 FOR IP): Performed by: INTERNAL MEDICINE

## 2025-01-01 PROCEDURE — 2500000003 HC RX 250 WO HCPCS: Performed by: STUDENT IN AN ORGANIZED HEALTH CARE EDUCATION/TRAINING PROGRAM

## 2025-01-01 PROCEDURE — 94150 VITAL CAPACITY TEST: CPT

## 2025-01-01 PROCEDURE — 6370000000 HC RX 637 (ALT 250 FOR IP)

## 2025-01-01 PROCEDURE — 84550 ASSAY OF BLOOD/URIC ACID: CPT

## 2025-01-01 PROCEDURE — 84443 ASSAY THYROID STIM HORMONE: CPT

## 2025-01-01 PROCEDURE — 2580000003 HC RX 258: Performed by: INTERNAL MEDICINE

## 2025-01-01 PROCEDURE — 85027 COMPLETE CBC AUTOMATED: CPT

## 2025-01-01 PROCEDURE — 97535 SELF CARE MNGMENT TRAINING: CPT

## 2025-01-01 PROCEDURE — 2580000003 HC RX 258: Performed by: NURSE PRACTITIONER

## 2025-01-01 PROCEDURE — 2580000003 HC RX 258: Performed by: STUDENT IN AN ORGANIZED HEALTH CARE EDUCATION/TRAINING PROGRAM

## 2025-01-01 PROCEDURE — 81001 URINALYSIS AUTO W/SCOPE: CPT

## 2025-01-01 PROCEDURE — 99223 1ST HOSP IP/OBS HIGH 75: CPT | Performed by: HOSPITALIST

## 2025-01-01 PROCEDURE — 87506 IADNA-DNA/RNA PROBE TQ 6-11: CPT

## 2025-01-01 PROCEDURE — 83521 IG LIGHT CHAINS FREE EACH: CPT

## 2025-01-01 PROCEDURE — 99232 SBSQ HOSP IP/OBS MODERATE 35: CPT | Performed by: HOSPITALIST

## 2025-01-01 PROCEDURE — 87077 CULTURE AEROBIC IDENTIFY: CPT

## 2025-01-01 PROCEDURE — 82705 FATS/LIPIDS FECES QUAL: CPT

## 2025-01-01 PROCEDURE — 85610 PROTHROMBIN TIME: CPT

## 2025-01-01 PROCEDURE — 02HV33Z INSERTION OF INFUSION DEVICE INTO SUPERIOR VENA CAVA, PERCUTANEOUS APPROACH: ICD-10-PCS | Performed by: SURGERY

## 2025-01-01 PROCEDURE — 93005 ELECTROCARDIOGRAM TRACING: CPT | Performed by: STUDENT IN AN ORGANIZED HEALTH CARE EDUCATION/TRAINING PROGRAM

## 2025-01-01 PROCEDURE — 84165 PROTEIN E-PHORESIS SERUM: CPT

## 2025-01-01 PROCEDURE — 99232 SBSQ HOSP IP/OBS MODERATE 35: CPT | Performed by: INTERNAL MEDICINE

## 2025-01-01 PROCEDURE — 6370000000 HC RX 637 (ALT 250 FOR IP): Performed by: HOSPITALIST

## 2025-01-01 PROCEDURE — 6370000000 HC RX 637 (ALT 250 FOR IP): Performed by: PHYSICIAN ASSISTANT

## 2025-01-01 PROCEDURE — 36415 COLL VENOUS BLD VENIPUNCTURE: CPT

## 2025-01-01 PROCEDURE — 88342 IMHCHEM/IMCYTCHM 1ST ANTB: CPT

## 2025-01-01 PROCEDURE — 87102 FUNGUS ISOLATION CULTURE: CPT

## 2025-01-01 PROCEDURE — 71045 X-RAY EXAM CHEST 1 VIEW: CPT

## 2025-01-01 PROCEDURE — 97530 THERAPEUTIC ACTIVITIES: CPT

## 2025-01-01 PROCEDURE — 86900 BLOOD TYPING SEROLOGIC ABO: CPT

## 2025-01-01 PROCEDURE — 93970 EXTREMITY STUDY: CPT

## 2025-01-01 PROCEDURE — 94761 N-INVAS EAR/PLS OXIMETRY MLT: CPT

## 2025-01-01 PROCEDURE — 82330 ASSAY OF CALCIUM: CPT

## 2025-01-01 PROCEDURE — 87086 URINE CULTURE/COLONY COUNT: CPT

## 2025-01-01 PROCEDURE — 80053 COMPREHEN METABOLIC PANEL: CPT

## 2025-01-01 PROCEDURE — 84481 FREE ASSAY (FT-3): CPT

## 2025-01-01 PROCEDURE — P9047 ALBUMIN (HUMAN), 25%, 50ML: HCPCS

## 2025-01-01 PROCEDURE — 82247 BILIRUBIN TOTAL: CPT

## 2025-01-01 PROCEDURE — 5A12012 PERFORMANCE OF CARDIAC OUTPUT, SINGLE, MANUAL: ICD-10-PCS

## 2025-01-01 PROCEDURE — 3E033XZ INTRODUCTION OF VASOPRESSOR INTO PERIPHERAL VEIN, PERCUTANEOUS APPROACH: ICD-10-PCS

## 2025-01-01 PROCEDURE — 6360000004 HC RX CONTRAST MEDICATION: Performed by: STUDENT IN AN ORGANIZED HEALTH CARE EDUCATION/TRAINING PROGRAM

## 2025-01-01 PROCEDURE — 0DBM8ZX EXCISION OF DESCENDING COLON, VIA NATURAL OR ARTIFICIAL OPENING ENDOSCOPIC, DIAGNOSTIC: ICD-10-PCS | Performed by: INTERNAL MEDICINE

## 2025-01-01 PROCEDURE — 87186 SC STD MICRODIL/AGAR DIL: CPT

## 2025-01-01 PROCEDURE — 82728 ASSAY OF FERRITIN: CPT

## 2025-01-01 PROCEDURE — 84145 PROCALCITONIN (PCT): CPT

## 2025-01-01 PROCEDURE — 5A1D90Z PERFORMANCE OF URINARY FILTRATION, CONTINUOUS, GREATER THAN 18 HOURS PER DAY: ICD-10-PCS | Performed by: INTERNAL MEDICINE

## 2025-01-01 PROCEDURE — 86901 BLOOD TYPING SEROLOGIC RH(D): CPT

## 2025-01-01 PROCEDURE — P9047 ALBUMIN (HUMAN), 25%, 50ML: HCPCS | Performed by: STUDENT IN AN ORGANIZED HEALTH CARE EDUCATION/TRAINING PROGRAM

## 2025-01-01 PROCEDURE — 86334 IMMUNOFIX E-PHORESIS SERUM: CPT

## 2025-01-01 PROCEDURE — 0BH17EZ INSERTION OF ENDOTRACHEAL AIRWAY INTO TRACHEA, VIA NATURAL OR ARTIFICIAL OPENING: ICD-10-PCS

## 2025-01-01 PROCEDURE — 87150 DNA/RNA AMPLIFIED PROBE: CPT

## 2025-01-01 PROCEDURE — 84439 ASSAY OF FREE THYROXINE: CPT

## 2025-01-01 PROCEDURE — 80202 ASSAY OF VANCOMYCIN: CPT

## 2025-01-01 PROCEDURE — 93010 ELECTROCARDIOGRAM REPORT: CPT | Performed by: INTERNAL MEDICINE

## 2025-01-01 PROCEDURE — 97161 PT EVAL LOW COMPLEX 20 MIN: CPT

## 2025-01-01 PROCEDURE — 94002 VENT MGMT INPAT INIT DAY: CPT

## 2025-01-01 PROCEDURE — 51702 INSERT TEMP BLADDER CATH: CPT

## 2025-01-01 PROCEDURE — 87324 CLOSTRIDIUM AG IA: CPT

## 2025-01-01 PROCEDURE — 36620 INSERTION CATHETER ARTERY: CPT | Performed by: INTERNAL MEDICINE

## 2025-01-01 PROCEDURE — 86850 RBC ANTIBODY SCREEN: CPT

## 2025-01-01 PROCEDURE — 82803 BLOOD GASES ANY COMBINATION: CPT

## 2025-01-01 PROCEDURE — 3609010300 HC COLONOSCOPY W/BIOPSY SINGLE/MULTIPLE: Performed by: INTERNAL MEDICINE

## 2025-01-01 PROCEDURE — 87070 CULTURE OTHR SPECIMN AEROBIC: CPT

## 2025-01-01 PROCEDURE — 7100000010 HC PHASE II RECOVERY - FIRST 15 MIN: Performed by: INTERNAL MEDICINE

## 2025-01-01 PROCEDURE — 87040 BLOOD CULTURE FOR BACTERIA: CPT

## 2025-01-01 PROCEDURE — 2700000000 HC OXYGEN THERAPY PER DAY

## 2025-01-01 PROCEDURE — P9012 CRYOPRECIPITATE EACH UNIT: HCPCS

## 2025-01-01 PROCEDURE — 2500000003 HC RX 250 WO HCPCS

## 2025-01-01 PROCEDURE — 84132 ASSAY OF SERUM POTASSIUM: CPT

## 2025-01-01 PROCEDURE — P9036 PLATELET PHERESIS IRRADIATED: HCPCS

## 2025-01-01 PROCEDURE — 71260 CT THORAX DX C+: CPT

## 2025-01-01 PROCEDURE — 74177 CT ABD & PELVIS W/CONTRAST: CPT

## 2025-01-01 PROCEDURE — 3700000001 HC ADD 15 MINUTES (ANESTHESIA): Performed by: INTERNAL MEDICINE

## 2025-01-01 PROCEDURE — 99233 SBSQ HOSP IP/OBS HIGH 50: CPT | Performed by: INTERNAL MEDICINE

## 2025-01-01 PROCEDURE — 87205 SMEAR GRAM STAIN: CPT

## 2025-01-01 PROCEDURE — 2709999900 HC NON-CHARGEABLE SUPPLY: Performed by: INTERNAL MEDICINE

## 2025-01-01 PROCEDURE — 84295 ASSAY OF SERUM SODIUM: CPT

## 2025-01-01 PROCEDURE — 87253 VIRUS INOCULATE TISSUE ADDL: CPT

## 2025-01-01 PROCEDURE — 03HY32Z INSERTION OF MONITORING DEVICE INTO UPPER ARTERY, PERCUTANEOUS APPROACH: ICD-10-PCS | Performed by: INTERNAL MEDICINE

## 2025-01-01 PROCEDURE — 97168 OT RE-EVAL EST PLAN CARE: CPT

## 2025-01-01 PROCEDURE — 99233 SBSQ HOSP IP/OBS HIGH 50: CPT | Performed by: HOSPITALIST

## 2025-01-01 PROCEDURE — 99223 1ST HOSP IP/OBS HIGH 75: CPT | Performed by: INTERNAL MEDICINE

## 2025-01-01 PROCEDURE — 83970 ASSAY OF PARATHORMONE: CPT

## 2025-01-01 PROCEDURE — 97165 OT EVAL LOW COMPLEX 30 MIN: CPT

## 2025-01-01 PROCEDURE — 7100000011 HC PHASE II RECOVERY - ADDTL 15 MIN: Performed by: INTERNAL MEDICINE

## 2025-01-01 PROCEDURE — 83550 IRON BINDING TEST: CPT

## 2025-01-01 PROCEDURE — 3700000000 HC ANESTHESIA ATTENDED CARE: Performed by: INTERNAL MEDICINE

## 2025-01-01 PROCEDURE — 82306 VITAMIN D 25 HYDROXY: CPT

## 2025-01-01 PROCEDURE — 97116 GAIT TRAINING THERAPY: CPT

## 2025-01-01 PROCEDURE — 92950 HEART/LUNG RESUSCITATION CPR: CPT

## 2025-01-01 PROCEDURE — 82947 ASSAY GLUCOSE BLOOD QUANT: CPT

## 2025-01-01 PROCEDURE — 86923 COMPATIBILITY TEST ELECTRIC: CPT

## 2025-01-01 PROCEDURE — 87252 VIRUS INOCULATION TISSUE: CPT

## 2025-01-01 PROCEDURE — 87798 DETECT AGENT NOS DNA AMP: CPT

## 2025-01-01 PROCEDURE — 6360000004 HC RX CONTRAST MEDICATION: Performed by: NURSE PRACTITIONER

## 2025-01-01 PROCEDURE — 36600 WITHDRAWAL OF ARTERIAL BLOOD: CPT

## 2025-01-01 PROCEDURE — 93970 EXTREMITY STUDY: CPT | Performed by: SURGERY

## 2025-01-01 PROCEDURE — 99222 1ST HOSP IP/OBS MODERATE 55: CPT | Performed by: SURGERY

## 2025-01-01 PROCEDURE — 97162 PT EVAL MOD COMPLEX 30 MIN: CPT

## 2025-01-01 PROCEDURE — 81003 URINALYSIS AUTO W/O SCOPE: CPT

## 2025-01-01 PROCEDURE — 88305 TISSUE EXAM BY PATHOLOGIST: CPT

## 2025-01-01 PROCEDURE — 83540 ASSAY OF IRON: CPT

## 2025-01-01 PROCEDURE — 5A1935Z RESPIRATORY VENTILATION, LESS THAN 24 CONSECUTIVE HOURS: ICD-10-PCS

## 2025-01-01 RX ORDER — OCTREOTIDE ACETATE 100 UG/ML
100 INJECTION, SOLUTION INTRAVENOUS; SUBCUTANEOUS DAILY
Status: DISCONTINUED | OUTPATIENT
Start: 2025-01-01 | End: 2025-01-01 | Stop reason: HOSPADM

## 2025-01-01 RX ORDER — VORICONAZOLE 40 MG/ML
200 POWDER, FOR SUSPENSION ORAL EVERY 12 HOURS SCHEDULED
Status: DISCONTINUED | OUTPATIENT
Start: 2025-01-01 | End: 2025-01-01 | Stop reason: HOSPADM

## 2025-01-01 RX ORDER — INSULIN LISPRO 100 [IU]/ML
0-4 INJECTION, SOLUTION INTRAVENOUS; SUBCUTANEOUS
Status: DISCONTINUED | OUTPATIENT
Start: 2025-01-01 | End: 2025-01-01 | Stop reason: HOSPADM

## 2025-01-01 RX ORDER — PHENYLEPHRINE HCL IN 0.9% NACL 1 MG/10 ML
SYRINGE (ML) INTRAVENOUS
Status: COMPLETED
Start: 2025-01-01 | End: 2025-01-01

## 2025-01-01 RX ORDER — ACETAZOLAMIDE 250 MG/1
125 TABLET ORAL EVERY OTHER DAY
Status: DISCONTINUED | OUTPATIENT
Start: 2025-01-01 | End: 2025-01-01

## 2025-01-01 RX ORDER — IOPAMIDOL 755 MG/ML
75 INJECTION, SOLUTION INTRAVASCULAR
Status: COMPLETED | OUTPATIENT
Start: 2025-01-01 | End: 2025-01-01

## 2025-01-01 RX ORDER — SODIUM BICARBONATE 650 MG/1
325 TABLET ORAL 2 TIMES DAILY
Status: DISCONTINUED | OUTPATIENT
Start: 2025-01-01 | End: 2025-01-01

## 2025-01-01 RX ORDER — DIPHENOXYLATE HYDROCHLORIDE AND ATROPINE SULFATE 2.5; .025 MG/1; MG/1
2 TABLET ORAL 4 TIMES DAILY PRN
Status: DISCONTINUED | OUTPATIENT
Start: 2025-01-01 | End: 2025-01-01

## 2025-01-01 RX ORDER — LOPERAMIDE HYDROCHLORIDE 2 MG/1
4 CAPSULE ORAL PRN
Status: DISCONTINUED | OUTPATIENT
Start: 2025-01-01 | End: 2025-01-01

## 2025-01-01 RX ORDER — 0.9 % SODIUM CHLORIDE 0.9 %
500 INTRAVENOUS SOLUTION INTRAVENOUS ONCE
Status: COMPLETED | OUTPATIENT
Start: 2025-01-01 | End: 2025-01-01

## 2025-01-01 RX ORDER — DIPHENOXYLATE HYDROCHLORIDE AND ATROPINE SULFATE 2.5; .025 MG/1; MG/1
2 TABLET ORAL 4 TIMES DAILY
Status: DISCONTINUED | OUTPATIENT
Start: 2025-01-01 | End: 2025-01-01

## 2025-01-01 RX ORDER — ACETAMINOPHEN 650 MG/1
650 SUPPOSITORY RECTAL EVERY 6 HOURS PRN
Status: DISCONTINUED | OUTPATIENT
Start: 2025-01-01 | End: 2025-01-01

## 2025-01-01 RX ORDER — VORICONAZOLE 200 MG/1
200 TABLET, FILM COATED ORAL EVERY 12 HOURS SCHEDULED
Status: DISCONTINUED | OUTPATIENT
Start: 2025-01-01 | End: 2025-01-01

## 2025-01-01 RX ORDER — SODIUM BICARBONATE 650 MG/1
1300 TABLET ORAL 3 TIMES DAILY
Status: DISCONTINUED | OUTPATIENT
Start: 2025-01-01 | End: 2025-01-01

## 2025-01-01 RX ORDER — HEPARIN SODIUM 1000 [USP'U]/ML
10000 INJECTION, SOLUTION INTRAVENOUS; SUBCUTANEOUS ONCE
Status: DISCONTINUED | OUTPATIENT
Start: 2025-01-01 | End: 2025-01-01 | Stop reason: HOSPADM

## 2025-01-01 RX ORDER — ACETAZOLAMIDE 250 MG/1
250 TABLET ORAL DAILY
Status: DISCONTINUED | OUTPATIENT
Start: 2025-01-01 | End: 2025-01-01

## 2025-01-01 RX ORDER — SODIUM CHLORIDE AND POTASSIUM CHLORIDE 150; 900 MG/100ML; MG/100ML
INJECTION, SOLUTION INTRAVENOUS CONTINUOUS
Status: DISCONTINUED | OUTPATIENT
Start: 2025-01-01 | End: 2025-01-01

## 2025-01-01 RX ORDER — CALCIUM CARBONATE 500 MG/1
500 TABLET, CHEWABLE ORAL 3 TIMES DAILY PRN
Status: DISCONTINUED | OUTPATIENT
Start: 2025-01-01 | End: 2025-01-01 | Stop reason: HOSPADM

## 2025-01-01 RX ORDER — OCTREOTIDE ACETATE 100 UG/ML
100 INJECTION, SOLUTION INTRAVENOUS; SUBCUTANEOUS EVERY 8 HOURS
Status: DISCONTINUED | OUTPATIENT
Start: 2025-01-01 | End: 2025-01-01

## 2025-01-01 RX ORDER — SODIUM CHLORIDE 9 MG/ML
INJECTION, SOLUTION INTRAVENOUS CONTINUOUS
Status: DISCONTINUED | OUTPATIENT
Start: 2025-01-01 | End: 2025-01-01

## 2025-01-01 RX ORDER — POTASSIUM CHLORIDE 29.8 MG/ML
20 INJECTION INTRAVENOUS PRN
Status: DISCONTINUED | OUTPATIENT
Start: 2025-01-01 | End: 2025-01-01 | Stop reason: SDUPTHER

## 2025-01-01 RX ORDER — VALGANCICLOVIR 450 MG/1
900 TABLET, FILM COATED ORAL 2 TIMES DAILY
Qty: 120 TABLET | Refills: 2 | Status: SHIPPED | OUTPATIENT
Start: 2025-01-01 | End: 2025-01-01

## 2025-01-01 RX ORDER — SODIUM BICARBONATE 650 MG/1
650 TABLET ORAL 2 TIMES DAILY
Status: DISCONTINUED | OUTPATIENT
Start: 2025-01-01 | End: 2025-01-01

## 2025-01-01 RX ORDER — SODIUM CHLORIDE 9 MG/ML
INJECTION, SOLUTION INTRAVENOUS PRN
Status: DISCONTINUED | OUTPATIENT
Start: 2025-01-01 | End: 2025-01-01 | Stop reason: HOSPADM

## 2025-01-01 RX ORDER — HEPARIN SODIUM 1000 [USP'U]/ML
3200 INJECTION, SOLUTION INTRAVENOUS; SUBCUTANEOUS ONCE
Status: DISCONTINUED | OUTPATIENT
Start: 2025-01-01 | End: 2025-01-01 | Stop reason: HOSPADM

## 2025-01-01 RX ORDER — DIPHENOXYLATE HCL/ATROPINE 2.5-.025/5
10 LIQUID (ML) ORAL 4 TIMES DAILY
Status: DISCONTINUED | OUTPATIENT
Start: 2025-01-01 | End: 2025-01-01

## 2025-01-01 RX ORDER — DIPHENOXYLATE HYDROCHLORIDE AND ATROPINE SULFATE 2.5; .025 MG/1; MG/1
1 TABLET ORAL 4 TIMES DAILY
Status: DISCONTINUED | OUTPATIENT
Start: 2025-01-01 | End: 2025-01-01

## 2025-01-01 RX ORDER — FUROSEMIDE 10 MG/ML
20 INJECTION INTRAMUSCULAR; INTRAVENOUS ONCE
Status: COMPLETED | OUTPATIENT
Start: 2025-01-01 | End: 2025-01-01

## 2025-01-01 RX ORDER — CEFEPIME 1 G/50ML
2000 INJECTION, SOLUTION INTRAVENOUS EVERY 12 HOURS
Status: DISCONTINUED | OUTPATIENT
Start: 2025-01-01 | End: 2025-01-01

## 2025-01-01 RX ORDER — CEFEPIME 1 G/50ML
2000 INJECTION, SOLUTION INTRAVENOUS ONCE
Status: DISCONTINUED | OUTPATIENT
Start: 2025-01-01 | End: 2025-01-01

## 2025-01-01 RX ORDER — SODIUM BICARBONATE 650 MG/1
650 TABLET ORAL 4 TIMES DAILY
Status: DISCONTINUED | OUTPATIENT
Start: 2025-01-01 | End: 2025-01-01

## 2025-01-01 RX ORDER — NOREPINEPHRINE BITARTRATE/D5W 16MG/250ML
1-100 PLASTIC BAG, INJECTION (ML) INTRAVENOUS CONTINUOUS
Status: DISCONTINUED | OUTPATIENT
Start: 2025-01-01 | End: 2025-01-01 | Stop reason: HOSPADM

## 2025-01-01 RX ORDER — SODIUM CHLORIDE, SODIUM LACTATE, POTASSIUM CHLORIDE, AND CALCIUM CHLORIDE .6; .31; .03; .02 G/100ML; G/100ML; G/100ML; G/100ML
1000 INJECTION, SOLUTION INTRAVENOUS ONCE
Status: COMPLETED | OUTPATIENT
Start: 2025-01-01 | End: 2025-01-01

## 2025-01-01 RX ORDER — BUDESONIDE 3 MG/1
3 CAPSULE, COATED PELLETS ORAL 3 TIMES DAILY
Qty: 90 CAPSULE | Refills: 3 | Status: SHIPPED | OUTPATIENT
Start: 2025-01-01 | End: 2025-01-01

## 2025-01-01 RX ORDER — GAUZE BANDAGE 2" X 2"
100 BANDAGE TOPICAL DAILY
Status: DISCONTINUED | OUTPATIENT
Start: 2025-01-01 | End: 2025-01-01 | Stop reason: HOSPADM

## 2025-01-01 RX ORDER — SULFAMETHOXAZOLE AND TRIMETHOPRIM 400; 80 MG/1; MG/1
1 TABLET ORAL DAILY
Status: DISCONTINUED | OUTPATIENT
Start: 2025-01-01 | End: 2025-01-01

## 2025-01-01 RX ORDER — METOPROLOL TARTRATE 25 MG/1
25 TABLET, FILM COATED ORAL 2 TIMES DAILY
Status: DISCONTINUED | OUTPATIENT
Start: 2025-01-01 | End: 2025-01-01 | Stop reason: HOSPADM

## 2025-01-01 RX ORDER — ATOVAQUONE 750 MG/5ML
1500 SUSPENSION ORAL DAILY
Qty: 300 ML | Refills: 3 | Status: SHIPPED | OUTPATIENT
Start: 2025-01-01 | End: 2025-03-01

## 2025-01-01 RX ORDER — VANCOMYCIN 1.5 G/300ML
1500 INJECTION, SOLUTION INTRAVENOUS EVERY 24 HOURS
Status: DISCONTINUED | OUTPATIENT
Start: 2025-01-01 | End: 2025-01-01

## 2025-01-01 RX ORDER — NITROFURANTOIN 25; 75 MG/1; MG/1
100 CAPSULE ORAL EVERY 12 HOURS SCHEDULED
Status: DISCONTINUED | OUTPATIENT
Start: 2025-01-01 | End: 2025-01-01

## 2025-01-01 RX ORDER — CEFEPIME 1 G/50ML
2000 INJECTION, SOLUTION INTRAVENOUS EVERY 8 HOURS
Status: DISCONTINUED | OUTPATIENT
Start: 2025-01-01 | End: 2025-01-01

## 2025-01-01 RX ORDER — POTASSIUM CHLORIDE 1500 MG/1
40 TABLET, EXTENDED RELEASE ORAL 2 TIMES DAILY WITH MEALS
Status: DISCONTINUED | OUTPATIENT
Start: 2025-01-01 | End: 2025-01-01

## 2025-01-01 RX ORDER — PROPOFOL 10 MG/ML
INJECTION, EMULSION INTRAVENOUS
Status: DISCONTINUED
Start: 2025-01-01 | End: 2025-01-01 | Stop reason: HOSPADM

## 2025-01-01 RX ORDER — SODIUM CHLORIDE 0.9 % (FLUSH) 0.9 %
1.1-1.9 SYRINGE (ML) INJECTION PRN
Status: DISCONTINUED | OUTPATIENT
Start: 2025-01-01 | End: 2025-01-01 | Stop reason: HOSPADM

## 2025-01-01 RX ORDER — VORICONAZOLE 200 MG/1
200 TABLET, FILM COATED ORAL EVERY 12 HOURS SCHEDULED
Qty: 60 TABLET | Refills: 1 | Status: SHIPPED | OUTPATIENT
Start: 2025-01-01 | End: 2025-01-01

## 2025-01-01 RX ORDER — VORICONAZOLE 200 MG/1
200 TABLET, FILM COATED ORAL EVERY 12 HOURS SCHEDULED
Qty: 60 TABLET | Refills: 1 | Status: SHIPPED | OUTPATIENT
Start: 2025-01-01 | End: 2025-03-01

## 2025-01-01 RX ORDER — SODIUM BICARBONATE 650 MG/1
650 TABLET ORAL 3 TIMES DAILY
Status: DISCONTINUED | OUTPATIENT
Start: 2025-01-01 | End: 2025-01-01

## 2025-01-01 RX ORDER — MAGNESIUM SULFATE IN WATER 40 MG/ML
4000 INJECTION, SOLUTION INTRAVENOUS PRN
Status: DISCONTINUED | OUTPATIENT
Start: 2025-01-01 | End: 2025-01-01 | Stop reason: SDUPTHER

## 2025-01-01 RX ORDER — BUDESONIDE 3 MG/1
3 CAPSULE, COATED PELLETS ORAL 3 TIMES DAILY
Qty: 90 CAPSULE | Refills: 3 | Status: SHIPPED | OUTPATIENT
Start: 2025-01-01 | End: 2025-03-01

## 2025-01-01 RX ORDER — DIPHENOXYLATE HYDROCHLORIDE AND ATROPINE SULFATE 2.5; .025 MG/1; MG/1
1 TABLET ORAL 4 TIMES DAILY PRN
Status: DISCONTINUED | OUTPATIENT
Start: 2025-01-01 | End: 2025-01-01 | Stop reason: HOSPADM

## 2025-01-01 RX ORDER — FUROSEMIDE 10 MG/ML
40 INJECTION INTRAMUSCULAR; INTRAVENOUS ONCE
Status: COMPLETED | OUTPATIENT
Start: 2025-01-01 | End: 2025-01-01

## 2025-01-01 RX ORDER — GLUCAGON 1 MG/ML
1 KIT INJECTION PRN
Status: DISCONTINUED | OUTPATIENT
Start: 2025-01-01 | End: 2025-01-01 | Stop reason: HOSPADM

## 2025-01-01 RX ORDER — OCTREOTIDE ACETATE 100 UG/ML
100 INJECTION, SOLUTION INTRAVENOUS; SUBCUTANEOUS EVERY 12 HOURS
Status: COMPLETED | OUTPATIENT
Start: 2025-01-01 | End: 2025-01-01

## 2025-01-01 RX ORDER — SODIUM CHLORIDE 0.9 % (FLUSH) 0.9 %
5-40 SYRINGE (ML) INJECTION EVERY 12 HOURS SCHEDULED
Status: DISCONTINUED | OUTPATIENT
Start: 2025-01-01 | End: 2025-01-01 | Stop reason: HOSPADM

## 2025-01-01 RX ORDER — LOPERAMIDE HYDROCHLORIDE 2 MG/1
2 CAPSULE ORAL PRN
Status: DISCONTINUED | OUTPATIENT
Start: 2025-01-01 | End: 2025-01-01

## 2025-01-01 RX ORDER — HYDROCORTISONE SODIUM SUCCINATE 100 MG/2ML
50 INJECTION INTRAMUSCULAR; INTRAVENOUS EVERY 8 HOURS
Status: DISCONTINUED | OUTPATIENT
Start: 2025-01-01 | End: 2025-01-01 | Stop reason: HOSPADM

## 2025-01-01 RX ORDER — POTASSIUM CHLORIDE 1500 MG/1
40 TABLET, EXTENDED RELEASE ORAL
Status: DISCONTINUED | OUTPATIENT
Start: 2025-01-01 | End: 2025-01-01

## 2025-01-01 RX ORDER — POTASSIUM CHLORIDE 29.8 MG/ML
20 INJECTION INTRAVENOUS PRN
Status: DISCONTINUED | OUTPATIENT
Start: 2025-01-01 | End: 2025-01-01 | Stop reason: HOSPADM

## 2025-01-01 RX ORDER — DIPHENOXYLATE HCL/ATROPINE 2.5-.025/5
10 LIQUID (ML) ORAL 4 TIMES DAILY PRN
Status: DISCONTINUED | OUTPATIENT
Start: 2025-01-01 | End: 2025-01-01 | Stop reason: RX

## 2025-01-01 RX ORDER — LOPERAMIDE HCL 1 MG/7.5ML
4 SOLUTION ORAL 4 TIMES DAILY PRN
Status: DISCONTINUED | OUTPATIENT
Start: 2025-01-01 | End: 2025-01-01 | Stop reason: HOSPADM

## 2025-01-01 RX ORDER — BUDESONIDE 3 MG/1
3 CAPSULE, COATED PELLETS ORAL 3 TIMES DAILY
Status: DISCONTINUED | OUTPATIENT
Start: 2025-01-01 | End: 2025-01-01 | Stop reason: HOSPADM

## 2025-01-01 RX ORDER — CEFEPIME 1 G/50ML
2000 INJECTION, SOLUTION INTRAVENOUS ONCE
Status: COMPLETED | OUTPATIENT
Start: 2025-01-01 | End: 2025-01-01

## 2025-01-01 RX ORDER — DIPHENOXYLATE HYDROCHLORIDE AND ATROPINE SULFATE 2.5; .025 MG/1; MG/1
1 TABLET ORAL 4 TIMES DAILY PRN
Status: DISCONTINUED | OUTPATIENT
Start: 2025-01-01 | End: 2025-01-01

## 2025-01-01 RX ORDER — MINERAL OIL AND PETROLATUM 150; 830 MG/G; MG/G
OINTMENT OPHTHALMIC PRN
Status: DISCONTINUED | OUTPATIENT
Start: 2025-01-01 | End: 2025-01-01 | Stop reason: HOSPADM

## 2025-01-01 RX ORDER — ALBUMIN (HUMAN) 12.5 G/50ML
25 SOLUTION INTRAVENOUS ONCE
Status: COMPLETED | OUTPATIENT
Start: 2025-01-01 | End: 2025-01-01

## 2025-01-01 RX ORDER — ENOXAPARIN SODIUM 100 MG/ML
40 INJECTION SUBCUTANEOUS DAILY
Status: DISCONTINUED | OUTPATIENT
Start: 2025-01-01 | End: 2025-01-01

## 2025-01-01 RX ORDER — PREDNISONE 5 MG/1
5 TABLET ORAL ONCE
Status: DISCONTINUED | OUTPATIENT
Start: 2025-03-01 | End: 2025-01-01

## 2025-01-01 RX ORDER — LOPERAMIDE HYDROCHLORIDE 2 MG/1
4 CAPSULE ORAL
Status: DISCONTINUED | OUTPATIENT
Start: 2025-01-01 | End: 2025-01-01

## 2025-01-01 RX ORDER — VALGANCICLOVIR 450 MG/1
900 TABLET, FILM COATED ORAL 2 TIMES DAILY
Status: DISCONTINUED | OUTPATIENT
Start: 2025-01-01 | End: 2025-01-01

## 2025-01-01 RX ORDER — TRAMADOL HYDROCHLORIDE 50 MG/1
100 TABLET ORAL EVERY 6 HOURS PRN
Status: DISCONTINUED | OUTPATIENT
Start: 2025-01-01 | End: 2025-01-01 | Stop reason: HOSPADM

## 2025-01-01 RX ORDER — TRAZODONE HYDROCHLORIDE 50 MG/1
25 TABLET ORAL NIGHTLY PRN
Status: DISCONTINUED | OUTPATIENT
Start: 2025-01-01 | End: 2025-01-01 | Stop reason: HOSPADM

## 2025-01-01 RX ORDER — SODIUM CHLORIDE 0.9 % (FLUSH) 0.9 %
5-40 SYRINGE (ML) INJECTION PRN
Status: DISCONTINUED | OUTPATIENT
Start: 2025-01-01 | End: 2025-01-01 | Stop reason: HOSPADM

## 2025-01-01 RX ORDER — ERGOCALCIFEROL 1.25 MG/1
50000 CAPSULE, LIQUID FILLED ORAL WEEKLY
Status: DISCONTINUED | OUTPATIENT
Start: 2025-01-01 | End: 2025-01-01

## 2025-01-01 RX ORDER — FUROSEMIDE 10 MG/ML
40 INJECTION INTRAMUSCULAR; INTRAVENOUS DAILY
Status: DISCONTINUED | OUTPATIENT
Start: 2025-01-01 | End: 2025-01-01 | Stop reason: HOSPADM

## 2025-01-01 RX ORDER — PANTOPRAZOLE SODIUM 40 MG/1
40 TABLET, DELAYED RELEASE ORAL
Status: DISCONTINUED | OUTPATIENT
Start: 2025-01-01 | End: 2025-01-01

## 2025-01-01 RX ORDER — ATOVAQUONE 750 MG/5ML
1500 SUSPENSION ORAL DAILY
Status: DISCONTINUED | OUTPATIENT
Start: 2025-01-01 | End: 2025-01-01 | Stop reason: HOSPADM

## 2025-01-01 RX ORDER — VANCOMYCIN 1.75 G/350ML
1250 INJECTION, SOLUTION INTRAVENOUS ONCE
Status: DISCONTINUED | OUTPATIENT
Start: 2025-01-01 | End: 2025-01-01 | Stop reason: HOSPADM

## 2025-01-01 RX ORDER — OCTREOTIDE ACETATE 100 UG/ML
100 INJECTION, SOLUTION INTRAVENOUS; SUBCUTANEOUS EVERY 12 HOURS
Status: DISCONTINUED | OUTPATIENT
Start: 2025-01-01 | End: 2025-01-01

## 2025-01-01 RX ORDER — MULTIVITAMIN WITH IRON
1 TABLET ORAL DAILY
Status: DISCONTINUED | OUTPATIENT
Start: 2025-01-01 | End: 2025-01-01 | Stop reason: HOSPADM

## 2025-01-01 RX ORDER — PANTOPRAZOLE SODIUM 40 MG/10ML
40 INJECTION, POWDER, LYOPHILIZED, FOR SOLUTION INTRAVENOUS DAILY
Status: DISCONTINUED | OUTPATIENT
Start: 2025-01-01 | End: 2025-01-01 | Stop reason: HOSPADM

## 2025-01-01 RX ORDER — SPIRONOLACTONE 25 MG/1
25 TABLET ORAL DAILY
Status: DISCONTINUED | OUTPATIENT
Start: 2025-01-01 | End: 2025-01-01 | Stop reason: HOSPADM

## 2025-01-01 RX ORDER — ACETAMINOPHEN 325 MG/1
650 TABLET ORAL EVERY 6 HOURS PRN
Status: DISCONTINUED | OUTPATIENT
Start: 2025-01-01 | End: 2025-01-01 | Stop reason: HOSPADM

## 2025-01-01 RX ORDER — CALCIUM GLUCONATE 20 MG/ML
1000 INJECTION, SOLUTION INTRAVENOUS PRN
Status: DISCONTINUED | OUTPATIENT
Start: 2025-01-01 | End: 2025-01-01 | Stop reason: HOSPADM

## 2025-01-01 RX ORDER — DICYCLOMINE HYDROCHLORIDE 10 MG/1
10 CAPSULE ORAL 3 TIMES DAILY PRN
Status: DISCONTINUED | OUTPATIENT
Start: 2025-01-01 | End: 2025-01-01 | Stop reason: HOSPADM

## 2025-01-01 RX ORDER — LOPERAMIDE HYDROCHLORIDE 2 MG/1
4 CAPSULE ORAL 4 TIMES DAILY
Status: DISCONTINUED | OUTPATIENT
Start: 2025-01-01 | End: 2025-01-01

## 2025-01-01 RX ORDER — TRAMADOL HYDROCHLORIDE 50 MG/1
50 TABLET ORAL EVERY 6 HOURS PRN
Status: DISCONTINUED | OUTPATIENT
Start: 2025-01-01 | End: 2025-01-01 | Stop reason: HOSPADM

## 2025-01-01 RX ORDER — ENOXAPARIN SODIUM 100 MG/ML
40 INJECTION SUBCUTANEOUS EVERY EVENING
Status: DISCONTINUED | OUTPATIENT
Start: 2025-01-01 | End: 2025-01-01

## 2025-01-01 RX ORDER — MAGNESIUM SULFATE 1 G/100ML
1000 INJECTION INTRAVENOUS PRN
Status: DISCONTINUED | OUTPATIENT
Start: 2025-01-01 | End: 2025-01-01 | Stop reason: HOSPADM

## 2025-01-01 RX ORDER — CALCIUM GLUCONATE 20 MG/ML
2000 INJECTION, SOLUTION INTRAVENOUS PRN
Status: DISCONTINUED | OUTPATIENT
Start: 2025-01-01 | End: 2025-01-01 | Stop reason: HOSPADM

## 2025-01-01 RX ORDER — SODIUM BICARBONATE 650 MG/1
325 TABLET ORAL DAILY
Status: DISCONTINUED | OUTPATIENT
Start: 2025-01-01 | End: 2025-01-01

## 2025-01-01 RX ORDER — CALCIUM GLUCONATE 20 MG/ML
2000 INJECTION, SOLUTION INTRAVENOUS ONCE
Status: COMPLETED | OUTPATIENT
Start: 2025-01-01 | End: 2025-01-01

## 2025-01-01 RX ORDER — DEXTROSE MONOHYDRATE 100 MG/ML
INJECTION, SOLUTION INTRAVENOUS CONTINUOUS PRN
Status: DISCONTINUED | OUTPATIENT
Start: 2025-01-01 | End: 2025-01-01 | Stop reason: HOSPADM

## 2025-01-01 RX ORDER — DEXTROSE MONOHYDRATE AND SODIUM CHLORIDE 5; .45 G/100ML; G/100ML
INJECTION, SOLUTION INTRAVENOUS CONTINUOUS
Status: DISCONTINUED | OUTPATIENT
Start: 2025-01-01 | End: 2025-01-01

## 2025-01-01 RX ORDER — VALGANCICLOVIR 450 MG/1
900 TABLET, FILM COATED ORAL 2 TIMES DAILY
Qty: 120 TABLET | Refills: 2 | Status: SHIPPED | OUTPATIENT
Start: 2025-01-01 | End: 2025-03-01

## 2025-01-01 RX ORDER — LOPERAMIDE HCL 1 MG/7.5ML
4 SOLUTION ORAL 4 TIMES DAILY
Status: DISCONTINUED | OUTPATIENT
Start: 2025-01-01 | End: 2025-01-01

## 2025-01-01 RX ORDER — MAGNESIUM SULFATE IN WATER 40 MG/ML
2000 INJECTION, SOLUTION INTRAVENOUS
Status: DISCONTINUED | OUTPATIENT
Start: 2025-01-01 | End: 2025-01-01

## 2025-01-01 RX ORDER — POTASSIUM CHLORIDE 1500 MG/1
20 TABLET, EXTENDED RELEASE ORAL DAILY
Status: DISCONTINUED | OUTPATIENT
Start: 2025-01-01 | End: 2025-01-01

## 2025-01-01 RX ORDER — PREDNISONE 5 MG/1
5 TABLET ORAL DAILY
Status: DISCONTINUED | OUTPATIENT
Start: 2025-03-05 | End: 2025-01-01

## 2025-01-01 RX ORDER — ACYCLOVIR 400 MG/1
400 TABLET ORAL 2 TIMES DAILY
Status: DISCONTINUED | OUTPATIENT
Start: 2025-01-01 | End: 2025-01-01

## 2025-01-01 RX ADMIN — POTASSIUM CHLORIDE 20 MEQ: 400 INJECTION, SOLUTION INTRAVENOUS at 19:41

## 2025-01-01 RX ADMIN — DIPHENOXYLATE HYDROCHLORIDE AND ATROPINE SULFATE 1 TABLET: 2.5; .025 TABLET ORAL at 22:01

## 2025-01-01 RX ADMIN — DIPHENOXYLATE HYDROCHLORIDE AND ATROPINE SULFATE 1 TABLET: 2.5; .025 TABLET ORAL at 17:12

## 2025-01-01 RX ADMIN — POTASSIUM CHLORIDE 20 MEQ: 400 INJECTION, SOLUTION INTRAVENOUS at 13:06

## 2025-01-01 RX ADMIN — POTASSIUM CHLORIDE 20 MEQ: 400 INJECTION, SOLUTION INTRAVENOUS at 07:14

## 2025-01-01 RX ADMIN — VALGANCICLOVIR 900 MG: 450 TABLET, FILM COATED ORAL at 09:38

## 2025-01-01 RX ADMIN — VORICONAZOLE 200 MG: 200 TABLET ORAL at 20:55

## 2025-01-01 RX ADMIN — LOPERAMIDE HYDROCHLORIDE 4 MG: 2 CAPSULE ORAL at 08:29

## 2025-01-01 RX ADMIN — DIPHENOXYLATE HYDROCHLORIDE AND ATROPINE SULFATE 1 TABLET: 2.5; .025 TABLET ORAL at 12:10

## 2025-01-01 RX ADMIN — OCTREOTIDE ACETATE 100 MCG: 100 INJECTION, SOLUTION INTRAVENOUS; SUBCUTANEOUS at 17:14

## 2025-01-01 RX ADMIN — LOPERAMIDE HYDROCHLORIDE 4 MG: 2 CAPSULE ORAL at 17:52

## 2025-01-01 RX ADMIN — VALGANCICLOVIR 900 MG: 450 TABLET, FILM COATED ORAL at 08:29

## 2025-01-01 RX ADMIN — SODIUM CHLORIDE, PRESERVATIVE FREE 10 ML: 5 INJECTION INTRAVENOUS at 08:32

## 2025-01-01 RX ADMIN — SODIUM BICARBONATE 650 MG: 650 TABLET ORAL at 09:39

## 2025-01-01 RX ADMIN — ENOXAPARIN SODIUM 40 MG: 100 INJECTION SUBCUTANEOUS at 17:24

## 2025-01-01 RX ADMIN — OCTREOTIDE ACETATE 100 MCG: 100 INJECTION, SOLUTION INTRAVENOUS; SUBCUTANEOUS at 17:23

## 2025-01-01 RX ADMIN — WATER 70 MG: 1 INJECTION INTRAMUSCULAR; INTRAVENOUS; SUBCUTANEOUS at 08:44

## 2025-01-01 RX ADMIN — BUDESONIDE 3 MG: 3 CAPSULE, GELATIN COATED ORAL at 08:45

## 2025-01-01 RX ADMIN — THERA TABS 1 TABLET: TAB at 09:03

## 2025-01-01 RX ADMIN — DIPHENOXYLATE HYDROCHLORIDE AND ATROPINE SULFATE 2 TABLET: 2.5; .025 TABLET ORAL at 03:47

## 2025-01-01 RX ADMIN — SODIUM CHLORIDE, PRESERVATIVE FREE 10 ML: 5 INJECTION INTRAVENOUS at 10:12

## 2025-01-01 RX ADMIN — SODIUM CHLORIDE, PRESERVATIVE FREE 10 ML: 5 INJECTION INTRAVENOUS at 20:08

## 2025-01-01 RX ADMIN — SULFAMETHOXAZOLE AND TRIMETHOPRIM 1 TABLET: 400; 80 TABLET ORAL at 22:08

## 2025-01-01 RX ADMIN — PREDNISONE 70 MG: 50 TABLET ORAL at 08:12

## 2025-01-01 RX ADMIN — THIAMINE HCL TAB 100 MG 100 MG: 100 TAB at 08:02

## 2025-01-01 RX ADMIN — DIPHENOXYLATE HYDROCHLORIDE AND ATROPINE SULFATE 2 TABLET: 2.5; .025 TABLET ORAL at 17:13

## 2025-01-01 RX ADMIN — VALGANCICLOVIR 900 MG: 450 TABLET, FILM COATED ORAL at 21:01

## 2025-01-01 RX ADMIN — VORICONAZOLE 200 MG: 200 TABLET ORAL at 08:12

## 2025-01-01 RX ADMIN — POTASSIUM CHLORIDE: 2 INJECTION, SOLUTION, CONCENTRATE INTRAVENOUS at 11:10

## 2025-01-01 RX ADMIN — BUDESONIDE 3 MG: 3 CAPSULE, GELATIN COATED ORAL at 20:39

## 2025-01-01 RX ADMIN — POTASSIUM CHLORIDE: 2 INJECTION, SOLUTION, CONCENTRATE INTRAVENOUS at 04:19

## 2025-01-01 RX ADMIN — DIPHENOXYLATE HYDROCHLORIDE AND ATROPINE SULFATE 1 TABLET: 2.5; .025 TABLET ORAL at 13:54

## 2025-01-01 RX ADMIN — DIPHENOXYLATE HYDROCHLORIDE AND ATROPINE SULFATE 1 TABLET: 2.5; .025 TABLET ORAL at 08:12

## 2025-01-01 RX ADMIN — SODIUM CHLORIDE 15 ML: 900 IRRIGANT IRRIGATION at 09:06

## 2025-01-01 RX ADMIN — BUDESONIDE 3 MG: 3 CAPSULE, GELATIN COATED ORAL at 21:48

## 2025-01-01 RX ADMIN — SULFAMETHOXAZOLE AND TRIMETHOPRIM 1 TABLET: 400; 80 TABLET ORAL at 22:09

## 2025-01-01 RX ADMIN — INSULIN LISPRO 1 UNITS: 100 INJECTION, SOLUTION INTRAVENOUS; SUBCUTANEOUS at 21:18

## 2025-01-01 RX ADMIN — VORICONAZOLE 200 MG: 200 TABLET ORAL at 21:14

## 2025-01-01 RX ADMIN — POTASSIUM CHLORIDE 20 MEQ: 400 INJECTION, SOLUTION INTRAVENOUS at 04:59

## 2025-01-01 RX ADMIN — SODIUM CHLORIDE 15 ML: 900 IRRIGANT IRRIGATION at 11:47

## 2025-01-01 RX ADMIN — VALGANCICLOVIR 900 MG: 450 TABLET, FILM COATED ORAL at 08:47

## 2025-01-01 RX ADMIN — THIAMINE HCL TAB 100 MG 100 MG: 100 TAB at 09:23

## 2025-01-01 RX ADMIN — POTASSIUM BICARBONATE 40 MEQ: 782 TABLET, EFFERVESCENT ORAL at 16:31

## 2025-01-01 RX ADMIN — INFLIXIMAB 800 MG: 100 INJECTION, POWDER, LYOPHILIZED, FOR SOLUTION INTRAVENOUS at 14:06

## 2025-01-01 RX ADMIN — SODIUM CHLORIDE, PRESERVATIVE FREE 10 ML: 5 INJECTION INTRAVENOUS at 09:19

## 2025-01-01 RX ADMIN — SODIUM BICARBONATE 325 MG: 650 TABLET ORAL at 10:23

## 2025-01-01 RX ADMIN — DIPHENOXYLATE HYDROCHLORIDE AND ATROPINE SULFATE 1 TABLET: 2.5; .025 TABLET ORAL at 13:06

## 2025-01-01 RX ADMIN — SODIUM PHOSPHATE, MONOBASIC, MONOHYDRATE AND SODIUM PHOSPHATE, DIBASIC, ANHYDROUS 30 MMOL: 142; 276 INJECTION, SOLUTION INTRAVENOUS at 09:12

## 2025-01-01 RX ADMIN — POTASSIUM CHLORIDE 20 MEQ: 400 INJECTION, SOLUTION INTRAVENOUS at 05:53

## 2025-01-01 RX ADMIN — ACYCLOVIR 400 MG: 400 TABLET ORAL at 22:36

## 2025-01-01 RX ADMIN — FILGRASTIM-AAFI 300 MCG: 300 INJECTION, SOLUTION SUBCUTANEOUS at 11:09

## 2025-01-01 RX ADMIN — BUDESONIDE 3 MG: 3 CAPSULE, GELATIN COATED ORAL at 08:15

## 2025-01-01 RX ADMIN — BUDESONIDE 3 MG: 3 CAPSULE, GELATIN COATED ORAL at 08:50

## 2025-01-01 RX ADMIN — DIPHENOXYLATE HYDROCHLORIDE AND ATROPINE SULFATE 1 TABLET: 2.5; .025 TABLET ORAL at 14:04

## 2025-01-01 RX ADMIN — ATOVAQUONE 1500 MG: 750 SUSPENSION ORAL at 08:47

## 2025-01-01 RX ADMIN — DIPHENOXYLATE HYDROCHLORIDE AND ATROPINE SULFATE 1 TABLET: 2.5; .025 TABLET ORAL at 08:45

## 2025-01-01 RX ADMIN — LOPERAMIDE HYDROCHLORIDE 4 MG: 2 CAPSULE ORAL at 20:11

## 2025-01-01 RX ADMIN — POTASSIUM CHLORIDE 20 MEQ: 400 INJECTION, SOLUTION INTRAVENOUS at 06:06

## 2025-01-01 RX ADMIN — NITROFURANTOIN (MONOHYDRATE/MACROCRYSTALS) 100 MG: 25; 75 CAPSULE ORAL at 09:39

## 2025-01-01 RX ADMIN — ENOXAPARIN SODIUM 40 MG: 100 INJECTION SUBCUTANEOUS at 18:27

## 2025-01-01 RX ADMIN — SODIUM CHLORIDE 15 ML: 900 IRRIGANT IRRIGATION at 13:51

## 2025-01-01 RX ADMIN — SULFAMETHOXAZOLE AND TRIMETHOPRIM 1 TABLET: 400; 80 TABLET ORAL at 20:43

## 2025-01-01 RX ADMIN — DIPHENOXYLATE HYDROCHLORIDE AND ATROPINE SULFATE 1 TABLET: 2.5; .025 TABLET ORAL at 21:36

## 2025-01-01 RX ADMIN — LOPERAMIDE HYDROCHLORIDE 4 MG: 2 CAPSULE ORAL at 20:10

## 2025-01-01 RX ADMIN — MAGNESIUM SULFATE HEPTAHYDRATE 4000 MG: 40 INJECTION, SOLUTION INTRAVENOUS at 06:19

## 2025-01-01 RX ADMIN — SODIUM CHLORIDE 15 ML: 900 IRRIGANT IRRIGATION at 20:28

## 2025-01-01 RX ADMIN — SODIUM CHLORIDE, PRESERVATIVE FREE 10 ML: 5 INJECTION INTRAVENOUS at 20:56

## 2025-01-01 RX ADMIN — PANTOPRAZOLE SODIUM 40 MG: 40 TABLET, DELAYED RELEASE ORAL at 05:43

## 2025-01-01 RX ADMIN — DIPHENOXYLATE HYDROCHLORIDE AND ATROPINE SULFATE 1 TABLET: 2.5; .025 TABLET ORAL at 20:11

## 2025-01-01 RX ADMIN — POTASSIUM BICARBONATE 40 MEQ: 782 TABLET, EFFERVESCENT ORAL at 08:30

## 2025-01-01 RX ADMIN — POTASSIUM CHLORIDE 40 MEQ: 1500 TABLET, EXTENDED RELEASE ORAL at 08:10

## 2025-01-01 RX ADMIN — NOREPINEPHRINE BITARTRATE 45 MCG/MIN: 16 INJECTION, SOLUTION INTRAVENOUS at 14:37

## 2025-01-01 RX ADMIN — ACYCLOVIR 400 MG: 400 TABLET ORAL at 20:26

## 2025-01-01 RX ADMIN — POTASSIUM BICARBONATE 40 MEQ: 782 TABLET, EFFERVESCENT ORAL at 15:58

## 2025-01-01 RX ADMIN — SODIUM CHLORIDE, PRESERVATIVE FREE 10 ML: 5 INJECTION INTRAVENOUS at 21:14

## 2025-01-01 RX ADMIN — DIPHENOXYLATE HYDROCHLORIDE AND ATROPINE SULFATE 1 TABLET: 2.5; .025 TABLET ORAL at 17:28

## 2025-01-01 RX ADMIN — WATER 70 MG: 1 INJECTION INTRAMUSCULAR; INTRAVENOUS; SUBCUTANEOUS at 09:13

## 2025-01-01 RX ADMIN — DIPHENOXYLATE HYDROCHLORIDE AND ATROPINE SULFATE 1 TABLET: 2.5; .025 TABLET ORAL at 20:58

## 2025-01-01 RX ADMIN — SODIUM CHLORIDE 15 ML: 900 IRRIGANT IRRIGATION at 09:52

## 2025-01-01 RX ADMIN — LOPERAMIDE HYDROCHLORIDE 4 MG: 2 CAPSULE ORAL at 20:08

## 2025-01-01 RX ADMIN — SODIUM CHLORIDE, PRESERVATIVE FREE 10 ML: 5 INJECTION INTRAVENOUS at 20:02

## 2025-01-01 RX ADMIN — ERGOCALCIFEROL 50000 UNITS: 1.25 CAPSULE ORAL at 15:02

## 2025-01-01 RX ADMIN — THIAMINE HCL TAB 100 MG 100 MG: 100 TAB at 08:41

## 2025-01-01 RX ADMIN — WATER 70 MG: 1 INJECTION INTRAMUSCULAR; INTRAVENOUS; SUBCUTANEOUS at 10:22

## 2025-01-01 RX ADMIN — ENOXAPARIN SODIUM 40 MG: 100 INJECTION SUBCUTANEOUS at 18:06

## 2025-01-01 RX ADMIN — DIPHENOXYLATE HYDROCHLORIDE AND ATROPINE SULFATE 1 TABLET: 2.5; .025 TABLET ORAL at 12:56

## 2025-01-01 RX ADMIN — POTASSIUM CHLORIDE 40 MEQ: 1500 TABLET, EXTENDED RELEASE ORAL at 16:52

## 2025-01-01 RX ADMIN — POTASSIUM CHLORIDE 40 MEQ: 1500 TABLET, EXTENDED RELEASE ORAL at 17:10

## 2025-01-01 RX ADMIN — WATER 70 MG: 1 INJECTION INTRAMUSCULAR; INTRAVENOUS; SUBCUTANEOUS at 09:09

## 2025-01-01 RX ADMIN — POTASSIUM CHLORIDE 20 MEQ: 400 INJECTION, SOLUTION INTRAVENOUS at 08:45

## 2025-01-01 RX ADMIN — PANTOPRAZOLE SODIUM 40 MG: 40 TABLET, DELAYED RELEASE ORAL at 05:58

## 2025-01-01 RX ADMIN — THIAMINE HCL TAB 100 MG 100 MG: 100 TAB at 10:14

## 2025-01-01 RX ADMIN — THIAMINE HCL TAB 100 MG 100 MG: 100 TAB at 08:12

## 2025-01-01 RX ADMIN — SULFAMETHOXAZOLE AND TRIMETHOPRIM 1 TABLET: 400; 80 TABLET ORAL at 21:13

## 2025-01-01 RX ADMIN — METOPROLOL TARTRATE 25 MG: 25 TABLET, FILM COATED ORAL at 20:52

## 2025-01-01 RX ADMIN — POTASSIUM BICARBONATE 40 MEQ: 782 TABLET, EFFERVESCENT ORAL at 09:03

## 2025-01-01 RX ADMIN — THERA TABS 1 TABLET: TAB at 08:02

## 2025-01-01 RX ADMIN — LOPERAMIDE HYDROCHLORIDE 4 MG: 2 CAPSULE ORAL at 19:44

## 2025-01-01 RX ADMIN — ACYCLOVIR 400 MG: 400 TABLET ORAL at 20:58

## 2025-01-01 RX ADMIN — SULFAMETHOXAZOLE AND TRIMETHOPRIM 1 TABLET: 400; 80 TABLET ORAL at 22:05

## 2025-01-01 RX ADMIN — VORICONAZOLE 200 MG: 200 TABLET ORAL at 22:36

## 2025-01-01 RX ADMIN — POTASSIUM CHLORIDE 40 MEQ: 1500 TABLET, EXTENDED RELEASE ORAL at 15:50

## 2025-01-01 RX ADMIN — SODIUM CHLORIDE, PRESERVATIVE FREE 10 ML: 5 INJECTION INTRAVENOUS at 09:22

## 2025-01-01 RX ADMIN — PREDNISONE 55 MG: 50 TABLET ORAL at 08:18

## 2025-01-01 RX ADMIN — SPIRONOLACTONE 25 MG: 25 TABLET ORAL at 09:38

## 2025-01-01 RX ADMIN — THERA TABS 1 TABLET: TAB at 09:22

## 2025-01-01 RX ADMIN — POTASSIUM BICARBONATE 40 MEQ: 782 TABLET, EFFERVESCENT ORAL at 15:21

## 2025-01-01 RX ADMIN — DIPHENOXYLATE HYDROCHLORIDE AND ATROPINE SULFATE 1 TABLET: 2.5; .025 TABLET ORAL at 11:40

## 2025-01-01 RX ADMIN — OCTREOTIDE ACETATE 100 MCG: 100 INJECTION, SOLUTION INTRAVENOUS; SUBCUTANEOUS at 16:52

## 2025-01-01 RX ADMIN — FUROSEMIDE 20 MG: 10 INJECTION, SOLUTION INTRAMUSCULAR; INTRAVENOUS at 11:36

## 2025-01-01 RX ADMIN — LOPERAMIDE HYDROCHLORIDE 4 MG: 2 CAPSULE ORAL at 08:36

## 2025-01-01 RX ADMIN — VORICONAZOLE 200 MG: 200 TABLET ORAL at 08:47

## 2025-01-01 RX ADMIN — BUDESONIDE 3 MG: 3 CAPSULE, GELATIN COATED ORAL at 15:00

## 2025-01-01 RX ADMIN — SODIUM CHLORIDE 15 ML: 900 IRRIGANT IRRIGATION at 20:27

## 2025-01-01 RX ADMIN — SODIUM CHLORIDE, PRESERVATIVE FREE 10 ML: 5 INJECTION INTRAVENOUS at 08:51

## 2025-01-01 RX ADMIN — POTASSIUM BICARBONATE 40 MEQ: 782 TABLET, EFFERVESCENT ORAL at 08:12

## 2025-01-01 RX ADMIN — POTASSIUM BICARBONATE 40 MEQ: 782 TABLET, EFFERVESCENT ORAL at 08:19

## 2025-01-01 RX ADMIN — OCTREOTIDE ACETATE 100 MCG: 100 INJECTION, SOLUTION INTRAVENOUS; SUBCUTANEOUS at 00:57

## 2025-01-01 RX ADMIN — DIPHENOXYLATE HYDROCHLORIDE AND ATROPINE SULFATE 1 TABLET: 2.5; .025 TABLET ORAL at 16:53

## 2025-01-01 RX ADMIN — VALGANCICLOVIR 900 MG: 450 TABLET, FILM COATED ORAL at 20:39

## 2025-01-01 RX ADMIN — ENOXAPARIN SODIUM 40 MG: 100 INJECTION SUBCUTANEOUS at 16:52

## 2025-01-01 RX ADMIN — VORICONAZOLE 200 MG: 200 TABLET ORAL at 08:25

## 2025-01-01 RX ADMIN — BUDESONIDE 3 MG: 3 CAPSULE, GELATIN COATED ORAL at 13:38

## 2025-01-01 RX ADMIN — SODIUM CHLORIDE 15 ML: 900 IRRIGANT IRRIGATION at 20:02

## 2025-01-01 RX ADMIN — THERA TABS 1 TABLET: TAB at 10:14

## 2025-01-01 RX ADMIN — POTASSIUM CHLORIDE 20 MEQ: 400 INJECTION, SOLUTION INTRAVENOUS at 11:16

## 2025-01-01 RX ADMIN — VORICONAZOLE 200 MG: 200 TABLET ORAL at 20:38

## 2025-01-01 RX ADMIN — LOPERAMIDE HYDROCHLORIDE 4 MG: 2 CAPSULE ORAL at 09:07

## 2025-01-01 RX ADMIN — ENOXAPARIN SODIUM 40 MG: 100 INJECTION SUBCUTANEOUS at 18:20

## 2025-01-01 RX ADMIN — SODIUM CHLORIDE: 0.9 INJECTION, SOLUTION INTRAVENOUS at 06:58

## 2025-01-01 RX ADMIN — POTASSIUM BICARBONATE 40 MEQ: 782 TABLET, EFFERVESCENT ORAL at 20:16

## 2025-01-01 RX ADMIN — DIPHENOXYLATE HYDROCHLORIDE AND ATROPINE SULFATE 1 TABLET: 2.5; .025 TABLET ORAL at 16:37

## 2025-01-01 RX ADMIN — SODIUM PHOSPHATE, MONOBASIC, MONOHYDRATE AND SODIUM PHOSPHATE, DIBASIC, ANHYDROUS 30 MMOL: 142; 276 INJECTION, SOLUTION INTRAVENOUS at 09:47

## 2025-01-01 RX ADMIN — SODIUM CHLORIDE: 9 INJECTION, SOLUTION INTRAVENOUS at 13:02

## 2025-01-01 RX ADMIN — LOPERAMIDE HYDROCHLORIDE 4 MG: 2 CAPSULE ORAL at 13:06

## 2025-01-01 RX ADMIN — ACYCLOVIR 400 MG: 400 TABLET ORAL at 20:11

## 2025-01-01 RX ADMIN — POTASSIUM BICARBONATE 40 MEQ: 782 TABLET, EFFERVESCENT ORAL at 19:59

## 2025-01-01 RX ADMIN — OCTREOTIDE ACETATE 100 MCG: 100 INJECTION, SOLUTION INTRAVENOUS; SUBCUTANEOUS at 02:22

## 2025-01-01 RX ADMIN — SODIUM BICARBONATE 1300 MG: 650 TABLET ORAL at 08:42

## 2025-01-01 RX ADMIN — BUDESONIDE 3 MG: 3 CAPSULE, GELATIN COATED ORAL at 21:02

## 2025-01-01 RX ADMIN — SODIUM CHLORIDE, PRESERVATIVE FREE 10 ML: 5 INJECTION INTRAVENOUS at 20:05

## 2025-01-01 RX ADMIN — LOPERAMIDE HYDROCHLORIDE 4 MG: 2 CAPSULE ORAL at 20:46

## 2025-01-01 RX ADMIN — LOPERAMIDE HYDROCHLORIDE 4 MG: 2 CAPSULE ORAL at 19:59

## 2025-01-01 RX ADMIN — SODIUM PHOSPHATE, MONOBASIC, MONOHYDRATE AND SODIUM PHOSPHATE, DIBASIC, ANHYDROUS 30 MMOL: 142; 276 INJECTION, SOLUTION INTRAVENOUS at 07:42

## 2025-01-01 RX ADMIN — POTASSIUM CHLORIDE 20 MEQ: 400 INJECTION, SOLUTION INTRAVENOUS at 08:35

## 2025-01-01 RX ADMIN — SODIUM CHLORIDE 15 ML: 900 IRRIGANT IRRIGATION at 16:43

## 2025-01-01 RX ADMIN — SODIUM CHLORIDE, PRESERVATIVE FREE 10 ML: 5 INJECTION INTRAVENOUS at 08:23

## 2025-01-01 RX ADMIN — SODIUM PHOSPHATE, MONOBASIC, MONOHYDRATE AND SODIUM PHOSPHATE, DIBASIC, ANHYDROUS 30 MMOL: 142; 276 INJECTION, SOLUTION INTRAVENOUS at 07:06

## 2025-01-01 RX ADMIN — ACETAZOLAMIDE 250 MG: 250 TABLET ORAL at 13:33

## 2025-01-01 RX ADMIN — SODIUM CHLORIDE 15 ML: 900 IRRIGANT IRRIGATION at 17:20

## 2025-01-01 RX ADMIN — DIPHENOXYLATE HYDROCHLORIDE AND ATROPINE SULFATE 1 TABLET: 2.5; .025 TABLET ORAL at 09:27

## 2025-01-01 RX ADMIN — DIBASIC SODIUM PHOSPHATE, MONOBASIC POTASSIUM PHOSPHATE AND MONOBASIC SODIUM PHOSPHATE 1 TABLET: 852; 155; 130 TABLET ORAL at 20:43

## 2025-01-01 RX ADMIN — POTASSIUM BICARBONATE 40 MEQ: 782 TABLET, EFFERVESCENT ORAL at 20:38

## 2025-01-01 RX ADMIN — ACYCLOVIR 400 MG: 400 TABLET ORAL at 09:39

## 2025-01-01 RX ADMIN — LOPERAMIDE HYDROCHLORIDE 4 MG: 2 CAPSULE ORAL at 17:20

## 2025-01-01 RX ADMIN — SODIUM CHLORIDE, PRESERVATIVE FREE 10 ML: 5 INJECTION INTRAVENOUS at 08:47

## 2025-01-01 RX ADMIN — VORICONAZOLE 200 MG: 200 TABLET ORAL at 21:48

## 2025-01-01 RX ADMIN — LOPERAMIDE HYDROCHLORIDE 4 MG: 2 CAPSULE ORAL at 17:13

## 2025-01-01 RX ADMIN — INSULIN LISPRO 1 UNITS: 100 INJECTION, SOLUTION INTRAVENOUS; SUBCUTANEOUS at 16:26

## 2025-01-01 RX ADMIN — LOPERAMIDE HYDROCHLORIDE 4 MG: 2 CAPSULE ORAL at 15:58

## 2025-01-01 RX ADMIN — SODIUM CHLORIDE, PRESERVATIVE FREE 10 ML: 5 INJECTION INTRAVENOUS at 09:39

## 2025-01-01 RX ADMIN — POTASSIUM CHLORIDE 40 MEQ: 1500 TABLET, EXTENDED RELEASE ORAL at 09:12

## 2025-01-01 RX ADMIN — SODIUM CHLORIDE, PRESERVATIVE FREE 10 ML: 5 INJECTION INTRAVENOUS at 20:47

## 2025-01-01 RX ADMIN — LOPERAMIDE HYDROCHLORIDE 4 MG: 2 CAPSULE ORAL at 20:07

## 2025-01-01 RX ADMIN — ACYCLOVIR 400 MG: 400 TABLET ORAL at 09:09

## 2025-01-01 RX ADMIN — POTASSIUM BICARBONATE 40 MEQ: 782 TABLET, EFFERVESCENT ORAL at 08:25

## 2025-01-01 RX ADMIN — SODIUM CHLORIDE, PRESERVATIVE FREE 10 ML: 5 INJECTION INTRAVENOUS at 07:50

## 2025-01-01 RX ADMIN — POTASSIUM BICARBONATE 40 MEQ: 782 TABLET, EFFERVESCENT ORAL at 10:23

## 2025-01-01 RX ADMIN — POTASSIUM CHLORIDE 20 MEQ: 400 INJECTION, SOLUTION INTRAVENOUS at 10:45

## 2025-01-01 RX ADMIN — POTASSIUM CHLORIDE 20 MEQ: 400 INJECTION, SOLUTION INTRAVENOUS at 18:02

## 2025-01-01 RX ADMIN — OCTREOTIDE ACETATE 100 MCG: 100 INJECTION, SOLUTION INTRAVENOUS; SUBCUTANEOUS at 00:24

## 2025-01-01 RX ADMIN — DIPHENOXYLATE HYDROCHLORIDE AND ATROPINE SULFATE 1 TABLET: 2.5; .025 TABLET ORAL at 13:47

## 2025-01-01 RX ADMIN — POTASSIUM CHLORIDE 40 MEQ: 1500 TABLET, EXTENDED RELEASE ORAL at 08:41

## 2025-01-01 RX ADMIN — POTASSIUM CHLORIDE 40 MEQ: 1500 TABLET, EXTENDED RELEASE ORAL at 16:12

## 2025-01-01 RX ADMIN — POTASSIUM CHLORIDE 20 MEQ: 400 INJECTION, SOLUTION INTRAVENOUS at 08:59

## 2025-01-01 RX ADMIN — BUDESONIDE 3 MG: 3 CAPSULE, GELATIN COATED ORAL at 15:55

## 2025-01-01 RX ADMIN — VALGANCICLOVIR 900 MG: 450 TABLET, FILM COATED ORAL at 19:56

## 2025-01-01 RX ADMIN — LOPERAMIDE HYDROCHLORIDE 4 MG: 2 CAPSULE ORAL at 16:19

## 2025-01-01 RX ADMIN — SODIUM CHLORIDE, PRESERVATIVE FREE 10 ML: 5 INJECTION INTRAVENOUS at 20:07

## 2025-01-01 RX ADMIN — POTASSIUM BICARBONATE 40 MEQ: 782 TABLET, EFFERVESCENT ORAL at 08:28

## 2025-01-01 RX ADMIN — POTASSIUM CHLORIDE 20 MEQ: 400 INJECTION, SOLUTION INTRAVENOUS at 05:07

## 2025-01-01 RX ADMIN — SODIUM CHLORIDE, PRESERVATIVE FREE 10 ML: 5 INJECTION INTRAVENOUS at 20:35

## 2025-01-01 RX ADMIN — POTASSIUM BICARBONATE 40 MEQ: 782 TABLET, EFFERVESCENT ORAL at 08:38

## 2025-01-01 RX ADMIN — DIPHENOXYLATE HYDROCHLORIDE AND ATROPINE SULFATE 1 TABLET: 2.5; .025 TABLET ORAL at 08:26

## 2025-01-01 RX ADMIN — PANTOPRAZOLE SODIUM 40 MG: 40 TABLET, DELAYED RELEASE ORAL at 06:12

## 2025-01-01 RX ADMIN — OCTREOTIDE ACETATE 100 MCG: 100 INJECTION, SOLUTION INTRAVENOUS; SUBCUTANEOUS at 22:09

## 2025-01-01 RX ADMIN — VORICONAZOLE 200 MG: 200 TABLET ORAL at 10:14

## 2025-01-01 RX ADMIN — LOPERAMIDE HYDROCHLORIDE 4 MG: 2 CAPSULE ORAL at 08:45

## 2025-01-01 RX ADMIN — OCTREOTIDE ACETATE 100 MCG: 100 INJECTION, SOLUTION INTRAVENOUS; SUBCUTANEOUS at 11:16

## 2025-01-01 RX ADMIN — PREDNISONE 70 MG: 50 TABLET ORAL at 09:03

## 2025-01-01 RX ADMIN — SULFAMETHOXAZOLE AND TRIMETHOPRIM 1 TABLET: 400; 80 TABLET ORAL at 21:48

## 2025-01-01 RX ADMIN — BUDESONIDE 3 MG: 3 CAPSULE, GELATIN COATED ORAL at 09:03

## 2025-01-01 RX ADMIN — THIAMINE HCL TAB 100 MG 100 MG: 100 TAB at 09:22

## 2025-01-01 RX ADMIN — SODIUM BICARBONATE 650 MG: 650 TABLET ORAL at 20:10

## 2025-01-01 RX ADMIN — SODIUM PHOSPHATE, MONOBASIC, MONOHYDRATE AND SODIUM PHOSPHATE, DIBASIC, ANHYDROUS 30 MMOL: 142; 276 INJECTION, SOLUTION INTRAVENOUS at 06:47

## 2025-01-01 RX ADMIN — THIAMINE HCL TAB 100 MG 100 MG: 100 TAB at 08:36

## 2025-01-01 RX ADMIN — LOPERAMIDE HYDROCHLORIDE 2 MG: 2 CAPSULE ORAL at 01:03

## 2025-01-01 RX ADMIN — POTASSIUM BICARBONATE 40 MEQ: 782 TABLET, EFFERVESCENT ORAL at 08:36

## 2025-01-01 RX ADMIN — BUDESONIDE 3 MG: 3 CAPSULE, GELATIN COATED ORAL at 20:25

## 2025-01-01 RX ADMIN — SODIUM CHLORIDE, PRESERVATIVE FREE 10 ML: 5 INJECTION INTRAVENOUS at 08:24

## 2025-01-01 RX ADMIN — ENOXAPARIN SODIUM 40 MG: 100 INJECTION SUBCUTANEOUS at 17:52

## 2025-01-01 RX ADMIN — THERA TABS 1 TABLET: TAB at 08:18

## 2025-01-01 RX ADMIN — EPINEPHRINE 1 MCG/MIN: 1 INJECTION INTRAMUSCULAR; INTRAVENOUS; SUBCUTANEOUS at 14:37

## 2025-01-01 RX ADMIN — SULFAMETHOXAZOLE AND TRIMETHOPRIM 1 TABLET: 400; 80 TABLET ORAL at 21:33

## 2025-01-01 RX ADMIN — VORICONAZOLE 200 MG: 200 TABLET ORAL at 20:27

## 2025-01-01 RX ADMIN — DIPHENOXYLATE HYDROCHLORIDE AND ATROPINE SULFATE 1 TABLET: 2.5; .025 TABLET ORAL at 13:08

## 2025-01-01 RX ADMIN — BUDESONIDE 3 MG: 3 CAPSULE, GELATIN COATED ORAL at 17:59

## 2025-01-01 RX ADMIN — OCTREOTIDE ACETATE 100 MCG: 100 INJECTION, SOLUTION INTRAVENOUS; SUBCUTANEOUS at 12:28

## 2025-01-01 RX ADMIN — DIPHENOXYLATE HYDROCHLORIDE AND ATROPINE SULFATE 1 TABLET: 2.5; .025 TABLET ORAL at 09:12

## 2025-01-01 RX ADMIN — DIPHENOXYLATE HYDROCHLORIDE AND ATROPINE SULFATE 2 TABLET: 2.5; .025 TABLET ORAL at 08:14

## 2025-01-01 RX ADMIN — ENOXAPARIN SODIUM 40 MG: 100 INJECTION SUBCUTANEOUS at 17:02

## 2025-01-01 RX ADMIN — LOPERAMIDE HYDROCHLORIDE 4 MG: 2 CAPSULE ORAL at 14:50

## 2025-01-01 RX ADMIN — SULFAMETHOXAZOLE AND TRIMETHOPRIM 1 TABLET: 400; 80 TABLET ORAL at 23:21

## 2025-01-01 RX ADMIN — SODIUM CHLORIDE 15 ML: 900 IRRIGANT IRRIGATION at 20:05

## 2025-01-01 RX ADMIN — PREDNISONE 45 MG: 5 TABLET ORAL at 09:07

## 2025-01-01 RX ADMIN — LOPERAMIDE HYDROCHLORIDE 2 MG: 2 CAPSULE ORAL at 13:47

## 2025-01-01 RX ADMIN — SODIUM CHLORIDE: 9 INJECTION, SOLUTION INTRAVENOUS at 21:09

## 2025-01-01 RX ADMIN — SODIUM CHLORIDE, PRESERVATIVE FREE 10 ML: 5 INJECTION INTRAVENOUS at 09:03

## 2025-01-01 RX ADMIN — ACYCLOVIR 400 MG: 400 TABLET ORAL at 09:03

## 2025-01-01 RX ADMIN — LOPERAMIDE HYDROCHLORIDE 4 MG: 2 CAPSULE ORAL at 16:31

## 2025-01-01 RX ADMIN — POTASSIUM CHLORIDE AND SODIUM CHLORIDE: 900; 150 INJECTION, SOLUTION INTRAVENOUS at 18:38

## 2025-01-01 RX ADMIN — LOPERAMIDE HYDROCHLORIDE 4 MG: 2 CAPSULE ORAL at 16:12

## 2025-01-01 RX ADMIN — OCTREOTIDE ACETATE 100 MCG: 100 INJECTION, SOLUTION INTRAVENOUS; SUBCUTANEOUS at 06:23

## 2025-01-01 RX ADMIN — SULFAMETHOXAZOLE AND TRIMETHOPRIM 1 TABLET: 400; 80 TABLET ORAL at 23:13

## 2025-01-01 RX ADMIN — ACYCLOVIR 400 MG: 400 TABLET ORAL at 20:10

## 2025-01-01 RX ADMIN — ACYCLOVIR 400 MG: 400 TABLET ORAL at 08:18

## 2025-01-01 RX ADMIN — SODIUM CHLORIDE 500 ML: 900 INJECTION, SOLUTION INTRAVENOUS at 08:11

## 2025-01-01 RX ADMIN — LOPERAMIDE HYDROCHLORIDE 4 MG: 2 CAPSULE ORAL at 13:13

## 2025-01-01 RX ADMIN — SULFAMETHOXAZOLE AND TRIMETHOPRIM 1 TABLET: 400; 80 TABLET ORAL at 22:36

## 2025-01-01 RX ADMIN — LOPERAMIDE HYDROCHLORIDE 4 MG: 2 CAPSULE ORAL at 08:18

## 2025-01-01 RX ADMIN — BUDESONIDE 3 MG: 3 CAPSULE, GELATIN COATED ORAL at 20:08

## 2025-01-01 RX ADMIN — DIPHENOXYLATE HYDROCHLORIDE AND ATROPINE SULFATE 1 TABLET: 2.5; .025 TABLET ORAL at 15:50

## 2025-01-01 RX ADMIN — SODIUM BICARBONATE 650 MG: 650 TABLET ORAL at 09:23

## 2025-01-01 RX ADMIN — OCTREOTIDE ACETATE 100 MCG: 100 INJECTION, SOLUTION INTRAVENOUS; SUBCUTANEOUS at 09:46

## 2025-01-01 RX ADMIN — SODIUM CHLORIDE, PRESERVATIVE FREE 10 ML: 5 INJECTION INTRAVENOUS at 08:26

## 2025-01-01 RX ADMIN — POTASSIUM BICARBONATE 40 MEQ: 782 TABLET, EFFERVESCENT ORAL at 20:28

## 2025-01-01 RX ADMIN — METOPROLOL TARTRATE 25 MG: 25 TABLET, FILM COATED ORAL at 09:38

## 2025-01-01 RX ADMIN — DIPHENOXYLATE HYDROCHLORIDE AND ATROPINE SULFATE 1 TABLET: 2.5; .025 TABLET ORAL at 15:27

## 2025-01-01 RX ADMIN — OCTREOTIDE ACETATE 100 MCG: 100 INJECTION, SOLUTION INTRAVENOUS; SUBCUTANEOUS at 04:49

## 2025-01-01 RX ADMIN — DIPHENOXYLATE HYDROCHLORIDE AND ATROPINE SULFATE 1 TABLET: 2.5; .025 TABLET ORAL at 13:38

## 2025-01-01 RX ADMIN — VALGANCICLOVIR 900 MG: 450 TABLET, FILM COATED ORAL at 20:38

## 2025-01-01 RX ADMIN — POTASSIUM CHLORIDE 20 MEQ: 400 INJECTION, SOLUTION INTRAVENOUS at 09:14

## 2025-01-01 RX ADMIN — SODIUM BICARBONATE 650 MG: 650 TABLET ORAL at 20:26

## 2025-01-01 RX ADMIN — THIAMINE HCL TAB 100 MG 100 MG: 100 TAB at 09:08

## 2025-01-01 RX ADMIN — ACYCLOVIR 400 MG: 400 TABLET ORAL at 20:27

## 2025-01-01 RX ADMIN — ANTACID TABLETS 500 MG: 500 TABLET, CHEWABLE ORAL at 13:00

## 2025-01-01 RX ADMIN — POTASSIUM CHLORIDE 20 MEQ: 400 INJECTION, SOLUTION INTRAVENOUS at 04:29

## 2025-01-01 RX ADMIN — BUDESONIDE 3 MG: 3 CAPSULE, GELATIN COATED ORAL at 20:26

## 2025-01-01 RX ADMIN — THERA TABS 1 TABLET: TAB at 09:09

## 2025-01-01 RX ADMIN — DIPHENOXYLATE HYDROCHLORIDE AND ATROPINE SULFATE 1 TABLET: 2.5; .025 TABLET ORAL at 09:02

## 2025-01-01 RX ADMIN — SULFAMETHOXAZOLE AND TRIMETHOPRIM 1 TABLET: 400; 80 TABLET ORAL at 20:50

## 2025-01-01 RX ADMIN — POTASSIUM BICARBONATE 40 MEQ: 782 TABLET, EFFERVESCENT ORAL at 20:43

## 2025-01-01 RX ADMIN — LOPERAMIDE HYDROCHLORIDE 4 MG: 2 CAPSULE ORAL at 12:26

## 2025-01-01 RX ADMIN — SODIUM PHOSPHATE, MONOBASIC, MONOHYDRATE AND SODIUM PHOSPHATE, DIBASIC, ANHYDROUS 30 MMOL: 142; 276 INJECTION, SOLUTION INTRAVENOUS at 10:32

## 2025-01-01 RX ADMIN — ACYCLOVIR 400 MG: 400 TABLET ORAL at 20:04

## 2025-01-01 RX ADMIN — THIAMINE HCL TAB 100 MG 100 MG: 100 TAB at 09:39

## 2025-01-01 RX ADMIN — BUDESONIDE 3 MG: 3 CAPSULE, GELATIN COATED ORAL at 14:37

## 2025-01-01 RX ADMIN — POTASSIUM CHLORIDE 20 MEQ: 400 INJECTION, SOLUTION INTRAVENOUS at 06:02

## 2025-01-01 RX ADMIN — THERA TABS 1 TABLET: TAB at 08:26

## 2025-01-01 RX ADMIN — PANTOPRAZOLE SODIUM 40 MG: 40 TABLET, DELAYED RELEASE ORAL at 06:23

## 2025-01-01 RX ADMIN — VORICONAZOLE 200 MG: 200 TABLET ORAL at 20:34

## 2025-01-01 RX ADMIN — POTASSIUM BICARBONATE 40 MEQ: 782 TABLET, EFFERVESCENT ORAL at 09:33

## 2025-01-01 RX ADMIN — OCTREOTIDE ACETATE 100 MCG: 100 INJECTION, SOLUTION INTRAVENOUS; SUBCUTANEOUS at 14:10

## 2025-01-01 RX ADMIN — VORICONAZOLE 200 MG: 200 TABLET ORAL at 09:09

## 2025-01-01 RX ADMIN — POTASSIUM CHLORIDE 20 MEQ: 400 INJECTION, SOLUTION INTRAVENOUS at 10:24

## 2025-01-01 RX ADMIN — LOPERAMIDE HYDROCHLORIDE 4 MG: 2 CAPSULE ORAL at 14:42

## 2025-01-01 RX ADMIN — ACETAMINOPHEN 650 MG: 325 TABLET ORAL at 06:08

## 2025-01-01 RX ADMIN — ACYCLOVIR 400 MG: 400 TABLET ORAL at 20:34

## 2025-01-01 RX ADMIN — BUDESONIDE 3 MG: 3 CAPSULE, GELATIN COATED ORAL at 16:12

## 2025-01-01 RX ADMIN — DIPHENOXYLATE HYDROCHLORIDE AND ATROPINE SULFATE 1 TABLET: 2.5; .025 TABLET ORAL at 16:21

## 2025-01-01 RX ADMIN — INSULIN LISPRO 1 UNITS: 100 INJECTION, SOLUTION INTRAVENOUS; SUBCUTANEOUS at 16:52

## 2025-01-01 RX ADMIN — LOPERAMIDE HYDROCHLORIDE 4 MG: 2 CAPSULE ORAL at 16:45

## 2025-01-01 RX ADMIN — POTASSIUM BICARBONATE 40 MEQ: 782 TABLET, EFFERVESCENT ORAL at 21:08

## 2025-01-01 RX ADMIN — ACYCLOVIR 400 MG: 400 TABLET ORAL at 09:12

## 2025-01-01 RX ADMIN — DIPHENOXYLATE HYDROCHLORIDE AND ATROPINE SULFATE 1 TABLET: 2.5; .025 TABLET ORAL at 18:27

## 2025-01-01 RX ADMIN — ACYCLOVIR 400 MG: 400 TABLET ORAL at 08:31

## 2025-01-01 RX ADMIN — LOPERAMIDE HYDROCHLORIDE 4 MG: 2 CAPSULE ORAL at 07:50

## 2025-01-01 RX ADMIN — DEXTROSE AND SODIUM CHLORIDE: 5; 450 INJECTION, SOLUTION INTRAVENOUS at 11:01

## 2025-01-01 RX ADMIN — VORICONAZOLE 200 MG: 200 TABLET ORAL at 11:08

## 2025-01-01 RX ADMIN — SODIUM CHLORIDE, PRESERVATIVE FREE 10 ML: 5 INJECTION INTRAVENOUS at 20:27

## 2025-01-01 RX ADMIN — LOPERAMIDE HYDROCHLORIDE 4 MG: 2 CAPSULE ORAL at 21:01

## 2025-01-01 RX ADMIN — PANTOPRAZOLE SODIUM 40 MG: 40 TABLET, DELAYED RELEASE ORAL at 06:34

## 2025-01-01 RX ADMIN — ACYCLOVIR 400 MG: 400 TABLET ORAL at 10:14

## 2025-01-01 RX ADMIN — THIAMINE HCL TAB 100 MG 100 MG: 100 TAB at 09:03

## 2025-01-01 RX ADMIN — DIPHENOXYLATE HYDROCHLORIDE AND ATROPINE SULFATE 1 TABLET: 2.5; .025 TABLET ORAL at 13:02

## 2025-01-01 RX ADMIN — DICYCLOMINE HYDROCHLORIDE 10 MG: 10 CAPSULE ORAL at 03:33

## 2025-01-01 RX ADMIN — ACYCLOVIR 400 MG: 400 TABLET ORAL at 09:23

## 2025-01-01 RX ADMIN — POTASSIUM CHLORIDE 20 MEQ: 400 INJECTION, SOLUTION INTRAVENOUS at 06:11

## 2025-01-01 RX ADMIN — ATOVAQUONE 1500 MG: 750 SUSPENSION ORAL at 08:35

## 2025-01-01 RX ADMIN — DIPHENOXYLATE HYDROCHLORIDE AND ATROPINE SULFATE 1 TABLET: 2.5; .025 TABLET ORAL at 21:48

## 2025-01-01 RX ADMIN — POTASSIUM BICARBONATE 40 MEQ: 782 TABLET, EFFERVESCENT ORAL at 20:26

## 2025-01-01 RX ADMIN — THIAMINE HCL TAB 100 MG 100 MG: 100 TAB at 07:50

## 2025-01-01 RX ADMIN — DIPHENOXYLATE HYDROCHLORIDE AND ATROPINE SULFATE 1 TABLET: 2.5; .025 TABLET ORAL at 21:02

## 2025-01-01 RX ADMIN — LOPERAMIDE HYDROCHLORIDE 4 MG: 2 CAPSULE ORAL at 20:54

## 2025-01-01 RX ADMIN — SODIUM CHLORIDE: 9 INJECTION, SOLUTION INTRAVENOUS at 20:05

## 2025-01-01 RX ADMIN — SODIUM BICARBONATE 650 MG: 650 TABLET ORAL at 19:58

## 2025-01-01 RX ADMIN — SPIRONOLACTONE 25 MG: 25 TABLET ORAL at 08:47

## 2025-01-01 RX ADMIN — POTASSIUM BICARBONATE 40 MEQ: 782 TABLET, EFFERVESCENT ORAL at 21:48

## 2025-01-01 RX ADMIN — ACYCLOVIR 400 MG: 400 TABLET ORAL at 08:41

## 2025-01-01 RX ADMIN — ACYCLOVIR 400 MG: 400 TABLET ORAL at 07:50

## 2025-01-01 RX ADMIN — SODIUM BICARBONATE 650 MG: 650 TABLET ORAL at 08:18

## 2025-01-01 RX ADMIN — THIAMINE HCL TAB 100 MG 100 MG: 100 TAB at 08:29

## 2025-01-01 RX ADMIN — POTASSIUM CHLORIDE 20 MEQ: 400 INJECTION, SOLUTION INTRAVENOUS at 10:42

## 2025-01-01 RX ADMIN — VORICONAZOLE 200 MG: 200 TABLET ORAL at 21:16

## 2025-01-01 RX ADMIN — SODIUM CHLORIDE 15 ML: 900 IRRIGANT IRRIGATION at 08:23

## 2025-01-01 RX ADMIN — POTASSIUM BICARBONATE 40 MEQ: 782 TABLET, EFFERVESCENT ORAL at 09:22

## 2025-01-01 RX ADMIN — THIAMINE HCL TAB 100 MG 100 MG: 100 TAB at 08:22

## 2025-01-01 RX ADMIN — LOPERAMIDE HYDROCHLORIDE 2 MG: 2 CAPSULE ORAL at 23:04

## 2025-01-01 RX ADMIN — ACYCLOVIR 400 MG: 400 TABLET ORAL at 20:03

## 2025-01-01 RX ADMIN — NITROFURANTOIN (MONOHYDRATE/MACROCRYSTALS) 100 MG: 25; 75 CAPSULE ORAL at 20:18

## 2025-01-01 RX ADMIN — SODIUM CHLORIDE, PRESERVATIVE FREE 10 ML: 5 INJECTION INTRAVENOUS at 08:43

## 2025-01-01 RX ADMIN — SODIUM CHLORIDE, PRESERVATIVE FREE 10 ML: 5 INJECTION INTRAVENOUS at 20:59

## 2025-01-01 RX ADMIN — SODIUM BICARBONATE 650 MG: 650 TABLET ORAL at 08:40

## 2025-01-01 RX ADMIN — DIPHENOXYLATE HYDROCHLORIDE AND ATROPINE SULFATE 1 TABLET: 2.5; .025 TABLET ORAL at 12:44

## 2025-01-01 RX ADMIN — LOPERAMIDE HYDROCHLORIDE 4 MG: 2 CAPSULE ORAL at 11:12

## 2025-01-01 RX ADMIN — SODIUM BICARBONATE 1300 MG: 650 TABLET ORAL at 20:55

## 2025-01-01 RX ADMIN — ACETAZOLAMIDE 250 MG: 250 TABLET ORAL at 08:38

## 2025-01-01 RX ADMIN — THERA TABS 1 TABLET: TAB at 08:36

## 2025-01-01 RX ADMIN — VORICONAZOLE 200 MG: 200 TABLET ORAL at 08:18

## 2025-01-01 RX ADMIN — LOPERAMIDE HYDROCHLORIDE 4 MG: 2 CAPSULE ORAL at 16:38

## 2025-01-01 RX ADMIN — VORICONAZOLE 200 MG: 200 TABLET ORAL at 20:10

## 2025-01-01 RX ADMIN — DIPHENOXYLATE HYDROCHLORIDE AND ATROPINE SULFATE 1 TABLET: 2.5; .025 TABLET ORAL at 12:37

## 2025-01-01 RX ADMIN — Medication 1 MG: at 08:38

## 2025-01-01 RX ADMIN — THERA TABS 1 TABLET: TAB at 08:41

## 2025-01-01 RX ADMIN — PREDNISONE 35 MG: 10 TABLET ORAL at 08:47

## 2025-01-01 RX ADMIN — LOPERAMIDE HYDROCHLORIDE 4 MG: 2 CAPSULE ORAL at 13:38

## 2025-01-01 RX ADMIN — PANTOPRAZOLE SODIUM 40 MG: 40 TABLET, DELAYED RELEASE ORAL at 06:54

## 2025-01-01 RX ADMIN — POTASSIUM CHLORIDE 20 MEQ: 400 INJECTION, SOLUTION INTRAVENOUS at 07:13

## 2025-01-01 RX ADMIN — BUDESONIDE 3 MG: 3 CAPSULE, GELATIN COATED ORAL at 14:54

## 2025-01-01 RX ADMIN — DIPHENOXYLATE HYDROCHLORIDE AND ATROPINE SULFATE 1 TABLET: 2.5; .025 TABLET ORAL at 07:49

## 2025-01-01 RX ADMIN — POTASSIUM BICARBONATE 40 MEQ: 782 TABLET, EFFERVESCENT ORAL at 09:36

## 2025-01-01 RX ADMIN — POTASSIUM BICARBONATE 40 MEQ: 782 TABLET, EFFERVESCENT ORAL at 20:08

## 2025-01-01 RX ADMIN — DIPHENOXYLATE HYDROCHLORIDE AND ATROPINE SULFATE 1 TABLET: 2.5; .025 TABLET ORAL at 17:47

## 2025-01-01 RX ADMIN — PREDNISONE 55 MG: 50 TABLET ORAL at 08:38

## 2025-01-01 RX ADMIN — DIPHENOXYLATE HYDROCHLORIDE AND ATROPINE SULFATE 1 TABLET: 2.5; .025 TABLET ORAL at 21:01

## 2025-01-01 RX ADMIN — THIAMINE HCL TAB 100 MG 100 MG: 100 TAB at 08:26

## 2025-01-01 RX ADMIN — LOPERAMIDE HYDROCHLORIDE 4 MG: 2 CAPSULE ORAL at 17:23

## 2025-01-01 RX ADMIN — SODIUM CHLORIDE, PRESERVATIVE FREE 10 ML: 5 INJECTION INTRAVENOUS at 09:42

## 2025-01-01 RX ADMIN — VORICONAZOLE 200 MG: 200 TABLET ORAL at 20:54

## 2025-01-01 RX ADMIN — POTASSIUM CHLORIDE 20 MEQ: 400 INJECTION, SOLUTION INTRAVENOUS at 06:59

## 2025-01-01 RX ADMIN — ACYCLOVIR 400 MG: 400 TABLET ORAL at 21:16

## 2025-01-01 RX ADMIN — VORICONAZOLE 200 MG: 200 TABLET ORAL at 08:02

## 2025-01-01 RX ADMIN — DICYCLOMINE HYDROCHLORIDE 10 MG: 10 CAPSULE ORAL at 12:32

## 2025-01-01 RX ADMIN — POTASSIUM BICARBONATE 40 MEQ: 782 TABLET, EFFERVESCENT ORAL at 20:46

## 2025-01-01 RX ADMIN — SODIUM CHLORIDE: 9 INJECTION, SOLUTION INTRAVENOUS at 03:27

## 2025-01-01 RX ADMIN — THERA TABS 1 TABLET: TAB at 08:15

## 2025-01-01 RX ADMIN — SODIUM CHLORIDE 15 ML: 900 IRRIGANT IRRIGATION at 11:11

## 2025-01-01 RX ADMIN — DIPHENOXYLATE HYDROCHLORIDE AND ATROPINE SULFATE 1 TABLET: 2.5; .025 TABLET ORAL at 13:50

## 2025-01-01 RX ADMIN — SODIUM CHLORIDE, PRESERVATIVE FREE 10 ML: 5 INJECTION INTRAVENOUS at 19:58

## 2025-01-01 RX ADMIN — LOPERAMIDE HYDROCHLORIDE 4 MG: 2 CAPSULE ORAL at 16:28

## 2025-01-01 RX ADMIN — BUDESONIDE 3 MG: 3 CAPSULE, GELATIN COATED ORAL at 14:53

## 2025-01-01 RX ADMIN — POTASSIUM BICARBONATE 40 MEQ: 782 TABLET, EFFERVESCENT ORAL at 20:51

## 2025-01-01 RX ADMIN — BUDESONIDE 3 MG: 3 CAPSULE, GELATIN COATED ORAL at 08:20

## 2025-01-01 RX ADMIN — LOPERAMIDE HYDROCHLORIDE 4 MG: 2 CAPSULE ORAL at 19:58

## 2025-01-01 RX ADMIN — PANTOPRAZOLE SODIUM 40 MG: 40 TABLET, DELAYED RELEASE ORAL at 06:24

## 2025-01-01 RX ADMIN — DICYCLOMINE HYDROCHLORIDE 10 MG: 10 CAPSULE ORAL at 20:06

## 2025-01-01 RX ADMIN — POTASSIUM BICARBONATE 40 MEQ: 782 TABLET, EFFERVESCENT ORAL at 08:47

## 2025-01-01 RX ADMIN — BUDESONIDE 3 MG: 3 CAPSULE, GELATIN COATED ORAL at 10:32

## 2025-01-01 RX ADMIN — THIAMINE HCL TAB 100 MG 100 MG: 100 TAB at 08:38

## 2025-01-01 RX ADMIN — CEFEPIME 2000 MG: 1 INJECTION, SOLUTION INTRAVENOUS at 07:31

## 2025-01-01 RX ADMIN — DIPHENOXYLATE HYDROCHLORIDE AND ATROPINE SULFATE 1 TABLET: 2.5; .025 TABLET ORAL at 09:09

## 2025-01-01 RX ADMIN — SODIUM BICARBONATE 650 MG: 650 TABLET ORAL at 08:41

## 2025-01-01 RX ADMIN — BUDESONIDE 3 MG: 3 CAPSULE, GELATIN COATED ORAL at 23:31

## 2025-01-01 RX ADMIN — VORICONAZOLE 200 MG: 200 TABLET ORAL at 09:23

## 2025-01-01 RX ADMIN — PREDNISONE 45 MG: 5 TABLET ORAL at 08:35

## 2025-01-01 RX ADMIN — LOPERAMIDE HYDROCHLORIDE 2 MG: 2 CAPSULE ORAL at 22:22

## 2025-01-01 RX ADMIN — LOPERAMIDE HYDROCHLORIDE 4 MG: 2 CAPSULE ORAL at 16:52

## 2025-01-01 RX ADMIN — INSULIN LISPRO 1 UNITS: 100 INJECTION, SOLUTION INTRAVENOUS; SUBCUTANEOUS at 16:03

## 2025-01-01 RX ADMIN — LOPERAMIDE HYDROCHLORIDE 4 MG: 2 CAPSULE ORAL at 12:37

## 2025-01-01 RX ADMIN — SODIUM CHLORIDE, PRESERVATIVE FREE 10 ML: 5 INJECTION INTRAVENOUS at 21:48

## 2025-01-01 RX ADMIN — LOPERAMIDE HYDROCHLORIDE 2 MG: 2 CAPSULE ORAL at 23:20

## 2025-01-01 RX ADMIN — LOPERAMIDE HYDROCHLORIDE 2 MG: 2 CAPSULE ORAL at 19:28

## 2025-01-01 RX ADMIN — THIAMINE HCL TAB 100 MG 100 MG: 100 TAB at 08:45

## 2025-01-01 RX ADMIN — SODIUM CHLORIDE 15 ML: 900 IRRIGANT IRRIGATION at 20:47

## 2025-01-01 RX ADMIN — DIPHENOXYLATE HYDROCHLORIDE AND ATROPINE SULFATE 1 TABLET: 2.5; .025 TABLET ORAL at 10:23

## 2025-01-01 RX ADMIN — DIPHENOXYLATE HYDROCHLORIDE AND ATROPINE SULFATE 1 TABLET: 2.5; .025 TABLET ORAL at 22:36

## 2025-01-01 RX ADMIN — VALGANCICLOVIR 900 MG: 450 TABLET, FILM COATED ORAL at 11:11

## 2025-01-01 RX ADMIN — LOPERAMIDE HYDROCHLORIDE 4 MG: 2 CAPSULE ORAL at 12:28

## 2025-01-01 RX ADMIN — ACETAZOLAMIDE 125 MG: 250 TABLET ORAL at 08:29

## 2025-01-01 RX ADMIN — ENOXAPARIN SODIUM 40 MG: 100 INJECTION SUBCUTANEOUS at 17:59

## 2025-01-01 RX ADMIN — INSULIN LISPRO 1 UNITS: 100 INJECTION, SOLUTION INTRAVENOUS; SUBCUTANEOUS at 16:45

## 2025-01-01 RX ADMIN — POTASSIUM CHLORIDE 20 MEQ: 400 INJECTION, SOLUTION INTRAVENOUS at 10:46

## 2025-01-01 RX ADMIN — POTASSIUM BICARBONATE 40 MEQ: 782 TABLET, EFFERVESCENT ORAL at 19:56

## 2025-01-01 RX ADMIN — VORICONAZOLE 200 MG: 200 TABLET ORAL at 09:12

## 2025-01-01 RX ADMIN — PREDNISONE 25 MG: 5 TABLET ORAL at 09:38

## 2025-01-01 RX ADMIN — LOPERAMIDE HYDROCHLORIDE 4 MG: 2 CAPSULE ORAL at 20:16

## 2025-01-01 RX ADMIN — BUDESONIDE 3 MG: 3 CAPSULE, GELATIN COATED ORAL at 09:38

## 2025-01-01 RX ADMIN — POTASSIUM CHLORIDE: 2 INJECTION, SOLUTION, CONCENTRATE INTRAVENOUS at 15:51

## 2025-01-01 RX ADMIN — ENOXAPARIN SODIUM 40 MG: 100 INJECTION SUBCUTANEOUS at 16:58

## 2025-01-01 RX ADMIN — ERGOCALCIFEROL 50000 UNITS: 1.25 CAPSULE ORAL at 08:20

## 2025-01-01 RX ADMIN — SODIUM BICARBONATE 50 MEQ: 84 INJECTION, SOLUTION INTRAVENOUS at 08:44

## 2025-01-01 RX ADMIN — SODIUM CHLORIDE, PRESERVATIVE FREE 10 ML: 5 INJECTION INTRAVENOUS at 03:39

## 2025-01-01 RX ADMIN — BUDESONIDE 3 MG: 3 CAPSULE, GELATIN COATED ORAL at 15:09

## 2025-01-01 RX ADMIN — DIPHENOXYLATE HYDROCHLORIDE AND ATROPINE SULFATE 1 TABLET: 2.5; .025 TABLET ORAL at 09:38

## 2025-01-01 RX ADMIN — THERA TABS 1 TABLET: TAB at 09:13

## 2025-01-01 RX ADMIN — SODIUM CHLORIDE 15 ML: 900 IRRIGANT IRRIGATION at 20:11

## 2025-01-01 RX ADMIN — SODIUM CHLORIDE, PRESERVATIVE FREE 10 ML: 5 INJECTION INTRAVENOUS at 20:33

## 2025-01-01 RX ADMIN — OCTREOTIDE ACETATE 100 MCG: 100 INJECTION, SOLUTION INTRAVENOUS; SUBCUTANEOUS at 16:21

## 2025-01-01 RX ADMIN — VORICONAZOLE 200 MG: 200 TABLET ORAL at 08:20

## 2025-01-01 RX ADMIN — FUROSEMIDE 40 MG: 10 INJECTION, SOLUTION INTRAMUSCULAR; INTRAVENOUS at 11:26

## 2025-01-01 RX ADMIN — LOPERAMIDE HYDROCHLORIDE 4 MG: 2 CAPSULE ORAL at 13:40

## 2025-01-01 RX ADMIN — POTASSIUM CHLORIDE 20 MEQ: 400 INJECTION, SOLUTION INTRAVENOUS at 06:35

## 2025-01-01 RX ADMIN — POTASSIUM CHLORIDE 20 MEQ: 400 INJECTION, SOLUTION INTRAVENOUS at 09:38

## 2025-01-01 RX ADMIN — VORICONAZOLE 200 MG: 200 TABLET ORAL at 20:26

## 2025-01-01 RX ADMIN — BUDESONIDE 3 MG: 3 CAPSULE, GELATIN COATED ORAL at 20:16

## 2025-01-01 RX ADMIN — DIPHENOXYLATE HYDROCHLORIDE AND ATROPINE SULFATE 1 TABLET: 2.5; .025 TABLET ORAL at 17:59

## 2025-01-01 RX ADMIN — POTASSIUM CHLORIDE 20 MEQ: 400 INJECTION, SOLUTION INTRAVENOUS at 18:21

## 2025-01-01 RX ADMIN — VALGANCICLOVIR 900 MG: 450 TABLET, FILM COATED ORAL at 09:07

## 2025-01-01 RX ADMIN — OCTREOTIDE ACETATE 100 MCG: 100 INJECTION, SOLUTION INTRAVENOUS; SUBCUTANEOUS at 01:00

## 2025-01-01 RX ADMIN — SODIUM CHLORIDE, PRESERVATIVE FREE 10 ML: 5 INJECTION INTRAVENOUS at 21:09

## 2025-01-01 RX ADMIN — TRAMADOL HYDROCHLORIDE 50 MG: 50 TABLET, COATED ORAL at 20:16

## 2025-01-01 RX ADMIN — SODIUM BICARBONATE: 84 INJECTION, SOLUTION INTRAVENOUS at 08:58

## 2025-01-01 RX ADMIN — BUDESONIDE 3 MG: 3 CAPSULE, GELATIN COATED ORAL at 08:18

## 2025-01-01 RX ADMIN — PANTOPRAZOLE SODIUM 40 MG: 40 TABLET, DELAYED RELEASE ORAL at 07:02

## 2025-01-01 RX ADMIN — VORICONAZOLE 200 MG: 200 TABLET ORAL at 09:38

## 2025-01-01 RX ADMIN — POTASSIUM CHLORIDE 20 MEQ: 400 INJECTION, SOLUTION INTRAVENOUS at 17:37

## 2025-01-01 RX ADMIN — DIPHENOXYLATE HYDROCHLORIDE AND ATROPINE SULFATE 1 TABLET: 2.5; .025 TABLET ORAL at 16:12

## 2025-01-01 RX ADMIN — ACYCLOVIR 400 MG: 400 TABLET ORAL at 08:02

## 2025-01-01 RX ADMIN — DIPHENOXYLATE HYDROCHLORIDE AND ATROPINE SULFATE 2 TABLET: 2.5; .025 TABLET ORAL at 20:46

## 2025-01-01 RX ADMIN — ENOXAPARIN SODIUM 40 MG: 100 INJECTION SUBCUTANEOUS at 17:37

## 2025-01-01 RX ADMIN — SODIUM CHLORIDE: 9 INJECTION, SOLUTION INTRAVENOUS at 06:11

## 2025-01-01 RX ADMIN — ATOVAQUONE 1500 MG: 750 SUSPENSION ORAL at 11:08

## 2025-01-01 RX ADMIN — INSULIN LISPRO 1 UNITS: 100 INJECTION, SOLUTION INTRAVENOUS; SUBCUTANEOUS at 21:48

## 2025-01-01 RX ADMIN — DIPHENOXYLATE HYDROCHLORIDE AND ATROPINE SULFATE 1 TABLET: 2.5; .025 TABLET ORAL at 06:30

## 2025-01-01 RX ADMIN — POTASSIUM BICARBONATE 40 MEQ: 782 TABLET, EFFERVESCENT ORAL at 08:45

## 2025-01-01 RX ADMIN — MEROPENEM 1000 MG: 1 INJECTION INTRAVENOUS at 08:52

## 2025-01-01 RX ADMIN — OCTREOTIDE ACETATE 100 MCG: 100 INJECTION, SOLUTION INTRAVENOUS; SUBCUTANEOUS at 10:23

## 2025-01-01 RX ADMIN — DIPHENOXYLATE HYDROCHLORIDE AND ATROPINE SULFATE 1 TABLET: 2.5; .025 TABLET ORAL at 20:55

## 2025-01-01 RX ADMIN — VORICONAZOLE 200 MG: 200 TABLET ORAL at 07:50

## 2025-01-01 RX ADMIN — BUDESONIDE 3 MG: 3 CAPSULE, GELATIN COATED ORAL at 09:22

## 2025-01-01 RX ADMIN — SODIUM PHOSPHATE, MONOBASIC, MONOHYDRATE AND SODIUM PHOSPHATE, DIBASIC, ANHYDROUS 30 MMOL: 142; 276 INJECTION, SOLUTION INTRAVENOUS at 06:12

## 2025-01-01 RX ADMIN — THERA TABS 1 TABLET: TAB at 07:50

## 2025-01-01 RX ADMIN — POTASSIUM CHLORIDE 40 MEQ: 1500 TABLET, EXTENDED RELEASE ORAL at 17:47

## 2025-01-01 RX ADMIN — SODIUM CHLORIDE, PRESERVATIVE FREE 10 ML: 5 INJECTION INTRAVENOUS at 08:44

## 2025-01-01 RX ADMIN — LOPERAMIDE HYDROCHLORIDE 4 MG: 2 CAPSULE ORAL at 13:08

## 2025-01-01 RX ADMIN — VORICONAZOLE 200 MG: 200 TABLET ORAL at 20:46

## 2025-01-01 RX ADMIN — LOPERAMIDE HYDROCHLORIDE 4 MG: 2 CAPSULE ORAL at 08:37

## 2025-01-01 RX ADMIN — LOPERAMIDE HYDROCHLORIDE 4 MG: 2 CAPSULE ORAL at 08:25

## 2025-01-01 RX ADMIN — DIPHENOXYLATE HYDROCHLORIDE AND ATROPINE SULFATE 1 TABLET: 2.5; .025 TABLET ORAL at 16:52

## 2025-01-01 RX ADMIN — PREDNISONE 45 MG: 5 TABLET ORAL at 08:21

## 2025-01-01 RX ADMIN — POTASSIUM BICARBONATE 40 MEQ: 782 TABLET, EFFERVESCENT ORAL at 20:34

## 2025-01-01 RX ADMIN — VORICONAZOLE 200 MG: 200 TABLET ORAL at 20:04

## 2025-01-01 RX ADMIN — INSULIN LISPRO 1 UNITS: 100 INJECTION, SOLUTION INTRAVENOUS; SUBCUTANEOUS at 16:21

## 2025-01-01 RX ADMIN — POTASSIUM CHLORIDE 20 MEQ: 400 INJECTION, SOLUTION INTRAVENOUS at 09:32

## 2025-01-01 RX ADMIN — METOPROLOL TARTRATE 25 MG: 25 TABLET, FILM COATED ORAL at 11:39

## 2025-01-01 RX ADMIN — PREDNISONE 55 MG: 50 TABLET ORAL at 08:31

## 2025-01-01 RX ADMIN — SODIUM CHLORIDE 15 ML: 900 IRRIGANT IRRIGATION at 16:29

## 2025-01-01 RX ADMIN — PREDNISONE 35 MG: 10 TABLET ORAL at 08:15

## 2025-01-01 RX ADMIN — VALGANCICLOVIR 900 MG: 450 TABLET, FILM COATED ORAL at 20:54

## 2025-01-01 RX ADMIN — SODIUM CHLORIDE, PRESERVATIVE FREE 10 ML: 5 INJECTION INTRAVENOUS at 08:19

## 2025-01-01 RX ADMIN — SPIRONOLACTONE 25 MG: 25 TABLET ORAL at 08:21

## 2025-01-01 RX ADMIN — SODIUM CHLORIDE 15 ML: 900 IRRIGANT IRRIGATION at 16:22

## 2025-01-01 RX ADMIN — SODIUM CHLORIDE 15 ML: 900 IRRIGANT IRRIGATION at 19:58

## 2025-01-01 RX ADMIN — POTASSIUM CHLORIDE 20 MEQ: 400 INJECTION, SOLUTION INTRAVENOUS at 10:37

## 2025-01-01 RX ADMIN — SODIUM BICARBONATE 1300 MG: 650 TABLET ORAL at 09:12

## 2025-01-01 RX ADMIN — SODIUM CHLORIDE 15 ML: 900 IRRIGANT IRRIGATION at 17:28

## 2025-01-01 RX ADMIN — ACYCLOVIR 400 MG: 400 TABLET ORAL at 08:26

## 2025-01-01 RX ADMIN — DIPHENOXYLATE HYDROCHLORIDE AND ATROPINE SULFATE 1 TABLET: 2.5; .025 TABLET ORAL at 08:43

## 2025-01-01 RX ADMIN — POTASSIUM BICARBONATE 40 MEQ: 782 TABLET, EFFERVESCENT ORAL at 20:11

## 2025-01-01 RX ADMIN — PANTOPRAZOLE SODIUM 40 MG: 40 TABLET, DELAYED RELEASE ORAL at 06:38

## 2025-01-01 RX ADMIN — SODIUM CHLORIDE 15 ML: 900 IRRIGANT IRRIGATION at 11:39

## 2025-01-01 RX ADMIN — BUDESONIDE 3 MG: 3 CAPSULE, GELATIN COATED ORAL at 16:31

## 2025-01-01 RX ADMIN — INSULIN LISPRO 1 UNITS: 100 INJECTION, SOLUTION INTRAVENOUS; SUBCUTANEOUS at 17:02

## 2025-01-01 RX ADMIN — LOPERAMIDE HYDROCHLORIDE 4 MG: 2 CAPSULE ORAL at 10:14

## 2025-01-01 RX ADMIN — DIPHENOXYLATE HYDROCHLORIDE AND ATROPINE SULFATE 1 TABLET: 2.5; .025 TABLET ORAL at 16:45

## 2025-01-01 RX ADMIN — PANTOPRAZOLE SODIUM 40 MG: 40 TABLET, DELAYED RELEASE ORAL at 06:03

## 2025-01-01 RX ADMIN — BUDESONIDE 3 MG: 3 CAPSULE, GELATIN COATED ORAL at 15:47

## 2025-01-01 RX ADMIN — ENOXAPARIN SODIUM 40 MG: 100 INJECTION SUBCUTANEOUS at 16:21

## 2025-01-01 RX ADMIN — SODIUM CHLORIDE, PRESERVATIVE FREE 10 ML: 5 INJECTION INTRAVENOUS at 09:17

## 2025-01-01 RX ADMIN — VORICONAZOLE 200 MG: 200 TABLET ORAL at 19:58

## 2025-01-01 RX ADMIN — THERA TABS 1 TABLET: TAB at 08:32

## 2025-01-01 RX ADMIN — VALGANCICLOVIR 900 MG: 450 TABLET, FILM COATED ORAL at 20:47

## 2025-01-01 RX ADMIN — SODIUM CHLORIDE, PRESERVATIVE FREE 10 ML: 5 INJECTION INTRAVENOUS at 20:06

## 2025-01-01 RX ADMIN — SODIUM CHLORIDE, PRESERVATIVE FREE 10 ML: 5 INJECTION INTRAVENOUS at 08:36

## 2025-01-01 RX ADMIN — SPIRONOLACTONE 25 MG: 25 TABLET ORAL at 08:29

## 2025-01-01 RX ADMIN — DIPHENOXYLATE HYDROCHLORIDE AND ATROPINE SULFATE 1 TABLET: 2.5; .025 TABLET ORAL at 09:22

## 2025-01-01 RX ADMIN — OCTREOTIDE ACETATE 100 MCG: 100 INJECTION, SOLUTION INTRAVENOUS; SUBCUTANEOUS at 00:52

## 2025-01-01 RX ADMIN — ACYCLOVIR 400 MG: 400 TABLET ORAL at 09:22

## 2025-01-01 RX ADMIN — OCTREOTIDE ACETATE 100 MCG: 100 INJECTION, SOLUTION INTRAVENOUS; SUBCUTANEOUS at 17:59

## 2025-01-01 RX ADMIN — BUDESONIDE 3 MG: 3 CAPSULE, GELATIN COATED ORAL at 14:33

## 2025-01-01 RX ADMIN — OCTREOTIDE ACETATE 100 MCG: 100 INJECTION, SOLUTION INTRAVENOUS; SUBCUTANEOUS at 11:39

## 2025-01-01 RX ADMIN — LOPERAMIDE HYDROCHLORIDE 4 MG: 2 CAPSULE ORAL at 17:17

## 2025-01-01 RX ADMIN — DIPHENOXYLATE HYDROCHLORIDE AND ATROPINE SULFATE 1 TABLET: 2.5; .025 TABLET ORAL at 09:08

## 2025-01-01 RX ADMIN — DEXTROSE AND SODIUM CHLORIDE: 5; 450 INJECTION, SOLUTION INTRAVENOUS at 21:20

## 2025-01-01 RX ADMIN — POTASSIUM CHLORIDE 40 MEQ: 1500 TABLET, EXTENDED RELEASE ORAL at 08:39

## 2025-01-01 RX ADMIN — PREDNISONE 35 MG: 10 TABLET ORAL at 08:29

## 2025-01-01 RX ADMIN — DIPHENOXYLATE HYDROCHLORIDE AND ATROPINE SULFATE 1 TABLET: 2.5; .025 TABLET ORAL at 16:26

## 2025-01-01 RX ADMIN — PANTOPRAZOLE SODIUM 40 MG: 40 TABLET, DELAYED RELEASE ORAL at 06:48

## 2025-01-01 RX ADMIN — NOREPINEPHRINE BITARTRATE 40 MCG/MIN: 16 INJECTION, SOLUTION INTRAVENOUS at 09:38

## 2025-01-01 RX ADMIN — WATER 70 MG: 1 INJECTION INTRAMUSCULAR; INTRAVENOUS; SUBCUTANEOUS at 08:09

## 2025-01-01 RX ADMIN — THERA TABS 1 TABLET: TAB at 08:29

## 2025-01-01 RX ADMIN — POLYETHYLENE GLYCOL-3350 AND ELECTROLYTES 2000 ML: 236; 6.74; 5.86; 2.97; 22.74 POWDER, FOR SOLUTION ORAL at 18:49

## 2025-01-01 RX ADMIN — DIPHENOXYLATE HYDROCHLORIDE AND ATROPINE SULFATE 1 TABLET: 2.5; .025 TABLET ORAL at 08:41

## 2025-01-01 RX ADMIN — POTASSIUM CHLORIDE 20 MEQ: 400 INJECTION, SOLUTION INTRAVENOUS at 21:49

## 2025-01-01 RX ADMIN — POTASSIUM CHLORIDE 20 MEQ: 400 INJECTION, SOLUTION INTRAVENOUS at 11:45

## 2025-01-01 RX ADMIN — SODIUM CHLORIDE, PRESERVATIVE FREE 10 ML: 5 INJECTION INTRAVENOUS at 20:21

## 2025-01-01 RX ADMIN — BUDESONIDE 3 MG: 3 CAPSULE, GELATIN COATED ORAL at 20:43

## 2025-01-01 RX ADMIN — POTASSIUM CHLORIDE 20 MEQ: 400 INJECTION, SOLUTION INTRAVENOUS at 06:46

## 2025-01-01 RX ADMIN — LOPERAMIDE HYDROCHLORIDE 4 MG: 2 CAPSULE ORAL at 09:22

## 2025-01-01 RX ADMIN — WATER 70 MG: 1 INJECTION INTRAMUSCULAR; INTRAVENOUS; SUBCUTANEOUS at 08:03

## 2025-01-01 RX ADMIN — DIPHENOXYLATE HYDROCHLORIDE AND ATROPINE SULFATE 1 TABLET: 2.5; .025 TABLET ORAL at 22:09

## 2025-01-01 RX ADMIN — THERA TABS 1 TABLET: TAB at 08:39

## 2025-01-01 RX ADMIN — BUDESONIDE 3 MG: 3 CAPSULE, GELATIN COATED ORAL at 15:16

## 2025-01-01 RX ADMIN — INFLIXIMAB 800 MG: 100 INJECTION, POWDER, LYOPHILIZED, FOR SOLUTION INTRAVENOUS at 11:24

## 2025-01-01 RX ADMIN — SODIUM CHLORIDE 15 ML: 900 IRRIGANT IRRIGATION at 20:06

## 2025-01-01 RX ADMIN — INSULIN LISPRO 1 UNITS: 100 INJECTION, SOLUTION INTRAVENOUS; SUBCUTANEOUS at 21:15

## 2025-01-01 RX ADMIN — DIPHENOXYLATE HYDROCHLORIDE AND ATROPINE SULFATE 1 TABLET: 2.5; .025 TABLET ORAL at 20:39

## 2025-01-01 RX ADMIN — Medication 10 ML: at 15:00

## 2025-01-01 RX ADMIN — PREDNISONE 70 MG: 50 TABLET ORAL at 09:45

## 2025-01-01 RX ADMIN — BUDESONIDE 3 MG: 3 CAPSULE, GELATIN COATED ORAL at 08:26

## 2025-01-01 RX ADMIN — LOPERAMIDE HYDROCHLORIDE 4 MG: 2 CAPSULE ORAL at 13:03

## 2025-01-01 RX ADMIN — ENOXAPARIN SODIUM 40 MG: 100 INJECTION SUBCUTANEOUS at 17:46

## 2025-01-01 RX ADMIN — POTASSIUM BICARBONATE 40 MEQ: 782 TABLET, EFFERVESCENT ORAL at 20:50

## 2025-01-01 RX ADMIN — ENOXAPARIN SODIUM 40 MG: 100 INJECTION SUBCUTANEOUS at 17:21

## 2025-01-01 RX ADMIN — VORICONAZOLE 200 MG: 200 TABLET ORAL at 09:22

## 2025-01-01 RX ADMIN — LOPERAMIDE HYDROCHLORIDE 4 MG: 2 CAPSULE ORAL at 08:19

## 2025-01-01 RX ADMIN — POTASSIUM CHLORIDE 20 MEQ: 400 INJECTION, SOLUTION INTRAVENOUS at 17:01

## 2025-01-01 RX ADMIN — INSULIN LISPRO 1 UNITS: 100 INJECTION, SOLUTION INTRAVENOUS; SUBCUTANEOUS at 17:04

## 2025-01-01 RX ADMIN — LOPERAMIDE HYDROCHLORIDE 4 MG: 2 CAPSULE ORAL at 21:08

## 2025-01-01 RX ADMIN — SODIUM CHLORIDE: 9 INJECTION, SOLUTION INTRAVENOUS at 17:21

## 2025-01-01 RX ADMIN — SODIUM BICARBONATE 325 MG: 650 TABLET ORAL at 09:22

## 2025-01-01 RX ADMIN — VANCOMYCIN HYDROCHLORIDE 1500 MG: 1.5 INJECTION, POWDER, LYOPHILIZED, FOR SOLUTION INTRAVENOUS at 11:31

## 2025-01-01 RX ADMIN — ACYCLOVIR 400 MG: 400 TABLET ORAL at 20:50

## 2025-01-01 RX ADMIN — OCTREOTIDE ACETATE 100 MCG: 100 INJECTION, SOLUTION INTRAVENOUS; SUBCUTANEOUS at 21:36

## 2025-01-01 RX ADMIN — VALGANCICLOVIR 900 MG: 450 TABLET, FILM COATED ORAL at 08:21

## 2025-01-01 RX ADMIN — POTASSIUM CHLORIDE 20 MEQ: 400 INJECTION, SOLUTION INTRAVENOUS at 09:03

## 2025-01-01 RX ADMIN — THIAMINE HCL TAB 100 MG 100 MG: 100 TAB at 08:19

## 2025-01-01 RX ADMIN — VALGANCICLOVIR 900 MG: 450 TABLET, FILM COATED ORAL at 20:07

## 2025-01-01 RX ADMIN — DIPHENOXYLATE HYDROCHLORIDE AND ATROPINE SULFATE 1 TABLET: 2.5; .025 TABLET ORAL at 08:25

## 2025-01-01 RX ADMIN — ACETAMINOPHEN 650 MG: 325 TABLET ORAL at 03:32

## 2025-01-01 RX ADMIN — SODIUM CHLORIDE: 9 INJECTION, SOLUTION INTRAVENOUS at 04:40

## 2025-01-01 RX ADMIN — DIPHENOXYLATE HYDROCHLORIDE AND ATROPINE SULFATE 1 TABLET: 2.5; .025 TABLET ORAL at 20:10

## 2025-01-01 RX ADMIN — MAGNESIUM SULFATE HEPTAHYDRATE 4000 MG: 40 INJECTION, SOLUTION INTRAVENOUS at 06:13

## 2025-01-01 RX ADMIN — SULFAMETHOXAZOLE AND TRIMETHOPRIM 1 TABLET: 400; 80 TABLET ORAL at 21:16

## 2025-01-01 RX ADMIN — LOPERAMIDE HYDROCHLORIDE 4 MG: 2 CAPSULE ORAL at 16:21

## 2025-01-01 RX ADMIN — SULFAMETHOXAZOLE AND TRIMETHOPRIM 1 TABLET: 400; 80 TABLET ORAL at 20:34

## 2025-01-01 RX ADMIN — SODIUM CHLORIDE 15 ML: 900 IRRIGANT IRRIGATION at 05:41

## 2025-01-01 RX ADMIN — METOPROLOL TARTRATE 25 MG: 25 TABLET, FILM COATED ORAL at 19:56

## 2025-01-01 RX ADMIN — OCTREOTIDE ACETATE 100 MCG: 100 INJECTION, SOLUTION INTRAVENOUS; SUBCUTANEOUS at 06:38

## 2025-01-01 RX ADMIN — SODIUM CHLORIDE 500 ML: 0.9 INJECTION, SOLUTION INTRAVENOUS at 06:30

## 2025-01-01 RX ADMIN — BUDESONIDE 3 MG: 3 CAPSULE, GELATIN COATED ORAL at 07:49

## 2025-01-01 RX ADMIN — VALGANCICLOVIR 900 MG: 450 TABLET, FILM COATED ORAL at 08:15

## 2025-01-01 RX ADMIN — LOPERAMIDE HYDROCHLORIDE 4 MG: 2 CAPSULE ORAL at 17:37

## 2025-01-01 RX ADMIN — THIAMINE HCL TAB 100 MG 100 MG: 100 TAB at 08:31

## 2025-01-01 RX ADMIN — ENOXAPARIN SODIUM 40 MG: 100 INJECTION SUBCUTANEOUS at 17:45

## 2025-01-01 RX ADMIN — ACYCLOVIR 400 MG: 400 TABLET ORAL at 08:12

## 2025-01-01 RX ADMIN — OCTREOTIDE ACETATE 100 MCG: 100 INJECTION, SOLUTION INTRAVENOUS; SUBCUTANEOUS at 09:43

## 2025-01-01 RX ADMIN — DICYCLOMINE HYDROCHLORIDE 10 MG: 10 CAPSULE ORAL at 14:37

## 2025-01-01 RX ADMIN — POTASSIUM CHLORIDE 20 MEQ: 400 INJECTION, SOLUTION INTRAVENOUS at 20:21

## 2025-01-01 RX ADMIN — SODIUM BICARBONATE 1300 MG: 650 TABLET ORAL at 22:36

## 2025-01-01 RX ADMIN — SODIUM BICARBONATE 650 MG: 650 TABLET ORAL at 10:14

## 2025-01-01 RX ADMIN — THERA TABS 1 TABLET: TAB at 09:39

## 2025-01-01 RX ADMIN — SODIUM CHLORIDE 15 ML: 900 IRRIGANT IRRIGATION at 12:10

## 2025-01-01 RX ADMIN — VALGANCICLOVIR 900 MG: 450 TABLET, FILM COATED ORAL at 20:16

## 2025-01-01 RX ADMIN — POTASSIUM CHLORIDE 20 MEQ: 400 INJECTION, SOLUTION INTRAVENOUS at 20:27

## 2025-01-01 RX ADMIN — SODIUM CHLORIDE: 9 INJECTION, SOLUTION INTRAVENOUS at 13:53

## 2025-01-01 RX ADMIN — ENOXAPARIN SODIUM 40 MG: 100 INJECTION SUBCUTANEOUS at 17:25

## 2025-01-01 RX ADMIN — VORICONAZOLE 200 MG: 200 TABLET ORAL at 20:07

## 2025-01-01 RX ADMIN — PANTOPRAZOLE SODIUM 40 MG: 40 TABLET, DELAYED RELEASE ORAL at 06:19

## 2025-01-01 RX ADMIN — POTASSIUM CHLORIDE 40 MEQ: 1500 TABLET, EXTENDED RELEASE ORAL at 18:37

## 2025-01-01 RX ADMIN — SODIUM CHLORIDE 15 ML: 900 IRRIGANT IRRIGATION at 16:03

## 2025-01-01 RX ADMIN — LOPERAMIDE HYDROCHLORIDE 4 MG: 2 CAPSULE ORAL at 20:25

## 2025-01-01 RX ADMIN — POTASSIUM CHLORIDE 20 MEQ: 400 INJECTION, SOLUTION INTRAVENOUS at 22:11

## 2025-01-01 RX ADMIN — BUDESONIDE 3 MG: 3 CAPSULE, GELATIN COATED ORAL at 15:58

## 2025-01-01 RX ADMIN — ATOVAQUONE 1500 MG: 750 SUSPENSION ORAL at 08:19

## 2025-01-01 RX ADMIN — SODIUM CHLORIDE, PRESERVATIVE FREE 10 ML: 5 INJECTION INTRAVENOUS at 09:10

## 2025-01-01 RX ADMIN — LOPERAMIDE HYDROCHLORIDE 4 MG: 2 CAPSULE ORAL at 09:45

## 2025-01-01 RX ADMIN — BUDESONIDE 3 MG: 3 CAPSULE, GELATIN COATED ORAL at 19:56

## 2025-01-01 RX ADMIN — LOPERAMIDE HYDROCHLORIDE 4 MG: 2 CAPSULE ORAL at 12:56

## 2025-01-01 RX ADMIN — THIAMINE HCL TAB 100 MG 100 MG: 100 TAB at 08:18

## 2025-01-01 RX ADMIN — BUDESONIDE 3 MG: 3 CAPSULE, GELATIN COATED ORAL at 20:10

## 2025-01-01 RX ADMIN — THERA TABS 1 TABLET: TAB at 09:08

## 2025-01-01 RX ADMIN — WATER 70 MG: 1 INJECTION INTRAMUSCULAR; INTRAVENOUS; SUBCUTANEOUS at 08:41

## 2025-01-01 RX ADMIN — POTASSIUM CHLORIDE 20 MEQ: 400 INJECTION, SOLUTION INTRAVENOUS at 23:17

## 2025-01-01 RX ADMIN — DIPHENOXYLATE HYDROCHLORIDE AND ATROPINE SULFATE 2 TABLET: 2.5; .025 TABLET ORAL at 12:21

## 2025-01-01 RX ADMIN — DIPHENOXYLATE HYDROCHLORIDE AND ATROPINE SULFATE 1 TABLET: 2.5; .025 TABLET ORAL at 08:34

## 2025-01-01 RX ADMIN — DIPHENOXYLATE HYDROCHLORIDE AND ATROPINE SULFATE 1 TABLET: 2.5; .025 TABLET ORAL at 18:16

## 2025-01-01 RX ADMIN — VORICONAZOLE 200 MG: 200 TABLET ORAL at 19:59

## 2025-01-01 RX ADMIN — PANTOPRAZOLE SODIUM 40 MG: 40 TABLET, DELAYED RELEASE ORAL at 06:16

## 2025-01-01 RX ADMIN — ACETAZOLAMIDE 250 MG: 250 TABLET ORAL at 08:36

## 2025-01-01 RX ADMIN — POTASSIUM CHLORIDE 20 MEQ: 400 INJECTION, SOLUTION INTRAVENOUS at 09:04

## 2025-01-01 RX ADMIN — POTASSIUM CHLORIDE 40 MEQ: 1500 TABLET, EXTENDED RELEASE ORAL at 16:11

## 2025-01-01 RX ADMIN — SODIUM BICARBONATE 650 MG: 650 TABLET ORAL at 20:27

## 2025-01-01 RX ADMIN — PANTOPRAZOLE SODIUM 40 MG: 40 TABLET, DELAYED RELEASE ORAL at 07:23

## 2025-01-01 RX ADMIN — POTASSIUM CHLORIDE 20 MEQ: 400 INJECTION, SOLUTION INTRAVENOUS at 08:15

## 2025-01-01 RX ADMIN — ALBUMIN (HUMAN) 25 G: 0.25 INJECTION, SOLUTION INTRAVENOUS at 12:09

## 2025-01-01 RX ADMIN — LOPERAMIDE HYDROCHLORIDE 4 MG: 2 CAPSULE ORAL at 16:22

## 2025-01-01 RX ADMIN — DIPHENOXYLATE HYDROCHLORIDE AND ATROPINE SULFATE 1 TABLET: 2.5; .025 TABLET ORAL at 14:10

## 2025-01-01 RX ADMIN — ACETAMINOPHEN 650 MG: 325 TABLET ORAL at 12:32

## 2025-01-01 RX ADMIN — SODIUM CHLORIDE, PRESERVATIVE FREE 10 ML: 5 INJECTION INTRAVENOUS at 09:13

## 2025-01-01 RX ADMIN — PANTOPRAZOLE SODIUM 40 MG: 40 TABLET, DELAYED RELEASE ORAL at 06:41

## 2025-01-01 RX ADMIN — DIPHENOXYLATE HYDROCHLORIDE AND ATROPINE SULFATE 1 TABLET: 2.5; .025 TABLET ORAL at 00:15

## 2025-01-01 RX ADMIN — ACYCLOVIR 400 MG: 400 TABLET ORAL at 19:58

## 2025-01-01 RX ADMIN — BUDESONIDE 3 MG: 3 CAPSULE, GELATIN COATED ORAL at 08:32

## 2025-01-01 RX ADMIN — LOPERAMIDE HYDROCHLORIDE 4 MG: 2 CAPSULE ORAL at 20:04

## 2025-01-01 RX ADMIN — SODIUM CHLORIDE, PRESERVATIVE FREE 10 ML: 5 INJECTION INTRAVENOUS at 10:24

## 2025-01-01 RX ADMIN — POTASSIUM BICARBONATE 40 MEQ: 782 TABLET, EFFERVESCENT ORAL at 09:38

## 2025-01-01 RX ADMIN — VORICONAZOLE 200 MG: 200 TABLET ORAL at 21:01

## 2025-01-01 RX ADMIN — WATER 70 MG: 1 INJECTION INTRAMUSCULAR; INTRAVENOUS; SUBCUTANEOUS at 18:37

## 2025-01-01 RX ADMIN — POTASSIUM CHLORIDE 20 MEQ: 400 INJECTION, SOLUTION INTRAVENOUS at 18:41

## 2025-01-01 RX ADMIN — SULFAMETHOXAZOLE AND TRIMETHOPRIM 1 TABLET: 400; 80 TABLET ORAL at 21:49

## 2025-01-01 RX ADMIN — POTASSIUM BICARBONATE 40 MEQ: 782 TABLET, EFFERVESCENT ORAL at 10:13

## 2025-01-01 RX ADMIN — THERA TABS 1 TABLET: TAB at 08:21

## 2025-01-01 RX ADMIN — ATOVAQUONE 1500 MG: 750 SUSPENSION ORAL at 09:08

## 2025-01-01 RX ADMIN — OCTREOTIDE ACETATE 100 MCG: 100 INJECTION, SOLUTION INTRAVENOUS; SUBCUTANEOUS at 11:12

## 2025-01-01 RX ADMIN — ATOVAQUONE 1500 MG: 750 SUSPENSION ORAL at 08:16

## 2025-01-01 RX ADMIN — POTASSIUM CHLORIDE 20 MEQ: 400 INJECTION, SOLUTION INTRAVENOUS at 18:48

## 2025-01-01 RX ADMIN — PANTOPRAZOLE SODIUM 40 MG: 40 TABLET, DELAYED RELEASE ORAL at 09:16

## 2025-01-01 RX ADMIN — POTASSIUM CHLORIDE 20 MEQ: 400 INJECTION, SOLUTION INTRAVENOUS at 08:05

## 2025-01-01 RX ADMIN — OCTREOTIDE ACETATE 100 MCG: 100 INJECTION, SOLUTION INTRAVENOUS; SUBCUTANEOUS at 13:06

## 2025-01-01 RX ADMIN — LOPERAMIDE HYDROCHLORIDE 4 MG: 2 CAPSULE ORAL at 13:01

## 2025-01-01 RX ADMIN — INFLIXIMAB 800 MG: 100 INJECTION, POWDER, LYOPHILIZED, FOR SOLUTION INTRAVENOUS at 10:45

## 2025-01-01 RX ADMIN — PANTOPRAZOLE SODIUM 40 MG: 40 TABLET, DELAYED RELEASE ORAL at 06:56

## 2025-01-01 RX ADMIN — DIPHENOXYLATE HYDROCHLORIDE AND ATROPINE SULFATE 1 TABLET: 2.5; .025 TABLET ORAL at 20:25

## 2025-01-01 RX ADMIN — POTASSIUM BICARBONATE 40 MEQ: 782 TABLET, EFFERVESCENT ORAL at 20:39

## 2025-01-01 RX ADMIN — VORICONAZOLE 200 MG: 200 TABLET ORAL at 08:37

## 2025-01-01 RX ADMIN — ENOXAPARIN SODIUM 40 MG: 100 INJECTION SUBCUTANEOUS at 18:28

## 2025-01-01 RX ADMIN — VORICONAZOLE 200 MG: 200 TABLET ORAL at 08:36

## 2025-01-01 RX ADMIN — VORICONAZOLE 200 MG: 200 TABLET ORAL at 09:08

## 2025-01-01 RX ADMIN — PHYTONADIONE 10 MG: 10 INJECTION, EMULSION INTRAMUSCULAR; INTRAVENOUS; SUBCUTANEOUS at 11:08

## 2025-01-01 RX ADMIN — PREDNISONE 70 MG: 50 TABLET ORAL at 07:49

## 2025-01-01 RX ADMIN — DIPHENOXYLATE HYDROCHLORIDE AND ATROPINE SULFATE 2 TABLET: 2.5; .025 TABLET ORAL at 12:32

## 2025-01-01 RX ADMIN — ACYCLOVIR 400 MG: 400 TABLET ORAL at 08:40

## 2025-01-01 RX ADMIN — POTASSIUM CHLORIDE 20 MEQ: 400 INJECTION, SOLUTION INTRAVENOUS at 07:55

## 2025-01-01 RX ADMIN — DIPHENOXYLATE HYDROCHLORIDE AND ATROPINE SULFATE 1 TABLET: 2.5; .025 TABLET ORAL at 08:39

## 2025-01-01 RX ADMIN — LOPERAMIDE HYDROCHLORIDE 4 MG: 2 CAPSULE ORAL at 08:12

## 2025-01-01 RX ADMIN — OCTREOTIDE ACETATE 100 MCG: 100 INJECTION, SOLUTION INTRAVENOUS; SUBCUTANEOUS at 22:01

## 2025-01-01 RX ADMIN — BUDESONIDE 3 MG: 3 CAPSULE, GELATIN COATED ORAL at 20:46

## 2025-01-01 RX ADMIN — VALGANCICLOVIR 900 MG: 450 TABLET, FILM COATED ORAL at 08:35

## 2025-01-01 RX ADMIN — BUDESONIDE 3 MG: 3 CAPSULE, GELATIN COATED ORAL at 20:34

## 2025-01-01 RX ADMIN — POTASSIUM CHLORIDE 20 MEQ: 400 INJECTION, SOLUTION INTRAVENOUS at 06:17

## 2025-01-01 RX ADMIN — ENOXAPARIN SODIUM 40 MG: 100 INJECTION SUBCUTANEOUS at 17:47

## 2025-01-01 RX ADMIN — THERA TABS 1 TABLET: TAB at 11:08

## 2025-01-01 RX ADMIN — DIPHENOXYLATE HYDROCHLORIDE AND ATROPINE SULFATE 2 TABLET: 2.5; .025 TABLET ORAL at 19:56

## 2025-01-01 RX ADMIN — VORICONAZOLE 200 MG: 200 TABLET ORAL at 08:45

## 2025-01-01 RX ADMIN — SODIUM CHLORIDE, PRESERVATIVE FREE 10 ML: 5 INJECTION INTRAVENOUS at 20:11

## 2025-01-01 RX ADMIN — DIPHENOXYLATE HYDROCHLORIDE AND ATROPINE SULFATE 1 TABLET: 2.5; .025 TABLET ORAL at 17:36

## 2025-01-01 RX ADMIN — DIPHENOXYLATE HYDROCHLORIDE AND ATROPINE SULFATE 2 TABLET: 2.5; .025 TABLET ORAL at 20:16

## 2025-01-01 RX ADMIN — BUDESONIDE 3 MG: 3 CAPSULE, GELATIN COATED ORAL at 10:14

## 2025-01-01 RX ADMIN — ACYCLOVIR 400 MG: 400 TABLET ORAL at 08:10

## 2025-01-01 RX ADMIN — LOPERAMIDE HYDROCHLORIDE 4 MG: 2 CAPSULE ORAL at 17:28

## 2025-01-01 RX ADMIN — SODIUM PHOSPHATE, MONOBASIC, MONOHYDRATE AND SODIUM PHOSPHATE, DIBASIC, ANHYDROUS 30 MMOL: 142; 276 INJECTION, SOLUTION INTRAVENOUS at 06:36

## 2025-01-01 RX ADMIN — POTASSIUM CHLORIDE: 2 INJECTION, SOLUTION, CONCENTRATE INTRAVENOUS at 06:07

## 2025-01-01 RX ADMIN — INSULIN LISPRO 1 UNITS: 100 INJECTION, SOLUTION INTRAVENOUS; SUBCUTANEOUS at 20:33

## 2025-01-01 RX ADMIN — PHYTONADIONE 10 MG: 10 INJECTION, EMULSION INTRAMUSCULAR; INTRAVENOUS; SUBCUTANEOUS at 11:04

## 2025-01-01 RX ADMIN — DIPHENOXYLATE HYDROCHLORIDE AND ATROPINE SULFATE 1 TABLET: 2.5; .025 TABLET ORAL at 12:26

## 2025-01-01 RX ADMIN — POTASSIUM CHLORIDE 40 MEQ: 1500 TABLET, EXTENDED RELEASE ORAL at 08:18

## 2025-01-01 RX ADMIN — ACYCLOVIR 400 MG: 400 TABLET ORAL at 20:25

## 2025-01-01 RX ADMIN — DIBASIC SODIUM PHOSPHATE, MONOBASIC POTASSIUM PHOSPHATE AND MONOBASIC SODIUM PHOSPHATE 1 TABLET: 852; 155; 130 TABLET ORAL at 08:31

## 2025-01-01 RX ADMIN — THERA TABS 1 TABLET: TAB at 09:46

## 2025-01-01 RX ADMIN — SODIUM CHLORIDE, PRESERVATIVE FREE 10 ML: 5 INJECTION INTRAVENOUS at 20:28

## 2025-01-01 RX ADMIN — POTASSIUM BICARBONATE 40 MEQ: 782 TABLET, EFFERVESCENT ORAL at 08:18

## 2025-01-01 RX ADMIN — WATER 70 MG: 1 INJECTION INTRAMUSCULAR; INTRAVENOUS; SUBCUTANEOUS at 08:45

## 2025-01-01 RX ADMIN — NITROFURANTOIN (MONOHYDRATE/MACROCRYSTALS) 100 MG: 25; 75 CAPSULE ORAL at 11:36

## 2025-01-01 RX ADMIN — LOPERAMIDE HYDROCHLORIDE 4 MG: 2 CAPSULE ORAL at 20:38

## 2025-01-01 RX ADMIN — OCTREOTIDE ACETATE 100 MCG: 100 INJECTION, SOLUTION INTRAVENOUS; SUBCUTANEOUS at 20:04

## 2025-01-01 RX ADMIN — ACETAMINOPHEN 650 MG: 325 TABLET ORAL at 17:47

## 2025-01-01 RX ADMIN — POTASSIUM CHLORIDE 20 MEQ: 400 INJECTION, SOLUTION INTRAVENOUS at 07:22

## 2025-01-01 RX ADMIN — THERA TABS 1 TABLET: TAB at 08:47

## 2025-01-01 RX ADMIN — POTASSIUM CHLORIDE: 2 INJECTION, SOLUTION, CONCENTRATE INTRAVENOUS at 19:31

## 2025-01-01 RX ADMIN — SULFAMETHOXAZOLE AND TRIMETHOPRIM 1 TABLET: 400; 80 TABLET ORAL at 20:25

## 2025-01-01 RX ADMIN — INSULIN LISPRO 1 UNITS: 100 INJECTION, SOLUTION INTRAVENOUS; SUBCUTANEOUS at 16:30

## 2025-01-01 RX ADMIN — POTASSIUM CHLORIDE 40 MEQ: 1500 TABLET, EXTENDED RELEASE ORAL at 08:44

## 2025-01-01 RX ADMIN — INSULIN LISPRO 1 UNITS: 100 INJECTION, SOLUTION INTRAVENOUS; SUBCUTANEOUS at 21:23

## 2025-01-01 RX ADMIN — DIPHENOXYLATE HYDROCHLORIDE AND ATROPINE SULFATE 1 TABLET: 2.5; .025 TABLET ORAL at 13:03

## 2025-01-01 RX ADMIN — VORICONAZOLE 200 MG: 200 TABLET ORAL at 20:58

## 2025-01-01 RX ADMIN — POTASSIUM CHLORIDE: 2 INJECTION, SOLUTION, CONCENTRATE INTRAVENOUS at 13:47

## 2025-01-01 RX ADMIN — LOPERAMIDE HYDROCHLORIDE 4 MG: 2 CAPSULE ORAL at 08:31

## 2025-01-01 RX ADMIN — POTASSIUM CHLORIDE 20 MEQ: 400 INJECTION, SOLUTION INTRAVENOUS at 10:31

## 2025-01-01 RX ADMIN — THIAMINE HCL TAB 100 MG 100 MG: 100 TAB at 09:38

## 2025-01-01 RX ADMIN — LOPERAMIDE HYDROCHLORIDE 4 MG: 2 CAPSULE ORAL at 12:21

## 2025-01-01 RX ADMIN — OCTREOTIDE ACETATE 100 MCG: 100 INJECTION, SOLUTION INTRAVENOUS; SUBCUTANEOUS at 23:12

## 2025-01-01 RX ADMIN — ENOXAPARIN SODIUM 40 MG: 100 INJECTION SUBCUTANEOUS at 18:24

## 2025-01-01 RX ADMIN — SODIUM CHLORIDE 15 ML: 900 IRRIGANT IRRIGATION at 06:12

## 2025-01-01 RX ADMIN — LOPERAMIDE HYDROCHLORIDE 4 MG: 2 CAPSULE ORAL at 14:10

## 2025-01-01 RX ADMIN — SODIUM CHLORIDE 15 ML: 900 IRRIGANT IRRIGATION at 03:39

## 2025-01-01 RX ADMIN — SODIUM CHLORIDE: 9 INJECTION, SOLUTION INTRAVENOUS at 13:08

## 2025-01-01 RX ADMIN — POTASSIUM CHLORIDE: 2 INJECTION, SOLUTION, CONCENTRATE INTRAVENOUS at 15:57

## 2025-01-01 RX ADMIN — LOPERAMIDE HYDROCHLORIDE 4 MG: 2 CAPSULE ORAL at 13:50

## 2025-01-01 RX ADMIN — SODIUM CHLORIDE, PRESERVATIVE FREE 10 ML: 5 INJECTION INTRAVENOUS at 20:54

## 2025-01-01 RX ADMIN — IOPAMIDOL 75 ML: 755 INJECTION, SOLUTION INTRAVENOUS at 17:09

## 2025-01-01 RX ADMIN — POTASSIUM CHLORIDE 20 MEQ: 400 INJECTION, SOLUTION INTRAVENOUS at 07:10

## 2025-01-01 RX ADMIN — VORICONAZOLE 200 MG: 200 TABLET ORAL at 20:25

## 2025-01-01 RX ADMIN — BUDESONIDE 3 MG: 3 CAPSULE, GELATIN COATED ORAL at 08:38

## 2025-01-01 RX ADMIN — POTASSIUM CHLORIDE 20 MEQ: 400 INJECTION, SOLUTION INTRAVENOUS at 21:32

## 2025-01-01 RX ADMIN — POTASSIUM BICARBONATE 40 MEQ: 782 TABLET, EFFERVESCENT ORAL at 15:11

## 2025-01-01 RX ADMIN — THIAMINE HCL TAB 100 MG 100 MG: 100 TAB at 08:10

## 2025-01-01 RX ADMIN — POTASSIUM CHLORIDE 20 MEQ: 400 INJECTION, SOLUTION INTRAVENOUS at 21:06

## 2025-01-01 RX ADMIN — PANTOPRAZOLE SODIUM 40 MG: 40 TABLET, DELAYED RELEASE ORAL at 07:07

## 2025-01-01 RX ADMIN — POTASSIUM CHLORIDE 20 MEQ: 400 INJECTION, SOLUTION INTRAVENOUS at 06:23

## 2025-01-01 RX ADMIN — BUDESONIDE 3 MG: 3 CAPSULE, GELATIN COATED ORAL at 20:49

## 2025-01-01 RX ADMIN — LOPERAMIDE HYDROCHLORIDE 2 MG: 2 CAPSULE ORAL at 04:06

## 2025-01-01 RX ADMIN — BUDESONIDE 3 MG: 3 CAPSULE, GELATIN COATED ORAL at 20:07

## 2025-01-01 RX ADMIN — SODIUM CHLORIDE, PRESERVATIVE FREE 10 ML: 5 INJECTION INTRAVENOUS at 20:29

## 2025-01-01 RX ADMIN — ALBUMIN (HUMAN) 25 G: 0.25 INJECTION, SOLUTION INTRAVENOUS at 13:46

## 2025-01-01 RX ADMIN — VASOPRESSIN 0.03 UNITS/MIN: 20 INJECTION INTRAVENOUS at 09:39

## 2025-01-01 RX ADMIN — SODIUM BICARBONATE 650 MG: 650 TABLET ORAL at 21:48

## 2025-01-01 RX ADMIN — OCTREOTIDE ACETATE 100 MCG: 100 INJECTION, SOLUTION INTRAVENOUS; SUBCUTANEOUS at 11:40

## 2025-01-01 RX ADMIN — SODIUM CHLORIDE 15 ML: 900 IRRIGANT IRRIGATION at 20:08

## 2025-01-01 RX ADMIN — BUDESONIDE 3 MG: 3 CAPSULE, GELATIN COATED ORAL at 14:42

## 2025-01-01 RX ADMIN — POTASSIUM CHLORIDE: 2 INJECTION, SOLUTION, CONCENTRATE INTRAVENOUS at 07:05

## 2025-01-01 RX ADMIN — INSULIN LISPRO 1 UNITS: 100 INJECTION, SOLUTION INTRAVENOUS; SUBCUTANEOUS at 16:01

## 2025-01-01 RX ADMIN — LOPERAMIDE HYDROCHLORIDE 4 MG: 2 CAPSULE ORAL at 17:10

## 2025-01-01 RX ADMIN — SODIUM CHLORIDE 15 ML: 900 IRRIGANT IRRIGATION at 10:37

## 2025-01-01 RX ADMIN — ATOVAQUONE 1500 MG: 750 SUSPENSION ORAL at 09:39

## 2025-01-01 RX ADMIN — POTASSIUM CHLORIDE 20 MEQ: 400 INJECTION, SOLUTION INTRAVENOUS at 06:18

## 2025-01-01 RX ADMIN — SPIRONOLACTONE 25 MG: 25 TABLET ORAL at 09:07

## 2025-01-01 RX ADMIN — DIPHENOXYLATE HYDROCHLORIDE AND ATROPINE SULFATE 1 TABLET: 2.5; .025 TABLET ORAL at 17:10

## 2025-01-01 RX ADMIN — POTASSIUM CHLORIDE 40 MEQ: 1500 TABLET, EXTENDED RELEASE ORAL at 08:02

## 2025-01-01 RX ADMIN — WATER 70 MG: 1 INJECTION INTRAMUSCULAR; INTRAVENOUS; SUBCUTANEOUS at 09:37

## 2025-01-01 RX ADMIN — DIPHENOXYLATE HYDROCHLORIDE AND ATROPINE SULFATE 1 TABLET: 2.5; .025 TABLET ORAL at 21:14

## 2025-01-01 RX ADMIN — SPIRONOLACTONE 25 MG: 25 TABLET ORAL at 11:39

## 2025-01-01 RX ADMIN — VORICONAZOLE 200 MG: 200 TABLET ORAL at 08:31

## 2025-01-01 RX ADMIN — SODIUM CHLORIDE: 9 INJECTION, SOLUTION INTRAVENOUS at 01:52

## 2025-01-01 RX ADMIN — LOPERAMIDE HYDROCHLORIDE 4 MG: 2 CAPSULE ORAL at 19:56

## 2025-01-01 RX ADMIN — LOPERAMIDE HYDROCHLORIDE 4 MG: 2 CAPSULE ORAL at 21:16

## 2025-01-01 RX ADMIN — POTASSIUM BICARBONATE 40 MEQ: 782 TABLET, EFFERVESCENT ORAL at 11:19

## 2025-01-01 RX ADMIN — POTASSIUM CHLORIDE 20 MEQ: 400 INJECTION, SOLUTION INTRAVENOUS at 23:57

## 2025-01-01 RX ADMIN — OCTREOTIDE ACETATE 100 MCG: 100 INJECTION, SOLUTION INTRAVENOUS; SUBCUTANEOUS at 08:45

## 2025-01-01 RX ADMIN — PANTOPRAZOLE SODIUM 40 MG: 40 TABLET, DELAYED RELEASE ORAL at 06:25

## 2025-01-01 RX ADMIN — THIAMINE HCL TAB 100 MG 100 MG: 100 TAB at 08:15

## 2025-01-01 RX ADMIN — DIPHENOXYLATE HYDROCHLORIDE AND ATROPINE SULFATE 1 TABLET: 2.5; .025 TABLET ORAL at 17:53

## 2025-01-01 RX ADMIN — SPIRONOLACTONE 25 MG: 25 TABLET ORAL at 08:15

## 2025-01-01 RX ADMIN — VORICONAZOLE 200 MG: 200 TABLET ORAL at 08:10

## 2025-01-01 RX ADMIN — VORICONAZOLE 200 MG: 200 TABLET ORAL at 21:08

## 2025-01-01 RX ADMIN — PANTOPRAZOLE SODIUM 40 MG: 40 TABLET, DELAYED RELEASE ORAL at 06:47

## 2025-01-01 RX ADMIN — THIAMINE HCL TAB 100 MG 100 MG: 100 TAB at 09:13

## 2025-01-01 RX ADMIN — SODIUM PHOSPHATE, MONOBASIC, MONOHYDRATE AND SODIUM PHOSPHATE, DIBASIC, ANHYDROUS 30 MMOL: 142; 276 INJECTION, SOLUTION INTRAVENOUS at 06:31

## 2025-01-01 RX ADMIN — PANTOPRAZOLE SODIUM 40 MG: 40 TABLET, DELAYED RELEASE ORAL at 06:35

## 2025-01-01 RX ADMIN — PANTOPRAZOLE SODIUM 40 MG: 40 TABLET, DELAYED RELEASE ORAL at 06:06

## 2025-01-01 RX ADMIN — POTASSIUM CHLORIDE: 2 INJECTION, SOLUTION, CONCENTRATE INTRAVENOUS at 10:34

## 2025-01-01 RX ADMIN — ENOXAPARIN SODIUM 40 MG: 100 INJECTION SUBCUTANEOUS at 18:26

## 2025-01-01 RX ADMIN — BUDESONIDE 3 MG: 3 CAPSULE, GELATIN COATED ORAL at 09:45

## 2025-01-01 RX ADMIN — POTASSIUM CHLORIDE 20 MEQ: 400 INJECTION, SOLUTION INTRAVENOUS at 09:20

## 2025-01-01 RX ADMIN — LOPERAMIDE HYDROCHLORIDE 4 MG: 2 CAPSULE ORAL at 13:48

## 2025-01-01 RX ADMIN — OCTREOTIDE ACETATE 100 MCG: 100 INJECTION, SOLUTION INTRAVENOUS; SUBCUTANEOUS at 20:39

## 2025-01-01 RX ADMIN — DIPHENOXYLATE HYDROCHLORIDE AND ATROPINE SULFATE 1 TABLET: 2.5; .025 TABLET ORAL at 21:15

## 2025-01-01 RX ADMIN — POTASSIUM CHLORIDE 40 MEQ: 1500 TABLET, EXTENDED RELEASE ORAL at 09:09

## 2025-01-01 RX ADMIN — OCTREOTIDE ACETATE 100 MCG: 100 INJECTION, SOLUTION INTRAVENOUS; SUBCUTANEOUS at 16:45

## 2025-01-01 RX ADMIN — POTASSIUM CHLORIDE 20 MEQ: 400 INJECTION, SOLUTION INTRAVENOUS at 04:47

## 2025-01-01 RX ADMIN — PREDNISONE 70 MG: 50 TABLET ORAL at 08:26

## 2025-01-01 RX ADMIN — VORICONAZOLE 200 MG: 200 TABLET ORAL at 09:46

## 2025-01-01 RX ADMIN — INSULIN LISPRO 1 UNITS: 100 INJECTION, SOLUTION INTRAVENOUS; SUBCUTANEOUS at 17:14

## 2025-01-01 RX ADMIN — VORICONAZOLE 200 MG: 200 TABLET ORAL at 20:03

## 2025-01-01 RX ADMIN — PANTOPRAZOLE SODIUM 40 MG: 40 TABLET, DELAYED RELEASE ORAL at 06:55

## 2025-01-01 RX ADMIN — VORICONAZOLE 200 MG: 200 TABLET ORAL at 08:41

## 2025-01-01 RX ADMIN — SULFAMETHOXAZOLE AND TRIMETHOPRIM 1 TABLET: 400; 80 TABLET ORAL at 22:46

## 2025-01-01 RX ADMIN — VORICONAZOLE 200 MG: 200 TABLET ORAL at 20:49

## 2025-01-01 RX ADMIN — POTASSIUM CHLORIDE: 2 INJECTION, SOLUTION, CONCENTRATE INTRAVENOUS at 03:32

## 2025-01-01 RX ADMIN — SODIUM BICARBONATE 1300 MG: 650 TABLET ORAL at 14:04

## 2025-01-01 RX ADMIN — SODIUM CHLORIDE 15 ML: 900 IRRIGANT IRRIGATION at 22:17

## 2025-01-01 RX ADMIN — THIAMINE HCL TAB 100 MG 100 MG: 100 TAB at 09:46

## 2025-01-01 RX ADMIN — BUDESONIDE 3 MG: 3 CAPSULE, GELATIN COATED ORAL at 15:53

## 2025-01-01 RX ADMIN — ACYCLOVIR 400 MG: 400 TABLET ORAL at 09:45

## 2025-01-01 RX ADMIN — DIPHENOXYLATE HYDROCHLORIDE AND ATROPINE SULFATE 2 TABLET: 2.5; .025 TABLET ORAL at 09:38

## 2025-01-01 RX ADMIN — PREDNISONE 70 MG: 50 TABLET ORAL at 09:22

## 2025-01-01 RX ADMIN — SODIUM CHLORIDE, PRESERVATIVE FREE 10 ML: 5 INJECTION INTRAVENOUS at 08:25

## 2025-01-01 RX ADMIN — LOPERAMIDE HYDROCHLORIDE 4 MG: 2 CAPSULE ORAL at 21:48

## 2025-01-01 RX ADMIN — POTASSIUM BICARBONATE 40 MEQ: 782 TABLET, EFFERVESCENT ORAL at 08:16

## 2025-01-01 RX ADMIN — BUDESONIDE 3 MG: 3 CAPSULE, GELATIN COATED ORAL at 15:02

## 2025-01-01 RX ADMIN — POTASSIUM CHLORIDE 20 MEQ: 400 INJECTION, SOLUTION INTRAVENOUS at 08:25

## 2025-01-01 RX ADMIN — LOPERAMIDE HYDROCHLORIDE 4 MG: 2 CAPSULE ORAL at 20:26

## 2025-01-01 RX ADMIN — BUDESONIDE 3 MG: 3 CAPSULE, GELATIN COATED ORAL at 20:11

## 2025-01-01 RX ADMIN — POTASSIUM CHLORIDE 20 MEQ: 400 INJECTION, SOLUTION INTRAVENOUS at 10:33

## 2025-01-01 RX ADMIN — DIPHENOXYLATE HYDROCHLORIDE AND ATROPINE SULFATE 1 TABLET: 2.5; .025 TABLET ORAL at 12:28

## 2025-01-01 RX ADMIN — INSULIN LISPRO 1 UNITS: 100 INJECTION, SOLUTION INTRAVENOUS; SUBCUTANEOUS at 16:02

## 2025-01-01 RX ADMIN — LOPERAMIDE HYDROCHLORIDE 4 MG: 2 CAPSULE ORAL at 17:59

## 2025-01-01 RX ADMIN — ATOVAQUONE 1500 MG: 750 SUSPENSION ORAL at 08:28

## 2025-01-01 RX ADMIN — DIPHENOXYLATE HYDROCHLORIDE AND ATROPINE SULFATE 1 TABLET: 2.5; .025 TABLET ORAL at 17:23

## 2025-01-01 RX ADMIN — SODIUM CHLORIDE 15 ML: 900 IRRIGANT IRRIGATION at 06:48

## 2025-01-01 RX ADMIN — INSULIN LISPRO 1 UNITS: 100 INJECTION, SOLUTION INTRAVENOUS; SUBCUTANEOUS at 20:17

## 2025-01-01 RX ADMIN — OCTREOTIDE ACETATE 100 MCG: 100 INJECTION, SOLUTION INTRAVENOUS; SUBCUTANEOUS at 00:43

## 2025-01-01 RX ADMIN — LOPERAMIDE HYDROCHLORIDE 2 MG: 2 CAPSULE ORAL at 13:58

## 2025-01-01 RX ADMIN — POTASSIUM CHLORIDE 20 MEQ: 400 INJECTION, SOLUTION INTRAVENOUS at 05:08

## 2025-01-01 RX ADMIN — SULFAMETHOXAZOLE AND TRIMETHOPRIM 1 TABLET: 400; 80 TABLET ORAL at 22:18

## 2025-01-01 RX ADMIN — BUDESONIDE 3 MG: 3 CAPSULE, GELATIN COATED ORAL at 20:03

## 2025-01-01 RX ADMIN — LOPERAMIDE HYDROCHLORIDE 4 MG: 2 CAPSULE ORAL at 12:53

## 2025-01-01 RX ADMIN — DIPHENOXYLATE HYDROCHLORIDE AND ATROPINE SULFATE 1 TABLET: 2.5; .025 TABLET ORAL at 12:55

## 2025-01-01 RX ADMIN — SODIUM CHLORIDE, PRESERVATIVE FREE 10 ML: 5 INJECTION INTRAVENOUS at 22:37

## 2025-01-01 RX ADMIN — DIPHENOXYLATE HYDROCHLORIDE AND ATROPINE SULFATE 1 TABLET: 2.5; .025 TABLET ORAL at 20:07

## 2025-01-01 RX ADMIN — ALBUMIN (HUMAN) 25 G: 0.25 INJECTION, SOLUTION INTRAVENOUS at 11:43

## 2025-01-01 RX ADMIN — OCTREOTIDE ACETATE 100 MCG: 100 INJECTION, SOLUTION INTRAVENOUS; SUBCUTANEOUS at 20:50

## 2025-01-01 RX ADMIN — POTASSIUM BICARBONATE 40 MEQ: 782 TABLET, EFFERVESCENT ORAL at 09:21

## 2025-01-01 RX ADMIN — PANTOPRAZOLE SODIUM 40 MG: 40 TABLET, DELAYED RELEASE ORAL at 06:01

## 2025-01-01 RX ADMIN — SODIUM CHLORIDE, PRESERVATIVE FREE 10 ML: 5 INJECTION INTRAVENOUS at 20:34

## 2025-01-01 RX ADMIN — SODIUM CHLORIDE 15 ML: 900 IRRIGANT IRRIGATION at 12:26

## 2025-01-01 RX ADMIN — POTASSIUM BICARBONATE 40 MEQ: 782 TABLET, EFFERVESCENT ORAL at 21:23

## 2025-01-01 RX ADMIN — BUDESONIDE 3 MG: 3 CAPSULE, GELATIN COATED ORAL at 14:48

## 2025-01-01 RX ADMIN — POTASSIUM BICARBONATE 40 MEQ: 782 TABLET, EFFERVESCENT ORAL at 15:09

## 2025-01-01 RX ADMIN — DIBASIC SODIUM PHOSPHATE, MONOBASIC POTASSIUM PHOSPHATE AND MONOBASIC SODIUM PHOSPHATE 1 TABLET: 852; 155; 130 TABLET ORAL at 15:02

## 2025-01-01 RX ADMIN — DIPHENOXYLATE HYDROCHLORIDE AND ATROPINE SULFATE 1 TABLET: 2.5; .025 TABLET ORAL at 11:19

## 2025-01-01 RX ADMIN — DIPHENOXYLATE HYDROCHLORIDE AND ATROPINE SULFATE 1 TABLET: 2.5; .025 TABLET ORAL at 20:08

## 2025-01-01 RX ADMIN — VORICONAZOLE 200 MG: 200 TABLET ORAL at 20:43

## 2025-01-01 RX ADMIN — VORICONAZOLE 200 MG: 200 TABLET ORAL at 20:39

## 2025-01-01 RX ADMIN — BUDESONIDE 3 MG: 3 CAPSULE, GELATIN COATED ORAL at 21:08

## 2025-01-01 RX ADMIN — LOPERAMIDE HYDROCHLORIDE 4 MG: 2 CAPSULE ORAL at 09:39

## 2025-01-01 RX ADMIN — SODIUM CHLORIDE 15 ML: 900 IRRIGANT IRRIGATION at 09:39

## 2025-01-01 RX ADMIN — PANTOPRAZOLE SODIUM 40 MG: 40 TABLET, DELAYED RELEASE ORAL at 07:19

## 2025-01-01 RX ADMIN — SODIUM PHOSPHATE, MONOBASIC, MONOHYDRATE AND SODIUM PHOSPHATE, DIBASIC, ANHYDROUS 30 MMOL: 142; 276 INJECTION, SOLUTION INTRAVENOUS at 07:38

## 2025-01-01 RX ADMIN — DIPHENOXYLATE HYDROCHLORIDE AND ATROPINE SULFATE 1 TABLET: 2.5; .025 TABLET ORAL at 20:26

## 2025-01-01 RX ADMIN — SODIUM BICARBONATE 650 MG: 650 TABLET ORAL at 14:45

## 2025-01-01 RX ADMIN — VORICONAZOLE 200 MG: 200 TABLET ORAL at 08:26

## 2025-01-01 RX ADMIN — LOPERAMIDE HYDROCHLORIDE 2 MG: 2 CAPSULE ORAL at 17:31

## 2025-01-01 RX ADMIN — SODIUM CHLORIDE 15 ML: 900 IRRIGANT IRRIGATION at 20:33

## 2025-01-01 RX ADMIN — SULFAMETHOXAZOLE AND TRIMETHOPRIM 1 TABLET: 400; 80 TABLET ORAL at 20:51

## 2025-01-01 RX ADMIN — VORICONAZOLE 200 MG: 200 TABLET ORAL at 20:16

## 2025-01-01 RX ADMIN — SODIUM BICARBONATE 650 MG: 650 TABLET ORAL at 08:45

## 2025-01-01 RX ADMIN — SODIUM CHLORIDE: 9 INJECTION, SOLUTION INTRAVENOUS at 17:28

## 2025-01-01 RX ADMIN — VORICONAZOLE 200 MG: 200 TABLET ORAL at 09:03

## 2025-01-01 RX ADMIN — SODIUM CHLORIDE, PRESERVATIVE FREE 10 ML: 5 INJECTION INTRAVENOUS at 08:15

## 2025-01-01 RX ADMIN — SODIUM PHOSPHATE, MONOBASIC, MONOHYDRATE AND SODIUM PHOSPHATE, DIBASIC, ANHYDROUS 30 MMOL: 142; 276 INJECTION, SOLUTION INTRAVENOUS at 09:07

## 2025-01-01 RX ADMIN — POTASSIUM BICARBONATE 40 MEQ: 782 TABLET, EFFERVESCENT ORAL at 20:10

## 2025-01-01 RX ADMIN — BUDESONIDE 3 MG: 3 CAPSULE, GELATIN COATED ORAL at 08:30

## 2025-01-01 RX ADMIN — LOPERAMIDE HYDROCHLORIDE 4 MG: 2 CAPSULE ORAL at 13:33

## 2025-01-01 RX ADMIN — DIPHENOXYLATE HYDROCHLORIDE AND ATROPINE SULFATE 1 TABLET: 2.5; .025 TABLET ORAL at 08:30

## 2025-01-01 RX ADMIN — SODIUM CHLORIDE 15 ML: 900 IRRIGANT IRRIGATION at 16:46

## 2025-01-01 RX ADMIN — SODIUM CHLORIDE, PRESERVATIVE FREE 10 ML: 5 INJECTION INTRAVENOUS at 08:38

## 2025-01-01 RX ADMIN — METOPROLOL TARTRATE 25 MG: 25 TABLET, FILM COATED ORAL at 20:16

## 2025-01-01 RX ADMIN — POTASSIUM BICARBONATE 40 MEQ: 782 TABLET, EFFERVESCENT ORAL at 20:07

## 2025-01-01 RX ADMIN — POTASSIUM CHLORIDE 20 MEQ: 400 INJECTION, SOLUTION INTRAVENOUS at 06:01

## 2025-01-01 RX ADMIN — POTASSIUM CHLORIDE: 2 INJECTION, SOLUTION, CONCENTRATE INTRAVENOUS at 22:41

## 2025-01-01 RX ADMIN — LOPERAMIDE HYDROCHLORIDE 4 MG: 2 CAPSULE ORAL at 20:34

## 2025-01-01 RX ADMIN — POTASSIUM CHLORIDE 20 MEQ: 400 INJECTION, SOLUTION INTRAVENOUS at 05:43

## 2025-01-01 RX ADMIN — DIPHENOXYLATE HYDROCHLORIDE AND ATROPINE SULFATE 1 TABLET: 2.5; .025 TABLET ORAL at 13:13

## 2025-01-01 RX ADMIN — OCTREOTIDE ACETATE 100 MCG: 100 INJECTION, SOLUTION INTRAVENOUS; SUBCUTANEOUS at 08:33

## 2025-01-01 RX ADMIN — Medication: at 08:47

## 2025-01-01 RX ADMIN — DIPHENOXYLATE HYDROCHLORIDE AND ATROPINE SULFATE 1 TABLET: 2.5; .025 TABLET ORAL at 10:13

## 2025-01-01 RX ADMIN — SULFAMETHOXAZOLE AND TRIMETHOPRIM 1 TABLET: 400; 80 TABLET ORAL at 22:01

## 2025-01-01 RX ADMIN — SODIUM BICARBONATE 650 MG: 650 TABLET ORAL at 20:04

## 2025-01-01 RX ADMIN — ENOXAPARIN SODIUM 40 MG: 100 INJECTION SUBCUTANEOUS at 18:37

## 2025-01-01 RX ADMIN — ACYCLOVIR 400 MG: 400 TABLET ORAL at 21:48

## 2025-01-01 RX ADMIN — OCTREOTIDE ACETATE 100 MCG: 100 INJECTION, SOLUTION INTRAVENOUS; SUBCUTANEOUS at 06:48

## 2025-01-01 RX ADMIN — SULFAMETHOXAZOLE AND TRIMETHOPRIM 1 TABLET: 400; 80 TABLET ORAL at 21:58

## 2025-01-01 RX ADMIN — OCTREOTIDE ACETATE 100 MCG: 100 INJECTION, SOLUTION INTRAVENOUS; SUBCUTANEOUS at 06:27

## 2025-01-01 RX ADMIN — DIPHENOXYLATE HYDROCHLORIDE AND ATROPINE SULFATE 2 TABLET: 2.5; .025 TABLET ORAL at 16:22

## 2025-01-01 RX ADMIN — VALGANCICLOVIR 900 MG: 450 TABLET, FILM COATED ORAL at 19:59

## 2025-01-01 RX ADMIN — SODIUM CHLORIDE 15 ML: 900 IRRIGANT IRRIGATION at 10:29

## 2025-01-01 RX ADMIN — DIPHENOXYLATE HYDROCHLORIDE AND ATROPINE SULFATE 2 TABLET: 2.5; .025 TABLET ORAL at 08:47

## 2025-01-01 RX ADMIN — LOPERAMIDE HYDROCHLORIDE 4 MG: 2 CAPSULE ORAL at 20:49

## 2025-01-01 RX ADMIN — THERA TABS 1 TABLET: TAB at 08:10

## 2025-01-01 RX ADMIN — DIPHENOXYLATE HYDROCHLORIDE AND ATROPINE SULFATE 2 TABLET: 2.5; .025 TABLET ORAL at 19:59

## 2025-01-01 RX ADMIN — THIAMINE HCL TAB 100 MG 100 MG: 100 TAB at 08:47

## 2025-01-01 RX ADMIN — BUDESONIDE 3 MG: 3 CAPSULE, GELATIN COATED ORAL at 21:15

## 2025-01-01 RX ADMIN — POTASSIUM CHLORIDE 20 MEQ: 400 INJECTION, SOLUTION INTRAVENOUS at 05:54

## 2025-01-01 RX ADMIN — ACETAZOLAMIDE 125 MG: 250 TABLET ORAL at 09:09

## 2025-01-01 RX ADMIN — LOPERAMIDE HYDROCHLORIDE 4 MG: 2 CAPSULE ORAL at 08:47

## 2025-01-01 RX ADMIN — LOPERAMIDE HYDROCHLORIDE 4 MG: 2 CAPSULE ORAL at 08:15

## 2025-01-01 RX ADMIN — OCTREOTIDE ACETATE 100 MCG: 100 INJECTION, SOLUTION INTRAVENOUS; SUBCUTANEOUS at 09:27

## 2025-01-01 RX ADMIN — BUDESONIDE 3 MG: 3 CAPSULE, GELATIN COATED ORAL at 08:35

## 2025-01-01 RX ADMIN — DIPHENOXYLATE HYDROCHLORIDE AND ATROPINE SULFATE 1 TABLET: 2.5; .025 TABLET ORAL at 08:20

## 2025-01-01 RX ADMIN — ACYCLOVIR 400 MG: 400 TABLET ORAL at 20:08

## 2025-01-01 RX ADMIN — THERA TABS 1 TABLET: TAB at 08:37

## 2025-01-01 RX ADMIN — DIPHENOXYLATE HYDROCHLORIDE AND ATROPINE SULFATE 2 TABLET: 2.5; .025 TABLET ORAL at 16:43

## 2025-01-01 RX ADMIN — BUDESONIDE 3 MG: 3 CAPSULE, GELATIN COATED ORAL at 08:12

## 2025-01-01 RX ADMIN — SODIUM PHOSPHATE, MONOBASIC, MONOHYDRATE AND SODIUM PHOSPHATE, DIBASIC, ANHYDROUS 30 MMOL: 142; 276 INJECTION, SOLUTION INTRAVENOUS at 05:12

## 2025-01-01 RX ADMIN — POTASSIUM BICARBONATE 40 MEQ: 782 TABLET, EFFERVESCENT ORAL at 21:01

## 2025-01-01 RX ADMIN — SULFAMETHOXAZOLE AND TRIMETHOPRIM 1 TABLET: 400; 80 TABLET ORAL at 21:36

## 2025-01-01 RX ADMIN — THERA TABS 1 TABLET: TAB at 08:45

## 2025-01-01 RX ADMIN — BUDESONIDE 3 MG: 3 CAPSULE, GELATIN COATED ORAL at 19:59

## 2025-01-01 RX ADMIN — ACYCLOVIR 400 MG: 400 TABLET ORAL at 20:55

## 2025-01-01 RX ADMIN — THERA TABS 1 TABLET: TAB at 08:12

## 2025-01-01 RX ADMIN — SODIUM PHOSPHATE, MONOBASIC, MONOHYDRATE AND SODIUM PHOSPHATE, DIBASIC, ANHYDROUS 30 MMOL: 142; 276 INJECTION, SOLUTION INTRAVENOUS at 08:01

## 2025-01-01 RX ADMIN — POTASSIUM CHLORIDE 20 MEQ: 400 INJECTION, SOLUTION INTRAVENOUS at 10:01

## 2025-01-01 RX ADMIN — THERA TABS 1 TABLET: TAB at 09:38

## 2025-01-01 RX ADMIN — ACYCLOVIR 400 MG: 400 TABLET ORAL at 20:43

## 2025-01-01 RX ADMIN — POTASSIUM CHLORIDE 20 MEQ: 400 INJECTION, SOLUTION INTRAVENOUS at 19:31

## 2025-01-01 RX ADMIN — SULFAMETHOXAZOLE AND TRIMETHOPRIM 1 TABLET: 400; 80 TABLET ORAL at 21:08

## 2025-01-01 RX ADMIN — VALGANCICLOVIR 900 MG: 450 TABLET, FILM COATED ORAL at 21:08

## 2025-01-01 RX ADMIN — LOPERAMIDE HYDROCHLORIDE 4 MG: 2 CAPSULE ORAL at 20:03

## 2025-01-01 RX ADMIN — THIAMINE HCL TAB 100 MG 100 MG: 100 TAB at 11:08

## 2025-01-01 RX ADMIN — SODIUM CHLORIDE, PRESERVATIVE FREE 10 ML: 5 INJECTION INTRAVENOUS at 08:04

## 2025-01-01 RX ADMIN — POTASSIUM CHLORIDE 40 MEQ: 1500 TABLET, EXTENDED RELEASE ORAL at 17:12

## 2025-01-01 RX ADMIN — DIPHENOXYLATE HYDROCHLORIDE AND ATROPINE SULFATE 2 TABLET: 2.5; .025 TABLET ORAL at 13:39

## 2025-01-01 RX ADMIN — ACYCLOVIR 400 MG: 400 TABLET ORAL at 08:45

## 2025-01-01 RX ADMIN — SODIUM CHLORIDE, PRESERVATIVE FREE 10 ML: 5 INJECTION INTRAVENOUS at 08:09

## 2025-01-01 RX ADMIN — FUROSEMIDE 40 MG: 10 INJECTION, SOLUTION INTRAMUSCULAR; INTRAVENOUS at 13:37

## 2025-01-01 RX ADMIN — LOPERAMIDE HYDROCHLORIDE 4 MG: 2 CAPSULE ORAL at 12:44

## 2025-01-01 RX ADMIN — THIAMINE HCL TAB 100 MG 100 MG: 100 TAB at 09:07

## 2025-01-01 RX ADMIN — LOPERAMIDE HYDROCHLORIDE 4 MG: 2 CAPSULE ORAL at 12:55

## 2025-01-01 RX ADMIN — DIPHENOXYLATE HYDROCHLORIDE AND ATROPINE SULFATE 1 TABLET: 2.5; .025 TABLET ORAL at 20:38

## 2025-01-01 RX ADMIN — INSULIN LISPRO 1 UNITS: 100 INJECTION, SOLUTION INTRAVENOUS; SUBCUTANEOUS at 16:25

## 2025-01-01 RX ADMIN — DIPHENOXYLATE HYDROCHLORIDE AND ATROPINE SULFATE 1 TABLET: 2.5; .025 TABLET ORAL at 17:13

## 2025-01-01 RX ADMIN — SODIUM CHLORIDE 15 ML: 900 IRRIGANT IRRIGATION at 11:37

## 2025-01-01 RX ADMIN — THIAMINE HCL TAB 100 MG 100 MG: 100 TAB at 08:40

## 2025-01-01 RX ADMIN — LOPERAMIDE HYDROCHLORIDE 4 MG: 2 CAPSULE ORAL at 08:20

## 2025-01-01 RX ADMIN — ENOXAPARIN SODIUM 40 MG: 100 INJECTION SUBCUTANEOUS at 18:58

## 2025-01-01 RX ADMIN — VALGANCICLOVIR 900 MG: 450 TABLET, FILM COATED ORAL at 08:37

## 2025-01-01 RX ADMIN — METOPROLOL TARTRATE 25 MG: 25 TABLET, FILM COATED ORAL at 08:14

## 2025-01-01 RX ADMIN — DIPHENOXYLATE HYDROCHLORIDE AND ATROPINE SULFATE 1 TABLET: 2.5; .025 TABLET ORAL at 17:17

## 2025-01-01 RX ADMIN — WATER 70 MG: 1 INJECTION INTRAMUSCULAR; INTRAVENOUS; SUBCUTANEOUS at 08:21

## 2025-01-01 RX ADMIN — BUDESONIDE 3 MG: 3 CAPSULE, GELATIN COATED ORAL at 15:21

## 2025-01-01 RX ADMIN — SODIUM CHLORIDE, PRESERVATIVE FREE 10 ML: 5 INJECTION INTRAVENOUS at 10:26

## 2025-01-01 RX ADMIN — SODIUM CHLORIDE, PRESERVATIVE FREE 10 ML: 5 INJECTION INTRAVENOUS at 20:50

## 2025-01-01 RX ADMIN — SODIUM CHLORIDE, PRESERVATIVE FREE 10 ML: 5 INJECTION INTRAVENOUS at 08:28

## 2025-01-01 RX ADMIN — POTASSIUM CHLORIDE: 2 INJECTION, SOLUTION, CONCENTRATE INTRAVENOUS at 17:11

## 2025-01-01 RX ADMIN — ENOXAPARIN SODIUM 40 MG: 100 INJECTION SUBCUTANEOUS at 16:45

## 2025-01-01 RX ADMIN — ENOXAPARIN SODIUM 40 MG: 100 INJECTION SUBCUTANEOUS at 17:13

## 2025-01-01 RX ADMIN — VORICONAZOLE 200 MG: 200 TABLET ORAL at 08:15

## 2025-01-01 RX ADMIN — SODIUM CHLORIDE, PRESERVATIVE FREE 10 ML: 5 INJECTION INTRAVENOUS at 05:41

## 2025-01-01 RX ADMIN — SODIUM BICARBONATE 325 MG: 650 TABLET ORAL at 08:26

## 2025-01-01 RX ADMIN — POTASSIUM CHLORIDE 40 MEQ: 1500 TABLET, EXTENDED RELEASE ORAL at 16:58

## 2025-01-01 RX ADMIN — INSULIN LISPRO 1 UNITS: 100 INJECTION, SOLUTION INTRAVENOUS; SUBCUTANEOUS at 17:20

## 2025-01-01 RX ADMIN — OCTREOTIDE ACETATE 100 MCG: 100 INJECTION, SOLUTION INTRAVENOUS; SUBCUTANEOUS at 13:00

## 2025-01-01 RX ADMIN — LOPERAMIDE HYDROCHLORIDE 4 MG: 2 CAPSULE ORAL at 09:03

## 2025-01-01 RX ADMIN — SODIUM CHLORIDE: 9 INJECTION, SOLUTION INTRAVENOUS at 10:29

## 2025-01-01 RX ADMIN — IOPAMIDOL 75 ML: 755 INJECTION, SOLUTION INTRAVENOUS at 00:43

## 2025-01-01 RX ADMIN — POTASSIUM CHLORIDE 20 MEQ: 400 INJECTION, SOLUTION INTRAVENOUS at 16:21

## 2025-01-01 RX ADMIN — VORICONAZOLE 200 MG: 200 TABLET ORAL at 08:30

## 2025-01-01 RX ADMIN — SODIUM CHLORIDE, SODIUM LACTATE, POTASSIUM CHLORIDE, AND CALCIUM CHLORIDE 1000 ML: .6; .31; .03; .02 INJECTION, SOLUTION INTRAVENOUS at 12:59

## 2025-01-01 RX ADMIN — LOPERAMIDE HYDROCHLORIDE 4 MG: 2 CAPSULE ORAL at 20:43

## 2025-01-01 RX ADMIN — POTASSIUM CHLORIDE 20 MEQ: 400 INJECTION, SOLUTION INTRAVENOUS at 08:24

## 2025-01-01 RX ADMIN — POTASSIUM CHLORIDE 20 MEQ: 400 INJECTION, SOLUTION INTRAVENOUS at 04:02

## 2025-01-01 RX ADMIN — OCTREOTIDE ACETATE 100 MCG: 100 INJECTION, SOLUTION INTRAVENOUS; SUBCUTANEOUS at 09:19

## 2025-01-01 RX ADMIN — SODIUM BICARBONATE 650 MG: 650 TABLET ORAL at 13:54

## 2025-01-01 RX ADMIN — BUDESONIDE 3 MG: 3 CAPSULE, GELATIN COATED ORAL at 20:51

## 2025-01-01 RX ADMIN — CALCIUM GLUCONATE 2000 MG: 20 INJECTION, SOLUTION INTRAVENOUS at 13:55

## 2025-01-01 RX ADMIN — DIBASIC SODIUM PHOSPHATE, MONOBASIC POTASSIUM PHOSPHATE AND MONOBASIC SODIUM PHOSPHATE 1 TABLET: 852; 155; 130 TABLET ORAL at 20:03

## 2025-01-01 RX ADMIN — POTASSIUM BICARBONATE 40 MEQ: 782 TABLET, EFFERVESCENT ORAL at 07:49

## 2025-01-01 RX ADMIN — HYDROCORTISONE SODIUM SUCCINATE 50 MG: 100 INJECTION, POWDER, FOR SOLUTION INTRAMUSCULAR; INTRAVENOUS at 09:39

## 2025-01-01 RX ADMIN — POTASSIUM CHLORIDE 20 MEQ: 400 INJECTION, SOLUTION INTRAVENOUS at 07:54

## 2025-01-01 RX ADMIN — VORICONAZOLE 200 MG: 200 TABLET ORAL at 19:56

## 2025-01-01 RX ADMIN — LOPERAMIDE HYDROCHLORIDE 4 MG: 2 CAPSULE ORAL at 16:26

## 2025-01-01 RX ADMIN — VORICONAZOLE 200 MG: 200 TABLET ORAL at 20:08

## 2025-01-01 RX ADMIN — POTASSIUM CHLORIDE 20 MEQ: 400 INJECTION, SOLUTION INTRAVENOUS at 08:37

## 2025-01-01 RX ADMIN — LOPERAMIDE HYDROCHLORIDE 4 MG: 2 CAPSULE ORAL at 13:00

## 2025-01-01 RX ADMIN — ACYCLOVIR 400 MG: 400 TABLET ORAL at 09:16

## 2025-01-01 RX ADMIN — PREDNISONE 55 MG: 50 TABLET ORAL at 08:25

## 2025-01-01 RX ADMIN — POTASSIUM CHLORIDE 20 MEQ: 400 INJECTION, SOLUTION INTRAVENOUS at 06:51

## 2025-01-01 RX ADMIN — SULFAMETHOXAZOLE AND TRIMETHOPRIM 1 TABLET: 400; 80 TABLET ORAL at 22:27

## 2025-01-01 RX ADMIN — VALGANCICLOVIR 900 MG: 450 TABLET, FILM COATED ORAL at 08:25

## 2025-01-01 RX ADMIN — LOPERAMIDE HYDROCHLORIDE 4 MG: 2 CAPSULE ORAL at 09:38

## 2025-01-01 RX ADMIN — ACETAMINOPHEN 650 MG: 325 TABLET ORAL at 00:58

## 2025-01-01 RX ADMIN — SODIUM CHLORIDE 15 ML: 900 IRRIGANT IRRIGATION at 20:54

## 2025-01-01 RX ADMIN — VORICONAZOLE 200 MG: 200 TABLET ORAL at 20:11

## 2025-01-01 RX ADMIN — LOPERAMIDE HYDROCHLORIDE 4 MG: 2 CAPSULE ORAL at 20:39

## 2025-01-01 RX ADMIN — ACYCLOVIR 400 MG: 400 TABLET ORAL at 21:13

## 2025-01-01 RX ADMIN — POTASSIUM BICARBONATE 40 MEQ: 782 TABLET, EFFERVESCENT ORAL at 20:03

## 2025-01-01 RX ADMIN — SODIUM CHLORIDE 15 ML: 900 IRRIGANT IRRIGATION at 21:49

## 2025-01-01 RX ADMIN — SODIUM BICARBONATE 650 MG: 650 TABLET ORAL at 20:25

## 2025-01-01 ASSESSMENT — ENCOUNTER SYMPTOMS
WHEEZING: 0
VOMITING: 0
ABDOMINAL DISTENTION: 0
DIARRHEA: 1
ABDOMINAL PAIN: 0
CONSTIPATION: 0
NAUSEA: 0
COUGH: 0
SHORTNESS OF BREATH: 1

## 2025-01-01 ASSESSMENT — PAIN DESCRIPTION - PAIN TYPE
TYPE: ACUTE PAIN

## 2025-01-01 ASSESSMENT — PAIN DESCRIPTION - LOCATION
LOCATION: ABDOMEN
LOCATION: LEG
LOCATION: LEG
LOCATION: ABDOMEN
LOCATION: LEG

## 2025-01-01 ASSESSMENT — PAIN - FUNCTIONAL ASSESSMENT
PAIN_FUNCTIONAL_ASSESSMENT: PREVENTS OR INTERFERES SOME ACTIVE ACTIVITIES AND ADLS
PAIN_FUNCTIONAL_ASSESSMENT: ACTIVITIES ARE NOT PREVENTED
PAIN_FUNCTIONAL_ASSESSMENT: PREVENTS OR INTERFERES SOME ACTIVE ACTIVITIES AND ADLS
PAIN_FUNCTIONAL_ASSESSMENT: PREVENTS OR INTERFERES SOME ACTIVE ACTIVITIES AND ADLS
PAIN_FUNCTIONAL_ASSESSMENT: NONE - DENIES PAIN
PAIN_FUNCTIONAL_ASSESSMENT: PREVENTS OR INTERFERES SOME ACTIVE ACTIVITIES AND ADLS
PAIN_FUNCTIONAL_ASSESSMENT: ACTIVITIES ARE NOT PREVENTED
PAIN_FUNCTIONAL_ASSESSMENT: PREVENTS OR INTERFERES SOME ACTIVE ACTIVITIES AND ADLS
PAIN_FUNCTIONAL_ASSESSMENT: PREVENTS OR INTERFERES SOME ACTIVE ACTIVITIES AND ADLS

## 2025-01-01 ASSESSMENT — PAIN DESCRIPTION - ONSET
ONSET: ON-GOING
ONSET: PROGRESSIVE

## 2025-01-01 ASSESSMENT — PAIN DESCRIPTION - ORIENTATION
ORIENTATION: RIGHT
ORIENTATION: RIGHT
ORIENTATION: INNER;MID
ORIENTATION: RIGHT
ORIENTATION: LOWER;MID
ORIENTATION: RIGHT

## 2025-01-01 ASSESSMENT — PAIN SCALES - GENERAL
PAINLEVEL_OUTOF10: 0
PAINLEVEL_OUTOF10: 0
PAINLEVEL_OUTOF10: 4
PAINLEVEL_OUTOF10: 0
PAINLEVEL_OUTOF10: 3
PAINLEVEL_OUTOF10: 4
PAINLEVEL_OUTOF10: 0
PAINLEVEL_OUTOF10: 5
PAINLEVEL_OUTOF10: 0
PAINLEVEL_OUTOF10: 2
PAINLEVEL_OUTOF10: 5
PAINLEVEL_OUTOF10: 7
PAINLEVEL_OUTOF10: 0
PAINLEVEL_OUTOF10: 7

## 2025-01-01 ASSESSMENT — PAIN DESCRIPTION - FREQUENCY
FREQUENCY: INTERMITTENT

## 2025-01-01 ASSESSMENT — PAIN DESCRIPTION - DESCRIPTORS
DESCRIPTORS: DISCOMFORT
DESCRIPTORS: DISCOMFORT
DESCRIPTORS: ACHING
DESCRIPTORS: ACHING;CRAMPING
DESCRIPTORS: CRAMPING
DESCRIPTORS: ACHING

## 2025-01-10 NOTE — TELEPHONE ENCOUNTER
Requested Prescriptions     Pending Prescriptions Disp Refills    amLODIPine (NORVASC) 10 MG tablet 30 tablet 0     Sig: Take 1 tablet by mouth daily            Checked Correct Pharmacy: Yes    Any changes since last refill? No     Number: 30  Refills: 0    Last OV: 12/11/2024 Provider: RAJAN    Next OV: 2/28/2025 Provider: JOSE    Last Labs:   CBC:   Lab Results   Component Value Date    WBC 13.2 (H) 11/29/2024    HGB 10.6 (L) 11/29/2024    HCT 32.3 (L) 11/29/2024    MCV 90.9 11/29/2024     (L) 11/29/2024     BMP:   Lab Results   Component Value Date/Time     11/29/2024 08:11 AM    K 3.4 11/29/2024 08:11 AM    K 3.9 11/23/2024 01:28 PM     11/29/2024 08:11 AM    CO2 27 11/29/2024 08:11 AM    BUN 14 11/29/2024 08:11 AM    CREATININE 0.9 11/29/2024 08:11 AM    GLUCOSE 110 11/29/2024 08:11 AM    CALCIUM 9.0 11/29/2024 08:11 AM    LABGLOM 70 11/29/2024 08:11 AM    LABGLOM 70 04/25/2024 04:22 AM       Magnesium:   Lab Results   Component Value Date/Time    MG 2.19 11/29/2024 08:11 AM

## 2025-01-13 RX ORDER — AMLODIPINE BESYLATE 10 MG/1
10 TABLET ORAL DAILY
Qty: 90 TABLET | Refills: 3 | Status: SHIPPED | OUTPATIENT
Start: 2025-01-13

## 2025-01-28 PROBLEM — R19.7 DIARRHEA: Status: ACTIVE | Noted: 2025-01-28

## 2025-01-28 PROBLEM — A04.72 DIARRHEA ASSOCIATED WITH PSEUDOMEMBRANOUS COLITIS: Status: ACTIVE | Noted: 2025-01-28

## 2025-01-28 NOTE — CARE COORDINATION
Case Management Assessment  Initial Evaluation    Date/Time of Evaluation: 1/28/2025 3:36 PM  Assessment Completed by: MELISSA Lee   for [facility] ([facility])  [employer]: [phone]    If patient is discharged prior to next notation, then this note serves as note for discharge by case management.    Patient Name: Leslee Maldonado                   YOB: 1955  Diagnosis: Diarrhea [R19.7]  Diarrhea associated with pseudomembranous colitis [A04.72]                   Date / Time: 1/28/2025  2:57 PM    Patient Admission Status: Inpatient   Readmission Risk (Low < 19, Mod (19-27), High > 27): Readmission Risk Score: 23.7    Current PCP: Raj Cedeño MD  PCP verified by CM? Yes ([facility])    Chart Reviewed: Yes      History Provided by: Patient  Patient Orientation: Alert and Oriented    Patient Cognition: Alert    Hospitalization in the last 30 days (Readmission):  No    If yes, Readmission Assessment in  Navigator will be completed.    Advance Directives:      Code Status: Full Code   Patient's Primary Decision Maker is: Patient Declined (Legal Next of Kin Remains as Decision Maker)      Discharge Planning:    Patient lives with: Children, Family Members Type of Home: House  Primary Care Giver: Self  Patient Support Systems include: Children, Family Members   Current Financial resources: Medicare ([facility] Medicare)  Current community resources: None  Current services prior to admission: Other (Comment) ([place])            Current DME:              Type of Home Care services:  None    ADLS  Prior functional level: Independent in ADLs/IADLs  Current functional level: Independent in ADLs/IADLs    PT AM-PAC:   /24  OT AM-PAC:   /24    Family can provide assistance at DC: Yes  Would you like Case Management to discuss the discharge plan with any other family members/significant others, and if so, who? Yes  Plans to Return to Present Housing: Yes  Other  Identified Issues/Barriers to RETURNING to current housing: n/a  Potential Assistance needed at discharge: N/A            Potential DME:    Patient expects to discharge to: House  Plan for transportation at discharge:      Financial    Payor: [facility] MEDICARE / Plan: [facility] MEDICARE ADVANTAGE / Product Type: *No Product type* /     Does insurance require precert for SNF: Yes    Potential assistance Purchasing Medications: No  Meds-to-Beds request:        [id number] [address] - P [phone] - F [phone]  [address]  [facility] [address]  Phone: [phone] Fax: [phone]      Notes:    Factors facilitating achievement of predicted outcomes: Family support, Motivated, Cooperative, Pleasant, Sense of humor, and Good insight into deficits    Barriers to discharge: Medical complications    Additional Case Management Notes: Chart review completed. Spoke with pt at bedside. She said she has remained independent with her ADL's and plans on returning home when discharged. She lives with her daughter and grandson. She said someone is always with her and her daughter will be her ride home when discharged. She doesn't anticipate SW needs.     SW will follow    The Plan for Transition of Care is related to the following treatment goals of Diarrhea [R19.7]  Diarrhea associated with pseudomembranous colitis [A04.72]    MELISSA Lee   for [facility] ([facility])  [employer] Mobile: [phone]

## 2025-01-28 NOTE — PROGRESS NOTES
Patient admitted to [facility] via direct admit from [facility] for diagnosis of diarrhea.     Patient oriented to patient room including call light and bed controls.  Admission assessment completed - see admission flowsheet documentation.  Patient is a medium fall risk.  Safety measures instituted per policy.    Patient oriented to unit policies and procedures including: pain management practices, unit safety precautions, family rapid response, q4h vital signs and assessments, daily 4am lab draws, weekly chest x-rays, daily chlorhexidine bathing, standing transfusion orders, and routine central line care.  Also discussed use of call light and how to get in touch with nursing staff.  Stressed the importance of calling out immediately for any changes in condition including but not limited to: pain, chills, fever, nausea, vomiting, diarrhea, chest pain, sob/ernst, assistance with toileting, bleeding, or any other symptoms that are out of the ordinary for the patient.  Patient verbalizes understanding of all instructions and will call for assistance as needed.

## 2025-01-28 NOTE — CONSULTS
Patient seen and examined, full consult to follow.  Plan for colonoscopy with biopsies tomorrow.  Orders written.    Trip Cope MD

## 2025-01-28 NOTE — H&P
[facility] History and Physical       Attending Physician: Dominique Lopez DO    Primary Care: Raj Cedeño MD       Referring MD: Dominique Lopez DO  [address]   [address]  [place] [address]    Name: Leslee Maldonado :  1955  MRN:  [id number]    Admission: 2025      Date: 2025    Reason for Admission: severe diarrhea and weight loss    History of Present Illness:   Eliz is a 69-year-old female with history of refractory IgG lambda multiple myeloma, coronary disease who recently underwent CAR-T cell therapy with Carvykti on .  She has been doing well except for diarrhea.  For the past 2 months she is lost 20 pounds in overall since September she is lost 30 pounds.  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  She has been having diarrhea for the past 6 to 8 weeks.  It has been progressively getting worse.  She is not able to eat well due to the diarrhea.  She denies any nausea or vomiting.  She denies any blood in her stools.  She had C. difficile checked on January 3 that was negative.  She is being admitted to the hospital for diarrhea weight loss and GI workup.      Past Surgical History:   Procedure Laterality Date    CARDIAC SURGERY  Quadruple by pass    COLONOSCOPY      CORONARY ARTERY BYPASS GRAFT N/A 2019    CORONARY ARTERY BYPASS GRAFT X4, INTERNAL MAMMARY ARTERY AND SAPHENOUS VEIN GRAFT-TCP-BP performed by Maykel Sousa MD at [facility] OR    HAND SURGERY  2018    HYSTERECTOMY (CERVIX STATUS UNKNOWN)      partial for fibroids    INSERTION / REMOVAL / REPLACEMENT VENOUS ACCESS CATHETER N/A 2019    TRIFUSION CATHETER INSERTION LEFT SUBCLAVIAN performed by Munir Hess MD at [facility] OR    IR NONTUNNELED VASCULAR CATHETER > 5 YEARS  2024    IR NONTUNNELED VASCULAR CATHETER 2024 [facility] CARDIAC CATH/IR/NEURO LAB    TUBAL LIGATION         Past Medical History:   Diagnosis Date    Arthritis Shoulder    CAD (coronary  \"LABURIC\" in the last 72 hours.    PROBLEM LIST:           IgG Lambda Multiple Myeloma  Hypertension  CAD s/p CABG x4 (6/2019), CARLO x2 (10/25/22)   Hyperlipidemia  RBBB  Type 2 DM  GERD  Hx of hypoxic respiratory failure d/t human metapneumovirus      TREATMENT:          IgG Lambda Multiple Myeloma:  8/29 - 11/2/18VRd x 4 cycles  2/7/19ASCT s/p high-dose Melpahlan (200 mg/m2)  5/11/19 - 6/2019Revlimid 10 mg qd maintenance with ASA qd    - Held in early 6/2019 d/t MI   7/11/19 - 3/31/20Velcade maintenance    - Stopped d/t progression  5/5/20  C1D1 of Roxi/Carfilzomib/Dex x 4 cycles   8/26/20Dara maintenance  11/4/20 - 2/23/22Dara q4w x 19 cycles    - Stopped d/t progression  3/11/22C1D1 Leia/Pom/Dex x 8 cycles  10/28/22 - 6/26/24Pom maintenance    - Stopped d/t progression  9/18/24Dara/Kyprolis/Dex as a bridge to Carvykti  10/23 - 10/25/24Lymphodepletion - fludarabine & cyclophosphamide  10/28/24          Carvykti (ID #:  [id number])          ASSESSMENT AND PLAN:           1.  IgG Lambda Multiple Myeloma: Multiply relapsed  - initial dx (7/16/18):  M-spike 2.47, IgG 3560, Hgb 13.2, creat 0.54, Ca 9.2  - PET (8/1/24):  negative  - BMBx (8/2/18):  60% cellular, 60% plasma cells, FISH hyperdiploidy 5, 9, 15 and Del 13q, del 17, IgH heavy chain rearranged and dup 1q. Cyto 46XX  - BMBx (10/8/24): 30% cellular bone marrow with trilineage hematopoiesis, including increased erythroid precursors and also megakaryocytes. Morphologically atypical plasma cells seen on the smear showing large size and prominent nucleoli. These account for about 6% of cells  PLAN: Carvykti Infusion (10/28/24)    Day + 78  - MMP (12/2/24): M spike 0.2, LLC <1.5  - MMP (12/30/24): M spike 0.1, LLC <1.5     2. CRS / Neuro: At risk post-infusion  CRS Grade: None  - 11/2-11/4/24:  CRS grade 1 - fever only  - Monitor CRP and Ferritin closely  ICANS Grade:  None  ICE Score:  CAR-T Score: 10  Left facial nerve weakness (11/18/24):   - Head CT (11/19/24):

## 2025-01-28 NOTE — H&P
[facility] History and Physical       Attending Physician: Dominique Lopez DO    Primary Care: Raj Cedeño MD       Referring MD: Dominique Lopez DO  [address]   [address]  [place] [address]    Name: Leslee Maldonado :  1955  MRN:  [id number]    Admission: 2025      Date: 2025    Reason for Admission: severe diarrhea and weight loss    History of Present Illness:   Eliz is a 69-year-old female with history of refractory IgG lambda multiple myeloma, coronary disease who recently underwent CAR-T cell therapy with Carvykti on 2024.  She had been doing well overall as an outpatient except for diarrhea. She had been having diarrhea for the past 6 to 8 weeks.  It has been progressively getting worse.  She is not able to eat well due to the diarrhea.  She denies any nausea or vomiting.  She denies any blood in her stools.  She had C. difficile checked on January 3 that was negative.  She is being admitted to the hospital for diarrhea weight loss and GI workup.      Past Surgical History:   Procedure Laterality Date    CARDIAC SURGERY  Quadruple by pass    COLONOSCOPY      CORONARY ARTERY BYPASS GRAFT N/A 2019    CORONARY ARTERY BYPASS GRAFT X4, INTERNAL MAMMARY ARTERY AND SAPHENOUS VEIN GRAFT-TCP-BP performed by Maykel Sousa MD at [facility] OR    HAND SURGERY  2018    HYSTERECTOMY (CERVIX STATUS UNKNOWN)      partial for fibroids    INSERTION / REMOVAL / REPLACEMENT VENOUS ACCESS CATHETER N/A 2019    TRIFUSION CATHETER INSERTION LEFT SUBCLAVIAN performed by Munir Hess MD at [facility] OR    IR NONTUNNELED VASCULAR CATHETER > 5 YEARS  2024    IR NONTUNNELED VASCULAR CATHETER 2024 [facility] CARDIAC CATH/IR/NEURO LAB    TUBAL LIGATION         Past Medical History:   Diagnosis Date    Arthritis Shoulder    CAD (coronary artery disease)     Cancer (HCC)     Multiple myeloma    Diabetes mellitus (HCC)     Hyperlipidemia     Hypertension     MI (myocardial infarction)  (10/25/22)   Hyperlipidemia  RBBB  Type 2 DM  GERD  Hx of hypoxic respiratory failure d/t human metapneumovirus      TREATMENT:          IgG Lambda Multiple Myeloma:  8/29 - 11/2/18VRd x 4 cycles  2/7/19ASCT s/p high-dose Melpahlan (200 mg/m2)  5/11/19 - 6/2019Revlimid 10 mg qd maintenance with ASA qd    - Held in early 6/2019 d/t MI   7/11/19 - 3/31/20Velcade maintenance    - Stopped d/t progression  5/5/20  C1D1 of Roxi/Carfilzomib/Dex x 4 cycles   8/26/20Dara maintenance  11/4/20 - 2/23/22Dara q4w x 19 cycles    - Stopped d/t progression  3/11/22C1D1 Leia/Pom/Dex x 8 cycles  10/28/22 - 6/26/24Pom maintenance    - Stopped d/t progression  9/18/24Dara/Kyprolis/Dex as a bridge to Carvykti  10/23 - 10/25/24Lymphodepletion - fludarabine & cyclophosphamide  10/28/24          Carvykti (ID #:  [id number])          ASSESSMENT AND PLAN:           1.  IgG Lambda Multiple Myeloma: Multiply relapsed  - initial dx (7/16/18):  M-spike 2.47, IgG 3560, Hgb 13.2, creat 0.54, Ca 9.2  - PET (8/1/24):  negative  - BMBx (8/2/18):  60% cellular, 60% plasma cells, FISH hyperdiploidy 5, 9, 15 and Del 13q, del 17, IgH heavy chain rearranged and dup 1q. Cyto 46XX  - BMBx (10/8/24): 30% cellular bone marrow with trilineage hematopoiesis, including increased erythroid precursors and also megakaryocytes. Morphologically atypical plasma cells seen on the smear showing large size and prominent nucleoli. These account for about 6% of cells  PLAN: Carvykti Infusion (10/28/24)    Day + 78  - MMP (12/2/24): M spike 0.2, LLC <1.5  - MMP (12/30/24): M spike 0.1, LLC <1.5     2. CRS / Neuro: At risk post-infusion  CRS Grade: None  - 11/2-11/4/24:  CRS grade 1 - fever only  - Monitor CRP and Ferritin closely  ICANS Grade:  None  ICE Score:  CAR-T Score: 10  Left facial nerve weakness (11/18/24):   - Head CT (11/19/24): Neg  - Viral studies 11/19/24: CMV: Not Detected,  HHV6, EBV,  Parvo Negative  - LP (11/20/24): Meningitis panel negative, Crypto Ag

## 2025-01-28 NOTE — PROGRESS NOTES
4 Eyes Admission Assessment     I agree as the admission nurse that 2 RN's have performed a thorough Head to Toe Skin Assessment on the patient. ALL assessment sites listed below have been assessed on admission.       Areas assessed by both nurses:   [x]   Head, Face, and Ears   [x]   Shoulders, Back, and Chest  [x]   Arms, Elbows, and Hands   [x]   Coccyx, Sacrum, and Ischium  [x]   Legs, Feet, and Heels        Does the Patient have Skin Breakdown?  No         Eddy Prevention initiated:  Yes   Wound Care Orders initiated:  NA      [facility] nurse consulted for Pressure Injury (Stage 3,4, Unstageable, DTI, NWPT, and Complex wounds) or Eddy score 18 or lower:  NA      Nurse 1 eSignature: Electronically signed by Fatuma Reyes RN on 1/28/25 at 3:17 PM EST    **SHARE this note so that the co-signing nurse is able to place an eSignature**    Nurse 2 eSignature: Electronically signed by Sheila Duffy RN on 1/28/25 at 4:37 PM EST

## 2025-01-29 ENCOUNTER — ANESTHESIA (OUTPATIENT)
Dept: ENDOSCOPY | Age: 70
End: 2025-01-29
Payer: MEDICARE

## 2025-01-29 ENCOUNTER — ANESTHESIA EVENT (OUTPATIENT)
Dept: ENDOSCOPY | Age: 70
End: 2025-01-29
Payer: MEDICARE

## 2025-01-29 PROBLEM — E43 SEVERE MALNUTRITION (HCC): Chronic | Status: ACTIVE | Noted: 2025-01-29

## 2025-01-29 PROBLEM — E43 SEVERE MALNUTRITION (HCC): Status: ACTIVE | Noted: 2025-01-29

## 2025-01-29 PROCEDURE — 6360000002 HC RX W HCPCS: Performed by: NURSE ANESTHETIST, CERTIFIED REGISTERED

## 2025-01-29 RX ORDER — PROPOFOL 10 MG/ML
INJECTION, EMULSION INTRAVENOUS
Status: DISCONTINUED | OUTPATIENT
Start: 2025-01-29 | End: 2025-01-29 | Stop reason: SDUPTHER

## 2025-01-29 RX ORDER — GLYCOPYRROLATE 0.2 MG/ML
INJECTION INTRAMUSCULAR; INTRAVENOUS
Status: DISCONTINUED | OUTPATIENT
Start: 2025-01-29 | End: 2025-01-29 | Stop reason: SDUPTHER

## 2025-01-29 RX ADMIN — PROPOFOL 25 MG: 10 INJECTION, EMULSION INTRAVENOUS at 16:43

## 2025-01-29 RX ADMIN — PROPOFOL 25 MG: 10 INJECTION, EMULSION INTRAVENOUS at 16:49

## 2025-01-29 RX ADMIN — PROPOFOL 75 MCG/KG/MIN: 10 INJECTION, EMULSION INTRAVENOUS at 16:43

## 2025-01-29 RX ADMIN — GLYCOPYRROLATE 0.2 MG: 0.2 INJECTION INTRAMUSCULAR; INTRAVENOUS at 16:45

## 2025-01-29 NOTE — PLAN OF CARE
Problem: Safety - Adult  Goal: Free from fall injury  Outcome: Progressing  Flowsheets (Taken 1/29/2025 0407)  Free From Fall Injury: Instruct family/caregiver on patient safety     Problem: Metabolic/Fluid and Electrolytes - Adult  Goal: Electrolytes maintained within normal limits  Outcome: Progressing  Flowsheets (Taken 1/29/2025 0407)  Electrolytes maintained within normal limits:   Monitor labs and assess patient for signs and symptoms of electrolyte imbalances   Administer electrolyte replacement as ordered   Monitor response to electrolyte replacements, including repeat lab results as appropriate     Problem: Hematologic - Adult  Goal: Maintains hematologic stability  Outcome: Progressing  Flowsheets (Taken 1/29/2025 0407)  Maintains hematologic stability:   Assess for signs and symptoms of bleeding or hemorrhage   Monitor labs for bleeding or clotting disorders

## 2025-01-29 NOTE — ANESTHESIA PRE PROCEDURE
Department of Anesthesiology  Preprocedure Note       Name:  Leslee Maldonado   Age:  69 y.o.  :  1955                                          MRN:  [id number]         Date:  2025      Surgeon: Surgeon(s):  Trip Cope MD    Procedure: Procedure(s):  COLONOSCOPY DIAGNOSTIC    Medications prior to admission:   Prior to Admission medications    Medication Sig Start Date End Date Taking? Authorizing Provider   amLODIPine (NORVASC) 10 MG tablet Take 1 tablet by mouth daily 25  Yes Carisa Ramirez APRN - CNP   lisinopril (PRINIVIL;ZESTRIL) 20 MG tablet Take 1 tablet by mouth daily   Yes ProviderDomingo MD   metoprolol tartrate (LOPRESSOR) 25 MG tablet TAKE 1 TABLET BY MOUTH TWICE DAILY 24  Yes Dominique Lopez DO   pantoprazole (PROTONIX) 40 MG tablet Take 1 tablet by mouth every morning (before breakfast) 24  Yes Dominique Lopez DO   acyclovir (ZOVIRAX) 400 MG tablet Take 1 tablet by mouth 2 times daily 24  Yes ProviderDomingo MD   predniSONE (DELTASONE) 20 MG tablet Take 1.5 tablets by mouth daily  Patient not taking: Reported on 24   Mackenzie Conteh APRN - CNP   sulfamethoxazole-trimethoprim (BACTRIM) 400-80 MG per tablet Take 1 tablet by mouth daily 24  Mackenzie Conteh APRN - CNP   potassium chloride (KLOR-CON M) 20 MEQ extended release tablet Take 1 tablet by mouth daily for 28 days 24  Mackenzie Conteh APRN - CNP   levETIRAcetam (KEPPRA) 500 MG tablet Take 1 tablet by mouth 2 times daily Unknown if still taking or not  Patient not taking: Reported on 2025 10/30/24   Cheryl Hazel MD   prochlorperazine (COMPAZINE) 10 MG tablet Take 1 tablet by mouth every 6 hours as needed (Nausea / Vomitting)  Patient not taking: Reported on 2024 10/23/24   Kerry Reagan APRN - NP       Current medications:    Current Facility-Administered Medications   Medication Dose Route Frequency Provider Last Rate Last

## 2025-01-29 NOTE — ANESTHESIA POSTPROCEDURE EVALUATION
Department of Anesthesiology  Postprocedure Note    Patient: Leslee Maldonado  MRN: [id number]  YOB: 1955  Date of evaluation: 1/29/2025    Procedure Summary       Date: 01/29/25 Room / Location: [place] [address] / [facility]    Anesthesia Start: 1639 Anesthesia Stop: 1706    Procedure: COLONOSCOPY BIOPSY Diagnosis:       Diarrhea, unspecified type      (Diarrhea, unspecified type [R19.7])    Surgeons: Trip Cope MD Responsible Provider: Britton Mercedes DO    Anesthesia Type: MAC ASA Status: 2            Anesthesia Type: No value filed.    Dolores Phase I: Dolores Score: 9    Dolores Phase II: Dolores Score: 9    Vitals:    01/29/25 1723   BP: (!) 106/54   Pulse: 56   Resp:    Temp:    SpO2: 96%       Anesthesia Post Evaluation    Patient location during evaluation: PACU  Patient participation: complete - patient participated  Level of consciousness: awake and awake and alert  Pain score: 0  Airway patency: patent  Nausea & Vomiting: no nausea and no vomiting  Cardiovascular status: hemodynamically stable  Respiratory status: acceptable  Hydration status: euvolemic  Pain management: adequate and satisfactory to patient    No notable events documented.

## 2025-01-29 NOTE — OP NOTE
[facility]  Colonoscopy Note            Patient: Leslee Maldonado  : 1955     Procedure: Colonoscopy with random biopsies    Date:  2025    Surgeon:  Trip Cope MD, MD    Anesthesia:  MAC    Indications: Persistent diarrhea in the setting of CAR-T treatment    Procedure:   An informed consent was obtained from the patient after explanation of indications, benefits, possible risks and complications of the procedure.  The patient was then taken to the endoscopy suite, placed in the left lateral decubitus position, and the above IV anesthesia was administered.    A digital rectal examination was performed and revealed negative without mass, lesions or tenderness.      The Olympus video colonoscope was placed in the patient's rectum under digital direction and advanced to the cecum. The cecum was identified by characteristic anatomy and ballottment.  The prep was fair.      The scope was then withdrawn back through the cecum, ascending, transverse, descending and sigmoid colons.  The scope was then withdrawn into the rectum and retroflexed.    The scope was straightened, the colon was decompressed and the scope was withdrawn from the patient.  The patient tolerated the procedure well and was taken to the PACU in good condition.    Findings:    Cecum: Normal mucosa  Ascending Colon: Normal mucosa  Transverse Colon: Normal mucosa  Descending Colon: Diverticulosis  Sigmoid Colon: Diverticulosis  Rectum: Normal    The colonic mucosa appeared normal throughout, biopsies were taken randomly.  There was thick clear mucus present throughout the colon which was clogging up the scope constantly.    Estimated Blood Loss: None      Impression:    Normal colonic mucosa  Left-sided diverticulosis    Recommendations:    Resume diet  Follow biopsy results    Trip Cope MD, MD   [facility]  2025

## 2025-01-29 NOTE — PROGRESS NOTES
Comprehensive Nutrition Assessment    RECOMMENDATIONS:  PO Diet: NPO  Nutrition Supplement: NPO  Nutrition Education: Education/Counseling initiated (diarrhea)     NUTRITION ASSESSMENT:   Nutritional summary & status: Admitted w/ diarrhea and weight loss. S/p CAR-T for MM in 10/2024. Concern for CAR-T colitis. Currently NPO, plans for scope by GI today. As stated, significant wt loss of 25#/12% in 2 months PTA. No muscle/fat wasting. Meets criteria for severe malnutrition based on inadequate oral intake and weight loss. Unable to eat d/t persistent severe diarrhea. Noted several electrolyte abnormalities. Pending GI findings and diet advancement, will monitor effectiveness of bowel regimen and provide diet education for diarrhea including low residue, low fiber diet.     Admission // PMH: Diarrhea // MM s/p CAR-T 10/2024, ASCT 2/20219    MALNUTRITION ASSESSMENT  Context of Malnutrition: Acute Illness (on chronic)   Malnutrition Status: Severe malnutrition  Findings of the 6 clinical characteristics of malnutrition (Minimum of 2 out of 6 clinical characteristics is required to make the diagnosis of moderate or severe Protein Calorie Malnutrition based on AND/ASPEN Guidelines):  Energy Intake:  50% or less of estimated energy requirements for 5 or more days  Weight Loss:  (S) Greater than 7.5% over 3 months (25#/12% in 2 months)     Body Fat Loss:  No body fat loss     Muscle Mass Loss:  No muscle mass loss        NUTRITION DIAGNOSIS   Severe malnutrition, in context of acute illness or injury related to altered GI function as evidenced by criteria as identified in malnutrition assessment    Nutrition Monitoring and Evaluation:   Food/Nutrient Intake Outcomes:  Progression of Nutrition, Diet Advancement/Tolerance  Physical Signs/Symptoms Outcomes:  Biochemical Data, Nutrition Focused Physical Findings, Skin, Weight, Diarrhea, GI Status     OBJECTIVE DATA: Significant to nutrition assessment  Nutrition Related  Findings: K 2.6, phos 2.3, Mg 1.54, 100 ml stool output so far today  Wounds: None  Nutrition Goals: Initiate PO diet, by next RD assessment     CURRENT NUTRITION THERAPIES  Diet NPO Exceptions are: Sips of Water with Meds  PO Intake: NPO   PO Supplement Intake:NPO  Additional Sources of Calories/IVF:NA     COMPARATIVE STANDARDS  Energy (kcal):  2083-9802 (18-20 kcal/kg CBW)     Protein (g):  65-75 kcal/kg IBW)       Fluid (ml/day):  1 ml/kcal    ANTHROPOMETRICS  Current Height: 157.5 cm (5' 2\")  Current Weight - Scale: 74.9 kg (165 lb 3.2 oz)    Admission weight: 76.3 kg (168 lb 3.2 oz)    The patient will be monitored per nutrition standards of care. Consult dietitian if additional nutrition interventions are needed prior to RD reassessment.     Shireen Verdugo RD  [place]:  [phone]

## 2025-01-29 NOTE — PROGRESS NOTES
[facility] Progress Note    2025     Leslee Maldonado    MRN: [id number]    : 1955    Referring MD: Dominique Lopez,   [address]  [address]  [place] [address]      SUBJECTIVE:    - Significant weight loss and diarrhea over last 6-8 weeks  - Admitted for workup of diarrhea- CDiff negative, GI panel pending  - Plan for colonoscopy with biopsies today    ECOG PS:  (2) Ambulatory and capable of self care, unable to carry out work activity, up and about > 50% or waking hours    KPS: 70% Cares for self; unable to carry on normal activity or to do active work    Isolation: None    Medications    Scheduled Meds:   sodium chloride flush  5-40 mL IntraVENous 2 times per day    Saline Mouthwash  15 mL Swish & Spit 4x Daily AC & HS    enoxaparin  40 mg SubCUTAneous Daily    acyclovir  400 mg Oral BID    [Held by provider] metoprolol tartrate  25 mg Oral BID    pantoprazole  40 mg Oral QAM AC    potassium chloride  20 mEq Oral Daily    sulfamethoxazole-trimethoprim  1 tablet Oral Daily    polyethylene glycol  2,000 mL Oral Once     Continuous Infusions:   sodium chloride       PRN Meds:.sodium chloride flush, sodium chloride, potassium chloride, magnesium sulfate, acetaminophen **OR** acetaminophen, Saline Mouthwash, ALTEplase (CATHFLO) 2 mg in sterile water 2 mL injection    ROS:  As noted above, otherwise remainder of 10-point ROS negative    Physical Exam:     I&O:    Intake/Output Summary (Last 24 hours) at 2025 0810  Last data filed at 2025 0612  Gross per 24 hour   Intake 1860 ml   Output 3200 ml   Net -1340 ml       Vital Signs:  /72   Pulse 74   Temp 97.6 °F (36.4 °C) (Oral)   Resp 20   Ht 1.575 m (5' 2\")   Wt 74.9 kg (165 lb 3.2 oz)   SpO2 94%   BMI 30.22 kg/m²     Weight:    Wt Readings from Last 3 Encounters:   25 74.9 kg (165 lb 3.2 oz)   24 85.7 kg (189 lb)   24 86.3 kg (190 lb 4.1 oz)         General: Awake, alert and oriented.  HEENT: normocephalic, PERRL,    Diarrhea  - Imodium PRN  - Pt with intermittent diarrhea for 4-6 weeks, worsening, now with 20 lbs weight loss in 2 months   - Stool studies (1/3/24): negative  - repeat stool studies pending   - consult GI for colonoscopy with biopsies 1/29/25 pending  - CT abd /pelvis 1/29/25:  Questionable wall thickening within the colon at the splenic flexure could represent peristalsis or mass. Correlate with colonoscopy when able. Fluid extending into the distal rectum compatible with diarrhea.  Colonic diverticulosis      7.  Endo  Type 2 DM:  well-controlled with diet     8.  Cardiac:  stable  Hx of RBBB, CAD s/p CABG & PCI:  - 6/11/19:  PCI recanalization of occluded mid-left Cx (residual 70% stenosis)  - 6/13/22:  CABG - LIMA to LAD, reverse saphenous v. to OM1, reverse saphenous v. to OM2, reverse saphenous v. to PDA  - 10/25/22:  CARLO x 2 (proximal & ostial aspect of saphenous v. graft to OM3)  Hyperlipidemia:  - Hold rosuvastatin 40 mg qd during CAR-T  Hypertension:  - Cont Metoprolol 25 mg BID - on hold  - hold amlodipine and lisinopril since hypotensive since diarrhea      9.  MSK: Chronic back, hip, and right shoulder pain  - Tylenol PRN    - DVT Prophylaxis: Platelets >50,000 cells/dL, - daily lovenox prophylaxis ordered  Contraindications to pharmacologic prophylaxis: None  Contraindications to mechanical prophylaxis: None    - Disposition: Unknown at this time    The patient was seen and examined by Dr. John Conteh, APRN - CNP

## 2025-01-30 NOTE — PROGRESS NOTES
Updated Apolonia landrum NP of potassium this AM being 3.8 and numbness and tingling resolved after 1 hour of stopping the fluids.  New order to d/c IV fluids received.

## 2025-01-30 NOTE — PROGRESS NOTES
Progress Note    Patient Leslee Maldonado  MRN: [id number]  YOB: 1955 Age: 69 y.o. Sex: female  Room: [address] [place] [address]       Admitting Physician: Dominique Lopez DO   Date of Admission: 1/28/2025  2:57 PM   Primary Care Physician: Raj Cedeño MD     Subjective:  Leslee Maldonado was seen and examined. We are following for diarrhea.  -- feeling well. No further diarrhea    ROS:  Constitutional: Denies fever, no change in appetite  Respiratory: Denies cough or shortness of breath  Cardiovascular: Denies chest pain or edema    Objective:  Vital Signs:   Vitals:    01/30/25 0805   BP: 121/70   Pulse:    Resp:    Temp: 98.3 °F (36.8 °C)   SpO2:          Physical Exam:  Constitutional: Alert and oriented x 4. No acute distress.   HEENT: Sclera anicteric, mucosal membranes moist  Cardiovascular: Regular rate and rhythm.  No murmurs.  Respiratory: Respirations nonlabored, no crepitus  GI: Abdomen nondistended, soft, and nontender.  Normal active bowel sounds.  No masses palpable.   Rectal: Deferred  Musculoskeletal:  No pitting edema of the lower legs.  Neurological: Gross memory appears intact.       Intake/Output:    Intake/Output Summary (Last 24 hours) at 1/30/2025 0844  Last data filed at 1/29/2025 1934  Gross per 24 hour   Intake 500 ml   Output 600 ml   Net -100 ml        Current Medications:  Current Facility-Administered Medications   Medication Dose Route Frequency Provider Last Rate Last Admin    potassium chloride (KLOR-CON M) extended release tablet 40 mEq  40 mEq Oral BID  Mackenzie Conteh APRN - CNP   40 mEq at 01/30/25 0810    sodium phosphate 30 mmol in sodium chloride 0.9 % 250 mL IVPB for central line  30 mmol IntraVENous PRN Mackenzie Conteh APRN - CNP   Stopped at 01/29/25 1632    enoxaparin (LOVENOX) injection 40 mg  40 mg SubCUTAneous QPM Mackenzie Conteh APRN - CNP   40 mg at 01/29/25 1837    loperamide (IMODIUM) capsule 2 mg  2 mg Oral PRN Mackenzie Conteh APRN - CNP         (BACTRIM;SEPTRA) 400-80 MG per tablet 1 tablet  1 tablet Oral Daily Dominique Lopez, DO   1 tablet at 01/29/25 2133         Recent Imaging:   Colonoscopy  No dictation        Labs:   Recent Labs     01/29/25  0325 01/29/25  1111 01/29/25  1300 01/29/25  1800 01/30/25  0335   WBC 3.4*  --   --   --  2.8*   HGB 11.8*  --   --   --  11.6*   *  --   --   --  115*   ALKPHOS 51  --   --   --   --    ALT 23  --   --   --   --    AST 31  --   --   --   --    BILITOT 0.3  --   --   --   --    BILIDIR <0.1  --   --   --   --    IBILI 0.2  --   --   --   --    BUN <2*  --   --  <2* <2*   CREATININE 0.7  --   --  0.7 0.7     --   --  149* 146*   K 2.6*  --  3.2* 3.2* 3.8   INR  --  1.33*  --   --  1.33*          Assessment:  Hospital Problems             Last Modified POA    * (Principal) Diarrhea 1/28/2025 Yes    Diarrhea associated with pseudomembranous colitis 1/28/2025 Yes    Severe malnutrition (HCC) 1/29/2025 Yes       69 year old female with history of refractory IgG lambda multiple myeloma, s/p recent CAR-T cell therapy on 10/28/24, CAD. GI following for diarrhea:  Colonoscopy 1/29/25: normal colonic mucosa, left sided diverticulosis  C. Diff negative on 1/28/25  GI pathogens panel negative    Plan:  Pending path from colonoscopy  Low fat diet as tolerated  No further GI plans at this time. Will be available as needed      Margaret Gama PA-C    [facility]    [phone]. Also available via Perfect Serve    Please note that some or all of this record was generated using voice recognition software. If there are any questions about the content of this document, please contact the author as some errors in translation may have occurred.

## 2025-01-30 NOTE — PROGRESS NOTES
Patient last bag of PRN potassium was hung at 5916.  Pt called nurse at 0030 and asked, \"is it normal to have numbness and tingling in lips and hands?\"  Per pt this had just started.  All IV fluids turned off.  0039 Apolonia Hong NP paged via perfect serve to notify.    0041- Ok to keep IV fluids off until check morning labs to see what current potassium level is.

## 2025-01-30 NOTE — PROGRESS NOTES
Patient seen and assessed for standard line care needs.  CVC site remains free of visible signs and symptoms of infection.  No drainage, edema, erythema, pain, itching, or warmth noted at and around the insertion site.  Port accessed on 1/28/25 per documentation, needle remains intact.  Line care performed by Lakeisha Mahajan RN.  The need for continued use of the CVC is due to ongoing therapy.  Patient verbalized understanding of the line care education provided by line care RN.  Staff RNs will continue to monitor and assess the CVC site throughout the patient's hospital stay.  Sterile dressing changes will continue to be changed per policy.

## 2025-01-30 NOTE — PROGRESS NOTES
[facility] Progress Note    2025     Leslee Maldonado    MRN: [id number]    : 1955    Referring MD: Dominique Lopez,   [address]  [address]  [place] [address]      SUBJECTIVE:    - Significant weight loss and diarrhea over last 6-8 weeks  - Admitted for workup of diarrhea- CDiff negative, GI panel negative  - Colonoscopy yesterday biopsies: pending    ECOG PS:  (2) Ambulatory and capable of self care, unable to carry out work activity, up and about > 50% or waking hours    KPS: 70% Cares for self; unable to carry on normal activity or to do active work    Isolation: None    Medications    Scheduled Meds:   potassium chloride  40 mEq Oral BID WC    enoxaparin  40 mg SubCUTAneous QPM    thiamine  100 mg Oral Daily    multivitamin  1 tablet Oral Daily    voriconazole  200 mg Oral 2 times per day    methylPREDNISolone  70 mg IntraVENous Daily    sodium chloride flush  5-40 mL IntraVENous 2 times per day    Saline Mouthwash  15 mL Swish & Spit 4x Daily AC & HS    acyclovir  400 mg Oral BID    [Held by provider] metoprolol tartrate  25 mg Oral BID    pantoprazole  40 mg Oral QAM AC    sulfamethoxazole-trimethoprim  1 tablet Oral Daily     Continuous Infusions:   sodium chloride       PRN Meds:.sodium phosphate IVPB (CENTRAL line), loperamide, diphenoxylate-atropine, sodium chloride flush, sodium chloride, potassium chloride, magnesium sulfate, acetaminophen **OR** acetaminophen, Saline Mouthwash, ALTEplase (CATHFLO) 2 mg in sterile water 2 mL injection    ROS:  As noted above, otherwise remainder of 10-point ROS negative    Physical Exam:     I&O:    Intake/Output Summary (Last 24 hours) at 2025 0952  Last data filed at 2025 0946  Gross per 24 hour   Intake 640 ml   Output 1050 ml   Net -410 ml       Vital Signs:  /70   Pulse 77   Temp 98.3 °F (36.8 °C) (Oral)   Resp 18   Ht 1.575 m (5' 2\")   Wt 76.4 kg (168 lb 6.4 oz)   SpO2 91%   BMI 30.80 kg/m²     Weight:    Wt Readings from  right shoulder pain  - Tylenol PRN    - DVT Prophylaxis: Platelets >50,000 cells/dL, - daily lovenox prophylaxis ordered  Contraindications to pharmacologic prophylaxis: None  Contraindications to mechanical prophylaxis: None    - Disposition: Saturday or Sunday if eating well and diarrhea improved.    The patient was seen and examined by Dr. John Todd, APRN - CNP

## 2025-01-30 NOTE — PLAN OF CARE
Problem: Safety - Adult  Goal: Free from fall injury  Outcome: Progressing   Patient is ortho negative, vitals stable and bed/chair alarm off. She is aware of fall safety standards with slow position changes and non-skid footwear.   Problem: ABCDS Injury Assessment  Goal: Absence of physical injury  Outcome: Progressing   Patient has reviewed fall safety with nurse and understands use of call light, non-skid footwear.   Problem: Nutrition Deficit:  Goal: Optimize nutritional status  Outcome: Progressing   Patient diarrhea is starting to be under control, she is eating and understands the importance of hydration support to help with her recent unplanned weight loss and diarrhea.

## 2025-01-31 NOTE — PROGRESS NOTES
[facility] Progress Note    2025     Leslee Maldonado    MRN: [id number]    : 1955    Referring MD: Dominique Lopez,   [address]  [address]  [place] [address]      SUBJECTIVE:    - 2500 mls of stool over the past 24 hours  - CDiff negative, GI panel negative  - Colonoscopy yesterday biopsies: Random colon, forceps biopsy: Multiple fragments of benign colonic mucosa with increased edema and chronic inflammation of the lamina propria with focal area of acute   colitis with acute cryptitis. Negative for microscopic colitis, granulomas and dysplasia/malignancy.     ECOG PS:  (2) Ambulatory and capable of self care, unable to carry out work activity, up and about > 50% or waking hours    KPS: 70% Cares for self; unable to carry on normal activity or to do active work    Isolation: None    Medications    Scheduled Meds:   insulin lispro  0-4 Units SubCUTAneous 4x Daily AC & HS    potassium chloride  40 mEq Oral BID WC    enoxaparin  40 mg SubCUTAneous QPM    thiamine  100 mg Oral Daily    multivitamin  1 tablet Oral Daily    voriconazole  200 mg Oral 2 times per day    methylPREDNISolone  70 mg IntraVENous Daily    sodium chloride flush  5-40 mL IntraVENous 2 times per day    Saline Mouthwash  15 mL Swish & Spit 4x Daily AC & HS    acyclovir  400 mg Oral BID    [Held by provider] metoprolol tartrate  25 mg Oral BID    pantoprazole  40 mg Oral QAM AC    sulfamethoxazole-trimethoprim  1 tablet Oral Daily     Continuous Infusions:   dextrose 5 % and 0.45 % NaCl 100 mL/hr at 25 2120    dextrose      sodium chloride       PRN Meds:.glucose, dextrose bolus **OR** dextrose bolus, glucagon (rDNA), dextrose, sodium phosphate IVPB (CENTRAL line), loperamide, diphenoxylate-atropine, sodium chloride flush, sodium chloride, potassium chloride, magnesium sulfate, acetaminophen **OR** acetaminophen, Saline Mouthwash, ALTEplase (CATHFLO) 2 mg in sterile water 2 mL injection    ROS:  As noted above, otherwise    7.  Endo  - Type 2 DM:  well-controlled with diet  - Accu checks ACHS with addition of steroids  - Low dose SSI     8.  Cardiac:  stable  Hx of RBBB, CAD s/p CABG & PCI:  - 6/11/19:  PCI recanalization of occluded mid-left Cx (residual 70% stenosis)  - 6/13/22:  CABG - LIMA to LAD, reverse saphenous v. to OM1, reverse saphenous v. to OM2, reverse saphenous v. to PDA  - 10/25/22:  CARLO x 2 (proximal & ostial aspect of saphenous v. graft to OM3)  Hyperlipidemia:  - Hold rosuvastatin 40 mg qd during CAR-T  Hypertension:  - Cont Metoprolol 25 mg BID - on hold  - hold amlodipine and lisinopril since hypotensive since diarrhea      9.  MSK: Chronic back, hip, and right shoulder pain  - Tylenol PRN    - DVT Prophylaxis: Platelets >50,000 cells/dL, - daily lovenox prophylaxis ordered  Contraindications to pharmacologic prophylaxis: None  Contraindications to mechanical prophylaxis: None    - Disposition: When eating well and diarrhea improved.    The patient was seen and examined by Dr. John Todd, APRN - CNP

## 2025-01-31 NOTE — CARE COORDINATION
Spoke with pt at bedside. She confirmed plan to return home which will likely be Monday. She did inquire about if it was okay to  her medication on Monday with the pharmacy and explained this should be fine but would confirm with the CNP. Confirmed with Jeana CASTILLO.     SW will follow    MELISSA Lee   for [facility] ([facility])  [facility] [employer]: [phone]

## 2025-01-31 NOTE — PLAN OF CARE
Problem: Safety - Adult  Goal: Free from fall injury  Outcome: Progressing     Problem: ABCDS Injury Assessment  Goal: Absence of physical injury  Outcome: Progressing  Flowsheets (Taken 1/31/2025 0553)  Absence of Physical Injury: Implement safety measures based on patient assessment     Problem: Metabolic/Fluid and Electrolytes - Adult  Goal: Electrolytes maintained within normal limits  Outcome: Progressing  Flowsheets (Taken 1/31/2025 0553)  Electrolytes maintained within normal limits:   Monitor labs and assess patient for signs and symptoms of electrolyte imbalances   Administer electrolyte replacement as ordered     Problem: Hematologic - Adult  Goal: Maintains hematologic stability  Outcome: Progressing  Note: Patient's hemoglobin this AM:   Recent Labs     01/31/25  0350   HGB 10.8*     Patient's platelet count this AM:   Recent Labs     01/31/25  0350   *    Thrombocytopenia Precautions in place.  Patient showing no signs or symptoms of active bleeding.  Transfusion not indicated at this time.  Patient verbalizes understanding of all instructions. Will continue to assess and implement POC. Call light within reach and hourly rounding in place.

## 2025-01-31 NOTE — PROGRESS NOTES
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 Continues w/ diarrhea, 2400 ml stool output in last 24 hrs. None today thus far, however, pt states diarrhea was intermittent at home as well. Re-reviewed diet for CAR-T colitis, emulates GVHD1 diet. EGD shows multiple fragments of benign colonic mucosa with increased edema and chronic inflammation, biopsies pending. Patient w/ strong appetite, struggling w/ limited options on current diet. Will continue to follow.     Admission // PMH: Diarrhea // MM s/p CAR-T 10/2024, ASCT 2/20219    MALNUTRITION ASSESSMENT  Context of Malnutrition: Acute Illness (on chronic)   Malnutrition Status: Severe malnutrition  Findings of the 6 clinical characteristics of malnutrition (Minimum of 2 out of 6 clinical characteristics is required to make the diagnosis of moderate or severe Protein Calorie Malnutrition based on AND/ASPEN Guidelines):  Energy Intake:  50% or less of estimated energy requirements for 5 or more days  Weight Loss:  (S) Greater than 7.5% over 3 months (25#/12% in 2 months)     Body Fat Loss:  No body fat loss     Muscle Mass Loss:  No muscle mass loss        NUTRITION DIAGNOSIS   Severe malnutrition, in context of acute illness or injury related to altered GI function as evidenced by criteria as identified in malnutrition assessment    Nutrition Monitoring and Evaluation:   Food/Nutrient Intake Outcomes:  Progression of Nutrition, Diet Advancement/Tolerance  Physical Signs/Symptoms Outcomes:  Biochemical Data, Nutrition Focused Physical Findings, Skin, Weight, Diarrhea, GI Status     OBJECTIVE DATA: Significant to nutrition assessment  Nutrition Related Findings: 2400 ml stool output yesterday, K 3.2, phos 2.3, ,

## 2025-01-31 NOTE — PLAN OF CARE
Problem: Safety - Adult  Goal: Free from fall injury  1/31/2025 0950 by Phyllis Martins RN  Outcome: Progressing  1/31/2025 0553 by Ralph Simon RN  Outcome: Progressing   Ortho negative, independent and steady on feet, she understands slow position changes.   Problem: Nutrition Deficit:  Goal: Optimize nutritional status  1/31/2025 0950 by Phyllis Martins RN  Outcome: Progressing  1/31/2025 0553 by Ralph Simon, RN  Outcome: Progressing   Diarrhea improved over night, reviewed GVHD diet and slow progression with patient and she understands.

## 2025-02-01 NOTE — CONSULTS
Nephrology Consult Note                                                                                                                                                                                                                                                                                                                                                               Office : [phone]     Fax :[phone]    Patient's Name: Leslee Maldonado  2:42 PM  2/1/2025    Reason for Consult:  acidoses   Requesting Physician:  Raj Cedeño MD  Chief Complaint:  No chief complaint on file.      Assessment/Plan     Acidosis   From diarrhea   Continue oral (1300 oral sodium bicarb tid) and IV bicarb   Colitis   Severe diarrhea   Steroids   IgG Lambda Multiple Myeloma  Hematology on board   Hypokalemia, HypoMg, hypophosphatemia   From GI losses   Continue to replace the GI losses    History of Present Ilness:    Leslee Maldonado is a 69 y.o. female with history of refractory IgG lambda multiple myeloma, coronary disease who recently underwent CAR-T cell therapy with Carvykti on October 28. She has been doing well except for diarrhea. For the past 2 months she is lost 20 pounds in overall since September she is lost 30 pounds. 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 She has been having diarrhea for the past 6 to 8 weeks. It has been progressively getting worse. She is not able to eat well due to the diarrhea. She denies any nausea or vomiting. She denies any blood in her stools. She had C. difficile checked on January 3 that was negative. She is being admitted to the hospital for diarrhea weight loss and GI workup.       Interval hx     Past Medical History:   Diagnosis Date    Arthritis Shoulder    CAD (coronary artery disease)     Cancer (HCC)     Multiple myeloma    Diabetes mellitus (HCC)     Hyperlipidemia     Hypertension     MI (myocardial infarction) (HCC)        Past  Surgical History:   Procedure Laterality Date    CARDIAC SURGERY  Quadruple by pass    COLONOSCOPY      COLONOSCOPY N/A 1/29/2025    COLONOSCOPY BIOPSY performed by Trip Cope MD at [facility] ENDOSCOPY    CORONARY ARTERY BYPASS GRAFT N/A 06/13/2019    CORONARY ARTERY BYPASS GRAFT X4, INTERNAL MAMMARY ARTERY AND SAPHENOUS VEIN GRAFT-TCP-BP performed by Maykel Sousa MD at [facility] OR    HAND SURGERY  September 2018    HYSTERECTOMY (CERVIX STATUS UNKNOWN)      partial for fibroids    INSERTION / REMOVAL / REPLACEMENT VENOUS ACCESS CATHETER N/A 01/24/2019    TRIFUSION CATHETER INSERTION LEFT SUBCLAVIAN performed by Munir Hess MD at [facility] OR    IR NONTUNNELED VASCULAR CATHETER > 5 YEARS  9/5/2024    IR NONTUNNELED VASCULAR CATHETER 9/5/2024 [facility] CARDIAC CATH/IR/NEURO LAB    TUBAL LIGATION         Family History   Problem Relation Age of Onset    Diabetes Sister     Breast Cancer Neg Hx     Ovarian Cancer Neg Hx         reports that she quit smoking about 26 years ago. Her smoking use included cigarettes. She has never used smokeless tobacco. She reports that she does not currently use alcohol after a past usage of about 2.0 standard drinks of alcohol per week. She reports that she does not use drugs.    Allergies:  Adhesive tape, Pcn [penicillins], and Lipitor [atorvastatin]    Current Medications:    sodium bicarbonate tablet 1,300 mg, TID  diphenoxylate-atropine (LOMOTIL) 2.5-0.025 MG per tablet 1 tablet, 4x Daily  potassium chloride 20 mEq, sodium bicarbonate 50 mEq, magnesium sulfate 2,000 mg in dextrose 5 % and 0.45 % NaCl 1,000 mL infusion, Continuous  insulin lispro (HUMALOG,ADMELOG) injection vial 0-4 Units, 4x Daily AC & HS  glucose chewable tablet 16 g, PRN  dextrose bolus 10% 125 mL, PRN   Or  dextrose bolus 10% 250 mL, PRN  glucagon injection 1 mg, PRN  dextrose 10 % infusion, Continuous PRN  potassium chloride (KLOR-CON M) extended release tablet 40 mEq, BID WC  sodium phosphate 30 mmol in sodium

## 2025-02-01 NOTE — PROGRESS NOTES
[facility] Progress Note    2025     Leslee Maldonado    MRN: [id number]    : 1955    Referring MD: Dominique Lopez,   [address]  [address]  [place] [address]      Interval History:  - 2.5 liters of stool over the past 24 hours  - CDiff negative, GI panel negative  - Colonoscopy yesterday biopsies: Random colon, forceps biopsy: Multiple fragments of benign colonic mucosa with increased edema and chronic inflammation of the lamina propria with focal area of acute   colitis with acute cryptitis. Negative for microscopic colitis, granulomas and dysplasia/malignancy.   - Nephrology consulted with worsening metabolic acidosis    Subjective:     Patient states that her diarrhea is slowing down little bit.  She did not get awakened at night by her diarrhea nor has she had any accidents.  She continues on IV fluids and electrolyte replacement and management.  She denies any abdominal cramping or pain.    ECOG PS:  (2) Ambulatory and capable of self care, unable to carry out work activity, up and about > 50% or waking hours    KPS: 70% Cares for self; unable to carry on normal activity or to do active work    Isolation: None    Medications    Scheduled Meds:   diphenoxylate-atropine  1 tablet Oral 4x Daily    insulin lispro  0-4 Units SubCUTAneous 4x Daily AC & HS    potassium chloride  40 mEq Oral BID WC    enoxaparin  40 mg SubCUTAneous QPM    thiamine  100 mg Oral Daily    multivitamin  1 tablet Oral Daily    voriconazole  200 mg Oral 2 times per day    methylPREDNISolone  70 mg IntraVENous Daily    sodium chloride flush  5-40 mL IntraVENous 2 times per day    Saline Mouthwash  15 mL Swish & Spit 4x Daily AC & HS    acyclovir  400 mg Oral BID    [Held by provider] metoprolol tartrate  25 mg Oral BID    pantoprazole  40 mg Oral QAM AC    sulfamethoxazole-trimethoprim  1 tablet Oral Daily     Continuous Infusions:   potassium chloride 20 mEq, sodium bicarbonate 50 mEq, magnesium sulfate 2,000 mg in dextrose  5 % and 0.45 % NaCl 1,000 mL infusion 50 mL/hr at 01/31/25 1551    dextrose      sodium chloride       PRN Meds:.glucose, dextrose bolus **OR** dextrose bolus, glucagon (rDNA), dextrose, sodium phosphate IVPB (CENTRAL line), loperamide, sodium chloride flush, sodium chloride, potassium chloride, magnesium sulfate, acetaminophen **OR** acetaminophen, Saline Mouthwash, ALTEplase (CATHFLO) 2 mg in sterile water 2 mL injection    ROS:  As noted above, otherwise remainder of 10-point ROS negative    Physical Exam:     I&O:    Intake/Output Summary (Last 24 hours) at 2/1/2025 1016  Last data filed at 2/1/2025 0639  Gross per 24 hour   Intake 3972 ml   Output 3600 ml   Net 372 ml       Vital Signs:  /61   Pulse 73   Temp 97.7 °F (36.5 °C) (Oral)   Resp 18   Ht 1.575 m (5' 2\")   Wt 78.2 kg (172 lb 6.4 oz)   SpO2 98%   BMI 31.53 kg/m²     Weight:    Wt Readings from Last 3 Encounters:   02/01/25 78.2 kg (172 lb 6.4 oz)   12/11/24 85.7 kg (189 lb)   11/29/24 86.3 kg (190 lb 4.1 oz)         General: Awake, alert and oriented.  HEENT: normocephalic, PERRL, no scleral erythema or icterus, Oral mucosa moist and intact, throat clear  NECK: supple  BACK: Straight   SKIN: warm dry and intact without lesions rashes or masses  CHEST: CTA bilaterally without use of accessory muscles  CV: Normal S1 S2, RRR, no MRG  ABD: NT ND normoactive BS  EXTREMITIES: without edema, denies calf tenderness  NEURO: Grossly intact  CATHETER: R PAC    Data    CBC:   Recent Labs     01/30/25  0335 01/31/25  0350 02/01/25  0408   WBC 2.8* 5.5 5.2   HGB 11.6* 10.8* 11.2*   HCT 36.0 33.8* 35.1*   MCV 90.2 90.4 91.3   * 108* 102*     BMP/Mag:  Recent Labs     01/30/25  0335 01/30/25  1530 01/31/25  0350 01/31/25  1600 02/01/25  0408   *   < > 145 144 145   K 3.8   < > 3.2* 3.1* 3.9   *   < > 119* 120* 123*   CO2 17*   < > 17* 15* 15*   PHOS 3.2  --  2.3*  --  2.5   BUN <2*   < > <2* <2* <2*   CREATININE 0.7   < > 0.8 0.9 0.9

## 2025-02-01 NOTE — PLAN OF CARE
Problem: Safety - Adult  Goal: Free from fall injury  Outcome: Progressing     Problem: ABCDS Injury Assessment  Goal: Absence of physical injury  Outcome: Progressing  Flowsheets (Taken 2/1/2025 0823)  Absence of Physical Injury: Implement safety measures based on patient assessment     Problem: Metabolic/Fluid and Electrolytes - Adult  Goal: Electrolytes maintained within normal limits  Outcome: Progressing  Flowsheets (Taken 2/1/2025 0823)  Electrolytes maintained within normal limits: Monitor labs and assess patient for signs and symptoms of electrolyte imbalances     Problem: Hematologic - Adult  Goal: Maintains hematologic stability  Outcome: Progressing  Note: Patient's hemoglobin this AM:   Recent Labs     02/01/25  0408   HGB 11.2*     Patient's platelet count this AM:   Recent Labs     02/01/25  0408   *    Thrombocytopenia Precautions in place.  Patient showing no signs or symptoms of active bleeding.  Transfusion not indicated at this time.  Patient verbalizes understanding of all instructions. Will continue to assess and implement POC. Call light within reach and hourly rounding in place.

## 2025-02-01 NOTE — PLAN OF CARE
Problem: Safety - Adult  Goal: Free from fall injury  2/1/2025 1503 by Kianna Leo RN  Outcome: Progressing  Flowsheets (Taken 2/1/2025 1503)  Free From Fall Injury: Instruct family/caregiver on patient safety  Note: Orthostatic vital signs obtained at start of shift - see flowsheet for details.  Pt does not meet criteria for orthostasis.  Pt is a Med fall risk. See Ayers Fall Score and ABCDS Injury Risk assessments.   - Screening for Orthostasis AND not a [place] Risk per AYERS/ABCDS: Pt bed is in low position, side rails up, call light and belongings are in reach.  Fall risk light is on outside pts room.  Pt encouraged to call for assistance as needed. Will continue with hourly rounds for PO intake, pain needs, toileting and repositioning as needed.       Problem: ABCDS Injury Assessment  Goal: Absence of physical injury  2/1/2025 1503 by Kianna Leo RN  Outcome: Progressing  Flowsheets (Taken 2/1/2025 1503)  Absence of Physical Injury: Implement safety measures based on patient assessment     Problem: Hematologic - Adult  Goal: Maintains hematologic stability  2/1/2025 1503 by Kianna Leo RN  Outcome: Progressing  Flowsheets (Taken 2/1/2025 1503)  Maintains hematologic stability:   Assess for signs and symptoms of bleeding or hemorrhage   Monitor labs for bleeding or clotting disorders   Administer blood products/factors as ordered  Note: Patient's hemoglobin this AM:   Recent Labs     02/01/25  0408   HGB 11.2*     Patient's platelet count this AM:   Recent Labs     02/01/25  0408   *    Thrombocytopenia Precautions in place.  Patient showing no signs or symptoms of active bleeding.  Transfusion not indicated at this time.  Patient verbalizes understanding of all instructions. Will continue to assess and implement POC. Call light within reach and hourly rounding in place.

## 2025-02-02 NOTE — PLAN OF CARE
Problem: ABCDS Injury Assessment  Goal: Absence of physical injury  Outcome: Progressing  Flowsheets (Taken 2/2/2025 1832)  Absence of Physical Injury: Implement safety measures based on patient assessment     Problem: Metabolic/Fluid and Electrolytes - Adult  Goal: Electrolytes maintained within normal limits  Outcome: Progressing  Flowsheets (Taken 2/2/2025 1832)  Electrolytes maintained within normal limits:   Monitor labs and assess patient for signs and symptoms of electrolyte imbalances   Administer electrolyte replacement as ordered   Monitor response to electrolyte replacements, including repeat lab results as appropriate     Problem: Hematologic - Adult  Goal: Maintains hematologic stability  Outcome: Progressing  Flowsheets (Taken 2/2/2025 1832)  Maintains hematologic stability:   Assess for signs and symptoms of bleeding or hemorrhage   Monitor labs for bleeding or clotting disorders   Administer blood products/factors as ordered

## 2025-02-02 NOTE — PROGRESS NOTES
Parvo Negative  - LP (11/20/24): Meningitis panel negative, Crypto Ag negative  - MRI w/ orbits (11/20/24): no acute intracranial abnormality,  - S/p Prednisone taper: 40 mg daily (11/20/24), 30 mg PO daily (11/27/24), 20 mg PO daily (11/30/24), 10 mg (12/7/24), 5 mg (12/17/24) & stopped 12/23/24.   - S/p Keppra 500 mg bid - stopped 12/27/24      3.  ID:  Afebrile   - Cont Vfend (changed from Acyclovir with addition of HD steroids) & Bactrim ppx     - Cdiff 1/28/25: Negative   - GI pathogen panel 1/28/25: Negative      Hypogammaglobulinemia:  - IgG (11/27/24): 395 - s/p IVIG 12/4/24   - IgG (12/30/24): 269 - s/p IVIG 1/7/24  - IgG (1/21/25) 883  - Monitor monthly and replace if <400      CD4: monitor monthly starting at 6 months  12/2/24: 244      4.  Heme:   Mild leukopenia, anemia and thrombocytopenia  - Transfuse for Hgb < 7 and Platelets < 10K  - No transfusion today       5.  Metabolic:    Hypokalemia, HypoMg, hypophosphatemia  - Cont KCL 20 mEq PO daily - increase to 40 mEq PO BID  - Replace mag and K Per prn orders  - Monitor BMP BID  - Sodium phosphate 30 mmol given 1/29/25, 1/31/25  Worsening Metabolic Acidosis 2/2 Diarrhea  - D5 1/2NS w bicarb, K, Mag @100 ml/hr   - Nephrology consulted for assistance, appreciate recs  - Continue bicarbonate tablets 1300 mg TID  - IV bicarb, mag, and K in MIVF  - Replace GI losses per SSE protocol    6.  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 Negative for microscopic colitis, granulomas and dysplasia/malignancy.   - CT abd /pelvis 1/29/25:  Questionable wall thickening within the colon at the splenic flexure could represent peristalsis or mass. Correlate with colonoscopy when able. Fluid extending into the distal rectum compatible with diarrhea.  Colonic diverticulosis     PLAN:  - Solu-medrol 70 mg daily started 1/29/25  - Continue Imodium PRN     7.  Endo  - Type 2 DM:  well-controlled with diet  - Accu checks ACHS with addition of steroids  - Low dose SSI     8.  Cardiac:  stable  Hx of RBBB, CAD s/p CABG & PCI:  - 6/11/19:  PCI recanalization of occluded mid-left Cx (residual 70% stenosis)  - 6/13/22:  CABG - LIMA to LAD, reverse saphenous v. to OM1, reverse saphenous v. to OM2, reverse saphenous v. to PDA  - 10/25/22:  CARLO x 2 (proximal & ostial aspect of saphenous v. graft to OM3)  Hyperlipidemia:  - Hold rosuvastatin 40 mg qd during CAR-T  Hypertension:  - Cont Metoprolol 25 mg BID - on hold  - hold amlodipine and lisinopril since hypotensive since diarrhea      9.  MSK: Chronic back, hip, and right shoulder pain  - Tylenol PRN    - DVT Prophylaxis: Platelets >50,000 cells/dL, - daily lovenox prophylaxis ordered  Contraindications to pharmacologic prophylaxis: None  Contraindications to mechanical prophylaxis: None    - Disposition: When eating well and diarrhea improved.    The patient was seen and examined by Dr. Waterhouse.    Jeana Todd, APRN - CNP     David M. Waterhouse, M.D., MPH    [facility] [place] [address]  Office [phone]  Cell [phone]  [email or web address]     \"Participating in a clinical trial is the first step to fighting cancer; not the

## 2025-02-03 NOTE — PROGRESS NOTES
Nephrology Note                                                                                                                                                                                                                                                                                                                                                               Office : [phone]     Fax :[phone]    Patient's Name: Leslee Maldonado  7:12 PM  2/2/2025    Reason for Consult:  acidoses   Requesting Physician:  Raj Cedeño MD  Chief Complaint:  No chief complaint on file.      Assessment/Plan     Acidosis   Levels Better   From diarrhea   Continue oral (1300 oral sodium bicarb tid) and IV bicarb containing IV fluids   Colitis   Severe diarrhea   Steroids   IgG Lambda Multiple Myeloma  Hematology on board   Hypokalemia, HypoMg, hypophosphatemia   From GI losses   Continue to replace the GI losses    History of Present Ilness:    Leslee Maldonado is a 69 y.o. female with history of refractory IgG lambda multiple myeloma, coronary disease who recently underwent CAR-T cell therapy with Carvykti on October 28. She has been doing well except for diarrhea. For the past 2 months she is lost 20 pounds in overall since September she is lost 30 pounds. 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 She has been having diarrhea for the past 6 to 8 weeks. It has been progressively getting worse. She is not able to eat well due to the diarrhea. She denies any nausea or vomiting. She denies any blood in her stools. She had C. difficile checked on January 3 that was negative. She is being admitted to the hospital for diarrhea weight loss and GI workup.       Interval hx   Diarrhea somewhat better     Past Medical History:   Diagnosis Date    Arthritis Shoulder    CAD (coronary artery disease)     Cancer (HCC)     Multiple myeloma    Diabetes mellitus (HCC)     Hyperlipidemia     Hypertension   PORTABLE    (Results Pending)         Medical Decision Making:  The following items were considered in medical decision making:  Discussion of patient care with other providers  Reviewed clinical lab tests  Reviewed radiology tests  Reviewed other diagnostic tests/interventions    Will be discussed w/  Primary team     Thank you for allowing us to participate in care of Leslee Maldonado   Feel free to contact me,     Zacarias Olivas MD   [facility]  Office : [phone] or through [employer]  Fax :[phone]

## 2025-02-03 NOTE — PROGRESS NOTES
[facility] Progress Note    2/3/2025     Leslee Maldonado    MRN: [id number]    : 1955    Referring MD: Dominique Lopez,   [address]  [address]  [place] [address]      Interval History:  - 2.48 liters of stool over the past 24 hours, concern that urine may be mixing with stool because patient is reporting significant improvement in diarrhea  - Electrolyte imbalances improving  - Will continue to monitor diarrhea with current steroid dosing, may start taper tomorrow    Subjective:  - Patient reports feeling much better, significant improvement in diarrhea per her report    ECOG PS:  (2) Ambulatory and capable of self care, unable to carry out work activity, up and about > 50% or waking hours    KPS: 70% Cares for self; unable to carry on normal activity or to do active work    Isolation: None    Medications    Scheduled Meds:   sodium bicarbonate  650 mg Oral TID    diphenoxylate-atropine  1 tablet Oral 4x Daily    insulin lispro  0-4 Units SubCUTAneous 4x Daily AC & HS    potassium chloride  40 mEq Oral BID WC    enoxaparin  40 mg SubCUTAneous QPM    thiamine  100 mg Oral Daily    multivitamin  1 tablet Oral Daily    voriconazole  200 mg Oral 2 times per day    methylPREDNISolone  70 mg IntraVENous Daily    sodium chloride flush  5-40 mL IntraVENous 2 times per day    Saline Mouthwash  15 mL Swish & Spit 4x Daily AC & HS    acyclovir  400 mg Oral BID    [Held by provider] metoprolol tartrate  25 mg Oral BID    pantoprazole  40 mg Oral QAM AC    sulfamethoxazole-trimethoprim  1 tablet Oral Daily     Continuous Infusions:   potassium chloride 30 mEq, sodium bicarbonate 100 mEq, magnesium sulfate 2,000 mg in dextrose 5 % and 0.45 % NaCl 1,000 mL infusion 50 mL/hr at 25 0419    dextrose      sodium chloride       PRN Meds:.glucose, dextrose bolus **OR** dextrose bolus, glucagon (rDNA), dextrose, sodium phosphate IVPB (CENTRAL line), loperamide, sodium chloride flush, sodium chloride, potassium  biopsies: Random colon, forceps biopsy: Multiple fragments of benign colonic mucosa with increased edema and chronic inflammation of the lamina propria with focal area of acute   colitis with acute cryptitis. Negative for microscopic colitis, granulomas and dysplasia/malignancy.   - CT abd /pelvis 1/29/25:  Questionable wall thickening within the colon at the splenic flexure could represent peristalsis or mass. Correlate with colonoscopy when able. Fluid extending into the distal rectum compatible with diarrhea.  Colonic diverticulosis     PLAN:  - Solu-medrol 70 mg daily started 1/29/25  - Continue Imodium PRN     7.  Endo  - Type 2 DM:  well-controlled with diet  - Accu checks ACHS with addition of steroids  - Low dose SSI     8.  Cardiac:  stable  Hx of RBBB, CAD s/p CABG & PCI:  - 6/11/19:  PCI recanalization of occluded mid-left Cx (residual 70% stenosis)  - 6/13/22:  CABG - LIMA to LAD, reverse saphenous v. to OM1, reverse saphenous v. to OM2, reverse saphenous v. to PDA  - 10/25/22:  CARLO x 2 (proximal & ostial aspect of saphenous v. graft to OM3)  Hyperlipidemia:  - Hold rosuvastatin 40 mg qd during CAR-T  Hypertension:  - Cont Metoprolol 25 mg BID - on hold  - hold amlodipine and lisinopril since hypotensive since diarrhea      9.  MSK: Chronic back, hip, and right shoulder pain  - Tylenol PRN    - DVT Prophylaxis: Platelets >50,000 cells/dL, - daily lovenox prophylaxis ordered  Contraindications to pharmacologic prophylaxis: None  Contraindications to mechanical prophylaxis: None    - Disposition: When eating well and diarrhea improved.    The patient was seen and examined by Dr. Reynolds.    Jeana Todd, APRN - CNP   Spencer Reynolds MD  Hematology, Bone marrow transplant and Cellular therapy  [facility]

## 2025-02-03 NOTE — PLAN OF CARE
Problem: Chronic Conditions and Co-morbidities  Goal: Patient's chronic conditions and co-morbidity symptoms are monitored and maintained or improved  Outcome: Progressing     Problem: Safety - Adult  Goal: Free from fall injury  Outcome: Progressing     Problem: ABCDS Injury Assessment  Goal: Absence of physical injury  Outcome: Progressing     Problem: Metabolic/Fluid and Electrolytes - Adult  Goal: Electrolytes maintained within normal limits  Outcome: Progressing  Flowsheets (Taken 2/3/2025 7909)  Electrolytes maintained within normal limits: Monitor labs and assess patient for signs and symptoms of electrolyte imbalances     Problem: Hematologic - Adult  Goal: Maintains hematologic stability  Outcome: Progressing     Problem: Nutrition Deficit:  Goal: Optimize nutritional status  Outcome: Progressing      Orthostatic vital signs obtained at start of shift - see flowsheet for details.  Pt does not meet criteria for orthostasis.  Pt is a Low fall risk. See [place] Fall Score and ABCDS Injury Risk assessments.

## 2025-02-03 NOTE — CARE COORDINATION
8:40am: Spoke with pt at bedside. Pt hopeful to leave today and is awaiting MD rounds. She confirmed returning home with no needs from SW.     Pt was given her IMM with explanation     SW will follow    MELISSA Lee   for [facility] ([facility])  [facility] Mobile: [phone]

## 2025-02-03 NOTE — PROGRESS NOTES
Nephrology Progress Note                                                                                                                                                                                                                                                                                                                                                               Office : [phone]     Fax :[phone]    Patient's Name: Leslee Maldonado  10:31 AM  2/3/2025    Reason for Consult:  acidoses   Requesting Physician:  Raj Cedeño MD  Chief Complaint:  No chief complaint on file.      Assessment/Plan     Acidosis   Levels Better   From diarrhea   Continue oral ( 650 mg oral sodium bicarb tid)  Stop IV bicarbonate containing IV fluids   Hypernatremia   Encourage free water   Colitis   Severe diarrhea   Steroids   IgG Lambda Multiple Myeloma  Hematology on board   Hypokalemia, HypoMg, hypophosphatemia  From GI losses   Continue to replace the GI losses      History of Present Ilness:    Leslee Maldonado is a 69 y.o. female with history of refractory IgG lambda multiple myeloma, coronary disease who recently underwent CAR-T cell therapy with Carvykti on October 28. She has been doing well except for diarrhea. For the past 2 months she is lost 20 pounds in overall since September she is lost 30 pounds. 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 She has been having diarrhea for the past 6 to 8 weeks. It has been progressively getting worse. She is not able to eat well due to the diarrhea. She denies any nausea or vomiting. She denies any blood in her stools. She had C. difficile checked on January 3 that was negative. She is being admitted to the hospital for diarrhea weight loss and GI workup.     Interval hx:    Bicarb improved  Sodium 147  BP controlled  Good UOP  Diarrhea better  Appetite is good      Past Medical History:   Diagnosis Date    Arthritis Shoulder    CAD  evidence of bowel obstruction   3. Colonic diverticulosis.      Electronically signed by Raj Cordova          Medical Decision Making:  The following items were considered in medical decision making:  Discussion of patient care with other providers  Reviewed clinical lab tests  Reviewed radiology tests  Reviewed other diagnostic tests/interventions    Zacarias Olivas MD   [facility]  Office : [phone] or through [employer]  Fax :[phone]

## 2025-02-03 NOTE — PLAN OF CARE
Problem: Safety - Adult  Goal: Free from fall injury  2/2/2025 2333 by Brittany Burgos, RN  Outcome: Progressing  Flowsheets (Taken 2/2/2025 2333)  Free From Fall Injury:   Instruct family/caregiver on patient safety   Based on caregiver fall risk screen, instruct family/caregiver to ask for assistance with transferring infant if caregiver noted to have fall risk factors  Note: Orthostatic vital signs obtained at start of shift - see flowsheet for details.  Pt does not meet criteria for orthostasis.  Pt is a Med fall risk. See Ayers Fall Score and ABCDS Injury Risk assessments.   - Screening for Orthostasis AND not a [place] Risk per AYERS/ABCDS: Pt bed is in low position, side rails up, call light and belongings are in reach.  Fall risk light is on outside pts room.  Pt encouraged to call for assistance as needed. Will continue with hourly rounds for PO intake, pain needs, toileting and repositioning as needed.      Problem: Metabolic/Fluid and Electrolytes - Adult  Goal: Electrolytes maintained within normal limits  2/2/2025 2333 by Brittany Burgos, RN  Outcome: Progressing  Flowsheets (Taken 2/2/2025 2333)  Electrolytes maintained within normal limits:   Monitor labs and assess patient for signs and symptoms of electrolyte imbalances   Administer electrolyte replacement as ordered   Monitor response to electrolyte replacements, including repeat lab results as appropriate     Problem: Hematologic - Adult  Goal: Maintains hematologic stability  2/2/2025 2333 by Brittany Burgos, RN  Outcome: Progressing  Flowsheets (Taken 2/2/2025 2333)  Maintains hematologic stability:   Assess for signs and symptoms of bleeding or hemorrhage   Monitor labs for bleeding or clotting disorders   Administer blood products/factors as ordered   Patient's hemoglobin this AM:   Recent Labs     02/03/25  0416   HGB 11.2*     Patient's platelet count this AM:   Recent Labs     02/03/25  0416   *    Thrombocytopenia

## 2025-02-04 NOTE — PROGRESS NOTES
Nephrology Progress Note                                                                                                                                                                                                                                                                                                                                                               Office : [phone]     Fax :[phone]    Patient's Name: Leslee Maldondao  3:04 PM  2/4/2025    Reason for Consult:  acidoses   Requesting Physician:  Raj Cedeño MD  Chief Complaint:  No chief complaint on file.      Assessment/Plan     Acidosis   Levels Better   From diarrhea   Continue oral ( 650 mg oral sodium bicarb bid)  Stopped IV bicarbonate containing IV fluids   Hypernatremia   Encourage free water   Colitis   Severe diarrhea   Steroids   IgG Lambda Multiple Myeloma  Hematology on board   Hypokalemia, HypoMg, hypophosphatemia  From GI losses   Continue to replace the GI losses      History of Present Ilness:    Leslee Maldonado is a 69 y.o. female with history of refractory IgG lambda multiple myeloma, coronary disease who recently underwent CAR-T cell therapy with Carvykti on October 28. She has been doing well except for diarrhea. For the past 2 months she is lost 20 pounds in overall since September she is lost 30 pounds. 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 She has been having diarrhea for the past 6 to 8 weeks. It has been progressively getting worse. She is not able to eat well due to the diarrhea. She denies any nausea or vomiting. She denies any blood in her stools. She had C. difficile checked on January 3 that was negative. She is being admitted to the hospital for diarrhea weight loss and GI workup.     Interval hx:    Bicarb improved  Sodium 146  BP controlled  Good UOP  Appetite is good      Past Medical History:   Diagnosis Date    Arthritis Shoulder    CAD (coronary artery  40 mEq, BID WC  sodium phosphate 30 mmol in sodium chloride 0.9 % 250 mL IVPB for central line, PRN  enoxaparin (LOVENOX) injection 40 mg, QPM  thiamine mononitrate tablet 100 mg, Daily  multivitamin 1 tablet, Daily  voriconazole (VFEND) tablet 200 mg, 2 times per day  methylPREDNISolone sodium succ (SOLU-MEDROL) 70 mg in sterile water 1.12 mL injection, Daily  sodium chloride flush 0.9 % injection 5-40 mL, 2 times per day  sodium chloride flush 0.9 % injection 5-40 mL, PRN  0.9 % sodium chloride infusion, PRN  potassium chloride 20 mEq/50 mL IVPB (Central Line), PRN  magnesium sulfate 4000 mg in 100 mL IVPB premix, PRN  acetaminophen (TYLENOL) tablet 650 mg, Q6H PRN   Or  acetaminophen (TYLENOL) suppository 650 mg, Q6H PRN  Saline Mouthwash 15 mL, 4x Daily AC & HS  Saline Mouthwash 15 mL, Q4H PRN  ALTEplase (CATHFLO) 2 mg in sterile water 2 mL injection, PRN  acyclovir (ZOVIRAX) tablet 400 mg, BID  [Held by provider] metoprolol tartrate (LOPRESSOR) tablet 25 mg, BID  pantoprazole (PROTONIX) tablet 40 mg, QAM AC  sulfamethoxazole-trimethoprim (BACTRIM;SEPTRA) 400-80 MG per tablet 1 tablet, Daily      Physical exam:     Vitals:  /84   Pulse 73   Temp 97.2 °F (36.2 °C) (Temporal)   Resp 16   Ht 1.575 m (5' 2\")   Wt 79 kg (174 lb 3.2 oz)   SpO2 97%   BMI 31.86 kg/m²   Constitutional:  OAA X3 NAD Yes  Skin: no rash, turgor wnl  Heent:  eomi, mmm  Neck: no bruits or jvd noted  Cardiovascular:  S1, S2 without m/r/g  Respiratory: CTA B without w/r/r  Abdomen:  , soft, nt, nd  Ext:  lower extremity edema No  Psychiatric: mood and affect stable   Musculoskeletal:  Rom, muscular strength intact    Data:   Labs:  CBC:   Recent Labs     02/02/25  0417 02/03/25  0416 02/04/25  0345   WBC 3.8* 3.7* 3.5*   HGB 11.0* 11.2* 10.6*   * 103* 97*     BMP:    Recent Labs     02/03/25  0416 02/03/25  1615 02/04/25  0345   * 146* 146*   K 3.7 3.7 3.6   * 117* 116*   CO2 23 20* 24   BUN <2* <2* <2*

## 2025-02-04 NOTE — PROGRESS NOTES
[facility] Progress Note    2025     Leslee Maldonado    MRN: [id number]    : 1955    Referring MD: Dominique Lopez DO  [address]  [address]  [place] [address]      Interval History:  - 2.4 liters of stool over the past 24 hours  - Electrolyte imbalances improving  - Will place rojas today to ensure urine and stool are not mixing  - Will continue to monitor diarrhea with current steroid dosing, may need to add infliximab    Subjective:  Continues to have diarrhea.     ECOG PS:  (2) Ambulatory and capable of self care, unable to carry out work activity, up and about > 50% or waking hours    KPS: 70% Cares for self; unable to carry on normal activity or to do active work    Isolation: None    Medications    Scheduled Meds:   sodium bicarbonate  650 mg Oral TID    diphenoxylate-atropine  1 tablet Oral 4x Daily    insulin lispro  0-4 Units SubCUTAneous 4x Daily AC & HS    potassium chloride  40 mEq Oral BID WC    enoxaparin  40 mg SubCUTAneous QPM    thiamine  100 mg Oral Daily    multivitamin  1 tablet Oral Daily    voriconazole  200 mg Oral 2 times per day    methylPREDNISolone  70 mg IntraVENous Daily    sodium chloride flush  5-40 mL IntraVENous 2 times per day    Saline Mouthwash  15 mL Swish & Spit 4x Daily AC & HS    acyclovir  400 mg Oral BID    [Held by provider] metoprolol tartrate  25 mg Oral BID    pantoprazole  40 mg Oral QAM AC    sulfamethoxazole-trimethoprim  1 tablet Oral Daily     Continuous Infusions:   dextrose      sodium chloride       PRN Meds:.diphenoxylate-atropine, loperamide, glucose, dextrose bolus **OR** dextrose bolus, glucagon (rDNA), dextrose, sodium phosphate IVPB (CENTRAL line), sodium chloride flush, sodium chloride, potassium chloride, magnesium sulfate, acetaminophen **OR** acetaminophen, Saline Mouthwash, ALTEplase (CATHFLO) 2 mg in sterile water 2 mL injection    ROS:  As noted above, otherwise remainder of 10-point ROS negative    Physical Exam:     I&O:   thickening within the colon at the splenic flexure could represent peristalsis or mass. Correlate with colonoscopy when able. Fluid extending into the distal rectum compatible with diarrhea.  Colonic diverticulosis     PLAN:  - Solu-medrol 70 mg daily started 1/29/25  - Continue Imodium PRN     7.  Endo  - Type 2 DM:  well-controlled with diet  - Accu checks ACHS with addition of steroids  - Low dose SSI     8.  Cardiac:  stable  Hx of RBBB, CAD s/p CABG & PCI:  - 6/11/19:  PCI recanalization of occluded mid-left Cx (residual 70% stenosis)  - 6/13/22:  CABG - LIMA to LAD, reverse saphenous v. to OM1, reverse saphenous v. to OM2, reverse saphenous v. to PDA  - 10/25/22:  CARLO x 2 (proximal & ostial aspect of saphenous v. graft to OM3)  Hyperlipidemia:  - Hold rosuvastatin 40 mg qd during CAR-T  Hypertension:  - Cont Metoprolol 25 mg BID - on hold  - hold amlodipine and lisinopril since hypotensive since diarrhea      9.  MSK: Chronic back, hip, and right shoulder pain  - Tylenol PRN    - DVT Prophylaxis: Platelets >50,000 cells/dL, - daily lovenox prophylaxis ordered  Contraindications to pharmacologic prophylaxis: None  Contraindications to mechanical prophylaxis: None    - Disposition: When eating well and diarrhea improved.    The patient was seen and examined by Dr. Reynolds.    Jeana Todd, APRN - CNP   Spencer Reynolds MD  Hematology, Bone marrow transplant and Cellular therapy  [facility]

## 2025-02-04 NOTE — PLAN OF CARE
Problem: Chronic Conditions and Co-morbidities  Goal: Patient's chronic conditions and co-morbidity symptoms are monitored and maintained or improved  Outcome: Progressing     Problem: Safety - Adult  Goal: Free from fall injury  2/4/2025 1654 by Sheila Duffy RN  Outcome: Progressing  2/4/2025 0554 by Zaid Burns RN  Outcome: Progressing     Problem: ABCDS Injury Assessment  Goal: Absence of physical injury  Outcome: Progressing     Problem: Metabolic/Fluid and Electrolytes - Adult  Goal: Electrolytes maintained within normal limits  2/4/2025 1654 by Sheila Duffy RN  Outcome: Progressing  2/4/2025 0554 by Zaid Burns RN  Outcome: Progressing     Problem: Hematologic - Adult  Goal: Maintains hematologic stability  2/4/2025 1654 by Sheila Duffy RN  Outcome: Progressing  2/4/2025 0554 by Zaid Burns RN  Outcome: Progressing     Problem: Nutrition Deficit:  Goal: Optimize nutritional status  Outcome: Progressing

## 2025-02-04 NOTE — PLAN OF CARE
Problem: Safety - Adult  Goal: Free from fall injury  2/4/2025 0554 by Zaid Burns, RN  Outcome: Progressing  - Screening for Orthostasis AND not a [place] Risk per AYERS/ABCDS: Pt bed is in low position, side rails up, call light and belongings are in reach.  Fall risk light is on outside pts room.  Pt encouraged to call for assistance as needed. Will continue with hourly rounds for PO intake, pain needs, toileting and repositioning as needed.      Problem: Metabolic/Fluid and Electrolytes - Adult  Goal: Electrolytes maintained within normal limits  2/4/2025 0554 by Zaid Burns, RN  Outcome: Progressing   Pt needed phosphorus replacement this AM.    Problem: Hematologic - Adult  Goal: Maintains hematologic stability  2/4/2025 0554 by Zaid Burns, RN  Outcome: Progressing   Patient's hemoglobin this AM:   Recent Labs     02/04/25  0345   HGB 10.6*     Patient's platelet count this AM:   Recent Labs     02/04/25  0345   PLT 97*    Thrombocytopenia not present at this time.  Patient showing no signs or symptoms of active bleeding.  Transfusion not indicated at this time.  Patient verbalizes understanding of all instructions. Will continue to assess and implement POC. Call light within reach and hourly rounding in place.

## 2025-02-04 NOTE — PROGRESS NOTES
Comprehensive Nutrition Assessment    RECOMMENDATIONS:  PO Diet: Modify: 'GVHD' 1 to 'GVHD' 2  Nutrition Supplement: Add Ensure +HP BID  Nutrition Education: Education/Counseling initiated (diarrhea)     NUTRITION ASSESSMENT:   Nutritional summary & status: Follow up. Appears 2.4 L stool output yesterday. Stool output is thought to be less severe, pt reports diarrhea frequency and volume is decreasing. Plan for rojas placement today for a more accurate reading. On steroids, may add Infliximab per MD. Pt malnourished, plan for diet advancement today. Diet is still low fat, low fiber, but provides more options and especially more protein to enhance intake given malnutrition status. Ensure supplements compliant with new diet- pt agreeable. Will continue to follow.     Admission // PMH: Diarrhea // MM s/p CAR-T 10/2024, ASCT 2/20219    MALNUTRITION ASSESSMENT  Context of Malnutrition: Acute Illness (on chronic)   Malnutrition Status: Severe malnutrition  Findings of the 6 clinical characteristics of malnutrition (Minimum of 2 out of 6 clinical characteristics is required to make the diagnosis of moderate or severe Protein Calorie Malnutrition based on AND/ASPEN Guidelines):  Energy Intake:  50% or less of estimated energy requirements for 5 or more days  Weight Loss:  (S) Greater than 7.5% over 3 months (25#/12% in 2 months)     Body Fat Loss:  No body fat loss     Muscle Mass Loss:  No muscle mass loss        NUTRITION DIAGNOSIS   Severe malnutrition, in context of acute illness or injury related to altered GI function as evidenced by criteria as identified in malnutrition assessment    Nutrition Monitoring and Evaluation:   Food/Nutrient Intake Outcomes:  Progression of Nutrition, Diet Advancement/Tolerance  Physical Signs/Symptoms Outcomes:  Biochemical Data, Nutrition Focused Physical Findings, Skin, Weight, Diarrhea, GI Status     OBJECTIVE DATA: Significant to nutrition assessment  Nutrition Related Findings: Na  146, phos 2.1, -7 L, 2.4 L  Wounds: None  Nutrition Goals: by next RD assessment, Meet at least 75% of estimated needs     CURRENT NUTRITION THERAPIES  GVHD DIET; GVHD 1  PO Intake: %   PO Supplement Intake:None Ordered  Additional Sources of Calories/IVF:NA     COMPARATIVE STANDARDS  Energy (kcal):  0911-5161 (18-20 kcal/kg CBW)     Protein (g):  65-75 (1.3-1.5 kcal/kg IBW)       Fluid (ml/day):  1 ml/kcal    ANTHROPOMETRICS  Current Height: 157.5 cm (5' 2\")  Current Weight - Scale: 79 kg (174 lb 3.2 oz)    Admission weight: 76.3 kg (168 lb 3.2 oz)    The patient will be monitored per nutrition standards of care. Consult dietitian if additional nutrition interventions are needed prior to RD reassessment.     Shireen Verdugo RD  [place]:  [phone]

## 2025-02-05 NOTE — PROGRESS NOTES
[facility] Progress Note    2025     Leslee Maldonado    MRN: [id number]    : 1955    Referring MD: Dominique Lopez,   [address]  [address]  [place] [address]      Interval History:  - 3.4 liters of stool over the past 24 hours, with 11 bowel movements in the last 24 hours.   - Electrolyte imbalances improving  - Will continue to monitor diarrhea with current steroid dosing, may start taper tomorrow    Subjective: Able to maintain good oral intake. Planning for Infliximab tomorrow. Awaiting CMV stain.     ECOG PS:  (2) Ambulatory and capable of self care, unable to carry out work activity, up and about > 50% or waking hours    KPS: 70% Cares for self; unable to carry on normal activity or to do active work    Isolation: None    Medications    Scheduled Meds:   sodium bicarbonate  650 mg Oral BID    insulin lispro  0-4 Units SubCUTAneous 4x Daily AC & HS    potassium chloride  40 mEq Oral BID WC    enoxaparin  40 mg SubCUTAneous QPM    thiamine  100 mg Oral Daily    multivitamin  1 tablet Oral Daily    voriconazole  200 mg Oral 2 times per day    methylPREDNISolone  70 mg IntraVENous Daily    sodium chloride flush  5-40 mL IntraVENous 2 times per day    Saline Mouthwash  15 mL Swish & Spit 4x Daily AC & HS    acyclovir  400 mg Oral BID    [Held by provider] metoprolol tartrate  25 mg Oral BID    pantoprazole  40 mg Oral QAM AC    sulfamethoxazole-trimethoprim  1 tablet Oral Daily     Continuous Infusions:   dextrose      sodium chloride       PRN Meds:.diphenoxylate-atropine, loperamide, glucose, dextrose bolus **OR** dextrose bolus, glucagon (rDNA), dextrose, sodium phosphate IVPB (CENTRAL line), sodium chloride flush, sodium chloride, potassium chloride, magnesium sulfate, acetaminophen **OR** acetaminophen, Saline Mouthwash, ALTEplase (CATHFLO) 2 mg in sterile water 2 mL injection    ROS:  As noted above, otherwise remainder of 10-point ROS negative    Physical Exam:     I&O:    Intake/Output

## 2025-02-05 NOTE — PLAN OF CARE
Problem: Safety - Adult  Goal: Free from fall injury  2/5/2025 0457 by Zaid Burns, RN  Outcome: Progressing  - Screening for Orthostasis AND not a [place] Risk per AYERS/ABCDS: Pt bed is in low position, side rails up, call light and belongings are in reach.  Fall risk light is on outside pts room.  Pt encouraged to call for assistance as needed. Will continue with hourly rounds for PO intake, pain needs, toileting and repositioning as needed.       Problem: Metabolic/Fluid and Electrolytes - Adult  Goal: Electrolytes maintained within normal limits  2/5/2025 0457 by Zaid Burns, RN  Outcome: Progressing  No electrolytes replacement needed this AM.    Problem: Hematologic - Adult  Goal: Maintains hematologic stability  2/5/2025 0457 by Zaid Burns, RN  Outcome: Progressing   Patient's hemoglobin this AM:   Recent Labs     02/05/25  0401   HGB 10.9*     Patient's platelet count this AM:   Recent Labs     02/05/25  0401   *    Thrombocytopenia not present at this time.  Patient showing no signs or symptoms of active bleeding.  Transfusion not indicated at this time.  Patient verbalizes understanding of all instructions. Will continue to assess and implement POC. Call light within reach and hourly rounding in place.

## 2025-02-05 NOTE — PROGRESS NOTES
Nephrology Progress Note                                                                                                                                                                                                                                                                                                                                                               Office : [phone]     Fax :[phone]    Patient's Name: Leslee Maldonado  10:42 AM  2/5/2025    Reason for Consult:  acidoses   Requesting Physician:  Raj Cedeño MD  Chief Complaint:  No chief complaint on file.      Assessment/Plan     Acidosis   Levels Better   From diarrhea   Continue oral ( 650 mg oral sodium bicarb bid)  Stopped IV bicarbonate containing IV fluids   Hypernatremia   Encourage free water   Colitis   Severe diarrhea   Steroids   IgG Lambda Multiple Myeloma  Hematology on board   Hypokalemia, HypoMg, hypophosphatemia  From GI losses   Continue to replace the GI losses  On scheduled potassium chloride       History of Present Ilness:    Leslee Maldonado is a 69 y.o. female with history of refractory IgG lambda multiple myeloma, coronary disease who recently underwent CAR-T cell therapy with Carvykti on October 28. She has been doing well except for diarrhea. For the past 2 months she is lost 20 pounds in overall since September she is lost 30 pounds. 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 She has been having diarrhea for the past 6 to 8 weeks. It has been progressively getting worse. She is not able to eat well due to the diarrhea. She denies any nausea or vomiting. She denies any blood in her stools. She had C. difficile checked on January 3 that was negative. She is being admitted to the hospital for diarrhea weight loss and GI workup.     Interval hx:    Continues to have diarrhea  Appetite is good  Sodium improved  Bicarb in good range  Creatinine stable      Past Medical  obstruction   3. Colonic diverticulosis.      Electronically signed by Raj Cordova          Medical Decision Making:  The following items were considered in medical decision making:  Discussion of patient care with other providers  Reviewed clinical lab tests  Reviewed radiology tests  Reviewed other diagnostic tests/interventions    Zacarias Olivas MD   [facility]  Office : [phone] or through [employer]  Fax :[phone]

## 2025-02-05 NOTE — PLAN OF CARE
Problem: Chronic Conditions and Co-morbidities  Goal: Patient's chronic conditions and co-morbidity symptoms are monitored and maintained or improved  Outcome: Progressing     Problem: Safety - Adult  Goal: Free from fall injury  2/5/2025 1201 by Yoana Ha RN  Outcome: Progressing     Problem: ABCDS Injury Assessment  Goal: Absence of physical injury  Outcome: Progressing

## 2025-02-06 NOTE — PLAN OF CARE
Problem: Chronic Conditions and Co-morbidities  Goal: Patient's chronic conditions and co-morbidity symptoms are monitored and maintained or improved  Outcome: Progressing     Problem: Safety - Adult  Goal: Free from fall injury  2/6/2025 1103 by Yoana Ha, RN  Outcome: Progressing     Problem: Metabolic/Fluid and Electrolytes - Adult  Goal: Electrolytes maintained within normal limits  2/6/2025 1103 by Yoana Ha, RN  Outcome: Progressing

## 2025-02-06 NOTE — PLAN OF CARE
Problem: Safety - Adult  Goal: Free from fall injury  Outcome: Progressing  Note:   - Screening for Orthostasis AND not a [place] Risk per AYERS/ABCDS: Pt bed is in low position, side rails up, call light and belongings are in reach.  Fall risk light is on outside pts room.  Pt encouraged to call for assistance as needed. Will continue with hourly rounds for PO intake, pain needs, toileting and repositioning as needed.       Problem: Metabolic/Fluid and Electrolytes - Adult  Goal: Electrolytes maintained within normal limits  Outcome: Progressing  Note: Pt needed K replacement this AM.     Problem: Hematologic - Adult  Goal: Maintains hematologic stability  Outcome: Progressing  Note: Patient's hemoglobin this AM:   Recent Labs     02/06/25  0355   HGB 10.6*     Patient's platelet count this AM:   Recent Labs     02/06/25  0355   PLT 97*    Thrombocytopenia not present at this time.  Patient showing no signs or symptoms of active bleeding.  Transfusion not indicated at this time.  Patient verbalizes understanding of all instructions. Will continue to assess and implement POC. Call light within reach and hourly rounding in place.

## 2025-02-06 NOTE — PSYCHOTHERAPY
Psychology Follow-up Consultation    Patient Name: Leslee Maldonado  MRN: [id number]  Admission Date: 1/28/2025  Today's date: 2/6/2025    Reason for Consult: follow-up for second readmission to hospital post CAR-T and depressed mood per treatment team.       HPI:   Leslee Maldonado is a 69 y.o.   female with multiple myeloma and PMHx of GERD, type 2 DM, CAD S/P CABG & stent placement, hypertension, and hyperlipidemia, who initially presented on 11/2/2024 for a fever post CAR-T infusion (Day 0: 10/28/2024)    Pre-CAR-T Psychological Evaluation recommendations:  Complete advanced directive.   Initially, patient identified her daughter, Alessia, as her primary caregiver. Alessia attended CAR-T education. During today's evaluation, Alessia was not present due to working. Patient brought two sisters. Appears that sister, Yolis, will be the one primarily driving patient to appointments and attending appointments and Alessia will be with patient at home. Therefore, patient has two primary caregivers who should be educated regarding CAR-T. Yolis, sister, was not present for initial education with coordinator, but was instructed to review all information today. Yolis will be main point of contact for treatment team due to being the one with whom the treatment team will have the most contact.- Update: Daughters (Alessia and Jessica) and well as sister (Yolis) will be primary caregivers    Interval History:    Patient reports doing okay despite recent set back of readmission to the hospital and persistent episodes of bowel movements. She states she is trying to remain positive throughout this process; however, admits she would like to return home and be done with this side effect of CAR-T. She reports engaging with her values closely while admitted in the hospital, including walking and continuing to keep her strength and stamina up, calling family and friends, speaking with God, and coloring or completing puzzles.     TSH 1.97 09/20/2022     No results found for: \"FOLATE\"  No results found for: \"VITD25\"   Lab Results   Component Value Date/Time    COLORU Yellow 02/03/2025 07:38 AM    NITRU Negative 02/03/2025 07:38 AM    GLUCOSEU Negative 02/03/2025 07:38 AM    KETUA Negative 02/03/2025 07:38 AM    UROBILINOGEN 0.2 02/03/2025 07:38 AM    BILIRUBINUR Negative 02/03/2025 07:38 AM       EKG:   Encounter Date: 01/28/25   EKG 12 lead   Result Value    Ventricular Rate 52    Atrial Rate 52    P-R Interval 150    QRS Duration 98    Q-T Interval 458    QTc Calculation (Bazett) 425    P Axis 58    R Axis -21    T Axis 45    Diagnosis      Sinus bradycardiaIncomplete right bundle branch blockPossible Lateral infarct , age undeterminedAbnormal ECGConfirmed by ROSCOE HOPE MD ([id number]) on 1/29/2025 8:33:33 AM       Dx:   Psychiatric (DSM V) Diagnoses:     Tobacco use disorder, in full remission since 2018    Differentials: Unspecified anxiety disorder     Assessment  From a psychological perspective, patient appears to have remote history of tobacco use and some anxiety regarding day to day life. Patient appears to be functioning well, despite recent setback of readmission to the hospital post CAR-T procedure due to increased and persistent bowel movements. Appears to be experiencing a normative response to a longer stay in the hospital with consistent physical ailments than originally anticipated upon admission.     Recommendations:    Psychology continue to follow patient and provide support.     Interventions during this admission: Supportive listening, Assessment of current needs, Setting plan for value guided action    Brigid Henning M.A.   Psychology Trainee     Prior to discussion the writer informed the patient that they are under the supervision of clinical psychologist Dr. Miladis Zazueta. Patient expressed understanding and was agreeable to meet.

## 2025-02-06 NOTE — PROGRESS NOTES
Acyclovir with addition of HD steroids) & Bactrim ppx     - Cdiff 1/28/25: Negative   - GI pathogen panel 1/28/25: Negative      Hypogammaglobulinemia:  - IgG (11/27/24): 395 - s/p IVIG 12/4/24   - IgG (12/30/24): 269 - s/p IVIG 1/7/24  - IgG (1/21/25) 883  - Monitor monthly and replace if <400      CD4: monitor monthly starting at 6 months  12/2/24: 244      4.  Heme:   Mild leukopenia, anemia and thrombocytopenia  - Transfuse for Hgb < 7 and Platelets < 10K  - No transfusion today       5.  Metabolic:    Hypokalemia, HypoMg, hypophosphatemia  - Cont KCL 20 mEq PO daily - increase to 40 mEq PO BID  - Replace mag and K Per prn orders  - Monitor BMP BID  - Sodium phosphate 30 mmol given 1/29/25, 1/31/25  Worsening Metabolic Acidosis 2/2 Diarrhea  - D5 1/2NS w bicarb, K, Mag @100 ml/hr   - Nephrology consulted for assistance, appreciate recs  - Continue bicarbonate tablets 1300 mg TID  - IV bicarb, mag, and K in MIVF  - Replace GI losses per SSE protocol    6.  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 Negative for microscopic colitis, granulomas and dysplasia/malignancy.   - CT abd /pelvis 1/29/25:  Questionable wall thickening within the colon at the splenic flexure could represent peristalsis or  mass. Correlate with colonoscopy when able. Fluid extending into the distal rectum compatible with diarrhea.  Colonic diverticulosis     PLAN:  - Cont Solu-medrol 70 mg daily (started 1/29/25)  - Continue Imodium- schedule starting 2/5/25  - Infliximab 2/6/25     7.  Endo  - Type 2 DM:  well-controlled with diet  - Accu checks ACHS with addition of steroids  - Low dose SSI     8.  Cardiac:  stable  Hx of RBBB, CAD s/p CABG & PCI:  - 6/11/19:  PCI recanalization of occluded mid-left Cx (residual 70% stenosis)  - 6/13/22:  CABG - LIMA to LAD, reverse saphenous v. to OM1, reverse saphenous v. to OM2, reverse saphenous v. to PDA  - 10/25/22:  CARLO x 2 (proximal & ostial aspect of saphenous v. graft to OM3)  Hyperlipidemia:  - Hold rosuvastatin 40 mg qd during CAR-T  Hypertension:  - Cont Metoprolol 25 mg BID - on hold  - hold amlodipine and lisinopril since hypotensive since diarrhea      9.  MSK: Chronic back, hip, and right shoulder pain  - Tylenol PRN    - DVT Prophylaxis: Platelets >50,000 cells/dL, - daily lovenox prophylaxis ordered  Contraindications to pharmacologic prophylaxis: None  Contraindications to mechanical prophylaxis: None    - Disposition: When eating well and diarrhea improved.    The patient was seen and examined by Dr. Reynolds.    Jeana Todd, APRN - CNP   Spencer Reynolds MD  Hematology, Bone marrow transplant and Cellular therapy  [facility]

## 2025-02-06 NOTE — PROGRESS NOTES
Nephrology Progress Note                                                                                                                                                                                                                                                                                                                                                               Office : [phone]     Fax :[phone]    Patient's Name: Leslee Maldonado  2:20 PM  2/6/2025    Reason for Consult:  acidoses   Requesting Physician:  Raj Cedeño MD  Chief Complaint:  No chief complaint on file.      Assessment/Plan     Acidosis   Levels Better   From diarrhea   Continue oral (650 mg oral sodium bicarb bid)  Stopped IV bicarbonate containing IV fluids   Hypernatremia   Encourage free water   Colitis   Severe diarrhea   Steroids   IgG Lambda Multiple Myeloma  Hematology on board   Hypokalemia, HypoMg, hypophosphatemia  From GI losses   Continue to replace the GI losses  On scheduled potassium chloride       History of Present Ilness:    Leslee Maldonado is a 69 y.o. female with history of refractory IgG lambda multiple myeloma, coronary disease who recently underwent CAR-T cell therapy with Carvykti on October 28. She has been doing well except for diarrhea. For the past 2 months she is lost 20 pounds in overall since September she is lost 30 pounds. 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 She has been having diarrhea for the past 6 to 8 weeks. It has been progressively getting worse. She is not able to eat well due to the diarrhea. She denies any nausea or vomiting. She denies any blood in her stools. She had C. difficile checked on January 3 that was negative. She is being admitted to the hospital for diarrhea weight loss and GI workup.     Interval hx:    K low - receiving IV replacement   Bicarb in good range  Creatinine stable  Ongoing diarrhea  Good PO intake       Past Medical

## 2025-02-07 NOTE — PLAN OF CARE
Problem: Safety - Adult  Goal: Free from fall injury  Outcome: Progressing  Note:   - Screening for Orthostasis AND not a [place] Risk per AYERS/ABCDS: Pt bed is in low position, side rails up, call light and belongings are in reach.  Fall risk light is on outside pts room.  Pt encouraged to call for assistance as needed. Will continue with hourly rounds for PO intake, pain needs, toileting and repositioning as needed.       Problem: Metabolic/Fluid and Electrolytes - Adult  Goal: Electrolytes maintained within normal limits  Outcome: Progressing  Note: Pt needed phosphorus replacement this AM.     Problem: Hematologic - Adult  Goal: Maintains hematologic stability  Outcome: Progressing  Note: Patient's hemoglobin this AM:   Recent Labs     02/07/25  0410   HGB 10.4*     Patient's platelet count this AM:   Recent Labs     02/07/25  0410   PLT 97*    Thrombocytopenia not present at this time.  Patient showing no signs or symptoms of active bleeding.  Transfusion not indicated at this time.  Patient verbalizes understanding of all instructions. Will continue to assess and implement POC. Call light within reach and hourly rounding in place.

## 2025-02-07 NOTE — PROGRESS NOTES
[facility] Progress Note    2025     Leslee Maldonado    MRN: [id number]    : 1955    Referring MD: Dominique Lopez,   [address]  [address]  [place] [address]      Interval History:  - 3.35 L diarrhea in the past 24 hours  - Infliximab today and then weekly (4-8 total doses)  - Add budesonide and Octreotide today    Subjective: Still having 10-11 BM daily. Able to maintain good oral intake. No F/N/V or abdominal cramps.     ECOG PS:  (2) Ambulatory and capable of self care, unable to carry out work activity, up and about > 50% or waking hours    KPS: 70% Cares for self; unable to carry on normal activity or to do active work    Isolation: None    Medications    Scheduled Meds:   diphenoxylate-atropine  1 tablet Oral 4x Daily    inFLIXimab/infliximab biosimilar  10 mg/kg IntraVENous Once    loperamide  4 mg Oral 4x Daily    sodium bicarbonate  650 mg Oral BID    insulin lispro  0-4 Units SubCUTAneous 4x Daily AC & HS    potassium chloride  40 mEq Oral BID WC    enoxaparin  40 mg SubCUTAneous QPM    thiamine  100 mg Oral Daily    multivitamin  1 tablet Oral Daily    voriconazole  200 mg Oral 2 times per day    methylPREDNISolone  70 mg IntraVENous Daily    sodium chloride flush  5-40 mL IntraVENous 2 times per day    Saline Mouthwash  15 mL Swish & Spit 4x Daily AC & HS    acyclovir  400 mg Oral BID    [Held by provider] metoprolol tartrate  25 mg Oral BID    pantoprazole  40 mg Oral QAM AC    sulfamethoxazole-trimethoprim  1 tablet Oral Daily     Continuous Infusions:   dextrose      sodium chloride       PRN Meds:.glucose, dextrose bolus **OR** dextrose bolus, glucagon (rDNA), dextrose, sodium phosphate IVPB (CENTRAL line), sodium chloride flush, sodium chloride, potassium chloride, magnesium sulfate, acetaminophen **OR** acetaminophen, Saline Mouthwash, ALTEplase (CATHFLO) 2 mg in sterile water 2 mL injection    ROS:  As noted above, otherwise remainder of 10-point ROS negative    Physical Exam:      I&O:    Intake/Output Summary (Last 24 hours) at 2/7/2025 0852  Last data filed at 2/7/2025 0636  Gross per 24 hour   Intake 680 ml   Output 5000 ml   Net -4320 ml       Vital Signs:  BP (!) 151/78   Pulse 55   Temp 98.5 °F (36.9 °C) (Oral)   Resp 18   Ht 1.575 m (5' 2\")   Wt 79.2 kg (174 lb 9.6 oz)   SpO2 96%   BMI 31.93 kg/m²     Weight:    Wt Readings from Last 3 Encounters:   02/06/25 79.2 kg (174 lb 9.6 oz)   12/11/24 85.7 kg (189 lb)   11/29/24 86.3 kg (190 lb 4.1 oz)         General: Awake, alert and oriented.  HEENT: normocephalic, PERRL, no scleral erythema or icterus, Oral mucosa moist and intact, throat clear  NECK: supple  BACK: Straight   SKIN: warm dry and intact without lesions rashes or masses  CHEST: CTA bilaterally without use of accessory muscles  CV: Normal S1 S2, RRR, no MRG  ABD: NT ND normoactive BS  EXTREMITIES: trace alessandra LE edema, denies calf tenderness  NEURO: Grossly intact  CATHETER: R PAC    Data    CBC:   Recent Labs     02/05/25  0401 02/06/25  0355 02/07/25  0410   WBC 4.3 4.6 5.3   HGB 10.9* 10.6* 10.4*   HCT 33.8* 33.8* 33.0*   MCV 90.2 91.2 90.6   * 97* 97*     BMP/Mag:  Recent Labs     02/05/25  0401 02/05/25  1615 02/06/25  0355 02/06/25  1549 02/07/25  0410      < > 145 142 143   K 3.6   < > 3.4* 4.5 3.9   *   < > 117* 116* 115*   CO2 23   < > 22 20* 21   PHOS 2.9  --  2.8  --  2.2*   BUN <2*   < > 3* 3* 4*   CREATININE 0.8   < > 0.9 0.9 0.9   MG 2.35  --  2.18  --  2.07    < > = values in this interval not displayed.     LIVP:   Recent Labs     02/05/25  0401 02/07/25  0410   AST 29 28   ALT 27 27   BILIDIR <0.1 <0.1   BILITOT 0.3 <0.2   ALKPHOS 45 45     Coags:   Recent Labs     02/06/25  0355   PROTIME 16.4*   INR 1.31*   APTT 30.6       Uric Acid No results for input(s): \"LABURIC\" in the last 72 hours.    PROBLEM LIST:           IgG Lambda Multiple Myeloma  Hypertension  CAD s/p CABG x4 (6/2019), CARLO x2 (10/25/22)   Hyperlipidemia  RBBB  Type 2  Left arm;

## 2025-02-07 NOTE — PLAN OF CARE
Problem: Chronic Conditions and Co-morbidities  Goal: Patient's chronic conditions and co-morbidity symptoms are monitored and maintained or improved  Outcome: Progressing  ACHS blood sugar checks this shift     Problem: Safety - Adult  Goal: Free from fall injury  2/7/2025 1853 by Fartun Valenzuela, RN  Outcome: Progressing  Pt up ad sharee, ortho neg, call light within reach     Problem: ABCDS Injury Assessment  Goal: Absence of physical injury  Outcome: Progressing  Pt up ad sharee, ortho neg, call light within reach    Problem: Metabolic/Fluid and Electrolytes - Adult  Goal: Electrolytes maintained within normal limits  2/7/2025 1853 by Fartun Valenzuela, RN  Outcome: Progressing  Sodium Phos received this shift.    Problem: Hematologic - Adult  Goal: Maintains hematologic stability  2/7/2025 1853 by Fartun Valenzuela, RN  Outcome: Progressing  Patient's hemoglobin this AM:   Recent Labs     02/07/25  0410   HGB 10.4*     Patient's platelet count this AM:   Recent Labs     02/07/25  0410   PLT 97*    Thrombocytopenia Precautions in place.  Patient showing no signs or symptoms of active bleeding.  Transfusion not indicated at this time.  Patient verbalizes understanding of all instructions. Will continue to assess and implement POC. Call light within reach and hourly rounding in place.      Problem: Nutrition Deficit:  Goal: Optimize nutritional status  Outcome: Progressing  Pt up out of bed for all meals

## 2025-02-07 NOTE — PROGRESS NOTES
Nephrology Progress Note                                                                                                                                                                                                                                                                                                                                                               Office : [phone]     Fax :[phone]    Patient's Name: Leslee Maldonado  11:09 AM  2/7/2025    Reason for Consult:  acidoses   Requesting Physician:  Raj Cedeño MD  Chief Complaint:  No chief complaint on file.      Assessment/Plan     Acidosis   Levels Better   From diarrhea   Continue oral (650 mg oral sodium bicarb bid)  Stopped IV bicarbonate containing IV fluids   Hypernatremia   Encourage free water   Colitis   Severe diarrhea   Steroids   IgG Lambda Multiple Myeloma  Hematology on board   Hypokalemia, HypoMg, hypophosphatemia  From GI losses   Continue to replace the GI losses  On scheduled potassium chloride       History of Present Ilness:    Leslee Maldonado is a 69 y.o. female with history of refractory IgG lambda multiple myeloma, coronary disease who recently underwent CAR-T cell therapy with Carvykti on October 28. She has been doing well except for diarrhea. For the past 2 months she is lost 20 pounds in overall since September she is lost 30 pounds. 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 She has been having diarrhea for the past 6 to 8 weeks. It has been progressively getting worse. She is not able to eat well due to the diarrhea. She denies any nausea or vomiting. She denies any blood in her stools. She had C. difficile checked on January 3 that was negative. She is being admitted to the hospital for diarrhea weight loss and GI workup.     Interval hx:    Bicarb in good range  Creatinine stable  Ongoing diarrhea  Good PO intake       Past Medical History:   Diagnosis Date     BID  insulin lispro (HUMALOG,ADMELOG) injection vial 0-4 Units, 4x Daily AC & HS  glucose chewable tablet 16 g, PRN  dextrose bolus 10% 125 mL, PRN   Or  dextrose bolus 10% 250 mL, PRN  glucagon injection 1 mg, PRN  dextrose 10 % infusion, Continuous PRN  sodium phosphate 30 mmol in sodium chloride 0.9 % 250 mL IVPB for central line, PRN  enoxaparin (LOVENOX) injection 40 mg, QPM  thiamine mononitrate tablet 100 mg, Daily  multivitamin 1 tablet, Daily  voriconazole (VFEND) tablet 200 mg, 2 times per day  methylPREDNISolone sodium succ (SOLU-MEDROL) 70 mg in sterile water 1.12 mL injection, Daily  sodium chloride flush 0.9 % injection 5-40 mL, 2 times per day  sodium chloride flush 0.9 % injection 5-40 mL, PRN  0.9 % sodium chloride infusion, PRN  potassium chloride 20 mEq/50 mL IVPB (Central Line), PRN  magnesium sulfate 4000 mg in 100 mL IVPB premix, PRN  acetaminophen (TYLENOL) tablet 650 mg, Q6H PRN   Or  acetaminophen (TYLENOL) suppository 650 mg, Q6H PRN  Saline Mouthwash 15 mL, 4x Daily AC & HS  Saline Mouthwash 15 mL, Q4H PRN  ALTEplase (CATHFLO) 2 mg in sterile water 2 mL injection, PRN  acyclovir (ZOVIRAX) tablet 400 mg, BID  [Held by provider] metoprolol tartrate (LOPRESSOR) tablet 25 mg, BID  pantoprazole (PROTONIX) tablet 40 mg, QAM AC  sulfamethoxazole-trimethoprim (BACTRIM;SEPTRA) 400-80 MG per tablet 1 tablet, Daily      Physical exam:     Vitals:  /86   Pulse 72   Temp 98 °F (36.7 °C) (Oral)   Resp 18   Ht 1.575 m (5' 2\")   Wt 77.1 kg (170 lb)   SpO2 95%   BMI 31.09 kg/m²   Constitutional:  OAA X3 NAD Yes  Skin: no rash, turgor wnl  Heent:  eomi, mmm  Neck: no bruits or jvd noted  Cardiovascular:  S1, S2 without m/r/g  Respiratory: CTA B without w/r/r  Abdomen:  , soft, nt, nd  Ext:  lower extremity edema No  Psychiatric: mood and affect stable   Musculoskeletal:  Rom, muscular strength intact    Data:   Labs:  CBC:   Recent Labs     02/05/25  0401 02/06/25  0355 02/07/25  0410   WBC 4.3

## 2025-02-07 NOTE — CARE COORDINATION
Met with patient during team rounding today.  Patient is still having large volume of stool.  New medications added today.  Reviewed the start of infliximab with patient.  This will be a weekly infusion to help with diarrhea for at least 4 weeks.     Patient maybe discharged next week if volume of stool is controlled and improved. Patient observed walking several laps on the unit, she is eating and drinking.  Provider encrouaged her to be sure to remain hydrated to include at least 2 bottles of Gatorade.

## 2025-02-07 NOTE — PSYCHOTHERAPY
Behavioral Health Note    Met with patient briefly to follow-up after she was seen by supervisee on 2/6/2025. Patient states that she is feeling hopeless with regards to diarrhea improving, but is trying to continue to pray and remain optimistic. Discussed potential regrets with receiving CART. Provided support.    Miladis Zazueta Psy.D., MSCP  Clinical Psychologist

## 2025-02-07 NOTE — CARE COORDINATION
Pt observed ambulating in the halls and expressed no SW needs at this time.     Pt plans on returning home with family with no anticipated SW needs.     SW will follow    Katie PEREZ, MELISSA   for [facility] ([facility])  [place] [facility]: [phone]

## 2025-02-08 NOTE — PLAN OF CARE
Problem: Safety - Adult  Goal: Free from fall injury  2/8/2025 1809 by Jacqueyln Almonte, RN  Outcome: Progressing  Orthostatic vital signs obtained at start of shift - see flowsheet for details.  Pt does not meet criteria for orthostasis.  Pt is a Med fall risk. See Har[place] Fall Score and ABCDS Injury Risk assessments. Pt bed is in low position, side rails up, call light and belongings are in reach.  Fall risk light is on outside pts room.  Pt encouraged to call for assistance as needed. Will continue with hourly rounds for PO intake, pain needs, toileting and repositioning as needed.       Problem: ABCDS Injury Assessment  Goal: Absence of physical injury  Outcome: Progressing  No new physical injuries noted.      Problem: Hematologic - Adult  Goal: Maintains hematologic stability  2/8/2025 1809 by Jacquelyn Almonte, RN  Outcome: Progressing  Patient's hemoglobin this AM:   Recent Labs     02/08/25  0345   HGB 10.3*     Patient's platelet count this AM:   Recent Labs     02/08/25  0345   PLT 94*    Thrombocytopenia Precautions in place.  Patient showing no signs or symptoms of active bleeding.  Transfusion not indicated at this time.  Patient verbalizes understanding of all instructions. Will continue to assess and implement POC. Call light within reach and hourly rounding in place.       Problem: Nutrition Deficit:  Goal: Optimize nutritional status  Outcome: Progressing  Pt educated on importance of staying hydrated and having as much intake as possible. Verbalized understanding. Will continue to monitor.

## 2025-02-08 NOTE — PLAN OF CARE
Problem: Safety - Adult  Goal: Free from fall injury  2/8/2025 0507 by Zaid Burns RN  Outcome: Progressing  Note:   - Screening for Orthostasis AND not a [place] Risk per AYERS/ABCDS: Pt bed is in low position, side rails up, call light and belongings are in reach.  Fall risk light is on outside pts room.  Pt encouraged to call for assistance as needed. Will continue with hourly rounds for PO intake, pain needs, toileting and repositioning as needed.       Problem: Metabolic/Fluid and Electrolytes - Adult  Goal: Electrolytes maintained within normal limits  2/8/2025 0507 by Zaid Burns RN  Outcome: Progressing  Note: No electrolytes replacement needed this AM.     Problem: Hematologic - Adult  Goal: Maintains hematologic stability  2/8/2025 0507 by Zaid Burns RN  Outcome: Progressing  Note: Patient's hemoglobin this AM:   Recent Labs     02/08/25  0345   HGB 10.3*     Patient's platelet count this AM:   Recent Labs     02/08/25  0345   PLT 94*    Thrombocytopenia not present at this time.  Patient showing no signs or symptoms of active bleeding.  Transfusion not indicated at this time.  Patient verbalizes understanding of all instructions. Will continue to assess and implement POC. Call light within reach and hourly rounding in place.

## 2025-02-08 NOTE — PROGRESS NOTES
Nephrology Progress Note                                                                                                                                                                                                                                                                                                                                                               Office : [phone]     Fax :[phone]    Patient's Name: Leslee Maldonado  9:58 AM  2/8/2025    Reason for Consult:  acidoses   Requesting Physician:  Raj Cedeño MD  Chief Complaint:  No chief complaint on file.      Assessment/Plan     Acidosis   Levels Better   From diarrhea   Continue oral (650 mg oral sodium bicarb bid)  Stopped IV bicarbonate containing IV fluids   Hypernatremia   Encourage free water   Colitis   Severe diarrhea   Steroids   IgG Lambda Multiple Myeloma  Hematology on board   Hypokalemia, HypoMg, hypophosphatemia  From GI losses   Continue to replace the GI losses  On scheduled potassium chloride       History of Present Ilness:    Leslee Maldonado is a 69 y.o. female with history of refractory IgG lambda multiple myeloma, coronary disease who recently underwent CAR-T cell therapy with Carvykti on October 28. She has been doing well except for diarrhea. For the past 2 months she is lost 20 pounds in overall since September she is lost 30 pounds. 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 She has been having diarrhea for the past 6 to 8 weeks. It has been progressively getting worse. She is not able to eat well due to the diarrhea. She denies any nausea or vomiting. She denies any blood in her stools. She had C. difficile checked on January 3 that was negative. She is being admitted to the hospital for diarrhea weight loss and GI workup.     Interval hx:    Labs stable  BP's controlled  Good UOP  Appetite is good      Past Medical History:   Diagnosis Date    Arthritis Shoulder    CAD  02/08/25  0345    143 143   K 3.9 4.1 3.7   * 114* 112*   CO2 21 19* 22   BUN 4* 4* 4*   CREATININE 0.9 1.1 1.0   GLUCOSE 117* 220* 191*     Ca/Mg/Phos:   Recent Labs     02/06/25  0355 02/06/25  1549 02/07/25  0410 02/07/25  1608 02/08/25  0345   CALCIUM 7.9*   < > 7.9* 7.5* 7.2*   MG 2.18  --  2.07  --  1.94   PHOS 2.8  --  2.2*  --  4.5    < > = values in this interval not displayed.     Hepatic:   Recent Labs     02/07/25  0410   AST 28   ALT 27   BILITOT <0.2   ALKPHOS 45     Troponin: No results for input(s): \"TROPONINI\" in the last 72 hours.  BNP: No results for input(s): \"BNP\" in the last 72 hours.  Lipids: No results for input(s): \"CHOL\", \"TRIG\", \"HDL\" in the last 72 hours.    Invalid input(s): \"LDLCALC\", \"LABVLDL\"  ABGs: No results for input(s): \"PHART\", \"PO2ART\", \"ORM8VYA\" in the last 72 hours.  INR:   Recent Labs     02/06/25  0355   INR 1.31*       UA:  No results for input(s): \"COLORU\", \"CLARITYU\", \"GLUCOSEU\", \"BILIRUBINUR\", \"KETUA\", \"SPECGRAV\", \"BLOODU\", \"PHUR\", \"PROTEINU\", \"UROBILINOGEN\", \"NITRU\", \"LEUKOCYTESUR\", \"URINETYPE\" in the last 72 hours.    Invalid input(s): \"LABMICR\"     Urine Microscopic: No results for input(s): \"LABCAST\", \"BACTERIA\", \"COMU\", \"HYALCAST\", \"WBCUA\", \"RBCUA\" in the last 72 hours.    Invalid input(s): \"EPIU\"  Urine Culture: No results for input(s): \"LABURIN\" in the last 72 hours.  Urine Chemistry: No results for input(s): \"CLUR\", \"LABCREA\", \"PROTEINUR\", \"NAUR\" in the last 72 hours.      IMAGING:  XR CHEST PORTABLE   Final Result      1. No evidence for acute cardiopulmonary disease         Electronically signed by Keon Puri MD      CT ABDOMEN PELVIS W IV CONTRAST Additional Contrast? None   Final Result      1. Questionable wall thickening within the colon at the splenic flexure could represent peristalsis or mass. Correlate with colonoscopy when able. Fluid extending into the distal rectum compatible with diarrhea.   2. No evidence of bowel obstruction   3.

## 2025-02-08 NOTE — PROGRESS NOTES
Physician Progress Note      PATIENT:               RHYS VILLARREAL  CSN #:                  [id number]  :                       1955  ADMIT DATE:       2025 2:57 PM  DISCH DATE:  RESPONDING  PROVIDER #:        Dominique Lopez DO          QUERY TEXT:    Patient admitted with diarrhea, noted to have WBC 3.5, RBC 3.62, PLT 97, HGB   10.6 on . If possible, please document in progress notes and discharge   summary if you are evaluating and/or treating any of the following:    The medical record reflects the following:  Risk Factors: multiple myeloma, Carvykti Infusion (10/28/24), recently   underwent CAR-T cell therapy  Clinical Indicators: - WBC 3.5, RBC 3.62, PLT 97, HGB 10.6 on   PN -  \"  Heme:  Mild leukopenia, anemia and thrombocytopenia  - Transfuse for Hgb < 7 and Platelets < 10K  - No transfusion today\"  Treatment: Serial CBCs, supportive care    Thank you,  Jacquelyn Quinn RN, BSN, ALEXANDER, CCDS  Options provided:  -- Antineoplastic (chemotherapy) induced pancytopenia  -- Pancytopenia due to myeloma  -- Other - I will add my own diagnosis  -- Disagree - Not applicable / Not valid  -- Disagree - Clinically unable to determine / Unknown  -- Refer to Clinical Documentation Reviewer    PROVIDER RESPONSE TEXT:    Patient has antineoplastic (chemotherapy) induced pancytopenia.    Query created by: Jacquelyn Quinn on 2025 8:36 AM      Electronically signed by:  Dominique Lopez DO 2025 8:41 AM

## 2025-02-08 NOTE — PROGRESS NOTES
[facility] Progress Note    2025     Leslee Maldonado    MRN: [id number]    : 1955    Referring MD: Dominique Lopez,   [address]  [address]  [place] [address]      Interval History:  - 4.28 L diarrhea in the past 24 hours  - Infliximab today and then weekly (4-8 total doses)  - Add budesonide and Octreotide today    Subjective: Still having 10-11 BM daily. Able to maintain good oral intake. No F/N/V or abdominal cramps.     ECOG PS:  (2) Ambulatory and capable of self care, unable to carry out work activity, up and about > 50% or waking hours    KPS: 70% Cares for self; unable to carry on normal activity or to do active work    Isolation: None    Medications    Scheduled Meds:   budesonide  3 mg Oral TID    octreotide  100 mcg SubCUTAneous Q8H    potassium bicarb-citric acid  40 mEq Oral BID    diphenoxylate-atropine  1 tablet Oral 4x Daily    loperamide  4 mg Oral 4x Daily    sodium bicarbonate  650 mg Oral BID    insulin lispro  0-4 Units SubCUTAneous 4x Daily AC & HS    enoxaparin  40 mg SubCUTAneous QPM    thiamine  100 mg Oral Daily    multivitamin  1 tablet Oral Daily    voriconazole  200 mg Oral 2 times per day    methylPREDNISolone  70 mg IntraVENous Daily    sodium chloride flush  5-40 mL IntraVENous 2 times per day    Saline Mouthwash  15 mL Swish & Spit 4x Daily AC & HS    acyclovir  400 mg Oral BID    [Held by provider] metoprolol tartrate  25 mg Oral BID    pantoprazole  40 mg Oral QAM AC    sulfamethoxazole-trimethoprim  1 tablet Oral Daily     Continuous Infusions:   dextrose      sodium chloride       PRN Meds:.glucose, dextrose bolus **OR** dextrose bolus, glucagon (rDNA), dextrose, sodium phosphate IVPB (CENTRAL line), sodium chloride flush, sodium chloride, potassium chloride, magnesium sulfate, acetaminophen **OR** acetaminophen, Saline Mouthwash, ALTEplase (CATHFLO) 2 mg in sterile water 2 mL injection    ROS:  As noted above, otherwise remainder of 10-point ROS  cryptitis. Negative for microscopic colitis, granulomas and dysplasia/malignancy. CMV negative on biopsy.  - CT abd /pelvis 1/29/25:  Questionable wall thickening within the colon at the splenic flexure could represent peristalsis or mass. Correlate with colonoscopy when able. Fluid extending into the distal rectum compatible with diarrhea.  Colonic diverticulosis     PLAN:  - Cont Solu-medrol 70 mg daily Day 10(started 1/29/25)- continue for 14 days then start taper  - Continue Imodium- schedule starting 2/5/25  - Infliximab 2/7/25:  then weekly (4-8 total doses)  - Add budesonide 3 mg TID 2/7/25   - Add octreotide 100 mg q8h 2/7/25     7.  Endo  - Type 2 DM:  well-controlled with diet  - Accu checks ACHS with addition of steroids  - Low dose SSI     8.  Cardiac:  stable  Hx of RBBB, CAD s/p CABG & PCI:  - 6/11/19:  PCI recanalization of occluded mid-left Cx (residual 70% stenosis)  - 6/13/22:  CABG - LIMA to LAD, reverse saphenous v. to OM1, reverse saphenous v. to OM2, reverse saphenous v. to PDA  - 10/25/22:  CARLO x 2 (proximal & ostial aspect of saphenous v. graft to OM3)  Hyperlipidemia:  - Hold rosuvastatin 40 mg qd during CAR-T  Hypertension:  - Cont Metoprolol 25 mg BID - on hold  - hold amlodipine and lisinopril since hypotensive since diarrhea      9.  MSK: Chronic back, hip, and right shoulder pain  - Tylenol PRN    - DVT Prophylaxis: Platelets >50,000 cells/dL, - daily lovenox prophylaxis ordered  Contraindications to pharmacologic prophylaxis: None  Contraindications to mechanical prophylaxis: None    - Disposition: When eating well and diarrhea improved.    The patient was seen and examined by Dr. Lopez.    Jeana Todd, APRN - CNP

## 2025-02-08 NOTE — PROGRESS NOTES
Comprehensive Nutrition Assessment    RECOMMENDATIONS:  PO Diet: Continue GVHD2  Nutrition Supplement: Continue Ensure +HP bid  Nutrition Education: Education/Counseling initiated (diarrhea)     NUTRITION ASSESSMENT:   Nutritional summary & status: Follow up. Pt continues with diarrhea, 4.2L stool output yesterday. Pt to begin Infliximab, budesonide, and octreotide per MD. Pt with good po intake despite diarrhea. Continues to select appropriately from GVHD 2 menu. Stated that she hasn't been drinking Ensures on tray due to uncertainity if allowed on current diet. Assured pt that they are appropriate on current diet and encouraged to drink for increased nutrition. Pt voiced understanding. Monitor ability to advance diet.   Admission // PMH: Diarrhea // MM s/p CAR-T 10/2024, ASCT 2/20219    MALNUTRITION ASSESSMENT  Context of Malnutrition: Acute Illness (on chronic)   Malnutrition Status: Severe malnutrition  Findings of the 6 clinical characteristics of malnutrition (Minimum of 2 out of 6 clinical characteristics is required to make the diagnosis of moderate or severe Protein Calorie Malnutrition based on AND/ASPEN Guidelines):  Energy Intake:  50% or less of estimated energy requirements for 5 or more days  Weight Loss:  (S) Greater than 7.5% over 3 months (25#/12% in 2 months)     Body Fat Loss:  No body fat loss     Muscle Mass Loss:  No muscle mass loss      NUTRITION DIAGNOSIS   Severe malnutrition, in context of acute illness or injury related to altered GI function as evidenced by criteria as identified in malnutrition assessment    Nutrition Monitoring and Evaluation:   Food/Nutrient Intake Outcomes:  Diet Advancement/Tolerance, Food and Nutrient Intake, Supplement Intake  Physical Signs/Symptoms Outcomes:  Biochemical Data, Nutrition Focused Physical Findings, Skin, Weight, Diarrhea, GI Status     OBJECTIVE DATA: Significant to nutrition assessment  Nutrition Related Findings: Glu 124. BLE +1 nonpitting

## 2025-02-09 PROBLEM — E87.20 ACIDOSIS: Status: ACTIVE | Noted: 2025-01-01

## 2025-02-09 NOTE — PROGRESS NOTES
Diagnosis Date    Arthritis Shoulder    CAD (coronary artery disease)     Cancer (HCC)     Multiple myeloma    Diabetes mellitus (HCC)     Hyperlipidemia     Hypertension     MI (myocardial infarction) (HCC)        Past Surgical History:   Procedure Laterality Date    CARDIAC SURGERY  Quadruple by pass    COLONOSCOPY      COLONOSCOPY N/A 1/29/2025    COLONOSCOPY BIOPSY performed by Trip Cope MD at [facility]    CORONARY ARTERY BYPASS GRAFT N/A 06/13/2019    CORONARY ARTERY BYPASS GRAFT X4, INTERNAL MAMMARY ARTERY AND SAPHENOUS VEIN GRAFT-TCP-BP performed by Maykel Sousa MD at [facility]    HAND SURGERY  September 2018    HYSTERECTOMY (CERVIX STATUS UNKNOWN)      partial for fibroids    INSERTION / REMOVAL / REPLACEMENT VENOUS ACCESS CATHETER N/A 01/24/2019    TRIFUSION CATHETER INSERTION LEFT SUBCLAVIAN performed by Munir Hess MD at [facility]    IR NONTUNNELED VASCULAR CATHETER > 5 YEARS  9/5/2024    IR NONTUNNELED VASCULAR CATHETER 9/5/2024 [facility] CATH/IR/NEURO LAB    TUBAL LIGATION         Family History   Problem Relation Age of Onset    Diabetes Sister     Breast Cancer Neg Hx     Ovarian Cancer Neg Hx         reports that she quit smoking about 26 years ago. Her smoking use included cigarettes. She has never used smokeless tobacco. She reports that she does not currently use alcohol after a past usage of about 2.0 standard drinks of alcohol per week. She reports that she does not use drugs.    Allergies:  Adhesive tape, Pcn [penicillins], and Lipitor [atorvastatin]    Current Medications:    0.9 % sodium chloride infusion, PRN  [START ON 2/10/2025] predniSONE (DELTASONE) tablet 70 mg, Daily  budesonide (ENTOCORT EC) delayed release capsule 3 mg, TID  octreotide (SANDOSTATIN) injection 100 mcg, Q8H  potassium bicarb-citric acid (EFFER-K) effervescent tablet 40 mEq, BID  diphenoxylate-atropine (LOMOTIL) 2.5-0.025 MG per tablet 1 tablet, 4x Daily  loperamide (IMODIUM) capsule 4 mg, 4x  rectum compatible with diarrhea.   2. No evidence of bowel obstruction   3. Colonic diverticulosis.      Electronically signed by Raj Cordova      XR CHEST PORTABLE    (Results Pending)       Medical Decision Making:  The following items were considered in medical decision making:  Discussion of patient care with other providers  Reviewed clinical lab tests  Reviewed radiology tests  Reviewed other diagnostic tests/interventions    Jayshree Church PA-C   [facility]  Office : [phone] or through [employer]  Fax :[phone]    I have seen the patient independently from the PA/NP .I discussed the care with the PA/NP . I personally reviewed the HPI, PH, FH, SH, ROS and medications. I repeated pertinent portions of the examination and reviewed the relevant imaging and laboratory data. I agree with the findings, assessment and plan as documented, with the following addendum:        Duran Marsh MD

## 2025-02-09 NOTE — CONSENT
Informed Consent for Blood Component Transfusion Note    I have discussed with the patient the rationale for blood component transfusion; its benefits in treating or preventing fatigue, organ damage, or death; and its risk which includes mild transfusion reactions, rare risk of blood borne infection, or more serious but rare reactions. I have discussed the alternatives to transfusion, including the risk and consequences of not receiving transfusion. The patient had an opportunity to ask questions and had agreed to proceed with transfusion of blood components.    Electronically signed by Dominique Lopez DO on 2/9/25 at 10:07 AM EST

## 2025-02-09 NOTE — PROGRESS NOTES
[facility] Progress Note    2025     Leslee Maldonado    MRN: [id number]    : 1955    Referring MD: Dominique Lopez,   [address]  [address]  [place] [address]      Interval History:  -  2.5 L diarrhea in the past 24 hours compared to 4.8 L the previous 24 hours  -   Subjective: she is doing ok, fibrinogen low, received cryo, no bleeding    ECOG PS:  (2) Ambulatory and capable of self care, unable to carry out work activity, up and about > 50% or waking hours    KPS: 70% Cares for self; unable to carry on normal activity or to do active work    Isolation: None    Medications    Scheduled Meds:   budesonide  3 mg Oral TID    octreotide  100 mcg SubCUTAneous Q8H    potassium bicarb-citric acid  40 mEq Oral BID    diphenoxylate-atropine  1 tablet Oral 4x Daily    loperamide  4 mg Oral 4x Daily    sodium bicarbonate  650 mg Oral BID    insulin lispro  0-4 Units SubCUTAneous 4x Daily AC & HS    enoxaparin  40 mg SubCUTAneous QPM    thiamine  100 mg Oral Daily    multivitamin  1 tablet Oral Daily    voriconazole  200 mg Oral 2 times per day    methylPREDNISolone  70 mg IntraVENous Daily    sodium chloride flush  5-40 mL IntraVENous 2 times per day    Saline Mouthwash  15 mL Swish & Spit 4x Daily AC & HS    acyclovir  400 mg Oral BID    [Held by provider] metoprolol tartrate  25 mg Oral BID    pantoprazole  40 mg Oral QAM AC    sulfamethoxazole-trimethoprim  1 tablet Oral Daily     Continuous Infusions:   dextrose      sodium chloride       PRN Meds:.glucose, dextrose bolus **OR** dextrose bolus, glucagon (rDNA), dextrose, sodium phosphate IVPB (CENTRAL line), sodium chloride flush, sodium chloride, potassium chloride, magnesium sulfate, acetaminophen **OR** acetaminophen, Saline Mouthwash, ALTEplase (CATHFLO) 2 mg in sterile water 2 mL injection    ROS:  As noted above, otherwise remainder of 10-point ROS negative    Physical Exam:     I&O:    Intake/Output Summary (Last 24 hours) at 2025 0932  Last

## 2025-02-09 NOTE — PLAN OF CARE
Problem: Safety - Adult  Goal: Free from fall injury  2/9/2025 1632 by Bernadette Garcia RN  Outcome: Progressing     Problem: Metabolic/Fluid and Electrolytes - Adult  Goal: Electrolytes maintained within normal limits  2/9/2025 1632 by Bernadette Garcia RN  Outcome: Progressing     Problem: Hematologic - Adult  Goal: Maintains hematologic stability  2/9/2025 1632 by Bernadette Garcia RN  Outcome: Progressing     Problem: Nutrition Deficit:  Goal: Optimize nutritional status  2/9/2025 1632 by Bernadette Garcia RN  Outcome: Progressing

## 2025-02-09 NOTE — PLAN OF CARE
Problem: Chronic Conditions and Co-morbidities  Goal: Patient's chronic conditions and co-morbidity symptoms are monitored and maintained or improved  Outcome: Progressing     Problem: Safety - Adult  Goal: Free from fall injury  2/9/2025 0612 by Sasha Hunter RN  Outcome: Progressing  Note: Patient remained free of falls during shift. Patient is a Harley Fall Risk: Medium (25-44); uses call light appropriately, ambulates with a steady gait and no assistive devices. Orthostatic blood pressures assessed and patient remains negative. Will continue to encourage ambulation and implement POC. Call light within reach and hourly rounding in place.      Problem: ABCDS Injury Assessment  Goal: Absence of physical injury  2/9/2025 0612 by Sasha Hunter RN  Outcome: Progressing     Problem: Metabolic/Fluid and Electrolytes - Adult  Goal: Electrolytes maintained within normal limits  Outcome: Progressing  Note: Electrolytes WNL this am.     Problem: Hematologic - Adult  Goal: Maintains hematologic stability  2/9/2025 0612 by Sasha Hunter RN  Outcome: Progressing  Note: Patient's hemoglobin this AM:   Recent Labs     02/09/25  0321   HGB 10.3*     Patient's platelet count this AM:   Recent Labs     02/09/25  0321   PLT 84*    Thrombocytopenia Precautions in place.  Patient showing no signs or symptoms of active bleeding.  Transfusion not indicated at this time.  Patient verbalizes understanding of all instructions. Will continue to assess and implement POC. Call light within reach and hourly rounding in place.      Problem: Nutrition Deficit:  Goal: Optimize nutritional status  2/9/2025 0612 by Sasha Hunter RN  Outcome: Progressing  Note: Pt with adequate nutritional intake this shift. See doc flowsheets. Will continue to monitor and encourage PO consumption.

## 2025-02-10 NOTE — CARE COORDINATION
Discussed plan of care with treatment team.     Pt will return home when able with no anticipated SW needs.     SW will follow    MELISSA Lee   for [facility] ([facility])  [employer] Mobile: [phone]

## 2025-02-10 NOTE — PROGRESS NOTES
[facility] Progress Note    2/10/2025     Leslee Maldonado    MRN: [id number]    : 1955    Referring MD: Dominique Lopez,   [address]  [address]  [place] [address]      Interval History:  -  Diarrhea worse overnight 3.3 liters, continue to monitor for next 24 hours and consider starting Jakafi if no improvement.       ECOG PS:  (2) Ambulatory and capable of self care, unable to carry out work activity, up and about > 50% or waking hours    KPS: 70% Cares for self; unable to carry on normal activity or to do active work    Isolation: None    Medications    Scheduled Meds:   predniSONE  70 mg Oral Daily    budesonide  3 mg Oral TID    octreotide  100 mcg SubCUTAneous Q8H    potassium bicarb-citric acid  40 mEq Oral BID    diphenoxylate-atropine  1 tablet Oral 4x Daily    loperamide  4 mg Oral 4x Daily    sodium bicarbonate  650 mg Oral BID    insulin lispro  0-4 Units SubCUTAneous 4x Daily AC & HS    enoxaparin  40 mg SubCUTAneous QPM    thiamine  100 mg Oral Daily    multivitamin  1 tablet Oral Daily    voriconazole  200 mg Oral 2 times per day    sodium chloride flush  5-40 mL IntraVENous 2 times per day    Saline Mouthwash  15 mL Swish & Spit 4x Daily AC & HS    acyclovir  400 mg Oral BID    [Held by provider] metoprolol tartrate  25 mg Oral BID    pantoprazole  40 mg Oral QAM AC    sulfamethoxazole-trimethoprim  1 tablet Oral Daily     Continuous Infusions:   sodium chloride      dextrose      sodium chloride       PRN Meds:.sodium chloride, glucose, dextrose bolus **OR** dextrose bolus, glucagon (rDNA), dextrose, sodium phosphate IVPB (CENTRAL line), sodium chloride flush, sodium chloride, potassium chloride, magnesium sulfate, acetaminophen **OR** acetaminophen, Saline Mouthwash, ALTEplase (CATHFLO) 2 mg in sterile water 2 mL injection    ROS:  As noted above, otherwise remainder of 10-point ROS negative    Physical Exam:     I&O:    Intake/Output Summary (Last 24 hours) at 2/10/2025 1006  Last data  Multiple Myeloma:  8/29 - 11/2/18VRd x 4 cycles  2/7/19ASCT s/p high-dose Melpahlan (200 mg/m2)  5/11/19 - 6/2019Revlimid 10 mg qd maintenance with ASA qd    - Held in early 6/2019 d/t MI   7/11/19 - 3/31/20Velcade maintenance    - Stopped d/t progression  5/5/20  C1D1 of Roxi/Carfilzomib/Dex x 4 cycles   8/26/20Dara maintenance  11/4/20 - 2/23/22Dara q4w x 19 cycles    - Stopped d/t progression  3/11/22C1D1 Leia/Pom/Dex x 8 cycles  10/28/22 - 6/26/24Pom maintenance    - Stopped d/t progression  9/18/24Dara/Kyprolis/Dex as a bridge to Carvykti  10/23 - 10/25/24Lymphodepletion - fludarabine & cyclophosphamide  10/28/24          Carvykti (ID #:  [id number])          ASSESSMENT AND PLAN:           1.  IgG Lambda Multiple Myeloma: Multiply relapsed  - initial dx (7/16/18):  M-spike 2.47, IgG 3560, Hgb 13.2, creat 0.54, Ca 9.2  - PET (8/1/24):  negative  - BMBx (8/2/18):  60% cellular, 60% plasma cells, FISH hyperdiploidy 5, 9, 15 and Del 13q, del 17, IgH heavy chain rearranged and dup 1q. Cyto 46XX  - BMBx (10/8/24): 30% cellular bone marrow with trilineage hematopoiesis, including increased erythroid precursors and also megakaryocytes. Morphologically atypical plasma cells seen on the smear showing large size and prominent nucleoli. These account for about 6% of cells  PLAN: Carvykti Infusion (10/28/24)     Day + 105  - MMP (12/2/24): M spike 0.2, LLC <1.5  - MMP (12/30/24): M spike 0.1, LLC <1.5     2. CRS / Neuro: Very low risk  CRS Grade: None  - 11/2-11/4/24:  CRS grade 1 - fever only  Left facial nerve weakness (11/18/24):   - Head CT (11/19/24): Neg  - Viral studies 11/19/24: CMV: Not Detected,  HHV6, EBV,  Parvo Negative  - LP (11/20/24): Meningitis panel negative, Crypto Ag negative  - MRI w/ orbits (11/20/24): no acute intracranial abnormality,  - S/p Prednisone taper: 40 mg daily (11/20/24), 30 mg PO daily (11/27/24), 20 mg PO daily (11/30/24), 10 mg (12/7/24), 5 mg (12/17/24) & stopped 12/23/24.   - S/p

## 2025-02-10 NOTE — PLAN OF CARE
Problem: Chronic Conditions and Co-morbidities  Goal: Patient's chronic conditions and co-morbidity symptoms are monitored and maintained or improved  2/9/2025 1632 by Bernadette Garcia RN  Outcome: Progressing     Problem: Safety - Adult  Goal: Free from fall injury  2/10/2025 0618 by Zaid Burns RN  Outcome: Progressing  Note:   - Screening for Orthostasis AND not a [place] Risk per AYERS/ABCDS: Pt bed is in low position, side rails up, call light and belongings are in reach.  Fall risk light is on outside pts room.  Pt encouraged to call for assistance as needed. Will continue with hourly rounds for PO intake, pain needs, toileting and repositioning as needed.       Problem: Metabolic/Fluid and Electrolytes - Adult  Goal: Electrolytes maintained within normal limits  2/10/2025 0618 by Zaid Burns RN  Outcome: Progressing  Note: Pt needed phosphorus replacement this AM.     Problem: Hematologic - Adult  Goal: Maintains hematologic stability  2/10/2025 0618 by Zaid Burns RN  Outcome: Progressing  Note: Patient's hemoglobin this AM:   Recent Labs     02/10/25  0445   HGB 10.0*     Patient's platelet count this AM:   Recent Labs     02/10/25  0445   *    Thrombocytopenia not present at this time.  Patient showing no signs or symptoms of active bleeding.  Transfusion not indicated at this time.  Patient verbalizes understanding of all instructions. Will continue to assess and implement POC. Call light within reach and hourly rounding in place.

## 2025-02-10 NOTE — PLAN OF CARE
Problem: Safety - Adult  Goal: Free from fall injury  2/10/2025 1850 by Hannah Saenz RN  Outcome: Progressing    Pt with activity orders for up ad sharee.  Encouraged pt to be up OOB as much as possible throughout the day and for all meals.  Encouraged frequent short naps as necessary to preserve energy but instructed that while awake, pt should be OOB.    Encouraged pt to ambulate in halls.  Pt seen up ad sharee in halls once this shift. Pt is visualized to be OOB % of the time this shift.  Will continue to encourage frequent activity.      Problem: Metabolic/Fluid and Electrolytes - Adult  Goal: Electrolytes maintained within normal limits  2/10/2025 1850 by Hannah Saenz RN  Outcome: Progressing   Pt needed phosphorus replacement today.     Problem: Hematologic - Adult  Goal: Maintains hematologic stability  2/10/2025 1850 by Hannah Saenz RN  Outcome: Progressing  Patient's hemoglobin this AM:   Recent Labs     02/10/25  0445   HGB 10.0*     Patient's platelet count this AM:   Recent Labs     02/10/25  0445   *    Thrombocytopenia Precautions in place.  Patient showing no signs or symptoms of active bleeding.  Transfusion not indicated at this time.  Patient verbalizes understanding of all instructions. Will continue to assess and implement POC. Call light within reach and hourly rounding in place.      Problem: ABCDS Injury Assessment  Goal: Absence of physical injury  2/10/2025 1850 by Hannah Saenz, RN  Outcome: Progressing   CVC site remains free of signs/symptoms of infection. No drainage, edema, erythema, pain, or warmth noted at site. Dressing changes continue per protocol and on an as needed basis - see flowsheet.     Compliant with BCC Bath Protocol:  Performed CHG bath today per BCC protocol utilizing CHG solution in the shower.  CVC site cleansed with CHG wipe over dressing, skin surrounding dressing, and first 6\" of IV tubing.  Pt tolerated well.  Continued to encourage daily CHG  bathing per [facility] protocol.    Problem: Chronic Conditions and Co-morbidities  Goal: Patient's chronic conditions and co-morbidity symptoms are monitored and maintained or improved  2/10/2025 1850 by Hannah Saenz RN  Outcome: Progressing   Patient continues with diarrhea today. Pt on lomotil imodium and Sandostatin

## 2025-02-10 NOTE — PLAN OF CARE
Problem: Chronic Conditions and Co-morbidities  Goal: Patient's chronic conditions and co-morbidity symptoms are monitored and maintained or improved  Outcome: Progressing     Problem: Safety - Adult  Goal: Free from fall injury  2/10/2025 1637 by Martha Warren RN  Outcome: Progressing  2/10/2025 0618 by Zaid Burns RN  Outcome: Progressing  Note:   - Screening for Orthostasis AND not a [place] Risk per AYERS/ABCDS: Pt bed is in low position, side rails up, call light and belongings are in reach.  Fall risk light is on outside pts room.  Pt encouraged to call for assistance as needed. Will continue with hourly rounds for PO intake, pain needs, toileting and repositioning as needed.       Problem: ABCDS Injury Assessment  Goal: Absence of physical injury  Outcome: Progressing     Problem: Metabolic/Fluid and Electrolytes - Adult  Goal: Electrolytes maintained within normal limits  2/10/2025 1637 by Martha Warren RN  Outcome: Progressing  2/10/2025 0618 by Zaid Burns RN  Outcome: Progressing  Note: Pt needed phosphorus replacement this AM.     Problem: Hematologic - Adult  Goal: Maintains hematologic stability  2/10/2025 1637 by Martha Warren RN  Outcome: Progressing  2/10/2025 0618 by Zaid Burns RN  Outcome: Progressing  Note: Patient's hemoglobin this AM:   Recent Labs     02/10/25  0445   HGB 10.0*     Patient's platelet count this AM:   Recent Labs     02/10/25  0445   *    Thrombocytopenia not present at this time.  Patient showing no signs or symptoms of active bleeding.  Transfusion not indicated at this time.  Patient verbalizes understanding of all instructions. Will continue to assess and implement POC. Call light within reach and hourly rounding in place.       Problem: Nutrition Deficit:  Goal: Optimize nutritional status  Outcome: Progressing

## 2025-02-10 NOTE — PROGRESS NOTES
represent peristalsis or mass. Correlate with colonoscopy when able. Fluid extending into the distal rectum compatible with diarrhea.   2. No evidence of bowel obstruction   3. Colonic diverticulosis.      Electronically signed by Raj Cordova          Medical Decision Making:  The following items were considered in medical decision making:  Discussion of patient care with other providers  Reviewed clinical lab tests  Reviewed radiology tests  Reviewed other diagnostic tests/interventions    Duran Marsh MD   [facility]  Office : [phone] or through [employer]  Fax :[phone]

## 2025-02-11 NOTE — PROGRESS NOTES
[facility] Progress Note    2025     Leslee Maldonado    MRN: [id number]    : 1955    Referring MD: Dominique Lopez DO  [address]  [address]  [place] [address]      Interval History:  - 3.2 L diarrhea overnight  - Weight trending down       ECOG PS:  (2) Ambulatory and capable of self care, unable to carry out work activity, up and about > 50% or waking hours    KPS: 70% Cares for self; unable to carry on normal activity or to do active work    Isolation: None    Medications    Scheduled Meds:   sodium bicarbonate  325 mg Oral BID    predniSONE  70 mg Oral Daily    budesonide  3 mg Oral TID    octreotide  100 mcg SubCUTAneous Q8H    potassium bicarb-citric acid  40 mEq Oral BID    diphenoxylate-atropine  1 tablet Oral 4x Daily    loperamide  4 mg Oral 4x Daily    insulin lispro  0-4 Units SubCUTAneous 4x Daily AC & HS    enoxaparin  40 mg SubCUTAneous QPM    thiamine  100 mg Oral Daily    multivitamin  1 tablet Oral Daily    voriconazole  200 mg Oral 2 times per day    sodium chloride flush  5-40 mL IntraVENous 2 times per day    Saline Mouthwash  15 mL Swish & Spit 4x Daily AC & HS    acyclovir  400 mg Oral BID    [Held by provider] metoprolol tartrate  25 mg Oral BID    pantoprazole  40 mg Oral QAM AC    sulfamethoxazole-trimethoprim  1 tablet Oral Daily     Continuous Infusions:   sodium chloride      dextrose      sodium chloride       PRN Meds:.sodium chloride, glucose, dextrose bolus **OR** dextrose bolus, glucagon (rDNA), dextrose, sodium phosphate IVPB (CENTRAL line), sodium chloride flush, sodium chloride, potassium chloride, magnesium sulfate, acetaminophen **OR** acetaminophen, Saline Mouthwash, ALTEplase (CATHFLO) 2 mg in sterile water 2 mL injection    ROS:  As noted above, otherwise remainder of 10-point ROS negative    Physical Exam:     I&O:    Intake/Output Summary (Last 24 hours) at 2025 0807  Last data filed at 2025 0656  Gross per 24 hour   Intake 360 ml   Output 3950 ml  flexure could represent peristalsis or mass. Correlate with colonoscopy when able. Fluid extending into the distal rectum compatible with diarrhea.  Colonic diverticulosis     PLAN:  Steroid Taper:       - Decrease by 20% every 7 days:  - Solu-medrol 70 mg daily 1/29/25-2/8/25  - Prednisone 70 mg daily started 2/10/25-2/16/25  - Prednisone 55 mg daily 2/17/25- 2/23/25  - Prednisone 45 mg daily 2/24/25-3/2/25  - Continue Imodium- schedule starting 2/5/25  - Infliximab 2/7/25:  then weekly (4-8 total doses)  - Add budesonide 3 mg TID 2/7/25   - Add octreotide 100 mg q8h 2/7/25     7.  Endo  - Type 2 DM:  well-controlled with diet  - Accu checks ACHS with addition of steroids  - Low dose SSI     8.  Cardiac:  stable  Hx of RBBB, CAD s/p CABG & PCI:  - 6/11/19:  PCI recanalization of occluded mid-left Cx (residual 70% stenosis)  - 6/13/22:  CABG - LIMA to LAD, reverse saphenous v. to OM1, reverse saphenous v. to OM2, reverse saphenous v. to PDA  - 10/25/22:  CARLO x 2 (proximal & ostial aspect of saphenous v. graft to OM3)  Hyperlipidemia:  - Hold rosuvastatin 40 mg qd during CAR-T  Hypertension:  - Cont Metoprolol 25 mg BID - on hold  - hold amlodipine and lisinopril since hypotensive since diarrhea      9.  MSK: Chronic back, hip, and right shoulder pain  - Tylenol PRN    - DVT Prophylaxis: Platelets >50,000 cells/dL, - daily lovenox prophylaxis ordered  Contraindications to pharmacologic prophylaxis: None  Contraindications to mechanical prophylaxis: None    - Disposition: When diarrhea improved.    The patient was seen and examined by Dr. Reynolds.    Jeana Todd, APRN - CNP   Spencer Reynolds MD  Hematology, Bone marrow transplant and Cellular therapy  [facility]

## 2025-02-11 NOTE — PLAN OF CARE
Problem: Safety - Adult  Goal: Free from fall injury  2/11/2025 0800 by Phoenix Lacey, RN  Outcome: Progressing  Note: Orthostatic vital signs obtained at start of shift - see flowsheet for details.  Pt does not meet criteria for orthostasis.  Pt is a Med fall risk. See [place] Fall Score and ABCDS Injury Risk assessments.   - Screening for Orthostasis AND not a [place] Risk per AYERS/ABCDS: Pt bed is in low position, side rails up, call light and belongings are in reach.  Fall risk light is on outside pts room.  Pt encouraged to call for assistance as needed. Will continue with hourly rounds for PO intake, pain needs, toileting and repositioning as needed.        Problem: Metabolic/Fluid and Electrolytes - Adult  Goal: Electrolytes maintained within normal limits  2/11/2025 0800 by Phoenix Lacey, RN  Outcome: Progressing  Note: Potassium this AM was 3.4.  Patient placed on telemetry and potassium Chloride is infusing at this time.      Problem: Hematologic - Adult  Goal: Maintains hematologic stability  2/11/2025 0800 by Phoenix Lacey, RN  Note: Patient's hemoglobin this AM:   Recent Labs     02/11/25  0330   HGB 9.9*     Patient's platelet count this AM:   Recent Labs     02/11/25  0330   PLT 82*    Thrombocytopenia Precautions in place.  Patient showing no signs or symptoms of active bleeding.  Transfusion not indicated at this time.  Patient verbalizes understanding of all instructions. Will continue to assess and implement POC. Call light within reach and hourly rounding in place.

## 2025-02-11 NOTE — PROGRESS NOTES
K 4.0 3.7 3.4*    107 108   CO2 25 24 28   BUN 5* 6* 5*   CREATININE 0.8 0.9 0.8   GLUCOSE 168* 189* 177*     Ca/Mg/Phos:   Recent Labs     02/09/25  0321 02/09/25  1625 02/10/25  0445 02/10/25  1515 02/11/25  0330   CALCIUM 7.0*   < > 7.4* 7.0* 7.1*   MG 1.92  --  1.92  --  1.81   PHOS 3.2  --  2.0*  --  3.2    < > = values in this interval not displayed.     Hepatic:   Recent Labs     02/10/25  0445   AST 32   ALT 33   BILITOT 0.4   ALKPHOS 44     Troponin: No results for input(s): \"TROPONINI\" in the last 72 hours.  BNP: No results for input(s): \"BNP\" in the last 72 hours.  Lipids: No results for input(s): \"CHOL\", \"TRIG\", \"HDL\" in the last 72 hours.    Invalid input(s): \"LDLCALC\", \"LABVLDL\"  ABGs: No results for input(s): \"PHART\", \"PO2ART\", \"ODV6RDZ\" in the last 72 hours.  INR:   Recent Labs     02/10/25  0445   INR 1.39*         UA:  Recent Labs     02/10/25  1755   COLORU Yellow   CLARITYU Clear   GLUCOSEU Negative   BILIRUBINUR SMALL*   KETUA Negative   BLOODU TRACE-INTACT*   PHUR 6.0   PROTEINU 100*   UROBILINOGEN 0.2   NITRU Negative   LEUKOCYTESUR Negative   URINETYPE NotGiven        Urine Microscopic:   Recent Labs     02/10/25  1755   BACTERIA 3+*   HYALCAST 6-10*   WBCUA 3-5   RBCUA 3-4     Urine Culture: No results for input(s): \"LABURIN\" in the last 72 hours.  Urine Chemistry: No results for input(s): \"CLUR\", \"LABCREA\", \"PROTEINUR\", \"NAUR\" in the last 72 hours.      IMAGING:  XR CHEST PORTABLE   Final Result   1. Stable exam with no acute abnormality.      Electronically signed by Simon Gamble      XR CHEST PORTABLE   Final Result      1. No evidence for acute cardiopulmonary disease         Electronically signed by Keon Puri MD      CT ABDOMEN PELVIS W IV CONTRAST Additional Contrast? None   Final Result      1. Questionable wall thickening within the colon at the splenic flexure could represent peristalsis or mass. Correlate with colonoscopy when able. Fluid extending into the distal  rectum compatible with diarrhea.   2. No evidence of bowel obstruction   3. Colonic diverticulosis.      Electronically signed by Raj Cordova          Medical Decision Making:  The following items were considered in medical decision making:  Discussion of patient care with other providers  Reviewed clinical lab tests  Reviewed radiology tests  Reviewed other diagnostic tests/interventions    RIO McclendonC   [facility]  Office : [phone] or through [employer]  Fax :[phone]      I have seen the patient independently from the PA/NP .I discussed the care with the PA/NP . I personally reviewed the HPI, PH, FH, SH, ROS and medications. I repeated pertinent portions of the examination and reviewed the relevant imaging and laboratory data. I agree with the findings, assessment and plan as documented, with the following addendum:        Duran Marsh MD

## 2025-02-11 NOTE — PLAN OF CARE
Problem: Safety - Adult  Goal: Free from fall injury  2/10/2025 1850 by Hannah Saenz RN  Outcome: Progressing    Pt with activity orders for up ad sharee.  Encouraged pt to be up OOB as much as possible throughout the day and for all meals.  Encouraged frequent short naps as necessary to preserve energy but instructed that while awake, pt should be OOB.    Encouraged pt to ambulate in halls.  Pt seen up ad sharee in halls once this shift. Pt is visualized to be OOB % of the time this shift.  Will continue to encourage frequent activity.      Problem: Metabolic/Fluid and Electrolytes - Adult  Goal: Electrolytes maintained within normal limits  2/10/2025 1850 by Hannah Saenz RN  Outcome: Progressing   Pt needed phosphorus replacement today.     Problem: Hematologic - Adult  Goal: Maintains hematologic stability  2/10/2025 1850 by Hannah Saenz RN  Outcome: Progressing  Patient's hemoglobin this AM:   Recent Labs     02/11/25  0330   HGB 9.9*     Patient's platelet count this AM:   Recent Labs     02/11/25  0330   PLT 82*    Thrombocytopenia Precautions in place.  Patient showing no signs or symptoms of active bleeding.  Transfusion not indicated at this time.  Patient verbalizes understanding of all instructions. Will continue to assess and implement POC. Call light within reach and hourly rounding in place.      Problem: ABCDS Injury Assessment  Goal: Absence of physical injury  2/10/2025 1850 by Hannah Saenz, RN  Outcome: Progressing   CVC site remains free of signs/symptoms of infection. No drainage, edema, erythema, pain, or warmth noted at site. Dressing changes continue per protocol and on an as needed basis - see flowsheet.     Compliant with BCC Bath Protocol:  Performed CHG bath today per BCC protocol utilizing CHG solution in the shower.  CVC site cleansed with CHG wipe over dressing, skin surrounding dressing, and first 6\" of IV tubing.  Pt tolerated well.  Continued to encourage daily CHG  bathing per [facility] protocol.    Problem: Chronic Conditions and Co-morbidities  Goal: Patient's chronic conditions and co-morbidity symptoms are monitored and maintained or improved  2/10/2025 1850 by Hannah Saenz RN  Outcome: Progressing   Patient continues with diarrhea today. Pt on lomotil imodium and Sandostatin

## 2025-02-11 NOTE — PSYCHOTHERAPY
Psychology Follow-up Consultation    Patient Name: Leslee Maldonado  MRN: [id number]  Admission Date: 1/28/2025  Today's date: 2/11/2025    Reason for Consult: follow-up for second readmission to hospital post CAR-T and depressed mood per treatment team.       HPI:   Leslee Maldonado is a 69 y.o.   female with multiple myeloma and PMHx of GERD, type 2 DM, CAD S/P CABG & stent placement, hypertension, and hyperlipidemia, who initially presented on 11/2/2024 for a fever post CAR-T infusion (Day 0: 10/28/2024). Currently is struggling with persistent diarrhea.     Pre-CAR-T Psychological Evaluation recommendations:  Complete advanced directive.     Recommendations to be added to H&P:  Primary caregiver: Alessia Maldonado (daughter), Yolisandrew Lincoln (sister), Jessica John (daughter)  Back-up caregivers: Alessia Maldonado (daughter), Yolis Salazar (sister), Jessica John (daughter)  Lives: [place], [place]   DSM-5 Diagnoses:   Tobacco use disorder, in full remission since 2018  R/O: Unspecified anxiety disorder      Interval History:    Patient reports doing okay despite continuous hospital stay and bowel movements. She reports ambiguity regarding her decision to pursue CAR-T and states she will know the answer to this question once discharged and returned to her life. She states she continues to engage with her values (e.g., lindsay, exercise, and speaking with family). She also reports relief regarding the news from the treatment team this morning. She elaborated that they are attempting to solve this problem and hope she will return home by next week.     Current psychiatric treatment: None.   Other relevant medications: None.     History obtained 9/06/2024 during pre-CAR-T psychological evaluation:    PSYCHIATRIC HISTORY:  Current mental health treatment: Denies  Other relevant medications: None.      Past mental health treatment: Denies  Family psychiatric history: Yes; Paternal grandfather and father both had     Ventricular Rate 52    Atrial Rate 52    P-R Interval 150    QRS Duration 98    Q-T Interval 458    QTc Calculation (Bazett) 425    P Axis 58    R Axis -21    T Axis 45    Diagnosis      Sinus bradycardiaIncomplete right bundle branch blockPossible Lateral infarct , age undeterminedAbnormal ECGConfirmed by ROSCOE HOPE MD ([id number]) on 1/29/2025 8:33:33 AM       Dx:   Psychiatric (DSM V) Diagnoses:   Tobacco use disorder, in full remission since 2018    Differentials: Unspecified anxiety disorder     Assessment  From a psychological perspective, patient appears to have remote history of tobacco use and some anxiety regarding day to day life. Patient appears to be functioning well, despite recent setback of readmission to the hospital post CAR-T procedure due to increased and persistent bowel movements. Appears to be experiencing a normative response to a longer stay in the hospital with consistent physical ailments than originally anticipated upon admission.     Recommendations:    Psychology to follow-up as needed    Interventions during this admission: Supportive listening, Assessment of current needs, Behavioral activation    Brigid Henning M.A.   Psychology Trainee     Prior to discussion the writer informed the patient that they are under the supervision of clinical psychologist Dr. Miladis Zazueta. Patient expressed understanding and was agreeable to meet.

## 2025-02-12 NOTE — PROGRESS NOTES
[facility] Progress Note    2025     Leslee Maldonado    MRN: [id number]    : 1955    Referring MD: Dominique Lopez DO  [address]  [address]  [place] [address]      Interval History:  - 4.8 L diarrhea overnight  - Weight trending down   -Start IVF  -Change to a clear liquid diet   -Send stool for viral studies   -Repeat c.diff       ECOG PS:  (2) Ambulatory and capable of self care, unable to carry out work activity, up and about > 50% or waking hours    KPS: 70% Cares for self; unable to carry on normal activity or to do active work    Isolation: None    Medications    Scheduled Meds:   sodium bicarbonate  325 mg Oral Daily    [START ON 2025] predniSONE  55 mg Oral Daily    [START ON 2025] predniSONE  45 mg Oral Daily    predniSONE  70 mg Oral Daily    budesonide  3 mg Oral TID    octreotide  100 mcg SubCUTAneous Q8H    potassium bicarb-citric acid  40 mEq Oral BID    diphenoxylate-atropine  1 tablet Oral 4x Daily    loperamide  4 mg Oral 4x Daily    insulin lispro  0-4 Units SubCUTAneous 4x Daily AC & HS    enoxaparin  40 mg SubCUTAneous QPM    thiamine  100 mg Oral Daily    multivitamin  1 tablet Oral Daily    voriconazole  200 mg Oral 2 times per day    sodium chloride flush  5-40 mL IntraVENous 2 times per day    Saline Mouthwash  15 mL Swish & Spit 4x Daily AC & HS    acyclovir  400 mg Oral BID    [Held by provider] metoprolol tartrate  25 mg Oral BID    pantoprazole  40 mg Oral QAM AC    sulfamethoxazole-trimethoprim  1 tablet Oral Daily     Continuous Infusions:   sodium chloride      dextrose      sodium chloride       PRN Meds:.sodium chloride, glucose, dextrose bolus **OR** dextrose bolus, glucagon (rDNA), dextrose, sodium phosphate IVPB (CENTRAL line), sodium chloride flush, sodium chloride, potassium chloride, magnesium sulfate, acetaminophen **OR** [DISCONTINUED] acetaminophen, Saline Mouthwash, ALTEplase (CATHFLO) 2 mg in sterile water 2 mL injection    ROS:  As noted above,

## 2025-02-12 NOTE — PROGRESS NOTES
Nephrology Progress Note                                                                                                                                                                                                                                                                                                                                                               Office : [phone]     Fax :[phone]    Patient's Name: Leslee Maldonado  10:15 AM  2/12/2025    Reason for Consult:  acidoses   Requesting Physician:  Raj Cedeño MD  Chief Complaint:  No chief complaint on file.      Assessment/Plan     Acidosis   Levels better   From diarrhea   Continue oral (325 mg oral sodium bicarb daily)  Stopped IV bicarbonate containing IV fluids   Hypernatremia   Encourage free water   Colitis   Severe diarrhea   Steroids   IgG Lambda Multiple Myeloma  Hematology on board   Hypokalemia, HypoMg, hypophosphatemia  From GI losses   Continue to replace the GI losses  On scheduled potassium chloride       History of Present Ilness:    Leslee Maldonado is a 69 y.o. female with history of refractory IgG lambda multiple myeloma, coronary disease who recently underwent CAR-T cell therapy with Carvykti on October 28. She has been doing well except for diarrhea. For the past 2 months she is lost 20 pounds in overall since September she is lost 30 pounds. 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 She has been having diarrhea for the past 6 to 8 weeks. It has been progressively getting worse. She is not able to eat well due to the diarrhea. She denies any nausea or vomiting. She denies any blood in her stools. She had C. difficile checked on January 3 that was negative. She is being admitted to the hospital for diarrhea weight loss and GI workup.     Interval hx:    Labs stable  Diarrhea worse  Now on IVF  BP's controlled       Past Medical History:   Diagnosis Date    Arthritis Shoulder  Result      1. Questionable wall thickening within the colon at the splenic flexure could represent peristalsis or mass. Correlate with colonoscopy when able. Fluid extending into the distal rectum compatible with diarrhea.   2. No evidence of bowel obstruction   3. Colonic diverticulosis.      Electronically signed by Raj Cordova          Medical Decision Making:  The following items were considered in medical decision making:  Discussion of patient care with other providers  Reviewed clinical lab tests  Reviewed radiology tests  Reviewed other diagnostic tests/interventions    Jayshree Church PA-C   [facility]  Office : [phone] or through [employer]  Fax :[phone]    I have seen the patient independently from the PA/NP .I discussed the care with the PA/NP . I personally reviewed the HPI, PH, FH, SH, ROS and medications. I repeated pertinent portions of the examination and reviewed the relevant imaging and laboratory data. I agree with the findings, assessment and plan as documented, with the following addendum:        Duran Marsh MD

## 2025-02-12 NOTE — PROGRESS NOTES
Routine VS obtained. Scheduled entocort administered, see MAR. Blood sugar checked via port line. BMP lab drawn via port, flushed port with 10 mL normal saline. Lab sent to laboratory via tube station. Patient denies further needs at this time, call light within reach, bed wheels locked and bed in low position.

## 2025-02-12 NOTE — CARE COORDINATION
Discussed plan of care with treatment team. No SW needs identified.     Pt will return home with family when able with no anticipated SW needs.     Spoke with Apolonia CASTILLO who will order PT/OT due to length of stay.     SW will follow    MELISSA Lee   for [facility] ([facility])  [employer] Mobile: [phone]

## 2025-02-12 NOTE — PROGRESS NOTES
Comprehensive Nutrition Assessment    RECOMMENDATIONS:  PO Diet: Downgraded to clears today, Recommend NPO with bowel rest with continued high stool output, consideration of TPN after five days of NPO  Nutrition Education: Education/Counseling initiated (diarrhea)     NUTRITION ASSESSMENT:   Nutritional summary & status: Follow up. Patient has been tolerating a GVHD2 diet well with good po intake of meals and supplements however MD downgraded pt to clears today. Pt continues with high stool output, noted 4.8L out ~24 hrs, lomotil, imodium, budesimide and octreotide ordered and IVF started. MD sending stool for viral studies and repeat c-diff. Noted patient has had greater than 2L of stool output ~13 days, RD will recommend complete bowel rest at this time.     Admission // PMH: Diarrhea // MM s/p CAR-T 10/2024, ASCT 2/20219    MALNUTRITION ASSESSMENT  Context of Malnutrition: Acute Illness (on chronic)   Malnutrition Status: Severe malnutrition  Findings of the 6 clinical characteristics of malnutrition (Minimum of 2 out of 6 clinical characteristics is required to make the diagnosis of moderate or severe Protein Calorie Malnutrition based on AND/ASPEN Guidelines):  Energy Intake:  50% or less of estimated energy requirements for 5 or more days  Weight Loss:  (S) Greater than 7.5% over 3 months (25#/12% in 2 months)     Body Fat Loss:  No body fat loss     Muscle Mass Loss:  No muscle mass loss      NUTRITION DIAGNOSIS   Severe malnutrition, in context of acute illness or injury related to altered GI function as evidenced by criteria as identified in malnutrition assessment    Nutrition Monitoring and Evaluation:   Food/Nutrient Intake Outcomes:  Diet Advancement/Tolerance, Food and Nutrient Intake, Supplement Intake  Physical Signs/Symptoms Outcomes:  Biochemical Data, Nutrition Focused Physical Findings, Skin, Weight, Diarrhea, GI Status     OBJECTIVE DATA: Significant to nutrition assessment  Nutrition Related  Findings: , Na+ 144, Phos 2.4- repleting, 4.8L stool output ~24 hrs  Wounds: None  Nutrition Goals: by next RD assessment, Meet at least 75% of estimated needs     CURRENT NUTRITION THERAPIES  ADULT ORAL NUTRITION SUPPLEMENT; Lunch, Dinner; Standard High Calorie/High Protein Oral Supplement  ADULT DIET; Clear Liquid  PO Intake: 51-75%, %   PO Supplement Intake:26-50%  Additional Sources of Calories/IVF:NS @125     COMPARATIVE STANDARDS  Energy (kcal):  3727-3227 (18-20 kcal/kg CBW)     Protein (g):  65-75 (1.3-1.5 kcal/kg IBW)       Fluid (ml/day):  1 ml/kcal    ANTHROPOMETRICS  Current Height: 157.5 cm (5' 2\")  Current Weight - Scale: 74.8 kg (164 lb 12.8 oz)    Admission weight: 76.3 kg (168 lb 3.2 oz)    The patient will be monitored per nutrition standards of care. Consult dietitian if additional nutrition interventions are needed prior to RD reassessment.     Kathleen Cm RD  [place]:  [phone]

## 2025-02-12 NOTE — PLAN OF CARE
Problem: Safety - Adult  Goal: Free from fall injury  Outcome: Progressing  Note: Orthostatic vital signs obtained at start of shift - see flowsheet for details.  Pt does not meet criteria for orthostasis.  Pt is a Med fall risk. See Ayers Fall Score and ABCDS Injury Risk assessments.   - Screening for Orthostasis AND not a Protection Risk per AYERS/ABCDS: Pt bed is in low position, side rails up, call light and belongings are in reach.  Fall risk light is on outside pts room.  Pt encouraged to call for assistance as needed. Will continue with hourly rounds for PO intake, pain needs, toileting and repositioning as needed.        Problem: Metabolic/Fluid and Electrolytes - Adult  Goal: Electrolytes maintained within normal limits  Outcome: Progressing  Note: Electrolytes within normal limits this shift.  No replacements needed.       Problem: Nutrition Deficit:  Goal: Optimize nutritional status  Outcome: Progressing  Note: Patient with more stool output this last 24 hours.  Patient asked if the Ensure could have caused it.  She was informed that the GVHD diet is a guide and to be used like trial and error.  If you add a new food/supplement and stool output increases the next day cut that item out and see how it goes.  Patient stated understanding and was going to not drink Ensure today and see how things go.

## 2025-02-12 NOTE — PROGRESS NOTES
Called daughter back, Alessia, and updated her on patient status and plan of care. All questions answered. Denies further needs at this time.

## 2025-02-13 NOTE — PLAN OF CARE
Problem: Metabolic/Fluid and Electrolytes - Adult  Goal: Electrolytes maintained within normal limits  2/13/2025 1210 by Fatuma Reyes, RN  Outcome: Progressing  Flowsheets (Taken 2/13/2025 1210)  Electrolytes maintained within normal limits:   Monitor labs and assess patient for signs and symptoms of electrolyte imbalances   Administer electrolyte replacement as ordered   Monitor response to electrolyte replacements, including repeat lab results as appropriate  Note: Potassium replaced this am.      Problem: Hematologic - Adult  Goal: Maintains hematologic stability  2/13/2025 1210 by Fatuma Reyes, RN  Outcome: Progressing  Flowsheets (Taken 2/13/2025 1210)  Maintains hematologic stability:   Assess for signs and symptoms of bleeding or hemorrhage   Administer blood products/factors as ordered   Monitor labs for bleeding or clotting disorders  Note: Patient's hemoglobin this AM:   Recent Labs     02/13/25  0441   HGB 9.5*     Patient's platelet count this AM:   Recent Labs     02/13/25  0441   PLT 79*    Thrombocytopenia Precautions in place.  Patient showing no signs or symptoms of active bleeding.  Transfusion not indicated at this time.  Patient verbalizes understanding of all instructions. Will continue to assess and implement POC. Call light within reach and hourly rounding in place.        Problem: Nutrition Deficit:  Goal: Optimize nutritional status  2/13/2025 1210 by Fatuma Reyes, RN  Outcome: Progressing  Flowsheets (Taken 2/13/2025 1210)  Nutrient intake appropriate for improving, restoring, or maintaining nutritional needs:   Assess nutritional status and recommend course of action   Monitor oral intake, labs, and treatment plans   Recommend appropriate diets, oral nutritional supplements, and vitamin/mineral supplements     Problem: Gastrointestinal - Adult  Goal: Maintains or returns to baseline bowel function  2/13/2025 1210 by Fatuma Reyes, RN  Outcome: Progressing  Flowsheets (Taken

## 2025-02-13 NOTE — CARE COORDINATION
Discussed plan of care with treatment team this AM in rounds. Aware PT/OT is pending.     SW will follow    MELISSA Lee   for [facility] ([facility])  [email or web address] Mobile: [phone]

## 2025-02-13 NOTE — PLAN OF CARE
Problem: Safety - Adult  Goal: Free from fall injury  Outcome: Progressing  Flowsheets (Taken 2/13/2025 0310)  Free From Fall Injury: Instruct family/caregiver on patient safety     Problem: ABCDS Injury Assessment  Goal: Absence of physical injury  Outcome: Progressing  Flowsheets (Taken 2/13/2025 0310)  Absence of Physical Injury: Implement safety measures based on patient assessment     Problem: Neurosensory - Adult  Goal: Achieves maximal functionality and self care  Outcome: Progressing     Problem: Skin/Tissue Integrity - Adult  Goal: Skin integrity remains intact  Outcome: Progressing

## 2025-02-13 NOTE — PROGRESS NOTES
yesterday biopsies: Random colon, forceps biopsy: Multiple fragments of benign colonic mucosa with increased edema and chronic inflammation of the lamina propria with focal area of acute   colitis with acute cryptitis. Negative for microscopic colitis, granulomas and dysplasia/malignancy. CMV negative on biopsy.  - CT abd /pelvis 1/29/25:  Questionable wall thickening within the colon at the splenic flexure could represent peristalsis or mass. Correlate with colonoscopy when able. Fluid extending into the distal rectum compatible with diarrhea.  Colonic diverticulosis   -Send stool for viral studies: Pending   -Repeat c.diff: Negative    PLAN:  Steroid Taper:       - Decrease by 20% every 7 days:  - Solu-medrol 70 mg daily 1/29/25-2/8/25  - Prednisone 70 mg daily started 2/10/25-2/16/25  - Prednisone 55 mg daily 2/17/25- 2/23/25  - Prednisone 45 mg daily 2/24/25-3/2/25  - Continue Imodium- schedule starting 2/5/25  - Infliximab 2/7/25:  then weekly (4-8 total doses)  - Add budesonide 3 mg TID 2/7/25   - Add octreotide 100 mg q8h 2/7/25     7.  Endo  - Type 2 DM:  well-controlled with diet  - Accu checks ACHS with addition of steroids  - Low dose SSI     8.  Cardiac:  stable  Hx of RBBB, CAD s/p CABG & PCI:  - 6/11/19:  PCI recanalization of occluded mid-left Cx (residual 70% stenosis)  - 6/13/22:  CABG - LIMA to LAD, reverse saphenous v. to OM1, reverse saphenous v. to OM2, reverse saphenous v. to PDA  - 10/25/22:  CARLO x 2 (proximal & ostial aspect of saphenous v. graft to OM3)  Hyperlipidemia:  - Hold rosuvastatin 40 mg qd during CAR-T  Hypertension:  - Cont Metoprolol 25 mg BID - on hold  - hold amlodipine and lisinopril since hypotensive since diarrhea      9.  MSK: Chronic back, hip, and right shoulder pain  - Tylenol PRN    - DVT Prophylaxis: Platelets >50,000 cells/dL, - daily lovenox prophylaxis ordered  Contraindications to pharmacologic prophylaxis: None  Contraindications to mechanical prophylaxis:  None    - Disposition: When diarrhea improved.    The patient was seen and examined by Dr. Robert Todd, APRN - CNP     Iván Jones MD  [facility]  [phone]

## 2025-02-13 NOTE — PROGRESS NOTES
Normal Limits  Orientation Level: Oriented X4           Education Given To: Patient  Education Provided: Role of Therapy;Plan of Care;ADL Adaptive Strategies;Transfer Training;Energy Conservation;Equipment  Education Method: Verbal  Barriers to Learning: None  Education Outcome: Verbalized understanding     AM-PAC - ADL  AM-PAC Daily Activity - Inpatient   How much help is needed for putting on and taking off regular lower body clothing?: None  How much help is needed for bathing (which includes washing, rinsing, drying)?: None  How much help is needed for toileting (which includes using toilet, bedpan, or urinal)?: None  How much help is needed for putting on and taking off regular upper body clothing?: None  How much help is needed for taking care of personal grooming?: None  How much help for eating meals?: None  AM-PAC Inpatient Daily Activity Raw Score: 24  AM-PAC Inpatient ADL T-Scale Score : 57.54  ADL Inpatient CMS 0-100% Score: 0  ADL Inpatient CMS G-Code Modifier : CH    Goals  Short Term Goals  Time Frame for Short Term Goals: no OT goals identified      Therapy Time   Individual Concurrent Group Co-treatment   Time In 1012         Time Out 1040         Minutes 28         Timed Code Treatment Minutes: 18 Minutes (+10 min eval)  Total Treatment Minutes: 28 Minutes       Gomez Thorpe OT

## 2025-02-13 NOTE — PROGRESS NOTES
Nephrology Progress Note                                                                                                                                                                                                                                                                                                                                                               Office : [phone]     Fax :[phone]    Patient's Name: Leslee Maldonado  9:44 AM  2/13/2025    Reason for Consult:  acidoses   Requesting Physician:  Raj Cedeño MD  Chief Complaint:  No chief complaint on file.      Assessment/Plan     Acidosis - Resolved   Levels better   From diarrhea   Stopped IV bicarbonate containing IV fluids & PO supplement   Hypernatremia   Encourage free water   Colitis   Severe diarrhea   Steroids   IgG Lambda Multiple Myeloma  Hematology on board   Hypokalemia, HypoMg, hypophosphatemia  From GI losses   Continue to replace the GI losses  On scheduled potassium chloride       History of Present Ilness:    Leslee Maldonado is a 69 y.o. female with history of refractory IgG lambda multiple myeloma, coronary disease who recently underwent CAR-T cell therapy with Carvykti on October 28. She has been doing well except for diarrhea. For the past 2 months she is lost 20 pounds in overall since September she is lost 30 pounds. 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 She has been having diarrhea for the past 6 to 8 weeks. It has been progressively getting worse. She is not able to eat well due to the diarrhea. She denies any nausea or vomiting. She denies any blood in her stools. She had C. difficile checked on January 3 that was negative. She is being admitted to the hospital for diarrhea weight loss and GI workup.     Interval hx:    On CLD  K low requiring replacement  Diarrhea improved  Off sodium bicarb   BP's controlled      Past Medical History:   Diagnosis Date    Arthritis

## 2025-02-13 NOTE — PROGRESS NOTES
Physical Therapy  Facility/Department: [address]  Physical Therapy Initial Assessment/Treatment/Discharge    Name: Leslee Maldonado  : 1955  MRN: [id number]  Date of Service: 2025    Discharge Recommendations:  Home with assist PRN   PT Equipment Recommendations  Equipment Needed: No      Patient Diagnosis(es): The encounter diagnosis was Diarrhea, unspecified type.  Past Medical History:  has a past medical history of Arthritis, CAD (coronary artery disease), Cancer (HCC), Diabetes mellitus (HCC), Hyperlipidemia, Hypertension, and MI (myocardial infarction) (HCC).  Past Surgical History:  has a past surgical history that includes Tubal ligation; Hysterectomy; INSERTION / REMOVAL / REPLACEMENT VENOUS ACCESS CATHETER (N/A, 2019); Coronary artery bypass graft (N/A, 2019); Colonoscopy; Hand surgery (2018); Cardiac surgery (Quadruple by pass); IR NONTUNNELED VASCULAR CATHETER > 5 YEARS (2024); and Colonoscopy (N/A, 2025).    Assessment  Assessment: 68 yo adm with diarrhea.  Pt has been up ad lip per nursing. Due to length of hospitalization, therapy asked to evaluate. Pt demo independence with transfers/gait/bed mobility & supervision level on stairs. Did demo weak hip flexors & showed pt exercises she could perform while standing and holding onto a firm surface like kitchen counter. Pt has no concerns about return home & will have A from dtr as needed.  No further PT needed at this time- will sign off.  Decision Making: Low Complexity  Requires PT Follow-Up: No  Activity Tolerance  Activity Tolerance: Patient tolerated evaluation without incident    Plan  Safety Devices  Type of Devices: Call light within reach, Nurse notified, Left in bed (pt up ad sharee up RN)    Restrictions  Position Activity Restriction  Other Position/Activity Restrictions: up as annia; If platelet count equal to or less than 10 but the patient has received a platelet transfusion, physical  Group Co-treatment   Time In 1012         Time Out 1040         Minutes 28          Timed Code Treatment Minutes:   14    Total Treatment Minutes:  28         Criselda Cassidy, PT [id number]

## 2025-02-13 NOTE — PROGRESS NOTES
Patient seen and assessed for standard line care needs.  CVC site remains free of visible signs and symptoms of infection.  No drainage, edema, erythema, pain, itching, or warmth noted at and around the insertion site.  Port accessed on 2/10/25 per documentation, needle remains intact Line care performed by Lakeisha Mahajan RN.  The need for continued use of the CVC is due to ongoing therapy.  Patient verbalized understanding of the line care education provided by line care RN.  Staff RNs will continue to monitor and assess the CVC site throughout the patient's hospital stay.  Sterile dressing changes will continue to be changed per policy.   (Respiratory Rate) 4= 10-29

## 2025-02-14 NOTE — PROGRESS NOTES
None  Nutrition Goals: by next RD assessment, Meet at least 75% of estimated needs     CURRENT NUTRITION THERAPIES  GVHD DIET; GVHD 1  PO Intake: %   PO Supplement Intake:26-50%  Additional Sources of Calories/IVF:NS @ 125 ml/hr     COMPARATIVE STANDARDS  Energy (kcal):  9291-2623 (18-20 kcal/kg CBW)     Protein (g):  65-75 (1.3-1.5 kcal/kg IBW)       Fluid (ml/day):  1 ml/kcal    ANTHROPOMETRICS  Current Height: 157.5 cm (5' 2\")  Current Weight - Scale: 77.6 kg (171 lb)    Admission weight: 76.3 kg (168 lb 3.2 oz)    The patient will be monitored per nutrition standards of care. Consult dietitian if additional nutrition interventions are needed prior to RD reassessment.     Hakan Rodriguez RD  [place]:  [phone]

## 2025-02-14 NOTE — PLAN OF CARE
Problem: Safety - Adult  Goal: Free from fall injury  Outcome: Progressing  Note: Orthostatic vital signs obtained at start of shift - see flowsheet for details.  Pt does not meet criteria for orthostasis.  Pt is a Med fall risk. See Ayers Fall Score and ABCDS Injury Risk assessments.   - Screening for Orthostasis AND not a Essie Risk per AYERS/ABCDS: Pt bed is in low position, side rails up, call light and belongings are in reach.  Fall risk light is on outside pts room.  Pt encouraged to call for assistance as needed. Will continue with hourly rounds for PO intake, pain needs, toileting and repositioning as needed.        Problem: Metabolic/Fluid and Electrolytes - Adult  Goal: Electrolytes maintained within normal limits  Outcome: Progressing  Note: Patient phosphorous was 2.0 this AM.  Med ordered , but did not get sent up yet.  Fatuma Reyes given report and informed it needs to be given still.        Problem: Hematologic - Adult  Goal: Maintains hematologic stability  Outcome: Progressing  Note: Patient's hemoglobin this AM:   Recent Labs     02/14/25  0356   HGB 9.9*     Patient's platelet count this AM:   Recent Labs     02/14/25  0356   PLT 77*    Thrombocytopenia not present at this time.  Patient showing no signs or symptoms of active bleeding.  Transfusion not indicated at this time.  Patient verbalizes understanding of all instructions. Will continue to assess and implement POC. Call light within reach and hourly rounding in place.

## 2025-02-14 NOTE — PROGRESS NOTES
[facility] Progress Note    2025     Leslee Maldonado    MRN: [id number]    : 1955    Referring MD: Dominique Lopez,   [address]  [address]  [place] [address]      Interval History:  - 1.3 L diarrhea overnight, improved   - Inflixmab today   -Upgrade to GvHD 1 diet - maintain over weekend  - stool for viral studies: pending  - Repeat c.diff negative, repeat bacterial GI pathogens negative.      ECOG PS:  (2) Ambulatory and capable of self care, unable to carry out work activity, up and about > 50% or waking hours    KPS: 70% Cares for self; unable to carry on normal activity or to do active work    Isolation: None    Medications    Scheduled Meds:   inFLIXimab/infliximab biosimilar  10 mg/kg IntraVENous Once    [START ON 2025] predniSONE  55 mg Oral Daily    [START ON 2025] predniSONE  45 mg Oral Daily    predniSONE  70 mg Oral Daily    budesonide  3 mg Oral TID    octreotide  100 mcg SubCUTAneous Q8H    potassium bicarb-citric acid  40 mEq Oral BID    diphenoxylate-atropine  1 tablet Oral 4x Daily    loperamide  4 mg Oral 4x Daily    insulin lispro  0-4 Units SubCUTAneous 4x Daily AC & HS    enoxaparin  40 mg SubCUTAneous QPM    thiamine  100 mg Oral Daily    multivitamin  1 tablet Oral Daily    voriconazole  200 mg Oral 2 times per day    sodium chloride flush  5-40 mL IntraVENous 2 times per day    Saline Mouthwash  15 mL Swish & Spit 4x Daily AC & HS    acyclovir  400 mg Oral BID    [Held by provider] metoprolol tartrate  25 mg Oral BID    pantoprazole  40 mg Oral QAM AC    sulfamethoxazole-trimethoprim  1 tablet Oral Daily     Continuous Infusions:   sodium chloride 125 mL/hr at 25 2109    sodium chloride      dextrose      sodium chloride       PRN Meds:.calcium carbonate, sodium chloride, glucose, dextrose bolus **OR** dextrose bolus, glucagon (rDNA), dextrose, sodium phosphate IVPB (CENTRAL line), sodium chloride flush, sodium chloride, potassium chloride, magnesium sulfate,

## 2025-02-14 NOTE — PLAN OF CARE
Problem: Safety - Adult  Goal: Free from fall injury  2/14/2025 1603 by Fatuma Reyes, RN  Outcome: Progressing  Flowsheets (Taken 2/14/2025 1603)  Free From Fall Injury: Instruct family/caregiver on patient safety  Note: Orthostatic vital signs obtained at start of shift - see flowsheet for details.  Pt does not meet criteria for orthostasis.  Pt is a Med fall risk. See Ayers Fall Score and ABCDS Injury Risk assessments.   - Screening for Orthostasis AND not a [place] Risk per AYERS/ABCDS: Pt bed is in low position, side rails up, call light and belongings are in reach.  Fall risk light is on outside pts room.  Pt encouraged to call for assistance as needed. Will continue with hourly rounds for PO intake, pain needs, toileting and repositioning as needed.        Problem: Hematologic - Adult  Goal: Maintains hematologic stability  2/14/2025 1603 by Fatuma Reyes, RN  Outcome: Progressing  Flowsheets (Taken 2/14/2025 1603)  Maintains hematologic stability:   Assess for signs and symptoms of bleeding or hemorrhage   Administer blood products/factors as ordered   Monitor labs for bleeding or clotting disorders  Note: Patient's hemoglobin this AM:   Recent Labs     02/14/25  0356   HGB 9.9*     Patient's platelet count this AM:   Recent Labs     02/14/25  0356   PLT 77*    Thrombocytopenia Precautions in place.  Patient showing no signs or symptoms of active bleeding.  Transfusion not indicated at this time.  Patient verbalizes understanding of all instructions. Will continue to assess and implement POC. Call light within reach and hourly rounding in place.        Problem: Infection - Adult  Goal: Absence of infection at discharge  2/14/2025 1603 by Fatuma Reyes, RN  Outcome: Progressing  Flowsheets (Taken 2/14/2025 1603)  Absence of infection at discharge:   Assess and monitor for signs and symptoms of infection   Monitor lab/diagnostic results   Administer medications as ordered   Instruct and encourage  patient and family to use good hand hygiene technique  Note: CVC site remains free of signs/symptoms of infection. No drainage, edema, erythema, pain, or warmth noted at site. Dressing changes continue per protocol and on an as needed basis - see flowsheet.     Compliant with [facility] Bath Protocol:  Performed CHG bath today per [facility] protocol utilizing CHG solution in the shower.  CVC site cleansed with CHG wipe over dressing, skin surrounding dressing, and first 6\" of IV tubing.  Pt tolerated well.  Continued to encourage daily CHG bathing per [facility] protocol.

## 2025-02-14 NOTE — PROGRESS NOTES
Nephrology Progress Note                                                                                                                                                                                                                                                                                                                                                               Office : [phone]     Fax :[phone]    Patient's Name: Leslee Maldonado  4:43 PM  2/14/2025    Reason for Consult:  acidoses   Requesting Physician:  Raj Cedeño MD  Chief Complaint:  No chief complaint on file.      Assessment/Plan     Acidosis - Resolved   Levels better   From diarrhea   Stopped IV bicarbonate containing IV fluids & PO supplement   Hypernatremia   Encourage free water   Colitis   Severe diarrhea   Steroids   IgG Lambda Multiple Myeloma  Hematology on board   Hypokalemia, HypoMg, hypophosphatemia  From GI losses   Continue to replace the GI losses  On scheduled potassium chloride       History of Present Ilness:    Leslee Maldonado is a 69 y.o. female with history of refractory IgG lambda multiple myeloma, coronary disease who recently underwent CAR-T cell therapy with Carvykti on October 28. She has been doing well except for diarrhea. For the past 2 months she is lost 20 pounds in overall since September she is lost 30 pounds. 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 She has been having diarrhea for the past 6 to 8 weeks. It has been progressively getting worse. She is not able to eat well due to the diarrhea. She denies any nausea or vomiting. She denies any blood in her stools. She had C. difficile checked on January 3 that was negative. She is being admitted to the hospital for diarrhea weight loss and GI workup.     Interval hx:    Electrolytes stable  Diet being slowly advanced      Past Medical History:   Diagnosis Date    Arthritis Shoulder    CAD (coronary artery disease)   tablet 40 mEq, BID  diphenoxylate-atropine (LOMOTIL) 2.5-0.025 MG per tablet 1 tablet, 4x Daily  loperamide (IMODIUM) capsule 4 mg, 4x Daily  insulin lispro (HUMALOG,ADMELOG) injection vial 0-4 Units, 4x Daily AC & HS  glucose chewable tablet 16 g, PRN  dextrose bolus 10% 125 mL, PRN   Or  dextrose bolus 10% 250 mL, PRN  glucagon injection 1 mg, PRN  dextrose 10 % infusion, Continuous PRN  sodium phosphate 30 mmol in sodium chloride 0.9 % 250 mL IVPB for central line, PRN  enoxaparin (LOVENOX) injection 40 mg, QPM  thiamine mononitrate tablet 100 mg, Daily  multivitamin 1 tablet, Daily  voriconazole (VFEND) tablet 200 mg, 2 times per day  sodium chloride flush 0.9 % injection 5-40 mL, 2 times per day  sodium chloride flush 0.9 % injection 5-40 mL, PRN  0.9 % sodium chloride infusion, PRN  potassium chloride 20 mEq/50 mL IVPB (Central Line), PRN  magnesium sulfate 4000 mg in 100 mL IVPB premix, PRN  acetaminophen (TYLENOL) tablet 650 mg, Q6H PRN  Saline Mouthwash 15 mL, 4x Daily AC & HS  Saline Mouthwash 15 mL, Q4H PRN  ALTEplase (CATHFLO) 2 mg in sterile water 2 mL injection, PRN  acyclovir (ZOVIRAX) tablet 400 mg, BID  [Held by provider] metoprolol tartrate (LOPRESSOR) tablet 25 mg, BID  pantoprazole (PROTONIX) tablet 40 mg, QAM AC  sulfamethoxazole-trimethoprim (BACTRIM;SEPTRA) 400-80 MG per tablet 1 tablet, Daily      Physical exam:     Vitals:  /85   Pulse 70   Temp 98 °F (36.7 °C) (Oral)   Resp 16   Ht 1.575 m (5' 2\")   Wt 77.6 kg (171 lb)   SpO2 94%   BMI 31.28 kg/m²   Constitutional:  OAA X3 NAD Yes  Skin: no rash, turgor wnl  Heent:  eomi, mmm  Neck: no bruits or jvd noted  Cardiovascular:  S1, S2 without m/r/g  Respiratory: CTA B without w/r/r  Abdomen:  soft, nt, nd  Ext:  lower extremity edema No  Psychiatric: mood and affect stable   Musculoskeletal:  Rom, muscular strength intact    Data:   Labs:  CBC:   Recent Labs     02/12/25  0234 02/13/25  0441 02/14/25  0356   WBC 4.6 3.6* 4.5   HGB

## 2025-02-15 PROBLEM — E83.42 HYPOMAGNESEMIA: Status: ACTIVE | Noted: 2025-01-01

## 2025-02-15 PROBLEM — E83.51 HYPOCALCEMIA: Status: ACTIVE | Noted: 2025-01-01

## 2025-02-15 NOTE — PLAN OF CARE
Problem: Chronic Conditions and Co-morbidities  Goal: Patient's chronic conditions and co-morbidity symptoms are monitored and maintained or improved  Outcome: Progressing  Flowsheets (Taken 2/15/2025 1217)  Care Plan - Patient's Chronic Conditions and Co-Morbidity Symptoms are Monitored and Maintained or Improved:   Monitor and assess patient's chronic conditions and comorbid symptoms for stability, deterioration, or improvement   Collaborate with multidisciplinary team to address chronic and comorbid conditions and prevent exacerbation or deterioration   Update acute care plan with appropriate goals if chronic or comorbid symptoms are exacerbated and prevent overall improvement and discharge     Problem: Safety - Adult  Goal: Free from fall injury  Outcome: Progressing  Flowsheets (Taken 2/15/2025 1217)  Free From Fall Injury: Instruct family/caregiver on patient safety  Note: Orthostatic vital signs obtained at start of shift - see flowsheet for details.  Pt does not meet criteria for orthostasis.  Pt is a Med fall risk. See Ayers Fall Score and ABCDS Injury Risk assessments.   - Screening for Orthostasis AND not a [place] Risk per AYERS/ABCDS: Pt bed is in low position, side rails up, call light and belongings are in reach.  Fall risk light is on outside pts room.  Pt encouraged to call for assistance as needed. Will continue with hourly rounds for PO intake, pain needs, toileting and repositioning as needed.      Problem: ABCDS Injury Assessment  Goal: Absence of physical injury  Outcome: Progressing  Flowsheets (Taken 2/15/2025 1217)  Absence of Physical Injury: Implement safety measures based on patient assessment     Problem: Metabolic/Fluid and Electrolytes - Adult  Goal: Electrolytes maintained within normal limits  Outcome: Progressing  Flowsheets (Taken 2/15/2025 1217)  Electrolytes maintained within normal limits:   Monitor labs and assess patient for signs and symptoms of electrolyte imbalances    Administer electrolyte replacement as ordered   Monitor response to electrolyte replacements, including repeat lab results as appropriate   Fluid restriction as ordered   Instruct patient on fluid and nutrition restrictions as appropriate  Note:   Lab Results   Component Value Date     02/15/2025    K 3.0 (L) 02/15/2025     02/15/2025    CO2 29 02/15/2025      Problem: Infection - Adult  Goal: Absence of infection at discharge  Outcome: Progressing  Flowsheets (Taken 2/15/2025 1217)  Absence of infection at discharge:   Assess and monitor for signs and symptoms of infection   Monitor lab/diagnostic results   Monitor all insertion sites i.e., indwelling lines, tubes and drains   Administer medications as ordered   Instruct and encourage patient and family to use good hand hygiene technique   Identify and instruct in appropriate isolation precautions for identified infection/condition  Note: CVC site remains free of signs/symptoms of infection. No drainage, edema, erythema, pain, or warmth noted at site. Dressing changes continue per protocol and on an as needed basis - see flowsheet.     Compliant with [facility] Bath Protocol:  Performed CHG bath today per [facility] protocol utilizing CHG solution in the shower.  CVC site cleansed with CHG wipe over dressing, skin surrounding dressing, and first 6\" of IV tubing.  Pt tolerated well.  Continued to encourage daily CHG bathing per [facility] protocol.

## 2025-02-15 NOTE — PROGRESS NOTES
negative, Crypto Ag negative  - MRI w/ orbits (11/20/24): no acute intracranial abnormality,  - S/p Prednisone taper: 40 mg daily (11/20/24), 30 mg PO daily (11/27/24), 20 mg PO daily (11/30/24), 10 mg (12/7/24), 5 mg (12/17/24) & stopped 12/23/24.   - S/p Keppra 500 mg bid - stopped 12/27/24      3.  ID:  Afebrile   - Cont Vfend (changed from Acyclovir with addition of HD steroids) & Bactrim ppx     - Cdiff 1/28/25: Negative   - GI pathogen panel 1/28/25: Negative      Hypogammaglobulinemia:  - IgG (11/27/24): 395 - s/p IVIG 12/4/24   - IgG (12/30/24): 269 - s/p IVIG 1/7/24  - IgG (1/21/25) 883  - Monitor monthly and replace if <400      CD4: monitor monthly starting at 6 months  12/2/24: 244     CMV:   - 1/28/25: Detected not quantified  - 2/3/25:  Detected not quantified  - 2/10/25: 60     4.  Heme:   Mild leukopenia, anemia and thrombocytopenia  - Transfuse for Hgb < 7 and Platelets < 10K  - No transfusion today  Hypofibrinogenemia:  - Fibrinogen low, check daily  - Transfuse cryo if <100       5.  Metabolic:    Hypokalemia, HypoMg, hypophosphatemia  - Replace mag and K Per prn orders  - Monitor BMP BID  - Sodium phosphate 30 mmol PRN  - NS@ 125 with increasing diarrhea 2/12/25  Metabolic Acidosis 2/2 Diarrhea--> resolved   - Nephrology consulted for assistance, appreciate recs  - Replace GI losses per [facility] protocol    6.  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  Here for the weekend.      Tracy Schoenhoft, APRN - CNP     David M. Waterhouse, M.D., MPH    [facility] [place] [address]  Office [phone]  Cell [phone]  [email or web address]    \"Participating in a clinical trial is the first step to fighting cancer; not the last.\"

## 2025-02-16 PROBLEM — E87.6 HYPOKALEMIA: Status: ACTIVE | Noted: 2025-01-01

## 2025-02-16 PROBLEM — E55.9 VITAMIN D DEFICIENCY: Status: ACTIVE | Noted: 2025-01-01

## 2025-02-16 NOTE — PLAN OF CARE
Problem: Chronic Conditions and Co-morbidities  Goal: Patient's chronic conditions and co-morbidity symptoms are monitored and maintained or improved  Outcome: Progressing  Pt continues to have ongoing diarrhea this shift. Lomotil, Imodium and Octreotide given per orders.    Problem: Safety - Adult  Goal: Free from fall injury  Outcome: Progressing  Pt up ad sharee, ortho negative, call light within reach     Problem: ABCDS Injury Assessment  Goal: Absence of physical injury  Outcome: Progressing  Pt up ad sharee, ortho negative, call light within reach. Pt ambulated halls this shift.     Problem: Metabolic/Fluid and Electrolytes - Adult  Goal: Electrolytes maintained within normal limits  Outcome: Progressing  Pt received K this shift per orders.     Problem: Hematologic - Adult  Goal: Maintains hematologic stability  Outcome: Progressing  Patient's hemoglobin this AM:   Recent Labs     02/16/25  0401   HGB 9.3*     Patient's platelet count this AM:   Recent Labs     02/16/25  0401   PLT 67*    Thrombocytopenia Precautions in place.  Patient showing no signs or symptoms of active bleeding.  Transfusion not indicated at this time.  Patient verbalizes understanding of all instructions. Will continue to assess and implement POC. Call light within reach and hourly rounding in place.      Problem: Skin/Tissue Integrity - Adult  Goal: Skin integrity remains intact  Outcome: Progressing  No new skin breakdown this shift.     Problem: Musculoskeletal - Adult  Goal: Return mobility to safest level of function  Outcome: Progressing  Pt up ad sharee, ortho negative, call light within reach     Problem: Gastrointestinal - Adult  Goal: Maintains or returns to baseline bowel function  Outcome: Progressing  Pt continues to have ongoing diarrhea this shift. Lomotil, Imodium and Octreotide given per orders.     Problem: Infection - Adult  Goal: Absence of infection at discharge  Outcome: Progressing  Pt remained afebrile this shift.      Problem: Nutrition Deficit:  Goal: Optimize nutritional status  Outcome: Progressing  Pt up out of bed for all meals, finishing 60% at each sitting

## 2025-02-16 NOTE — PROGRESS NOTES
[facility] Progress Note    2025     Leslee Maldonado    MRN: [id number]    : 1955    Referring MD: Dominique Lopez DO  [address]  [address]  [place] [address]      Interval History: No significant change in the amount of diarrhea.    ECOG PS:  (2) Ambulatory and capable of self care, unable to carry out work activity, up and about > 50% or waking hours    KPS: 70% Cares for self; unable to carry on normal activity or to do active work    Isolation: None    Medications    Scheduled Meds:   [START ON 2025] predniSONE  55 mg Oral Daily    [START ON 2025] predniSONE  45 mg Oral Daily    predniSONE  70 mg Oral Daily    budesonide  3 mg Oral TID    octreotide  100 mcg SubCUTAneous Q8H    potassium bicarb-citric acid  40 mEq Oral BID    diphenoxylate-atropine  1 tablet Oral 4x Daily    loperamide  4 mg Oral 4x Daily    insulin lispro  0-4 Units SubCUTAneous 4x Daily AC & HS    enoxaparin  40 mg SubCUTAneous QPM    thiamine  100 mg Oral Daily    multivitamin  1 tablet Oral Daily    voriconazole  200 mg Oral 2 times per day    sodium chloride flush  5-40 mL IntraVENous 2 times per day    Saline Mouthwash  15 mL Swish & Spit 4x Daily AC & HS    acyclovir  400 mg Oral BID    [Held by provider] metoprolol tartrate  25 mg Oral BID    pantoprazole  40 mg Oral QAM AC    sulfamethoxazole-trimethoprim  1 tablet Oral Daily     Continuous Infusions:   sodium chloride 75 mL/hr at 25 0327    sodium chloride      dextrose      sodium chloride       PRN Meds:.calcium carbonate, sodium chloride, glucose, dextrose bolus **OR** dextrose bolus, glucagon (rDNA), dextrose, sodium phosphate IVPB (CENTRAL line), sodium chloride flush, sodium chloride, potassium chloride, magnesium sulfate, acetaminophen **OR** [DISCONTINUED] acetaminophen, Saline Mouthwash, ALTEplase (CATHFLO) 2 mg in sterile water 2 mL injection    ROS:  As noted above, otherwise remainder of 10-point ROS negative    Physical Exam:     I&O:

## 2025-02-16 NOTE — PROGRESS NOTES
Nephrology Progress Note                                                                                                                                                                                                                                                                                                                                                               Office : [phone]     Fax :[phone]    Patient's Name: Leslee Maldonado  11:42 PM  2/15/2025    Reason for Consult:  acidoses   Requesting Physician:  Raj Cedeño MD  Chief Complaint:  No chief complaint on file.      Assessment/Plan     Acidosis - Resolved   Levels better   From diarrhea   Stopped IV bicarbonate containing IV fluids & PO supplement   Hypernatremia - resolved   Encourage free water   Colitis   Severe diarrhea   Steroids   IgG Lambda Multiple Myeloma  Hematology on board   Hypokalemia- resolved: montior   , HypoMg- given Mg , recheck AM    Hypophosphatemia- resolved, monitor   Hypo-Ca- check PTH Vit D       History of Present Ilness:    Leslee Maldonado is a 69 y.o. female with history of refractory IgG lambda multiple myeloma, coronary disease who recently underwent CAR-T cell therapy with Carvykti on October 28. She has been doing well except for diarrhea. For the past 2 months she is lost 20 pounds in overall since September she is lost 30 pounds. 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 She has been having diarrhea for the past 6 to 8 weeks. It has been progressively getting worse. She is not able to eat well due to the diarrhea. She denies any nausea or vomiting. She denies any blood in her stools. She had C. difficile checked on January 3 that was negative. She is being admitted to the hospital for diarrhea weight loss and GI workup.     Interval hx:    Electrolytes stable  K 3.9   Mg 1,4 s/p 4 gr Mg   cCa 7,4   Diet being slowly advanced  Diarrhea better  Pt denies  (ENTOCORT EC) delayed release capsule 3 mg, TID  octreotide (SANDOSTATIN) injection 100 mcg, Q8H  potassium bicarb-citric acid (EFFER-K) effervescent tablet 40 mEq, BID  diphenoxylate-atropine (LOMOTIL) 2.5-0.025 MG per tablet 1 tablet, 4x Daily  loperamide (IMODIUM) capsule 4 mg, 4x Daily  insulin lispro (HUMALOG,ADMELOG) injection vial 0-4 Units, 4x Daily AC & HS  glucose chewable tablet 16 g, PRN  dextrose bolus 10% 125 mL, PRN   Or  dextrose bolus 10% 250 mL, PRN  glucagon injection 1 mg, PRN  dextrose 10 % infusion, Continuous PRN  sodium phosphate 30 mmol in sodium chloride 0.9 % 250 mL IVPB for central line, PRN  enoxaparin (LOVENOX) injection 40 mg, QPM  thiamine mononitrate tablet 100 mg, Daily  multivitamin 1 tablet, Daily  voriconazole (VFEND) tablet 200 mg, 2 times per day  sodium chloride flush 0.9 % injection 5-40 mL, 2 times per day  sodium chloride flush 0.9 % injection 5-40 mL, PRN  0.9 % sodium chloride infusion, PRN  potassium chloride 20 mEq/50 mL IVPB (Central Line), PRN  magnesium sulfate 4000 mg in 100 mL IVPB premix, PRN  acetaminophen (TYLENOL) tablet 650 mg, Q6H PRN  Saline Mouthwash 15 mL, 4x Daily AC & HS  Saline Mouthwash 15 mL, Q4H PRN  ALTEplase (CATHFLO) 2 mg in sterile water 2 mL injection, PRN  acyclovir (ZOVIRAX) tablet 400 mg, BID  [Held by provider] metoprolol tartrate (LOPRESSOR) tablet 25 mg, BID  pantoprazole (PROTONIX) tablet 40 mg, QAM AC  sulfamethoxazole-trimethoprim (BACTRIM;SEPTRA) 400-80 MG per tablet 1 tablet, Daily      Physical exam:     Vitals:  /77   Pulse 56   Temp 98.2 °F (36.8 °C) (Oral)   Resp 16   Ht 1.575 m (5' 2\")   Wt 80.1 kg (176 lb 9.6 oz)   SpO2 94%   BMI 32.30 kg/m²   Constitutional:  OAA X3 NAD Yes  Skin: no rash, turgor wnl  Heent:  eomi, mmm  Neck: no bruits or jvd noted  Cardiovascular:  S1, S2 without m/r/g  Respiratory: CTA B without w/r/r  Abdomen:  soft, nt, nd  Ext:  lower extremity edema No  Psychiatric: mood and affect stable

## 2025-02-16 NOTE — PROGRESS NOTES
Central line dressing changed using sterile technique following hospital policy. Sheila Duffy RN, observed with procedure to ensure proper technique.

## 2025-02-16 NOTE — PROGRESS NOTES
Nephrology Progress Note                                                                                                                                                                                                                                                                                                                                                               Office : [phone]     Fax :[phone]    Patient's Name: Leslee Maldonado  2:10 PM  2/16/2025    Reason for Consult:  acidoses   Requesting Physician:  Raj Cedeño MD  Chief Complaint:  No chief complaint on file.      Assessment/Plan     Acidosis - Resolved   Levels better   From diarrhea   Stop fluids   Hypernatremia - resolved   Encourage free water   Colitis   Severe diarrhea - resolving   Steroids   IgG Lambda Multiple Myeloma  Hematology on board   Hypokalemia- : replete   , HypoMg- resolved  , recheck AM    Hypophosphatemia- replete   Hypo-Ca-low viot D- Vit D 50 k units       History of Present Ilness:    Leslee Maldonado is a 69 y.o. female with history of refractory IgG lambda multiple myeloma, coronary disease who recently underwent CAR-T cell therapy with Carvykti on October 28. She has been doing well except for diarrhea. For the past 2 months she is lost 20 pounds in overall since September she is lost 30 pounds. 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 She has been having diarrhea for the past 6 to 8 weeks. It has been progressively getting worse. She is not able to eat well due to the diarrhea. She denies any nausea or vomiting. She denies any blood in her stools. She had C. difficile checked on January 3 that was negative. She is being admitted to the hospital for diarrhea weight loss and GI workup.     Interval hx:    Electrolytes stable  K 3.3- replete   Mg 2.1   cCa 7,4   Pi 105/17/25  Vit D low   Diet being slowly advanced  Diarrhea better  Pt denies CP/SOB/palpitations/abdominal  cardiopulmonary disease         Electronically signed by Keon Puri MD      CT ABDOMEN PELVIS W IV CONTRAST Additional Contrast? None   Final Result      1. Questionable wall thickening within the colon at the splenic flexure could represent peristalsis or mass. Correlate with colonoscopy when able. Fluid extending into the distal rectum compatible with diarrhea.   2. No evidence of bowel obstruction   3. Colonic diverticulosis.      Electronically signed by Raj Cordova      XR CHEST PORTABLE    (Results Pending)       Medical Decision Making:  The following items were considered in medical decision making:  Discussion of patient care with other providers  Reviewed clinical lab tests  Reviewed radiology tests  Reviewed other diagnostic tests/interventions    Jovi Yoder MD   [facility]  Office : [phone] or through [employer]  Fax :[phone]

## 2025-02-17 NOTE — PROGRESS NOTES
Comprehensive Nutrition Assessment    RECOMMENDATIONS:  PO Diet: GVHD1; adv per MD  Nutrition Supplement: Not allowed on current diet, will order as appropriate when diet advances  Nutrition Education: No recommendation at this time     NUTRITION ASSESSMENT:   Nutritional summary & status: Follow up. Patient continues with diarrhea and although output initially improved with downgrade to clear liquid diet output appears to be trending up since advancement to GVHD1 diet. MD goal is to d/c patient by the end of the week if stool output can be controlled, noted plan to decrease octreotide today (from Q8 hr to Q12 hrs). Patient continues on lomotil, imodium and budesonide.   Admission // PMH: Diarrhea // MM s/p CAR-T 10/2024, ASCT 2/20219    MALNUTRITION ASSESSMENT  Context of Malnutrition: Acute Illness (on chronic)   Malnutrition Status: Severe malnutrition  Findings of the 6 clinical characteristics of malnutrition (Minimum of 2 out of 6 clinical characteristics is required to make the diagnosis of moderate or severe Protein Calorie Malnutrition based on AND/ASPEN Guidelines):  Energy Intake:  50% or less of estimated energy requirements for 5 or more days  Weight Loss:  (S) Greater than 7.5% over 3 months (25#/12% in 2 months)     Body Fat Loss:  No body fat loss     Muscle Mass Loss:  No muscle mass loss        NUTRITION DIAGNOSIS   Severe malnutrition, in context of acute illness or injury related to altered GI function as evidenced by criteria as identified in malnutrition assessment    Nutrition Monitoring and Evaluation:   Food/Nutrient Intake Outcomes:  Food and Nutrient Intake, Supplement Intake  Physical Signs/Symptoms Outcomes:  Biochemical Data, GI Status, Diarrhea, Nausea or Vomiting     OBJECTIVE DATA: Significant to nutrition assessment  Nutrition Related Findings: , K+ 3.4, Phos 1.8, 1100 ml stool output ~24 hrs  Wounds: None  Nutrition Goals: PO intake 75% or greater, by next RD assessment      CURRENT NUTRITION THERAPIES  GVHD DIET; GVHD 1  PO Intake: %   PO Supplement Intake:None Ordered  Additional Sources of Calories/IVF:NS @75 ml/hr     COMPARATIVE STANDARDS  Energy (kcal):  2205-0210 (18-20 kcal/kg CBW)     Protein (g):  65-75 (1.3-1.5 kcal/kg IBW)       Fluid (ml/day):  1 ml/kcal    ANTHROPOMETRICS  Current Height: 157.5 cm (5' 2\")  Current Weight - Scale: 81.6 kg (179 lb 12.8 oz)    Admission weight: 76.3 kg (168 lb 3.2 oz)    The patient will be monitored per nutrition standards of care. Consult dietitian if additional nutrition interventions are needed prior to RD reassessment.     Kathleen Cm RD  [place]:  [phone]

## 2025-02-17 NOTE — CARE COORDINATION
Attended MD rounds at bedside. Pt denied SW needs and will return home when able.     SW will continue to follow    MELISSA Lee   for [facility] ([facility])  [employer]: [phone]

## 2025-02-17 NOTE — PLAN OF CARE
Problem: Chronic Conditions and Co-morbidities  Goal: Patient's chronic conditions and co-morbidity symptoms are monitored and maintained or improved  2/17/2025 0509 by Elin To RN  Outcome: Progressing     Problem: Safety - Adult  Goal: Free from fall injury  2/17/2025 0509 by Elin To RN  Outcome: Progressing     Problem: ABCDS Injury Assessment  Goal: Absence of physical injury  2/17/2025 0509 by Elin To RN  Outcome: Progressing     Problem: Metabolic/Fluid and Electrolytes - Adult  Goal: Electrolytes maintained within normal limits  2/17/2025 0509 by Elin To RN  Outcome: Progressing     Problem: Hematologic - Adult  Goal: Maintains hematologic stability  2/17/2025 0509 by Elin To RN  Outcome: Progressing  2/16/2025 1736 by Fartun Valenzuela RN  Outcome: Progressing     Problem: Gastrointestinal - Adult  Goal: Maintains or returns to baseline bowel function  2/17/2025 0509 by Elin To RN  Outcome: Progressing     Problem: Infection - Adult  Goal: Absence of infection at discharge  2/17/2025 0509 by Elin To RN  Outcome: Progressing

## 2025-02-17 NOTE — PROGRESS NOTES
Nephrology Progress Note                                                                                                                                                                                                                                                                                                                                                               Office : [phone]     Fax :[phone]    Patient's Name: Leslee Maldonado  10:20 AM  2/17/2025    Reason for Consult:  acidoses   Requesting Physician:  Raj Cedeño MD  Chief Complaint:  No chief complaint on file.      Assessment/Plan     Acidosis - Resolved   Levels better   From diarrhea   Hypernatremia - Resolved   Encourage free water   Colitis   Severe diarrhea - resolving   Steroids   IgG Lambda Multiple Myeloma  Hematology on board   Hypokalemia - replete PRN  HypoMg - resolved   Hypophosphatemia - replete PRN  Hypo-Ca -low vit D - Vit D 50k units weekly       History of Present Ilness:    Leslee Maldonado is a 69 y.o. female with history of refractory IgG lambda multiple myeloma, coronary disease who recently underwent CAR-T cell therapy with Carvykti on October 28. She has been doing well except for diarrhea. For the past 2 months she is lost 20 pounds in overall since September she is lost 30 pounds. 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 She has been having diarrhea for the past 6 to 8 weeks. It has been progressively getting worse. She is not able to eat well due to the diarrhea. She denies any nausea or vomiting. She denies any blood in her stools. She had C. difficile checked on January 3 that was negative. She is being admitted to the hospital for diarrhea weight loss and GI workup.     Interval hx:    Doing OK  Diet being slowly advanced   K low, phos low - replacement ordered   Still on IVF      Past Medical History:   Diagnosis Date    Arthritis Shoulder    CAD (coronary artery  02/15/25  0420 02/16/25  0401 02/17/25 0359   WBC 3.9* 4.4 5.5   HGB 9.3* 9.3* 9.2*   PLT 71* 67* 62*     BMP:    Recent Labs     02/16/25  0401 02/16/25 1600 02/17/25 0358    139 141   K 3.3* 4.5 3.4*    106 107   CO2 29 26 27   BUN 3* 3* <2*   CREATININE 0.7 0.8 0.7   GLUCOSE 180* 204* 187*     Ca/Mg/Phos:   Recent Labs     02/15/25  0420 02/15/25  1550 02/16/25  0401 02/16/25 1600 02/17/25 0358   CALCIUM 6.5*   < > 6.5* 6.6* 6.6*   MG 1.36*  --  2.09  --  1.84   PHOS 2.6  --  1.9*  --  1.8*    < > = values in this interval not displayed.     Hepatic:   Recent Labs     02/16/25 0401 02/17/25 0358   AST  --  39*   ALT  --  36   BILITOT 0.4 0.4   ALKPHOS  --  40     Troponin: No results for input(s): \"TROPONINI\" in the last 72 hours.  BNP: No results for input(s): \"BNP\" in the last 72 hours.  Lipids: No results for input(s): \"CHOL\", \"TRIG\", \"HDL\" in the last 72 hours.    Invalid input(s): \"LDLCALC\", \"LABVLDL\"  ABGs: No results for input(s): \"PHART\", \"PO2ART\", \"MQA3IKX\" in the last 72 hours.  INR:   Recent Labs     02/17/25 0358   INR 1.45*           UA:  No results for input(s): \"COLORU\", \"CLARITYU\", \"GLUCOSEU\", \"BILIRUBINUR\", \"KETUA\", \"SPECGRAV\", \"BLOODU\", \"PHUR\", \"PROTEINU\", \"UROBILINOGEN\", \"NITRU\", \"LEUKOCYTESUR\", \"URINETYPE\" in the last 72 hours.    Invalid input(s): \"LABMICR\"       Urine Microscopic:   No results for input(s): \"LABCAST\", \"BACTERIA\", \"COMU\", \"HYALCAST\", \"WBCUA\", \"RBCUA\" in the last 72 hours.    Invalid input(s): \"EPIU\"    Urine Culture: No results for input(s): \"LABURIN\" in the last 72 hours.  Urine Chemistry: No results for input(s): \"CLUR\", \"LABCREA\", \"PROTEINUR\", \"NAUR\" in the last 72 hours.      IMAGING:  XR CHEST PORTABLE   Final Result   1. Stable exam with no acute abnormality.      Electronically signed by Simon Gamble      XR CHEST PORTABLE   Final Result      1. No evidence for acute cardiopulmonary disease         Electronically signed by Keon Puri MD

## 2025-02-17 NOTE — PLAN OF CARE
Problem: Safety - Adult  Goal: Free from fall injury  2/17/2025 1834 by Hannah Saenz RN  Outcome: Progressing    Pt with activity orders for up ad sharee.  Encouraged pt to be up OOB as much as possible throughout the day and for all meals.  Encouraged frequent short naps as necessary to preserve energy but instructed that while awake, pt should be OOB.    Encouraged pt to ambulate in halls.  Pt seen up ad sharee in halls once this shift. Pt is visualized to be OOB % of the time this shift.  Will continue to encourage frequent activity.      Problem: Metabolic/Fluid and Electrolytes - Adult  Goal: Electrolytes maintained within normal limits  2/17/2025 1834 by Hannah Saenz, RN  Outcome: Progressing   Patient continues with diarrhea. Pt continues on imodium lomotil and Sandostatin.     Problem: Hematologic - Adult  Goal: Maintains hematologic stability  2/17/2025 1834 by Hannah Saenz, RN  Outcome: Progressing   Patient's hemoglobin this AM:   Recent Labs     02/17/25  0359   HGB 9.2*     Patient's platelet count this AM:   Recent Labs     02/17/25  0359   PLT 62*    Thrombocytopenia Precautions in place.  Patient showing no signs or symptoms of active bleeding.  Transfusion not indicated at this time.  Patient verbalizes understanding of all instructions. Will continue to assess and implement POC. Call light within reach and hourly rounding in place.     Problem: Infection - Adult  Goal: Absence of infection at discharge  2/17/2025 1834 by Hannah Saenz, RN  Outcome: Progressing   CVC site remains free of signs/symptoms of infection. No drainage, edema, erythema, pain, or warmth noted at site. Dressing changes continue per protocol and on an as needed basis - see flowsheet.     Compliant with BCC Bath Protocol:  Performed CHG bath today per BCC protocol utilizing CHG solution in the shower.  CVC site cleansed with CHG wipe over dressing, skin surrounding dressing, and first 6\" of IV tubing.  Pt  tolerated well.  Continued to encourage daily CHG bathing per BCC protocol.

## 2025-02-17 NOTE — CARE COORDINATION
Provider discussed discharge plan for patient.  The goal is to get the diarrhea under control with some medication changes this week.  Provider is hoping to discharge patient by the end of the week.

## 2025-02-17 NOTE — PROGRESS NOTES
[facility] Progress Note    2025     Leslee Maldonado    MRN: [id number]    : 1955    Referring MD: Dominique Lopez,   [address]  [address]  [place] [address]      Interval History:   - 1.3 L diarrhea in the past 24 hours  - Eating a reasonable diet, will cut back on octreotide.  Complaint of increased sagging of L breast, had recent mammogram which was ok.    ECOG PS:  (2) Ambulatory and capable of self care, unable to carry out work activity, up and about > 50% or waking hours    KPS: 70% Cares for self; unable to carry on normal activity or to do active work    Isolation: None    Medications    Scheduled Meds:   vitamin D  50,000 Units Oral Weekly    phosphorus  250 mg Oral BID    predniSONE  55 mg Oral Daily    [START ON 2025] predniSONE  45 mg Oral Daily    budesonide  3 mg Oral TID    octreotide  100 mcg SubCUTAneous Q8H    potassium bicarb-citric acid  40 mEq Oral BID    diphenoxylate-atropine  1 tablet Oral 4x Daily    loperamide  4 mg Oral 4x Daily    insulin lispro  0-4 Units SubCUTAneous 4x Daily AC & HS    enoxaparin  40 mg SubCUTAneous QPM    thiamine  100 mg Oral Daily    multivitamin  1 tablet Oral Daily    voriconazole  200 mg Oral 2 times per day    sodium chloride flush  5-40 mL IntraVENous 2 times per day    Saline Mouthwash  15 mL Swish & Spit 4x Daily AC & HS    acyclovir  400 mg Oral BID    [Held by provider] metoprolol tartrate  25 mg Oral BID    pantoprazole  40 mg Oral QAM AC    sulfamethoxazole-trimethoprim  1 tablet Oral Daily     Continuous Infusions:   sodium chloride 75 mL/hr at 25 0611    sodium chloride      dextrose      sodium chloride       PRN Meds:.calcium carbonate, sodium chloride, glucose, dextrose bolus **OR** dextrose bolus, glucagon (rDNA), dextrose, sodium phosphate IVPB (CENTRAL line), sodium chloride flush, sodium chloride, potassium chloride, magnesium sulfate, acetaminophen **OR** [DISCONTINUED] acetaminophen, Saline Mouthwash, ALTEplase

## 2025-02-18 NOTE — CARE COORDINATION
Medications sent to [facility] pharmacy in preparation for discharge have large copays.  Assisting patient in finding discounts using [facility] or potentially [facility].     Team updated.

## 2025-02-18 NOTE — PROGRESS NOTES
(11/20/24): Meningitis panel negative, Crypto Ag negative  - MRI w/ orbits (11/20/24): no acute intracranial abnormality,  - S/p Prednisone taper: 40 mg daily (11/20/24), 30 mg PO daily (11/27/24), 20 mg PO daily (11/30/24), 10 mg (12/7/24), 5 mg (12/17/24) & stopped 12/23/24.   - S/p Keppra 500 mg bid - stopped 12/27/24      3.  ID:  Afebrile   - Cont Vfend (changed from Acyclovir with addition of HD steroids) & Bactrim ppx     - Cdiff 1/28/25: Negative   - GI pathogen panel 1/28/25: Negative      Hypogammaglobulinemia:  - Monitor monthly and replace if <400   - IgG (11/27/24): 395 - s/p IVIG 12/4/24   - IgG (12/30/24): 269 - s/p IVIG 1/7/24  - IgG (1/21/25) 883  - IgG (2/18/25): Pending       CD4: monitor monthly starting at 6 months  12/2/24: 244     CMV:   - 1/28/25: Detected not quantified  - 2/3/25:  Detected not quantified  - 2/10/25: 60  - 2/17/25: Pending    PLAN:   - Valcyte 900 mg BID (2/18/25)     4.  Heme:   Mild leukopenia, anemia and thrombocytopenia  - Transfuse for Hgb < 7 and Platelets < 10K  - No transfusion today 2/16/25  Hypofibrinogenemia:  - Fibrinogen low, check daily  - Transfuse cryo if <100       5.  Metabolic:    Hypokalemia, HypoMg, hypophosphatemia  - Replace mag and K Per prn orders  - Monitor BMP BID  - Sodium phosphate 30 mmol PRN  Metabolic Acidosis 2/2 Diarrhea--> resolved   - Nephrology consulted for assistance, appreciate recs  - Replace GI losses per SSE protocol  Fluid Overload  - BLE edema  - Weight up 13 lbs since admission  - IVF stopped 2/18/25  - 40 mg IV Lasix 2/18/25    6.  GI / Nutrition:   Nutrition:    - Appetite and oral intake is adequate  - GvHD 1 diet  - Follow closely with dietician  - Continue multivitamin   - Continue thiamine   GERD:   - well-controlled  - Tums PRN    - Protonix daily   Diarrhea   - Pt with intermittent diarrhea for 4-6 weeks, worsening, now with 20 lbs weight loss in 2 months   - Stool studies (1/3/24): negative  - repeat stool studies  lisinopril since hypotensive since diarrhea      9.  MSK: Chronic back, hip, and right shoulder pain  - Tylenol PRN    - DVT Prophylaxis: Platelets >50,000 cells/dL, - daily lovenox prophylaxis ordered  Contraindications to pharmacologic prophylaxis: None  Contraindications to mechanical prophylaxis: None    - Disposition: When diarrhea improved.  Hopefully by the end of the week.    Jeana Todd, APRN - CNP

## 2025-02-18 NOTE — DISCHARGE INSTRUCTIONS
[facility] Discharge Instructions    Call for Questions/Concerns:  [phone] [facility] office  The phone number listed above is available 24 hrs/7 days per week  [facility] is open M-F 8am-4:30pm; Sat-Sun/Holidays 8am-1pm    Symptoms to Report Immediately:    Fever of 100.5 or greater  Vomiting without relief after use of anti-nausea medication  Severe abdominal cramping  Diarrhea: More than 3 loose, watery bowel movements in a 24 hour period  Unusual or excessive bleeding from your mouth, nose, rectum, bladder or vagina  Sudden onset of shortness of breath or chest pain  Signs/symptoms of infection: redness, warmth, swelling-particularly to central line site    Report to Physician's office within 24 hours:    Pain not relieved by pain medication  Change in urination-odor, cloudiness, frequency, or pain with urination  Flu-like symptoms  Skin changes-rash, hives, redness or peeling of skin    Additional Instructions:    Avoid people with colds, flu-like symptoms, or any sign of infection  Drink plenty of fluids-attempt to consume 2-3 liters ( ounces) of fluids/24 hour period  Continue low microbial diet until instructed by physician to resume normal diet  Bring all of your medications with you to your doctor's appointments  Bring your current medicine list to each hospital and office visit    Home Health Care Agency: ***    You are being discharged with IV access due to need for ongoing therapy.  Below is pertinent information regarding your IV that your next provider may need to know:  Type:  ***                        Date of placement:  ***  Surgeon:  ***  Plan:{CHP CONTINUE, INSERT, REMOVE:32953}   Next dressing change due on: ***  Cap changes due on: ***  CVC care and maintenance was reviewed with patient and {Blank multiple:69193::\"spouse\",\"family members\",\"caregiver\"}.  Pt verbalizes understanding of line care and maintenance.      2/18/2025 4:37 PM  Tamy Isaac  Discharge Checklist    Here at the [facility], we want to make sure you have the help you will need once you leave the hospital.  We are going to go over your discharge instructions with you. We give these to you in writing so you will have a reference if you have questions about symptoms or problems to look for after you leave the hospital.     We know you want to feel better and get home soon. Please answer these questions so we can be sure you have what you need, your questions are answered, and you feel prepared for discharge.    Yes No Do you understand your diagnosis?  Yes No Do you know when and who you need to follow up with?  Yes No Do you feel ready to go home & take care of your daily needs?  Yes No Do you have the help you need at home?  Yes No Do you understand what medications your are taking?  Yes No Do you understand what your medications are for?  Yes No Do you understand what medication side effects to watch for?  Yes No Do you know what symptoms or health problems that require an immediate call to your physician?  Yes No Do you feel ready for discharge?  Yes No Are there any questions that you have re: how to care for yourself at home?  Yes No Do you know about MyChart?    If you have any questions after you get home, feel free to call the unit and ask to speak with your nurse.  In about 7-10 days you will receive a survey. We value your opinion and hope that you have received care that will enable you to choose the best scores when completing the survey.    It was our pleasure to take care of you,  [facility] Staff                                                              [facility]   Inpatient Unit           [phone]                                                     Outpatient Infusion [phone]    [facility] Physician Office [phone]  [facility] Testing or Procedural Scheduling [phone] ([facility])

## 2025-02-18 NOTE — PLAN OF CARE
Problem: Safety - Adult  Goal: Free from fall injury  2/18/2025 0549 by Alejandra Zuniga RN  Outcome: Progressing  Flowsheets (Taken 2/18/2025 0549)  Free From Fall Injury: Instruct family/caregiver on patient safety     Problem: Metabolic/Fluid and Electrolytes - Adult  Goal: Electrolytes maintained within normal limits  2/18/2025 0549 by Alejandra Zuniga RN  Outcome: Progressing  Flowsheets (Taken 2/18/2025 0549)  Electrolytes maintained within normal limits:   Monitor labs and assess patient for signs and symptoms of electrolyte imbalances   Administer electrolyte replacement as ordered   Monitor response to electrolyte replacements, including repeat lab results as appropriate     Problem: Hematologic - Adult  Goal: Maintains hematologic stability  2/18/2025 0549 by Alejandra Zuniga RN  Outcome: Progressing  Flowsheets (Taken 2/18/2025 0549)  Maintains hematologic stability:   Assess for signs and symptoms of bleeding or hemorrhage   Monitor labs for bleeding or clotting disorders     Problem: Nutrition Deficit:  Goal: Optimize nutritional status  2/18/2025 0549 by Alejandra Zuniga RN  Outcome: Progressing  Flowsheets (Taken 2/18/2025 0549)  Nutrient intake appropriate for improving, restoring, or maintaining nutritional needs:   Monitor oral intake, labs, and treatment plans   Provide specific nutrition education to patient or family as appropriate     Problem: Skin/Tissue Integrity - Adult  Goal: Skin integrity remains intact  2/18/2025 0549 by Alejandra Zuniga RN  Outcome: Progressing  Flowsheets (Taken 2/18/2025 0549)  Skin Integrity Remains Intact:   Monitor for areas of redness and/or skin breakdown   Every 4-6 hours minimum: Change oxygen saturation probe site     Problem: Musculoskeletal - Adult  Goal: Return mobility to safest level of function  2/18/2025 0549 by Alejandra Zuniga RN  Outcome: Progressing  Flowsheets (Taken 2/18/2025 0549)  Return Mobility to Safest Level of Function:   Assess patient stability and

## 2025-02-18 NOTE — PROGRESS NOTES
Nephrology Progress Note                                                                                                                                                                                                                                                                                                                                                               Office : [phone]     Fax :[phone]    Patient's Name: Leslee Maldonado  10:05 AM  2/18/2025    Reason for Consult:  acidoses   Requesting Physician:  Raj Cedeño MD  Chief Complaint:  No chief complaint on file.      Assessment/Plan     Acidosis - Resolved   Levels better   From diarrhea   Hypernatremia - Resolved   Encourage free water   Colitis   Severe diarrhea - resolving   Steroids & octreotide per heme   IgG Lambda Multiple Myeloma  Hematology on board   Hypokalemia - replete PRN  HypoMg - resolved   Hypophosphatemia - replete PRN  Hypo-Ca -low vit D - Vit D 50k units weekly   Edema - Lasix PRN      History of Present Ilness:    Leslee Maldonado is a 69 y.o. female with history of refractory IgG lambda multiple myeloma, coronary disease who recently underwent CAR-T cell therapy with Carvykti on October 28. She has been doing well except for diarrhea. For the past 2 months she is lost 20 pounds in overall since September she is lost 30 pounds. 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 She has been having diarrhea for the past 6 to 8 weeks. It has been progressively getting worse. She is not able to eat well due to the diarrhea. She denies any nausea or vomiting. She denies any blood in her stools. She had C. difficile checked on January 3 that was negative. She is being admitted to the hospital for diarrhea weight loss and GI workup.     Interval hx:    K low - getting IV replacement   Na up   BP's controlled  Getting a dose of IV Lasix today for swelling       Past Medical History:   Diagnosis  02/16/25  0401 02/17/25  0359 02/18/25  0335   WBC 4.4 5.5 4.8   HGB 9.3* 9.2* 8.8*   PLT 67* 62* 61*     BMP:    Recent Labs     02/17/25  0358 02/17/25  1513 02/18/25  0335    144 147*   K 3.4* 3.9 3.0*    109 110   CO2 27 27 30   BUN <2* 3* <2*   CREATININE 0.7 0.7 0.7   GLUCOSE 187* 205* 149*     Ca/Mg/Phos:   Recent Labs     02/16/25  0401 02/16/25  1600 02/17/25 0358 02/17/25 1513 02/18/25  0335   CALCIUM 6.5*   < > 6.6* 6.3* 6.4*   MG 2.09  --  1.84  --  1.68*   PHOS 1.9*  --  1.8*  --  2.7    < > = values in this interval not displayed.     Hepatic:   Recent Labs     02/16/25  0401 02/17/25 0358   AST  --  39*   ALT  --  36   BILITOT 0.4 0.4   ALKPHOS  --  40     Troponin: No results for input(s): \"TROPONINI\" in the last 72 hours.  BNP: No results for input(s): \"BNP\" in the last 72 hours.  Lipids: No results for input(s): \"CHOL\", \"TRIG\", \"HDL\" in the last 72 hours.    Invalid input(s): \"LDLCALC\", \"LABVLDL\"  ABGs: No results for input(s): \"PHART\", \"PO2ART\", \"ICR1KML\" in the last 72 hours.  INR:   Recent Labs     02/17/25 0358   INR 1.45*           UA:  Recent Labs     02/17/25 1649   COLORU Yellow   CLARITYU Clear   GLUCOSEU 250*   BILIRUBINUR Negative   KETUA Negative   BLOODU TRACE*   PHUR 6.0   PROTEINU 30*   UROBILINOGEN 0.2   NITRU Negative   LEUKOCYTESUR Negative   URINETYPE Voided          Urine Microscopic:   Recent Labs     02/17/25 1649   BACTERIA 1+*   WBCUA 3-5   RBCUA 0-2       Urine Culture: No results for input(s): \"LABURIN\" in the last 72 hours.  Urine Chemistry: No results for input(s): \"CLUR\", \"LABCREA\", \"PROTEINUR\", \"NAUR\" in the last 72 hours.      IMAGING:  XR CHEST PORTABLE   Final Result      No evidence of acute cardiopulmonary process.  No significant change.      Electronically signed by MD Morris Pereira      XR CHEST PORTABLE   Final Result   1. Stable exam with no acute abnormality.      Electronically signed by Simon Gamble      XR CHEST PORTABLE   Final Result

## 2025-02-19 NOTE — PROGRESS NOTES
[facility] Progress Note    2025     Leslee Maldonado    MRN: [id number]    : 1955    Referring MD: Dominique Lopez DO  [address]  [address]  [place] [address]      Interval History:   - 650 diarrhea in the past 24 hours  - Octreotide and steroid dosing decreased 25, stop 25  Advance her diet today, encouraged protein intake     ECOG PS:  (2) Ambulatory and capable of self care, unable to carry out work activity, up and about > 50% or waking hours    KPS: 70% Cares for self; unable to carry on normal activity or to do active work    Isolation: None    Medications    Scheduled Meds:   valGANciclovir  900 mg Oral BID    octreotide  100 mcg SubCUTAneous Q12H    vitamin D  50,000 Units Oral Weekly    predniSONE  55 mg Oral Daily    [START ON 2025] predniSONE  45 mg Oral Daily    budesonide  3 mg Oral TID    potassium bicarb-citric acid  40 mEq Oral BID    diphenoxylate-atropine  1 tablet Oral 4x Daily    loperamide  4 mg Oral 4x Daily    insulin lispro  0-4 Units SubCUTAneous 4x Daily AC & HS    enoxaparin  40 mg SubCUTAneous QPM    thiamine  100 mg Oral Daily    multivitamin  1 tablet Oral Daily    voriconazole  200 mg Oral 2 times per day    sodium chloride flush  5-40 mL IntraVENous 2 times per day    Saline Mouthwash  15 mL Swish & Spit 4x Daily AC & HS    [Held by provider] metoprolol tartrate  25 mg Oral BID    pantoprazole  40 mg Oral QAM AC    sulfamethoxazole-trimethoprim  1 tablet Oral Daily     Continuous Infusions:   sodium chloride      dextrose      sodium chloride       PRN Meds:.calcium carbonate, sodium chloride, glucose, dextrose bolus **OR** dextrose bolus, glucagon (rDNA), dextrose, sodium phosphate IVPB (CENTRAL line), sodium chloride flush, sodium chloride, potassium chloride, magnesium sulfate, acetaminophen **OR** [DISCONTINUED] acetaminophen, Saline Mouthwash, ALTEplase (CATHFLO) 2 mg in sterile water 2 mL injection    ROS:  As noted above, otherwise remainder of  GI for colonoscopy with biopsies 1/29/25: Colonoscopy yesterday biopsies: Random colon, forceps biopsy: Multiple fragments of benign colonic mucosa with increased edema and chronic inflammation of the lamina propria with focal area of acute   colitis with acute cryptitis. Negative for microscopic colitis, granulomas and dysplasia/malignancy. CMV negative on biopsy.  - CT abd /pelvis 1/29/25:  Questionable wall thickening within the colon at the splenic flexure could represent peristalsis or mass. Correlate with colonoscopy when able. Fluid extending into the distal rectum compatible with diarrhea.  Colonic diverticulosis   - Stool for viral studies:  Negative  - Repeat c.diff: Negative    PLAN:  Steroid Taper:       - Decrease by 20% every 7 days: Consider more rapid taper as she may be refractory to steroids  - Solu-medrol 70 mg daily 1/29/25-2/8/25  - Prednisone 70 mg daily started 2/10/25-2/16/25  - Prednisone 55 mg daily 2/17/25- 2/23/25  - Prednisone 45 mg daily 2/24/25-3/2/25 and lomotil  - Continue Imodium- schedule starting 2/5/25  - Infliximab 2/7/25:  then weekly (4-8 total doses) Dose #2: 2/13/24   - Add budesonide 3 mg TID 2/7/25   - Add octreotide 100 mg q8h 2/7/25--> decreased to q12h 2/17/25--> receive 2 doses 2/18/25, 1 dose 2/19/25 and then stop     7.  Endo  - Type 2 DM:  well-controlled with diet  - Accu checks ACHS with addition of steroids  - Low dose SSI     8.  Cardiac:  stable  Hx of RBBB, CAD s/p CABG & PCI:  - 6/11/19:  PCI recanalization of occluded mid-left Cx (residual 70% stenosis)  - 6/13/22:  CABG - LIMA to LAD, reverse saphenous v. to OM1, reverse saphenous v. to OM2, reverse saphenous v. to PDA  - 10/25/22:  CARLO x 2 (proximal & ostial aspect of saphenous v. graft to OM3)  Hyperlipidemia:  - Hold rosuvastatin 40 mg qd during CAR-T  Hypertension:  - Cont Metoprolol 25 mg BID - on hold  - hold amlodipine and lisinopril since hypotensive since diarrhea      9.  MSK: Chronic back, hip,

## 2025-02-19 NOTE — PROGRESS NOTES
Nephrology Progress Note                                                                                                                                                                                                                                                                                                                                                               Office : [phone]     Fax :[phone]    Patient's Name: Leslee Maldonado  11:08 AM  2/19/2025    Reason for Consult:  acidoses   Requesting Physician:  Raj Cedeño MD  Chief Complaint:  No chief complaint on file.      Assessment/Plan     Alkalosis from K-citric acid   Continue current dissolvable K   Start Acetazolamide daily   Acidosis   Resolved   Diarrhea improved  Hypernatremia - Resolved   Encourage free water   Colitis   Severe diarrhea - resolving   Steroids & octreotide per heme   IgG Lambda Multiple Myeloma  Hematology on board   Hypokalemia - cont scheduled K replacement   HypoMg - replete PRN   Hypophosphatemia - replete PRN  Hypo-Ca -low vit D - Vit D 50k units weekly   Edema - Lasix PRN      History of Present Ilness:    Leslee Maldonado is a 69 y.o. female with history of refractory IgG lambda multiple myeloma, coronary disease who recently underwent CAR-T cell therapy with Carvykti on October 28. She has been doing well except for diarrhea. For the past 2 months she is lost 20 pounds in overall since September she is lost 30 pounds. 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 She has been having diarrhea for the past 6 to 8 weeks. It has been progressively getting worse. She is not able to eat well due to the diarrhea. She denies any nausea or vomiting. She denies any blood in her stools. She had C. difficile checked on January 3 that was negative. She is being admitted to the hospital for diarrhea weight loss and GI workup.     Interval hx:    S/p dose of IV Lasix yesterday   K low -

## 2025-02-19 NOTE — PLAN OF CARE
Problem: Safety - Adult  Goal: Free from fall injury  Outcome: Progressing  Note: Orthostatic vital signs obtained at start of shift - see flowsheet for details.  Pt does not meet criteria for orthostasis.  Pt is a Low fall risk. See Ayers Fall Score and ABCDS Injury Risk assessments.   - Screening for Orthostasis AND not a [place] Risk per AYERS/ABCDS: Pt bed is in low position, side rails up, call light and belongings are in reach.  Fall risk light is on outside pts room.  Pt encouraged to call for assistance as needed. Will continue with hourly rounds for PO intake, pain needs, toileting and repositioning as needed.      Problem: ABCDS Injury Assessment  Goal: Absence of physical injury  Outcome: Progressing  Note: No new reported or observed physical injuries this shift.      Problem: Hematologic - Adult  Goal: Maintains hematologic stability  Outcome: Progressing  Note: Patient's hemoglobin this AM:   Recent Labs     02/18/25  0335   HGB 8.8*     Patient's platelet count this AM:   Recent Labs     02/18/25  0335   PLT 61*    Thrombocytopenia Precautions in place.  Patient showing no signs or symptoms of active bleeding.  Transfusion not indicated at this time.  Patient verbalizes understanding of all instructions. Will continue to assess and implement POC. Call light within reach and hourly rounding in place.

## 2025-02-19 NOTE — PLAN OF CARE
Problem: Chronic Conditions and Co-morbidities  Goal: Patient's chronic conditions and co-morbidity symptoms are monitored and maintained or improved  Outcome: Progressing     Problem: Safety - Adult  Goal: Free from fall injury  Outcome: Progressing     Problem: ABCDS Injury Assessment  Goal: Absence of physical injury  Outcome: Progressing     Problem: Metabolic/Fluid and Electrolytes - Adult  Goal: Electrolytes maintained within normal limits  Outcome: Progressing     Problem: Hematologic - Adult  Goal: Maintains hematologic stability  Outcome: Progressing     Problem: Nutrition Deficit:  Goal: Optimize nutritional status  Outcome: Progressing     Problem: Skin/Tissue Integrity - Adult  Goal: Skin integrity remains intact  Outcome: Progressing     Problem: Musculoskeletal - Adult  Goal: Return mobility to safest level of function  Outcome: Progressing     Problem: Gastrointestinal - Adult  Goal: Maintains or returns to baseline bowel function  Outcome: Progressing     Problem: Infection - Adult  Goal: Absence of infection at discharge  Outcome: Progressing

## 2025-02-20 NOTE — PROGRESS NOTES
[facility] Progress Note    2025     Leslee Maldonado    MRN: [id number]    : 1955    Referring MD: Dominique Lopez DO  [address]  [address]  [place] [address]      Interval History:   - 1800 ml diarrhea in the past 24 hours  - Octreotide and steroid dosing decreased 25, stop 25  Advance her diet today, encouraged protein intake     ECOG PS:  (2) Ambulatory and capable of self care, unable to carry out work activity, up and about > 50% or waking hours    KPS: 70% Cares for self; unable to carry on normal activity or to do active work    Isolation: None    Medications    Scheduled Meds:   potassium bicarb-citric acid  40 mEq Oral TID    acetaZOLAMIDE  250 mg Oral Daily    valGANciclovir  900 mg Oral BID    vitamin D  50,000 Units Oral Weekly    predniSONE  55 mg Oral Daily    [START ON 2025] predniSONE  45 mg Oral Daily    budesonide  3 mg Oral TID    loperamide  4 mg Oral 4x Daily    insulin lispro  0-4 Units SubCUTAneous 4x Daily AC & HS    enoxaparin  40 mg SubCUTAneous QPM    thiamine  100 mg Oral Daily    multivitamin  1 tablet Oral Daily    voriconazole  200 mg Oral 2 times per day    sodium chloride flush  5-40 mL IntraVENous 2 times per day    Saline Mouthwash  15 mL Swish & Spit 4x Daily AC & HS    [Held by provider] metoprolol tartrate  25 mg Oral BID    pantoprazole  40 mg Oral QAM AC    sulfamethoxazole-trimethoprim  1 tablet Oral Daily     Continuous Infusions:   sodium chloride      dextrose      sodium chloride       PRN Meds:.diphenoxylate-atropine, artificial tears, calcium carbonate, sodium chloride, glucose, dextrose bolus **OR** dextrose bolus, glucagon (rDNA), dextrose, sodium phosphate IVPB (CENTRAL line), sodium chloride flush, sodium chloride, potassium chloride, magnesium sulfate, acetaminophen **OR** [DISCONTINUED] acetaminophen, Saline Mouthwash, ALTEplase (CATHFLO) 2 mg in sterile water 2 mL injection    ROS:  As noted above, otherwise remainder of 10-point

## 2025-02-20 NOTE — PROGRESS NOTES
Nephrology Progress Note                                                                                                                                                                                                                                                                                                                                                               Office : [phone]     Fax :[phone]    Patient's Name: Leslee Maldonado  12:47 PM  2/20/2025    Reason for Consult:  acidoses   Requesting Physician:  Raj Cedeño MD  Chief Complaint:  No chief complaint on file.      Assessment/Plan     Alkalosis from K-citric acid   Continue current dissolvable K   Start Acetazolamide daily   Acidosis   Resolved   Hypernatremia - Resolved   Encourage free water   Colitis   From CAR-T therapy   Severe diarrhea  Steroids & octreotide per heme   IgG Lambda Multiple Myeloma  Hematology on board   Hypokalemia - cont scheduled K replacement   HypoMg - replete PRN   Hypophosphatemia - replete PRN  Hypo-Ca -low vit D - Vit D 50k units weekly   Edema - Lasix PRN      History of Present Ilness:    Leslee Maldonado is a 69 y.o. female with history of refractory IgG lambda multiple myeloma, coronary disease who recently underwent CAR-T cell therapy with Carvykti on October 28. She has been doing well except for diarrhea. For the past 2 months she is lost 20 pounds in overall since September she is lost 30 pounds. 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 She has been having diarrhea for the past 6 to 8 weeks. It has been progressively getting worse. She is not able to eat well due to the diarrhea. She denies any nausea or vomiting. She denies any blood in her stools. She had C. difficile checked on January 3 that was negative. She is being admitted to the hospital for diarrhea weight loss and GI workup.     Interval hx:    Diarrhea still continue to be an issue  Bicarb up - not  PORTABLE   Final Result      No evidence of acute cardiopulmonary process.  No significant change.      Electronically signed by MD Morris Pereira      XR CHEST PORTABLE   Final Result   1. Stable exam with no acute abnormality.      Electronically signed by Simon Gamble      XR CHEST PORTABLE   Final Result      1. No evidence for acute cardiopulmonary disease         Electronically signed by Keon Puri MD      CT ABDOMEN PELVIS W IV CONTRAST Additional Contrast? None   Final Result      1. Questionable wall thickening within the colon at the splenic flexure could represent peristalsis or mass. Correlate with colonoscopy when able. Fluid extending into the distal rectum compatible with diarrhea.   2. No evidence of bowel obstruction   3. Colonic diverticulosis.      Electronically signed by Raj Cordova          Medical Decision Making:  The following items were considered in medical decision making:  Discussion of patient care with other providers  Reviewed clinical lab tests  Reviewed radiology tests  Reviewed other diagnostic tests/interventions    Zacarias Olivas MD   [facility]  Office : [phone] or through [employer]  Fax :[phone]

## 2025-02-20 NOTE — PLAN OF CARE
Problem: Safety - Adult  Goal: Free from fall injury  2/20/2025 1141 by Fatuma Reyes, RN  Outcome: Progressing  Flowsheets (Taken 2/20/2025 1141)  Free From Fall Injury: Instruct family/caregiver on patient safety  Note: Orthostatic vital signs obtained at start of shift - see flowsheet for details.  Pt does not meet criteria for orthostasis.  Pt is a Med fall risk. See Ayers Fall Score and ABCDS Injury Risk assessments.     - Screening for Orthostasis AND not a [place] Risk per AYERS/ABCDS: Pt bed is in low position, side rails up, call light and belongings are in reach.  Fall risk light is on outside pts room.  Pt encouraged to call for assistance as needed. Will continue with hourly rounds for PO intake, pain needs, toileting and repositioning as needed.        Problem: Metabolic/Fluid and Electrolytes - Adult  Goal: Electrolytes maintained within normal limits  2/20/2025 1141 by Fatuma Reyes RN  Outcome: Progressing  Flowsheets (Taken 2/20/2025 1141)  Electrolytes maintained within normal limits:   Monitor labs and assess patient for signs and symptoms of electrolyte imbalances   Administer electrolyte replacement as ordered     Problem: Hematologic - Adult  Goal: Maintains hematologic stability  2/20/2025 1141 by Fatuma Reyes, RN  Outcome: Progressing  Flowsheets (Taken 2/20/2025 1141)  Maintains hematologic stability:   Administer blood products/factors as ordered   Assess for signs and symptoms of bleeding or hemorrhage  Note: Patient's hemoglobin this AM:   Recent Labs     02/20/25  0338   HGB 9.2*     Patient's platelet count this AM:   Recent Labs     02/20/25  0338   PLT 55*    Thrombocytopenia Precautions in place.  Patient showing no signs or symptoms of active bleeding.  Transfusion not indicated at this time.  Patient verbalizes understanding of all instructions. Will continue to assess and implement POC. Call light within reach and hourly rounding in place.        Problem: Nutrition  Deficit:  Goal: Optimize nutritional status  2/20/2025 1141 by Fatuma Reyes, RN  Outcome: Progressing  Flowsheets (Taken 2/20/2025 1141)  Nutrient intake appropriate for improving, restoring, or maintaining nutritional needs:   Assess nutritional status and recommend course of action   Monitor oral intake, labs, and treatment plans   Recommend appropriate diets, oral nutritional supplements, and vitamin/mineral supplements     Problem: Gastrointestinal - Adult  Goal: Maintains or returns to baseline bowel function  2/20/2025 1141 by Fatuma Reyes, RN  Outcome: Progressing  Flowsheets (Taken 2/20/2025 1141)  Maintains or returns to baseline bowel function:   Assess bowel function   Encourage oral fluids to ensure adequate hydration   Encourage mobilization and activity   Nutrition consult to assist patient with appropriate food choices

## 2025-02-20 NOTE — PLAN OF CARE
Problem: Safety - Adult  Goal: Free from fall injury  Outcome: Progressing  Free From Fall Injury: Instruct family/caregiver on patient safety  Note: Orthostatic vital signs obtained at start of shift - see flowsheet for details.  Pt meets criteria for orthostasis.  Pt is a Low fall risk. See Ayers Fall Score and ABCDS Injury Risk assessments.   - Screening for Orthostasis AND not a [place] Risk per AYERS/ABCDS: Pt bed is in low position, side rails up, call light and belongings are in reach.  Fall risk light is on outside pts room.  Pt encouraged to call for assistance as needed. Will continue with hourly rounds for PO intake, pain needs, toileting and repositioning as needed.       Problem: Chronic Conditions and Co-morbidities  Goal: Patient's chronic conditions and co-morbidity symptoms are monitored and maintained or improved  Outcome: Progressing     Problem: Metabolic/Fluid and Electrolytes - Adult  Goal: Electrolytes maintained within normal limits  Electrolytes maintained within normal limits: Monitor labs and assess patient for signs and symptoms of electrolyte imbalances  Note: .potassium at 3.1, replacements initiated

## 2025-02-20 NOTE — PROGRESS NOTES
Comprehensive Nutrition Assessment    RECOMMENDATIONS:  PO Diet: GV 2  Nutrition Supplement: Ensure Clear BID, Expedite (gelatin, 10 g protein) daily  Nutrition Education: Education/Counseling completed (GVH-like diet)     NUTRITION ASSESSMENT:   Nutritional summary & status: Follow up. 650 ml stool output 2 days ago, worse yesterday, 1800 ml in 24 hrs. Last day of ocreotide yesterday. On steroid taper, CMV+. Receiving Infliximab. Discussed need for increased protein intake yesterday, pt consumed ~36g for lunch and same amount for dinner. Multiple supplements ordered; did not receive Ensure Clear (will investigate), consuming Expedite for extra protein (10 g) daily. Weight stable/wt gain during admit, -44L. If pt were to remain inpatient and stool output continues to worsen/ continue ~1800 ml, recommed bowel rest and TPN. Noted pt appears to be third spacing.     Admission // PMH: Diarrhea // MM s/p CAR-T 10/2024, ASCT 2/20219    MALNUTRITION ASSESSMENT  Context of Malnutrition: Acute Illness (on chronic)   Malnutrition Status: Severe malnutrition  Findings of the 6 clinical characteristics of malnutrition (Minimum of 2 out of 6 clinical characteristics is required to make the diagnosis of moderate or severe Protein Calorie Malnutrition based on AND/ASPEN Guidelines):  Energy Intake:  50% or less of estimated energy requirements for 5 or more days  Weight Loss:  (S) Greater than 7.5% over 3 months (25#/12% in 2 months)     Body Fat Loss:  No body fat loss     Muscle Mass Loss:  No muscle mass loss        NUTRITION DIAGNOSIS   Severe malnutrition, in context of acute illness or injury related to altered GI function as evidenced by criteria as identified in malnutrition assessment    Nutrition Monitoring and Evaluation:   Food/Nutrient Intake Outcomes:  Food and Nutrient Intake, Supplement Intake  Physical Signs/Symptoms Outcomes:  Biochemical Data, GI Status, Diarrhea, Nausea or Vomiting     OBJECTIVE DATA:

## 2025-02-21 NOTE — PLAN OF CARE
Problem: Metabolic/Fluid and Electrolytes - Adult  Goal: Electrolytes maintained within normal limits  Outcome: Not Progressing  Electrolytes maintained within normal limits: Monitor labs and assess patient for signs and symptoms of electrolyte imbalances  Note: Pt potassium is at 2.8 this AM, replacements have been initiated.      Problem: Gastrointestinal - Adult  Goal: Maintains or returns to baseline bowel function  Outcome: Not Progressing  Maintains or returns to baseline bowel function: Assess bowel function  Note: Pt had had an increase in diarrhea this shift despite PRN lomotil.     Problem: Safety - Adult  Goal: Free from fall injury  Outcome: Progressing  Free From Fall Injury: Instruct family/caregiver on patient safety  Note: Orthostatic vital signs obtained at start of shift - see flowsheet for details.  Pt meets criteria for orthostasis.  Pt is a Low fall risk. See Ayers Fall Score and ABCDS Injury Risk assessments.   - Screening for Orthostasis AND not a [place] Risk per AYERS/ABCDS: Pt bed is in low position, side rails up, call light and belongings are in reach.  Fall risk light is on outside pts room.  Pt encouraged to call for assistance as needed. Will continue with hourly rounds for PO intake, pain needs, toileting and repositioning as needed.

## 2025-02-21 NOTE — CARE COORDINATION
Spoke with pt at bedside. She denied SW needs at this time.     She will return home when able with no anticipated SW needs.     Discussed with Tamy ULLOA and Fatuma ULLOA    SW will follow    MELISSA Lee   for [facility] ([facility])  "[employer] Mobile: [phone]

## 2025-02-21 NOTE — PLAN OF CARE
Problem: Safety - Adult  Goal: Free from fall injury  2/21/2025 1454 by Fatuma Reyes, RN  Outcome: Progressing  Flowsheets (Taken 2/21/2025 1454)  Free From Fall Injury: Instruct family/caregiver on patient safety  Note: Orthostatic vital signs obtained at start of shift - see flowsheet for details.  Pt does not meet criteria for orthostasis.  Pt is a Med fall risk. See Ayers Fall Score and ABCDS Injury Risk assessments.   - Screening for Orthostasis AND not a [place] Risk per AYERS/ABCDS: Pt bed is in low position, side rails up, call light and belongings are in reach.  Fall risk light is on outside pts room.  Pt encouraged to call for assistance as needed. Will continue with hourly rounds for PO intake, pain needs, toileting and repositioning as needed.        Problem: Metabolic/Fluid and Electrolytes - Adult  Goal: Electrolytes maintained within normal limits  2/21/2025 1454 by Fatuma Reyes, RN  Outcome: Progressing  Flowsheets (Taken 2/21/2025 1454)  Electrolytes maintained within normal limits:   Monitor labs and assess patient for signs and symptoms of electrolyte imbalances   Administer electrolyte replacement as ordered   Monitor response to electrolyte replacements, including repeat lab results as appropriate     Problem: Hematologic - Adult  Goal: Maintains hematologic stability  2/21/2025 1454 by Fatuma Reyes, RN  Outcome: Progressing  Flowsheets (Taken 2/21/2025 1454)  Maintains hematologic stability:   Assess for signs and symptoms of bleeding or hemorrhage   Administer blood products/factors as ordered   Monitor labs for bleeding or clotting disorders  Note: Patient's hemoglobin this AM:   Recent Labs     02/21/25 0356   HGB 8.6*     Patient's platelet count this AM:   Recent Labs     02/21/25 0356   PLT 53*    Thrombocytopenia Precautions in place.  Patient showing no signs or symptoms of active bleeding.  Transfusion not indicated at this time.  Patient verbalizes understanding of all

## 2025-02-21 NOTE — PROGRESS NOTES
[facility] Progress Note    2025     Leslee Maldonado    MRN: [id number]    : 1955    Referring MD: Dominique Lopez DO  [address]  [address]  [place] [address]      Interval History:   Worsening diarrhea. Advised to switch back to level 1 GVHD diet. Scheduled imodium and lomotil. Infliximab today, pending jakafi approval     ECOG PS:  (2) Ambulatory and capable of self care, unable to carry out work activity, up and about > 50% or waking hours    KPS: 70% Cares for self; unable to carry on normal activity or to do active work    Isolation: None    Medications    Scheduled Meds:   diphenoxylate-atropine  1 tablet Oral 4x daily    predniSONE  45 mg Oral Daily    [START ON 2025] predniSONE  35 mg Oral Daily    atovaquone  1,500 mg Oral Daily    [START ON 2025] predniSONE  25 mg Oral Daily    [START ON 3/2/2025] predniSONE  15 mg Oral Daily    [START ON 3/5/2025] predniSONE  5 mg Oral Daily    inFLIXimab/infliximab biosimilar  10 mg/kg IntraVENous Once    potassium bicarb-citric acid  40 mEq Oral TID    acetaZOLAMIDE  250 mg Oral Daily    valGANciclovir  900 mg Oral BID    vitamin D  50,000 Units Oral Weekly    budesonide  3 mg Oral TID    loperamide  4 mg Oral 4x Daily    insulin lispro  0-4 Units SubCUTAneous 4x Daily AC & HS    thiamine  100 mg Oral Daily    multivitamin  1 tablet Oral Daily    voriconazole  200 mg Oral 2 times per day    sodium chloride flush  5-40 mL IntraVENous 2 times per day    Saline Mouthwash  15 mL Swish & Spit 4x Daily AC & HS    [Held by provider] metoprolol tartrate  25 mg Oral BID    pantoprazole  40 mg Oral QAM AC     Continuous Infusions:   potassium chloride 40 mEq in sodium chloride 0.9 % 1,000 mL infusion      sodium chloride      dextrose      sodium chloride       PRN Meds:.artificial tears, calcium carbonate, sodium chloride, glucose, dextrose bolus **OR** dextrose bolus, glucagon (rDNA), dextrose, sodium phosphate IVPB (CENTRAL line), sodium chloride  grade 1 - fever only  Left facial nerve weakness (11/18/24):   - Head CT (11/19/24): Neg  - Viral studies 11/19/24: CMV: Not Detected,  HHV6, EBV,  Parvo Negative  - LP (11/20/24): Meningitis panel negative, Crypto Ag negative  - MRI w/ orbits (11/20/24): no acute intracranial abnormality,  - S/p Prednisone taper: 40 mg daily (11/20/24), 30 mg PO daily (11/27/24), 20 mg PO daily (11/30/24), 10 mg (12/7/24), 5 mg (12/17/24) & stopped 12/23/24.   - S/p Keppra 500 mg bid - stopped 12/27/24      3.  ID:  Afebrile   - Cont Vfend (changed from Acyclovir with addition of HD steroids) & Bactrim ppx- changed to mepron 2/20/25 with thrombocytopenia     - Cdiff 1/28/25: Negative   - GI pathogen panel 1/28/25: Negative      Hypogammaglobulinemia:  - Monitor monthly and replace if <400   - IgG (11/27/24): 395 - s/p IVIG 12/4/24   - IgG (12/30/24): 269 - s/p IVIG 1/7/24  - IgG (1/21/25) 883  - IgG (2/18/25): 497       CD4: monitor monthly starting at 6 months  12/2/24: 244     CMV:   - 1/28/25: Detected not quantified  - 2/3/25:  Detected not quantified  - 2/10/25: 60  - 2/17/25: 171    PLAN:   - Valcyte 900 mg BID (2/18/25)     4.  Heme:   Anemia and thrombocytopenia  - Transfuse for Hgb < 7 and Platelets < 10K  - No transfusion today  Hypofibrinogenemia:  - Fibrinogen low, check daily  - Transfuse cryo if <100       5.  Metabolic:    Hypokalemia, HypoMg, hypophosphatemia, hypocalcemia  - Replace mag and K Per prn orders  - Monitor BMP BID  - Sodium phosphate 30 mmol PRN  Metabolic Acidosis 2/2 Diarrhea--> resolved   - Nephrology consulted for assistance, appreciate recs  - Replace GI losses per SSE protocol  Fluid Overload  - BLE edema  - Weight up 13 lbs since admission  - IVF stopped 2/18/25  - 40 mg IV Lasix 2/18/25    6.  GI / Nutrition:   Nutrition:    - Appetite and oral intake is adequate  - GvHD 1 diet  - Follow closely with dietician  - Continue multivitamin   - Continue thiamine   GERD:   - well-controlled  - Tums PRN

## 2025-02-21 NOTE — CARE COORDINATION
Patient notified this writer that her prescriptions that were sent to [facility] have been picked up and delivered to her home.  Atovaquone was sent to [facility] and will be available when discharged.     Patient Hawa has been approved and copay covered with copay [facility].  This medication will be available today.  Patient , provider and NP notified.

## 2025-02-21 NOTE — PROGRESS NOTES
Nephrology Progress Note                                                                                                                                                                                                                                                                                                                                                               Office : [phone]     Fax :[phone]    Patient's Name: Leslee Maldonado  11:32 AM  2/21/2025    Reason for Consult:  acidoses   Requesting Physician:  Raj Cedeño MD  Chief Complaint:  No chief complaint on file.      Assessment/Plan     Hypokalemia   From GI losses  Add Spironolactone   Alkalosis from K-citric acid   Continue current dissolvable K   Continue Acetazolamide every other day   Acidosis   Resolved   Hypernatremia - Resolved   Encourage free water   Colitis   From CAR-T therapy   Severe diarrhea  Steroids & octreotide per heme   IgG Lambda Multiple Myeloma  Hematology on board   HypoMg - replete PRN   Hypophosphatemia - replete PRN  Hypo-Ca -low vit D - Vit D 50k units weekly   Edema - Lasix PRN      History of Present Ilness:    Leslee Maldonado is a 69 y.o. female with history of refractory IgG lambda multiple myeloma, coronary disease who recently underwent CAR-T cell therapy with Carvykti on October 28. She has been doing well except for diarrhea. For the past 2 months she is lost 20 pounds in overall since September she is lost 30 pounds. 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 She has been having diarrhea for the past 6 to 8 weeks. It has been progressively getting worse. She is not able to eat well due to the diarrhea. She denies any nausea or vomiting. She denies any blood in her stools. She had C. difficile checked on January 3 that was negative. She is being admitted to the hospital for diarrhea weight loss and GI workup.     Interval hx:    Diarrhea still continue to be an  °C) (Oral)   Resp 18   Ht 1.575 m (5' 2\")   Wt 77.4 kg (170 lb 9.6 oz)   SpO2 94%   BMI 31.20 kg/m²   Constitutional:  OAA X3 NAD Yes  Skin: no rash, turgor wnl  Heent:  eomi, mmm  Neck: no bruits or jvd noted  Cardiovascular:  S1, S2 without m/r/g  Respiratory: CTA B without w/r/r  Abdomen:  soft, nt, nd  Ext:  lower extremity edema yes +  Psychiatric: mood and affect stable   Musculoskeletal:  Rom, muscular strength intact    Data:   Labs:  CBC:   Recent Labs     02/19/25  0340 02/20/25  0338 02/21/25  0356   WBC 5.0 4.7 4.2   HGB 8.8* 9.2* 8.6*   PLT 55* 55* 53*     BMP:    Recent Labs     02/20/25  0338 02/20/25  1600 02/21/25  0356    143 144   K 3.1* 3.7 2.8*    108 109   CO2 32 27 28   BUN 4* 8 8   CREATININE 0.8 1.0 0.9   GLUCOSE 156* 197* 164*     Ca/Mg/Phos:   Recent Labs     02/19/25  0340 02/19/25  1630 02/20/25  0338 02/20/25  1600 02/21/25  0356   CALCIUM 6.8*   < > 6.9* 7.5* 7.4*   MG 1.59*  --  1.61*  --  1.60*   PHOS 2.4*  --  2.8  --  2.9    < > = values in this interval not displayed.     Hepatic:   Recent Labs     02/19/25  0340 02/21/25  0356   AST 41* 40*   ALT 42* 46*   BILITOT 0.4 0.5   ALKPHOS 44 46     Troponin: No results for input(s): \"TROPONINI\" in the last 72 hours.  BNP: No results for input(s): \"BNP\" in the last 72 hours.  Lipids: No results for input(s): \"CHOL\", \"TRIG\", \"HDL\" in the last 72 hours.    Invalid input(s): \"LDLCALC\", \"LABVLDL\"  ABGs: No results for input(s): \"PHART\", \"PO2ART\", \"RYL1DPA\" in the last 72 hours.  INR:   Recent Labs     02/20/25  0338   INR 1.48*           UA:  No results for input(s): \"COLORU\", \"CLARITYU\", \"GLUCOSEU\", \"BILIRUBINUR\", \"KETUA\", \"SPECGRAV\", \"BLOODU\", \"PHUR\", \"PROTEINU\", \"UROBILINOGEN\", \"NITRU\", \"LEUKOCYTESUR\", \"URINETYPE\" in the last 72 hours.    Invalid input(s): \"LABMICR\"         Urine Microscopic:   No results for input(s): \"LABCAST\", \"BACTERIA\", \"COMU\", \"HYALCAST\", \"WBCUA\", \"RBCUA\" in the last 72 hours.    Invalid input(s):

## 2025-02-22 NOTE — PROGRESS NOTES
[facility] Progress Note    2025     Leslee Maldonado    MRN: [id number]    : 1955    Referring MD: Dominique Lopez,   [address]  [address]  [place] [address]      - Diarrhea improved overnight  - Started Jakafi on 25  -She has not had any diarrhea since the Jakafi was started.  She feels relatively well.    ECOG PS:  (2) Ambulatory and capable of self care, unable to carry out work activity, up and about > 50% or waking hours    KPS: 70% Cares for self; unable to carry on normal activity or to do active work    Isolation: None    Medications    Scheduled Meds:   diphenoxylate-atropine  1 tablet Oral 4x daily    spironolactone  25 mg Oral Daily    acetaZOLAMIDE  125 mg Oral Every Other Day    ruxolitinib phosphate  5 mg Oral BID    predniSONE  45 mg Oral Daily    [START ON 2025] predniSONE  35 mg Oral Daily    atovaquone  1,500 mg Oral Daily    [START ON 2025] predniSONE  25 mg Oral Daily    [START ON 3/2/2025] predniSONE  15 mg Oral Daily    [START ON 3/5/2025] predniSONE  5 mg Oral Daily    potassium bicarb-citric acid  40 mEq Oral TID    valGANciclovir  900 mg Oral BID    vitamin D  50,000 Units Oral Weekly    budesonide  3 mg Oral TID    loperamide  4 mg Oral 4x Daily    insulin lispro  0-4 Units SubCUTAneous 4x Daily AC & HS    thiamine  100 mg Oral Daily    multivitamin  1 tablet Oral Daily    voriconazole  200 mg Oral 2 times per day    sodium chloride flush  5-40 mL IntraVENous 2 times per day    Saline Mouthwash  15 mL Swish & Spit 4x Daily AC & HS    [Held by provider] metoprolol tartrate  25 mg Oral BID    pantoprazole  40 mg Oral QAM AC     Continuous Infusions:   potassium chloride 40 mEq in sodium chloride 0.9 % 1,000 mL infusion 50 mL/hr at 25 1347    sodium chloride      dextrose      sodium chloride 5 mL/hr at 25 0658     PRN Meds:.artificial tears, calcium carbonate, sodium chloride, glucose, dextrose bolus **OR** dextrose bolus, glucagon (rDNA),

## 2025-02-22 NOTE — PLAN OF CARE
Problem: Gastrointestinal - Adult  Goal: Maintains or returns to baseline bowel function  2/21/2025 2210 by Hayley Figueredo RN  Outcome: Not Progressing  Flowsheets  Taken 2/21/2025 2210  Maintains or returns to baseline bowel function: Assess bowel function  Taken 2/21/2025 2025  Maintains or returns to baseline bowel function: Assess bowel function     Problem: Safety - Adult  Goal: Free from fall injury  2/21/2025 2210 by Hayley Figueredo RN  Outcome: Progressing  Flowsheets (Taken 2/21/2025 2210)  Free From Fall Injury: Instruct family/caregiver on patient safety     Problem: ABCDS Injury Assessment  Goal: Absence of physical injury  2/21/2025 2210 by Hayley Figueredo RN  Outcome: Progressing  Flowsheets  Taken 2/21/2025 2210  Absence of Physical Injury: Implement safety measures based on patient assessment  Taken 2/21/2025 2208  Absence of Physical Injury: Implement safety measures based on patient assessment     Problem: Skin/Tissue Integrity - Adult  Goal: Skin integrity remains intact  2/21/2025 2210 by Hayley Figueredo RN  Outcome: Progressing  Flowsheets  Taken 2/21/2025 2208  Skin Integrity Remains Intact: Monitor for areas of redness and/or skin breakdown  Taken 2/21/2025 2025  Skin Integrity Remains Intact: Monitor for areas of redness and/or skin breakdown     Problem: Musculoskeletal - Adult  Goal: Return mobility to safest level of function  2/21/2025 2210 by Hayley Figueredo RN  Outcome: Progressing  Flowsheets (Taken 2/21/2025 2025)  Return Mobility to Safest Level of Function: Assess patient stability and activity tolerance for standing, transferring and ambulating with or without assistive devices     Problem: Gastrointestinal - Adult  Goal: Maintains or returns to baseline bowel function  2/21/2025 2210 by Hayley Figueredo RN  Outcome: Not Progressing  Flowsheets  Taken 2/21/2025 2210  Maintains or returns to baseline bowel function: Assess bowel function  Taken 2/21/2025 2025  Maintains or  returns to baseline bowel function: Assess bowel function  2/21/2025 7849 by Fatuma Reyes, RN  Outcome: Progressing  Flowsheets (Taken 2/21/2025 6484)  Maintains or returns to baseline bowel function:   Assess bowel function   Encourage mobilization and activity   Nutrition consult to assist patient with appropriate food choices  2/21/2025 0940 by Fatuma Reyes, RN  Outcome: Progressing

## 2025-02-22 NOTE — PLAN OF CARE
Problem: Safety - Adult  Goal: Free from fall injury  Outcome: Progressing    Pt with activity orders for up ad sharee.  Encouraged pt to be up OOB as much as possible throughout the day and for all meals.  Encouraged frequent short naps as necessary to preserve energy but instructed that while awake, pt should be OOB.    Encouraged pt to ambulate in halls.  Pt seen up ad sharee in halls several times this shift. Pt is visualized to be OOB % of the time this shift.  Will continue to encourage frequent activity.      Problem: Metabolic/Fluid and Electrolytes - Adult  Goal: Electrolytes maintained within normal limits  Outcome: Progressing   Patient on Q 12 bmp. Patient in need of potassium and phosphorous today.     Problem: Hematologic - Adult  Goal: Maintains hematologic stability  Outcome: Progressing   Patient's hemoglobin this AM:   Recent Labs     02/22/25  0350   HGB 8.7*     Patient's platelet count this AM:   Recent Labs     02/22/25  0350   PLT 55*    Thrombocytopenia Precautions in place.  Patient showing no signs or symptoms of active bleeding.  Transfusion not indicated at this time.  Patient verbalizes understanding of all instructions. Will continue to assess and implement POC. Call light within reach and hourly rounding in place.     Problem: Gastrointestinal - Adult  Goal: Maintains or returns to baseline bowel function  Outcome: Progressing   Pt verbalize having less bm today.     Problem: Infection - Adult  Goal: Absence of infection at discharge  Outcome: Progressing   CVC site remains free of signs/symptoms of infection. No drainage, edema, erythema, pain, or warmth noted at site. Dressing changes continue per protocol and on an as needed basis - see flowsheet.       Performed CHG bath today per [facility] protocol utilizing CHG solution in the shower.  CVC site cleansed with CHG wipe over dressing, skin surrounding dressing, and first 6\" of IV tubing.  Pt tolerated well.  Continued to encourage daily

## 2025-02-23 NOTE — PLAN OF CARE
Problem: Safety - Adult  Goal: Free from fall injury  Outcome: Progressing     Problem: ABCDS Injury Assessment  Goal: Absence of physical injury  Outcome: Progressing     Problem: Metabolic/Fluid and Electrolytes - Adult  Goal: Electrolytes maintained within normal limits  Outcome: Progressing     Problem: Hematologic - Adult  Goal: Maintains hematologic stability  Outcome: Progressing     Problem: Skin/Tissue Integrity - Adult  Goal: Skin integrity remains intact  Outcome: Progressing     Problem: Musculoskeletal - Adult  Goal: Return mobility to safest level of function  Outcome: Progressing     Problem: Gastrointestinal - Adult  Goal: Maintains or returns to baseline bowel function  Outcome: Progressing

## 2025-02-23 NOTE — PROGRESS NOTES
Nephrology Progress Note                                                                                                                                                                                                                                                                                                                                                               Office : [phone]     Fax :[phone]    Patient's Name: Leslee Maldonado  10:20 AM  2/23/2025    Reason for Consult:  acidoses   Requesting Physician:  Raj Cedeño MD  Chief Complaint:  No chief complaint on file.      Assessment/Plan     Hypokalemia   From GI losses  Added Spironolactone   On K citrate   On IV fluids with K in it   Alkalosis from K-citric acid   Continue current dissolvable K   Continue Acetazolamide every other day   Acidosis   Resolved   Hypernatremia - Resolved   Encourage free water   Colitis   From CAR-T therapy   Severe diarrhea  Steroids & octreotide per heme   IgG Lambda Multiple Myeloma  Hematology on board   HypoMg - replete PRN   Hypophosphatemia - replete PRN  Hypo-Ca -low vit D - Vit D 50k units weekly   Edema - Lasix PRN      History of Present Ilness:    Leslee Maldonado is a 69 y.o. female with history of refractory IgG lambda multiple myeloma, coronary disease who recently underwent CAR-T cell therapy with Carvykti on October 28. She has been doing well except for diarrhea. For the past 2 months she is lost 20 pounds in overall since September she is lost 30 pounds. 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 She has been having diarrhea for the past 6 to 8 weeks. It has been progressively getting worse. She is not able to eat well due to the diarrhea. She denies any nausea or vomiting. She denies any blood in her stools. She had C. difficile checked on January 3 that was negative. She is being admitted to the hospital for diarrhea weight loss and GI workup.     Interval  input(s): \"CLUR\", \"LABCREA\", \"PROTEINUR\", \"NAUR\" in the last 72 hours.      IMAGING:  XR CHEST PORTABLE   Final Result      No evidence of acute cardiopulmonary process.  No significant change.      Electronically signed by MD Morris Pereira      XR CHEST PORTABLE   Final Result   1. Stable exam with no acute abnormality.      Electronically signed by Simon Gamble      XR CHEST PORTABLE   Final Result      1. No evidence for acute cardiopulmonary disease         Electronically signed by Keon Puri MD      CT ABDOMEN PELVIS W IV CONTRAST Additional Contrast? None   Final Result      1. Questionable wall thickening within the colon at the splenic flexure could represent peristalsis or mass. Correlate with colonoscopy when able. Fluid extending into the distal rectum compatible with diarrhea.   2. No evidence of bowel obstruction   3. Colonic diverticulosis.      Electronically signed by Raj Cordova      XR CHEST PORTABLE    (Results Pending)       Medical Decision Making:  The following items were considered in medical decision making:  Discussion of patient care with other providers  Reviewed clinical lab tests  Reviewed radiology tests  Reviewed other diagnostic tests/interventions    Zacarias Olivas MD   [facility]  Office : [phone] or through [employer]  Fax :[phone]

## 2025-02-23 NOTE — PLAN OF CARE
Problem: Safety - Adult  Goal: Free from fall injury  Outcome: Progressing    Pt with activity orders for up ad sharee.  Encouraged pt to be up OOB as much as possible throughout the day and for all meals.  Encouraged frequent short naps as necessary to preserve energy but instructed that while awake, pt should be OOB.    Encouraged pt to ambulate in halls.  Pt seen up ad sharee in halls several times this shift. Pt is visualized to be OOB % of the time this shift.  Will continue to encourage frequent activity.      Problem: Metabolic/Fluid and Electrolytes - Adult  Goal: Electrolytes maintained within normal limits  Outcome: Progressing   Patient on Q 12 bmp. Patient in need of potassium  today.     Problem: Hematologic - Adult  Goal: Maintains hematologic stability  Outcome: Progressing   Patient's hemoglobin this AM:   Recent Labs     02/23/25  0348   HGB 8.1*     Patient's platelet count this AM:   Recent Labs     02/23/25  0348   PLT 41*    Thrombocytopenia Precautions in place.  Patient showing no signs or symptoms of active bleeding.  Transfusion not indicated at this time.  Patient verbalizes understanding of all instructions. Will continue to assess and implement POC. Call light within reach and hourly rounding in place.     Problem: Gastrointestinal - Adult  Goal: Maintains or returns to baseline bowel function  Outcome: Progressing   Pt verbalize having less bm today.     Problem: Infection - Adult  Goal: Absence of infection at discharge  Outcome: Progressing   CVC site remains free of signs/symptoms of infection. No drainage, edema, erythema, pain, or warmth noted at site. Dressing changes continue per protocol and on an as needed basis - see flowsheet.       Performed CHG bath today per [facility] protocol utilizing CHG solution in the shower.  CVC site cleansed with CHG wipe over dressing, skin surrounding dressing, and first 6\" of IV tubing.  Pt tolerated well.  Continued to encourage daily CHG bathing  per [facility] protocol.      Problem: Nutrition Deficit:  Goal: Optimize nutritional status  Outcome: Progressing   Encourage small frequent meals.

## 2025-02-23 NOTE — PROGRESS NOTES
Nephrology Progress Note                                                                                                                                                                                                                                                                                                                                                               Office : [phone]     Fax :[phone]    Patient's Name: Leslee Maldonado  7:02 PM  2/22/2025    Reason for Consult:  acidoses   Requesting Physician:  Raj Cedeño MD  Chief Complaint:  No chief complaint on file.      Assessment/Plan     Hypokalemia   From GI losses  Added Spironolactone   On K citrate   On IV fluids with K in it   Alkalosis from K-citric acid   Continue current dissolvable K   Continue Acetazolamide every other day   Acidosis   Resolved   Hypernatremia - Resolved   Encourage free water   Colitis   From CAR-T therapy   Severe diarrhea  Steroids & octreotide per heme   IgG Lambda Multiple Myeloma  Hematology on board   HypoMg - replete PRN   Hypophosphatemia - replete PRN  Hypo-Ca -low vit D - Vit D 50k units weekly   Edema - Lasix PRN      History of Present Ilness:    Leslee Maldonado is a 69 y.o. female with history of refractory IgG lambda multiple myeloma, coronary disease who recently underwent CAR-T cell therapy with Carvykti on October 28. She has been doing well except for diarrhea. For the past 2 months she is lost 20 pounds in overall since September she is lost 30 pounds. 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 She has been having diarrhea for the past 6 to 8 weeks. It has been progressively getting worse. She is not able to eat well due to the diarrhea. She denies any nausea or vomiting. She denies any blood in her stools. She had C. difficile checked on January 3 that was negative. She is being admitted to the hospital for diarrhea weight loss and GI workup.     Interval

## 2025-02-23 NOTE — PROGRESS NOTES
[facility] Progress Note    2025     Leslee Maldonado    MRN: [id number]    : 1955    Referring MD: Dominique Lopez,   [address]  [address]  [place] [address]    Subjective:      ECOG PS:  (2) Ambulatory and capable of self care, unable to carry out work activity, up and about > 50% or waking hours    The patient had diarrhea yesterday, but she states it was limited to a few hours in the afternoon.  She did not have any in the middle of the night or the rest of the day.    KPS: 70% Cares for self; unable to carry on normal activity or to do active work    Isolation: None    Medications    Scheduled Meds:   diphenoxylate-atropine  1 tablet Oral 4x daily    spironolactone  25 mg Oral Daily    acetaZOLAMIDE  125 mg Oral Every Other Day    ruxolitinib phosphate  5 mg Oral BID    [START ON 2025] predniSONE  35 mg Oral Daily    atovaquone  1,500 mg Oral Daily    [START ON 2025] predniSONE  25 mg Oral Daily    [START ON 3/2/2025] predniSONE  15 mg Oral Daily    [START ON 3/5/2025] predniSONE  5 mg Oral Daily    potassium bicarb-citric acid  40 mEq Oral TID    valGANciclovir  900 mg Oral BID    vitamin D  50,000 Units Oral Weekly    budesonide  3 mg Oral TID    loperamide  4 mg Oral 4x Daily    insulin lispro  0-4 Units SubCUTAneous 4x Daily AC & HS    thiamine  100 mg Oral Daily    multivitamin  1 tablet Oral Daily    voriconazole  200 mg Oral 2 times per day    sodium chloride flush  5-40 mL IntraVENous 2 times per day    Saline Mouthwash  15 mL Swish & Spit 4x Daily AC & HS    [Held by provider] metoprolol tartrate  25 mg Oral BID    pantoprazole  40 mg Oral QAM AC     Continuous Infusions:   potassium chloride 40 mEq in sodium chloride 0.9 % 1,000 mL infusion 50 mL/hr at 25 0607    sodium chloride      dextrose      sodium chloride 5 mL/hr at 25 0658     PRN Meds:.artificial tears, calcium carbonate, sodium chloride, glucose, dextrose bolus **OR** dextrose bolus, glucagon (rDNA),  dextrose, sodium phosphate IVPB (CENTRAL line), sodium chloride flush, sodium chloride, potassium chloride, magnesium sulfate, acetaminophen **OR** [DISCONTINUED] acetaminophen, Saline Mouthwash, ALTEplase (CATHFLO) 2 mg in sterile water 2 mL injection    ROS:  As noted above, otherwise remainder of 10-point ROS negative    Physical Exam:     I&O:    Intake/Output Summary (Last 24 hours) at 2/23/2025 0847  Last data filed at 2/23/2025 0608  Gross per 24 hour   Intake 2193 ml   Output 4230 ml   Net -2037 ml       Vital Signs:  /83   Pulse 87   Temp 98.5 °F (36.9 °C) (Oral)   Resp 18   Ht 1.575 m (5' 2\")   Wt 77.4 kg (170 lb 9.6 oz)   SpO2 98%   BMI 31.20 kg/m²     Weight:    Wt Readings from Last 3 Encounters:   02/21/25 77.4 kg (170 lb 9.6 oz)   12/11/24 85.7 kg (189 lb)   11/29/24 86.3 kg (190 lb 4.1 oz)         General: Awake, alert and oriented.  HEENT: normocephalic, no scleral erythema or icterus, Oral mucosa moist and intact, throat clear  NECK: supple  BACK: Straight   SKIN: warm dry and intact without lesions rashes or masses  CHEST: CTA bilaterally without use of accessory muscles  CV: Normal S1 S2, RRR, no MRG  ABD: NT ND normoactive BS  EXTREMITIES: trace alessandra LE edema, denies calf tenderness  NEURO: Grossly intact  CATHETER: R PAC    Data    CBC:   Recent Labs     02/21/25  0356 02/22/25  0350 02/23/25  0348   WBC 4.2 4.8 4.6   HGB 8.6* 8.7* 8.1*   HCT 26.8* 26.9* 25.4*   MCV 90.1 90.3 90.4   PLT 53* 55* 41*     BMP/Mag:  Recent Labs     02/21/25  0356 02/21/25  1635 02/22/25  0350 02/22/25  1510 02/23/25  0348      < > 144 146* 145   K 2.8*   < > 3.1* 4.2 3.4*      < > 113* 115* 115*   CO2 28   < > 25 22 21   PHOS 2.9  --  2.3*  --  3.1   BUN 8   < > 10 13 12   CREATININE 0.9   < > 0.8 0.9 0.8   MG 1.60*  --  1.53*  --  1.43*    < > = values in this interval not displayed.     LIVP:   Recent Labs     02/21/25  0356   AST 40*   ALT 46*   BILIDIR 0.3   BILITOT 0.5   ALKPHOS 46

## 2025-02-24 NOTE — PROGRESS NOTES
[facility] Progress Note    2025     Leslee Maldonado    MRN: [id number]    : 1955    Referring MD: Dominique Lopez,   [address]  [address]  [place] [address]    Interval History:  - 2.1 L diarrhea in the past 24 hours  - will consult GI if other recommendations for her diarrhea     ECOG PS:  (2) Ambulatory and capable of self care, unable to carry out work activity, up and about > 50% or waking hours    The patient had diarrhea yesterday, but she states it was limited to a few hours in the afternoon.  She did not have any in the middle of the night or the rest of the day.    KPS: 70% Cares for self; unable to carry on normal activity or to do active work    Isolation: None    Medications    Scheduled Meds:   potassium bicarb-citric acid  40 mEq Oral BID    diphenoxylate-atropine  1 tablet Oral 4x daily    spironolactone  25 mg Oral Daily    ruxolitinib phosphate  5 mg Oral BID    predniSONE  35 mg Oral Daily    atovaquone  1,500 mg Oral Daily    [START ON 2025] predniSONE  25 mg Oral Daily    [START ON 3/2/2025] predniSONE  15 mg Oral Daily    [START ON 3/5/2025] predniSONE  5 mg Oral Daily    valGANciclovir  900 mg Oral BID    vitamin D  50,000 Units Oral Weekly    budesonide  3 mg Oral TID    loperamide  4 mg Oral 4x Daily    insulin lispro  0-4 Units SubCUTAneous 4x Daily AC & HS    thiamine  100 mg Oral Daily    multivitamin  1 tablet Oral Daily    voriconazole  200 mg Oral 2 times per day    sodium chloride flush  5-40 mL IntraVENous 2 times per day    Saline Mouthwash  15 mL Swish & Spit 4x Daily AC & HS    [Held by provider] metoprolol tartrate  25 mg Oral BID    pantoprazole  40 mg Oral QAM AC     Continuous Infusions:   potassium chloride 40 mEq in sodium chloride 0.9 % 1,000 mL infusion 50 mL/hr at 25 0332    sodium chloride      dextrose      sodium chloride 5 mL/hr at 25 0658     PRN Meds:.artificial tears, calcium carbonate, sodium chloride, glucose, dextrose bolus

## 2025-02-24 NOTE — PSYCHOTHERAPY
Psychology Follow-up Consultation    Patient Name: Leslee Maldonado  MRN: [id number]  Admission Date: 1/28/2025  Today's date: 2/24/2025    Reason for Consult: follow-up for second readmission to hospital post CAR-T and depressed mood per treatment team.       HPI:   eLslee Maldonado is a 69 y.o.   female with multiple myeloma and PMHx of GERD, type 2 DM, CAD S/P CABG & stent placement, hypertension, and hyperlipidemia, who initially presented on 11/2/2024 for a fever post CAR-T infusion (Day 0: 10/28/2024). Currently is struggling with persistent diarrhea.     Pre-CAR-T Psychological Evaluation recommendations:  Complete advanced directive.     Recommendations to be added to H&P:  Primary caregiver: Alsesia Maldonado (daughter), Yolis Salazar (sister), Jessica John (daughter)  Back-up caregivers: Alessia Maldonado (daughter), Yolis Salazar (sister), Jessica John (daughter)  Lives: [place], [place]   DSM-5 Diagnoses:   Tobacco use disorder, in full remission since 2018  R/O: Unspecified anxiety disorder      Interval History:    Patient reports that she feels depressed and anxious about diarrhea.  States that she will get depressed when she has to go to the bathroom.  Reports that her mood will return to normal throughout the day.  Patient reports feeling anxious that they are \"experimenting\" on her.  She is worried that she will not survive this diarrhea due to electrolyte imbalances or nutritional concerns.    Patient reports that she continues to pray and read the Bible.  She reports that she will not allow this to \"break me\".    Reports not sleeping well. States she has never slept well, but currently not sleeping well due to anxiety. Discussed medication to assist with sleep PRN. Discussed possible issues with bowel movement if not awakening at night. Patient expressed understanding.    Current psychiatric treatment: None.   Other relevant medications: voriconazole     History obtained 9/06/2024 during

## 2025-02-24 NOTE — PROGRESS NOTES
Progress Note    Patient Leslee Maldonado  MRN: [id number]  YOB: 1955 Age: 69 y.o. Sex: female  Room: [address] [place] [address]       Admitting Physician: Dominique Lopez DO   Date of Admission: 1/28/2025  2:57 PM   Primary Care Physician: Raj Cedeño MD     Subjective:  Leslee Maldonado was seen and examined. We are re consulted for diarrhea.  -- 69-year-old female with a complicated past medical history, that includes relapsed IgG lambda multiple myeloma complicated by immune effector cell associated enterocolitis status post colonoscopy last month who continues to have diarrhea.    ROS:  Constitutional: Denies fever, no change in appetite  Respiratory: Denies cough or shortness of breath  Cardiovascular: Denies chest pain or edema    Objective:  Vital Signs:   Vitals:    02/24/25 1124   BP: (!) 158/86   Pulse: 87   Resp: 18   Temp: 98.3 °F (36.8 °C)   SpO2: 97%         Physical Exam:  Constitutional: Alert and oriented x 4. No acute distress.   HEENT: Sclera anicteric, mucosal membranes moist  Cardiovascular: Regular rate and rhythm.  No murmurs.  Respiratory: Respirations nonlabored, no crepitus  GI: Abdomen nondistended, soft, and nontender.  Normal active bowel sounds.  No masses palpable.   Rectal: Deferred  Musculoskeletal:  No pitting edema of the lower legs.  Neurological: Gross memory appears intact.  no Asterixis    Intake/Output:    Intake/Output Summary (Last 24 hours) at 2/24/2025 1521  Last data filed at 2/24/2025 1441  Gross per 24 hour   Intake 2563 ml   Output 2750 ml   Net -187 ml        Current Medications:  Current Facility-Administered Medications   Medication Dose Route Frequency Provider Last Rate Last Admin    potassium bicarb-citric acid (EFFER-K) effervescent tablet 40 mEq  40 mEq Oral BID Dominique Lopez DO        traZODone (DESYREL) tablet 25 mg  25 mg Oral Nightly PRN Jeana Todd APRN - JONATHAN        dicyclomine (BENTYL) capsule 10 mg  10 mg Oral TID PRN Jeana Todd APRN - CNP   day.  Could consider bile acid binder, but this might inhibit absorption of other medications.  Would therefore increase Lomotil to the maximum of 8 pills a day.      Madelaine Greer MD    [facility]    [phone]. Also available via Perfect Serve    Please note that some or all of this record was generated using voice recognition software. If there are any questions about the content of this document, please contact the author as some errors in translation may have occurred.

## 2025-02-24 NOTE — PROGRESS NOTES
workup.     Interval hx:    Diarrhea still continue to be an issue      Past Medical History:   Diagnosis Date    Arthritis Shoulder    CAD (coronary artery disease)     Cancer (HCC)     Multiple myeloma    Diabetes mellitus (HCC)     Hyperlipidemia     Hypertension     MI (myocardial infarction) (HCC)        Past Surgical History:   Procedure Laterality Date    CARDIAC SURGERY  Quadruple by pass    COLONOSCOPY      COLONOSCOPY N/A 1/29/2025    COLONOSCOPY BIOPSY performed by Trip Cope MD at [facility] ENDOSCOPY    CORONARY ARTERY BYPASS GRAFT N/A 06/13/2019    CORONARY ARTERY BYPASS GRAFT X4, INTERNAL MAMMARY ARTERY AND SAPHENOUS VEIN GRAFT-TCP-BP performed by Maykel Sousa MD at [facility] OR    HAND SURGERY  September 2018    HYSTERECTOMY (CERVIX STATUS UNKNOWN)      partial for fibroids    INSERTION / REMOVAL / REPLACEMENT VENOUS ACCESS CATHETER N/A 01/24/2019    TRIFUSION CATHETER INSERTION LEFT SUBCLAVIAN performed by Munir Hess MD at [facility] OR    IR NONTUNNELED VASCULAR CATHETER > 5 YEARS  9/5/2024    IR NONTUNNELED VASCULAR CATHETER 9/5/2024 [facility] CARDIAC CATH/IR/NEURO LAB    TUBAL LIGATION         Family History   Problem Relation Age of Onset    Diabetes Sister     Breast Cancer Neg Hx     Ovarian Cancer Neg Hx         reports that she quit smoking about 26 years ago. Her smoking use included cigarettes. She has never used smokeless tobacco. She reports that she does not currently use alcohol after a past usage of about 2.0 standard drinks of alcohol per week. She reports that she does not use drugs.    Allergies:  Adhesive tape, Pcn [penicillins], and Lipitor [atorvastatin]    Current Medications:    potassium bicarb-citric acid (EFFER-K) effervescent tablet 40 mEq, BID  diphenoxylate-atropine (LOMOTIL) 2.5-0.025 MG per tablet 1 tablet, 4x daily  potassium chloride 40 mEq in sodium chloride 0.9 % 1,000 mL infusion, Continuous  spironolactone (ALDACTONE) tablet 25 mg, Daily  ruxolitinib phosphate (JAKAFI)  Result      No evidence of acute cardiopulmonary process.  No significant change.      Electronically signed by MD Morris Pereira      XR CHEST PORTABLE   Final Result   1. Stable exam with no acute abnormality.      Electronically signed by Simon Gamble      XR CHEST PORTABLE   Final Result      1. No evidence for acute cardiopulmonary disease         Electronically signed by Keon Puri MD      CT ABDOMEN PELVIS W IV CONTRAST Additional Contrast? None   Final Result      1. Questionable wall thickening within the colon at the splenic flexure could represent peristalsis or mass. Correlate with colonoscopy when able. Fluid extending into the distal rectum compatible with diarrhea.   2. No evidence of bowel obstruction   3. Colonic diverticulosis.      Electronically signed by Raj Cordova      XR CHEST PORTABLE    (Results Pending)       Medical Decision Making:  The following items were considered in medical decision making:  Discussion of patient care with other providers  Reviewed clinical lab tests  Reviewed radiology tests  Reviewed other diagnostic tests/interventions    Zacarias Olivas MD   [facility]  Office : [phone] or through Perfect Serve  Fax :[phone]

## 2025-02-24 NOTE — PLAN OF CARE
Problem: Chronic Conditions and Co-morbidities  Goal: Patient's chronic conditions and co-morbidity symptoms are monitored and maintained or improved  Outcome: Progressing     Problem: Safety - Adult  Goal: Free from fall injury  Outcome: Progressing  Flowsheets (Taken 2/24/2025 0627)  Free From Fall Injury:   Instruct family/caregiver on patient safety   Based on caregiver fall risk screen, instruct family/caregiver to ask for assistance with transferring infant if caregiver noted to have fall risk factors     Problem: ABCDS Injury Assessment  Goal: Absence of physical injury  Outcome: Progressing  Flowsheets (Taken 2/24/2025 0627)  Absence of Physical Injury: Implement safety measures based on patient assessment     Problem: Metabolic/Fluid and Electrolytes - Adult  Goal: Electrolytes maintained within normal limits  Outcome: Progressing     Problem: Skin/Tissue Integrity - Adult  Goal: Skin integrity remains intact  Outcome: Progressing  Flowsheets (Taken 2/24/2025 0627)  Skin Integrity Remains Intact:   Assess vascular access sites hourly   Every 4-6 hours minimum: Change oxygen saturation probe site   Every 4-6 hours: If on nasal continuous positive airway pressure, respiratory therapy assesses nares and determine need for appliance change or resting period     Problem: Gastrointestinal - Adult  Goal: Maintains or returns to baseline bowel function  Outcome: Progressing

## 2025-02-24 NOTE — CARE COORDINATION
Attended rounds with team today. Patient is D +119 from [facility].  Patient is still having a large amount of diarrhea.      Patient will be discharged when diarrhea is improved.

## 2025-02-25 NOTE — PROGRESS NOTES
Progress Note    Patient Leslee Maldonado  MRN: [id number]  YOB: 1955 Age: 69 y.o. Sex: female  Room: [address] [place] [address]       Admitting Physician: Dominique Lopez DO   Date of Admission: 1/28/2025  2:57 PM   Primary Care Physician: Raj Cedeño MD     Subjective:  Leslee Maldonado was seen and examined. We were re consulted for diarrhea.  -- 69-year-old female with a complicated past medical history, that includes relapsed IgG lambda multiple myeloma complicated by immune effector cell associated enterocolitis status post colonoscopy last month who continues to have diarrhea.  Lomotil does increase to maximum with improvement today.  Patient reports a total of 4 bowel movements a day, some of which started having some formed to them.    ROS:  Constitutional: Denies fever, no change in appetite  Respiratory: Denies cough or shortness of breath  Cardiovascular: Denies chest pain or edema    Objective:  Vital Signs:   Vitals:    02/25/25 1627   BP: (!) 158/88   Pulse: 68   Resp: 16   Temp: 98.4 °F (36.9 °C)   SpO2: 92%         Physical Exam:  Constitutional: Alert and oriented x 4. No acute distress.   HEENT: Sclera anicteric, mucosal membranes moist  Cardiovascular: Regular rate and rhythm.  No murmurs.  Respiratory: Respirations nonlabored, no crepitus  GI: Abdomen nondistended, soft, and nontender.  Normal active bowel sounds.  No masses palpable.   Rectal: Deferred  Musculoskeletal:  No pitting edema of the lower legs.  Neurological: Gross memory appears intact.  no Asterixis    Intake/Output:    Intake/Output Summary (Last 24 hours) at 2/25/2025 1647  Last data filed at 2/25/2025 1626  Gross per 24 hour   Intake 2923 ml   Output 3000 ml   Net -77 ml        Current Medications:  Current Facility-Administered Medications   Medication Dose Route Frequency Provider Last Rate Last Admin    octreotide (SANDOSTATIN) injection 100 mcg  100 mcg SubCUTAneous Q12H Jeana Todd, HE - CNP   100 mcg at 02/25/25

## 2025-02-25 NOTE — PROGRESS NOTES
[facility] Progress Note    2025     Leslee Maldonado    MRN: [id number]    : 1955    Referring MD: Dominique Lopez,   [address]  [address]  [place] [address]    Interval History:  - 1.5 L diarrhea in the past 24 hours  - Started w abd cramping 25- bentyl added  - GI increased lomotil to max dosing  - Trazodone PRN for sleep    ECOG PS:  (2) Ambulatory and capable of self care, unable to carry out work activity, up and about > 50% or waking hours    The patient had diarrhea yesterday, but she states it was limited to a few hours in the afternoon.  She did not have any in the middle of the night or the rest of the day.    KPS: 70% Cares for self; unable to carry on normal activity or to do active work    Isolation: None    Medications    Scheduled Meds:   potassium bicarb-citric acid  40 mEq Oral BID    diphenoxylate-atropine  2 tablet Oral 4x daily    spironolactone  25 mg Oral Daily    ruxolitinib phosphate  5 mg Oral BID    predniSONE  35 mg Oral Daily    atovaquone  1,500 mg Oral Daily    [START ON 2025] predniSONE  25 mg Oral Daily    [START ON 3/2/2025] predniSONE  15 mg Oral Daily    [START ON 3/5/2025] predniSONE  5 mg Oral Daily    valGANciclovir  900 mg Oral BID    vitamin D  50,000 Units Oral Weekly    budesonide  3 mg Oral TID    loperamide  4 mg Oral 4x Daily    insulin lispro  0-4 Units SubCUTAneous 4x Daily AC & HS    thiamine  100 mg Oral Daily    multivitamin  1 tablet Oral Daily    voriconazole  200 mg Oral 2 times per day    sodium chloride flush  5-40 mL IntraVENous 2 times per day    Saline Mouthwash  15 mL Swish & Spit 4x Daily AC & HS    [Held by provider] metoprolol tartrate  25 mg Oral BID    pantoprazole  40 mg Oral QAM AC     Continuous Infusions:   potassium chloride 40 mEq in sodium chloride 0.9 % 1,000 mL infusion 50 mL/hr at 25 2241    sodium chloride      dextrose      sodium chloride 5 mL/hr at 25 0658     PRN Meds:.traZODone, dicyclomine,  After reviewing chart, medications were refilled per MD's standing orders

## 2025-02-25 NOTE — PROGRESS NOTES
Comprehensive Nutrition Assessment    RECOMMENDATIONS:  PO Diet: GVHD1  Nutrition Supplement: Ensure Clear BID, modify to TID, Expedite daily  Nutrition Education: Education/Counseling completed (GVH-like diet)     NUTRITION ASSESSMENT:   Nutritional summary & status: Follow up. 1550 ml stool output yesterday, improved from days prior. Ocreotide restarted today. Jakafi started 2/21. GI increased lomotil to maximum dosing. Bentyl added for abd cramping. Pt w/ apathy towards food, also fearful of increased stool output if/when eating. Reviewed importance of adequate nutrition. Also discussed low protein levels and fluid status. Remains on 'GVHD' 1 diet, clear liquids w/ simple sugars (low fat, low fiber), very difficult to meet energy/protein needs. Pt consuming Ensure Clear BID (16 g/day) for extra protein. Tolerating bites of Expedite for extra protein. Encouraged egg intake, complient w/ diet. Severely malnourished on a very restrictve diet. Hopeful current medicinal regimen will improve stool output and lead to diet advancement. Recommend bowel rest w/ TPN if not.     Admission // PMH: Diarrhea // MM s/p CAR-T 10/2024, ASCT 2/20219    MALNUTRITION ASSESSMENT  Context of Malnutrition: Acute Illness (on chronic)   Malnutrition Status: Severe malnutrition  Findings of the 6 clinical characteristics of malnutrition (Minimum of 2 out of 6 clinical characteristics is required to make the diagnosis of moderate or severe Protein Calorie Malnutrition based on AND/ASPEN Guidelines):  Energy Intake:  50% or less of estimated energy requirements for 5 or more days  Weight Loss:  (S) Greater than 7.5% over 3 months (25#/12% in 2 months)     Body Fat Loss:  No body fat loss     Muscle Mass Loss:  No muscle mass loss        NUTRITION DIAGNOSIS   Severe malnutrition, in context of acute illness or injury related to altered GI function as evidenced by criteria as identified in malnutrition assessment    Nutrition Monitoring and  Evaluation:   Food/Nutrient Intake Outcomes:  Food and Nutrient Intake, Supplement Intake  Physical Signs/Symptoms Outcomes:  Biochemical Data, Diarrhea, GI Status, Nutrition Focused Physical Findings, Skin, Weight     OBJECTIVE DATA: Significant to nutrition assessment  Nutrition Related Findings: Na 146, K 3.1, , -34 L, + 1 BLE pitting edema  Wounds: None  Nutrition Goals: PO intake 75% or greater, by next RD assessment     CURRENT NUTRITION THERAPIES  ADULT ORAL NUTRITION SUPPLEMENT; Lunch, Dinner; Clear Liquid Oral Supplement  ADULT ORAL NUTRITION SUPPLEMENT; Breakfast; Wound Healing Oral Supplement  GVHD DIET; GVHD 1  PO Intake: %, 51-75%   PO Supplement Intake:%  Additional Sources of Calories/IVF:KCl in NS @ 50 ml/hr     COMPARATIVE STANDARDS  Energy (kcal):  6219-4707 (18-20 kcal/kg CBW)     Protein (g):  65-75 (1.3-1.5 kcal/kg IBW)       Fluid (ml/day):  1 ml/kcal    ANTHROPOMETRICS  Current Height: 157.5 cm (5' 2\")  Current Weight - Scale: 78.5 kg (173 lb)    Admission weight: 76.3 kg (168 lb 3.2 oz)    The patient will be monitored per nutrition standards of care. Consult dietitian if additional nutrition interventions are needed prior to RD reassessment.     Shireen Verdugo RD  [place]:  [phone]

## 2025-02-25 NOTE — PLAN OF CARE
Problem: Chronic Conditions and Co-morbidities  Goal: Patient's chronic conditions and co-morbidity symptoms are monitored and maintained or improved  2/24/2025 1930 by Anna Lopes RN  Outcome: Progressing  Flowsheets (Taken 2/24/2025 1930)  Care Plan - Patient's Chronic Conditions and Co-Morbidity Symptoms are Monitored and Maintained or Improved: Monitor and assess patient's chronic conditions and comorbid symptoms for stability, deterioration, or improvement     Problem: Safety - Adult  Goal: Free from fall injury  2/24/2025 1930 by Anna Lopes RN  Outcome: Progressing  Flowsheets (Taken 2/24/2025 1930)  Free From Fall Injury: Instruct family/caregiver on patient safety     Problem: ABCDS Injury Assessment  Goal: Absence of physical injury  2/24/2025 1930 by Anna Lopes RN  Outcome: Progressing  Flowsheets (Taken 2/24/2025 1930)  Absence of Physical Injury: Implement safety measures based on patient assessment     Problem: Metabolic/Fluid and Electrolytes - Adult  Goal: Electrolytes maintained within normal limits  2/24/2025 1930 by Anna Lopes RN  Outcome: Progressing     Problem: Hematologic - Adult  Goal: Maintains hematologic stability  2/24/2025 1930 by Anna Lopes RN  Outcome: Progressing  Flowsheets (Taken 2/24/2025 1930)  Maintains hematologic stability: Assess for signs and symptoms of bleeding or hemorrhage     Problem: Nutrition Deficit:  Goal: Optimize nutritional status  2/24/2025 1930 by Anna Lopes RN  Outcome: Progressing     Problem: Skin/Tissue Integrity - Adult  Goal: Skin integrity remains intact  2/24/2025 1930 by Anna Lopes RN  Outcome: Progressing  Flowsheets (Taken 2/24/2025 1930)  Skin Integrity Remains Intact: Monitor for areas of redness and/or skin breakdown     Problem: Musculoskeletal - Adult  Goal: Return mobility to safest level of function  2/24/2025 1930 by Anna Lopes RN  Outcome: Progressing  Flowsheets (Taken 2/24/2025 1930)  Return

## 2025-02-25 NOTE — CARE COORDINATION
Spoke with pt at bedside this AM. Pt stated she didn't have much of an appetite and said she feels if she doesn't eat then she won't have a bowel movement. Encouraged her to discuss with provider and dietician which she stated understanding.     Requested Carrol Iyer to speak with pt. Also updated provider.     SW will follow    MELISSA Lee   for [facility] ([facility])  [employer] Mobile: [phone]

## 2025-02-25 NOTE — PLAN OF CARE
Problem: Chronic Conditions and Co-morbidities  Goal: Patient's chronic conditions and co-morbidity symptoms are monitored and maintained or improved  Outcome: Progressing  Flowsheets (Taken 2/25/2025 1821)  Care Plan - Patient's Chronic Conditions and Co-Morbidity Symptoms are Monitored and Maintained or Improved: Monitor and assess patient's chronic conditions and comorbid symptoms for stability, deterioration, or improvement     Problem: Safety - Adult  Goal: Free from fall injury  2/25/2025 1821 by Anna Lopes RN  Outcome: Progressing  Flowsheets (Taken 2/25/2025 1821)  Free From Fall Injury: Instruct family/caregiver on patient safety     Problem: ABCDS Injury Assessment  Goal: Absence of physical injury  Outcome: Progressing  Flowsheets (Taken 2/25/2025 1821)  Absence of Physical Injury: Implement safety measures based on patient assessment     Problem: Metabolic/Fluid and Electrolytes - Adult  Goal: Electrolytes maintained within normal limits  2/25/2025 1821 by Anna Lopes RN  Outcome: Progressing  Flowsheets (Taken 2/25/2025 1821)  Electrolytes maintained within normal limits: Monitor labs and assess patient for signs and symptoms of electrolyte imbalances     Problem: Hematologic - Adult  Goal: Maintains hematologic stability  2/25/2025 1821 by Anna Lopes RN  Outcome: Progressing  Flowsheets (Taken 2/25/2025 1821)  Maintains hematologic stability: Assess for signs and symptoms of bleeding or hemorrhage     Problem: Nutrition Deficit:  Goal: Optimize nutritional status  Outcome: Progressing  Flowsheets (Taken 2/25/2025 1821)  Nutrient intake appropriate for improving, restoring, or maintaining nutritional needs: Assess nutritional status and recommend course of action     Problem: Skin/Tissue Integrity - Adult  Goal: Skin integrity remains intact  2/25/2025 1821 by Anna Lopes, RN  Outcome: Progressing  Flowsheets (Taken 2/25/2025 1821)  Skin Integrity Remains Intact: Monitor for areas  of redness and/or skin breakdown     Problem: Musculoskeletal - Adult  Goal: Return mobility to safest level of function  2/25/2025 1821 by Anna Lopes RN  Outcome: Progressing  Flowsheets (Taken 2/25/2025 1821)  Return Mobility to Safest Level of Function: Assess patient stability and activity tolerance for standing, transferring and ambulating with or without assistive devices     Problem: Gastrointestinal - Adult  Goal: Maintains or returns to baseline bowel function  2/25/2025 1821 by Anna Lopes, RN  Outcome: Progressing  Flowsheets (Taken 2/25/2025 1821)  Maintains or returns to baseline bowel function: Assess bowel function     Problem: Infection - Adult  Goal: Absence of infection at discharge  2/25/2025 1821 by Anna Lopes, RN  Outcome: Progressing  Flowsheets (Taken 2/25/2025 1821)  Absence of infection at discharge: Assess and monitor for signs and symptoms of infection     Problem: Pain  Goal: Verbalizes/displays adequate comfort level or baseline comfort level  2/25/2025 1821 by Anna Lopes, RN  Outcome: Progressing

## 2025-02-25 NOTE — PROGRESS NOTES
Nephrology Progress Note                                                                                                                                                                                                                                                                                                                                                               Office : [phone]     Fax :[phone]    Patient's Name: Leslee Maldonado  9:30 AM  2/25/2025    Reason for Consult:  acidoses   Requesting Physician:  Raj Cedeño MD  Chief Complaint:  No chief complaint on file.      Assessment/Plan     Hypokalemia   From GI losses  Added Spironolactone   On K citrate   On IV fluids with K in it   Monitor K closely   Alkalosis from K-citric acid - Resolved   Continue current dissolvable K   Off Acetazolamide   Acidosis   Resolved   Hypernatremia - Resolved   Encourage free water   Colitis   From CAR-T therapy   Severe diarrhea  Steroids & octreotide per heme   IgG Lambda Multiple Myeloma  Hematology on board   HypoMg - replete PRN   Hypophosphatemia - replete PRN  Hypo-Ca -low vit D - Vit D 50k units weekly   Edema - Lasix PRN      History of Present Ilness:    Leslee Maldonado is a 69 y.o. female with history of refractory IgG lambda multiple myeloma, coronary disease who recently underwent CAR-T cell therapy with Carvykti on October 28. She has been doing well except for diarrhea. For the past 2 months she is lost 20 pounds in overall since September she is lost 30 pounds. 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 She has been having diarrhea for the past 6 to 8 weeks. It has been progressively getting worse. She is not able to eat well due to the diarrhea. She denies any nausea or vomiting. She denies any blood in her stools. She had C. difficile checked on January 3 that was negative. She is being admitted to the hospital for diarrhea weight loss and GI workup.  \"PROTEINUR\", \"NAUR\" in the last 72 hours.      IMAGING:  XR CHEST PORTABLE   Final Result   No acute cardiopulmonary abnormality.      Electronically signed by Oh Georges MD      XR CHEST PORTABLE   Final Result      No evidence of acute cardiopulmonary process.  No significant change.      Electronically signed by MD Morris Pereira      XR CHEST PORTABLE   Final Result   1. Stable exam with no acute abnormality.      Electronically signed by Simon Gamble      XR CHEST PORTABLE   Final Result      1. No evidence for acute cardiopulmonary disease         Electronically signed by Keon Puri MD      CT ABDOMEN PELVIS W IV CONTRAST Additional Contrast? None   Final Result      1. Questionable wall thickening within the colon at the splenic flexure could represent peristalsis or mass. Correlate with colonoscopy when able. Fluid extending into the distal rectum compatible with diarrhea.   2. No evidence of bowel obstruction   3. Colonic diverticulosis.      Electronically signed by Raj Cordova          Medical Decision Making:  The following items were considered in medical decision making:  Discussion of patient care with other providers  Reviewed clinical lab tests  Reviewed radiology tests  Reviewed other diagnostic tests/interventions    Jayshree Church PA-C   [facility]  Office : [phone] or through [employer]  Fax :[phone]      I have seen the patient independently from the PA/NP .I discussed the care with the PA/NP . I personally reviewed the HPI, PH, FH, SH, ROS and medications. I repeated pertinent portions of the examination and reviewed the relevant imaging and laboratory data. I agree with the findings, assessment and plan as documented, with the following addendum:        Duran Marsh MD

## 2025-02-25 NOTE — PLAN OF CARE
Problem: Safety - Adult  Goal: Free from fall injury  2/25/2025 0601 by Zaid Burns RN  Outcome: Progressing  Note:   - Screening for Orthostasis AND not a [place] Falls Risk per AYERS/ABCDS: Pt bed is in low position, side rails up, call light and belongings are in reach.  Fall risk light is on outside pts room.  Pt encouraged to call for assistance as needed. Will continue with hourly rounds for PO intake, pain needs, toileting and repositioning as needed.       Problem: Metabolic/Fluid and Electrolytes - Adult  Goal: Electrolytes maintained within normal limits  2/25/2025 0601 by Zaid Burns RN  Outcome: Progressing  Note: Pt needed K replacement this AM.     Problem: Skin/Tissue Integrity - Adult  Goal: Skin integrity remains intact  2/25/2025 0601 by Zaid Burns RN  Outcome: Progressing  Note: No new skin breakdown noted this shift.     Problem: Infection - Adult  Goal: Absence of infection at discharge  2/25/2025 0601 by Zaid Burns RN  Outcome: Progressing  Note: Port  site remains free of signs/symptoms of infection. No drainage, edema, erythema, pain, or warmth noted at site. Dressing changes continue per protocol and on an as needed basis - see flowsheet.        Problem: Pain  Goal: Verbalizes/displays adequate comfort level or baseline comfort level  Outcome: Progressing  Note: Pt complained of abdominal pain this shift, PRN bentyl given.

## 2025-02-26 PROBLEM — N17.9 AKI (ACUTE KIDNEY INJURY): Status: ACTIVE | Noted: 2025-01-01

## 2025-02-26 PROBLEM — E87.8 ELECTROLYTE IMBALANCE: Status: ACTIVE | Noted: 2025-01-01

## 2025-02-26 NOTE — PROGRESS NOTES
(LOPRESSOR) tablet 25 mg  25 mg Oral BID Jeana Todd, APRN - CNP   25 mg at 02/26/25 0814    pantoprazole (PROTONIX) tablet 40 mg  40 mg Oral Dominique Schwartz DO   40 mg at 02/26/25 0641         Recent Imaging:   XR CHEST PORTABLE  Narrative: EXAM: XR CHEST PORTABLE     INDICATION: neutropenia     TECHNIQUE: 2 views of the chest.     COMPARISON: None.     FINDINGS:  Medical Devices: Right power injectable chest port with its tip projecting over the SVC.    Heart and Mediastinum: Unchanged. Postoperative changes from prior CABG.    Lungs and Pleura: Lungs are clear with no pleural effusions or evidence of pneumothorax.    Bones and soft tissues: No acute abnormalities.  Impression: No acute cardiopulmonary abnormality.    Electronically signed by Oh Georges MD       Labs:   Recent Labs     02/24/25  0314 02/24/25  1651 02/25/25  0330 02/25/25  1628 02/26/25  0320   WBC 4.2  --  4.1  --  2.9*   HGB 8.0*  --  9.0*  --  7.7*   PLT 39*  --  41*  --  36*   ALKPHOS 65  --   --   --  73   ALT 43*  --   --   --  47*   AST 30  --   --   --  34   BILITOT 0.5  --   --   --  0.8   BILIDIR 0.3  --   --   --  0.4*   IBILI 0.2  --   --   --  0.4   BUN 12 12 9 13 12   CREATININE 0.8 0.8 0.7 0.8 0.8    146* 146* 144 144   K 4.0 3.5 3.1* 4.7 4.2   INR 1.49*  --   --   --   --           Assessment:  Hospital Problems             Last Modified POA    * (Principal) Diarrhea 1/28/2025 Yes    Diarrhea associated with pseudomembranous colitis 1/28/2025 Yes    Severe malnutrition 1/29/2025 Yes    Acidosis 2/9/2025 Yes    Hypomagnesemia 2/15/2025 Yes    Hypocalcemia 2/15/2025 Yes    Hypokalemia 2/16/2025 Yes    Vitamin D deficiency 2/16/2025 Yes    Electrolyte imbalance 2/26/2025 Yes    NERY (acute kidney injury) 2/26/2025 Yes       69-year-old female with a complicated past medical history, that includes relapsed IgG lambda multiple myeloma complicated by immune effector cell associated enterocolitis status post colonoscopy  last month who continues to have diarrhea.   Patient on prednisone taper, Jakafi, which was started on 2/21, Entocort 9 g daily, Lomotil 2 pills 4 times a day, as well as Imodium 1 pill 4 times a day.     Plan:  Would continue her current bowel regimen and her diarrhea frequency has improved.  No further GI plans at this time and will be available as needed      Margaret Gama PA-C    [facility]    [phone]. Also available via [employer]    Please note that some or all of this record was generated using voice recognition software. If there are any questions about the content of this document, please contact the author as some errors in translation may have occurred.

## 2025-02-26 NOTE — PLAN OF CARE
Problem: Safety - Adult  Goal: Free from fall injury  2/26/2025 1626 by Kimberly Banegas, RN  Outcome: Progressing  Flowsheets (Taken 2/26/2025 1626)  Free From Fall Injury:   Instruct family/caregiver on patient safety   Based on caregiver fall risk screen, instruct family/caregiver to ask for assistance with transferring infant if caregiver noted to have fall risk factors  Note: Orthostatic vital signs obtained at start of shift - see flowsheet for details.  Pt does not meet criteria for orthostasis.  Pt is a Low fall risk. See Ayers Fall Score and ABCDS Injury Risk assessments.   + Screening for Orthostasis and/or + High Fall Risk per AYERS/ABCDS: Explained fall risk precautions to pt and family and rationale behind their use to keep the patient safe. Pt bed is in low position, side rails up, call light and belongings are in reach. Fall wristband applied and present on pts wrist.  Chair Alarm on.  Pt encouraged to call for assistance. Will continue with hourly rounds for PO intake, pain needs, toileting and repositioning as needed.     Patient feeling weaker so alarms are being used.      Problem: ABCDS Injury Assessment  Goal: Absence of physical injury  Outcome: Progressing  Flowsheets (Taken 2/26/2025 1626)  Absence of Physical Injury: Implement safety measures based on patient assessment  Note: Bed in lowest position, call light within reach, bed and chair wheels locked, bed alarm on, non-skid socks on, bed side table within reach, 2x side rails up.      Problem: Skin/Tissue Integrity - Adult  Goal: Skin integrity remains intact  2/26/2025 1626 by Kimberly Banegas, RN  Outcome: Progressing  Flowsheets (Taken 2/26/2025 1626)  Skin Integrity Remains Intact:   Monitor for areas of redness and/or skin breakdown   Assess vascular access sites hourly  Note: Monitoring patient's skin. Breakdown noted below PAC dressing from previous irritating dressing.

## 2025-02-26 NOTE — PROGRESS NOTES
Occupational Therapy  Facility/Department: [address]  Occupational Therapy Re Assessment    Name: Leslee Maldonado  : 1955  MRN: [id number]  Date of Service: 2025    Discharge Recommendations:  24 hour supervision or assist, Home with Home health OT  OT Equipment Recommendations  Equipment Needed: Yes  Mobility Devices: Walker  Walker: Rolling  ADL Assistive Devices: Shower Chair with back  Other: possible toilet raiser for home, will CTA     Patient Diagnosis(es): The encounter diagnosis was Diarrhea, unspecified type.  Past Medical History:  has a past medical history of Arthritis, CAD (coronary artery disease), Cancer (HCC), Diabetes mellitus (HCC), Hyperlipidemia, Hypertension, and MI (myocardial infarction) (HCC).  Past Surgical History:  has a past surgical history that includes Tubal ligation; Hysterectomy; INSERTION / REMOVAL / REPLACEMENT VENOUS ACCESS CATHETER (N/A, 2019); Coronary artery bypass graft (N/A, 2019); Colonoscopy; Hand surgery (2018); Cardiac surgery (Quadruple by pass); IR NONTUNNELED VASCULAR CATHETER > 5 YEARS (2024); and Colonoscopy (N/A, 2025).    Treatment Diagnosis: decreased ax endurance and strength for ADLs    Assessment  Performance deficits / Impairments: Decreased functional mobility ;Decreased endurance;Decreased ADL status;Decreased strength;Decreased safe awareness;Decreased high-level IADLs;Decreased balance  Assessment: Pt has displayed small set back since being hospitalized and is now experiencing generalized weakness and decreased endurance. Pt was re evaluated and picked up on therapy caseload. Pt was independent on admission but is now requiring min A for ambulation when not using 2WW and progressing to SBA-CGA when using 2WW. Pt does require mod-CGA for transfers depending on level of surface and if there are arm rests to push from. Pt was educated on on different transfer strategies and toilet raiser was added to  No  Balance  Sitting: Intact  Standing: With support  Toilet Transfers  Toilet - Technique: Ambulating  Equipment Used: Standard toilet  Toilet Transfer: Moderate assistance;Contact guard assistance  Toilet Transfers Comments: mod A initially and then progressed to CGA w/ toilet raiser added  AROM: Generally decreased, functional  Strength: Generally decreased, functional  Coordination: Generally decreased, functional  ADL  Feeding: Setup  Grooming: Stand by assistance  Grooming Skilled Clinical Factors: wash hands at sink  UE Dressing: Maximum assistance  UE Dressing Skilled Clinical Factors: w/ bath wipes  LE Dressing: Minimal assistance  LE Dressing Skilled Clinical Factors: exchange briefs  Putting On/Taking Off Footwear: Maximum assistance  Putting On/Taking Off Footwear Skilled Clinical Factors: exchange socks after incontinent BM episode  Toileting: Minimal assistance  Toileting Skilled Clinical Factors: for hygiene, 2 bouts of loose stool, episode of it getting on gown, floor and socks  Functional Mobility: Contact guard assistance;Stand by assistance  Functional Mobility Skilled Clinical Factors: CGA-min A to ambulate to bathroom without AD, reaching OBOS for stability; pt then progressed to SBA w/ 2WW to ambulate lap in hallway with short seated rest break; pt ascended and descended 4 steps w/ min A to go up and CGA to go down  Product Used : Chlorhexidine wipes (line care)           Transfers  Sit to stand: Moderate assistance;Contact guard assistance  Stand to sit: Moderate assistance;Contact guard assistance  Transfer Comments: pt educated on hand placement, pt needs mod A when leaving from low surface that doesn't have handrails and greatly improves when transferring from surface with hand rails  Vision  Vision: Impaired  Vision Exceptions: Wears glasses for reading  Hearing  Hearing: Within functional limits  Cognition  Overall Cognitive Status: WFL  Orientation  Overall Orientation Status: Within

## 2025-02-26 NOTE — PSYCHOTHERAPY
Behavioral Health Note    Attended rounds with oncology team. Met with patient briefly afterwards. She appeared to have some issues with recall. Met with patient to assess if she feels recall is not as good as usual. Patient states she has some issues with memory at baseline and feels current memory is baseline. Educated patient regarding memory difficulties that can arise with poor nutrition. Patient expressed understanding. Will continue to monitor. She is prescribed trazodone 25mg PRN for sleep, but has not utilized yet.    Miladis Zazueta Psy.D., MSCP  Clinical Psychologist

## 2025-02-26 NOTE — PROGRESS NOTES
02/26/25 1451   Encounter Summary   Encounter Overview/Reason Initial Encounter   Service Provided For Patient and family together   Referral/Consult From [facility]   Support System Family members   Last Encounter  02/26/25  (alicia)   Complexity of Encounter Low   Begin Time 1435   End Time  1440   Total Time Calculated 5 min   Assessment/Intervention/Outcome   Assessment Calm;Hopeful   Intervention Active listening;Explored/Affirmed feelings, thoughts, concerns   Outcome Expressed feelings, needs, and concerns;Refused/Declined     Staff Pietro MA

## 2025-02-26 NOTE — PROGRESS NOTES
Physical Therapy  Facility/Department: [address]  Physical Therapy Initial Assessment/Treatment    Name: Leslee Maldonado  : 1955  MRN: [id number]  Date of Service: 2025    Discharge Recommendations:  Home with Home health PT, 24 hour supervision or assist   PT Equipment Recommendations  Equipment Needed: Yes  Mobility Devices: Walker  Walker: Rolling      Patient Diagnosis(es): The encounter diagnosis was Diarrhea, unspecified type.  Past Medical History:  has a past medical history of Arthritis, CAD (coronary artery disease), Cancer (HCC), Diabetes mellitus (HCC), Hyperlipidemia, Hypertension, and MI (myocardial infarction) (HCC).  Past Surgical History:  has a past surgical history that includes Tubal ligation; Hysterectomy; INSERTION / REMOVAL / REPLACEMENT VENOUS ACCESS CATHETER (N/A, 2019); Coronary artery bypass graft (N/A, 2019); Colonoscopy; Hand surgery (2018); Cardiac surgery (Quadruple by pass); IR NONTUNNELED VASCULAR CATHETER > 5 YEARS (2024); and Colonoscopy (N/A, 2025).    Assessment  Assessment: pt seen for re-eval this date with new weakness. Pt currently requiring up to mod A for transfers and CGA to min A to ambulate without AD, SBA when using RW. Mobility appears effortful with pt fatiguing quickly. Pt is below her baseline and would benefit from therapy during admission. Anticipate pt will DC home, rec initial 24hr A, RW, HHPT.  Treatment Diagnosis: gait difficulty  Therapy Prognosis: Good  Decision Making: Medium Complexity  Requires PT Follow-Up: Yes  Activity Tolerance  Activity Tolerance: Patient tolerated treatment well;Patient tolerated evaluation without incident  Activity Tolerance Comments: 2 episodes of diarrhea requiring inc time for cleanup during session    Plan  Physical Therapy Plan  General Plan:  (2-5)  Current Treatment Recommendations: Strengthening, Balance training, Functional mobility training, Transfer training,

## 2025-02-26 NOTE — PLAN OF CARE
Problem: Safety - Adult  Goal: Free from fall injury  2/26/2025 0651 by Zaid Burns RN  Outcome: Progressing  Note:  High Fall Risk per AYERS/ABCDS: Explained fall risk precautions to pt and family and rationale behind their use to keep the patient safe. Pt bed is in low position, side rails up, call light and belongings are in reach. Fall wristband applied and present on pts wrist.  Bed alarm on.  Pt encouraged to call for assistance. Will continue with hourly rounds for PO intake, pain needs, toileting and repositioning as needed.        Problem: Metabolic/Fluid and Electrolytes - Adult  Goal: Electrolytes maintained within normal limits  2/26/2025 0651 by Zaid Burns, RN  Outcome: Progressing  Note: Pt needed Ph replacement this AM.     Problem: Hematologic - Adult  Goal: Maintains hematologic stability  2/26/2025 0651 by Zaid Burns, RN  Outcome: Progressing  Note: Patient's hemoglobin this AM:   Recent Labs     02/26/25  0320   HGB 7.7*     Patient's platelet count this AM:   Recent Labs     02/26/25  0320   PLT 36*    Thrombocytopenia Precautions in place.  Patient showing no signs or symptoms of active bleeding.  Transfusion not indicated at this time.  Patient verbalizes understanding of all instructions. Will continue to assess and implement POC. Call light within reach and hourly rounding in place.       Problem: Skin/Tissue Integrity - Adult  Goal: Skin integrity remains intact  2/26/2025 0651 by Zaid Burns, RN  Outcome: Progressing  Note: No new skin break down noted this shift.

## 2025-02-26 NOTE — PROGRESS NOTES
[facility] Progress Note    2025     Leslee Maldonado    MRN: [id number]    : 1955    Referring MD: Dominique Lopez,   [address]  [address]  [place] [address]    Interval History:    - 950 mL diarrhea in the past 24 hours ( - )  - Started w abd cramping 25- bentyl added  - GI increased lomotil to max dosing  - Trazodone PRN for sleep    ECOG PS:  (2) Ambulatory and capable of self care, unable to carry out work activity, up and about > 50% or waking hours    The patient had diarrhea yesterday, but she states it was limited to a few hours in the afternoon.  She did not have any in the middle of the night or the rest of the day.    KPS: 70% Cares for self; unable to carry on normal activity or to do active work    Isolation: None    Medications    Scheduled Meds:   octreotide  100 mcg SubCUTAneous Q12H    potassium bicarb-citric acid  40 mEq Oral BID    diphenoxylate-atropine  2 tablet Oral 4x daily    spironolactone  25 mg Oral Daily    ruxolitinib phosphate  5 mg Oral BID    predniSONE  35 mg Oral Daily    atovaquone  1,500 mg Oral Daily    [START ON 2025] predniSONE  25 mg Oral Daily    [START ON 3/2/2025] predniSONE  15 mg Oral Daily    [START ON 3/5/2025] predniSONE  5 mg Oral Daily    valGANciclovir  900 mg Oral BID    vitamin D  50,000 Units Oral Weekly    budesonide  3 mg Oral TID    loperamide  4 mg Oral 4x Daily    insulin lispro  0-4 Units SubCUTAneous 4x Daily AC & HS    thiamine  100 mg Oral Daily    multivitamin  1 tablet Oral Daily    voriconazole  200 mg Oral 2 times per day    sodium chloride flush  5-40 mL IntraVENous 2 times per day    Saline Mouthwash  15 mL Swish & Spit 4x Daily AC & HS    metoprolol tartrate  25 mg Oral BID    pantoprazole  40 mg Oral QAM AC     Continuous Infusions:   potassium chloride 40 mEq in sodium chloride 0.9 % 1,000 mL infusion 50 mL/hr at 25 1931    sodium chloride      dextrose      sodium chloride 5 mL/hr at 25 0658  hours started 2/25/25  - Scheduled lomotil- increased to max dosing per GI starting 2/24  - Imodium- schedule starting 2/5/25  - Budesonide 3 mg TID 2/7/25   - Infliximab weekly (4-8 total doses)   - Dose #2 2/7/25  - Dose #2: 2/13/25  - Dose #3: 2/21/25  - Start jakafi 5 mg bid (2/21/25)  - GI consulted 2/24/25 for medication recs     7.  Endo  - Type 2 DM:  well-controlled with diet  - Accu checks ACHS with addition of steroids  - Low dose SSI     8.  Cardiac:  stable  Hx of RBBB, CAD s/p CABG & PCI:  - 6/11/19:  PCI recanalization of occluded mid-left Cx (residual 70% stenosis)  - 6/13/22:  CABG - LIMA to LAD, reverse saphenous v. to OM1, reverse saphenous v. to OM2, reverse saphenous v. to PDA  - 10/25/22:  CARLO x 2 (proximal & ostial aspect of saphenous v. graft to OM3)  Hyperlipidemia:  - Hold rosuvastatin 40 mg qd during CAR-T  Hypertension and Tachycardia:  - Cont Metoprolol 25 mg BID - held on admission with diarrhea, resumed 2/25/25 with HTN and tachycardia  - hold amlodipine and lisinopril since hypotensive since diarrhea   - TSH 2/25/25: <0.01    9.  MSK: Chronic back, hip, and right shoulder pain  - Tylenol PRN    10. Psychosocial  Anxiety/Depression  - Situational  - Psychology following  Insomnia  - Trazodone 25 mg nightly PRN    - DVT Prophylaxis: Platelets <50,000 cells/dL - prophylactic lovenox on hold and mechanical prophylaxis with bilateral SCDs while in bed in place.  Contraindications to pharmacologic prophylaxis: Thrombocytopenia  Contraindications to mechanical prophylaxis: None    - Disposition: When diarrhea improved.     Patient seen and examined by Dr. Dominique Lopez.       HE Milton - JENNIFER

## 2025-02-27 NOTE — PROGRESS NOTES
Patient seen and assessed for standard line care needs.  CVC site remains free of visible signs and symptoms of infection.  No drainage, edema, erythema, pain, itching, or warmth noted at and around the insertion site.  Port accessed on 2/24/25 per documentation, needle remains intact. Line care performed by Lakeisha Mahajan RN.  The need for continued use of the CVC is due to ongoing therapy.  Patient verbalized understanding of the line care education provided by line care RN.  Staff RNs will continue to monitor and assess the CVC site throughout the patient's hospital stay.  Sterile dressing changes will continue to be changed per policy.

## 2025-02-27 NOTE — PROGRESS NOTES
[facility] - Acute Rehab Unit   After review, this patient is felt to be:       [x]  Dr. Marquez states this patient is appropriate for rehab.     []  Not appropriate for Acute Inpatient Rehab    []  Referral received and ARU reviewing patient      Pt is a good candidate for acute rehab, but due to no bed availability at [facility] Acute Rehab Unit at this time please refer pt to [facility], [place], or [place] Acute [facility] Units.     Will notify CM/SW with further updates. Thank you for the referral.    Gaby CARRANZA OTR/L  Clinical Liaison- [facility]   (P): [phone]  (F): [phone]

## 2025-02-27 NOTE — PROGRESS NOTES
chloride 0.9 % 1,000 mL infusion 50 mL/hr at 02/27/25 0705    sodium chloride      dextrose      sodium chloride 5 mL/hr at 02/22/25 0658     PRN Meds:.traMADol **OR** traMADol, traZODone, dicyclomine, artificial tears, calcium carbonate, sodium chloride, glucose, dextrose bolus **OR** dextrose bolus, glucagon (rDNA), dextrose, sodium phosphate IVPB (CENTRAL line), sodium chloride flush, sodium chloride, potassium chloride, magnesium sulfate, acetaminophen **OR** [DISCONTINUED] acetaminophen, Saline Mouthwash, ALTEplase (CATHFLO) 2 mg in sterile water 2 mL injection    ROS:  As noted above, otherwise remainder of 10-point ROS negative    Physical Exam:     I&O:    Intake/Output Summary (Last 24 hours) at 2/27/2025 0919  Last data filed at 2/27/2025 0425  Gross per 24 hour   Intake 240 ml   Output 2300 ml   Net -2060 ml       Vital Signs:  BP (!) 146/96   Pulse 86   Temp 97.9 °F (36.6 °C) (Oral)   Resp 16   Ht 1.575 m (5' 2\")   Wt 79.9 kg (176 lb 3.2 oz)   SpO2 96%   BMI 32.23 kg/m²     Weight:    Wt Readings from Last 3 Encounters:   02/26/25 79.9 kg (176 lb 3.2 oz)   12/11/24 85.7 kg (189 lb)   11/29/24 86.3 kg (190 lb 4.1 oz)         General: Awake, alert and oriented.  HEENT: normocephalic, no scleral erythema or icterus, Oral mucosa moist and intact, throat clear  NECK: supple  BACK: Straight   SKIN: warm dry and intact without lesions rashes or masses  CHEST: CTA bilaterally without use of accessory muscles  CV: Normal S1 S2, RRR, no MRG  ABD: NT ND normoactive BS  EXTREMITIES: trace alessandra LE edema, denies calf tenderness  NEURO: Grossly intact  CATHETER: R PAC    Data    CBC:   Recent Labs     02/25/25  0330 02/26/25  0320 02/27/25  0438   WBC 4.1 2.9* 1.0*   HGB 9.0* 7.7* 8.0*   HCT 28.3* 24.3* 24.6*   MCV 91.2 91.3 89.2   PLT 41* 36* 34*     BMP/Mag:  Recent Labs     02/25/25  0330 02/25/25  1628 02/26/25  0320 02/26/25  1634 02/27/25  0438   *   < > 144 144 143   K 3.1*   < > 4.2 3.8 4.0   CL  Pulmonary  Shortness of Breath and New O2 Requirement overnight 2/26/25  - Became increasingly short of breath and required 2lpm overnight 2/26/25  - CTPE completed (she had simultaneous RLE pain)- negative for PE  - Currently on Room air    10.  MSK: Chronic back, hip, and right shoulder pain  - Tylenol PRN    11. Psychosocial  Anxiety/Depression  - Situational  - Psychology following  Insomnia  - Trazodone 25 mg nightly PRN    12. Integumentary  BLE Erythema  - Right sided redness accompanied by pain  - RLE doppler pending  - See ID for antibiotic coverage    - DVT Prophylaxis: Platelets <50,000 cells/dL - prophylactic lovenox on hold and mechanical prophylaxis with bilateral SCDs while in bed in place.  Contraindications to pharmacologic prophylaxis: Thrombocytopenia  Contraindications to mechanical prophylaxis: None    - Disposition: When diarrhea improved.     Patient seen and examined by Dr. Dominique Lopez.     Jeana Todd, APRN - CNP

## 2025-02-27 NOTE — PROGRESS NOTES
to [facility]   2/10/25 Blood CMV PCR  + 60   2/17/25 Blood CMV PCR  +171  (induction dosing Valcyte started 2/18)   2/24/25 Blood CMV PCR  +450           Recent Labs     02/25/25  0330 02/25/25  1628 02/26/25  0320 02/26/25  1634 02/27/25  0438   CREATININE 0.7   < > 0.8 0.9 0.9   BUN 9   < > 12 15 16   WBC 4.1  --  2.9*  --  1.0*    < > = values in this interval not displayed.       Estimated Creatinine Clearance: 59 mL/min (based on SCr of 0.9 mg/dL).    Additional Lab Values / Findings of Note:    No results for input(s): \"PROCAL\" in the last 72 hours.

## 2025-02-27 NOTE — CARE COORDINATION
Provided pt with the [employer] donation bag this AM. Pt stated appreciation.     SW noted pt was on o2 which she doesn't have at baseline.     Katie PEREZ, ISHAAN-S   for [facility] ([facility])  [employer]: [phone]    Addendum at 2:01pm: Received notification from [facility] liaison that they are not able to accept. Updated Jeana CASTILLO and aware recs from therapy are currently for home. Will follow and discuss if recommendations change     Addendum at 2:33pm: OT recs are now for ARU. Message sent to [facility] to update. Also updated Jeana CASTILLO.

## 2025-02-27 NOTE — PLAN OF CARE
Problem: Gastrointestinal - Adult  Goal: Maintains or returns to baseline bowel function  Outcome: Progressing   Pt has had a decrease in diarrhea during this shift     Problem: ABCDS Injury Assessment  Goal: Absence of physical injury  Outcome: Progressing  Absence of Physical Injury: Implement safety measures based on patient assessment  Note: Bed in lowest position, call light within reach, bed and chair wheels locked, bed alarm on, non-skid socks on, bed side table within reach, 2x side rails up.      Problem: Hematologic - Adult  Goal: Maintains hematologic stability  Outcome: Progressing

## 2025-02-27 NOTE — PROGRESS NOTES
Pt complaining of right upper leg pain. During the assessment pt leg appears to be hot and red. During 2000 vitals pt oxygen saturation was in 80s, 2L NC placed on pt. Apolonia Hong NP notified and an order for a CT was placed. No other complaints at this time. POC continues.

## 2025-02-27 NOTE — PROGRESS NOTES
(ULTRAM) tablet 50 mg, Q6H PRN   Or  traMADol (ULTRAM) tablet 100 mg, Q6H PRN  potassium bicarb-citric acid (EFFER-K) effervescent tablet 40 mEq, BID  traZODone (DESYREL) tablet 25 mg, Nightly PRN  dicyclomine (BENTYL) capsule 10 mg, TID PRN  diphenoxylate-atropine (LOMOTIL) 2.5-0.025 MG per tablet 2 tablet, 4x daily  potassium chloride 40 mEq in sodium chloride 0.9 % 1,000 mL infusion, Continuous  spironolactone (ALDACTONE) tablet 25 mg, Daily  ruxolitinib phosphate (JAKAFI) 5 MG tablet TABS 5 mg (Patient Supplied), BID  atovaquone (MEPRON) suspension 1,500 mg, Daily  predniSONE (DELTASONE) tablet 25 mg, Daily  [START ON 3/2/2025] predniSONE (DELTASONE) tablet 15 mg, Daily  [START ON 3/5/2025] predniSONE (DELTASONE) tablet 5 mg, Daily  Artificial Tears 83-15 %, PRN  valGANciclovir (VALCYTE) tablet 900 mg, BID  vitamin D (ERGOCALCIFEROL) capsule 50,000 Units, Weekly  calcium carbonate (TUMS) chewable tablet 500 mg, TID PRN  0.9 % sodium chloride infusion, PRN  budesonide (ENTOCORT EC) delayed release capsule 3 mg, TID  loperamide (IMODIUM) capsule 4 mg, 4x Daily  insulin lispro (HUMALOG,ADMELOG) injection vial 0-4 Units, 4x Daily AC & HS  glucose chewable tablet 16 g, PRN  dextrose bolus 10% 125 mL, PRN   Or  dextrose bolus 10% 250 mL, PRN  glucagon injection 1 mg, PRN  dextrose 10 % infusion, Continuous PRN  sodium phosphate 30 mmol in sodium chloride 0.9 % 250 mL IVPB for central line, PRN  thiamine mononitrate tablet 100 mg, Daily  multivitamin 1 tablet, Daily  voriconazole (VFEND) tablet 200 mg, 2 times per day  sodium chloride flush 0.9 % injection 5-40 mL, 2 times per day  sodium chloride flush 0.9 % injection 5-40 mL, PRN  0.9 % sodium chloride infusion, PRN  potassium chloride 20 mEq/50 mL IVPB (Central Line), PRN  magnesium sulfate 4000 mg in 100 mL IVPB premix, PRN  acetaminophen (TYLENOL) tablet 650 mg, Q6H PRN  Saline Mouthwash 15 mL, 4x Daily AC & HS  Saline Mouthwash 15 mL, Q4H PRN  ALTEplase (CATHFLO)  [facility]  Office : [phone] or through [employer]  Fax :[phone]      I have seen the patient independently from the PA/NP .I discussed the care with the PA/NP . I personally reviewed the HPI, PH, FH, SH, ROS and medications. I repeated pertinent portions of the examination and reviewed the relevant imaging and laboratory data. I agree with the findings, assessment and plan as documented, with the following addendum:        Duran Marsh MD

## 2025-02-27 NOTE — PLAN OF CARE
Problem: Hematologic - Adult  Goal: Maintains hematologic stability  2/27/2025 1854 by Shayla Gallego, RN  Outcome: Progressing  Note: Patient's hemoglobin this AM:   Recent Labs     02/27/25 0438   HGB 8.0*     Patient's platelet count this AM:   Recent Labs     02/27/25 0438   PLT 34*    Thrombocytopenia Precautions in place.  Patient showing no signs or symptoms of active bleeding.  Transfusion not indicated at this time.  Patient verbalizes understanding of all instructions. Will continue to assess and implement POC. Call light within reach and hourly rounding in place.      Problem: Gastrointestinal - Adult  Goal: Maintains or returns to baseline bowel function  2/27/2025 1854 by Shayla Gallego, RN  Outcome: Progressing  Note: Pt had one episode of diarrhea this shift. Given imodium and lomotil as scheduled.      Problem: Nutrition Deficit:  Goal: Optimize nutritional status  Outcome: Progressing  Note: Pt encouraged to add grilled chicken into diet.      Problem: Pain  Goal: Verbalizes/displays adequate comfort level or baseline comfort level  Outcome: Progressing  Note: Pt reported pain this shift. PRN tylenol given per MAR. Pt educated on importance of calling for pain meds when in pain. Pt verbalized understanding.

## 2025-02-27 NOTE — PROGRESS NOTES
as annia; If platelet count equal to or less than 10 but the patient has received a platelet transfusion, physical therapy or occupational therapy may proceed with treatment.    Subjective  Leslee Maldonado is a 69 y.o. female with history of refractory IgG lambda multiple myeloma, coronary disease who recently underwent CAR-T cell therapy with Carvykti on October 28. She has been doing well except for diarrhea. For the past 2 months she is lost 20 pounds in overall since September she is lost 30 pounds. 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 She has been having diarrhea for the past 6 to 8 weeks. It has been progressively getting worse. She is not able to eat well due to the diarrhea. She denies any nausea or vomiting. She denies any blood in her stools. She had C. difficile checked on January 3 that was negative. She is being admitted to the hospital for diarrhea weight loss and GI workup. Pt evaluated by OT and was discharged 2/13. Therapy re consulted 2/26 and picked up on POC to address weakness.    Subjective  Subjective: Pt met completing supine to sit with PCA + dtr. Pt agreeable to OT tx.  Pain: No report of pain.  Orientation  Overall Orientation Status: Within Functional Limits  Cognition  Overall Cognitive Status: WFL       Objective Treatment included functional transfer training, ADL's and pt. education.     Bed Mobility Training  Bed Mobility Training: Yes  Supine to Sit: Substantial/Maximal assistance  Scooting: Substantial/Maximal assistance  Balance  Sitting: High guard (SBA to Violet with intermittent R lateral lean EOB + on commode requiring assist to correct)  Standing: Impaired (modA x 1 in sarastedy; mod x2 with RW with impaired ability to weight shift; R lateral lean noted)  Transfer Training  Transfer Training: Yes  Overall Level of Assistance: 2 Person assistance  Interventions: Verbal cues;Safety awareness training  Sit to Stand: 2 Person assistance (mod  Little  How much help for eating meals?: A Little  AM-PAC Inpatient Daily Activity Raw Score: 15  AM-PAC Inpatient ADL T-Scale Score : 34.69  ADL Inpatient CMS 0-100% Score: 56.46  ADL Inpatient CMS G-Code Modifier : CK    Therapy Time   Individual Concurrent Group Co-treatment   Time In       1300   Time Out       1325   Minutes       25       Timed Code Treatment Minutes:   25 mins     Total Treatment Minutes:  25 mins     If co-treatement indicated, patient seen for co-treatment due to: requirement of two skilled therapists, patient safety, patient decreased activity tolerance/unable to tolerate multiple sessions, and/or cognitive status.        Katie Cabello OTR/L

## 2025-02-27 NOTE — CONSULTS
Consult Note  Physical Medicine and Rehabilitation    Patient: Leslee Maldonado  [id number]  Date: 2/27/2025      Chief Complaint: severe diarrhea and weight loss     History of Present Illness/Hospital Course:  Leslee Maldonado is a 69 y.o. female with pmhx with history of refractory IgG lambda multiple myeloma, coronary disease who recently underwent CAR-T cell therapy with Carvykti on October 28 who presented on 1/28 with severe diarrhea and weight loss.  Patient was placed on steroids and GI was consulted and patient underwent colonoscopy on 1/29 which was largely unremarkable.  Given acidosis nephrology was also consulted.  Throughout admission there was also concern for possible Enterococcus UTI and possible cellulitis for which she was placed on antibiotics.  We were consulted as patient may benefit from rehab in ARU.  On 2/26 patient had PT score of 18 and OT score of 19 however there is concern that she has become slightly weaker today    Patient was seen and examined at bedside this morning.  Patient continues to have some exertional shortness of breath and intermittent abdominal pain and diarrhea but states that is much improved.  Patient otherwise denies any other acute complaints at this time.    Prior Level of Function:  Independent for mobility, ADLs, and IADLs    Current Level of Function:  Below baseline    Pertinent Social History:  -From home with daughter and grandson    Past Medical History:   Diagnosis Date    Arthritis Shoulder    CAD (coronary artery disease)     Cancer (HCC)     Multiple myeloma    Diabetes mellitus (HCC)     Hyperlipidemia     Hypertension     MI (myocardial infarction) (HCC)        Past Surgical History:   Procedure Laterality Date    CARDIAC SURGERY  Quadruple by pass    COLONOSCOPY      COLONOSCOPY N/A 1/29/2025    COLONOSCOPY BIOPSY performed by Trip Cope MD at [facility] ENDOSCOPY    CORONARY ARTERY BYPASS GRAFT N/A 06/13/2019    CORONARY ARTERY BYPASS GRAFT X4, INTERNAL  None  Grooming: Physical Assistance Required - A Little  LB Dressing: Physical Assistance Required - A Little  UB Dressing: Physical Assistance Required - A Little  Bathing: Physical Assistance Required - A Little  Toileting: Physical Assistance Required - A Little    SLP      Assessment:  Patient Active Problem List   Diagnosis    Multiple myeloma (HCC)    Multiple myeloma in remission (HCC)    NSTEMI (non-ST elevated myocardial infarction) (HCC)    Chest pain    HTN (hypertension)    Hyperlipidemia    Type 2 diabetes mellitus (HCC)    S/P coronary artery bypass graft x 4    Coronary artery disease involving native coronary artery    Acute respiratory failure with hypoxia (HCC)    Acute bronchospasm due to viral infection    CAD S/P percutaneous coronary angioplasty    Hypoxia    Multiple myeloma, remission status unspecified (HCC)    Abnormal results of liver function studies    Chest pain due to CAD    Diarrhea    Diarrhea associated with pseudomembranous colitis    Severe malnutrition    Acidosis    Hypomagnesemia    Hypocalcemia    Hypokalemia    Vitamin D deficiency    Electrolyte imbalance    NERY (acute kidney injury)       Impairments - Decreased functional mobility, Decreased ADLs    Recommendations:  AM-PAC scores of 18/19.  Possible for patient discharge home.  Will defer to case management.      Thank you for this consult. Please contact me with any questions or concerns.     Cristiana Lujan MD, PGY 3  Internal Medicine Resident  2/27/2025  11:35 AM    Discussed with Harmeet Marquez D.O. M.P.H  PM&R      This patient has been seen, examined, and discussed with the resident. I was part of the key critical services provided to the patient. I agree with the residents documentation. This note may have been altered to reflect my own examination findings, impression, and recommendations.     Harmeet Marquez D.O. M.P.H  PM&R  2/27/2025  12:30 PM

## 2025-02-27 NOTE — PROGRESS NOTES
Physical Therapy  Facility/Department: [address]  Physical Therapy Daily Treatment    Name: Leslee Maldonado  : 1955  MRN: [id number]  Date of Service: 2025    Discharge Recommendations:  IP Rehab   PT Equipment Recommendations  Other: defer      Patient Diagnosis(es): The primary encounter diagnosis was Multiple myeloma (HCC). A diagnosis of Diarrhea, unspecified type was also pertinent to this visit.  Past Medical History:  has a past medical history of Arthritis, CAD (coronary artery disease), Cancer (HCC), Diabetes mellitus (HCC), Hyperlipidemia, Hypertension, and MI (myocardial infarction) (HCC).  Past Surgical History:  has a past surgical history that includes Tubal ligation; Hysterectomy; INSERTION / REMOVAL / REPLACEMENT VENOUS ACCESS CATHETER (N/A, 2019); Coronary artery bypass graft (N/A, 2019); Colonoscopy; Hand surgery (2018); Cardiac surgery (Quadruple by pass); IR NONTUNNELED VASCULAR CATHETER > 5 YEARS (2024); and Colonoscopy (N/A, 2025).    Assessment  Assessment: pt with significant regression in mobility this date, requiring max A for bed mobility and use of sarastedy for transfers. Pt able to stand to RW with mod A x2 briefly, but demo's poor standing balance and unable to weightshift or ambulate. Pt currently well below her baseline and would benefit from IPR at SD to maximize mobility and return to baseline.  Activity Tolerance  Activity Tolerance: Patient limited by fatigue;Patient limited by endurance  Activity Tolerance Comments: 1 episode of diarrhea requiring inc time for cleanup during session    Plan  Physical Therapy Plan  General Plan:  (2-5)  Current Treatment Recommendations: Strengthening, Balance training, Functional mobility training, Transfer training, Endurance training, Gait training, Stair training, Safety education & training, Home exercise program, Patient/Caregiver education & training, Therapeutic activities  Safety

## 2025-02-28 NOTE — PROGRESS NOTES
.Neutropenic Pathway  If patient oral temp > or = to 38.0 or if axillary temp > or = to 37.4 initiate protocol per orders.  Draw 2 sets of blood cultures from different sites. If patient remains febrile, redraw PAN culture every 68-72 hours.             Temp 100.6    Site(s) / Line Type Time Cultures obtained   Date 2/28/2025  0620 AM      Rt port  0620

## 2025-02-28 NOTE — PROGRESS NOTES
02/28/25 1539   Encounter Summary   Encounter Overview/Reason Initial Encounter;Crisis   Service Provided For Patient and family together   Last Encounter  02/28/25  ([facility])   Complexity of Encounter High   Begin Time 1430   End Time  1540   Total Time Calculated 70 min   Crisis   Type Code Blue   Assessment/Intervention/Outcome   Assessment Anxious;Shock;Tearful;Stress overload   Intervention Active listening;Sustaining Presence/Ministry of presence   Outcome Comfort;Expressed Gratitude

## 2025-02-28 NOTE — PROGRESS NOTES
Consulted for abrasion on right chest below port site. Wound care orders placed. Re-consult Wound Care for changes or deterioration.

## 2025-02-28 NOTE — SIGNIFICANT EVENT
Code Blue    Bradycardia with intermittent pauses prior to code blue.  Dr. Love and ICU team present.    1407 - Asystole, CPR started.  Glucose 16 (collected from central line).  1410 - No pulse. CRP re-started, epi x 1, D50, bicarb x 1  1412 - ROSC.  Not breathing.  Bagging initiated.  On Levophed 45 mcg/min, vasopressin 0.03 U/min.  1417 - Intubated, 26 at teeth, size 8 tube.  Glucose 302 (collected from central line).  1424 - Asystole, CPR started, epi x 1  1425 - Bicarb x 1  1426 - ROSC, /76 per A-line  1431 - Asystole, CPR started, epi x 1  1433 - Bradycardia, broad complex rhythm. K 7.6 and glucose 180 (collected from A-line) on POC.  1437 - Epinephrine 1 mcg/min initiated  1439 - Calcium gluconate 2 g  1442 - Family does not want further compressions.  Code status changed to DNR-CCA.

## 2025-02-28 NOTE — PROGRESS NOTES
Progress  Note  Physical Medicine and Rehabilitation    Patient: Leslee Maldonado  [id number]  Date: 2/28/2025      Chief Complaint: severe diarrhea and weight loss     History of Present Illness/Hospital Course:  Leslee Maldonado is a 69 y.o. female with pmhx with history of refractory IgG lambda multiple myeloma, coronary disease who recently underwent CAR-T cell therapy with Carvykti on October 28 who presented on 1/28 with severe diarrhea and weight loss.  Patient was placed on steroids and GI was consulted and patient underwent colonoscopy on 1/29 which was largely unremarkable.  Given acidosis nephrology was also consulted.  Throughout admission there was also concern for possible Enterococcus UTI and possible cellulitis for which she was placed on antibiotics.  We were consulted as patient may benefit from rehab in ARU.  On 2/26 patient had PT score of 18 and OT score of 19 however there is concern that she has become slightly weaker today    Interval History  Patient seen and examined at bedside this morning.  Concern for septic shock this morning and patient currently on pressors and HFNC.  Patient endorses intermittent shortness of breath but otherwise denies any chest pain, nausea vomiting or abdominal pain    Prior Level of Function:  Independent for mobility, ADLs, and IADLs    Current Level of Function:  Below baseline    Pertinent Social History:  -From home with daughter and grandson    Past Medical History:   Diagnosis Date    Arthritis Shoulder    CAD (coronary artery disease)     Cancer (HCC)     Multiple myeloma    Diabetes mellitus (HCC)     Hyperlipidemia     Hypertension     MI (myocardial infarction) (HCC)        Past Surgical History:   Procedure Laterality Date    CARDIAC SURGERY  Quadruple by pass    COLONOSCOPY      COLONOSCOPY N/A 1/29/2025    COLONOSCOPY BIOPSY performed by Trip Cope MD at [facility] ENDOSCOPY    CORONARY ARTERY BYPASS GRAFT N/A 06/13/2019    CORONARY ARTERY BYPASS GRAFT

## 2025-02-28 NOTE — PROGRESS NOTES
Nephrology Progress Note                                                                                                                                                                                                                                                                                                                                                               Office : [phone]     Fax :[phone]    Patient's Name: Leslee Maldonado  10:20 AM  2/28/2025    Reason for Consult:  acidoses   Requesting Physician:  Raj Cedeño MD  Chief Complaint:  No chief complaint on file.      Assessment/Plan     #NERY requiring CRRT  #Acute pulmonary edema  #AMS  - Cr 1/2-->1.7 --> 2.0   - CXR consistent with pulmonary edema- sec to ARDS   - Vazquez inserted    #Anemia  - PRBCs per primary    #Hypotension  - IV albumin  - pressor support  - LR bolus      #Hypokalemia --> hyperkalemia  Spironolactone on hold  Monitor K closely   # Alkalosis from K-citric acid - Resolved   Off Acetazolamide   # Acidosis   Resolved   # Hypernatremia - Resolved   Encourage free water   # Colitis   From CAR-T therapy   Severe diarrhea  Steroids & octreotide per heme   #IgG Lambda Multiple Myeloma  Hematology on board       History of Present Ilness:    Leslee Maldonado is a 69 y.o. female with history of refractory IgG lambda multiple myeloma, coronary disease who recently underwent CAR-T cell therapy with Carvykti on October 28. She has been doing well except for diarrhea. For the past 2 months she is lost 20 pounds in overall since September she is lost 30 pounds. 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 She has been having diarrhea for the past 6 to 8 weeks. It has been progressively getting worse. She is not able to eat well due to the diarrhea. She denies any nausea or vomiting. She denies any blood in her stools. She had C. difficile checked on January 3 that was negative. She is being admitted

## 2025-02-28 NOTE — PROGRESS NOTES
[facility] -  Clinical Pharmacy Note    Vancomycin - Management by Pharmacy    Consult Date(s): 2/27/25  Consulting Provider(s): HE Anna-CNP      Assessment / Plan  SSTI / UTI  [Indication] - Vancomycin  Concurrent Antimicrobials: induction dosing of Valcyte and proph Mepron and Vfend  Day of Vanc Therapy / Ordered Duration: 2/ tbd  Current Dosing Method: Intermittent dosing by levels (changed 2/28)  Therapeutic Goal: Trough 10-15 mg/L  Current Dose / Plan:   Patient received Vancomycin 1500 mg IV x 1 dose on 2/28  Patient with new NERY today s/t septic shock. CRRT planned to start later today per orders from Dr Marsh  Will change vancomycin to intermittent dosing by levels  Random level this AM = 13.7 at 0900. Will give her 1-time dose today with plans to start CRRT later today (patient getting VasCath placed at this time).   Random level ordered for tomorrow AM  Plan to check daily AM levels and re-dose vancomycin 1250 mg (~15 mg/kg) with if level < ~10 mg/L  Will continue to monitor clinical condition and make adjustments to regimen as appropriate.    Thank you for consulting pharmacy,  Crissy Valera, PharmD  [facility] Clinical Pharmacist  Phone: [phone]        Interval update: Patient in septic shock overnight. She is now on levophed and vasopressin with new O2 requirement (on non-rebreather at 15 L/min). Holding off an intubation as of rounds this morning. Planning to start CRRT per renal.    Subjective/Objective:   Leslee Maldonado is a 69 y.o. female with IgG lambda Multiple Myeloma s/p most recent therapy Carvykti on 10/28/24.  PMHx also significant for GERD, DM, HTN, and CAD s/p CABG &PCI.  Patient initially admitted on 1/28 for diarrhea which continues with current treatment of scheduled lomotil, imodium, octreotide, tapering doses of steroids, Infliximab and Jakafi.    Pharmacy is consulted to dose Vancomycin for Enterococcus UTI and possible cellulitis.    Ht Readings from Last 1 Encounters:

## 2025-02-28 NOTE — CARE COORDINATION
Chart review completed and discussed plan of care with team.     Final disposition is pending at this time.     SW will follow    MELISSA Lee   for [facility] [place] ([facility])  [employer] Mobile: [phone]    Addendum at 9:12am: Updated Tamy RN that there are no advance directives on epic. Tamy said pt's daughter Alessia whom is here stated she will discuss plan with her sister and discuss decisions.

## 2025-02-28 NOTE — CARE COORDINATION
Patient moved to ICU this morning.  Call placed to emergency contact daughter Alessia to provide an update on patient change in status. Daughter informed this writer that she has just arrived to the hospital.  This writer met patient and walked with her to the room.

## 2025-02-28 NOTE — PROGRESS NOTES
0804:  SBAR report received from LAILA Mar.  Discussed Pt's current status and potential for transfer to ICU.    0810:  Notified KAROLINA Anna, BP 43/31.  Asked for ICU consult and potential need for pressors.     0814:  Dr. Lopez at bedside assessing Pt. 1L NS bolus started.    0838:  Pt BP remaining low (MAP <65).  Another 100mg gonzález-synephrine given IV push.      0841:  Levo 10mcg started IV infusion.    0843:  Dr. Lopez requested hydrocortisone 50mg.  Administered at bedside.    0844:  Per Dr. Lopez, give one amp of bicarbonate IV push.    0845:  Levophed IV infusion increased to 20mcg due to poor BP (MAP <65).    0847:  González-synephrine 100mcg given IV push at bedside.    0847:  NS 500ml bolus x 1 administered, González-synephrine 100mcg given IV push.    0852:  Merrem started per verbal order from Dr. Lopez.     0853:  Another amp of bicarbonate given per verbal order by Dr. Lopez.      0901:  BP remaining below goal (MAP 65).  Levo increased to 25mcg IV infusion.  Dr. Lopez remaining at bedside.    0922:  Levo increased to 40 mcg IV infusion, BP stabilizing and remaining above goal (MAP >65)    0956:  Notified by Amalia in lab.  Critical result, lactic acid 10.8.  Dr. Love aware. No new orders.    1057:  Notified KAROLINA Esteves.  Pt 99.0 axillary prior to platelet infusion, 99.9 axillary after transfusion.  No signs or symptoms of transfusion reaction.  No need for transfusion work up at this time.  Concerned about need for intubation and potential aspiration.  Okay to hold on PO meds at this time.     1058:  Notified Dr. Govea and Dr. Love. Unable to maintain Pt O2 sat above 90%, Pt denies SOB, no apparent distress.  Obtain ABG now.     1140:  Dr. Govea at bedside, arterial line placed. ABG obtained at bedside and assessed by Dr. Govea.  No new orders at this time.      1229:  Notified by Amalia in lab, Pt critical results lactic acid 12.4, K 6.2.  Notified Dr. Becerra.  Order received for calcium  gluconate.     1247:  Notified KAROLINA Anna.  Concerned about potential aspiration with PO intake.  PO meds transitioned to liquid or IV.  See orders.     1305:  General surgery at bedside preparing for Vascath placement. Remained at bedside during procedure.     1403:  Blood glucose 16 on glucometer.  Notified pharmacy, awaiting D10 infusion.      1407:  At bedside assessing Pt and gathering supplies to obtain blood specimen to confirm glucose of 16.  Pt converted into sinus bradycardia and went into asystole.  Code blue called and compressions started.  See note by Dr. Hazel regarding code blue.      1442: Dr. Love and Dr. Govea at bedside post code blue, ROSC obtained.  Remained at bedside awaiting further intervention.  Restraints ordered but Pt not grabbing at tubes or lines, restraints were not applied.     1459:  Notified by Amalia from lab, potassium critical result is 7.1.  Dr. Govea and Dr. Love notified, no new orders at this time, remaining at bedside.     1501:  Dr. Love and Dr. Govea remaining at bedside after code blue.  Received call from Mino in the lab that HgB was 5 and Hct 16.1.  Per Dr. Love, do not give PRBC at this time.      1505:  Pt converted to asystole on monitor.  Pt DNR at this time.  Dr. Love pronounced Pt  at 1505.

## 2025-02-28 NOTE — PLAN OF CARE
Problem: Safety - Adult  Goal: Free from fall injury  Outcome: Progressing  Note: High Fall Risk per AYERS/ABCDS: Explained fall risk precautions to pt and family and rationale behind their use to keep the patient safe. Pt bed is in low position, side rails up, call light and belongings are in reach. Fall wristband applied and present on pts wrist.  Bed alarm on.  Pt encouraged to call for assistance. Will continue with hourly rounds for PO intake, pain needs, toileting and repositioning as needed.        Problem: Metabolic/Fluid and Electrolytes - Adult  Goal: Electrolytes maintained within normal limits  Outcome: Progressing  Note: Pt needed phosphorus replacement this AM.     Problem: Hematologic - Adult  Goal: Maintains hematologic stability  2/28/2025 0525 by Zaid Burns RN  Outcome: Progressing  Note: Patient's hemoglobin this AM:   Recent Labs     02/28/25  0354   HGB 7.3*     Patient's platelet count this AM:   Recent Labs     02/28/25  0354   PLT 10*    Thrombocytopenia Precautions in place.  Patient showing no signs or symptoms of active bleeding.  Patient transfused blood products per orders - see flowsheet.  Patient verbalizes understanding of all instructions. Will continue to assess and implement POC. Call light within reach and hourly rounding in place.       Problem: Skin/Tissue Integrity - Adult  Goal: Skin integrity remains intact  Description: 1.  Monitor for areas of redness and/or skin breakdown  2.  Assess vascular access sites hourly  3.  Every 4-6 hours minimum:  Change oxygen saturation probe site  4.  Every 4-6 hours:  If on nasal continuous positive airway pressure, respiratory therapy assess nares and determine need for appliance change or resting period  Outcome: Progressing  Note: No new skin break down noted this shift.

## 2025-02-28 NOTE — DEATH NOTES
Death Pronouncement Note  Patient's Name: Leslee Maldonado   Patient's YOB: 1955  MRN Number: [id number]    Admitting Provider: Dominique Lopez DO  Attending Provider: Dominique Lopez DO    Patient was examined and the following were absent: Pulses, Blood Pressure, and Respiratory effort    I declared the patient dead on 2/28/2025 at 3:05 PM    Preliminary Cause of Death: Multi-organ failure with heart failure (HCC)     Electronically signed by Gt Govea DO on 2/28/25 at 3:07 PM EST

## 2025-02-28 NOTE — CONSULTS
ICU Consult Note    Admit Date: 1/28/2025  Hospital Day: 31  ICU Day: Patient does not have an ICU stay during this admission.  Vent Day: NA  IV Access: Peripheral, Central (port)  IV Fluids: None  Vasopressors: Levophed, Vasopressin  Antibiotics: Vancomycin, Merrem, voriconazole, atovaquone, valganciclovir  Diet: Diet NPO  PCP: Raj Cedeño MD  Code Status: Full Code    CC: Diarrhea    Interval history:  Early in the morning on 02/28, Ms. Maldonado became hypotensive and hypoxemic, requiring supplemental oxygen and IV fluids.     Her blood pressure did not improve after 1L of fluids and 2 push doses of phenylephrine, so she was started on norepinephrine gtt. Chest Xray showed possible interstitial edema vs atelectasis, but not overly concerning for pneumonia. New cultures were sent including blood x2, fungal blood cultures, urine, throat cultures. She was also started on stress-dose SoluCortef and her antibiotic regimen was escalated to include meropenem, she was already on vancomycin, atovaquone, voriconazole and valganciclovir.     As for her hypoxemic respiratory failure, she required up to 15L NRB. ABG done around 0900 showed a mild respiratory alkalosis with pO2 of 95.6. Her SpO2 then started to drop, she was placed on high flow and NRB running 15L of O2 in both. ABG after this showed a normal pH, with respiratory compensation for a metabolic acidosis, with pO2 of 456. Peripheral oxygen readings not currently correlating to her arterial oxygenation.     Morning lab values were significant for hyperkalemia, acute renal failure, lactic acidosis, significantly elevated procalcitonin, pancytopenia with WBC 0.1, Hgb 7.3, PLT 10.     With her renal failure, lactic acidosis, and hyperkalemia nephrology instructed us to start CRRT.    The ICU team was consulted for assistance with critical medical management.     Addendum:  After arterial line placement and VasCath placement, patient went into cardiac arrest  suggestive of an acute sepsis.  Lactic acid is elevated, and over the course of the morning metabolic acidosis noted to continue to markedly increase.  She maintains compensation or partial compensation with respiratory alkalosis.  No impairment of gas exchange seen.  Chest radiograph shows bibasilar atelectasis or interstitial changes.  Source of infection is not clear but broad-spectrum antibiotics are instituted.  Provoking process is severe neutropenia secondary to treatment for multiple myeloma.  She developed bradycardia, then asystole and underwent cardiopulmonary resuscitation.  Initiating cause was cardiac, she did not have a pulmonary event.  After third round of intervention with epinephrine and CPR, family requested no further interventions.  She was kept on supportive care, and  quietly with cardiac asystole.

## 2025-02-28 NOTE — PROCEDURES
PROCEDURE NOTE  Date: 2/28/2025   Name: Leslee Maldonado  YOB: 1955    CENTRAL LINE    Date/Time: 2/28/2025 1:36 PM    Performed by: Thea Morfin DO  Authorized by: Vivek Pearce MD  Consent: Written consent obtained.  Consent given by: patient  Test results: test results available and properly labeled  Patient identity confirmed: arm band  Time out: Immediately prior to procedure a \"time out\" was called to verify the correct patient, procedure, equipment, support staff and site/side marked as required.  Indications: Dialysis.    Anesthesia:  Local Anesthetic: lidocaine 1% without epinephrine    Sedation:  Patient sedated: no    Preparation: skin prepped with 2% chlorhexidine  Skin prep agent dried: skin prep agent completely dried prior to procedure  Sterile barriers: all five maximum sterile barriers used - cap, mask, sterile gown, sterile gloves, and large sterile sheet  Hand hygiene: hand hygiene performed prior to central venous catheter insertion  Location details: left femoral  Catheter size: 13 Fr.  Pre-procedure: landmarks identified  Ultrasound guidance: yes  Sterile ultrasound techniques: sterile gel and sterile probe covers were used  Number of attempts: 1  Successful placement: yes  Post-procedure: line sutured and dressing applied  Assessment: blood return through all ports  Patient tolerance: patient tolerated the procedure well with no immediate complications  Comments: EBL 5

## 2025-02-28 NOTE — PROGRESS NOTES
[facility] Progress Note    2025     Leslee Maldonado    MRN: [id number]    : 1955    Referring MD: Dominique Lopez,   [address]  [address]  [place] [address]    Interval History:    - Septic, on 2 pressors, may require intubation  - Surgery consulted for vas cath  - Nephrology placed orders for CRRT  - AMS, stat head ct pending   - Increasing abd distention, KUB pending  - Critical care consulted    ECOG PS:  (2) Ambulatory and capable of self care, unable to carry out work activity, up and about > 50% or waking hours    The patient had diarrhea yesterday, but she states it was limited to a few hours in the afternoon.  She did not have any in the middle of the night or the rest of the day.    KPS: 70% Cares for self; unable to carry on normal activity or to do active work    Isolation: None    Medications    Scheduled Meds:   sodium chloride  500 mL IntraVENous Once    cefepime  2,000 mg IntraVENous Once    Followed by    cefepime  2,000 mg IntraVENous Q12H    sodium chloride  500 mL IntraVENous Once    inFLIXimab/infliximab biosimilar  10 mg/kg IntraVENous Once    octreotide  100 mcg SubCUTAneous Daily    [Held by provider] furosemide  40 mg IntraVENous Daily    vancomycin (VANCOCIN) 1,500 mg in sodium chloride 0.9 % 250 mL IVPB ([id number])  1,500 mg IntraVENous Q24H    predniSONE  15 mg Oral Daily    [START ON 3/1/2025] predniSONE  5 mg Oral Once    potassium bicarb-citric acid  40 mEq Oral BID    diphenoxylate-atropine  2 tablet Oral 4x daily    spironolactone  25 mg Oral Daily    ruxolitinib phosphate  5 mg Oral BID    atovaquone  1,500 mg Oral Daily    valGANciclovir  900 mg Oral BID    vitamin D  50,000 Units Oral Weekly    budesonide  3 mg Oral TID    loperamide  4 mg Oral 4x Daily    insulin lispro  0-4 Units SubCUTAneous 4x Daily AC & HS    thiamine  100 mg Oral Daily    multivitamin  1 tablet Oral Daily    voriconazole  200 mg Oral 2 times per day    sodium chloride flush  5-40 mL  colon, forceps biopsy: Multiple fragments of benign colonic mucosa with increased edema and chronic inflammation of the lamina propria with focal area of acute   colitis with acute cryptitis. Negative for microscopic colitis, granulomas and dysplasia/malignancy. CMV negative on biopsy.  - CT abd /pelvis 1/29/25:  Questionable wall thickening within the colon at the splenic flexure could represent peristalsis or mass. Correlate with colonoscopy when able. Fluid extending into the distal rectum compatible with diarrhea.  Colonic diverticulosis   - Stool for viral studies:  Negative  - Repeat c.diff: Negative    PLAN:  Steroid Taper:       - Decrease by 20% every 7 days: Consider more rapid taper as she may be refractory to steroids/and with CMV reactivation- see below  - Solu-medrol 70 mg daily 1/29/25-2/8/25  - Prednisone 70 mg daily started 2/10/25-2/16/25  - Prednisone 55 mg daily 2/17/25- 2/20/25      - Decrease by 10 mg every 3 days:  - Prednisone 45 mg daily 2/21/25-2/23/25   - Prednisone 35 mg daily 2/24/25-2/26/25        - Decrease by 10 mg daily starting 2/27/25  - Prednisone 25 mg 2/27/25   - Prednisone 15 mg 2/28/25  - Prednisone 5 mg 3/1/25     -  Octreotide 100 mg q8h 2/7/25--> decreased to q12h 2/17/25--> receive 2 doses 2/18/25, 1 dose 2/19/25 and then stop--> resumed 100 mcg Q12 hours started 2/25/25--> Decreased to daily starting 2/27/25  - Scheduled lomotil- increased to max dosing per GI starting 2/24  - Imodium- schedule starting 2/5/25  - Budesonide 3 mg TID 2/7/25   - Infliximab weekly (4-8 total doses)   - Dose #2 2/7/25  - Dose #2: 2/13/25  - Dose #3: 2/21/25  - Start jakafi 5 mg bid (2/21/25)  - Order for Sprycel submitted- denied, appeal pending (No plan to start yet)  - GI consulted 2/24/25 for medication recs  Abd Distention:  - KUB 2/28/25: Pending    7.  Endo  - Type 2 DM:  well-controlled with diet  - Accu checks ACHS with addition of steroids  - Low dose SSI     8.  Cardiac:  stable  Hx

## 2025-02-28 NOTE — PLAN OF CARE
Problem: Safety - Adult  Goal: Free from fall injury  2/28/2025 1612 by Casey Canas, RN  Note: Pt is a high fall risk (see [facility] Fall Risk assessment).  Oriented pt to room and call light. Instructed pt to call for help when needed.  Call light and personal items within reach.  Bed in lowest position, brakes on, and 2/4 side rails up.  Non-skid footwear on. Falls risk stop sign on door; hourly visual checks implemented.  Falls risk arm band on pt.  Bed alarm is on.  Pt using call light appropriately.  Will continue to monitor.       Problem: Hematologic - Adult  Goal: Maintains hematologic stability  2/28/2025 1612 by Casey Canas, RN  Note: Patient's hemoglobin this AM:   Recent Labs     02/28/25  1421   HGB 5.0*     Patient's platelet count this AM:   Recent Labs     02/28/25  1421   PLT 42*    Thrombocytopenia Precautions in place.  Patient showing no signs or symptoms of active bleeding.  Patient transfused blood products per orders - see flowsheet.  Patient verbalizes understanding of all instructions. Will continue to assess and implement POC. Call light within reach and hourly rounding in place.      Problem: Skin/Tissue Integrity - Adult  Goal: Skin integrity remains intact  Description: 1.  Monitor for areas of redness and/or skin breakdown  2.  Assess vascular access sites hourly  3.  Every 4-6 hours minimum:  Change oxygen saturation probe site  4.  Every 4-6 hours:  If on nasal continuous positive airway pressure, respiratory therapy assess nares and determine need for appliance change or resting period  2/28/2025 1612 by Casey Canas, RN  Note: Turns q 2 hour with pillow support.  Frequent hygiene, frequent repositioning.  Pillow support.

## 2025-02-28 NOTE — PROCEDURES
PROCEDURE NOTE  Date: 2/28/2025   Name: Leslee Maldonado  YOB: 1955    VASCULAR CATHETER PLACEMENT    Date/Time: 2/28/2025 12:50 PM    Performed by: Thea Morfin DO  Authorized by: Vivek Pearce MD  Consent: Verbal consent obtained. Written consent obtained.  Risks and benefits: risks, benefits and alternatives were discussed  Consent given by: Daughter.  Patient understanding: patient states understanding of the procedure being performed (Daughter)  Patient consent: the patient's understanding of the procedure matches consent given (Daughter)  Procedure consent: procedure consent matches procedure scheduled  Relevant documents: relevant documents present and verified  Test results: test results available and properly labeled  Site marked: the operative site was marked  Imaging studies: imaging studies available  Required items: required blood products, implants, devices, and special equipment available  Patient identity confirmed: arm band, hospital-assigned identification number and provided demographic data  Time out: Immediately prior to procedure a \"time out\" was called to verify the correct patient, procedure, equipment, support staff and site/side marked as required.  Indications: vascular access  Anesthesia: see MAR for details    Anesthesia:  Local Anesthetic: lidocaine 1% without epinephrine    Sedation:  Patient sedated: no    Preparation: skin prepped with ChloraPrep  Skin prep agent dried: skin prep agent completely dried prior to procedure  Sterile barriers: all five maximum sterile barriers used - cap, mask, sterile gown, sterile gloves, and large sterile sheet  Hand hygiene: hand hygiene performed prior to central venous catheter insertion  Location details: left femoral  Site selection rationale: Left femoral access is the preferred due to right subclavian port and inability to lay patient flat to access the neck  Patient position: reverse Trendelenburg  Catheter type: triple  lumen  Catheter size: 13 Fr.  Pre-procedure: landmarks identified  Ultrasound guidance: yes  Sterile ultrasound techniques: sterile gel and sterile probe covers were used  Number of attempts: 2  Successful placement: yes  Post-procedure: line sutured and dressing applied  Assessment: blood return through all ports  Patient tolerance: patient tolerated the procedure well with no immediate complications  Comments: EBL: 10 mL                Thea Morfin DO  PGY1, General Surgery  02/28/25  3:04 PM  [phone]

## 2025-02-28 NOTE — PROCEDURES
PROCEDURE NOTE  Date: 2/28/2025   Name: Leslee Maldonado  YOB: 1955    Insert Arterial Line    Date/Time: 2/28/2025 12:13 PM    Performed by: Gt Govea DO  Authorized by: Prudencio Love MD  Consent: Verbal consent obtained. Written consent obtained.  Risks and benefits: risks, benefits and alternatives were discussed  Consent given by: patient and power of   Patient understanding: patient states understanding of the procedure being performed  Patient consent: the patient's understanding of the procedure matches consent given  Procedure consent: procedure consent matches procedure scheduled  Relevant documents: relevant documents present and verified  Test results: test results available and properly labeled  Site marked: the operative site was marked  Imaging studies: imaging studies available  Required items: required blood products, implants, devices, and special equipment available  Patient identity confirmed: verbally with patient, provided demographic data and hospital-assigned identification number  Time out: Immediately prior to procedure a \"time out\" was called to verify the correct patient, procedure, equipment, support staff and site/side marked as required.  Preparation: Patient was prepped and draped in the usual sterile fashion.  Indications: hemodynamic monitoring  Location: left radial  Anesthesia: local infiltration    Anesthesia:  Local Anesthetic: lidocaine 2% without epinephrine    Sedation:  Patient sedated: no    Leonel's test normal: yes  Needle gauge: 20  Seldinger technique: Seldinger technique used  Number of attempts: 1  Post-procedure: line sutured and dressing applied  Post-procedure CMS: normal and unchanged  Patient tolerance: patient tolerated the procedure well with no immediate complications  Comments: Less than 10mL EBL.

## 2025-02-28 NOTE — CONSULTS
AND PLAN:  RHYS VILLARREAL is a 69 y.o. female recently admitted on 1/28/2025 with IgG Lambda Multiple Myeloma.  Patient requires vascular catheter line placement to begin CRRT.  Therefore general surgery has been consulted for assistance with access.     Repeat blood work after 1st bag shows PLT 43, PT 22.3, and INR 1.95  Patient currently receiving 2nd bag of Platelets  Plan to attempt line placement of RIJ (primary preferred site)  Consent obtained by family member and in chart  Cases seen and discussed with senior resident and Dr. Pearce.     Thea Morfin DO  PGY1, General Surgery  02/28/25  11:31 AM  [phone]        I reviewed with the resident the medical history and the resident's findings on the physical examination.  I discussed with the resident the patient's diagnosis and concur with the plan.    Vivek Pearce M.D.  2/28/25   12:15 PM

## 2025-02-28 NOTE — DISCHARGE SUMMARY
[facility] Discharge Summary             Attending Physician: Dominique Lopez DO    Referring MD: Dominique Lopez DO  [address]  [address]  [place] [id number]    Name: Leslee Maldonado :  1955  MRN:  [id number]    Admission: 2025   Discharge:   2025    Date: 2025    Diagnosis on admit: Severe diarrhea and weight loss     Consultations:   Critical Care  GI      Reason for Admission: Severe diarrhea and weight loss     Hospital Course: Eliz is a 69-year-old female with history of refractory IgG lambda multiple myeloma, coronary disease who recently underwent CAR-T cell therapy with Carvykti on . She had been doing well overall post transplant, except for ongoing diarrhea that was initially manageable at home. 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 It has been progressively getting worse. She is not able to eat well due to the diarrhea. GI was consulted and EGD and colonoscopy was performed on 2025 showing only increased edema and inflammation. C diff panel and GI pathogens panel was negative. She was started on high dose steroids but was refractory and was tapering off. She was started on Infliximab and as of 25 had received 3 doses. She was also on Jakafi, Octreotide, Lomotil, and Loperamide. She was still having large amounts of diarrhea until 25 when her output began to decrease significantly, from multiple liters per day down to 200 mls in 24 hours. On 25 she was started on Macrobid for enterococcus UTI. This was changed to Vancomycin on 25 with concerns for cellulitis to BLE extremities.     Into the morning of 25 she became febrile and at that time was also neutropenic. She was pan cultured and started on Cefepime in addition to the Vancomycin. 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)  CMV Viremia       TREATMENT:           IgG Lambda Multiple Myeloma:  8/29 - 11/2/18VRd x 4 cycles  2/7/19ASCT s/p high-dose Melpahlan (200 mg/m2)  5/11/19 - 6/2019Revlimid 10 mg qd maintenance with ASA qd    - Held in early 6/2019 d/t MI   7/11/19 - 3/31/20Velcade maintenance    - Stopped d/t progression  5/5/20  C1D1 of Roxi/Carfilzomib/Dex x 4 cycles   8/26/20Dara maintenance  11/4/20 - 2/23/22Dara q4w x 19 cycles    - Stopped d/t progression  3/11/22C1D1 Leia/Pom/Dex x 8 cycles  10/28/22 - 6/26/24Pom maintenance    - Stopped d/t progression  9/18/24Dara/Kyprolis/Dex as a bridge to Carvykti  10/23 - 10/25/24Lymphodepletion - fludarabine & cyclophosphamide  10/28/24          Carvykti (ID #:  [id number])          ASSESSMENT AND PLAN:           See hospital course above.     After multiple instances of ROSC and subsequent pulse loss, the family decided to change code status to DNR-CCA and stop all life-prolonging efforts. Patient was declared dead on 2/28/2025 at 1503 by Dr. Gt Govea.     This discharge summary and plan was discussed and agreed upon with Dr. Lopez.    Jeana Todd, APRN - CNP

## 2025-02-28 NOTE — PROGRESS NOTES
[facility] - Clinical Pharmacy Note - Renal Dosing    Cefepime ordered for treatment of Febrile Neutropenia. Per [facility] Renal Dose Adjustment Policy, Cefepime 2000 mg Q8H will be changed to 2000 mg Q12H.     Estimated Creatinine Clearance: Estimated Creatinine Clearance: 31 mL/min (A) (based on SCr of 1.7 mg/dL (H)).  Dialysis Status, NERY, CKD: NERY  BMI: Body mass index is 32.23 kg/m².    Rationale for Adjustment: Agent is renally eliminated.    Pharmacy will continue to monitor renal function, cultures and sensitivities (where available) and adjust dose as necessary.      Please call with any questions.      Brigid Machuca, PharmD  [id number] (Main Pharmacy)

## 2025-03-01 PROBLEM — A41.9 SEPTIC SHOCK (HCC): Status: ACTIVE | Noted: 2025-03-01

## 2025-03-01 PROBLEM — R65.21 SEPTIC SHOCK (HCC): Status: ACTIVE | Noted: 2025-03-01

## 2025-03-01 LAB
BACTERIA BLD CULT ORG #2: NORMAL
IRON SATN MFR SERPL: 14 % (ref 15–50)
IRON SERPL-MCNC: 14 UG/DL (ref 37–145)
LOEFFLER MB STN SPEC: NORMAL
REPORT: NORMAL
T3FREE SERPL-MCNC: 1.6 PG/ML (ref 2.3–4.2)
TIBC SERPL-MCNC: 98 UG/DL (ref 260–445)

## 2025-03-02 LAB
BACTERIA BLD CULT: ABNORMAL
BACTERIA THROAT AEROBE CULT: NORMAL
ORGANISM: ABNORMAL
ORGANISM: ABNORMAL

## 2025-03-03 LAB
BACTERIA BLD CULT: ABNORMAL
BACTERIA BLD CULT: ABNORMAL
ORGANISM: ABNORMAL
ORGANISM: ABNORMAL

## 2025-03-04 LAB
BACTERIA BLD CULT ORG #2: NORMAL
BLOOD BANK DISPENSE STATUS: NORMAL
BLOOD BANK PRODUCT CODE: NORMAL
BPU ID: NORMAL
DESCRIPTION BLOOD BANK: NORMAL

## 2025-03-17 LAB
FUNGUS BLD CULT: NORMAL
FUNGUS SPEC CULT: NORMAL
LOEFFLER MB STN SPEC: NORMAL

## 2025-03-19 LAB
FUNGUS BLD CULT: ABNORMAL
FUNGUS BLD CULT: ABNORMAL
ORGANISM: ABNORMAL

## 2025-03-24 LAB
FUNGUS BLD CULT: NORMAL
FUNGUS SPEC CULT: NORMAL
LOEFFLER MB STN SPEC: NORMAL

## 2025-03-26 LAB
FUNGUS BLD CULT: ABNORMAL
FUNGUS BLD CULT: ABNORMAL
ORGANISM: ABNORMAL

## 2025-03-31 LAB
FUNGUS BLD CULT: NORMAL
FUNGUS SPEC CULT: NORMAL
LOEFFLER MB STN SPEC: NORMAL

## 2025-04-02 LAB
FUNGUS BLD CULT: ABNORMAL
FUNGUS BLD CULT: ABNORMAL
ORGANISM: ABNORMAL

## (undated) DEVICE — COVER LT HNDL CAM BLU DISP W/ SURG CTRL

## (undated) DEVICE — DRAPE C ARM UNIV W41XL74IN CLR PLAS XR VELC CLSR POLY STRP

## (undated) DEVICE — CATHETER ETER INTROCAN PERIPH IV 075 IN 24G SFTY SHLD MAT PUR WNG

## (undated) DEVICE — CAP IV VLV LUER ACCS DISINFECT SWABPACK XT

## (undated) DEVICE — CATHETER,URETHRAL,REDRUBBER,STRL,18FR: Brand: MEDLINE

## (undated) DEVICE — CONNECTOR PERF L5 IN OD1 2 IN SAT HCT ST FEATURING B CARE 5

## (undated) DEVICE — ELECTRODE PT RET AD L9FT HI MOIST COND ADH HYDRGEL CORDED

## (undated) DEVICE — Device: Brand: JELCO

## (undated) DEVICE — SYRINGE IRRIG 60ML SFT PLIABLE BLB EZ TO GRP 1 HND USE W/

## (undated) DEVICE — SUTURE ABSORBABLE BRAIDED 2-0 CT-1 27 IN UD VICRYL J259H

## (undated) DEVICE — TUBING SUCT L12FT DIA025IN UNIV W SCALLOPED FEM CONN

## (undated) DEVICE — DRESSING GERM DIA1IN CNTR H DIA7MM BLU CHG ANTIMIC PROTCT

## (undated) DEVICE — SURGICAL SET UP - SURE SET: Brand: MEDLINE INDUSTRIES, INC.

## (undated) DEVICE — STRIP,CLOSURE,WOUND,MEDI-STRIP,1/2X4: Brand: MEDLINE

## (undated) DEVICE — SUTURE MCRYL SZ 3-0 L27IN ABSRB UD L19MM PS-2 3/8 CIR PRIM Y427H

## (undated) DEVICE — SUTURE PROL SZ 4-0 L36IN NONABSORBABLE BLU L26MM SH 1/2 CIR 8521H

## (undated) DEVICE — KIT PROC 2 SPRY APPL 11:1

## (undated) DEVICE — SYRINGE MED 10ML TRNSLUC BRL PLUNG BLK MRK POLYPR CTRL

## (undated) DEVICE — [HIGH FLOW INSUFFLATOR,  DO NOT USE IF PACKAGE IS DAMAGED,  KEEP DRY,  KEEP AWAY FROM SUNLIGHT,  PROTECT FROM HEAT AND RADIOACTIVE SOURCES.]: Brand: PNEUMOSURE

## (undated) DEVICE — SUTURE MCRYL SZ 4-0 L27IN ABSRB UD L19MM PS-2 1/2 CIR PRIM Y426H

## (undated) DEVICE — CATHETER ETER URIN 18FR ROB NELATON RED RUB ALL PURP

## (undated) DEVICE — GLOVE ORANGE PI 7 1/2   MSG9075

## (undated) DEVICE — CANNULA VES L25IN RADPQ BODY W  1 W VLV 3MM BLNT TIP DLP

## (undated) DEVICE — STERNUM BLADE, OFFSET (31.7 X 0.64 X 6.3MM)

## (undated) DEVICE — DRESSING FOAM DISK DIA1IN H 7MM HYDRPHLC CHG IMPREG IN SL

## (undated) DEVICE — YANKAUER SUCTION INSTRUMENT NO CONTROL VENT, BULB TIP, CLEAR: Brand: YANKAUER

## (undated) DEVICE — TOWEL,OR,DSP,ST,BLUE,DLX,8/PK,10PK/CS: Brand: MEDLINE

## (undated) DEVICE — SOLUTION IV 1000ML 0.9% SOD CHL

## (undated) DEVICE — SKIN AFFIX SURG ADHESIVE 72/CS 0.55ML: Brand: MEDLINE

## (undated) DEVICE — COVER US PRB W15XL244CM ST SURGICAL/INTRAOPERATIVE W/

## (undated) DEVICE — LEAD PACE 2.6FR L50CM UPLR TEMP ATR NONSTEROID ELUT PIN

## (undated) DEVICE — TUBING, SUCTION, 1/4" X 12', STRAIGHT: Brand: MEDLINE

## (undated) DEVICE — CANNULA ART 24FR OVERALL L105IN VENT CONN 3 8IN MTL TIP W

## (undated) DEVICE — WAX SURG 2.5GM HEMSTAT BNE BEESWAX PARAFFIN ISO PALMITATE

## (undated) DEVICE — 3M™ STERI-STRIP™ REINFORCED ADHESIVE SKIN CLOSURES, R1547, 1/2 IN X 4 IN (12 MM X 100 MM), 6 STRIPS/ENVELOPE: Brand: 3M™ STERI-STRIP™

## (undated) DEVICE — SET PROC STD VOL BOWL SUCT ASMBLY GS FLTR BLD COLLCTN RESVR

## (undated) DEVICE — CANNULA PERF L15IN DIA29FR VEN 3 STG THN WALL DSGN W  VENT

## (undated) DEVICE — SUTURE PERMA-HAND 0 L18IN NONABSORBABLE BLK SILK BRAID W/O SA66G

## (undated) DEVICE — ELECTROSURGICAL PENCIL ROCKER SWITCH NON COATED BLADE ELECTRODE 10 FT (3 M) CORD HOLSTER: Brand: MEGADYNE

## (undated) DEVICE — RETRACTOR SURG INSRT SUT HLD OCTOBASE

## (undated) DEVICE — TURNOVER KIT RM INF CTRL TECH

## (undated) DEVICE — GLOVE SURG SZ 7 L12IN FNGR THK87MIL WHT LTX FREE

## (undated) DEVICE — CHLORAPREP 26ML ORANGE

## (undated) DEVICE — SURE SET-DOUBLE BASIN-LF: Brand: MEDLINE INDUSTRIES, INC.

## (undated) DEVICE — BAG PRSS INFUS 500ML NYL NETTED BK VISIBLE COLOR-CODED G L

## (undated) DEVICE — SUTURE NONABSORBABLE MONOFILAMENT 4-0 RB-1 36 IN BLU PROLENE 8557H

## (undated) DEVICE — 1.5L THIN WALL CAN: Brand: CRD

## (undated) DEVICE — STANDARD HYPODERMIC NEEDLE,POLYPROPYLENE HUB: Brand: MONOJECT

## (undated) DEVICE — GOWN,BREATHABLE SLV,AURORA,XLG,STRL: Brand: MEDLINE

## (undated) DEVICE — HICKMAN TRIFUSION TRIPLE-LUMEN LONG-TERM CENTRAL VENOUS CATHETER 12F INTERMEDIATE TRAY (23CM): Brand: HICKMAN TRIFUSION

## (undated) DEVICE — BANDAGE PRIMAPORE POLY ACRYLIC 35X12CM

## (undated) DEVICE — PUNCH AORT DIA4MM LNG HNDL

## (undated) DEVICE — DRAPE SLUSH W44XL66IN FOR RECT BSIN ORS HUSH SYS PLATE-DRP

## (undated) DEVICE — TIP APPL TOP 2 SPRY

## (undated) DEVICE — Z INACTIVE USE 2641838 CLIP INT L ORNG TI TRNSVRS GRV CHEVRON SHP W/ PRECIS TIP TO

## (undated) DEVICE — GARMENT,MEDLINE,DVT,INT,CALF,MED, GEN2: Brand: MEDLINE

## (undated) DEVICE — COVER LT HNDL BLU PLAS

## (undated) DEVICE — SET PERF L15IN BLU CLMP MULT FEM LUER ON SGL INLET LEG DLP

## (undated) DEVICE — Device

## (undated) DEVICE — E-Z CLEAN, NON-STICK, PTFE COATED, ELECTROSURGICAL BLADE ELECTRODE, 2.5 INCH (6.35 CM): Brand: EZ CLEAN

## (undated) DEVICE — SUTURE PERMA-HAND SZ 0 L18IN NONABSORBABLE BLK L30MM FSL 678G

## (undated) DEVICE — GOWN,SIRUS,POLYRNF,BRTHSLV,LG,30/CS: Brand: MEDLINE

## (undated) DEVICE — SURGICAL PROCEDURE PACK PERF TBNG

## (undated) DEVICE — SUTURE SZ 7 L18IN NONABSORBABLE SIL CCS L48MM 1/2 CIR STRNM M655G

## (undated) DEVICE — SUTURE ETHBND EXCEL SZ 0 L18IN NONABSORBABLE GRN L36MM CT-1 CX21D

## (undated) DEVICE — LOOP,VESSEL,MAXI,BLUE,2/PK,STERILE: Brand: MEDLINE

## (undated) DEVICE — SOLUTION IV SODIUM CHL 0.9% 500 ML INJ FLX CONTAINER

## (undated) DEVICE — INTENDED FOR TISSUE SEPARATION, AND OTHER PROCEDURES THAT REQUIRE A SHARP SURGICAL BLADE TO PUNCTURE OR CUT.: Brand: BARD-PARKER ® CARBON RIB-BACK BLADES

## (undated) DEVICE — SUTURE PROL SZ 2-0 L36IN NONABSORBABLE BLU SH L26MM 1/2 CIR 8523H

## (undated) DEVICE — ORTHO PRE OP PACK: Brand: MEDLINE INDUSTRIES, INC.

## (undated) DEVICE — SURGICAL PROCEDURE PACK PROC SMARTPREP 2

## (undated) DEVICE — CONNECTOR PERF 3/8X3/8X3/8IN EQL WYE

## (undated) DEVICE — BLOOD TRANSFUSION FILTER: Brand: HAEMONETICS

## (undated) DEVICE — CANNULA PERF 7FR L5.5IN AORT ROOT RADPQ STD TIP W/ VENT LN

## (undated) DEVICE — SUTURE PDS II SZ 0 L27IN ABSRB VLT L36MM CT-1 1/2 CIR Z340H

## (undated) DEVICE — SUTURE ETHBND EXCEL SZ 2-0 L36IN NONABSORBABLE GRN SH-2 X559H

## (undated) DEVICE — CATHETER THORACENTHESIS 9 FRX20 IN EYES TOP

## (undated) DEVICE — CATHETERIZATION TRAY 16 FR 5 CC FOL STR TIP URIMTR BARDX IC

## (undated) DEVICE — RADIFOCUS GLIDEWIRE: Brand: GLIDEWIRE

## (undated) DEVICE — KIT BLWR MISTER 5P 15L W/ TBNG SET IRRIG MIST TO IMPROVE

## (undated) DEVICE — FORCEPS BX L240CM JAW DIA2.4MM ORNG L CAP W/ NDL DISP RAD

## (undated) DEVICE — VASOVIEW 2 HEMOPRO MIN ORD 5 EA

## (undated) DEVICE — CATHETER THOR 28FR L23CM DIA9.3MM POLYVI CHL TAPR CONN TIP

## (undated) DEVICE — SYRINGE MED 10ML POLYPR LUERSLIP TIP FLAT TOP W/O SFTY DISP

## (undated) DEVICE — PRE OP PACK: Brand: MEDLINE INDUSTRIES, INC.

## (undated) DEVICE — SUTURE VCRL SZ 3-0 L36IN ABSRB UD L36MM CT-1 1/2 CIR J944H

## (undated) DEVICE — SYRINGE, LUER LOCK, 10ML: Brand: MEDLINE

## (undated) DEVICE — SUTURE PROL SZ 7-0 L24IN NONABSORBABLE BLU L8MM BV175-6 3/8 8735H

## (undated) DEVICE — AIR SHEET,LAT,COMFORT GLIDE, BLEND 40X80: Brand: MEDLINE

## (undated) DEVICE — FOGARTY - HYDRAGRIP SURGICAL - CLAMP INSERTS: Brand: FOGARTY SOFTJAW

## (undated) DEVICE — DRAPE,T,LAPARO,TRANS,STERILE: Brand: MEDLINE

## (undated) DEVICE — CUFF RESTRN WRST OR ANK 45FT AD FOAM

## (undated) DEVICE — CONNECTOR PERF W3/8XH0.5IN BASE Y SHP REDUC W/O LUERLOCK

## (undated) DEVICE — FILTER OXGNTR GAS BACT VIR REMOVAL W/ 1/4 INLET

## (undated) DEVICE — CONNECTOR IV PRIMING 0.19ML 0 REFLX NO FLTR MAX0

## (undated) DEVICE — GAUZE,SPONGE,4"X4",16PLY,XRAY,STRL,LF: Brand: MEDLINE

## (undated) DEVICE — DRAPE,LAP,CHOLE,W/TROUGHS,STERILE: Brand: MEDLINE

## (undated) DEVICE — SUTURE PERMA-HAND SZ 2 L60IN NONABSORBABLE BLK SILK BRAID SA8H

## (undated) DEVICE — DRAIN SURG SGL COLL PT TB FOR ATS BG OASIS

## (undated) DEVICE — SUTURE NONABSORBABLE MONOFILAMENT 6-0 C-1 1X30 IN PROLENE 8706H

## (undated) DEVICE — E-Z CLEAN, NON-STICK, PTFE COATED, ELECTROSURGICAL BLADE ELECTRODE, 4 INCH (10.2 CM): Brand: MEGADYNE